# Patient Record
Sex: FEMALE | Race: WHITE | NOT HISPANIC OR LATINO | Employment: OTHER | URBAN - NONMETROPOLITAN AREA
[De-identification: names, ages, dates, MRNs, and addresses within clinical notes are randomized per-mention and may not be internally consistent; named-entity substitution may affect disease eponyms.]

---

## 2017-01-10 ENCOUNTER — OFFICE VISIT (OUTPATIENT)
Dept: RETAIL CLINIC | Facility: CLINIC | Age: 62
End: 2017-01-10

## 2017-01-10 VITALS
HEIGHT: 62 IN | BODY MASS INDEX: 27.94 KG/M2 | WEIGHT: 151.8 LBS | TEMPERATURE: 98.2 F | OXYGEN SATURATION: 97 % | HEART RATE: 93 BPM | RESPIRATION RATE: 20 BRPM

## 2017-01-10 DIAGNOSIS — J06.9 VIRAL UPPER RESPIRATORY TRACT INFECTION: Primary | ICD-10-CM

## 2017-01-10 PROCEDURE — 99213 OFFICE O/P EST LOW 20 MIN: CPT | Performed by: NURSE PRACTITIONER

## 2017-01-10 RX ORDER — FLUTICASONE PROPIONATE 50 MCG
2 SPRAY, SUSPENSION (ML) NASAL DAILY
Qty: 1 BOTTLE | Refills: 0 | Status: SHIPPED | OUTPATIENT
Start: 2017-01-10 | End: 2017-01-24 | Stop reason: SDUPTHER

## 2017-01-10 RX ORDER — GUAIFENESIN, PSEUDOEPHEDRINE HYDROCHLORIDE 600; 60 MG/1; MG/1
1 TABLET, EXTENDED RELEASE ORAL EVERY 12 HOURS
Qty: 10 TABLET | Refills: 0 | Status: SHIPPED | OUTPATIENT
Start: 2017-01-10 | End: 2017-02-07

## 2017-01-10 RX ORDER — NATEGLINIDE 60 MG/1
120 TABLET ORAL
COMMUNITY
End: 2017-02-26 | Stop reason: SDUPTHER

## 2017-01-10 RX ORDER — LORATADINE 10 MG/1
10 TABLET ORAL DAILY
Qty: 30 TABLET | Refills: 0 | Status: SHIPPED | OUTPATIENT
Start: 2017-01-10 | End: 2017-01-24 | Stop reason: SDUPTHER

## 2017-01-10 RX ORDER — ALLOPURINOL 100 MG/1
300 TABLET ORAL DAILY
COMMUNITY
End: 2017-02-26 | Stop reason: SDUPTHER

## 2017-01-10 NOTE — PATIENT INSTRUCTIONS
"Upper Respiratory Infection, Adult  Most upper respiratory infections (URIs) are a viral infection of the air passages leading to the lungs. A URI affects the nose, throat, and upper air passages. The most common type of URI is nasopharyngitis and is typically referred to as \"the common cold.\"  URIs run their course and usually go away on their own. Most of the time, a URI does not require medical attention, but sometimes a bacterial infection in the upper airways can follow a viral infection. This is called a secondary infection. Sinus and middle ear infections are common types of secondary upper respiratory infections.  Bacterial pneumonia can also complicate a URI. A URI can worsen asthma and chronic obstructive pulmonary disease (COPD). Sometimes, these complications can require emergency medical care and may be life threatening.   CAUSES  Almost all URIs are caused by viruses. A virus is a type of germ and can spread from one person to another.   RISKS FACTORS  You may be at risk for a URI if:   · You smoke.    · You have chronic heart or lung disease.  · You have a weakened defense (immune) system.    · You are very young or very old.    · You have nasal allergies or asthma.  · You work in crowded or poorly ventilated areas.  · You work in health care facilities or schools.  SIGNS AND SYMPTOMS   Symptoms typically develop 2-3 days after you come in contact with a cold virus. Most viral URIs last 7-10 days. However, viral URIs from the influenza virus (flu virus) can last 14-18 days and are typically more severe. Symptoms may include:   · Runny or stuffy (congested) nose.    · Sneezing.    · Cough.    · Sore throat.    · Headache.    · Fatigue.    · Fever.    · Loss of appetite.    · Pain in your forehead, behind your eyes, and over your cheekbones (sinus pain).  · Muscle aches.    DIAGNOSIS   Your health care provider may diagnose a URI by:  · Physical exam.  · Tests to check that your symptoms are not due to " another condition such as:  ¨ Strep throat.  ¨ Sinusitis.  ¨ Pneumonia.  ¨ Asthma.  TREATMENT   A URI goes away on its own with time. It cannot be cured with medicines, but medicines may be prescribed or recommended to relieve symptoms. Medicines may help:  · Reduce your fever.  · Reduce your cough.  · Relieve nasal congestion.  HOME CARE INSTRUCTIONS   · Take medicines only as directed by your health care provider.    · Gargle warm saltwater or take cough drops to comfort your throat as directed by your health care provider.  · Use a warm mist humidifier or inhale steam from a shower to increase air moisture. This may make it easier to breathe.  · Drink enough fluid to keep your urine clear or pale yellow.    · Eat soups and other clear broths and maintain good nutrition.    · Rest as needed.    · Return to work when your temperature has returned to normal or as your health care provider advises. You may need to stay home longer to avoid infecting others. You can also use a face mask and careful hand washing to prevent spread of the virus.  · Increase the usage of your inhaler if you have asthma.    · Do not use any tobacco products, including cigarettes, chewing tobacco, or electronic cigarettes. If you need help quitting, ask your health care provider.  PREVENTION   The best way to protect yourself from getting a cold is to practice good hygiene.   · Avoid oral or hand contact with people with cold symptoms.    · Wash your hands often if contact occurs.    There is no clear evidence that vitamin C, vitamin E, echinacea, or exercise reduces the chance of developing a cold. However, it is always recommended to get plenty of rest, exercise, and practice good nutrition.   SEEK MEDICAL CARE IF:   · You are getting worse rather than better.    · Your symptoms are not controlled by medicine.    · You have chills.  · You have worsening shortness of breath.  · You have brown or red mucus.  · You have yellow or brown nasal  discharge.  · You have pain in your face, especially when you bend forward.  · You have a fever.  · You have swollen neck glands.  · You have pain while swallowing.  · You have white areas in the back of your throat.  SEEK IMMEDIATE MEDICAL CARE IF:   · You have severe or persistent:    Headache.    Ear pain.    Sinus pain.    Chest pain.  · You have chronic lung disease and any of the following:    Wheezing.    Prolonged cough.    Coughing up blood.    A change in your usual mucus.  · You have a stiff neck.  · You have changes in your:    Vision.    Hearing.    Thinking.    Mood.  MAKE SURE YOU:   · Understand these instructions.  · Will watch your condition.  · Will get help right away if you are not doing well or get worse.     This information is not intended to replace advice given to you by your health care provider. Make sure you discuss any questions you have with your health care provider.     Document Released: 06/13/2002 Document Revised: 05/03/2016 Document Reviewed: 03/25/2015  [employer] Interactive Patient Education ©2016 [employer]
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
## 2017-01-10 NOTE — MR AVS SNAPSHOT
Breanna Kaba   1/10/2017 2:30 PM   Office Visit    Dept Phone:  [phone]   Encounter #:  [id number]    Provider:  GREG OLIVAREZ   Department:  [facility]                Your Full Care Plan              Today's Medication Changes          These changes are accurate as of: 1/10/17  2:48 PM.  If you have any questions, ask your nurse or doctor.               New Medication(s)Ordered:     fluticasone 50 MCG/ACT nasal spray   Commonly known as:  FLONASE   2 sprays into each nostril Daily for 30 days. Administer 2 sprays in each nostril for each dose.       loratadine 10 MG tablet   Commonly known as:  CLARITIN   Take 1 tablet by mouth Daily for 30 days.       pseudoephedrine-guaifenesin  MG per 12 hr tablet   Commonly known as:  MUCINEX D   Take 1 tablet by mouth Every 12 (Twelve) Hours.            Where to Get Your Medications      These medications were sent to [id number] [facility] [address] [place] [id number]     Phone:  [phone]     fluticasone 50 MCG/ACT nasal spray    loratadine 10 MG tablet    pseudoephedrine-guaifenesin  MG per 12 hr tablet                  Your Updated Medication List          This list is accurate as of: 1/10/17  2:48 PM.  Always use your most recent med list.                allopurinol 100 MG tablet   Commonly known as:  ZYLOPRIM       diclofenac 1 % gel gel   Commonly known as:  VOLTAREN       fluticasone 50 MCG/ACT nasal spray   Commonly known as:  FLONASE   2 sprays into each nostril Daily for 30 days. Administer 2 sprays in each nostril for each dose.       HYDROcodone-acetaminophen 5-325 MG per tablet   Commonly known as:  NORCO   Take 1 tablet by mouth Every 6 (Six) Hours As Needed for mild pain (1-3).       JANUMET PO       loratadine 10 MG tablet   Commonly known as:  CLARITIN   Take 1 tablet by mouth Daily for 30 days.       methocarbamol  "750 MG tablet   Commonly known as:  ROBAXIN   Take 1 tablet by mouth 2 (Two) Times a Day As Needed for muscle spasms.       nateglinide 60 MG tablet   Commonly known as:  STARLIX       pseudoephedrine-guaifenesin  MG per 12 hr tablet   Commonly known as:  MUCINEX D   Take 1 tablet by mouth Every 12 (Twelve) Hours.               You Were Diagnosed With        Codes Comments    Viral upper respiratory tract infection    -  Primary ICD-10-CM: J06.9, B97.89  ICD-9-CM: 465.9       Instructions    Upper Respiratory Infection, Adult  Most upper respiratory infections (URIs) are a viral infection of the air passages leading to the lungs. A URI affects the nose, throat, and upper air passages. The most common type of URI is nasopharyngitis and is typically referred to as \"the common cold.\"  URIs run their course and usually go away on their own. Most of the time, a URI does not require medical attention, but sometimes a bacterial infection in the upper airways can follow a viral infection. This is called a secondary infection. Sinus and middle ear infections are common types of secondary upper respiratory infections.  Bacterial pneumonia can also complicate a URI. A URI can worsen asthma and chronic obstructive pulmonary disease (COPD). Sometimes, these complications can require emergency medical care and may be life threatening.   CAUSES  Almost all URIs are caused by viruses. A virus is a type of germ and can spread from one person to another.   RISKS FACTORS  You may be at risk for a URI if:   · You smoke.    · You have chronic heart or lung disease.  · You have a weakened defense (immune) system.    · You are very young or very old.    · You have nasal allergies or asthma.  · You work in crowded or poorly ventilated areas.  · You work in health care facilities or schools.  SIGNS AND SYMPTOMS   Symptoms typically develop 2-3 days after you come in contact with a cold virus. Most viral URIs last 7-10 days. However, " viral URIs from the influenza virus (flu virus) can last 14-18 days and are typically more severe. Symptoms may include:   · Runny or stuffy (congested) nose.    · Sneezing.    · Cough.    · Sore throat.    · Headache.    · Fatigue.    · Fever.    · Loss of appetite.    · Pain in your forehead, behind your eyes, and over your cheekbones (sinus pain).  · Muscle aches.    DIAGNOSIS   Your health care provider may diagnose a URI by:  · Physical exam.  · Tests to check that your symptoms are not due to another condition such as:  ¨ Strep throat.  ¨ Sinusitis.  ¨ Pneumonia.  ¨ Asthma.  TREATMENT   A URI goes away on its own with time. It cannot be cured with medicines, but medicines may be prescribed or recommended to relieve symptoms. Medicines may help:  · Reduce your fever.  · Reduce your cough.  · Relieve nasal congestion.  HOME CARE INSTRUCTIONS   · Take medicines only as directed by your health care provider.    · Gargle warm saltwater or take cough drops to comfort your throat as directed by your health care provider.  · Use a warm mist humidifier or inhale steam from a shower to increase air moisture. This may make it easier to breathe.  · Drink enough fluid to keep your urine clear or pale yellow.    · Eat soups and other clear broths and maintain good nutrition.    · Rest as needed.    · Return to work when your temperature has returned to normal or as your health care provider advises. You may need to stay home longer to avoid infecting others. You can also use a face mask and careful hand washing to prevent spread of the virus.  · Increase the usage of your inhaler if you have asthma.    · Do not use any tobacco products, including cigarettes, chewing tobacco, or electronic cigarettes. If you need help quitting, ask your health care provider.  PREVENTION   The best way to protect yourself from getting a cold is to practice good hygiene.   · Avoid oral or hand contact with people with cold symptoms.    · Wash  your hands often if contact occurs.    There is no clear evidence that vitamin C, vitamin E, echinacea, or exercise reduces the chance of developing a cold. However, it is always recommended to get plenty of rest, exercise, and practice good nutrition.   SEEK MEDICAL CARE IF:   · You are getting worse rather than better.    · Your symptoms are not controlled by medicine.    · You have chills.  · You have worsening shortness of breath.  · You have brown or red mucus.  · You have yellow or brown nasal discharge.  · You have pain in your face, especially when you bend forward.  · You have a fever.  · You have swollen neck glands.  · You have pain while swallowing.  · You have white areas in the back of your throat.  SEEK IMMEDIATE MEDICAL CARE IF:   · You have severe or persistent:    Headache.    Ear pain.    Sinus pain.    Chest pain.  · You have chronic lung disease and any of the following:    Wheezing.    Prolonged cough.    Coughing up blood.    A change in your usual mucus.  · You have a stiff neck.  · You have changes in your:    Vision.    Hearing.    Thinking.    Mood.  MAKE SURE YOU:   · Understand these instructions.  · Will watch your condition.  · Will get help right away if you are not doing well or get worse.     This information is not intended to replace advice given to you by your health care provider. Make sure you discuss any questions you have with your health care provider.     Document Released: 06/13/2002 Document Revised: 05/03/2016 Document Reviewed: 03/25/2015  [employer] Interactive Patient Education ©2016 [employer]       Patient Instructions History      Upcoming Appointments     Visit Type Date Time Department    OFFICE VISIT 1/10/2017  2:30 PM [facility]    NEW PATIENT 1/24/2017  2:50 PM [facility]      My[facility]ma[facility] Signup     Our records indicate that you have declined [employer] MyChart signup. If you would like to sign up for [employer], please email [email or web address]  "or call [phone] to obtain an activation code.             Other Info from Your Visit           Your Appointments     Jan 24, 2017  2:50 PM EST   New Patient with Myrna Ramos MD   [facility] (--)    [address]  [address]  [facility] [address] [phone]           Bring all previous medical records and films, along with current medications and insurance information.              Allergies     Asa [Aspirin]      Penicillins        Reason for Visit     Sore Throat           Vital Signs     Pulse Temperature Respirations Height Weight Oxygen Saturation    93 98.2 °F (36.8 °C) (Temporal Artery ) 20 62\" (157.5 cm) 151 lb 12.8 oz (68.9 kg) 97%    Body Mass Index Smoking Status                27.76 kg/m2 Never Smoker          Problems and Diagnoses Noted     Viral upper respiratory tract infection    -  Primary        "

## 2017-01-24 ENCOUNTER — OFFICE VISIT (OUTPATIENT)
Dept: FAMILY MEDICINE CLINIC | Facility: CLINIC | Age: 62
End: 2017-01-24

## 2017-01-24 VITALS
SYSTOLIC BLOOD PRESSURE: 125 MMHG | HEART RATE: 102 BPM | DIASTOLIC BLOOD PRESSURE: 73 MMHG | HEIGHT: 62 IN | WEIGHT: 154 LBS | BODY MASS INDEX: 28.34 KG/M2 | OXYGEN SATURATION: 98 %

## 2017-01-24 DIAGNOSIS — R82.90 ABNORMAL URINE: ICD-10-CM

## 2017-01-24 DIAGNOSIS — M1A.09X1 IDIOPATHIC CHRONIC GOUT OF MULTIPLE SITES WITH TOPHUS: ICD-10-CM

## 2017-01-24 DIAGNOSIS — G89.4 CHRONIC PAIN SYNDROME: ICD-10-CM

## 2017-01-24 DIAGNOSIS — J30.1 SEASONAL ALLERGIC RHINITIS DUE TO POLLEN: ICD-10-CM

## 2017-01-24 DIAGNOSIS — E11.9 TYPE 2 DIABETES MELLITUS WITHOUT COMPLICATION, WITHOUT LONG-TERM CURRENT USE OF INSULIN (HCC): Primary | ICD-10-CM

## 2017-01-24 LAB
ALBUMIN UR-MCNC: 42 MG/L
BILIRUB BLD-MCNC: NEGATIVE MG/DL
CLARITY, POC: CLEAR
COLOR UR: YELLOW
GLUCOSE UR STRIP-MCNC: ABNORMAL MG/DL
KETONES UR QL: NEGATIVE
LEUKOCYTE EST, POC: ABNORMAL
NITRITE UR-MCNC: NEGATIVE MG/ML
PH UR: 5.5 [PH] (ref 5–8)
PROT UR STRIP-MCNC: ABNORMAL MG/DL
RBC # UR STRIP: NEGATIVE /UL
SP GR UR: 1.03 (ref 1–1.03)
UROBILINOGEN UR QL: NORMAL

## 2017-01-24 PROCEDURE — 81003 URINALYSIS AUTO W/O SCOPE: CPT | Performed by: FAMILY MEDICINE

## 2017-01-24 PROCEDURE — 82043 UR ALBUMIN QUANTITATIVE: CPT | Performed by: FAMILY MEDICINE

## 2017-01-24 PROCEDURE — 87086 URINE CULTURE/COLONY COUNT: CPT | Performed by: FAMILY MEDICINE

## 2017-01-24 PROCEDURE — 99204 OFFICE O/P NEW MOD 45 MIN: CPT | Performed by: FAMILY MEDICINE

## 2017-01-24 RX ORDER — FLUTICASONE PROPIONATE 50 MCG
2 SPRAY, SUSPENSION (ML) NASAL DAILY
Qty: 1 BOTTLE | Refills: 5 | Status: SHIPPED | OUTPATIENT
Start: 2017-01-24 | End: 2017-02-23

## 2017-01-24 RX ORDER — GABAPENTIN 600 MG/1
600 TABLET ORAL 3 TIMES DAILY
COMMUNITY
End: 2017-01-24 | Stop reason: SDUPTHER

## 2017-01-24 RX ORDER — DICLOFENAC SODIUM 75 MG/1
75 TABLET, DELAYED RELEASE ORAL 2 TIMES DAILY
Qty: 60 TABLET | Refills: 2 | Status: SHIPPED | OUTPATIENT
Start: 2017-01-24 | End: 2019-01-16 | Stop reason: SDUPTHER

## 2017-01-24 RX ORDER — DICLOFENAC SODIUM 75 MG/1
75 TABLET, DELAYED RELEASE ORAL 2 TIMES DAILY
COMMUNITY
End: 2017-01-24 | Stop reason: SDUPTHER

## 2017-01-24 RX ORDER — ALLOPURINOL 300 MG/1
300 TABLET ORAL DAILY
Qty: 30 TABLET | Refills: 2 | Status: SHIPPED | OUTPATIENT
Start: 2017-01-24 | End: 2019-01-16 | Stop reason: SDUPTHER

## 2017-01-24 RX ORDER — LORATADINE 10 MG/1
10 TABLET ORAL DAILY
Qty: 30 TABLET | Refills: 2 | Status: SHIPPED | OUTPATIENT
Start: 2017-01-24 | End: 2017-02-23

## 2017-01-24 RX ORDER — NATEGLINIDE 120 MG/1
120 TABLET ORAL
Qty: 90 TABLET | Refills: 2 | Status: SHIPPED | OUTPATIENT
Start: 2017-01-24 | End: 2019-01-16 | Stop reason: SDUPTHER

## 2017-01-24 RX ORDER — GABAPENTIN 600 MG/1
600 TABLET ORAL 3 TIMES DAILY
Qty: 90 TABLET | Refills: 2 | Status: SHIPPED | OUTPATIENT
Start: 2017-01-24 | End: 2019-06-27

## 2017-01-26 LAB — BACTERIA SPEC AEROBE CULT: NORMAL
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
## 2017-02-13 ENCOUNTER — HOSPITAL ENCOUNTER (EMERGENCY)
Facility: HOSPITAL | Age: 62
Discharge: HOME | End: 2017-02-13
Attending: EMERGENCY MEDICINE | Admitting: EMERGENCY MEDICINE

## 2017-02-13 ENCOUNTER — APPOINTMENT (OUTPATIENT)
Dept: GENERAL RADIOLOGY | Facility: HOSPITAL | Age: 62
End: 2017-02-13

## 2017-02-13 VITALS
SYSTOLIC BLOOD PRESSURE: 145 MMHG | DIASTOLIC BLOOD PRESSURE: 80 MMHG | HEIGHT: 62 IN | TEMPERATURE: 97.9 F | RESPIRATION RATE: 18 BRPM | BODY MASS INDEX: 27.42 KG/M2 | WEIGHT: 149 LBS | OXYGEN SATURATION: 98 % | HEART RATE: 92 BPM

## 2017-02-13 DIAGNOSIS — J10.1 INFLUENZA A: Primary | ICD-10-CM

## 2017-02-13 LAB
ALBUMIN SERPL-MCNC: 4.4 G/DL (ref 3.4–4.8)
ALBUMIN/GLOB SERPL: 1.4 G/DL (ref 1.5–2.5)
ALP SERPL-CCNC: 89 U/L (ref 46–116)
ALT SERPL W P-5'-P-CCNC: 21 U/L (ref 10–36)
ANION GAP SERPL CALCULATED.3IONS-SCNC: 4.1 MMOL/L (ref 3.6–11.2)
AST SERPL-CCNC: 22 U/L (ref 10–30)
BACTERIA UR QL AUTO: ABNORMAL /HPF
BASOPHILS # BLD AUTO: 0.04 10*3/MM3 (ref 0–0.3)
BASOPHILS NFR BLD AUTO: 0.4 % (ref 0–2)
BILIRUB SERPL-MCNC: 0.4 MG/DL (ref 0.2–1.8)
BILIRUB UR QL STRIP: NEGATIVE
BUN BLD-MCNC: 19 MG/DL (ref 7–21)
BUN/CREAT SERPL: 21.3 (ref 7–25)
CALCIUM SPEC-SCNC: 9.7 MG/DL (ref 7.7–10)
CHLORIDE SERPL-SCNC: 100 MMOL/L (ref 99–112)
CLARITY UR: CLEAR
CO2 SERPL-SCNC: 32.9 MMOL/L (ref 24.3–31.9)
COLOR UR: YELLOW
CREAT BLD-MCNC: 0.89 MG/DL (ref 0.43–1.29)
DEPRECATED RDW RBC AUTO: 40.9 FL (ref 37–54)
EOSINOPHIL # BLD AUTO: 0.2 10*3/MM3 (ref 0–0.7)
EOSINOPHIL NFR BLD AUTO: 1.8 % (ref 0–5)
ERYTHROCYTE [DISTWIDTH] IN BLOOD BY AUTOMATED COUNT: 14.2 % (ref 11.5–14.5)
FLUAV AG NPH QL: NEGATIVE
FLUBV AG NPH QL IA: NEGATIVE
GFR SERPL CREATININE-BSD FRML MDRD: 64 ML/MIN/1.73
GLOBULIN UR ELPH-MCNC: 3.1 GM/DL
GLUCOSE BLD-MCNC: 238 MG/DL (ref 70–110)
GLUCOSE UR STRIP-MCNC: ABNORMAL MG/DL
HCT VFR BLD AUTO: 39.1 % (ref 37–47)
HGB BLD-MCNC: 13.2 G/DL (ref 12–16)
HGB UR QL STRIP.AUTO: NEGATIVE
HYALINE CASTS UR QL AUTO: ABNORMAL /LPF
IMM GRANULOCYTES # BLD: 0.03 10*3/MM3 (ref 0–0.03)
IMM GRANULOCYTES NFR BLD: 0.3 % (ref 0–0.5)
KETONES UR QL STRIP: NEGATIVE
LEUKOCYTE ESTERASE UR QL STRIP.AUTO: ABNORMAL
LYMPHOCYTES # BLD AUTO: 2.7 10*3/MM3 (ref 1–3)
LYMPHOCYTES NFR BLD AUTO: 23.8 % (ref 21–51)
MCH RBC QN AUTO: 27.1 PG (ref 27–33)
MCHC RBC AUTO-ENTMCNC: 33.8 G/DL (ref 33–37)
MCV RBC AUTO: 80.3 FL (ref 80–94)
MONOCYTES # BLD AUTO: 1.1 10*3/MM3 (ref 0.1–0.9)
MONOCYTES NFR BLD AUTO: 9.7 % (ref 0–10)
NEUTROPHILS # BLD AUTO: 7.28 10*3/MM3 (ref 1.4–6.5)
NEUTROPHILS NFR BLD AUTO: 64 % (ref 30–70)
NITRITE UR QL STRIP: NEGATIVE
OSMOLALITY SERPL CALC.SUM OF ELEC: 283.8 MOSM/KG (ref 273–305)
PH UR STRIP.AUTO: 5.5 [PH] (ref 5–8)
PLATELET # BLD AUTO: 375 10*3/MM3 (ref 130–400)
PMV BLD AUTO: 10.5 FL (ref 6–10)
POTASSIUM BLD-SCNC: 4.8 MMOL/L (ref 3.5–5.3)
PROT SERPL-MCNC: 7.5 G/DL (ref 6–8)
PROT UR QL STRIP: NEGATIVE
RBC # BLD AUTO: 4.87 10*6/MM3 (ref 4.2–5.4)
RBC # UR: ABNORMAL /HPF
REF LAB TEST METHOD: ABNORMAL
S PYO AG THROAT QL: NEGATIVE
SODIUM BLD-SCNC: 137 MMOL/L (ref 135–153)
SP GR UR STRIP: 1.02 (ref 1–1.03)
SQUAMOUS #/AREA URNS HPF: ABNORMAL /HPF
UROBILINOGEN UR QL STRIP: ABNORMAL
WBC NRBC COR # BLD: 11.35 10*3/MM3 (ref 4.5–12.5)
WBC UR QL AUTO: ABNORMAL /HPF

## 2017-02-13 PROCEDURE — 80053 COMPREHEN METABOLIC PANEL: CPT | Performed by: PHYSICIAN ASSISTANT

## 2017-02-13 PROCEDURE — 87081 CULTURE SCREEN ONLY: CPT | Performed by: PHYSICIAN ASSISTANT

## 2017-02-13 PROCEDURE — 71020 XR CHEST 2 VW: CPT | Performed by: RADIOLOGY

## 2017-02-13 PROCEDURE — 87804 INFLUENZA ASSAY W/OPTIC: CPT | Performed by: PHYSICIAN ASSISTANT

## 2017-02-13 PROCEDURE — 81001 URINALYSIS AUTO W/SCOPE: CPT | Performed by: PHYSICIAN ASSISTANT

## 2017-02-13 PROCEDURE — 87086 URINE CULTURE/COLONY COUNT: CPT | Performed by: PHYSICIAN ASSISTANT

## 2017-02-13 PROCEDURE — 36415 COLL VENOUS BLD VENIPUNCTURE: CPT

## 2017-02-13 PROCEDURE — 85025 COMPLETE CBC W/AUTO DIFF WBC: CPT | Performed by: PHYSICIAN ASSISTANT

## 2017-02-13 PROCEDURE — 99283 EMERGENCY DEPT VISIT LOW MDM: CPT

## 2017-02-13 PROCEDURE — 87880 STREP A ASSAY W/OPTIC: CPT | Performed by: PHYSICIAN ASSISTANT

## 2017-02-13 PROCEDURE — 71020 HC CHEST PA AND LATERAL: CPT

## 2017-02-13 RX ORDER — OSELTAMIVIR PHOSPHATE 75 MG/1
75 CAPSULE ORAL 2 TIMES DAILY
Qty: 10 CAPSULE | Refills: 0 | Status: SHIPPED | OUTPATIENT
Start: 2017-02-13 | End: 2017-02-26

## 2017-02-13 NOTE — ED PROVIDER NOTES
Subjective   Patient is a 61 y.o. female presenting with URI.   History provided by:  Patient   used: No    URI   Presenting symptoms: congestion, cough, fatigue and rhinorrhea    Presenting symptoms: no fever    Severity:  Moderate  Onset quality:  Sudden  Duration:  2 days  Timing:  Constant  Progression:  Worsening  Chronicity:  New  Relieved by:  Nothing  Worsened by:  Nothing  Associated symptoms: arthralgias and myalgias    Associated symptoms: no swollen glands and no wheezing    Risk factors: not elderly, no chronic cardiac disease, no chronic kidney disease, no chronic respiratory disease, no immunosuppression, no recent illness and no recent travel        Review of Systems   Constitutional: Positive for chills and fatigue. Negative for fever.   HENT: Positive for congestion and rhinorrhea.    Respiratory: Positive for cough. Negative for wheezing.    Musculoskeletal: Positive for arthralgias and myalgias.   All other systems reviewed and are negative.      Past Medical History   Diagnosis Date   • Chronic pain    • Diabetes mellitus    • GERD (gastroesophageal reflux disease)    • Gout    • Headache    • Neuropathy        Allergies   Allergen Reactions   • Asa [Aspirin]    • Penicillins        Past Surgical History   Procedure Laterality Date   • Hysterectomy       Partial   • Knee surgery     • Thyroidectomy, partial       Removal of goiter       Family History   Problem Relation Age of Onset   • COPD Mother    • Heart disease Brother    • Heart attack Brother    • Hypertension Daughter    • Diabetes Daughter    • Hypertension Daughter    • Diabetes Daughter        Social History     Social History   • Marital status:      Spouse name: N/A   • Number of children: N/A   • Years of education: N/A     Social History Main Topics   • Smoking status: Never Smoker   • Smokeless tobacco: Never Used   • Alcohol use No   • Drug use: No   • Sexual activity: Defer     Other Topics Concern    • None     Social History Narrative           Objective   Physical Exam   Constitutional: She is oriented to person, place, and time. She appears well-developed and well-nourished.   HENT:   Head: Normocephalic.   Right Ear: External ear normal.   Left Ear: External ear normal.   Nose: Nose normal.   Mouth/Throat: Oropharynx is clear and moist.   Eyes: Conjunctivae and EOM are normal. Pupils are equal, round, and reactive to light.   Neck: Normal range of motion. Neck supple. No tracheal deviation present. No thyromegaly present.   Cardiovascular: Normal rate, regular rhythm, normal heart sounds and intact distal pulses.    Pulmonary/Chest: Effort normal and breath sounds normal.   Abdominal: Soft. Bowel sounds are normal.   Musculoskeletal: Normal range of motion.   Neurological: She is alert and oriented to person, place, and time. She has normal reflexes.   Skin: Skin is warm and dry.   Psychiatric: She has a normal mood and affect. Her behavior is normal. Judgment and thought content normal.   Nursing note and vitals reviewed.      Procedures         ED Course  ED Course   Comment By Time   61-year-old female comes in with chief complaint cough, congestion, fever, body aches.  States his been present for 2 days.  Patient has had a dry nonproductive cough. Sanford Warren PA-C 02/13 1042   Patient will be treated for flu due to known close contact with individual who has tested positive for flu type A. Sanford Warren PA-C 02/13 1247                  MDM  Number of Diagnoses or Management Options  Influenza A: new and requires workup     Amount and/or Complexity of Data Reviewed  Clinical lab tests: reviewed and ordered  Tests in the radiology section of CPT®: ordered and reviewed    Risk of Complications, Morbidity, and/or Mortality  Presenting problems: moderate  Diagnostic procedures: moderate  Management options: moderate    Patient Progress  Patient progress: stable      Final diagnoses:    Influenza A            Sanford Warren PA-C  02/13/17 [id number]

## 2017-02-14 LAB — BACTERIA SPEC AEROBE CULT: NORMAL

## 2017-02-15 LAB — BACTERIA SPEC AEROBE CULT: NO GROWTH

## 2017-02-24 ENCOUNTER — OFFICE VISIT (OUTPATIENT)
Dept: FAMILY MEDICINE CLINIC | Facility: CLINIC | Age: 62
End: 2017-02-24

## 2017-02-24 VITALS
WEIGHT: 154.8 LBS | DIASTOLIC BLOOD PRESSURE: 83 MMHG | HEIGHT: 62 IN | OXYGEN SATURATION: 98 % | HEART RATE: 101 BPM | BODY MASS INDEX: 28.49 KG/M2 | SYSTOLIC BLOOD PRESSURE: 149 MMHG

## 2017-02-24 DIAGNOSIS — M1A.09X0 IDIOPATHIC CHRONIC GOUT OF MULTIPLE SITES WITHOUT TOPHUS: ICD-10-CM

## 2017-02-24 DIAGNOSIS — Z91.199 NONCOMPLIANCE: Primary | ICD-10-CM

## 2017-02-24 DIAGNOSIS — E11.65 TYPE 2 DIABETES MELLITUS WITH HYPERGLYCEMIA, WITHOUT LONG-TERM CURRENT USE OF INSULIN (HCC): ICD-10-CM

## 2017-02-24 DIAGNOSIS — G89.4 CHRONIC PAIN SYNDROME: ICD-10-CM

## 2017-02-24 DIAGNOSIS — J30.89 SEASONAL ALLERGIC RHINITIS DUE TO OTHER ALLERGIC TRIGGER: ICD-10-CM

## 2017-02-24 PROCEDURE — 99214 OFFICE O/P EST MOD 30 MIN: CPT | Performed by: FAMILY MEDICINE

## 2017-02-24 RX ORDER — GABAPENTIN 600 MG/1
600 TABLET ORAL 3 TIMES DAILY
Qty: 90 TABLET | Refills: 2 | Status: CANCELLED | OUTPATIENT
Start: 2017-02-24

## 2017-02-24 RX ORDER — ALLOPURINOL 300 MG/1
300 TABLET ORAL DAILY
Qty: 30 TABLET | Refills: 2 | Status: CANCELLED | OUTPATIENT
Start: 2017-02-24

## 2017-02-24 RX ORDER — DICLOFENAC SODIUM 75 MG/1
75 TABLET, DELAYED RELEASE ORAL 2 TIMES DAILY
Qty: 60 TABLET | Refills: 2 | Status: CANCELLED | OUTPATIENT
Start: 2017-02-24

## 2017-02-27 ENCOUNTER — HOSPITAL ENCOUNTER (EMERGENCY)
Facility: HOSPITAL | Age: 62
Discharge: HOME | End: 2017-02-27
Attending: EMERGENCY MEDICINE | Admitting: EMERGENCY MEDICINE

## 2017-02-27 VITALS
SYSTOLIC BLOOD PRESSURE: 138 MMHG | RESPIRATION RATE: 18 BRPM | WEIGHT: 154 LBS | TEMPERATURE: 97.3 F | HEIGHT: 63 IN | BODY MASS INDEX: 27.29 KG/M2 | OXYGEN SATURATION: 99 % | HEART RATE: 92 BPM | DIASTOLIC BLOOD PRESSURE: 80 MMHG

## 2017-02-27 DIAGNOSIS — N30.01 ACUTE CYSTITIS WITH HEMATURIA: Primary | ICD-10-CM

## 2017-02-27 LAB
BACTERIA UR QL AUTO: ABNORMAL /HPF
BILIRUB UR QL STRIP: NEGATIVE
CLARITY UR: CLEAR
COLOR UR: ABNORMAL
GLUCOSE UR STRIP-MCNC: NEGATIVE MG/DL
HGB UR QL STRIP.AUTO: ABNORMAL
HYALINE CASTS UR QL AUTO: ABNORMAL /LPF
KETONES UR QL STRIP: NEGATIVE
LEUKOCYTE ESTERASE UR QL STRIP.AUTO: ABNORMAL
NITRITE UR QL STRIP: NEGATIVE
PH UR STRIP.AUTO: 6 [PH] (ref 5–8)
PROT UR QL STRIP: NEGATIVE
RBC # UR: ABNORMAL /HPF
REF LAB TEST METHOD: ABNORMAL
SP GR UR STRIP: <=1.005 (ref 1–1.03)
SQUAMOUS #/AREA URNS HPF: ABNORMAL /HPF
UROBILINOGEN UR QL STRIP: ABNORMAL
WBC UR QL AUTO: ABNORMAL /HPF

## 2017-02-27 PROCEDURE — 99283 EMERGENCY DEPT VISIT LOW MDM: CPT

## 2017-02-27 PROCEDURE — 87088 URINE BACTERIA CULTURE: CPT | Performed by: PHYSICIAN ASSISTANT

## 2017-02-27 PROCEDURE — 87086 URINE CULTURE/COLONY COUNT: CPT | Performed by: PHYSICIAN ASSISTANT

## 2017-02-27 PROCEDURE — 81001 URINALYSIS AUTO W/SCOPE: CPT | Performed by: PHYSICIAN ASSISTANT

## 2017-02-27 PROCEDURE — 87077 CULTURE AEROBIC IDENTIFY: CPT | Performed by: PHYSICIAN ASSISTANT

## 2017-02-27 PROCEDURE — 87186 SC STD MICRODIL/AGAR DIL: CPT | Performed by: PHYSICIAN ASSISTANT

## 2017-02-27 RX ORDER — NITROFURANTOIN 25; 75 MG/1; MG/1
100 CAPSULE ORAL 2 TIMES DAILY
Qty: 14 CAPSULE | Refills: 0 | Status: SHIPPED | OUTPATIENT
Start: 2017-02-27 | End: 2017-03-06

## 2017-02-27 RX ORDER — NITROFURANTOIN 25; 75 MG/1; MG/1
100 CAPSULE ORAL ONCE
Status: COMPLETED | OUTPATIENT
Start: 2017-02-27 | End: 2017-02-27

## 2017-02-27 RX ADMIN — NITROFURANTOIN MONOHYDRATE/MACROCRYSTALLINE 100 MG: 25; 75 CAPSULE ORAL at 14:59
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
## 2017-02-28 ENCOUNTER — HOSPITAL ENCOUNTER (OUTPATIENT)
Dept: PHYSICAL THERAPY | Facility: HOSPITAL | Age: 62
Setting detail: THERAPIES SERIES
Discharge: HOME | End: 2017-02-28

## 2017-02-28 DIAGNOSIS — M54.2 NECK PAIN: Primary | ICD-10-CM

## 2017-02-28 PROCEDURE — 97010 HOT OR COLD PACKS THERAPY: CPT | Performed by: PHYSICAL THERAPIST

## 2017-02-28 PROCEDURE — 97163 PT EVAL HIGH COMPLEX 45 MIN: CPT | Performed by: PHYSICAL THERAPIST

## 2017-02-28 PROCEDURE — G0283 ELEC STIM OTHER THAN WOUND: HCPCS | Performed by: PHYSICAL THERAPIST

## 2017-03-01 LAB
25(OH)D3 SERPL-MCNC: 25 NG/ML
ALBUMIN SERPL-MCNC: 4.6 G/DL (ref 3.4–4.8)
ALBUMIN/GLOB SERPL: 1.5 G/DL (ref 1.5–2.5)
ALP SERPL-CCNC: 90 U/L (ref 35–104)
ALT SERPL W P-5'-P-CCNC: 29 U/L (ref 10–36)
ANION GAP SERPL CALCULATED.3IONS-SCNC: 6.2 MMOL/L (ref 3.6–11.2)
AST SERPL-CCNC: 25 U/L (ref 10–30)
BACTERIA SPEC AEROBE CULT: ABNORMAL
BILIRUB SERPL-MCNC: 0.7 MG/DL (ref 0.2–1.8)
BUN BLD-MCNC: 17 MG/DL (ref 7–21)
BUN/CREAT SERPL: 21.5 (ref 7–25)
CALCIUM SPEC-SCNC: 9.9 MG/DL (ref 7.7–10)
CHLORIDE SERPL-SCNC: 99 MMOL/L (ref 99–112)
CO2 SERPL-SCNC: 32.8 MMOL/L (ref 24.3–31.9)
CREAT BLD-MCNC: 0.79 MG/DL (ref 0.43–1.29)
CRP SERPL-MCNC: 0.83 MG/DL (ref 0–0.99)
ERYTHROCYTE [SEDIMENTATION RATE] IN BLOOD: 12 MM/HR (ref 0–30)
GFR SERPL CREATININE-BSD FRML MDRD: 74 ML/MIN/1.73
GLOBULIN UR ELPH-MCNC: 3 GM/DL
GLUCOSE BLD-MCNC: 227 MG/DL (ref 70–110)
HBA1C MFR BLD: 7.7 % (ref 4.5–5.7)
OSMOLALITY SERPL CALC.SUM OF ELEC: 284.4 MOSM/KG (ref 273–305)
POTASSIUM BLD-SCNC: 4.6 MMOL/L (ref 3.5–5.3)
PROT SERPL-MCNC: 7.6 G/DL (ref 6–8)
SODIUM BLD-SCNC: 138 MMOL/L (ref 135–153)
URATE SERPL-MCNC: 3.9 MG/DL (ref 2.4–5.7)
VIT B12 BLD-MCNC: 505 PG/ML (ref 211–911)

## 2017-03-01 PROCEDURE — 83036 HEMOGLOBIN GLYCOSYLATED A1C: CPT | Performed by: FAMILY MEDICINE

## 2017-03-01 PROCEDURE — 82306 VITAMIN D 25 HYDROXY: CPT | Performed by: FAMILY MEDICINE

## 2017-03-01 PROCEDURE — 85652 RBC SED RATE AUTOMATED: CPT | Performed by: FAMILY MEDICINE

## 2017-03-01 PROCEDURE — 36415 COLL VENOUS BLD VENIPUNCTURE: CPT | Performed by: FAMILY MEDICINE

## 2017-03-01 PROCEDURE — 80061 LIPID PANEL: CPT | Performed by: FAMILY MEDICINE

## 2017-03-01 PROCEDURE — 86140 C-REACTIVE PROTEIN: CPT | Performed by: FAMILY MEDICINE

## 2017-03-01 PROCEDURE — 80053 COMPREHEN METABOLIC PANEL: CPT | Performed by: FAMILY MEDICINE

## 2017-03-01 PROCEDURE — 82607 VITAMIN B-12: CPT | Performed by: FAMILY MEDICINE

## 2017-03-01 PROCEDURE — 83704 LIPOPROTEIN BLD QUAN PART: CPT | Performed by: FAMILY MEDICINE

## 2017-03-01 PROCEDURE — 84550 ASSAY OF BLOOD/URIC ACID: CPT | Performed by: FAMILY MEDICINE

## 2017-03-01 RX ORDER — ONDANSETRON 4 MG/1
4 TABLET, FILM COATED ORAL EVERY 8 HOURS PRN
Qty: 21 TABLET | Refills: 0 | Status: SHIPPED | OUTPATIENT
Start: 2017-03-01 | End: 2019-06-27

## 2017-03-02 LAB
CHOLEST SERPL-MCNC: 256 MG/DL (ref 100–199)
HDL SERPL-SCNC: 33.9 UMOL/L
HDLC SERPL-MCNC: 46 MG/DL
LDL-P: 2377 NMOL/L
LDLC REAL SIZE PAT SERPL: 20.1 NM
LDLC SERPL CALC-MCNC: 172 MG/DL (ref 0–99)
SMALL LDL-P: 1639 NMOL/L
TRIGL SERPL-MCNC: 189 MG/DL (ref 0–149)

## 2017-03-03 ENCOUNTER — HOSPITAL ENCOUNTER (OUTPATIENT)
Dept: PHYSICAL THERAPY | Facility: HOSPITAL | Age: 62
Setting detail: THERAPIES SERIES
Discharge: HOME | End: 2017-03-03

## 2017-03-03 DIAGNOSIS — M54.2 NECK PAIN: Primary | ICD-10-CM

## 2017-03-03 PROCEDURE — G0283 ELEC STIM OTHER THAN WOUND: HCPCS | Performed by: PHYSICAL THERAPIST

## 2017-03-03 PROCEDURE — 97110 THERAPEUTIC EXERCISES: CPT | Performed by: PHYSICAL THERAPIST

## 2017-03-03 PROCEDURE — 97010 HOT OR COLD PACKS THERAPY: CPT | Performed by: PHYSICAL THERAPIST

## 2017-03-03 NOTE — PROGRESS NOTES
Outpatient Physical Therapy Ortho Treatment Note   [facility]     Patient Name: Breanna Kaba  : 1955  MRN: [id number]  Today's Date: 3/3/2017      Visit Date: 2017    Visit Dx:    ICD-10-CM ICD-9-CM   1. Neck pain M54.2 723.1       Patient Active Problem List   Diagnosis   • Neuropathy   • Gout   • GERD (gastroesophageal reflux disease)   • Diabetes mellitus   • Chronic pain        Past Medical History   Diagnosis Date   • Arthritis    • Chronic pain    • Diabetes mellitus    • GERD (gastroesophageal reflux disease)    • Gout    • Headache    • Neuropathy         Past Surgical History   Procedure Laterality Date   • Hysterectomy       Partial   • Knee surgery     • Thyroidectomy, partial       Removal of goiter             PT Ortho       17 1200    Subjective Comments    Subjective Comments Pt reported participating in her HEP as instructed. She stated she can tell small improvements.  -AD    Subjective Pain    Able to rate subjective pain? yes  -AD    Pre-Treatment Pain Level 5  -AD    Post-Treatment Pain Level 3  -AD      User Key  (r) = Recorded By, (t) = Taken By, (c) = Cosigned By    Initials Name Provider Type    AD Ashley Claudene Dalton, PT Physical Therapist                            PT Assessment/Plan       17 1253       PT Assessment    Assessment Comments Pt tolerated the session well with no reports of increased pain during therapeutic exercises. No adverse reactions were observed following moist heat combined with IFC to the bilateral trapezius muscles. Minimal cuing was required for proper form. Therapeutic exercises address bilateral upper extremity weakness, scapular stability,  strength, cervical range of motion, and impaired posture. She will continue to be progressed as tolerated.  -AD     PT Plan    PT Plan Comments Progress as tolerated per POC.  -AD       User Key  (r) = Recorded By, (t) = Taken By, (c) = Cosigned By    Initials Name Provider Type    NIRAV  Ashley Claudene Dalton, PT Physical Therapist                Modalities       03/03/17 1200          Moist Heat    MH Applied Yes  -AD      Location Bilateral upper trapezius   No adverse reactions following modality  -AD      Rx Minutes 10 mins  -AD      MH S/P Rx Yes  -AD      ELECTRICAL STIMULATION    Attended/Unattended Unattended  -AD      Stimulation Type IFC  -AD      Max mAmp --   Per pt tolerance  -AD      Location/Electrode Placement/Other Bilateral upper trapzius   With moist heat  -AD      Rx Minutes 10 mins  -AD        User Key  (r) = Recorded By, (t) = Taken By, (c) = Cosigned By    Initials Name Provider Type    AD Ashley Claudene Dalton, PT Physical Therapist                Exercises       03/03/17 1200          Subjective Comments    Subjective Comments Pt reported participating in her HEP as instructed. She stated she can tell small improvements.  -AD      Subjective Pain    Able to rate subjective pain? yes  -AD      Pre-Treatment Pain Level 5  -AD      Post-Treatment Pain Level 3  -AD      Exercise 1    Exercise Name 1 UT stretch, levator stretch, cervical retraction, scapular squeeze, mid and low rows with RTB, red hand gripper.  -AD      Cueing 1 Verbal;Tactile  -AD        User Key  (r) = Recorded By, (t) = Taken By, (c) = Cosigned By    Initials Name Provider Type    AD Ashley Claudene Dalton, PT Physical Therapist                                   Therapy Education       03/03/17 1253          Therapy Education    Given HEP;Symptoms/condition management;Pain management;Posture/body mechanics  -AD      Program Reinforced  -AD      How Provided Verbal  -AD      Provided to Patient  -AD      Level of Understanding Verbalized  -AD        User Key  (r) = Recorded By, (t) = Taken By, (c) = Cosigned By    Initials Name Provider Type    AD Ashley Claudene Dalton, PT Physical Therapist                Time Calculation:   Start Time: 1045  Stop Time: 1130  Time Calculation (min): 45 min    Therapy  Charges for Today     Code Description Service Date Service Provider Modifiers Qty    49392006752 HC ELECTRICAL STIM UNATTENDED 3/3/2017 Ashley Claudene Dalton, PT  1    35964347719 HC PT HOT/COLD PACK WC NONMCARE 3/3/2017 Ashley Claudene Dalton, PT GP 1    21173202564 HC PT THER PROC EA 15 MIN 3/3/2017 Ashley Claudene Dalton, PT GP 2                    Ashley Claudene Dalton, PT  3/3/2017

## 2017-03-06 ENCOUNTER — TELEPHONE (OUTPATIENT)
Dept: FAMILY MEDICINE CLINIC | Facility: CLINIC | Age: 62
End: 2017-03-06

## 2017-03-06 NOTE — TELEPHONE ENCOUNTER
Patient arrived for pill count at 12:22pm gave me her gabapentin bottle and stated she had spilled them & threw some of them away because they were dirty,she had filled #90 on 2/22/17 had 25 remaining verified as gabapentin per Dr. Ramos informed her that Dr. Ramos would discuss this with her on her next visit.

## 2017-03-06 NOTE — TELEPHONE ENCOUNTER
Needs to have at least #51 in her bottle. With an inconsistent UDS x2 along with an inconsistent pill count, will have ot discontinue gabapentin and will have to discuss alternatives at her next visit. Will cancel gabapentin refills.

## 2017-03-06 NOTE — TELEPHONE ENCOUNTER
Called patien to come into the office for a random pill count on gabapentin & informed her she must arrive before 2pm & she verbalized understanding.

## 2017-03-07 ENCOUNTER — HOSPITAL ENCOUNTER (OUTPATIENT)
Dept: PHYSICAL THERAPY | Facility: HOSPITAL | Age: 62
Setting detail: THERAPIES SERIES
Discharge: HOME | End: 2017-03-07

## 2017-03-07 DIAGNOSIS — M54.2 NECK PAIN: Primary | ICD-10-CM

## 2017-03-07 PROCEDURE — G0283 ELEC STIM OTHER THAN WOUND: HCPCS

## 2017-03-07 PROCEDURE — 97010 HOT OR COLD PACKS THERAPY: CPT

## 2017-03-07 PROCEDURE — 97110 THERAPEUTIC EXERCISES: CPT

## 2017-03-07 NOTE — PROGRESS NOTES
Outpatient Physical Therapy Ortho Treatment Note   [facility]     Patient Name: Breanna Kaba  : 1955  MRN: [id number]  Today's Date: 3/7/2017      Visit Date: 2017    Visit Dx:    ICD-10-CM ICD-9-CM   1. Neck pain M54.2 723.1       Patient Active Problem List   Diagnosis   • Neuropathy   • Gout   • GERD (gastroesophageal reflux disease)   • Diabetes mellitus   • Chronic pain        Past Medical History   Diagnosis Date   • Arthritis    • Chronic pain    • Diabetes mellitus    • GERD (gastroesophageal reflux disease)    • Gout    • Headache    • Neuropathy         Past Surgical History   Procedure Laterality Date   • Hysterectomy       Partial   • Knee surgery     • Thyroidectomy, partial       Removal of goiter             PT Ortho       17 1100    Subjective Comments    Subjective Comments Patient states that she is continuing to work on her home exercises. Patient reports that last treatment session.  -    Subjective Pain    Able to rate subjective pain? yes  -    Pre-Treatment Pain Level 7  -    Post-Treatment Pain Level 6  -      User Key  (r) = Recorded By, (t) = Taken By, (c) = Cosigned By    Initials Name Provider Type    LUIS Bass, PTA Physical Therapy Assistant                            PT Assessment/Plan       17 1157       PT Assessment    Assessment Comments New ther ex added per the patient's tolerance, patient demonstrated and understood new ther ex with no increase in pain. Patient tolerated treatment well with no increase in pain noted. Reps increased on mid and lows with no increase in pain noted. Educated patient to perform ther ex per her tolerance, patient verbalized understanding. No adverse reactions with modalities or treatment.   -     PT Plan    PT Plan Comments Continue per PT's POC, progress per the patient's tolerance.  -       User Key  (r) = Recorded By, (t) = Taken By, (c) = Cosigned By    Initials Name Provider Type    LUIS Mustafa  Katie Bass PTA Physical Therapy Assistant                Modalities       03/07/17 1100          Moist Heat     Applied Yes   No redness noted following moist heat  -      Location Bilateral upper trapezius  -      Rx Minutes 10 mins  -      MH S/P Rx Yes  -      ELECTRICAL STIMULATION    Attended/Unattended Unattended   No irritation noted following estim  -      Stimulation Type IFC  -      Max mAmp --   per the patient's tolerance  -      Location/Electrode Placement/Other Bilateral upper trapzius  -      Rx Minutes 10 mins  -        User Key  (r) = Recorded By, (t) = Taken By, (c) = Cosigned By    Initials Name Provider Type     Moon Bass PTA Physical Therapy Assistant                Exercises       03/07/17 1100          Subjective Comments    Subjective Comments Patient states that she is continuing to work on her home exercises. Patient reports that last treatment session.  -      Subjective Pain    Able to rate subjective pain? yes  -AH      Pre-Treatment Pain Level 7  -AH      Post-Treatment Pain Level 6  -      Exercise 1    Exercise Name 1 UT stretch 20 sec holdx3, levator stretch 20 sec hold x3, cervical retraction 15x2, scapular squeeze 15x2, mid and low rows with RTB 25x, red hand gripper 2 minutes each, CROM: (rot, flex, ext) 15x  -      Cueing 1 Verbal;Tactile  -      Time (Minutes) 1 28 minutes  -      Treatment Type 1 Ther Ex  -        User Key  (r) = Recorded By, (t) = Taken By, (c) = Cosigned By    Initials Name Provider Type     Moon Bass PTA Physical Therapy Assistant                                   Therapy Education       03/07/17 1156          Therapy Education    Given HEP;Pain management;Symptoms/condition management;Posture/body mechanics  -      Program New  -      How Provided Verbal  -      Provided to Patient  -      Level of Understanding Verbalized;Demonstrated  -        User Key  (r) = Recorded By, (t) =  Taken By, (c) = Cosigned By    Initials Name Provider Type     Moon Bass PTA Physical Therapy Assistant                Time Calculation:   Start Time: 1100  Stop Time: 1140  Time Calculation (min): 40 min    Therapy Charges for Today     Code Description Service Date Service Provider Modifiers Qty    71793504018 HC PT THER PROC EA 15 MIN 3/7/2017 Moon Bass PTA GP 2    44890706147 HC ELECTRICAL STIM UNATTENDED 3/7/2017 Moon Bass PTA  1    [id number]  PT HOT/COLD PACK WC NONMCARE 3/7/2017 Moon Bass PTA GP 1                    Moon Bass, PATI  3/7/2017

## 2017-03-10 ENCOUNTER — HOSPITAL ENCOUNTER (OUTPATIENT)
Dept: PHYSICAL THERAPY | Facility: HOSPITAL | Age: 62
Setting detail: THERAPIES SERIES
Discharge: HOME | End: 2017-03-10

## 2017-03-10 DIAGNOSIS — M54.2 NECK PAIN: Primary | ICD-10-CM

## 2017-03-10 PROCEDURE — G0283 ELEC STIM OTHER THAN WOUND: HCPCS | Performed by: PHYSICAL THERAPIST

## 2017-03-10 PROCEDURE — 97110 THERAPEUTIC EXERCISES: CPT | Performed by: PHYSICAL THERAPIST

## 2017-03-10 PROCEDURE — 97010 HOT OR COLD PACKS THERAPY: CPT | Performed by: PHYSICAL THERAPIST

## 2017-03-10 NOTE — PROGRESS NOTES
Outpatient Physical Therapy Ortho Treatment Note   [facility]     Patient Name: Breanna Kaba  : 1955  MRN: [id number]  Today's Date: 3/10/2017      Visit Date: 03/10/2017    Visit Dx:    ICD-10-CM ICD-9-CM   1. Neck pain M54.2 723.1       Patient Active Problem List   Diagnosis   • Neuropathy   • Gout   • GERD (gastroesophageal reflux disease)   • Diabetes mellitus   • Chronic pain        Past Medical History   Diagnosis Date   • Arthritis    • Chronic pain    • Diabetes mellitus    • GERD (gastroesophageal reflux disease)    • Gout    • Headache    • Neuropathy         Past Surgical History   Procedure Laterality Date   • Hysterectomy       Partial   • Knee surgery     • Thyroidectomy, partial       Removal of goiter             PT Ortho       03/10/17 1100    Subjective Comments    Subjective Comments Pt reports minimal back pain (2/10) prior to the treatment session, stating she has more back pain than neck.  -AD    Subjective Pain    Able to rate subjective pain? yes  -AD    Pre-Treatment Pain Level 2  -AD    Post-Treatment Pain Level 2  -AD      User Key  (r) = Recorded By, (t) = Taken By, (c) = Cosigned By    Initials Name Provider Type    AD Ashley Claudene Dalton, PT Physical Therapist                            PT Assessment/Plan       03/10/17 1114       PT Assessment    Assessment Comments Pt tolerated treatment session well with minimal cues for proper form. No adverse reactions were observed following moist heat combined with IFC to the cervical region at the beginning of the session. Therapeutic exercises addressed BUE strength, cervical range of motion,  impaired posture, and scapular stability. She will be progressed as tolerated.  -AD     PT Plan    PT Plan Comments Progress as tolerated per POC.  -AD       User Key  (r) = Recorded By, (t) = Taken By, (c) = Cosigned By    Initials Name Provider Type    AD Ashley Claudene Dalton, PT Physical Therapist                Modalities        03/10/17 1100          Moist Heat    MH Applied Yes   Combined with IFC, no adverse reactions  -AD      Location Bilateral upper trapezius  -AD      Rx Minutes 10 mins  -AD      MH Prior to Rx Yes  -AD      ELECTRICAL STIMULATION    Attended/Unattended Unattended   No irritation noted following estim  -AD      Stimulation Type IFC  -AD      Max mAmp --   per the patient's tolerance  -AD      Location/Electrode Placement/Other Bilateral upper trapezius  -AD      Rx Minutes 10 mins  -AD        User Key  (r) = Recorded By, (t) = Taken By, (c) = Cosigned By    Initials Name Provider Type    AD Ashley Claudene Dalton, PT Physical Therapist                Exercises       03/10/17 1100          Subjective Comments    Subjective Comments Pt reports minimal back pain (2/10) prior to the treatment session, stating she has more back pain than neck.  -AD      Subjective Pain    Able to rate subjective pain? yes  -AD      Pre-Treatment Pain Level 2  -AD      Post-Treatment Pain Level 2  -AD      Exercise 1    Exercise Name 1 UT stretch 20 sec holdx3, levator stretch 20 sec hold x3, cervical retraction 15x2, scapular squeeze 15x2, mid and low rows with RTB 25x, red hand gripper 2 minutes each, CROM: (rot, flex, ext) 15x  -AD      Cueing 1 Verbal;Tactile  -AD      Time (Minutes) 1 25 minutes  -AD      Treatment Type 1 Ther Ex  -AD        User Key  (r) = Recorded By, (t) = Taken By, (c) = Cosigned By    Initials Name Provider Type    AD Ashley Claudene Dalton, PT Physical Therapist                                   Therapy Education       03/10/17 1114          Therapy Education    Given HEP;Pain management;Symptoms/condition management;Posture/body mechanics  -AD      Program Reinforced  -AD      How Provided Verbal  -AD      Provided to Patient  -AD      Level of Understanding Verbalized;Demonstrated  -AD        User Key  (r) = Recorded By, (t) = Taken By, (c) = Cosigned By    Initials Name Provider Type    AD Ashley Claudene  Jb, PT Physical Therapist                Time Calculation:   Start Time: 1030  Stop Time: 1105  Time Calculation (min): 35 min    Therapy Charges for Today     Code Description Service Date Service Provider Modifiers Qty    50286430316 HC ELECTRICAL STIM UNATTENDED 3/10/2017 Ashley Claudene Dalton, PT  1    45621740507 HC PT HOT/COLD PACK WC NONMCARE 3/10/2017 Ashley Claudene Dalton, PT GP 1    60599896090 HC PT THER PROC EA 15 MIN 3/10/2017 Ashley Claudene Dalton, PT GP 2    06087025435 HC PT THER SUPP EA 15 MIN 3/10/2017 Ashley Claudene Dalton, PT GP 1                    Ashley Claudene Dalton, PT  3/10/2017

## 2017-03-13 RX ORDER — GABAPENTIN 600 MG/1
TABLET ORAL
Qty: 90 TABLET | Refills: 0 | OUTPATIENT
Start: 2017-03-13

## 2017-03-14 ENCOUNTER — HOSPITAL ENCOUNTER (OUTPATIENT)
Dept: PHYSICAL THERAPY | Facility: HOSPITAL | Age: 62
Setting detail: THERAPIES SERIES
Discharge: HOME | End: 2017-03-14

## 2017-03-14 DIAGNOSIS — M54.2 NECK PAIN: Primary | ICD-10-CM

## 2017-03-14 PROCEDURE — 97010 HOT OR COLD PACKS THERAPY: CPT | Performed by: PHYSICAL THERAPIST

## 2017-03-14 PROCEDURE — 97110 THERAPEUTIC EXERCISES: CPT | Performed by: PHYSICAL THERAPIST

## 2017-03-14 PROCEDURE — G0283 ELEC STIM OTHER THAN WOUND: HCPCS | Performed by: PHYSICAL THERAPIST

## 2017-03-14 NOTE — PROGRESS NOTES
Outpatient Physical Therapy Ortho Treatment Note   [facility]     Patient Name: Breanna Kaba  : 1955  MRN: [id number]  Today's Date: 3/14/2017      Visit Date: 2017    Visit Dx:    ICD-10-CM ICD-9-CM   1. Neck pain M54.2 723.1       Patient Active Problem List   Diagnosis   • Neuropathy   • Gout   • GERD (gastroesophageal reflux disease)   • Diabetes mellitus   • Chronic pain        Past Medical History   Diagnosis Date   • Arthritis    • Chronic pain    • Diabetes mellitus    • GERD (gastroesophageal reflux disease)    • Gout    • Headache    • Neuropathy         Past Surgical History   Procedure Laterality Date   • Hysterectomy       Partial   • Knee surgery     • Thyroidectomy, partial       Removal of goiter                             PT Assessment/Plan       17 1459       PT Assessment    Assessment Comments Pt tolerated treatmemt well today, with good form observed with new ther ex and decrease in c/o pain at conclusion of treatment.  Pt continues to require skilled PT services to focus upon achievement of goals and improved functional mobility.   -CC     PT Plan    PT Plan Comments Cont. with POC, progress as to pt's tolerance  -CC       User Key  (r) = Recorded By, (t) = Taken By, (c) = Cosigned By    Initials Name Provider Type    CC Alma Mathis, PT Physical Therapist                Modalities       17 1400          Moist Heat    Location Bilateral upper trapezius  -CC      Rx Minutes 10 mins  -CC       Prior to Rx Yes  -CC       S/P Rx Yes  -CC      ELECTRICAL STIMULATION    Attended/Unattended Unattended   No irritation noted following estim  -CC      Stimulation Type IFC  -CC      Max mAmp --   per the patient's tolerance  -CC      Location/Electrode Placement/Other Bilateral upper trapezius  -CC      Rx Minutes 10 mins  -CC        User Key  (r) = Recorded By, (t) = Taken By, (c) = Cosigned By    Initials Name Provider Type    CC lAma Mathis, PT Physical  Therapist                Exercises       03/14/17 1400          Subjective Comments    Subjective Comments Pt reports her low back is hurting and it is a 5/10.  States her neck pain today is a 2/10, at conclusion of treatment her pain level was a 1/10.    -CC      Subjective Pain    Able to rate subjective pain? yes  -CC      Pre-Treatment Pain Level 2  -CC      Post-Treatment Pain Level 1  -CC      Exercise 1    Exercise Name 1 cervical isometrics 4 way with ball 15x, corner stretch 5x 10 secs, UT stretch 20 sec holdx3, levator stretch 20 sec hold x3, cervical retraction 15x2, scapular squeeze 15x2, mid and low rows with RTB 25x, red hand gripper 2 minutes each, CROM: (rot, flex, ext) 15x  -CC      Cueing 1 Verbal;Tactile  -CC      Time (Minutes) 1 30 mins  -CC      Treatment Type 1 Ther Ex  -CC        User Key  (r) = Recorded By, (t) = Taken By, (c) = Cosigned By    Initials Name Provider Type    CC Alma Mathis, PT Physical Therapist                                   Therapy Education       03/14/17 3849          Therapy Education    Given HEP  -CC      Program Reinforced  -CC      How Provided Verbal  -CC      Provided to Patient  -CC      Level of Understanding Verbalized;Demonstrated  -CC        User Key  (r) = Recorded By, (t) = Taken By, (c) = Cosigned By    Initials Name Provider Type    CC Alma Mathis, PT Physical Therapist                Time Calculation:        Therapy Charges for Today     Code Description Service Date Service Provider Modifiers Qty    39770331811 HC PT THER PROC EA 15 MIN 3/14/2017 Alma Mathis, PT GP 2    14762252980 HC ELECTRICAL STIM UNATTENDED 3/14/2017 Alma Mathis, PT  1    [id number]  PT HOT/COLD PACK WC NONMCARE 3/14/2017 Alma Mathis, PT GP 1                    Alma Mathis, PT  3/14/2017

## 2017-03-15 ENCOUNTER — TELEPHONE (OUTPATIENT)
Dept: FAMILY MEDICINE CLINIC | Facility: CLINIC | Age: 62
End: 2017-03-15

## 2017-03-15 RX ORDER — GLIPIZIDE 5 MG/1
5 TABLET ORAL DAILY
Qty: 30 TABLET | Refills: 5 | Status: SHIPPED | OUTPATIENT
Start: 2017-03-15 | End: 2019-01-16 | Stop reason: SDUPTHER

## 2017-03-15 RX ORDER — ATORVASTATIN CALCIUM 40 MG/1
40 TABLET, FILM COATED ORAL DAILY
Qty: 30 TABLET | Refills: 5 | Status: SHIPPED | OUTPATIENT
Start: 2017-03-15 | End: 2019-01-16 | Stop reason: SDUPTHER

## 2017-03-15 NOTE — TELEPHONE ENCOUNTER
----- Message from Myrna Ramos MD sent at 3/14/2017  5:43 PM EDT -----  Please call patient.   1) Her lipid panel is elevated (23.1 --> 3.2) and she needs to be on a statin to reduce her heart risk. Can I place her on lipitor 40 mg orally daily, #30 with 5 refills?  2) Her vitamin D is just slightly decreased. I would recommend OTC Vitamin D pills.  3) Her A1C is slightly elevated at 7.7. I don't have anything to compare it to. She is on two pills now; will she take a third pill or really work on her diet and exercise? I would place her on glipizide 5 mg orally daily, #30 with 5 refills.    If she is okay with above, send to pharmacy please. Thanks.      Left a message for her to return call.    Patient returned call & is agreeable to new meds (sent to pharmacy as requested).

## 2017-03-21 ENCOUNTER — HOSPITAL ENCOUNTER (OUTPATIENT)
Dept: PHYSICAL THERAPY | Facility: HOSPITAL | Age: 62
Setting detail: THERAPIES SERIES
Discharge: HOME | End: 2017-03-21

## 2017-03-21 DIAGNOSIS — M54.2 NECK PAIN: Primary | ICD-10-CM

## 2017-03-21 PROCEDURE — 97010 HOT OR COLD PACKS THERAPY: CPT

## 2017-03-21 PROCEDURE — 97110 THERAPEUTIC EXERCISES: CPT

## 2017-03-21 PROCEDURE — G0283 ELEC STIM OTHER THAN WOUND: HCPCS

## 2017-03-21 NOTE — PROGRESS NOTES
Outpatient Physical Therapy Ortho Treatment Note  [facility]     Patient Name: Breanna Kaba  : 1955  MRN: [id number]  Today's Date: 3/21/2017      Visit Date: 2017    Visit Dx:    ICD-10-CM ICD-9-CM   1. Neck pain M54.2 723.1       Patient Active Problem List   Diagnosis   • Neuropathy   • Gout   • GERD (gastroesophageal reflux disease)   • Diabetes mellitus   • Chronic pain        Past Medical History   Diagnosis Date   • Arthritis    • Chronic pain    • Diabetes mellitus    • GERD (gastroesophageal reflux disease)    • Gout    • Headache    • Neuropathy         Past Surgical History   Procedure Laterality Date   • Hysterectomy       Partial   • Knee surgery     • Thyroidectomy, partial       Removal of goiter             PT Ortho       17 1100    Subjective Comments    Subjective Comments Patient states that the pain in her neck is better today. Patient reports still having problems with her back  -    Subjective Pain    Able to rate subjective pain? yes  -    Pre-Treatment Pain Level 6  -    Post-Treatment Pain Level 3  -      User Key  (r) = Recorded By, (t) = Taken By, (c) = Cosigned By    Initials Name Provider Type    LUIS Bass PTA Physical Therapy Assistant                            PT Assessment/Plan       17 1128       PT Assessment    Assessment Comments Thera band resistance and hand gripper resistance increased to green per the patient's tolerance with no increase in pain noted following treatment. Patient tolerated treatment well with no increase in pain or facial grimaces noted. No adverse reactions with modalities or treatment. Decrease in pain noted following treatment.   -     PT Plan    PT Plan Comments Continue per PT's POC, progress per the patient's tolerance.  -       User Key  (r) = Recorded By, (t) = Taken By, (c) = Cosigned By    Initials Name Provider Type    LUIS Bass PTA Physical Therapy Assistant                 Modalities       03/21/17 1100          Moist Heat    MH Applied Yes   No redness noted following moist heat  -AH      Location Bilateral upper trapezius  -AH      Rx Minutes 10 mins  -AH      MH Prior to Rx Yes  -AH      ELECTRICAL STIMULATION    Attended/Unattended Unattended   No irritation noted following estim  -AH      Stimulation Type IFC  -AH      Max mAmp --   per the patient's tolerance  -AH      Location/Electrode Placement/Other Bilateral upper trapezius  -AH      Rx Minutes 10 mins  -AH        User Key  (r) = Recorded By, (t) = Taken By, (c) = Cosigned By    Initials Name Provider Type     Moon Bass PTA Physical Therapy Assistant                Exercises       03/21/17 1100          Subjective Comments    Subjective Comments Patient states that the pain in her neck is better today. Patient reports still having problems with her back  -      Subjective Pain    Able to rate subjective pain? yes  -AH      Pre-Treatment Pain Level 6  -AH      Post-Treatment Pain Level 3  -AH      Exercise 1    Exercise Name 1 cervical isometrics 4 way with ball 15x, corner stretch 5x 10 secs, UT stretch 20 sec holdx3, levator stretch 20 sec hold x3, cervical retraction 15x2, scapular squeeze 15x2, mid and low rows with GTB 10x2, green hand gripper 2 minutes each, CROM: (rot, flex, ext) 15x  -      Cueing 1 Verbal;Tactile  -      Time (Minutes) 1 30 mins  -      Treatment Type 1 Ther Ex  -        User Key  (r) = Recorded By, (t) = Taken By, (c) = Cosigned By    Initials Name Provider Type     Moon Bass PTA Physical Therapy Assistant                                   Therapy Education       03/21/17 1128          Therapy Education    Given HEP;Pain management;Posture/body mechanics;Symptoms/condition management  -      Program Reinforced  -      How Provided Verbal  -      Provided to Patient  -      Level of Understanding Verbalized;Demonstrated  -        User Key  (r) =  Recorded By, (t) = Taken By, (c) = Cosigned By    Initials Name Provider Type     Moon Bass PTA Physical Therapy Assistant                Time Calculation:   Start Time: 1020  Stop Time: 1106  Time Calculation (min): 46 min    Therapy Charges for Today     Code Description Service Date Service Provider Modifiers Qty    50098375689 HC PT THER PROC EA 15 MIN 3/21/2017 Moon Bass PTA GP 2    83570474394 HC ELECTRICAL STIM UNATTENDED 3/21/2017 Moon Bass PTA  1    48645403391 HC PT HOT/COLD PACK WC NONMCARE 3/21/2017 Moon Bass PTA GP 1                    Moon Bass PTA  3/21/2017

## 2017-03-24 ENCOUNTER — HOSPITAL ENCOUNTER (OUTPATIENT)
Dept: PHYSICAL THERAPY | Facility: HOSPITAL | Age: 62
Setting detail: THERAPIES SERIES
Discharge: HOME | End: 2017-03-24

## 2017-03-24 DIAGNOSIS — M54.2 NECK PAIN: Primary | ICD-10-CM

## 2017-03-24 PROCEDURE — 97110 THERAPEUTIC EXERCISES: CPT

## 2017-03-24 PROCEDURE — G0283 ELEC STIM OTHER THAN WOUND: HCPCS

## 2017-03-24 PROCEDURE — 97010 HOT OR COLD PACKS THERAPY: CPT

## 2017-03-24 NOTE — PROGRESS NOTES
Outpatient Physical Therapy Ortho Treatment Note   [facility]     Patient Name: Breanna Kaba  : 1955  MRN: [id number]  Today's Date: 3/24/2017      Visit Date: 2017    Visit Dx:    ICD-10-CM ICD-9-CM   1. Neck pain M54.2 723.1       Patient Active Problem List   Diagnosis   • Neuropathy   • Gout   • GERD (gastroesophageal reflux disease)   • Diabetes mellitus   • Chronic pain        Past Medical History:   Diagnosis Date   • Arthritis    • Chronic pain    • Diabetes mellitus    • GERD (gastroesophageal reflux disease)    • Gout    • Headache    • Neuropathy         Past Surgical History:   Procedure Laterality Date   • HYSTERECTOMY      Partial   • KNEE SURGERY     • THYROIDECTOMY, PARTIAL      Removal of goiter             PT Ortho       17 1100    Subjective Comments    Subjective Comments Patient states that she is going to her MD next week. Patient reports that her neck is feeling better but her back feels worse.  -    Subjective Pain    Able to rate subjective pain? yes  -    Pre-Treatment Pain Level 5  -    Post-Treatment Pain Level 0  -      User Key  (r) = Recorded By, (t) = Taken By, (c) = Cosigned By    Initials Name Provider Type     Moon Bass, PTA Physical Therapy Assistant                            PT Assessment/Plan       17 1136       PT Assessment    Assessment Comments Patient tolerated treatment good with no increase in pain or facial grimaces noted following treatment. Reps increased on several exercsises per the patient's tolerance with no increase in pain noted. New ther ex added per the patient's tolerance, patient demonstrated and understood new ther ex with no increase in pain. Decrease in pain noted following treatment. No adverse reactions with modalities or treatment.    -     PT Plan    PT Plan Comments Continue per PT's POC, progress per the patient's tolerance.  -       User Key  (r) = Recorded By, (t) = Taken By, (c) = Cosigned By     Initials Name Provider Type     Moon Bass PTA Physical Therapy Assistant                Modalities       03/24/17 1100          Moist Heat    MH Applied Yes   No redness noted following moist heat  -      Location Bilateral upper trapezius  -      Rx Minutes 10 mins  -      MH Prior to Rx Yes  -      ELECTRICAL STIMULATION    Attended/Unattended Unattended   No irritation noted following estim  -      Stimulation Type IFC  -      Max mAmp --   per the patient's tolerance.  -      Location/Electrode Placement/Other Bilateral upper trapezius  -      Rx Minutes 10 mins  -        User Key  (r) = Recorded By, (t) = Taken By, (c) = Cosigned By    Initials Name Provider Type     Moon Bass PTA Physical Therapy Assistant                Exercises       03/24/17 1100          Subjective Comments    Subjective Comments Patient states that she is going to her MD next week. Patient reports that her neck is feeling better but her back feels worse.  -      Subjective Pain    Able to rate subjective pain? yes  -      Pre-Treatment Pain Level 5  -      Post-Treatment Pain Level 0  -      Exercise 1    Exercise Name 1 cervical isometrics 4 way with ball 15x, corner stretch 3x 20 secs, UT stretch 20 sec holdx3, levator stretch 20 sec hold x3, cervical retraction 15x2, scapular squeeze 15x2, mid and low rows with GTB 15x2, green hand gripper 2 minutes each, CROM: (rot, flex, ext) 15x, lat pull with GTB 10x2  -      Cueing 1 Verbal;Tactile  -      Time (Minutes) 1 35 minutes  -      Treatment Type 1 Ther Ex  -        User Key  (r) = Recorded By, (t) = Taken By, (c) = Cosigned By    Initials Name Provider Type     Moon Bass PTA Physical Therapy Assistant                                   Therapy Education       03/24/17 1136          Therapy Education    Given HEP;Pain management;Symptoms/condition management;Posture/body mechanics  -      Program New  -       How Provided Verbal  -AH      Provided to Patient  -AH      Level of Understanding Verbalized;Demonstrated  -AH        User Key  (r) = Recorded By, (t) = Taken By, (c) = Cosigned By    Initials Name Provider Type     Moon Bass PTA Physical Therapy Assistant                Time Calculation:   Start Time: 1030  Stop Time: 1120  Time Calculation (min): 50 min    Therapy Charges for Today     Code Description Service Date Service Provider Modifiers Qty    39703940044 HC PT THER PROC EA 15 MIN 3/24/2017 Moon Bass PTA GP 2    15297404313 HC PT HOT/COLD PACK WC NONMCARE 3/24/2017 Moon Bass PTA GP 1    37748942855 HC ELECTRICAL STIM UNATTENDED 3/24/2017 Moon Bass PTA  1    53653249665 HC PT THER SUPP EA 15 MIN 3/24/2017 Moon Bass PTA GP 1                    Moon Bass PTA  3/24/2017

## 2017-03-28 ENCOUNTER — HOSPITAL ENCOUNTER (OUTPATIENT)
Dept: PHYSICAL THERAPY | Facility: HOSPITAL | Age: 62
Setting detail: THERAPIES SERIES
Discharge: HOME | End: 2017-03-28

## 2017-03-28 DIAGNOSIS — M54.2 NECK PAIN: Primary | ICD-10-CM

## 2017-03-28 PROCEDURE — 97110 THERAPEUTIC EXERCISES: CPT | Performed by: PHYSICAL THERAPIST

## 2017-03-28 PROCEDURE — 97010 HOT OR COLD PACKS THERAPY: CPT | Performed by: PHYSICAL THERAPIST

## 2017-03-28 PROCEDURE — G0283 ELEC STIM OTHER THAN WOUND: HCPCS | Performed by: PHYSICAL THERAPIST

## 2017-03-28 NOTE — PROGRESS NOTES
Outpatient Physical Therapy Ortho Re-Evaluation   [facility]     Patient Name: Breanna Kaba  : 1955  MRN: [id number]  Today's Date: 3/28/2017      Visit Date: 2017    Patient Active Problem List   Diagnosis   • Neuropathy   • Gout   • GERD (gastroesophageal reflux disease)   • Diabetes mellitus   • Chronic pain        Past Medical History:   Diagnosis Date   • Arthritis    • Chronic pain    • Diabetes mellitus    • GERD (gastroesophageal reflux disease)    • Gout    • Headache    • Neuropathy         Past Surgical History:   Procedure Laterality Date   • HYSTERECTOMY      Partial   • KNEE SURGERY     • THYROIDECTOMY, PARTIAL      Removal of goiter       Visit Dx:     ICD-10-CM ICD-9-CM   1. Neck pain M54.2 723.1                 PT Ortho       17 1100    Subjective Comments    Subjective Comments Pt reports she feels as though her neck pain is improving, however she continues to have back pain.  -AD    Subjective Pain    Able to rate subjective pain? yes  -AD    Pre-Treatment Pain Level 4  -AD    Post-Treatment Pain Level 7  -AD    Myotomal Screen- Upper Quarter Clearing    Shoulder flexion (C5) Bilateral:;4 (Good)  -AD    Elbow flexion/wrist extension (C6) Bilateral:;4 (Good)  -AD    Elbow extension/wrist flexion (C7) Bilateral:;4 (Good)  -AD    Cervical/Shoulder ROM Screen    Cervical flexion Normal  -AD    Cervical extension Normal  -AD    Cervical lateral flexion Normal  -AD    Cervical rotation Normal  -AD      User Key  (r) = Recorded By, (t) = Taken By, (c) = Cosigned By    Initials Name Provider Type    AD Ashley Claudene Dalton, PT Physical Therapist                            Therapy Education       17 1132          Therapy Education    Given HEP;Pain management;Symptoms/condition management;Posture/body mechanics  -AD      Program Reinforced  -AD      How Provided Verbal  -AD      Provided to Patient  -AD      Level of Understanding Verbalized;Demonstrated  -AD        User Key   (r) = Recorded By, (t) = Taken By, (c) = Cosigned By    Initials Name Provider Type    AD Ashley Claudene Dalton, PT Physical Therapist                PT OP Goals       03/28/17 1100       PT Short Term Goals    STG Date to Achieve 04/11/17  -AD     STG 1 Pt will report no more than 6/10 neck pain to improve QOL.  -AD     STG 1 Progress Not Met  -AD     STG 1 Progress Comments Per pt report, 7-8/10  -AD     STG 2 Pt will demonstrate a 10 degree improvement in cervical range of motion, all planes, for improved function.  -AD     STG 2 Progress Met  -AD     STG 2 Progress Comments WNL  -AD     STG 3 Pt will demonstrate proper understanding of HEP for improved independence.  -AD     STG 3 Progress Met  -AD     Long Term Goals    LTG Date to Achieve 04/27/17  -AD     LTG 1 Pt will demonstrate 4+/5 BUE strength for improved function.  -AD     LTG 1 Progress Partially Met  -AD     LTG 1 Progress Comments 4/5  -AD     LTG 2 Pt will report less than 30% impairment on the NDI for improved function.  -AD     LTG 2 Progress Not Met  -AD     LTG 2 Progress Comments 54%  -AD     LTG 3 Pt will demonstrate cervical AROM within normal limits for improved mobility and function.  -AD     LTG 3 Progress Met  -AD     LTG 4 Pt will report less than 2 headaches in a week for improved QOL and decreased nausea.  -AD     LTG 4 Progress Ongoing  -AD     Time Calculation    PT Goal Re-Cert Due Date 04/27/17  -AD       User Key  (r) = Recorded By, (t) = Taken By, (c) = Cosigned By    Initials Name Provider Type    AD Ashley Claudene Dalton, PT Physical Therapist                PT Assessment/Plan       03/28/17 1135       PT Assessment    Functional Limitations Limitation in home management;Limitations in community activities;Performance in self-care ADL;Performance in leisure activities;Limitations in functional capacity and performance  -AD     Impairments Balance;Posture;Joint mobility;Poor body mechanics;Pain;Muscle strength;Range of  motion  -AD     Assessment Comments Pt has achieved 3/7 established physical therapy goals. She has improved with cervical ROM and BUE strength. However, she states she is experiencing low back pain. She tolerated today's session well with no adverse reactions to modalities. She states she sees her physician this afternoon and will determine course of action at that point.  -AD     Please refer to paper survey for additional self-reported information Yes  -AD     Rehab Potential Good  -AD     Patient/caregiver participated in establishment of treatment plan and goals Yes  -AD     Patient would benefit from skilled therapy intervention Yes  -AD     PT Plan    PT Frequency 2x/week;3x/week  -AD     Predicted Duration of Therapy Intervention (days/wks) 4 weeks  -AD     Planned CPT's? PT EVAL HIGH COMPLEXITY: 12599;PT RE-EVAL: 18201;PT THER PROC EA 15 MIN: 56505;PT NEUROMUSC RE-EDUCATION EA 15 MIN: 09583;PT MANUAL THERAPY EA 15 MIN: 02223;PT THER ACT EA 15 MIN: 59527;PT ULTRASOUND EA 15 MIN: 59586;PT SELF CARE/HOME MGMT/TRAIN EA 15: 53746;PT ELECTRICAL STIM UNATTEND: ;PT HOT/COLD PACK WC NONMCARE: 73942;PT THER SUPP EA 15 MIN  -AD     Physical Therapy Interventions (Optional Details) gross motor skills;home exercise program;joint mobilization;postural re-education;patient/family education;neuromuscular re-education;motor coordination training;modalities;stretching;strengthening;manual therapy techniques;ROM (Range of Motion)  -AD     PT Plan Comments Progress as tolerated per POC.  -AD       User Key  (r) = Recorded By, (t) = Taken By, (c) = Cosigned By    Initials Name Provider Type    AD Ashley Claudene Dalton, PT Physical Therapist                Modalities       03/28/17 1100          Moist Heat    MH Applied Yes  -AD      Location Bilateral upper trapezius  -AD      Rx Minutes 10 mins  -AD      MH Prior to Rx Yes  -AD      ELECTRICAL STIMULATION    Attended/Unattended Unattended   No irritation noted following  estim  -AD      Stimulation Type IFC  -AD      Max mAmp --   per the patient's tolerance.  -AD      Location/Electrode Placement/Other Bilateral upper trapezius  -AD      Rx Minutes 10 mins  -AD        User Key  (r) = Recorded By, (t) = Taken By, (c) = Cosigned By    Initials Name Provider Type    AD Ashley Claudene Dalton, PT Physical Therapist              Exercises       03/28/17 1100          Subjective Comments    Subjective Comments Pt reports she feels as though her neck pain is improving, however she continues to have back pain.  -AD      Subjective Pain    Able to rate subjective pain? yes  -AD      Pre-Treatment Pain Level 4  -AD      Post-Treatment Pain Level 7  -AD      Exercise 1    Exercise Name 1 cervical isometrics 4 way with ball 15x, corner stretch 3x 20 secs, UT stretch 20 sec holdx3, levator stretch 20 sec hold x3, cervical retraction 15x2, scapular squeeze 15x2, mid and low rows with GTB 15x2, green hand gripper 2 minutes each, CROM: (rot, flex, ext) 15x, lat pull with GTB 10x2  -AD      Cueing 1 Verbal;Tactile  -AD      Time (Minutes) 1 25 minutes  -AD      Treatment Type 1 Ther Ex  -AD        User Key  (r) = Recorded By, (t) = Taken By, (c) = Cosigned By    Initials Name Provider Type    AD Ashley Claudene Dalton, JAM Physical Therapist                              Outcome Measures       03/28/17 1100          Neck Disability Index    Section 1 - Pain Intensity 2  -AD      Section 2 - Personal Care 2  -AD      Section 3 - Lifting 2  -AD      Section 4 - Work 2  -AD      Section 5 - Headaches 4  -AD      Section 6 - Concentration 3  -AD      Section 7 - Sleeping 2  -AD      Section 8 - Driving 3  -AD      Section 9 - Reading 4  -AD      Section 10 - Recreation 3  -AD      Neck Disability Index Score 27  -AD      Functional Assessment    Outcome Measure Options Neck Disability Index (NDI)  -AD        User Key  (r) = Recorded By, (t) = Taken By, (c) = Cosigned By    Initials Name Provider Type     AD Ashley Claudene Dalton, PT Physical Therapist            Time Calculation:   Start Time: 1030  Stop Time: 1110  Time Calculation (min): 40 min     Therapy Charges for Today     Code Description Service Date Service Provider Modifiers Qty    61341034165 HC ELECTRICAL STIM UNATTENDED 3/28/2017 Ashley Claudene Dalton, PT  1    44306007536 HC PT HOT/COLD PACK WC NONMCARE 3/28/2017 Ashley Claudene Dalton, PT GP 1    08979378970 HC PT THER PROC EA 15 MIN 3/28/2017 Ashley Claudene Dalton, PT GP 2          PT G-Codes  Outcome Measure Options: Neck Disability Index (NDI)         Ashley Claudene Dalton, PT  3/28/2017

## 2017-04-06 ENCOUNTER — TRANSCRIBE ORDERS (OUTPATIENT)
Dept: ADMINISTRATIVE | Facility: HOSPITAL | Age: 62
End: 2017-04-06

## 2017-04-06 DIAGNOSIS — Z12.31 VISIT FOR SCREENING MAMMOGRAM: Primary | ICD-10-CM

## 2017-04-06 DIAGNOSIS — Z13.820 SCREENING FOR OSTEOPOROSIS: ICD-10-CM

## 2017-04-12 ENCOUNTER — HOSPITAL ENCOUNTER (OUTPATIENT)
Dept: MAMMOGRAPHY | Facility: HOSPITAL | Age: 62
Discharge: HOME | End: 2017-04-12
Admitting: NURSE PRACTITIONER

## 2017-04-12 ENCOUNTER — HOSPITAL ENCOUNTER (OUTPATIENT)
Dept: BONE DENSITY | Facility: HOSPITAL | Age: 62
Discharge: HOME | End: 2017-04-12

## 2017-04-12 DIAGNOSIS — Z12.31 VISIT FOR SCREENING MAMMOGRAM: ICD-10-CM

## 2017-04-12 DIAGNOSIS — Z13.820 SCREENING FOR OSTEOPOROSIS: ICD-10-CM

## 2017-04-12 PROCEDURE — G0202 SCR MAMMO BI INCL CAD: HCPCS

## 2017-04-12 PROCEDURE — 77080 DXA BONE DENSITY AXIAL: CPT | Performed by: RADIOLOGY

## 2017-04-12 PROCEDURE — 77067 SCR MAMMO BI INCL CAD: CPT | Performed by: RADIOLOGY

## 2017-04-12 PROCEDURE — 77080 DXA BONE DENSITY AXIAL: CPT

## 2017-04-12 PROCEDURE — 77063 BREAST TOMOSYNTHESIS BI: CPT

## 2017-04-12 PROCEDURE — 77063 BREAST TOMOSYNTHESIS BI: CPT | Performed by: RADIOLOGY

## 2017-04-18 ENCOUNTER — DOCUMENTATION (OUTPATIENT)
Dept: PHYSICAL THERAPY | Facility: HOSPITAL | Age: 62
End: 2017-04-18

## 2017-04-18 DIAGNOSIS — M54.2 NECK PAIN: Primary | ICD-10-CM

## 2017-04-18 NOTE — THERAPY DISCHARGE NOTE
Outpatient Physical Therapy Discharge Summary         Patient Name: Breanna Kaba  : 1955  MRN: [id number]    Today's Date: 2017    Visit Dx:    ICD-10-CM ICD-9-CM   1. Neck pain M54.2 723.1           OP PT Discharge Summary  Date of Discharge: 17  Reason for Discharge: Non-compliant (The patient last attended skilled physical therapy on 3-28-17. The patient was called to schedule more visits. A voicemail was left with no return call by the patient.)  Outcomes Achieved:  (Unable to assess due to patient not returning to physical therapy.)  Discharge Destination: Unknown  Discharge Instructions: The patient last attended physical therapy on 3-28-17 and did not return phone calls or voicemails to schedule more appointment visits. Therefore, the patient is being discharged from skilled physical therapy at this time.      Time Calculation:                    Ashley Claudene Dalton, PT  2017

## 2017-05-27 ENCOUNTER — HOSPITAL ENCOUNTER (EMERGENCY)
Facility: HOSPITAL | Age: 62
Discharge: HOME | End: 2017-05-27
Attending: EMERGENCY MEDICINE | Admitting: EMERGENCY MEDICINE

## 2017-05-27 VITALS
WEIGHT: 152 LBS | TEMPERATURE: 98.7 F | OXYGEN SATURATION: 99 % | DIASTOLIC BLOOD PRESSURE: 78 MMHG | HEIGHT: 62 IN | RESPIRATION RATE: 18 BRPM | HEART RATE: 88 BPM | BODY MASS INDEX: 27.97 KG/M2 | SYSTOLIC BLOOD PRESSURE: 132 MMHG

## 2017-05-27 DIAGNOSIS — N39.0 URINARY TRACT INFECTION, SITE UNSPECIFIED: Primary | ICD-10-CM

## 2017-05-27 LAB
ANION GAP SERPL CALCULATED.3IONS-SCNC: 9.9 MMOL/L (ref 3.6–11.2)
BACTERIA UR QL AUTO: ABNORMAL /HPF
BASOPHILS # BLD AUTO: 0.04 10*3/MM3 (ref 0–0.3)
BASOPHILS NFR BLD AUTO: 0.4 % (ref 0–2)
BILIRUB UR QL STRIP: ABNORMAL
BUN BLD-MCNC: 26 MG/DL (ref 7–21)
BUN/CREAT SERPL: 31.3 (ref 7–25)
CALCIUM SPEC-SCNC: 9.1 MG/DL (ref 7.7–10)
CHLORIDE SERPL-SCNC: 103 MMOL/L (ref 99–112)
CLARITY UR: ABNORMAL
CO2 SERPL-SCNC: 29.1 MMOL/L (ref 24.3–31.9)
COLOR UR: ABNORMAL
CREAT BLD-MCNC: 0.83 MG/DL (ref 0.43–1.29)
DEPRECATED RDW RBC AUTO: 40.1 FL (ref 37–54)
EOSINOPHIL # BLD AUTO: 0.15 10*3/MM3 (ref 0–0.7)
EOSINOPHIL NFR BLD AUTO: 1.6 % (ref 0–5)
ERYTHROCYTE [DISTWIDTH] IN BLOOD BY AUTOMATED COUNT: 14 % (ref 11.5–14.5)
GFR SERPL CREATININE-BSD FRML MDRD: 69 ML/MIN/1.73
GLUCOSE BLD-MCNC: 123 MG/DL (ref 70–110)
GLUCOSE UR STRIP-MCNC: ABNORMAL MG/DL
HCT VFR BLD AUTO: 37.1 % (ref 37–47)
HGB BLD-MCNC: 12.8 G/DL (ref 12–16)
HGB UR QL STRIP.AUTO: ABNORMAL
IMM GRANULOCYTES # BLD: 0.02 10*3/MM3 (ref 0–0.03)
IMM GRANULOCYTES NFR BLD: 0.2 % (ref 0–0.5)
KETONES UR QL STRIP: ABNORMAL
LEUKOCYTE ESTERASE UR QL STRIP.AUTO: ABNORMAL
LYMPHOCYTES # BLD AUTO: 2.67 10*3/MM3 (ref 1–3)
LYMPHOCYTES NFR BLD AUTO: 27.8 % (ref 21–51)
MCH RBC QN AUTO: 27.7 PG (ref 27–33)
MCHC RBC AUTO-ENTMCNC: 34.5 G/DL (ref 33–37)
MCV RBC AUTO: 80.3 FL (ref 80–94)
MONOCYTES # BLD AUTO: 0.75 10*3/MM3 (ref 0.1–0.9)
MONOCYTES NFR BLD AUTO: 7.8 % (ref 0–10)
NEUTROPHILS # BLD AUTO: 5.99 10*3/MM3 (ref 1.4–6.5)
NEUTROPHILS NFR BLD AUTO: 62.2 % (ref 30–70)
NITRITE UR QL STRIP: POSITIVE
OSMOLALITY SERPL CALC.SUM OF ELEC: 289.2 MOSM/KG (ref 273–305)
PH UR STRIP.AUTO: <=5 [PH] (ref 5–8)
PLATELET # BLD AUTO: 379 10*3/MM3 (ref 130–400)
PMV BLD AUTO: 10.5 FL (ref 6–10)
POTASSIUM BLD-SCNC: 4.5 MMOL/L (ref 3.5–5.3)
PROT UR QL STRIP: ABNORMAL
RBC # BLD AUTO: 4.62 10*6/MM3 (ref 4.2–5.4)
RBC # UR: ABNORMAL /HPF
REF LAB TEST METHOD: ABNORMAL
SODIUM BLD-SCNC: 142 MMOL/L (ref 135–153)
SP GR UR STRIP: >=1.03 (ref 1–1.03)
SQUAMOUS #/AREA URNS HPF: ABNORMAL /HPF
UROBILINOGEN UR QL STRIP: ABNORMAL
WBC NRBC COR # BLD: 9.62 10*3/MM3 (ref 4.5–12.5)
WBC UR QL AUTO: ABNORMAL /HPF

## 2017-05-27 PROCEDURE — 99283 EMERGENCY DEPT VISIT LOW MDM: CPT

## 2017-05-27 PROCEDURE — 96372 THER/PROPH/DIAG INJ SC/IM: CPT

## 2017-05-27 PROCEDURE — 85025 COMPLETE CBC W/AUTO DIFF WBC: CPT | Performed by: PHYSICIAN ASSISTANT

## 2017-05-27 PROCEDURE — 87186 SC STD MICRODIL/AGAR DIL: CPT | Performed by: PHYSICIAN ASSISTANT

## 2017-05-27 PROCEDURE — 25010000002 CEFTRIAXONE PER 250 MG: Performed by: PHYSICIAN ASSISTANT

## 2017-05-27 PROCEDURE — 81001 URINALYSIS AUTO W/SCOPE: CPT | Performed by: PHYSICIAN ASSISTANT

## 2017-05-27 PROCEDURE — 87086 URINE CULTURE/COLONY COUNT: CPT | Performed by: PHYSICIAN ASSISTANT

## 2017-05-27 PROCEDURE — 87077 CULTURE AEROBIC IDENTIFY: CPT | Performed by: PHYSICIAN ASSISTANT

## 2017-05-27 PROCEDURE — 80048 BASIC METABOLIC PNL TOTAL CA: CPT | Performed by: PHYSICIAN ASSISTANT

## 2017-05-27 RX ORDER — LIDOCAINE HYDROCHLORIDE 10 MG/ML
2.1 INJECTION, SOLUTION EPIDURAL; INFILTRATION; INTRACAUDAL; PERINEURAL ONCE
Status: COMPLETED | OUTPATIENT
Start: 2017-05-27 | End: 2017-05-27

## 2017-05-27 RX ORDER — CEFTRIAXONE 1 G/1
1 INJECTION, POWDER, FOR SOLUTION INTRAMUSCULAR; INTRAVENOUS ONCE
Status: COMPLETED | OUTPATIENT
Start: 2017-05-27 | End: 2017-05-27

## 2017-05-27 RX ORDER — PHENAZOPYRIDINE HYDROCHLORIDE 100 MG/1
100 TABLET, FILM COATED ORAL 3 TIMES DAILY PRN
Qty: 15 TABLET | Refills: 0 | Status: SHIPPED | OUTPATIENT
Start: 2017-05-27 | End: 2019-06-27

## 2017-05-27 RX ORDER — NITROFURANTOIN 25; 75 MG/1; MG/1
100 CAPSULE ORAL 2 TIMES DAILY
Qty: 14 CAPSULE | Refills: 0 | Status: SHIPPED | OUTPATIENT
Start: 2017-05-27 | End: 2017-06-03

## 2017-05-27 RX ADMIN — LIDOCAINE HYDROCHLORIDE 2.1 ML: 10 INJECTION, SOLUTION EPIDURAL; INFILTRATION; INTRACAUDAL; PERINEURAL at 13:20

## 2017-05-27 RX ADMIN — CEFTRIAXONE 1 G: 1 INJECTION, POWDER, FOR SOLUTION INTRAMUSCULAR; INTRAVENOUS at 13:20

## 2017-05-29 LAB — BACTERIA SPEC AEROBE CULT: ABNORMAL

## 2017-08-18 ENCOUNTER — TRANSCRIBE ORDERS (OUTPATIENT)
Dept: PHYSICAL THERAPY | Facility: HOSPITAL | Age: 62
End: 2017-08-18

## 2017-08-18 DIAGNOSIS — S16.1XXD CERVICAL STRAIN, SUBSEQUENT ENCOUNTER: Primary | ICD-10-CM

## 2017-08-21 ENCOUNTER — HOSPITAL ENCOUNTER (OUTPATIENT)
Dept: PHYSICAL THERAPY | Facility: HOSPITAL | Age: 62
Setting detail: THERAPIES SERIES
Discharge: HOME | End: 2017-08-21

## 2017-08-21 DIAGNOSIS — M54.2 CERVICAL PAIN: Primary | ICD-10-CM

## 2017-08-21 PROCEDURE — 97162 PT EVAL MOD COMPLEX 30 MIN: CPT | Performed by: PHYSICAL THERAPIST
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
## 2017-08-23 ENCOUNTER — HOSPITAL ENCOUNTER (OUTPATIENT)
Dept: PHYSICAL THERAPY | Facility: HOSPITAL | Age: 62
Setting detail: THERAPIES SERIES
Discharge: HOME | End: 2017-08-23

## 2017-08-23 DIAGNOSIS — M54.2 CERVICAL PAIN: Primary | ICD-10-CM

## 2017-08-23 PROCEDURE — 97010 HOT OR COLD PACKS THERAPY: CPT

## 2017-08-23 PROCEDURE — 97110 THERAPEUTIC EXERCISES: CPT

## 2017-08-23 PROCEDURE — G0283 ELEC STIM OTHER THAN WOUND: HCPCS

## 2017-08-23 NOTE — THERAPY TREATMENT NOTE
Outpatient Physical Therapy Ortho Treatment Note   [facility]     Patient Name: Breanna Kaba  : 1955  MRN: [id number]  Today's Date: 2017      Visit Date: 2017    Visit Dx:    ICD-10-CM ICD-9-CM   1. Cervical pain M54.2 723.1       Patient Active Problem List   Diagnosis   • Neuropathy   • Gout   • GERD (gastroesophageal reflux disease)   • Diabetes mellitus   • Chronic pain        Past Medical History:   Diagnosis Date   • Arthritis    • Chronic pain    • Diabetes mellitus    • GERD (gastroesophageal reflux disease)    • Gout    • Headache    • Neuropathy         Past Surgical History:   Procedure Laterality Date   • BREAST BIOPSY Left     benign   • HYSTERECTOMY      Partial   • KNEE SURGERY     • THYROIDECTOMY, PARTIAL      Removal of goiter             PT Ortho       17 1500    Subjective Pain    Able to rate subjective pain? yes  -GUILLERMO    Pre-Treatment Pain Level 8  -GUILLERMO    Post-Treatment Pain Level 5  -GUILLERMO      17 1500    Posture/Observations    Posture/Observations Comments In standing demonstrates moderate forward head, rounded should posture, at times elevating both shoulders,  right greater than left with muscle guarding   -CC    Quarter Clearing    Quarter Clearing Upper Quarter Clearing  -CC    DTR- Upper Quarter Clearing    Biceps (C5/6) Bilateral:;2- Normal response  -CC    Brachioradialis (C6) Bilateral:;2- Normal response  -CC    Triceps (C7) Bilateral:;2- Normal response  -CC    Sensory Screen for Light Touch- Upper Quarter Clearing    C4 (posterior shoulder) Bilateral:;Intact  -CC    C5 (lateral upper arm) Bilateral:;Intact  -CC    C6 (tip of thumb) Bilateral:;Intact  -CC    C7 (tip of 3rd finger) Bilateral:;Intact  -CC    C8 (tip of 5th finger) Bilateral:;Intact  -CC    T1 (medial lower arm) Bilateral:;Intact  -CC    Myotomal Screen- Upper Quarter Clearing    Finger abduction (T1) Bilateral:;5 (Normal)  -CC     --   setting 2 R)18lbs L)25lbs  -CC     Cervical/Shoulder ROM Screen    Cervical flexion Impaired   25 degrees  -CC    Cervical extension Impaired   35 degrees  -CC    Cervical lateral flexion Impaired   R) 20 degrees L) 15 degrees  -CC    Cervical rotation Impaired   R) 45 degree  L) 30 degrees  -CC    Shoulder elevation  Normal  -CC    Special Tests/Palpation    Special Tests/Palpation Shoulder  -CC    Shoulder Impingement/Rotator Cuff Special Tests    Jimenez-Zohaib Test (RC Lesion vs. Bursitis) Bilateral:;Negative  -CC    Full Can Test (RC Lesion) Bilateral:;Negative  -CC    Empty Can Test (RC Lesion) Right:;Positive  -CC    Drop Arm Test (Full Thickness RC Lesion) Bilateral:;Negative  -CC    Speed's Test (LH of Biceps Lesion) Bilateral:;Negative  -CC    ROM (Range of Motion)    General ROM Detail B) UE AROM at knees and ankles WNL.  B) shoulder AROM  flexion:  R) 0 to 160 degrees  L) 0 to 155 degrees,  B) abduction 0 to 160 degrees   -CC    MMT (Manual Muscle Testing)    General MMT Assessment Detail MMT B) UE   shoulder flexors R) 4+/5 L) 4-/5    Shoulder Abduction  R) 4+/5   L) 4-/5  Bicep/Tricep 5/5   B) shoulder IR/ER   5/5,  B) wrist flexion/extension 5/5, B) finger abduction 5/5,  B) thumb extension 5/5   -CC      User Key  (r) = Recorded By, (t) = Taken By, (c) = Cosigned By    Initials Name Provider Type    CC Alma Mathis, PT Physical Therapist    GUILLERMO Diaz, PTA Physical Therapy Assistant                            PT Assessment/Plan       08/23/17 1506       PT Assessment    Assessment Comments Patient tolerated treatment well today w/ reports of decreased pain at conclusion of session, 5/10.  Today's tx initiated w/ MH and Estim to cervical region for pain control f/b therex as per written flow sheet.  Continuous US performed to R) UT and cervical region to address tightness and assist w/ improved ROM.  Pt will progress with therapy as tolerated.  No complaints or adverse reactions observed during/ following today's tx.   " -GUILLERMO     PT Plan    PT Plan Comments Continue with PT's POC and progress tx as tolerated by patient.   -GUILLERMO       User Key  (r) = Recorded By, (t) = Taken By, (c) = Cosigned By    Initials Name Provider Type    GUILLERMO Diaz PTA Physical Therapy Assistant                Modalities       08/23/17 1500          Subjective Comments    Subjective Comments Patient arrives to therapy w/ reports of 8/10 neck pain.  Pt states the pain is greater on the right side of her neck.   -GUILLERMO      Moist Heat    MH Applied Yes   no adverse reactions observed following  -GUILLERMO      Location cervical and R) upper trap  -GUILLERMO      Rx Minutes 15 mins  -GUILLERMO      MH Prior to Rx Yes   w/ estim in seated position  -GUILLERMO      Ultrasound 69882    Location R) UT region  -GUILLERMO      Rx Minutes 8 min  -GUILLERMO      Duty Cycle 100  -GUILLERMO      Frequency --   3.3MHz  -GUILLERMO      Intensity - Wts/cm 1.2  -GUILLERMO      ELECTRICAL STIMULATION    Attended/Unattended Unattended  -GUILLERMO      Stimulation Type IFC  -GUILLERMO      Max mAmp --   as to pt's tolerance  -GUILLERMO      Location/Electrode Placement/Other cervical region  -GUILLERMO      Rx Minutes 15 mins  -GUILLERMO        User Key  (r) = Recorded By, (t) = Taken By, (c) = Cosigned By    Initials Name Provider Type    GUILLERMO Diaz PTA Physical Therapy Assistant                Exercises       08/23/17 1500          Subjective Comments    Subjective Comments Patient arrives to therapy w/ reports of 8/10 neck pain.  Pt states the pain is greater on the right side of her neck.   -GUILLERMO      Subjective Pain    Able to rate subjective pain? yes  -GUILLERMO      Pre-Treatment Pain Level 8  -GUILLERMO      Post-Treatment Pain Level 5  -GUILLERMO      Exercise 1    Exercise Name 1 UT stretch 3x20\", lev scap stretch 3x20\", scap squeeze 10x2, cervical AROM (flex/ext, rotation) x10, cervical retraction x10, pulleys (flex) x2 min  -GUILLERMO      Cueing 1 Verbal;Tactile;Demo  -GUILLERMO      Time (Minutes) 1 25 min  -GUILLERMO        User Key  (r) = Recorded By, (t) = Taken By, (c) = " Cosigned By    Initials Name Provider Type    GUILLERMO Diaz PTA Physical Therapy Assistant                                   Therapy Education       08/23/17 1506          Therapy Education    Given HEP;Symptoms/condition management;Pain management;Posture/body mechanics  -GUILLERMO      Program Reinforced  -GUILLERMO      How Provided Verbal;Demonstration  -GUILLERMO      Provided to Patient  -GUILLERMO      Level of Understanding Verbalized;Demonstrated  -GUILLERMO        User Key  (r) = Recorded By, (t) = Taken By, (c) = Cosigned By    Initials Name Provider Type    GUILLERMO Diaz PTA Physical Therapy Assistant                Time Calculation:   Start Time: 1340  Stop Time: 1433  Time Calculation (min): 53 min    Therapy Charges for Today     Code Description Service Date Service Provider Modifiers Qty    05487271503 HC PT THER PROC EA 15 MIN 8/23/2017 Kari Diaz PTA GP 2    29615371404 HC PT ELECTRICAL STIM UNATTENDED 8/23/2017 Kari Diaz PTA  1    38679219153 HC PT HOT/COLD PACK WC NONMCARE 8/23/2017 Kari Diaz PTA GP 1                    Kari Diaz PTA  8/23/2017

## 2017-08-28 ENCOUNTER — HOSPITAL ENCOUNTER (OUTPATIENT)
Dept: PHYSICAL THERAPY | Facility: HOSPITAL | Age: 62
Setting detail: THERAPIES SERIES
Discharge: HOME | End: 2017-08-28

## 2017-08-28 DIAGNOSIS — M54.2 CERVICAL PAIN: Primary | ICD-10-CM

## 2017-08-28 PROCEDURE — 97010 HOT OR COLD PACKS THERAPY: CPT | Performed by: PHYSICAL THERAPIST

## 2017-08-28 PROCEDURE — 97110 THERAPEUTIC EXERCISES: CPT | Performed by: PHYSICAL THERAPIST

## 2017-08-28 PROCEDURE — G0283 ELEC STIM OTHER THAN WOUND: HCPCS | Performed by: PHYSICAL THERAPIST

## 2017-08-28 PROCEDURE — 97140 MANUAL THERAPY 1/> REGIONS: CPT | Performed by: PHYSICAL THERAPIST

## 2017-08-28 NOTE — THERAPY TREATMENT NOTE
Outpatient Physical Therapy Ortho Treatment Note   [facility]     Patient Name: Breanna Kaba  : 1955  MRN: [id number]  Today's Date: 2017      Visit Date: 2017    Visit Dx:    ICD-10-CM ICD-9-CM   1. Cervical pain M54.2 723.1       Patient Active Problem List   Diagnosis   • Neuropathy   • Gout   • GERD (gastroesophageal reflux disease)   • Diabetes mellitus   • Chronic pain        Past Medical History:   Diagnosis Date   • Arthritis    • Chronic pain    • Diabetes mellitus    • GERD (gastroesophageal reflux disease)    • Gout    • Headache    • Neuropathy         Past Surgical History:   Procedure Laterality Date   • BREAST BIOPSY Left     benign   • HYSTERECTOMY      Partial   • KNEE SURGERY     • THYROIDECTOMY, PARTIAL      Removal of goiter                             PT Assessment/Plan       17 1612       PT Assessment    Assessment Comments Pt tolerated treatment fairly well today, with decrease in c/o pain at conclusion of treatment, and good form with ther ex observed with verbal cueing required.  Pt continues to require skilled PT services to focus on decrease c/o pain, improve shoulder and cervical mobility and promote improved strength and posture to promote improved functional mobility.   -CC     PT Plan    PT Plan Comments P: Continue with POC, progress as to pt's tolerance   -CC       User Key  (r) = Recorded By, (t) = Taken By, (c) = Cosigned By    Initials Name Provider Type    CC Alma Mathis, PT Physical Therapist                Modalities       17 1600          Moist Heat     Applied Yes   during E-stim  -CC      Location cervical and R) upper trap  -CC      Rx Minutes 15 mins  -CC      MH Prior to Rx Yes   w/ estim in seated position  -CC      Ultrasound 65063    Frequency --   3.3MHz  -CC      ELECTRICAL STIMULATION    Attended/Unattended Unattended   with MH, no adverse skin reactions upon removal of MH  -CC      Stimulation Type IFC   intensity as to  pt's tolerance as per protocol of unit   -CC      Location/Electrode Placement/Other cervical region   B) Upper trap/cervical musculature  -CC      Rx Minutes 15 mins  -CC        User Key  (r) = Recorded By, (t) = Taken By, (c) = Cosigned By    Initials Name Provider Type    CC Alma Mathis, PT Physical Therapist                Exercises       08/28/17 1600          Subjective Comments    Subjective Comments Pt reports she is starting to have some numbness in her R) hand.  (indicates the dorsal side of the hand).  Pt reports she has received her [facility] records from when she was at .  Reports she believes she saw a Dr. Warner who told her, that she was OK.   ( docummentation from Imaging Studies: indicates she has degenerative changes noted at multiple levels.  Grade 1 anterolisthesis of C3 on 4,  C4 on 5, and C5 on 6.  Evidence of significant subluxation on flexion and extension x-rays that were also obtained that same day.  Reports of the CT head without contract was reviewed which notes densley calcified extra-axial mass at the apex likely representing a meningioma.  No acute intracranila abnormality)   In assessment recomendation for Ms. Kaba to follow-up with he PCP for a possible MRI, in regards to the possible meningioma.  Pt reports she will follow-up with her doctor.   -CC      Subjective Pain    Able to rate subjective pain? yes  -CC      Pre-Treatment Pain Level 8  -CC      Post-Treatment Pain Level 5  -CC      Exercise 1    Exercise Name 1 Supine chin tuck 10x 2 slowly and with good form, B) UT stretch 3x 30 secs and B) Lev scapula stretch 3 x 30 secs bilateral with each, scap squeeze 15 x 2, Cervical AROM (flexion, extension, B) rotation) 15 x  slowly and with good form,  verbal cues for posture and upright seated posture,  Pulleys B) shoulder flexion 2 mins x 2   -CC      Time (Minutes) 1 30 mins   -CC        User Key  (r) = Recorded By, (t) = Taken By, (c) = Cosigned By    Initials Name  Provider Type    CC Alma Mathis PT Physical Therapist                        Manual Rx (last 36 hours)      Manual Treatments       08/28/17 1500          Manual Rx 1    Manual Rx 1 Location STM to B) Upper trap/ cervical musculature  -CC      Manual Rx 1 Type as to pt's tolerance, seated   -CC      Manual Rx 1 Duration 10 mins   -CC        User Key  (r) = Recorded By, (t) = Taken By, (c) = Cosigned By    Initials Name Provider Type    KAROLINE Mathis PT Physical Therapist                    Therapy Education       08/28/17 1614          Therapy Education    Given HEP  -CC      Program Reinforced  -CC      How Provided Verbal  -CC      Provided to Patient  -CC      Level of Understanding Verbalized  -CC        User Key  (r) = Recorded By, (t) = Taken By, (c) = Cosigned By    Initials Name Provider Type    CC Alma Mathis PT Physical Therapist                Time Calculation:   Start Time: 1400  Stop Time: 1500  Time Calculation (min): 60 min    Therapy Charges for Today     Code Description Service Date Service Provider Modifiers Qty    33231650044 HC PT ELECTRICAL STIM UNATTENDED 8/28/2017 Alma Mathis, PT  1    86197430559 HC PT HOT/COLD PACK WC NONMCARE 8/28/2017 Alma Mathsi, PT GP 1    46011453828 HC PT MANUAL THERAPY EA 15 MIN 8/28/2017 Alma Mathis, PT GP 1    96969235052 HC PT THER PROC EA 15 MIN 8/28/2017 Alma Mathis PT GP 2                    Alma Mathis PT  8/28/2017

## 2017-09-08 ENCOUNTER — HOSPITAL ENCOUNTER (OUTPATIENT)
Dept: PHYSICAL THERAPY | Facility: HOSPITAL | Age: 62
Setting detail: THERAPIES SERIES
Discharge: HOME | End: 2017-09-08

## 2017-09-08 DIAGNOSIS — M54.2 CERVICAL PAIN: Primary | ICD-10-CM

## 2017-09-08 DIAGNOSIS — M54.2 NECK PAIN: ICD-10-CM

## 2017-09-08 PROCEDURE — 97140 MANUAL THERAPY 1/> REGIONS: CPT | Performed by: PHYSICAL THERAPIST

## 2017-09-08 PROCEDURE — 97010 HOT OR COLD PACKS THERAPY: CPT | Performed by: PHYSICAL THERAPIST

## 2017-09-08 PROCEDURE — G0283 ELEC STIM OTHER THAN WOUND: HCPCS | Performed by: PHYSICAL THERAPIST

## 2017-09-08 PROCEDURE — 97110 THERAPEUTIC EXERCISES: CPT | Performed by: PHYSICAL THERAPIST

## 2017-09-08 NOTE — PROGRESS NOTES
Outpatient Physical Therapy Ortho Treatment Note  [facility]ST[facility]     Patient Name: Breanna Kaba  : 1955  MRN: [id number]  Today's Date: 2017      Visit Date: 2017    Visit Dx:    ICD-10-CM ICD-9-CM   1. Cervical pain M54.2 723.1   2. Neck pain M54.2 723.1       Patient Active Problem List   Diagnosis   • Neuropathy   • Gout   • GERD (gastroesophageal reflux disease)   • Diabetes mellitus   • Chronic pain        Past Medical History:   Diagnosis Date   • Arthritis    • Chronic pain    • Diabetes mellitus    • GERD (gastroesophageal reflux disease)    • Gout    • Headache    • Neuropathy         Past Surgical History:   Procedure Laterality Date   • BREAST BIOPSY Left     benign   • HYSTERECTOMY      Partial   • KNEE SURGERY     • THYROIDECTOMY, PARTIAL      Removal of goiter             PT Ortho       17 1400    Subjective Comments    Subjective Comments Patient reported reports of neck pain, particularly on the right side, prior to the treatment session.  -AD    Subjective Pain    Able to rate subjective pain? yes  -AD    Pre-Treatment Pain Level 6  -AD    Post-Treatment Pain Level 3  -AD      User Key  (r) = Recorded By, (t) = Taken By, (c) = Cosigned By    Initials Name Provider Type    AD Ashley Claudene Dalton, PT Physical Therapist                            PT Assessment/Plan       17 1442       PT Assessment    Assessment Comments The patient performed today's session with minimal tactile and verbal cues required for proper form. STM was applied to the right upper trapezius to address muscle guarding. No adverse reactions were observed following moist heat combined with IFC to the right upper trapezius at the beginning of the session.  -AD     PT Plan    PT Plan Comments Progress as tolerated per POC.  -AD       User Key  (r) = Recorded By, (t) = Taken By, (c) = Cosigned By    Initials Name Provider Type    AD Ashley Claudene Dalton, PT Physical Therapist                 Modalities       09/08/17 1400          Moist Heat    MH Applied Yes   No redness observed  -AD      Location cervical and bilateral upper trap  -AD      Rx Minutes 15 mins  -AD      MH Prior to Rx Yes   w/ estim in seated position  -AD      ELECTRICAL STIMULATION    Attended/Unattended Unattended   with MH, no adverse skin reactions upon removal of MH  -AD      Stimulation Type IFC   intensity as to pt's tolerance as per protocol of unit   -AD      Location/Electrode Placement/Other cervical region   B) Upper trap/cervical musculature  -AD      Rx Minutes 15 mins  -AD        User Key  (r) = Recorded By, (t) = Taken By, (c) = Cosigned By    Initials Name Provider Type    AD Ashley Claudene Dalton, PT Physical Therapist                Exercises       09/08/17 1400          Subjective Comments    Subjective Comments Patient reported reports of neck pain, particularly on the right side, prior to the treatment session.  -AD      Subjective Pain    Able to rate subjective pain? yes  -AD      Pre-Treatment Pain Level 6  -AD      Post-Treatment Pain Level 3  -AD      Exercise 1    Exercise Name 1 UT stretch, levator stretch, scapular squeeze, cervical AROM, pulleys into flexion and scaption.  -AD      Cueing 1 Verbal;Tactile  -AD      Time (Minutes) 1  15 minutes  -AD        User Key  (r) = Recorded By, (t) = Taken By, (c) = Cosigned By    Initials Name Provider Type    AD Ashley Claudene Dalton, PT Physical Therapist                        Manual Rx (last 36 hours)      Manual Treatments       09/08/17 1300          Manual Rx 1    Manual Rx 1 Location STM to B) Upper trap/ cervical musculature  -AD      Manual Rx 1 Type as to pt's tolerance, seated   -AD      Manual Rx 1 Duration 25 minutes  -AD        User Key  (r) = Recorded By, (t) = Taken By, (c) = Cosigned By    Initials Name Provider Type    AD Ashley Claudene Dalton, PT Physical Therapist                    Therapy Education       09/08/17 1442          Therapy  Education    Given HEP  -AD      Program Reinforced  -AD      How Provided Verbal  -AD      Provided to Patient  -AD      Level of Understanding Verbalized  -AD        User Key  (r) = Recorded By, (t) = Taken By, (c) = Cosigned By    Initials Name Provider Type    AD Ashley Claudene Dalton, PT Physical Therapist                Time Calculation:   Start Time: 1300  Stop Time: 1358  Time Calculation (min): 58 min    Therapy Charges for Today     Code Description Service Date Service Provider Modifiers Qty    62568800102 HC PT ELECTRICAL STIM UNATTENDED 9/8/2017 Ashley Claudene Dalton, PT  1    99169946198 HC PT HOT/COLD PACK WC NONMCARE 9/8/2017 Ashley Claudene Dalton, PT GP 1    13770006076 HC PT MANUAL THERAPY EA 15 MIN 9/8/2017 Ashley Claudene Dalton, PT GP 2    54244968250 HC PT THER PROC EA 15 MIN 9/8/2017 Ashley Claudene Dalton, PT GP 1                    Ashley Claudene Dalton, PT  9/8/2017

## 2017-09-18 ENCOUNTER — TRANSCRIBE ORDERS (OUTPATIENT)
Dept: ADMINISTRATIVE | Facility: HOSPITAL | Age: 62
End: 2017-09-18

## 2017-09-18 DIAGNOSIS — M54.2 NECK PAIN: Primary | ICD-10-CM

## 2017-09-19 ENCOUNTER — APPOINTMENT (OUTPATIENT)
Dept: PHYSICAL THERAPY | Facility: HOSPITAL | Age: 62
End: 2017-09-19

## 2017-09-22 ENCOUNTER — DOCUMENTATION (OUTPATIENT)
Dept: PHYSICAL THERAPY | Facility: HOSPITAL | Age: 62
End: 2017-09-22

## 2017-09-22 DIAGNOSIS — M54.2 CERVICAL PAIN: Primary | ICD-10-CM

## 2017-09-22 NOTE — THERAPY DISCHARGE NOTE
Outpatient Physical Therapy Ortho Treatment Note/Discharge Summary       Patient Name: Breanna Kaba  : 1955  MRN: [id number]  Today's Date: 2017      Visit Date: 2017    Visit Dx:    ICD-10-CM ICD-9-CM   1. Cervical pain M54.2 723.1       Patient Active Problem List   Diagnosis   • Neuropathy   • Gout   • GERD (gastroesophageal reflux disease)   • Diabetes mellitus   • Chronic pain        Past Medical History:   Diagnosis Date   • Arthritis    • Chronic pain    • Diabetes mellitus    • GERD (gastroesophageal reflux disease)    • Gout    • Headache    • Neuropathy         Past Surgical History:   Procedure Laterality Date   • BREAST BIOPSY Left     benign   • HYSTERECTOMY      Partial   • KNEE SURGERY     • THYROIDECTOMY, PARTIAL      Removal of goiter                                                        PT OP Goals       17 1100       PT Short Term Goals    STG 1 Pt will report a decrease in c/o pain at worse to 3/10 with daily activities.  -GUILLERMO     STG 1 Progress Comments Unable to assess goals  -GUILLERMO     STG 2 Pt will demonstrates improved AROM with cervical flexion, extension and B) rotation by 10 degrees.  -GUILLERMO     STG 2 Progress Comments Unable to assess goals  -GUILLERMO     STG 3 Pt will demonstrate improved strength of B) shoulders to grossly 4+/5.  -GUILLERMO     STG 3 Progress Comments Unable to assess goals  -GUILLERMO     STG 4 Pt will demonstrates decrease muscle guarding and palpable tenderness of cervical R) shoulder musculature to grossly 1+.  -GUILLERMO     STG 4 Progress Comments Unable to assess goals  -GUILLERMO     STG 5 Pt will be instructed and report daily performance of HEP.    -GUILLERMO     STG 5 Progress Comments Unable to assess  -GUILLERMO     Long Term Goals    LTG 1 Pt will report a decrease in c/o pain at worse to 1/10 with daily activities.  -GUILLERMO     LTG 1 Progress Comments Unable to assess goals  -GUILLERMO     LTG 2 Pt will demonstrates improved AROM with cervical flexion, extension and B) rotation by 20  degrees.  -GUILLERMO     LTG 2 Progress Comments Unable to assess goals  -GUILLERMO     LTG 3 Pt will demonstrate improved strength of B) shoulders to grossly 5/5 to promote improved functional mobility.  -GUILLERMO     LTG 3 Progress Comments Unable to assess goals  -GUILLERMO     LTG 4 Pt will demonstrates improved posture in standing, without observing shoulder elevation and requires no verbal cuing to promote improved posture.   -GUILLERMO     LTG 4 Progress Comments Unable to assess goals  -GUILLERMO       User Key  (r) = Recorded By, (t) = Taken By, (c) = Cosigned By    Initials Name Provider Type    GUILLERMO Diaz, PTA Physical Therapy Assistant                    Time Calculation:                  OP PT Discharge Summary  Date of Discharge: 09/22/17  Reason for Discharge: other (comment) (Pt discharged w/ reason unknown)  Discharge Destination: Unknown  Discharge Instructions: Patient discharged from therapy services at this time due to failure to continue w/ therapy services; unable to assess goals.  Patient attended a total of 4 visits including Initial Evaluation.  Thank you for your referral.       Kari Diaz PTA  9/22/2017

## 2018-04-10 RX ORDER — ATORVASTATIN CALCIUM 40 MG/1
TABLET, FILM COATED ORAL
Qty: 30 TABLET | Refills: 5 | OUTPATIENT
Start: 2018-04-10

## 2018-07-06 ENCOUNTER — TRANSCRIBE ORDERS (OUTPATIENT)
Dept: GENERAL RADIOLOGY | Facility: HOSPITAL | Age: 63
End: 2018-07-06

## 2018-07-06 ENCOUNTER — HOSPITAL ENCOUNTER (OUTPATIENT)
Dept: GENERAL RADIOLOGY | Facility: HOSPITAL | Age: 63
Discharge: HOME | End: 2018-07-06
Admitting: FAMILY MEDICINE

## 2018-07-06 DIAGNOSIS — S16.1XXA CERVICAL STRAIN, ACUTE, INITIAL ENCOUNTER: Primary | ICD-10-CM

## 2018-07-06 DIAGNOSIS — S16.1XXA CERVICAL STRAIN, ACUTE, INITIAL ENCOUNTER: ICD-10-CM

## 2018-07-06 PROCEDURE — 72050 X-RAY EXAM NECK SPINE 4/5VWS: CPT

## 2018-07-06 PROCEDURE — 72050 X-RAY EXAM NECK SPINE 4/5VWS: CPT | Performed by: RADIOLOGY

## 2019-01-16 ENCOUNTER — TELEPHONE (OUTPATIENT)
Dept: FAMILY MEDICINE CLINIC | Facility: CLINIC | Age: 64
End: 2019-01-16

## 2019-01-16 ENCOUNTER — OFFICE VISIT (OUTPATIENT)
Dept: FAMILY MEDICINE CLINIC | Facility: CLINIC | Age: 64
End: 2019-01-16

## 2019-01-16 VITALS
DIASTOLIC BLOOD PRESSURE: 79 MMHG | TEMPERATURE: 98.5 F | WEIGHT: 133 LBS | BODY MASS INDEX: 24.33 KG/M2 | SYSTOLIC BLOOD PRESSURE: 145 MMHG | OXYGEN SATURATION: 99 % | HEART RATE: 82 BPM

## 2019-01-16 DIAGNOSIS — E11.65 TYPE 2 DIABETES MELLITUS WITH HYPERGLYCEMIA, WITHOUT LONG-TERM CURRENT USE OF INSULIN (HCC): Primary | ICD-10-CM

## 2019-01-16 DIAGNOSIS — M1A.09X0 IDIOPATHIC CHRONIC GOUT OF MULTIPLE SITES WITHOUT TOPHUS: ICD-10-CM

## 2019-01-16 DIAGNOSIS — J30.89 SEASONAL ALLERGIC RHINITIS DUE TO OTHER ALLERGIC TRIGGER: ICD-10-CM

## 2019-01-16 DIAGNOSIS — G89.4 CHRONIC PAIN SYNDROME: ICD-10-CM

## 2019-01-16 LAB
ALBUMIN SERPL-MCNC: 4.5 G/DL (ref 3.4–4.8)
ALBUMIN UR-MCNC: 33.4 MG/L
ALBUMIN/GLOB SERPL: 1.6 G/DL (ref 1.5–2.5)
ALP SERPL-CCNC: 213 U/L (ref 35–104)
ALT SERPL W P-5'-P-CCNC: 23 U/L (ref 10–36)
ANION GAP SERPL CALCULATED.3IONS-SCNC: 8.4 MMOL/L (ref 3.6–11.2)
AST SERPL-CCNC: 18 U/L (ref 10–30)
BASOPHILS # BLD AUTO: 0.04 10*3/MM3 (ref 0–0.3)
BASOPHILS NFR BLD AUTO: 0.7 % (ref 0–2)
BILIRUB SERPL-MCNC: 0.5 MG/DL (ref 0.2–1.8)
BUN BLD-MCNC: 16 MG/DL (ref 7–21)
BUN/CREAT SERPL: 16.8 (ref 7–25)
CALCIUM SPEC-SCNC: 9.8 MG/DL (ref 7.7–10)
CHLORIDE SERPL-SCNC: 92 MMOL/L (ref 99–112)
CO2 SERPL-SCNC: 28.6 MMOL/L (ref 24.3–31.9)
CREAT BLD-MCNC: 0.95 MG/DL (ref 0.43–1.29)
DEPRECATED RDW RBC AUTO: 37 FL (ref 37–54)
EOSINOPHIL # BLD AUTO: 0.1 10*3/MM3 (ref 0–0.7)
EOSINOPHIL NFR BLD AUTO: 1.8 % (ref 0–7)
ERYTHROCYTE [DISTWIDTH] IN BLOOD BY AUTOMATED COUNT: 13.1 % (ref 11.5–14.5)
GFR SERPL CREATININE-BSD FRML MDRD: 59 ML/MIN/1.73
GLOBULIN UR ELPH-MCNC: 2.9 GM/DL
GLUCOSE BLD-MCNC: 536 MG/DL (ref 70–110)
HBA1C MFR BLD: 15.1 % (ref 4.5–5.7)
HCT VFR BLD AUTO: 40.5 % (ref 37–47)
HGB BLD-MCNC: 13.9 G/DL (ref 12–16)
IMM GRANULOCYTES # BLD AUTO: 0 10*3/MM3 (ref 0–0.03)
IMM GRANULOCYTES NFR BLD AUTO: 0 % (ref 0–0.5)
LYMPHOCYTES # BLD AUTO: 2.22 10*3/MM3 (ref 1–3)
LYMPHOCYTES NFR BLD AUTO: 41 % (ref 16–46)
MCH RBC QN AUTO: 27.5 PG (ref 27–33)
MCHC RBC AUTO-ENTMCNC: 34.3 G/DL (ref 33–37)
MCV RBC AUTO: 80.2 FL (ref 80–94)
MONOCYTES # BLD AUTO: 0.44 10*3/MM3 (ref 0.1–0.9)
MONOCYTES NFR BLD AUTO: 8.1 % (ref 0–12)
NEUTROPHILS # BLD AUTO: 2.61 10*3/MM3 (ref 1.4–6.5)
NEUTROPHILS NFR BLD AUTO: 48.4 % (ref 40–75)
OSMOLALITY SERPL CALC.SUM OF ELEC: 284.4 MOSM/KG (ref 273–305)
PLATELET # BLD AUTO: 393 10*3/MM3 (ref 130–400)
PMV BLD AUTO: 11.2 FL (ref 6–10)
POTASSIUM BLD-SCNC: 4.2 MMOL/L (ref 3.5–5.3)
PROT SERPL-MCNC: 7.4 G/DL (ref 6–8)
RBC # BLD AUTO: 5.05 10*6/MM3 (ref 4.2–5.4)
SODIUM BLD-SCNC: 129 MMOL/L (ref 135–153)
URATE SERPL-MCNC: 2.9 MG/DL (ref 2.4–5.7)
WBC NRBC COR # BLD: 5.41 10*3/MM3 (ref 4.5–12.5)

## 2019-01-16 PROCEDURE — 85025 COMPLETE CBC W/AUTO DIFF WBC: CPT | Performed by: FAMILY MEDICINE

## 2019-01-16 PROCEDURE — 83036 HEMOGLOBIN GLYCOSYLATED A1C: CPT | Performed by: FAMILY MEDICINE

## 2019-01-16 PROCEDURE — 84550 ASSAY OF BLOOD/URIC ACID: CPT | Performed by: FAMILY MEDICINE

## 2019-01-16 PROCEDURE — 99214 OFFICE O/P EST MOD 30 MIN: CPT | Performed by: FAMILY MEDICINE

## 2019-01-16 PROCEDURE — 82043 UR ALBUMIN QUANTITATIVE: CPT | Performed by: FAMILY MEDICINE

## 2019-01-16 PROCEDURE — 80053 COMPREHEN METABOLIC PANEL: CPT | Performed by: FAMILY MEDICINE

## 2019-01-16 RX ORDER — ALENDRONATE SODIUM 70 MG/1
70 TABLET ORAL
COMMUNITY
End: 2019-06-27 | Stop reason: SDUPTHER

## 2019-01-16 RX ORDER — DICLOFENAC SODIUM 75 MG/1
75 TABLET, DELAYED RELEASE ORAL 2 TIMES DAILY
Qty: 60 TABLET | Refills: 2 | Status: SHIPPED | OUTPATIENT
Start: 2019-01-16 | End: 2019-06-27 | Stop reason: SDUPTHER

## 2019-01-16 RX ORDER — ALENDRONATE SODIUM 70 MG/1
70 TABLET ORAL
Qty: 4 TABLET | Refills: 1 | Status: CANCELLED | OUTPATIENT
Start: 2019-01-16

## 2019-01-16 RX ORDER — RANITIDINE 150 MG/1
1 TABLET ORAL EVERY 12 HOURS
COMMUNITY
Start: 2018-08-17 | End: 2019-01-16 | Stop reason: SDUPTHER

## 2019-01-16 RX ORDER — NATEGLINIDE 120 MG/1
120 TABLET ORAL
Qty: 90 TABLET | Refills: 2 | Status: SHIPPED | OUTPATIENT
Start: 2019-01-16 | End: 2019-06-27 | Stop reason: SDUPTHER

## 2019-01-16 RX ORDER — ATORVASTATIN CALCIUM 40 MG/1
40 TABLET, FILM COATED ORAL DAILY
Qty: 30 TABLET | Refills: 2 | Status: SHIPPED | OUTPATIENT
Start: 2019-01-16 | End: 2019-06-27 | Stop reason: SDUPTHER

## 2019-01-16 RX ORDER — LORATADINE 10 MG/1
1 TABLET ORAL DAILY
COMMUNITY
Start: 2018-08-17 | End: 2019-01-16 | Stop reason: SDUPTHER

## 2019-01-16 RX ORDER — PERMETHRIN 50 MG/G
CREAM TOPICAL ONCE
Qty: 60 G | Refills: 1 | Status: SHIPPED | OUTPATIENT
Start: 2019-01-16 | End: 2019-01-16

## 2019-01-16 RX ORDER — ALLOPURINOL 300 MG/1
300 TABLET ORAL DAILY
Qty: 30 TABLET | Refills: 2 | Status: SHIPPED | OUTPATIENT
Start: 2019-01-16 | End: 2019-06-27 | Stop reason: SDUPTHER

## 2019-01-16 RX ORDER — GLIPIZIDE 5 MG/1
5 TABLET ORAL DAILY
Qty: 30 TABLET | Refills: 2 | Status: SHIPPED | OUTPATIENT
Start: 2019-01-16 | End: 2019-06-27 | Stop reason: SDUPTHER

## 2019-01-16 RX ORDER — RANITIDINE 150 MG/1
150 TABLET ORAL EVERY 12 HOURS
Qty: 60 TABLET | Refills: 2 | Status: SHIPPED | OUTPATIENT
Start: 2019-01-16 | End: 2019-06-27 | Stop reason: SDUPTHER

## 2019-01-16 RX ORDER — LORATADINE 10 MG/1
10 TABLET ORAL DAILY
Qty: 30 TABLET | Refills: 2 | Status: SHIPPED | OUTPATIENT
Start: 2019-01-16 | End: 2019-06-27 | Stop reason: SDUPTHER

## 2019-01-17 LAB
CHOLEST SERPL-MCNC: 273 MG/DL (ref 0–200)
HDLC SERPL-MCNC: 43 MG/DL (ref 60–100)
LDLC SERPL CALC-MCNC: 156 MG/DL (ref 0–100)
LDLC/HDLC SERPL: 3.63 {RATIO}
TRIGL SERPL-MCNC: 369 MG/DL (ref 0–150)
VLDLC SERPL-MCNC: 73.8 MG/DL

## 2019-01-17 PROCEDURE — 80061 LIPID PANEL: CPT | Performed by: FAMILY MEDICINE
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
## 2019-04-02 RX ORDER — SITAGLIPTIN AND METFORMIN HYDROCHLORIDE 1000; 50 MG/1; MG/1
TABLET, FILM COATED ORAL
Qty: 60 TABLET | Refills: 0 | Status: SHIPPED | OUTPATIENT
Start: 2019-04-02 | End: 2019-06-27 | Stop reason: SDUPTHER

## 2019-06-27 ENCOUNTER — OFFICE VISIT (OUTPATIENT)
Dept: FAMILY MEDICINE CLINIC | Facility: CLINIC | Age: 64
End: 2019-06-27

## 2019-06-27 VITALS
HEIGHT: 62 IN | OXYGEN SATURATION: 99 % | SYSTOLIC BLOOD PRESSURE: 130 MMHG | HEART RATE: 87 BPM | BODY MASS INDEX: 25.58 KG/M2 | TEMPERATURE: 98.9 F | DIASTOLIC BLOOD PRESSURE: 78 MMHG | WEIGHT: 139 LBS

## 2019-06-27 DIAGNOSIS — N89.8 VAGINAL IRRITATION: ICD-10-CM

## 2019-06-27 DIAGNOSIS — E78.2 MIXED HYPERLIPIDEMIA: ICD-10-CM

## 2019-06-27 DIAGNOSIS — Z12.31 ENCOUNTER FOR SCREENING MAMMOGRAM FOR MALIGNANT NEOPLASM OF BREAST: ICD-10-CM

## 2019-06-27 DIAGNOSIS — J30.89 SEASONAL ALLERGIC RHINITIS DUE TO OTHER ALLERGIC TRIGGER: ICD-10-CM

## 2019-06-27 DIAGNOSIS — E11.65 TYPE 2 DIABETES MELLITUS WITH HYPERGLYCEMIA, WITHOUT LONG-TERM CURRENT USE OF INSULIN (HCC): Primary | ICD-10-CM

## 2019-06-27 DIAGNOSIS — M25.561 CHRONIC PAIN OF BOTH KNEES: ICD-10-CM

## 2019-06-27 DIAGNOSIS — M25.562 CHRONIC PAIN OF BOTH KNEES: ICD-10-CM

## 2019-06-27 DIAGNOSIS — G89.29 CHRONIC PAIN OF BOTH KNEES: ICD-10-CM

## 2019-06-27 DIAGNOSIS — M1A.09X0 IDIOPATHIC CHRONIC GOUT OF MULTIPLE SITES WITHOUT TOPHUS: ICD-10-CM

## 2019-06-27 DIAGNOSIS — G89.4 CHRONIC PAIN SYNDROME: ICD-10-CM

## 2019-06-27 DIAGNOSIS — M81.8 OTHER OSTEOPOROSIS WITHOUT CURRENT PATHOLOGICAL FRACTURE: ICD-10-CM

## 2019-06-27 DIAGNOSIS — K21.9 GASTROESOPHAGEAL REFLUX DISEASE WITHOUT ESOPHAGITIS: ICD-10-CM

## 2019-06-27 LAB
25(OH)D3 SERPL-MCNC: 29.2 NG/ML (ref 30–100)
ALBUMIN SERPL-MCNC: 4.6 G/DL (ref 3.5–5.2)
ALBUMIN/GLOB SERPL: 1.5 G/DL
ALP SERPL-CCNC: 175 U/L (ref 39–117)
ALT SERPL W P-5'-P-CCNC: 20 U/L (ref 1–33)
ANION GAP SERPL CALCULATED.3IONS-SCNC: 11.6 MMOL/L (ref 5–15)
AST SERPL-CCNC: 17 U/L (ref 1–32)
BILIRUB BLD-MCNC: NEGATIVE MG/DL
BILIRUB SERPL-MCNC: 0.6 MG/DL (ref 0.2–1.2)
BUN BLD-MCNC: 16 MG/DL (ref 8–23)
BUN/CREAT SERPL: 18.2 (ref 7–25)
CALCIUM SPEC-SCNC: 10.1 MG/DL (ref 8.6–10.5)
CHLORIDE SERPL-SCNC: 90 MMOL/L (ref 98–107)
CLARITY, POC: CLEAR
CO2 SERPL-SCNC: 26.4 MMOL/L (ref 22–29)
COLOR UR: YELLOW
CREAT BLD-MCNC: 0.88 MG/DL (ref 0.57–1)
GFR SERPL CREATININE-BSD FRML MDRD: 64 ML/MIN/1.73
GLOBULIN UR ELPH-MCNC: 3 GM/DL
GLUCOSE BLD-MCNC: 541 MG/DL (ref 65–99)
GLUCOSE UR STRIP-MCNC: ABNORMAL MG/DL
KETONES UR QL: NEGATIVE
LEUKOCYTE EST, POC: NEGATIVE
NITRITE UR-MCNC: NEGATIVE MG/ML
PH UR: 5 [PH] (ref 5–8)
POTASSIUM BLD-SCNC: 3.9 MMOL/L (ref 3.5–5.2)
PROT SERPL-MCNC: 7.6 G/DL (ref 6–8.5)
PROT UR STRIP-MCNC: NEGATIVE MG/DL
RBC # UR STRIP: NEGATIVE /UL
SODIUM BLD-SCNC: 128 MMOL/L (ref 136–145)
SP GR UR: 1.01 (ref 1–1.03)
URATE SERPL-MCNC: 3.3 MG/DL (ref 2.4–5.7)
UROBILINOGEN UR QL: NORMAL

## 2019-06-27 PROCEDURE — 82306 VITAMIN D 25 HYDROXY: CPT | Performed by: FAMILY MEDICINE

## 2019-06-27 PROCEDURE — 99214 OFFICE O/P EST MOD 30 MIN: CPT | Performed by: FAMILY MEDICINE

## 2019-06-27 PROCEDURE — 80053 COMPREHEN METABOLIC PANEL: CPT | Performed by: FAMILY MEDICINE

## 2019-06-27 PROCEDURE — 84550 ASSAY OF BLOOD/URIC ACID: CPT | Performed by: FAMILY MEDICINE

## 2019-06-27 PROCEDURE — 81003 URINALYSIS AUTO W/O SCOPE: CPT | Performed by: FAMILY MEDICINE

## 2019-06-27 PROCEDURE — 83036 HEMOGLOBIN GLYCOSYLATED A1C: CPT | Performed by: FAMILY MEDICINE

## 2019-06-27 RX ORDER — LORATADINE 10 MG/1
10 TABLET ORAL DAILY
Qty: 30 TABLET | Refills: 2 | Status: SHIPPED | OUTPATIENT
Start: 2019-06-27 | End: 2019-09-23 | Stop reason: SDUPTHER

## 2019-06-27 RX ORDER — ATORVASTATIN CALCIUM 40 MG/1
40 TABLET, FILM COATED ORAL DAILY
Qty: 30 TABLET | Refills: 2 | Status: SHIPPED | OUTPATIENT
Start: 2019-06-27 | End: 2019-09-23 | Stop reason: SDUPTHER

## 2019-06-27 RX ORDER — GLIPIZIDE 5 MG/1
5 TABLET ORAL DAILY
Qty: 30 TABLET | Refills: 2 | Status: SHIPPED | OUTPATIENT
Start: 2019-06-27 | End: 2019-09-23 | Stop reason: SDUPTHER

## 2019-06-27 RX ORDER — DICLOFENAC SODIUM 75 MG/1
75 TABLET, DELAYED RELEASE ORAL 2 TIMES DAILY
Qty: 60 TABLET | Refills: 2 | Status: SHIPPED | OUTPATIENT
Start: 2019-06-27 | End: 2019-09-23 | Stop reason: SDUPTHER

## 2019-06-27 RX ORDER — NATEGLINIDE 120 MG/1
120 TABLET ORAL
Qty: 90 TABLET | Refills: 2 | Status: SHIPPED | OUTPATIENT
Start: 2019-06-27 | End: 2019-09-23 | Stop reason: SDUPTHER

## 2019-06-27 RX ORDER — ALLOPURINOL 300 MG/1
300 TABLET ORAL DAILY
Qty: 30 TABLET | Refills: 2 | Status: SHIPPED | OUTPATIENT
Start: 2019-06-27 | End: 2019-09-23 | Stop reason: SDUPTHER

## 2019-06-27 RX ORDER — RANITIDINE 150 MG/1
150 TABLET ORAL EVERY 12 HOURS
Qty: 60 TABLET | Refills: 2 | Status: SHIPPED | OUTPATIENT
Start: 2019-06-27 | End: 2019-09-23 | Stop reason: SDUPTHER

## 2019-06-27 RX ORDER — GABAPENTIN 600 MG/1
600 TABLET ORAL 3 TIMES DAILY
Qty: 90 TABLET | Refills: 2 | Status: CANCELLED | OUTPATIENT
Start: 2019-06-27

## 2019-06-27 RX ORDER — ALENDRONATE SODIUM 70 MG/1
70 TABLET ORAL
Qty: 12 TABLET | Refills: 1 | Status: SHIPPED | OUTPATIENT
Start: 2019-06-27 | End: 2019-09-23 | Stop reason: SDUPTHER

## 2019-06-28 LAB — HBA1C MFR BLD: 14.8 % (ref 4.8–5.6)

## 2019-06-29 LAB
A VAGINAE DNA VAG QL NAA+PROBE: ABNORMAL SCORE
BVAB2 DNA VAG QL NAA+PROBE: ABNORMAL SCORE
C ALBICANS DNA VAG QL NAA+PROBE: POSITIVE
C GLABRATA DNA VAG QL NAA+PROBE: POSITIVE
MEGA1 DNA VAG QL NAA+PROBE: ABNORMAL SCORE
T VAGINALIS RRNA SPEC QL NAA+PROBE: NEGATIVE

## 2019-07-05 RX ORDER — FLUCONAZOLE 150 MG/1
150 TABLET ORAL ONCE
Qty: 1 TABLET | Refills: 0 | Status: SHIPPED | OUTPATIENT
Start: 2019-07-05 | End: 2019-07-05

## 2019-07-08 ENCOUNTER — TELEPHONE (OUTPATIENT)
Dept: FAMILY MEDICINE CLINIC | Facility: CLINIC | Age: 64
End: 2019-07-08

## 2019-07-08 RX ORDER — INSULIN GLARGINE 100 [IU]/ML
8 INJECTION, SOLUTION SUBCUTANEOUS NIGHTLY
Qty: 3 ML | Refills: 5 | Status: SHIPPED | OUTPATIENT
Start: 2019-07-08 | End: 2019-08-06

## 2019-07-08 RX ORDER — BLOOD SUGAR DIAGNOSTIC
1 STRIP MISCELLANEOUS NIGHTLY
Qty: 90 EACH | Refills: 5 | Status: SHIPPED | OUTPATIENT
Start: 2019-07-08 | End: 2019-12-06 | Stop reason: SDUPTHER

## 2019-07-08 NOTE — TELEPHONE ENCOUNTER
Please let her know that I sent in some insulin to the pharmacy. I'd like her to start at 8 units at night. I sent in basaglar; if it's not covered, please have her call us and let us know. Please have her call us in a week or so with some numbers. Please set up a followup appointment in 3-4 weeks for her to see me so we can titrate this some more. She can also discontinue the glipizide and the starlix once she is on the insulin (please keep on the janumet for now).

## 2019-07-08 NOTE — TELEPHONE ENCOUNTER
----- Message from Myrna Ramos MD sent at 7/5/2019  4:24 PM EDT -----  Please call Breanna. I sent in a diflucan for yeast as it is positive and she is complaining of vaginal irritation. More importantly, her diabetes is still out of control. We really switch over to insulin at this point. She was very resistant last time; would she be more amendable now? A 14 will not decrease to below a 10 with the three meds she is on.        Patient notified about labs, she is ok with starting insulin.
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
## 2019-07-31 ENCOUNTER — HOSPITAL ENCOUNTER (OUTPATIENT)
Dept: BONE DENSITY | Facility: HOSPITAL | Age: 64
Discharge: HOME | End: 2019-07-31

## 2019-07-31 ENCOUNTER — HOSPITAL ENCOUNTER (OUTPATIENT)
Dept: MAMMOGRAPHY | Facility: HOSPITAL | Age: 64
Discharge: HOME | End: 2019-07-31
Admitting: FAMILY MEDICINE

## 2019-07-31 DIAGNOSIS — M81.8 OTHER OSTEOPOROSIS WITHOUT CURRENT PATHOLOGICAL FRACTURE: ICD-10-CM

## 2019-07-31 PROCEDURE — 77063 BREAST TOMOSYNTHESIS BI: CPT

## 2019-07-31 PROCEDURE — 77067 SCR MAMMO BI INCL CAD: CPT

## 2019-07-31 PROCEDURE — 77080 DXA BONE DENSITY AXIAL: CPT | Performed by: RADIOLOGY

## 2019-07-31 PROCEDURE — 77063 BREAST TOMOSYNTHESIS BI: CPT | Performed by: RADIOLOGY

## 2019-07-31 PROCEDURE — 77080 DXA BONE DENSITY AXIAL: CPT

## 2019-07-31 PROCEDURE — 77067 SCR MAMMO BI INCL CAD: CPT | Performed by: RADIOLOGY

## 2019-08-01 ENCOUNTER — TELEPHONE (OUTPATIENT)
Dept: FAMILY MEDICINE CLINIC | Facility: CLINIC | Age: 64
End: 2019-08-01

## 2019-08-01 NOTE — TELEPHONE ENCOUNTER
----- Message from Myrna Ramos MD sent at 8/1/2019  8:51 AM EDT -----  Labs stable. Okay to either call or send letter to patient. Thanks.        Stable letter mailed.

## 2019-08-05 ENCOUNTER — TELEPHONE (OUTPATIENT)
Dept: FAMILY MEDICINE CLINIC | Facility: CLINIC | Age: 64
End: 2019-08-05

## 2019-08-05 NOTE — TELEPHONE ENCOUNTER
Patient called wanting to know about her Dexa Scan & reports the Co-pay on the Basaglar is 75-$100 ,she can't afford that.

## 2019-08-06 NOTE — TELEPHONE ENCOUNTER
I sent her in [facility]. Let her know to call [employer] before she goes over there and see if it's covered. If it's not, have her call us back.

## 2019-08-06 NOTE — TELEPHONE ENCOUNTER
Well, you can let her know that her DEXA scan shows that she is osteoporotic now, worse than when she was osteopenic. She was started on fosamax then. I think she was still taking it last time I saw her so the fosamax did not really work. We will discuss her options at her next visit.     Is it because she is in the doughnut hole or is it because [employer] changed to something else to cover? Thanks.

## 2019-08-06 NOTE — TELEPHONE ENCOUNTER
I sent her in [employer]. Let her know to call [employer] before she goes over there and see if it's covered. If it's not, have her call us back.    Left a message to return call.    Patient notified & verbalized understanding.

## 2019-08-06 NOTE — TELEPHONE ENCOUNTER
Well, you can let her know that her DEXA scan shows that she is osteoporotic now, worse than when she was osteopenic. She was started on fosamax then. I think she was still taking it last time I saw her so the fosamax did not really work. We will discuss her options at her next visit.     Is it because she is in the doughnut hole or is it because [employer] changed to something else to cover? Thanks.    Spoke with patient & she verbalized understanding,as to her insulin all she knew was that they told her it wasn't covered.

## 2019-09-23 DIAGNOSIS — J30.89 SEASONAL ALLERGIC RHINITIS DUE TO OTHER ALLERGIC TRIGGER: ICD-10-CM

## 2019-09-23 DIAGNOSIS — M1A.09X0 IDIOPATHIC CHRONIC GOUT OF MULTIPLE SITES WITHOUT TOPHUS: ICD-10-CM

## 2019-09-23 DIAGNOSIS — M81.8 OTHER OSTEOPOROSIS WITHOUT CURRENT PATHOLOGICAL FRACTURE: ICD-10-CM

## 2019-09-23 DIAGNOSIS — E78.2 MIXED HYPERLIPIDEMIA: ICD-10-CM

## 2019-09-23 DIAGNOSIS — K21.9 GASTROESOPHAGEAL REFLUX DISEASE WITHOUT ESOPHAGITIS: ICD-10-CM

## 2019-09-23 DIAGNOSIS — G89.4 CHRONIC PAIN SYNDROME: ICD-10-CM

## 2019-09-23 DIAGNOSIS — E11.65 TYPE 2 DIABETES MELLITUS WITH HYPERGLYCEMIA, WITHOUT LONG-TERM CURRENT USE OF INSULIN (HCC): ICD-10-CM

## 2019-09-23 RX ORDER — ALENDRONATE SODIUM 70 MG/1
70 TABLET ORAL
Qty: 12 TABLET | Refills: 1 | Status: SHIPPED | OUTPATIENT
Start: 2019-09-23 | End: 2019-12-05 | Stop reason: SDUPTHER

## 2019-09-23 RX ORDER — ALLOPURINOL 300 MG/1
300 TABLET ORAL DAILY
Qty: 30 TABLET | Refills: 2 | Status: SHIPPED | OUTPATIENT
Start: 2019-09-23 | End: 2019-12-05 | Stop reason: SDUPTHER

## 2019-09-23 RX ORDER — GLIPIZIDE 5 MG/1
5 TABLET ORAL DAILY
Qty: 30 TABLET | Refills: 2 | Status: SHIPPED | OUTPATIENT
Start: 2019-09-23 | End: 2019-12-05 | Stop reason: SDUPTHER

## 2019-09-23 RX ORDER — NATEGLINIDE 120 MG/1
120 TABLET ORAL
Qty: 90 TABLET | Refills: 2 | Status: SHIPPED | OUTPATIENT
Start: 2019-09-23 | End: 2019-12-05 | Stop reason: SDUPTHER

## 2019-09-23 RX ORDER — LORATADINE 10 MG/1
10 TABLET ORAL DAILY
Qty: 30 TABLET | Refills: 2 | Status: SHIPPED | OUTPATIENT
Start: 2019-09-23 | End: 2019-12-05 | Stop reason: SDUPTHER

## 2019-09-23 RX ORDER — ATORVASTATIN CALCIUM 40 MG/1
40 TABLET, FILM COATED ORAL DAILY
Qty: 30 TABLET | Refills: 2 | Status: SHIPPED | OUTPATIENT
Start: 2019-09-23 | End: 2019-12-05 | Stop reason: SDUPTHER

## 2019-09-23 RX ORDER — DICLOFENAC SODIUM 75 MG/1
75 TABLET, DELAYED RELEASE ORAL 2 TIMES DAILY
Qty: 60 TABLET | Refills: 2 | Status: SHIPPED | OUTPATIENT
Start: 2019-09-23 | End: 2019-10-01 | Stop reason: ALTCHOICE

## 2019-09-23 RX ORDER — RANITIDINE 150 MG/1
150 TABLET ORAL EVERY 12 HOURS
Qty: 60 TABLET | Refills: 2 | Status: SHIPPED | OUTPATIENT
Start: 2019-09-23 | End: 2019-12-05

## 2019-09-27 ENCOUNTER — TELEPHONE (OUTPATIENT)
Dept: FAMILY MEDICINE CLINIC | Facility: CLINIC | Age: 64
End: 2019-09-27

## 2019-09-27 ENCOUNTER — OFFICE VISIT (OUTPATIENT)
Dept: FAMILY MEDICINE CLINIC | Facility: CLINIC | Age: 64
End: 2019-09-27

## 2019-09-27 VITALS
OXYGEN SATURATION: 99 % | SYSTOLIC BLOOD PRESSURE: 145 MMHG | HEART RATE: 97 BPM | BODY MASS INDEX: 27.05 KG/M2 | WEIGHT: 147 LBS | DIASTOLIC BLOOD PRESSURE: 70 MMHG | HEIGHT: 62 IN | TEMPERATURE: 98.3 F

## 2019-09-27 DIAGNOSIS — R82.90 ABNORMAL FINDING IN URINE: ICD-10-CM

## 2019-09-27 DIAGNOSIS — E11.65 TYPE 2 DIABETES MELLITUS WITH HYPERGLYCEMIA, WITHOUT LONG-TERM CURRENT USE OF INSULIN (HCC): ICD-10-CM

## 2019-09-27 DIAGNOSIS — M54.41 CHRONIC BILATERAL LOW BACK PAIN WITH BILATERAL SCIATICA: Primary | ICD-10-CM

## 2019-09-27 DIAGNOSIS — G89.29 CHRONIC BILATERAL LOW BACK PAIN WITH BILATERAL SCIATICA: Primary | ICD-10-CM

## 2019-09-27 DIAGNOSIS — M54.42 CHRONIC BILATERAL LOW BACK PAIN WITH BILATERAL SCIATICA: Primary | ICD-10-CM

## 2019-09-27 LAB
BILIRUB BLD-MCNC: NEGATIVE MG/DL
GLUCOSE UR STRIP-MCNC: ABNORMAL MG/DL
KETONES UR QL: NEGATIVE
LEUKOCYTE EST, POC: ABNORMAL
NITRITE UR-MCNC: NEGATIVE MG/ML
PH UR: 6 [PH] (ref 5–8)
PROT UR STRIP-MCNC: ABNORMAL MG/DL
RBC # UR STRIP: NEGATIVE /UL
SP GR UR: 1.02 (ref 1–1.03)
UROBILINOGEN UR QL: NORMAL

## 2019-09-27 PROCEDURE — 83036 HEMOGLOBIN GLYCOSYLATED A1C: CPT | Performed by: FAMILY MEDICINE

## 2019-09-27 PROCEDURE — 87086 URINE CULTURE/COLONY COUNT: CPT | Performed by: FAMILY MEDICINE

## 2019-09-27 PROCEDURE — 36415 COLL VENOUS BLD VENIPUNCTURE: CPT | Performed by: FAMILY MEDICINE

## 2019-09-27 PROCEDURE — 99214 OFFICE O/P EST MOD 30 MIN: CPT | Performed by: FAMILY MEDICINE

## 2019-09-27 PROCEDURE — 80053 COMPREHEN METABOLIC PANEL: CPT | Performed by: FAMILY MEDICINE

## 2019-09-27 PROCEDURE — 81003 URINALYSIS AUTO W/O SCOPE: CPT | Performed by: FAMILY MEDICINE

## 2019-09-27 RX ORDER — LANOLIN ALCOHOL/MO/W.PET/CERES
1000 CREAM (GRAM) TOPICAL DAILY
COMMUNITY
End: 2019-12-05

## 2019-09-27 RX ORDER — SULFAMETHOXAZOLE AND TRIMETHOPRIM 800; 160 MG/1; MG/1
1 TABLET ORAL 2 TIMES DAILY
Qty: 6 TABLET | Refills: 0 | OUTPATIENT
Start: 2019-09-27 | End: 2019-11-12

## 2019-09-27 RX ORDER — ASCORBIC ACID 500 MG
500 TABLET ORAL DAILY
COMMUNITY
End: 2019-12-05

## 2019-09-27 RX ORDER — MELOXICAM 7.5 MG/1
7.5 TABLET ORAL DAILY
Qty: 30 TABLET | Refills: 2 | Status: SHIPPED | OUTPATIENT
Start: 2019-09-27 | End: 2019-11-05 | Stop reason: SDUPTHER

## 2019-09-27 NOTE — TELEPHONE ENCOUNTER
Patient called stated to tell you it was Mobic she took that you would know what she was talking about & to let you know she left a urine here that she thinks she has a UTI,frequency & decreased output.

## 2019-09-27 NOTE — TELEPHONE ENCOUNTER
Yes. Let her know I sent in mobic. Her urine is dirty; I also sent in bactrim, but part of her symptoms might be due to her sugars being high because it's very apparent in the urine. Thanks.      Left a message to return call.    Patient returned call & verbalized understanding.

## 2019-09-27 NOTE — TELEPHONE ENCOUNTER
Yes. Let her know I sent in mobic. Her urine is dirty; I also sent in bactrim, but part of her symptoms might be due to her sugars being high because it's very apparent in the urine. Thanks.

## 2019-09-28 LAB
ALBUMIN SERPL-MCNC: 4.9 G/DL (ref 3.5–5.2)
ALBUMIN/GLOB SERPL: 1.9 G/DL
ALP SERPL-CCNC: 110 U/L (ref 39–117)
ALT SERPL W P-5'-P-CCNC: 18 U/L (ref 1–33)
ANION GAP SERPL CALCULATED.3IONS-SCNC: 13.6 MMOL/L (ref 5–15)
AST SERPL-CCNC: 18 U/L (ref 1–32)
BACTERIA SPEC AEROBE CULT: NO GROWTH
BILIRUB SERPL-MCNC: 0.4 MG/DL (ref 0.2–1.2)
BUN BLD-MCNC: 29 MG/DL (ref 8–23)
BUN/CREAT SERPL: 39.2 (ref 7–25)
CALCIUM SPEC-SCNC: 9.5 MG/DL (ref 8.6–10.5)
CHLORIDE SERPL-SCNC: 101 MMOL/L (ref 98–107)
CO2 SERPL-SCNC: 26.4 MMOL/L (ref 22–29)
CREAT BLD-MCNC: 0.74 MG/DL (ref 0.57–1)
GFR SERPL CREATININE-BSD FRML MDRD: 79 ML/MIN/1.73
GLOBULIN UR ELPH-MCNC: 2.6 GM/DL
GLUCOSE BLD-MCNC: 172 MG/DL (ref 65–99)
HBA1C MFR BLD: 9.2 % (ref 4.8–5.6)
POTASSIUM BLD-SCNC: 4.7 MMOL/L (ref 3.5–5.2)
PROT SERPL-MCNC: 7.5 G/DL (ref 6–8.5)
SODIUM BLD-SCNC: 141 MMOL/L (ref 136–145)

## 2019-09-30 ENCOUNTER — TELEPHONE (OUTPATIENT)
Dept: FAMILY MEDICINE CLINIC | Facility: CLINIC | Age: 64
End: 2019-09-30

## 2019-09-30 NOTE — TELEPHONE ENCOUNTER
----- Message from Myrna Ramos MD sent at 9/29/2019  6:28 PM EDT -----  Please call Breanna. Let her know she is on the right track. Diabetes is better. No growth on urine.

## 2019-10-14 NOTE — PROGRESS NOTES
"Breanna Kaba     VITALS: Blood pressure 145/70, pulse 97, temperature 98.3 °F (36.8 °C), temperature source Oral, height 157.5 cm (62.01\"), weight 66.7 kg (147 lb), SpO2 99 %, not currently breastfeeding.    Subjective  Chief Complaint:   Chief Complaint   Patient presents with   • Pain     Hips an legs         History of Present Illness:  Patient is a 64 y.o.  female with a medical history significant for type 2 diabetes, gout, and allergic rhinitis who presents to clinic secondary to medical followup.  She states that her lower back and her bilateral hips are hurting her worse than usual.  This is been going on for the last 2 weeks.  She states that she is having trouble ambulating.  She states that the pain starts in her lower back at approximately L3-L5 and radiates over to her hips.  The ache is a throbbing, dull ache.  She has tried a Epson salt baths and heating pad without any alleviation to the pain.  She continues on her medications for chronic pain.  She states that they are not working.  She does have a history of seeking behavior.  She has a history of abnormal urine drug screen; she does not get any controlled medications from this clinic.  She does have complications of neuropathy, but this has not worsened.  She denies any urinary incontinence or bowel incontinence.  She has not tried any exercises at home.    Patient has chronic conditions of type 2 diabetes, gout, allergic rhinitis that have remained stable and unchanged.  She denies any side effects of her medications.  No complaints about any of the medications.    The following portions of the patient's history were reviewed and updated as appropriate: allergies, current medications, past family history, past medical history, past social history, past surgical history and problem list.    Past Medical History  Past Medical History:   Diagnosis Date   • Arthritis    • Chronic pain    • Diabetes mellitus (CMS/[facility])    • GERD (gastroesophageal " reflux disease)    • Gout    • Headache    • Neuropathy        Review of Systems   Respiratory: Negative for shortness of breath and wheezing.    Cardiovascular: Negative for chest pain and palpitations.   Musculoskeletal: Positive for arthralgias, back pain and myalgias.       Surgical History  Past Surgical History:   Procedure Laterality Date   • BREAST BIOPSY Left 1988    benign   • HYSTERECTOMY      Partial   • KNEE SURGERY     • THYROIDECTOMY, PARTIAL      Removal of goiter       Family History  Family History   Problem Relation Age of Onset   • COPD Mother    • Heart disease Brother    • Heart attack Brother    • Hypertension Daughter    • Diabetes Daughter    • Hypertension Daughter    • Diabetes Daughter    • Breast cancer Neg Hx        Social History  Social History     Socioeconomic History   • Marital status:      Spouse name: Not on file   • Number of children: Not on file   • Years of education: Not on file   • Highest education level: Not on file   Tobacco Use   • Smoking status: Never Smoker   • Smokeless tobacco: Never Used   Substance and Sexual Activity   • Alcohol use: No   • Drug use: No   • Sexual activity: Defer       Objective  Physical Exam    Gen: Patient in NAD. Pleasant and answers appropriately. A&Ox3.    Skin: Warm and dry with normal turgor. No purpura, rashes, or unusual pigmentation noted. Hair is normal in appearance and distribution.    HEENT: NC/AT. No lesions noted. Conjunctiva clear, sclera nonicteric. PERRL. EOMI without nystagmus or strabismus. Fundi appear benign. No hemorrhages or exudates of eyes. Auditory canals are patent bilaterally without lesions. TMs intact,  nonerythematous, nonbulging without lesions. Nasal mucosa pink, nonerythematous, and nonedematous. Frontal and maxillary sinuses are nontender. O/P nonerythematous and moist without exudate.    Neck: Supple without lymph nodes palpated. FROM.     Lungs: CTA B/L without rales, rhonchi, crackles, or  wheezes.    Heart: RRR. S1 and S2 normal. No S3 or S4. No MRGT.    Abd: Soft, nontender,nondistended. (+)BSx4 quadrants.     Extrem: No CCE. Radial pulses 2+/4 and equal B/L. FROMx4.  Negative flank tenderness bilaterally.    Back: Decreased range of motion.  Straight leg raise testing benign bilaterally.    Neuro: No focal motor/sensory deficits.    Procedures    Assessment/Plan  Breanna Kaba is a 64 y.o. here for medical followup.  Diagnoses and all orders for this visit:    Chronic bilateral low back pain with bilateral sciatica  -     POC Urinalysis Dipstick, Automated  -     Urine Culture - Urine, Urine, Clean Catch; Future  -     Urine Culture - Urine, Urine, Clean Catch  -     meloxicam (MOBIC) 7.5 MG tablet; Take 1 tablet by mouth Daily.    Type 2 diabetes mellitus with hyperglycemia, without long-term current use of insulin (CMS/[facility])  -     Comprehensive Metabolic Panel; Future  -     Hemoglobin A1c; Future  -     Comprehensive Metabolic Panel  -     Hemoglobin A1c    Abnormal finding in urine   -     Urine Culture - Urine, Urine, Clean Catch; Future  -     Urine Culture - Urine, Urine, Clean Catch  -     sulfamethoxazole-trimethoprim (BACTRIM DS) 800-160 MG per tablet; Take 1 tablet by mouth 2 (Two) Times a Day.      Patient's Body mass index is 26.88 kg/m². BMI is above normal parameters. Recommendations include: exercise counseling and nutrition counseling.     Findings and plans discussed with patient who verbalizes understanding and agreement. Will followup with patient once results are in. Patient to followup at clinic PRN or in three months for further medical followup.    MD ABIGAIL Weinberg Dragon/Transcription Disclaimer:  Much of this encounter note is an electronic transcription/translation of spoken language to printed text.  The electronic translation of spoken language may permit erroneous, or at times, nonsensical words or phrases to be inadvertently transcribed.  Although I have  reviewed the note for such errors, some may still exist.

## 2019-10-19 ENCOUNTER — HOSPITAL ENCOUNTER (EMERGENCY)
Facility: HOSPITAL | Age: 64
Discharge: HOME | End: 2019-10-19
Attending: EMERGENCY MEDICINE | Admitting: EMERGENCY MEDICINE

## 2019-10-19 ENCOUNTER — APPOINTMENT (OUTPATIENT)
Dept: CT IMAGING | Facility: HOSPITAL | Age: 64
End: 2019-10-19

## 2019-10-19 VITALS
HEIGHT: 62 IN | OXYGEN SATURATION: 98 % | WEIGHT: 140 LBS | TEMPERATURE: 98.4 F | RESPIRATION RATE: 18 BRPM | SYSTOLIC BLOOD PRESSURE: 143 MMHG | DIASTOLIC BLOOD PRESSURE: 82 MMHG | BODY MASS INDEX: 25.76 KG/M2 | HEART RATE: 101 BPM

## 2019-10-19 DIAGNOSIS — M54.31 SCIATICA OF RIGHT SIDE: Primary | ICD-10-CM

## 2019-10-19 DIAGNOSIS — E08.65 DIABETES MELLITUS DUE TO UNDERLYING CONDITION, UNCONTROLLED, WITH HYPERGLYCEMIA (HCC): ICD-10-CM

## 2019-10-19 LAB
ALBUMIN SERPL-MCNC: 4.68 G/DL (ref 3.5–5.2)
ALBUMIN/GLOB SERPL: 1.6 G/DL
ALP SERPL-CCNC: 139 U/L (ref 39–117)
ALT SERPL W P-5'-P-CCNC: 20 U/L (ref 1–33)
ANION GAP SERPL CALCULATED.3IONS-SCNC: 14 MMOL/L (ref 5–15)
AST SERPL-CCNC: 15 U/L (ref 1–32)
BASOPHILS # BLD AUTO: 0.04 10*3/MM3 (ref 0–0.2)
BASOPHILS NFR BLD AUTO: 0.6 % (ref 0–1.5)
BILIRUB SERPL-MCNC: 0.2 MG/DL (ref 0.2–1.2)
BILIRUB UR QL STRIP: NEGATIVE
BUN BLD-MCNC: 20 MG/DL (ref 8–23)
BUN/CREAT SERPL: 26 (ref 7–25)
CALCIUM SPEC-SCNC: 9.5 MG/DL (ref 8.6–10.5)
CHLORIDE SERPL-SCNC: 96 MMOL/L (ref 98–107)
CLARITY UR: CLEAR
CO2 SERPL-SCNC: 25 MMOL/L (ref 22–29)
COLOR UR: YELLOW
CREAT BLD-MCNC: 0.77 MG/DL (ref 0.57–1)
DEPRECATED RDW RBC AUTO: 40.7 FL (ref 37–54)
EOSINOPHIL # BLD AUTO: 0.23 10*3/MM3 (ref 0–0.4)
EOSINOPHIL NFR BLD AUTO: 3.3 % (ref 0.3–6.2)
ERYTHROCYTE [DISTWIDTH] IN BLOOD BY AUTOMATED COUNT: 14 % (ref 12.3–15.4)
GFR SERPL CREATININE-BSD FRML MDRD: 75 ML/MIN/1.73
GLOBULIN UR ELPH-MCNC: 2.9 GM/DL
GLUCOSE BLD-MCNC: 502 MG/DL (ref 65–99)
GLUCOSE UR STRIP-MCNC: ABNORMAL MG/DL
HCT VFR BLD AUTO: 34.5 % (ref 34–46.6)
HGB BLD-MCNC: 11.8 G/DL (ref 12–15.9)
HGB UR QL STRIP.AUTO: NEGATIVE
IMM GRANULOCYTES # BLD AUTO: 0.01 10*3/MM3 (ref 0–0.05)
IMM GRANULOCYTES NFR BLD AUTO: 0.1 % (ref 0–0.5)
KETONES UR QL STRIP: NEGATIVE
LEUKOCYTE ESTERASE UR QL STRIP.AUTO: NEGATIVE
LYMPHOCYTES # BLD AUTO: 2.31 10*3/MM3 (ref 0.7–3.1)
LYMPHOCYTES NFR BLD AUTO: 33.5 % (ref 19.6–45.3)
MCH RBC QN AUTO: 28 PG (ref 26.6–33)
MCHC RBC AUTO-ENTMCNC: 34.2 G/DL (ref 31.5–35.7)
MCV RBC AUTO: 81.9 FL (ref 79–97)
MONOCYTES # BLD AUTO: 0.63 10*3/MM3 (ref 0.1–0.9)
MONOCYTES NFR BLD AUTO: 9.1 % (ref 5–12)
NEUTROPHILS # BLD AUTO: 3.67 10*3/MM3 (ref 1.7–7)
NEUTROPHILS NFR BLD AUTO: 53.4 % (ref 42.7–76)
NITRITE UR QL STRIP: NEGATIVE
PH UR STRIP.AUTO: <=5 [PH] (ref 5–8)
PLATELET # BLD AUTO: 391 10*3/MM3 (ref 140–450)
PMV BLD AUTO: 11.1 FL (ref 6–12)
POTASSIUM BLD-SCNC: 3.9 MMOL/L (ref 3.5–5.2)
PROT SERPL-MCNC: 7.6 G/DL (ref 6–8.5)
PROT UR QL STRIP: NEGATIVE
RBC # BLD AUTO: 4.21 10*6/MM3 (ref 3.77–5.28)
SODIUM BLD-SCNC: 135 MMOL/L (ref 136–145)
SP GR UR STRIP: >1.03 (ref 1–1.03)
UROBILINOGEN UR QL STRIP: ABNORMAL
WBC NRBC COR # BLD: 6.89 10*3/MM3 (ref 3.4–10.8)

## 2019-10-19 PROCEDURE — 25010000002 KETOROLAC TROMETHAMINE PER 15 MG: Performed by: EMERGENCY MEDICINE

## 2019-10-19 PROCEDURE — 96375 TX/PRO/DX INJ NEW DRUG ADDON: CPT

## 2019-10-19 PROCEDURE — 25010000002 METHYLPREDNISOLONE PER 125 MG: Performed by: EMERGENCY MEDICINE

## 2019-10-19 PROCEDURE — 63710000001 INSULIN REGULAR HUMAN PER 5 UNITS: Performed by: EMERGENCY MEDICINE

## 2019-10-19 PROCEDURE — 96374 THER/PROPH/DIAG INJ IV PUSH: CPT

## 2019-10-19 PROCEDURE — 81003 URINALYSIS AUTO W/O SCOPE: CPT | Performed by: EMERGENCY MEDICINE

## 2019-10-19 PROCEDURE — 25010000002 ONDANSETRON PER 1 MG: Performed by: EMERGENCY MEDICINE

## 2019-10-19 PROCEDURE — 25010000002 ORPHENADRINE CITRATE PER 60 MG: Performed by: EMERGENCY MEDICINE

## 2019-10-19 PROCEDURE — 85025 COMPLETE CBC W/AUTO DIFF WBC: CPT | Performed by: EMERGENCY MEDICINE

## 2019-10-19 PROCEDURE — P9612 CATHETERIZE FOR URINE SPEC: HCPCS

## 2019-10-19 PROCEDURE — 80053 COMPREHEN METABOLIC PANEL: CPT | Performed by: EMERGENCY MEDICINE

## 2019-10-19 PROCEDURE — 99284 EMERGENCY DEPT VISIT MOD MDM: CPT

## 2019-10-19 PROCEDURE — 72131 CT LUMBAR SPINE W/O DYE: CPT

## 2019-10-19 PROCEDURE — 25010000002 MORPHINE PER 10 MG: Performed by: EMERGENCY MEDICINE

## 2019-10-19 RX ORDER — KETOROLAC TROMETHAMINE 10 MG/1
10 TABLET, FILM COATED ORAL EVERY 8 HOURS PRN
Qty: 15 TABLET | Refills: 0 | OUTPATIENT
Start: 2019-10-19 | End: 2019-11-12

## 2019-10-19 RX ORDER — ORPHENADRINE CITRATE 30 MG/ML
60 INJECTION INTRAMUSCULAR; INTRAVENOUS ONCE
Status: COMPLETED | OUTPATIENT
Start: 2019-10-19 | End: 2019-10-19

## 2019-10-19 RX ORDER — PREDNISONE 20 MG/1
20 TABLET ORAL
Qty: 15 TABLET | Refills: 0 | OUTPATIENT
Start: 2019-10-19 | End: 2019-11-12

## 2019-10-19 RX ORDER — METHYLPREDNISOLONE SODIUM SUCCINATE 125 MG/2ML
125 INJECTION, POWDER, LYOPHILIZED, FOR SOLUTION INTRAMUSCULAR; INTRAVENOUS ONCE
Status: COMPLETED | OUTPATIENT
Start: 2019-10-19 | End: 2019-10-19

## 2019-10-19 RX ORDER — KETOROLAC TROMETHAMINE 30 MG/ML
30 INJECTION, SOLUTION INTRAMUSCULAR; INTRAVENOUS ONCE
Status: COMPLETED | OUTPATIENT
Start: 2019-10-19 | End: 2019-10-19

## 2019-10-19 RX ORDER — METAXALONE 800 MG/1
800 TABLET ORAL 3 TIMES DAILY PRN
Qty: 30 TABLET | Refills: 0 | Status: SHIPPED | OUTPATIENT
Start: 2019-10-19 | End: 2019-11-05 | Stop reason: SDUPTHER

## 2019-10-19 RX ORDER — ONDANSETRON 2 MG/ML
4 INJECTION INTRAMUSCULAR; INTRAVENOUS ONCE
Status: COMPLETED | OUTPATIENT
Start: 2019-10-19 | End: 2019-10-19

## 2019-10-19 RX ADMIN — HUMAN INSULIN 10 UNITS: 100 INJECTION, SOLUTION SUBCUTANEOUS at 21:25

## 2019-10-19 RX ADMIN — MORPHINE SULFATE 4 MG: 4 INJECTION, SOLUTION INTRAMUSCULAR; INTRAVENOUS at 21:14

## 2019-10-19 RX ADMIN — METHYLPREDNISOLONE SODIUM SUCCINATE 125 MG: 125 INJECTION, POWDER, FOR SOLUTION INTRAMUSCULAR; INTRAVENOUS at 20:01

## 2019-10-19 RX ADMIN — ORPHENADRINE CITRATE 60 MG: 60 INJECTION INTRAMUSCULAR; INTRAVENOUS at 20:01

## 2019-10-19 RX ADMIN — ONDANSETRON 4 MG: 2 INJECTION, SOLUTION INTRAMUSCULAR; INTRAVENOUS at 21:14

## 2019-10-19 RX ADMIN — KETOROLAC TROMETHAMINE 30 MG: 30 INJECTION, SOLUTION INTRAMUSCULAR; INTRAVENOUS at 20:01

## 2019-10-20 NOTE — ED NOTES
Pt resting quietly on stretcher with complaints of back pain that radiates down right leg.  Pt AAOx4 with no resp distress noted, respirations even and unlabored.  Pt denies any needs at this time.  Skin PWD.  Pt family at bedside. Will continue to monitor and follow plan of care.  Bed rails up x2, bed in lowest position, call light in reach.       Elisa López, RN  10/20/19 [id number]

## 2019-10-20 NOTE — ED NOTES
Pt resting quietly on stretcher with complaints of back pain that radiates down right leg.  Pt AAOx4 with no resp distress noted, respirations even and unlabored.  Pt denies any needs at this time.  Skin PWD.  Pt family at bedside. Will continue to monitor and follow plan of care.  Bed rails up x2, bed in lowest position, call light in reach.      Elisa López, RN  10/20/19 [id number]

## 2019-10-20 NOTE — ED NOTES
Pt resting quietly on stretcher with complaints of back pain that radiates down right leg.  Pt AAOx4 with no resp distress noted, respirations even and unlabored.  Pt denies any needs at this time.  Skin PWD.  Pt family at bedside. Will continue to monitor and follow plan of care.  Bed rails up x2, bed in lowest position, call light in reach.      Elisa López, RN  10/20/19 0353

## 2019-10-20 NOTE — ED PROVIDER NOTES
Subjective   64-year-old female in the emergency department complaining of lower back pain on the right side that radiates into her right buttock.  Started a few days ago, and she denies any recent trauma and/or injury.  Has not improved since she came to the emergency department for further evaluation.  Patient has significant past medical history of arthritis, chronic pain, diabetes mellitus, GERD, gout, headache, and neuropathy.        History provided by:  Patient   used: No    Back Pain   Location:  Lumbar spine  Quality:  Aching  Radiates to:  R thigh  Pain severity:  Mild  Pain is:  Same all the time  Onset quality:  Gradual  Timing:  Constant  Progression:  Worsening  Chronicity:  Recurrent  Context: not emotional stress, not falling, not jumping from heights, not lifting heavy objects, not MCA, not MVA, not occupational injury, not pedestrian accident, not physical stress, not recent illness, not recent injury and not twisting    Relieved by:  Nothing  Worsened by:  Ambulation  Ineffective treatments:  None tried  Associated symptoms: no abdominal pain, no abdominal swelling, no bladder incontinence, no bowel incontinence, no chest pain, no dysuria, no fever, no headaches, no leg pain, no numbness, no paresthesias, no pelvic pain, no perianal numbness, no tingling, no weakness and no weight loss    Risk factors: no hx of cancer, no hx of osteoporosis, no lack of exercise, no menopause, not obese, not pregnant, no recent surgery, no steroid use and no vascular disease        Review of Systems   Constitutional: Negative for fever and weight loss.   Cardiovascular: Negative for chest pain.   Gastrointestinal: Negative for abdominal pain and bowel incontinence.   Genitourinary: Negative for bladder incontinence, dysuria and pelvic pain.   Musculoskeletal: Positive for back pain.   Neurological: Negative for tingling, weakness, numbness, headaches and paresthesias.   All other systems  reviewed and are negative.      Past Medical History:   Diagnosis Date   • Arthritis    • Chronic pain    • Diabetes mellitus (CMS/HCC)    • GERD (gastroesophageal reflux disease)    • Gout    • Headache    • Neuropathy        Allergies   Allergen Reactions   • Asa [Aspirin]    • Penicillins        Past Surgical History:   Procedure Laterality Date   • BREAST BIOPSY Left 1988    benign   • HYSTERECTOMY      Partial   • KNEE SURGERY     • THYROIDECTOMY, PARTIAL      Removal of goiter       Family History   Problem Relation Age of Onset   • COPD Mother    • Heart disease Brother    • Heart attack Brother    • Hypertension Daughter    • Diabetes Daughter    • Hypertension Daughter    • Diabetes Daughter    • Breast cancer Neg Hx        Social History     Socioeconomic History   • Marital status:      Spouse name: Not on file   • Number of children: Not on file   • Years of education: Not on file   • Highest education level: Not on file   Tobacco Use   • Smoking status: Never Smoker   • Smokeless tobacco: Never Used   Substance and Sexual Activity   • Alcohol use: No   • Drug use: No   • Sexual activity: Defer           Objective   Physical Exam   Constitutional: She is oriented to person, place, and time. She appears well-developed and well-nourished.  Non-toxic appearance. No distress.   HENT:   Head: Normocephalic and atraumatic.   Right Ear: External ear normal.   Left Ear: External ear normal.   Nose: Nose normal.   Mouth/Throat: Oropharynx is clear and moist and mucous membranes are normal. No oropharyngeal exudate. No tonsillar exudate.   Eyes: Conjunctivae, EOM and lids are normal. Pupils are equal, round, and reactive to light.   Neck: Normal range of motion and full passive range of motion without pain. Neck supple. No thyromegaly present.   Cardiovascular: Normal rate, regular rhythm, S1 normal, S2 normal, normal heart sounds, intact distal pulses and normal pulses.   Pulmonary/Chest: Effort normal and  breath sounds normal. No tachypnea. No respiratory distress. She has no decreased breath sounds. She has no wheezes. She has no rales. She exhibits no tenderness.   Abdominal: Soft. Normal appearance and bowel sounds are normal. She exhibits no distension. There is no tenderness. There is no rebound and no guarding.   Musculoskeletal: Normal range of motion. She exhibits tenderness. She exhibits no edema or deformity.        Right hip: She exhibits tenderness.        Lumbar back: She exhibits tenderness.   Lymphadenopathy:     She has no cervical adenopathy.   Neurological: She is alert and oriented to person, place, and time. She has normal strength. No cranial nerve deficit or sensory deficit. GCS eye subscore is 4. GCS verbal subscore is 5. GCS motor subscore is 6.   Skin: Skin is warm, dry and intact. No rash noted. She is not diaphoretic. No erythema. No pallor.   Psychiatric: She has a normal mood and affect. Her speech is normal and behavior is normal. Judgment and thought content normal. Cognition and memory are normal.   Nursing note and vitals reviewed.      Procedures           ED Course  ED Course as of Oct 20 0700   Sat Oct 19, 2019   2144 Impression    Degenerative change in the lumbar spine. No CT evidence of lumbar spine fracture.     CT Lumbar Spine Without Contrast [ES]   Sun Oct 20, 2019   0658 Patient reports feeling better with treatment in the emergency department.  Requested to be discharged home, and says she will take her diabetic medications when she gets home.  Reports that her glucose is commonly this high.  Take her medication, and follow-up with the primary care physician to discuss.  [ES]      ED Course User Index  [ES] Noah Goldstein MD                  MDM  Number of Diagnoses or Management Options  Diabetes mellitus due to underlying condition, uncontrolled, with hyperglycemia (CMS/[facility]):   Sciatica of right side: new and requires workup     Amount and/or Complexity of  Data Reviewed  Clinical lab tests: reviewed and ordered  Tests in the radiology section of CPT®: reviewed and ordered  Tests in the medicine section of CPT®: ordered and reviewed  Independent visualization of images, tracings, or specimens: yes    Risk of Complications, Morbidity, and/or Mortality  Presenting problems: moderate  Diagnostic procedures: moderate  Management options: moderate    Patient Progress  Patient progress: stable      Final diagnoses:   Sciatica of right side   Diabetes mellitus due to underlying condition, uncontrolled, with hyperglycemia (CMS/[facility])              Noah Goldstein MD  10/20/19 0700

## 2019-11-05 ENCOUNTER — OFFICE VISIT (OUTPATIENT)
Dept: FAMILY MEDICINE CLINIC | Facility: CLINIC | Age: 64
End: 2019-11-05

## 2019-11-05 ENCOUNTER — TELEPHONE (OUTPATIENT)
Dept: FAMILY MEDICINE CLINIC | Facility: CLINIC | Age: 64
End: 2019-11-05

## 2019-11-05 VITALS
HEIGHT: 62 IN | BODY MASS INDEX: 26.5 KG/M2 | DIASTOLIC BLOOD PRESSURE: 70 MMHG | WEIGHT: 144 LBS | HEART RATE: 117 BPM | SYSTOLIC BLOOD PRESSURE: 125 MMHG | OXYGEN SATURATION: 98 % | TEMPERATURE: 98.7 F

## 2019-11-05 DIAGNOSIS — Z23 IMMUNIZATION DUE: ICD-10-CM

## 2019-11-05 DIAGNOSIS — M54.41 CHRONIC BILATERAL LOW BACK PAIN WITH RIGHT-SIDED SCIATICA: Primary | ICD-10-CM

## 2019-11-05 DIAGNOSIS — G89.4 CHRONIC PAIN SYNDROME: ICD-10-CM

## 2019-11-05 DIAGNOSIS — E11.65 TYPE 2 DIABETES MELLITUS WITH HYPERGLYCEMIA, WITHOUT LONG-TERM CURRENT USE OF INSULIN (HCC): ICD-10-CM

## 2019-11-05 DIAGNOSIS — G89.29 CHRONIC BILATERAL LOW BACK PAIN WITH RIGHT-SIDED SCIATICA: Primary | ICD-10-CM

## 2019-11-05 PROCEDURE — 99214 OFFICE O/P EST MOD 30 MIN: CPT | Performed by: FAMILY MEDICINE

## 2019-11-05 PROCEDURE — 90670 PCV13 VACCINE IM: CPT | Performed by: FAMILY MEDICINE

## 2019-11-05 PROCEDURE — G0009 ADMIN PNEUMOCOCCAL VACCINE: HCPCS | Performed by: FAMILY MEDICINE

## 2019-11-05 RX ORDER — METAXALONE 800 MG/1
800 TABLET ORAL 2 TIMES DAILY PRN
Qty: 60 TABLET | Refills: 0 | Status: SHIPPED | OUTPATIENT
Start: 2019-11-05 | End: 2019-12-05

## 2019-11-05 RX ORDER — MELOXICAM 7.5 MG/1
7.5 TABLET ORAL DAILY
Qty: 30 TABLET | Refills: 2 | Status: SHIPPED | OUTPATIENT
Start: 2019-11-05 | End: 2019-12-05 | Stop reason: SDUPTHER

## 2019-11-05 NOTE — PATIENT INSTRUCTIONS
For your back: Physical therapy, mobic, metaxalone.    For your diabetes: Increase insulin to 12 units at night. Stop janumet. New medication metformin 1000 twice a day. Continue nateglinide and glipizide.

## 2019-11-06 RX ORDER — BACLOFEN 10 MG/1
10 TABLET ORAL 2 TIMES DAILY
Qty: 60 TABLET | Refills: 0 | Status: SHIPPED | OUTPATIENT
Start: 2019-11-06 | End: 2019-12-05

## 2019-11-06 NOTE — TELEPHONE ENCOUNTER
I sent in baclofen. Please let her know. Thanks.      Left a message to return call.    Patient notified & verbalized understanding.

## 2019-11-12 ENCOUNTER — HOSPITAL ENCOUNTER (EMERGENCY)
Facility: HOSPITAL | Age: 64
Discharge: HOME | End: 2019-11-12
Attending: EMERGENCY MEDICINE | Admitting: EMERGENCY MEDICINE

## 2019-11-12 ENCOUNTER — APPOINTMENT (OUTPATIENT)
Dept: GENERAL RADIOLOGY | Facility: HOSPITAL | Age: 64
End: 2019-11-12

## 2019-11-12 VITALS
TEMPERATURE: 98 F | WEIGHT: 140 LBS | OXYGEN SATURATION: 99 % | BODY MASS INDEX: 25.76 KG/M2 | HEART RATE: 107 BPM | SYSTOLIC BLOOD PRESSURE: 140 MMHG | RESPIRATION RATE: 18 BRPM | DIASTOLIC BLOOD PRESSURE: 75 MMHG | HEIGHT: 62 IN

## 2019-11-12 DIAGNOSIS — S61.052A DOG BITE OF LEFT THUMB, INITIAL ENCOUNTER: Primary | ICD-10-CM
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
## 2019-11-12 PROCEDURE — 99284 EMERGENCY DEPT VISIT MOD MDM: CPT

## 2019-11-12 PROCEDURE — 73130 X-RAY EXAM OF HAND: CPT | Performed by: RADIOLOGY

## 2019-11-12 PROCEDURE — 73130 X-RAY EXAM OF HAND: CPT

## 2019-11-12 RX ORDER — SACCHAROMYCES BOULARDII 250 MG
250 CAPSULE ORAL 2 TIMES DAILY
Qty: 20 CAPSULE | Refills: 0 | Status: SHIPPED | OUTPATIENT
Start: 2019-11-12 | End: 2019-12-05

## 2019-11-12 RX ORDER — HYDROCODONE BITARTRATE AND ACETAMINOPHEN 5; 325 MG/1; MG/1
1 TABLET ORAL EVERY 6 HOURS PRN
Qty: 8 TABLET | Refills: 0 | Status: SHIPPED | OUTPATIENT
Start: 2019-11-12 | End: 2019-12-05

## 2019-11-12 RX ORDER — BACITRACIN ZINC, NEOMYCIN SULFATE, POLYMYXIN B SULFATE 3.5; 5000; 4 MG/G; [USP'U]/G; [USP'U]/G
OINTMENT TOPICAL ONCE
Status: COMPLETED | OUTPATIENT
Start: 2019-11-12 | End: 2019-11-12

## 2019-11-12 RX ORDER — CLINDAMYCIN HYDROCHLORIDE 300 MG/1
300 CAPSULE ORAL 4 TIMES DAILY
Qty: 40 CAPSULE | Refills: 0 | Status: SHIPPED | OUTPATIENT
Start: 2019-11-12 | End: 2019-11-22

## 2019-11-12 RX ADMIN — BACITRACIN ZINC, NEOMYCIN SULFATE, POLYMYXIN B SULFATE 1 APPLICATION: 3.5; 5000; 4 OINTMENT TOPICAL at 17:32

## 2019-11-12 NOTE — ED NOTES
Animal bite form completed  On patient and faxed to [facility] Dept.     Randy Alvarez RN  11/12/19 6342

## 2019-11-12 NOTE — ED PROVIDER NOTES
Subjective   64-year-old female who presents to the ED today due to a dog bite.  She states she was trying to separate her dogs that were fighting when she was bit on the left thumb approximately 1 hour ago.  She denies any other injury.  She states she is up-to-date on her tetanus vaccination.        History provided by:  Patient  Animal Bite   Contact animal:  Dog  Location:  Finger  Finger injury location:  L thumb  Time since incident:  1 hour  Pain details:     Quality:  Sore    Severity:  Moderate    Timing:  Constant    Progression:  Unchanged  Incident location:  Home  Provoked: provoked    Animal's rabies vaccination status:  Up to date  Animal in possession: yes    Tetanus status:  Up to date  Relieved by:  Nothing  Worsened by:  Activity  Ineffective treatments:  None tried  Associated symptoms: swelling        Review of Systems   Constitutional: Negative.    HENT: Negative.    Eyes: Negative.    Respiratory: Negative.    Cardiovascular: Negative.    Gastrointestinal: Negative.    Genitourinary: Negative.    Musculoskeletal: Negative.    Skin: Positive for wound.   Neurological: Negative.    Psychiatric/Behavioral: Negative.    All other systems reviewed and are negative.      Past Medical History:   Diagnosis Date   • Arthritis    • Chronic pain    • Diabetes mellitus (CMS/HCC)    • GERD (gastroesophageal reflux disease)    • Gout    • Headache    • Neuropathy        Allergies   Allergen Reactions   • Asa [Aspirin]    • Naproxen Nausea And Vomiting   • Penicillins        Past Surgical History:   Procedure Laterality Date   • BREAST BIOPSY Left 1988    benign   • HYSTERECTOMY      Partial   • KNEE SURGERY     • THYROIDECTOMY, PARTIAL      Removal of goiter       Family History   Problem Relation Age of Onset   • COPD Mother    • Heart disease Brother    • Heart attack Brother    • Hypertension Daughter    • Diabetes Daughter    • Hypertension Daughter    • Diabetes Daughter    • Breast cancer Neg Hx         Social History     Socioeconomic History   • Marital status:      Spouse name: Not on file   • Number of children: Not on file   • Years of education: Not on file   • Highest education level: Not on file   Tobacco Use   • Smoking status: Never Smoker   • Smokeless tobacco: Never Used   Substance and Sexual Activity   • Alcohol use: No   • Drug use: No   • Sexual activity: Defer           Objective   Physical Exam   Constitutional: She is oriented to person, place, and time. She appears well-developed and well-nourished. No distress.   HENT:   Head: Normocephalic and atraumatic.   Right Ear: External ear normal.   Left Ear: External ear normal.   Nose: Nose normal.   Mouth/Throat: Oropharynx is clear and moist.   Eyes: Conjunctivae and EOM are normal. Pupils are equal, round, and reactive to light.   Neck: Normal range of motion. Neck supple.   Cardiovascular: Regular rhythm, normal heart sounds and intact distal pulses. Tachycardia present.   Pulmonary/Chest: Effort normal and breath sounds normal.   Abdominal: Soft. Bowel sounds are normal.   Musculoskeletal: Normal range of motion.   Neurological: She is alert and oriented to person, place, and time.   Skin: Skin is warm and dry. Capillary refill takes less than 2 seconds.   2 puncture wounds to the dorsal side of the left thumb.  Mildly tender to palpation.  She has full ROM with no difficulty.   Psychiatric: She has a normal mood and affect. Her behavior is normal. Judgment and thought content normal.   Nursing note and vitals reviewed.      Procedures           ED Course  ED Course as of Nov 12 1755   Tue Nov 12, 2019   1714 ROGELIO queried #[id number]  [AH]   173 No acute findings on x-ray.  Patient was instructed on wound care.  She will be discharged on Clindamycin and will follow up with her PCP in 2 days for a wound check.  [AH]      ED Course User Index  [AH] Iqra Kelly, PA                  MDM  Number of Diagnoses or Management Options  Dog  bite of left thumb, initial encounter:      Amount and/or Complexity of Data Reviewed  Tests in the radiology section of CPT®: reviewed    Patient Progress  Patient progress: improved      Final diagnoses:   Dog bite of left thumb, initial encounter              Iqra Kelly PA  11/12/19 8826

## 2019-11-12 NOTE — DISCHARGE INSTRUCTIONS
It is very important to take all of your antibiotics until they are gone.  Keep the wound clean and dry.  Wash the area twice a day with antibacterial soap and place triple antibiotic ointment on it twice a day.

## 2019-11-23 ENCOUNTER — HOSPITAL ENCOUNTER (EMERGENCY)
Facility: HOSPITAL | Age: 64
Discharge: HOME | End: 2019-11-23
Attending: EMERGENCY MEDICINE | Admitting: EMERGENCY MEDICINE

## 2019-11-23 VITALS
DIASTOLIC BLOOD PRESSURE: 95 MMHG | HEART RATE: 100 BPM | WEIGHT: 150 LBS | HEIGHT: 62 IN | TEMPERATURE: 98.5 F | RESPIRATION RATE: 18 BRPM | OXYGEN SATURATION: 96 % | SYSTOLIC BLOOD PRESSURE: 167 MMHG | BODY MASS INDEX: 27.6 KG/M2

## 2019-11-23 DIAGNOSIS — M54.31 SCIATICA OF RIGHT SIDE: Primary | ICD-10-CM

## 2019-11-23 PROCEDURE — 25010000002 KETOROLAC TROMETHAMINE PER 15 MG: Performed by: EMERGENCY MEDICINE

## 2019-11-23 PROCEDURE — 25010000002 METHYLPREDNISOLONE PER 125 MG: Performed by: EMERGENCY MEDICINE

## 2019-11-23 PROCEDURE — 96372 THER/PROPH/DIAG INJ SC/IM: CPT

## 2019-11-23 PROCEDURE — 99284 EMERGENCY DEPT VISIT MOD MDM: CPT

## 2019-11-23 RX ORDER — METHYLPREDNISOLONE SODIUM SUCCINATE 125 MG/2ML
80 INJECTION, POWDER, LYOPHILIZED, FOR SOLUTION INTRAMUSCULAR; INTRAVENOUS ONCE
Status: COMPLETED | OUTPATIENT
Start: 2019-11-23 | End: 2019-11-23

## 2019-11-23 RX ORDER — PREDNISONE 20 MG/1
20 TABLET ORAL
Qty: 15 TABLET | Refills: 0 | Status: SHIPPED | OUTPATIENT
Start: 2019-11-23 | End: 2019-12-05

## 2019-11-23 RX ORDER — METAXALONE 800 MG/1
800 TABLET ORAL 3 TIMES DAILY PRN
Qty: 30 TABLET | Refills: 0 | Status: SHIPPED | OUTPATIENT
Start: 2019-11-23 | End: 2019-12-05

## 2019-11-23 RX ORDER — HYDROCODONE BITARTRATE AND ACETAMINOPHEN 5; 325 MG/1; MG/1
1 TABLET ORAL ONCE
Status: COMPLETED | OUTPATIENT
Start: 2019-11-23 | End: 2019-11-23

## 2019-11-23 RX ORDER — HYDROCODONE BITARTRATE AND ACETAMINOPHEN 5; 325 MG/1; MG/1
1 TABLET ORAL ONCE
Status: DISCONTINUED | OUTPATIENT
Start: 2019-11-23 | End: 2019-11-23

## 2019-11-23 RX ORDER — KETOROLAC TROMETHAMINE 30 MG/ML
60 INJECTION, SOLUTION INTRAMUSCULAR; INTRAVENOUS ONCE
Status: COMPLETED | OUTPATIENT
Start: 2019-11-23 | End: 2019-11-23

## 2019-11-23 RX ADMIN — KETOROLAC TROMETHAMINE 60 MG: 60 INJECTION, SOLUTION INTRAMUSCULAR at 20:01

## 2019-11-23 RX ADMIN — METHYLPREDNISOLONE SODIUM SUCCINATE 80 MG: 125 INJECTION, POWDER, FOR SOLUTION INTRAMUSCULAR; INTRAVENOUS at 20:02

## 2019-11-23 RX ADMIN — HYDROCODONE BITARTRATE AND ACETAMINOPHEN 1 TABLET: 5; 325 TABLET ORAL at 20:38

## 2019-11-24 NOTE — ED PROVIDER NOTES
Subjective   64-year-old female in the emergency department complaining of lumbosacral area pain that radiates into the right buttock and right hip.  She reports this is been a chronic issue that waxes and wanes.  Patient is aware that she has lumbar degenerative disc disease and right-sided radiculopathy.  She denies nausea, vomiting, diarrhea, fever, chills, chest pain, or shortness of breath.  Patient has significant past medical history of arthritis, chronic pain, diabetes mellitus, GERD, gout, headache, and neuropathy.        History provided by:  Patient   used: No    Back Pain   Location:  Lumbar spine  Quality:  Aching  Radiates to:  R posterior upper leg and R thigh  Pain severity:  Mild  Pain is:  Worse during the night  Onset quality:  Gradual  Timing:  Intermittent  Progression:  Unchanged  Chronicity:  Chronic  Context: not emotional stress, not falling, not jumping from heights, not lifting heavy objects, not MCA, not MVA, not occupational injury, not pedestrian accident, not physical stress, not recent illness, not recent injury and not twisting    Relieved by:  Nothing  Worsened by:  Nothing  Ineffective treatments:  None tried  Associated symptoms: no abdominal pain, no abdominal swelling, no bladder incontinence, no bowel incontinence, no chest pain, no dysuria, no fever, no headaches, no leg pain, no numbness, no paresthesias, no pelvic pain, no perianal numbness, no tingling, no weakness and no weight loss    Risk factors: no hx of cancer, no hx of osteoporosis, no lack of exercise, no menopause, not obese, not pregnant, no recent surgery, no steroid use and no vascular disease        Review of Systems   Constitutional: Negative for fever and weight loss.   Cardiovascular: Negative for chest pain.   Gastrointestinal: Negative for abdominal pain and bowel incontinence.   Genitourinary: Negative for bladder incontinence, dysuria and pelvic pain.   Musculoskeletal: Positive for  back pain.   Neurological: Negative for tingling, weakness, numbness, headaches and paresthesias.   All other systems reviewed and are negative.      Past Medical History:   Diagnosis Date   • Arthritis    • Chronic pain    • Diabetes mellitus (CMS/HCC)    • GERD (gastroesophageal reflux disease)    • Gout    • Headache    • Neuropathy        Allergies   Allergen Reactions   • Asa [Aspirin]    • Naproxen Nausea And Vomiting   • Penicillins        Past Surgical History:   Procedure Laterality Date   • BREAST BIOPSY Left 1988    benign   • HYSTERECTOMY      Partial   • KNEE SURGERY     • THYROIDECTOMY, PARTIAL      Removal of goiter       Family History   Problem Relation Age of Onset   • COPD Mother    • Heart disease Brother    • Heart attack Brother    • Hypertension Daughter    • Diabetes Daughter    • Hypertension Daughter    • Diabetes Daughter    • Breast cancer Neg Hx        Social History     Socioeconomic History   • Marital status:      Spouse name: Not on file   • Number of children: Not on file   • Years of education: Not on file   • Highest education level: Not on file   Tobacco Use   • Smoking status: Never Smoker   • Smokeless tobacco: Never Used   Substance and Sexual Activity   • Alcohol use: No   • Drug use: No   • Sexual activity: Defer           Objective   Physical Exam   Constitutional: She is oriented to person, place, and time. She appears well-developed and well-nourished.  Non-toxic appearance. No distress.   HENT:   Head: Normocephalic and atraumatic.   Right Ear: External ear normal.   Left Ear: External ear normal.   Nose: Nose normal.   Mouth/Throat: Oropharynx is clear and moist and mucous membranes are normal. No oropharyngeal exudate. No tonsillar exudate.   Eyes: Conjunctivae, EOM and lids are normal. Pupils are equal, round, and reactive to light.   Neck: Normal range of motion and full passive range of motion without pain. Neck supple. No thyromegaly present.    Cardiovascular: Normal rate, regular rhythm, S1 normal, S2 normal, normal heart sounds, intact distal pulses and normal pulses.   Pulmonary/Chest: Effort normal and breath sounds normal. No tachypnea. No respiratory distress. She has no decreased breath sounds. She has no wheezes. She has no rales. She exhibits no tenderness.   Abdominal: Soft. Normal appearance and bowel sounds are normal. She exhibits no distension. There is no tenderness. There is no rebound and no guarding.   Musculoskeletal: Normal range of motion. She exhibits tenderness. She exhibits no edema or deformity.        Lumbar back: She exhibits tenderness and pain.        Right upper leg: She exhibits tenderness.        Legs:  Lymphadenopathy:     She has no cervical adenopathy.   Neurological: She is alert and oriented to person, place, and time. She has normal strength. No cranial nerve deficit or sensory deficit. GCS eye subscore is 4. GCS verbal subscore is 5. GCS motor subscore is 6.   Skin: Skin is warm, dry and intact. No rash noted. She is not diaphoretic. No erythema. No pallor.   Psychiatric: She has a normal mood and affect. Her speech is normal and behavior is normal. Judgment and thought content normal. Cognition and memory are normal.   Nursing note and vitals reviewed.      Procedures           ED Course  ED Course as of Nov 23 2006   Sat Nov 23, 2019 2004 Patient improved with treatment in the emergency department.  She will return to the emergency department with any worsening symptoms.  [ES]      ED Course User Index  [ES] Noah Goldstein MD                  [facility]    Final diagnoses:   Sciatica of right side              Noah Goldstein MD  11/23/19 2006

## 2019-11-25 NOTE — PROGRESS NOTES
"Breanna Kaba     VITALS: Blood pressure 125/70, pulse 117, temperature 98.7 °F (37.1 °C), temperature source Oral, height 157.5 cm (62.01\"), weight 65.3 kg (144 lb), SpO2 98 %, not currently breastfeeding.    Subjective  Chief Complaint:   Chief Complaint   Patient presents with   • Follow-up     ER   • Back Pain        History of Present Illness:  Patient is a 64 y.o.  female with a medical history significant for type 2 diabetes, gout, and allergic rhinitis who presents to clinic secondary to medical followup.  Since her last visit, she has been seen in the ER secondary to her lower back and bilateral hip pain.  She states that she is having trouble ambulating.  She states that the pain starts in the lower back at approximately L3-L5 and radiates over to her hips.  The ache is a throbbing, dull ache.  She has tried Epson salt baths and a heating pad without any alleviation to the pain.  She continues on her medications for chronic pain, but she states that they are not working.  She does have a history of seeking behavior.  She has a history of abnormal urine drug screen; she does not get any controlled medications from this clinic.  She does have complications of neuropathy, but this has not worsened.  She denies any urinary incontinence or bowel incontinence.  She has not been doing any exercises at home.    Patient has chronic conditions including type 2 diabetes, gout, and allergic rhinitis that have remained stable and unchanged.  She denies any side effects of her medications.    No complaints about any of the medications.    The following portions of the patient's history were reviewed and updated as appropriate: allergies, current medications, past family history, past medical history, past social history, past surgical history and problem list.    Past Medical History  Past Medical History:   Diagnosis Date   • Arthritis    • Chronic pain    • Diabetes mellitus (CMS/[facility])    • GERD (gastroesophageal " reflux disease)    • Gout    • Headache    • Neuropathy        Review of Systems   Respiratory: Negative for shortness of breath and wheezing.    Cardiovascular: Negative for chest pain and palpitations.   Musculoskeletal: Positive for arthralgias, back pain, gait problem and myalgias.       Surgical History  Past Surgical History:   Procedure Laterality Date   • BREAST BIOPSY Left 1988    benign   • HYSTERECTOMY      Partial   • KNEE SURGERY     • THYROIDECTOMY, PARTIAL      Removal of goiter       Family History  Family History   Problem Relation Age of Onset   • COPD Mother    • Heart disease Brother    • Heart attack Brother    • Hypertension Daughter    • Diabetes Daughter    • Hypertension Daughter    • Diabetes Daughter    • Breast cancer Neg Hx        Social History  Social History     Socioeconomic History   • Marital status:      Spouse name: Not on file   • Number of children: Not on file   • Years of education: Not on file   • Highest education level: Not on file   Tobacco Use   • Smoking status: Never Smoker   • Smokeless tobacco: Never Used   Substance and Sexual Activity   • Alcohol use: No   • Drug use: No   • Sexual activity: Defer       Objective  Physical Exam    Gen: Patient in NAD. Pleasant and answers appropriately. A&Ox3.    Skin: Warm and dry with normal turgor. No purpura, rashes, or unusual pigmentation noted. Hair is normal in appearance and distribution.    HEENT: NC/AT. No lesions noted. Conjunctiva clear, sclera nonicteric. PERRL. EOMI without nystagmus or strabismus. Fundi appear benign. No hemorrhages or exudates of eyes. Auditory canals are patent bilaterally without lesions. TMs intact,  nonerythematous, nonbulging without lesions. Nasal mucosa pink, nonerythematous, and nonedematous. Frontal and maxillary sinuses are nontender. O/P nonerythematous and moist without exudate.    Neck: Supple without lymph nodes palpated. FROM.     Lungs: CTA B/L without rales, rhonchi,  crackles, or wheezes.    Heart: RRR. S1 and S2 normal. No S3 or S4. No MRGT.    Abd: Soft, nontender,nondistended. (+)BSx4 quadrants.     Extrem: No CCE. Radial pulses 2+/4 and equal B/L.  Negative flank tenderness bilaterally.    Back: Decreased range of motion.  Straight leg raise testing benign bilaterally.    Neuro: No focal motor/sensory deficits.    Procedures    Assessment/Plan  Breanna Kaba is a 64 y.o. here for medical followup.  Diagnoses and all orders for this visit:    Chronic bilateral low back pain with right-sided sciatica  -     meloxicam (MOBIC) 7.5 MG tablet; Take 1 tablet by mouth Daily.  -     metaxalone (SKELAXIN) 800 MG tablet; Take 1 tablet by mouth 2 (Two) Times a Day As Needed for Muscle Spasms.  -     Ambulatory Referral to Physical Therapy Evaluate and treat    Immunization due  -     Pneumococcal Conjugate Vaccine 13-Valent All    Type 2 diabetes mellitus with hyperglycemia, without long-term current use of insulin (CMS/[facility])  -     Insulin Glargine, 1 Unit Dial, (TOUJEO) 300 UNIT/ML solution pen-injector injection; Inject 12 Units under the skin into the appropriate area as directed every night at bedtime.  -     metFORMIN (GLUCOPHAGE) 1000 MG tablet; Take 1 tablet by mouth 2 (Two) Times a Day With Meals.  We will increase Toujeo from 8 units to 12 units secondary to increased glucose levels. Continue other medications. Janumet is too expensive for patient.  As a result, we will discontinue the Januvia portion and will send in a prescription for metformin 1000 mg orally twice a day.  Will monitor glucose levels.  Patient to continue Starlix 120 mg orally 3 times a day.  She will figure out how much that is costing her.  We may need to change that also.  Patient to continue glipizide 5 mg orally daily.  That may need to be increased also.    Chronic pain syndrome  Stable.  Patient to continue new pain medications.  Encourage diet and exercise.    Patient's Body mass index is 26.33  kg/m². BMI is above normal parameters. Recommendations include: exercise counseling and nutrition counseling.      Findings and plans discussed with patient who verbalizes understanding and agreement. Will followup with patient once results are in. Patient to followup at clinic PRN or in one month for further medical followup.    Myrna Ramos MD    EMR Dragon/Transcription Disclaimer:  Much of this encounter note is an electronic transcription/translation of spoken language to printed text.  The electronic translation of spoken language may permit erroneous, or at times, nonsensical words or phrases to be inadvertently transcribed.  Although I have reviewed the note for such errors, some may still exist.

## 2019-11-26 ENCOUNTER — HOSPITAL ENCOUNTER (EMERGENCY)
Facility: HOSPITAL | Age: 64
Discharge: HOME | End: 2019-11-26
Attending: EMERGENCY MEDICINE | Admitting: FAMILY MEDICINE

## 2019-11-26 ENCOUNTER — APPOINTMENT (OUTPATIENT)
Dept: MRI IMAGING | Facility: HOSPITAL | Age: 64
End: 2019-11-26

## 2019-11-26 ENCOUNTER — APPOINTMENT (OUTPATIENT)
Dept: CT IMAGING | Facility: HOSPITAL | Age: 64
End: 2019-11-26

## 2019-11-26 ENCOUNTER — OFFICE VISIT (OUTPATIENT)
Dept: FAMILY MEDICINE CLINIC | Facility: CLINIC | Age: 64
End: 2019-11-26

## 2019-11-26 VITALS
HEART RATE: 88 BPM | BODY MASS INDEX: 26.13 KG/M2 | HEIGHT: 62 IN | WEIGHT: 142 LBS | SYSTOLIC BLOOD PRESSURE: 180 MMHG | RESPIRATION RATE: 18 BRPM | OXYGEN SATURATION: 95 % | TEMPERATURE: 98 F | DIASTOLIC BLOOD PRESSURE: 100 MMHG

## 2019-11-26 VITALS
TEMPERATURE: 98.3 F | BODY MASS INDEX: 26.13 KG/M2 | HEIGHT: 62 IN | WEIGHT: 142 LBS | HEART RATE: 101 BPM | OXYGEN SATURATION: 98 % | DIASTOLIC BLOOD PRESSURE: 71 MMHG | SYSTOLIC BLOOD PRESSURE: 130 MMHG

## 2019-11-26 DIAGNOSIS — M54.42 CHRONIC BILATERAL LOW BACK PAIN WITH BILATERAL SCIATICA: Primary | ICD-10-CM

## 2019-11-26 DIAGNOSIS — M51.36 BULGING LUMBAR DISC: Primary | ICD-10-CM

## 2019-11-26 DIAGNOSIS — M54.41 CHRONIC BILATERAL LOW BACK PAIN WITH BILATERAL SCIATICA: Primary | ICD-10-CM

## 2019-11-26 DIAGNOSIS — G89.29 CHRONIC BILATERAL LOW BACK PAIN WITH BILATERAL SCIATICA: Primary | ICD-10-CM

## 2019-11-26 LAB
ALBUMIN SERPL-MCNC: 4.73 G/DL (ref 3.5–5.2)
ALBUMIN/GLOB SERPL: 1.5 G/DL
ALP SERPL-CCNC: 97 U/L (ref 39–117)
ALT SERPL W P-5'-P-CCNC: 18 U/L (ref 1–33)
ANION GAP SERPL CALCULATED.3IONS-SCNC: 13 MMOL/L (ref 5–15)
AST SERPL-CCNC: 12 U/L (ref 1–32)
BASOPHILS # BLD AUTO: 0.03 10*3/MM3 (ref 0–0.2)
BASOPHILS NFR BLD AUTO: 0.2 % (ref 0–1.5)
BILIRUB BLD-MCNC: NEGATIVE MG/DL
BILIRUB SERPL-MCNC: 0.5 MG/DL (ref 0.2–1.2)
BILIRUB UR QL STRIP: NEGATIVE
BUN BLD-MCNC: 29 MG/DL (ref 8–23)
BUN/CREAT SERPL: 34.9 (ref 7–25)
CALCIUM SPEC-SCNC: 10.1 MG/DL (ref 8.6–10.5)
CHLORIDE SERPL-SCNC: 95 MMOL/L (ref 98–107)
CLARITY UR: CLEAR
CLARITY, POC: CLEAR
CO2 SERPL-SCNC: 27 MMOL/L (ref 22–29)
COLOR UR: YELLOW
COLOR UR: YELLOW
CREAT BLD-MCNC: 0.83 MG/DL (ref 0.57–1)
DEPRECATED RDW RBC AUTO: 40.2 FL (ref 37–54)
EOSINOPHIL # BLD AUTO: 0 10*3/MM3 (ref 0–0.4)
EOSINOPHIL NFR BLD AUTO: 0 % (ref 0.3–6.2)
ERYTHROCYTE [DISTWIDTH] IN BLOOD BY AUTOMATED COUNT: 13.6 % (ref 12.3–15.4)
GFR SERPL CREATININE-BSD FRML MDRD: 69 ML/MIN/1.73
GLOBULIN UR ELPH-MCNC: 3.2 GM/DL
GLUCOSE BLD-MCNC: 382 MG/DL (ref 65–99)
GLUCOSE BLDC GLUCOMTR-MCNC: 361 MG/DL (ref 70–130)
GLUCOSE UR STRIP-MCNC: ABNORMAL MG/DL
GLUCOSE UR STRIP-MCNC: ABNORMAL MG/DL
HCT VFR BLD AUTO: 38.4 % (ref 34–46.6)
HGB BLD-MCNC: 13.2 G/DL (ref 12–15.9)
HGB UR QL STRIP.AUTO: NEGATIVE
HOLD SPECIMEN: NORMAL
HOLD SPECIMEN: NORMAL
IMM GRANULOCYTES # BLD AUTO: 0.05 10*3/MM3 (ref 0–0.05)
IMM GRANULOCYTES NFR BLD AUTO: 0.4 % (ref 0–0.5)
KETONES UR QL STRIP: NEGATIVE
KETONES UR QL: NEGATIVE
LEUKOCYTE EST, POC: NEGATIVE
LEUKOCYTE ESTERASE UR QL STRIP.AUTO: NEGATIVE
LYMPHOCYTES # BLD AUTO: 2.04 10*3/MM3 (ref 0.7–3.1)
LYMPHOCYTES NFR BLD AUTO: 16.7 % (ref 19.6–45.3)
MCH RBC QN AUTO: 27.9 PG (ref 26.6–33)
MCHC RBC AUTO-ENTMCNC: 34.4 G/DL (ref 31.5–35.7)
MCV RBC AUTO: 81.2 FL (ref 79–97)
MONOCYTES # BLD AUTO: 0.69 10*3/MM3 (ref 0.1–0.9)
MONOCYTES NFR BLD AUTO: 5.7 % (ref 5–12)
NEUTROPHILS # BLD AUTO: 9.4 10*3/MM3 (ref 1.7–7)
NEUTROPHILS NFR BLD AUTO: 77 % (ref 42.7–76)
NITRITE UR QL STRIP: NEGATIVE
NITRITE UR-MCNC: NEGATIVE MG/ML
NRBC BLD AUTO-RTO: 0 /100 WBC (ref 0–0.2)
PH UR STRIP.AUTO: <=5 [PH] (ref 5–8)
PH UR: 6 [PH] (ref 5–8)
PLATELET # BLD AUTO: 463 10*3/MM3 (ref 140–450)
PMV BLD AUTO: 10.3 FL (ref 6–12)
POTASSIUM BLD-SCNC: 4.5 MMOL/L (ref 3.5–5.2)
PROT SERPL-MCNC: 7.9 G/DL (ref 6–8.5)
PROT UR QL STRIP: ABNORMAL
PROT UR STRIP-MCNC: ABNORMAL MG/DL
RBC # BLD AUTO: 4.73 10*6/MM3 (ref 3.77–5.28)
RBC # UR STRIP: NEGATIVE /UL
SODIUM BLD-SCNC: 135 MMOL/L (ref 136–145)
SP GR UR STRIP: >1.03 (ref 1–1.03)
SP GR UR: 1.01 (ref 1–1.03)
UROBILINOGEN UR QL STRIP: ABNORMAL
UROBILINOGEN UR QL: NORMAL
WBC NRBC COR # BLD: 12.21 10*3/MM3 (ref 3.4–10.8)
WHOLE BLOOD HOLD SPECIMEN: NORMAL
WHOLE BLOOD HOLD SPECIMEN: NORMAL

## 2019-11-26 PROCEDURE — 72148 MRI LUMBAR SPINE W/O DYE: CPT

## 2019-11-26 PROCEDURE — 99213 OFFICE O/P EST LOW 20 MIN: CPT | Performed by: FAMILY MEDICINE

## 2019-11-26 PROCEDURE — 99284 EMERGENCY DEPT VISIT MOD MDM: CPT

## 2019-11-26 PROCEDURE — 80053 COMPREHEN METABOLIC PANEL: CPT | Performed by: PHYSICIAN ASSISTANT

## 2019-11-26 PROCEDURE — 72148 MRI LUMBAR SPINE W/O DYE: CPT | Performed by: RADIOLOGY

## 2019-11-26 PROCEDURE — 85025 COMPLETE CBC W/AUTO DIFF WBC: CPT | Performed by: PHYSICIAN ASSISTANT

## 2019-11-26 PROCEDURE — 72131 CT LUMBAR SPINE W/O DYE: CPT | Performed by: RADIOLOGY

## 2019-11-26 PROCEDURE — 72131 CT LUMBAR SPINE W/O DYE: CPT

## 2019-11-26 PROCEDURE — 81003 URINALYSIS AUTO W/O SCOPE: CPT | Performed by: FAMILY MEDICINE

## 2019-11-26 PROCEDURE — 82962 GLUCOSE BLOOD TEST: CPT

## 2019-11-26 PROCEDURE — 81003 URINALYSIS AUTO W/O SCOPE: CPT | Performed by: PHYSICIAN ASSISTANT

## 2019-11-26 RX ORDER — CYCLOBENZAPRINE HCL 5 MG
5 TABLET ORAL EVERY 8 HOURS PRN
Refills: 0 | COMMUNITY
Start: 2019-11-24 | End: 2019-12-05

## 2019-11-26 RX ORDER — SODIUM CHLORIDE 0.9 % (FLUSH) 0.9 %
10 SYRINGE (ML) INJECTION AS NEEDED
Status: DISCONTINUED | OUTPATIENT
Start: 2019-11-26 | End: 2019-11-27 | Stop reason: HOSPADM

## 2019-11-27 ENCOUNTER — PATIENT OUTREACH (OUTPATIENT)
Dept: CASE MANAGEMENT | Facility: OTHER | Age: 64
End: 2019-11-27

## 2019-11-27 ENCOUNTER — TELEPHONE (OUTPATIENT)
Dept: FAMILY MEDICINE CLINIC | Facility: CLINIC | Age: 64
End: 2019-11-27

## 2019-11-27 NOTE — OUTREACH NOTE
Care Plan Note      Responses   Lifestyle Goals  Decrease falls risk, Medication management, Routine follow-up with doctor(s), Routine eye exam, Self monitor blood sugar, Routine foot care   Barriers  Pain   Self Management  Medication Adherence, Home Glucose Monitoring   Suggested Appointments  Other (See Comment) [Follow up with PCP as scheduled.]   Annual Wellness Visit:   -- [Patient overdue for Medicare AWV - education materials sent.]   AWV Materials  Send Materials   Care Gaps Addressed  Colon Cancer Screening, Diabetic A1C   Colon Cancer Screening Type  Colonoscopy   Colonoscopy Status  Up to Date (< 10 yrs)   Colon Cancer Screening Completion at [facility] or Other  [facility]   HbA1c Status  Up to Date-outside defined limits   HbA1c Completion at [facility] or Other  [facility]   HbA1c Comments  A1C outside defined limits but considerably lower than previous levels.   Specific Disease Process Teaching  -- [Pain, back precautions, falls risk]   Other Patient Education/Resources   24/7 [facility] Nurse Call Line, [facility], Advanced Care Planning   24/7 Nurse Call Line Education Method  Send Materials   ACP Education Method  Send Materials   MyChart Education Method  Send Materials   Does patient have depression diagnosis?  No   Advanced Directives:  Send Materials   Ed Visits past 12 months:  2 or 3   Discharged From:  [facility] ED   Discharged to:  Home / self care   Discharge Date:  11/23/19   Medication Adherence  Medications understood   Goal Progress  Making Progress Toward Goal(s)   Health Literacy  Good            Care Coordination Assessment    Documented/Reviewed By:  Gwendolyn Whalen RN Date/time:  11/27/2019  9:04 AM   Assessment completed with:  patient  Enrolled in care management program:  Yes  Living arrangement:  family members  Support system:  family  Type of residence:  private residence  Home care services:  No  Equipment used at home:   (Comment: glucometer)  Communication  device:  No  Medication adherence problem:  No  Experiencing side effects from current medications:  No  History of fall(s) in last 6 months:  No  Difficulty keeping appointments:  No  Chronic pain:  Yes  Location of chronic pain:  Back/lumbar  Chronic pain timing:  constant       .    Gwendolyn Whalen RN  Ambulatory     11/27/2019, 9:19 AM

## 2019-11-27 NOTE — OUTREACH NOTE
Patient Outreach Note    Acute activity alert received via Patient PING.  Patient presented to ED at [facility] 11/26/2019 with c/o back pain.  Provider notes indicate patient was seen same day by PCP and referred to ED for imaging.  Resulting diagnosis: bulging lumbar disc.  Patient has f/u appointment with PCP on 12/05/2019. [facility] outreach to patient this morning.  Patient expressed deep appreciation of PCP and hospital staff.  Ms. Kaba stated she still has discomfort but her pain level is considerably reduced today.  She stated compliance with prescribed medications and has a grandson present for assistance.  Patient stated she does not use a walker or cane.  Education provided on falls risk, availability of DME if needed, medication compliance, back precautions, and importance of following with PCP and treatment plan.  Patient stated understanding.  Contact information provided and patient encouraged to call as needed    Gwendolyn Whalen RN  Ambulatory     11/27/2019, 9:18 AM

## 2019-12-05 ENCOUNTER — OFFICE VISIT (OUTPATIENT)
Dept: FAMILY MEDICINE CLINIC | Facility: CLINIC | Age: 64
End: 2019-12-05

## 2019-12-05 VITALS
HEART RATE: 108 BPM | BODY MASS INDEX: 26.13 KG/M2 | DIASTOLIC BLOOD PRESSURE: 80 MMHG | HEIGHT: 62 IN | TEMPERATURE: 98.8 F | OXYGEN SATURATION: 97 % | WEIGHT: 142 LBS | SYSTOLIC BLOOD PRESSURE: 130 MMHG

## 2019-12-05 DIAGNOSIS — J30.89 SEASONAL ALLERGIC RHINITIS DUE TO OTHER ALLERGIC TRIGGER: ICD-10-CM

## 2019-12-05 DIAGNOSIS — E78.2 MIXED HYPERLIPIDEMIA: ICD-10-CM

## 2019-12-05 DIAGNOSIS — G89.29 CHRONIC BILATERAL LOW BACK PAIN WITH RIGHT-SIDED SCIATICA: Primary | ICD-10-CM

## 2019-12-05 DIAGNOSIS — M54.41 CHRONIC BILATERAL LOW BACK PAIN WITH RIGHT-SIDED SCIATICA: Primary | ICD-10-CM

## 2019-12-05 DIAGNOSIS — E11.65 TYPE 2 DIABETES MELLITUS WITH HYPERGLYCEMIA, WITH LONG-TERM CURRENT USE OF INSULIN (HCC): ICD-10-CM

## 2019-12-05 DIAGNOSIS — Z79.4 TYPE 2 DIABETES MELLITUS WITH HYPERGLYCEMIA, WITH LONG-TERM CURRENT USE OF INSULIN (HCC): ICD-10-CM

## 2019-12-05 DIAGNOSIS — M1A.09X0 IDIOPATHIC CHRONIC GOUT OF MULTIPLE SITES WITHOUT TOPHUS: ICD-10-CM

## 2019-12-05 DIAGNOSIS — M81.8 OTHER OSTEOPOROSIS WITHOUT CURRENT PATHOLOGICAL FRACTURE: ICD-10-CM

## 2019-12-05 PROCEDURE — 99214 OFFICE O/P EST MOD 30 MIN: CPT | Performed by: FAMILY MEDICINE

## 2019-12-05 RX ORDER — ALLOPURINOL 300 MG/1
300 TABLET ORAL DAILY
Qty: 30 TABLET | Refills: 2 | Status: SHIPPED | OUTPATIENT
Start: 2019-12-05 | End: 2019-12-06 | Stop reason: SDUPTHER

## 2019-12-05 RX ORDER — NATEGLINIDE 120 MG/1
120 TABLET ORAL
Qty: 90 TABLET | Refills: 2 | Status: SHIPPED | OUTPATIENT
Start: 2019-12-05 | End: 2019-12-06 | Stop reason: SDUPTHER

## 2019-12-05 RX ORDER — LORATADINE 10 MG/1
10 TABLET ORAL DAILY
Qty: 30 TABLET | Refills: 2 | Status: SHIPPED | OUTPATIENT
Start: 2019-12-05 | End: 2019-12-06 | Stop reason: SDUPTHER

## 2019-12-05 RX ORDER — MELOXICAM 7.5 MG/1
7.5 TABLET ORAL 2 TIMES DAILY
Qty: 60 TABLET | Refills: 2 | Status: SHIPPED | OUTPATIENT
Start: 2019-12-05 | End: 2019-12-06 | Stop reason: SDUPTHER

## 2019-12-05 RX ORDER — ALENDRONATE SODIUM 70 MG/1
70 TABLET ORAL
Qty: 12 TABLET | Refills: 1 | Status: SHIPPED | OUTPATIENT
Start: 2019-12-05 | End: 2019-12-06 | Stop reason: SDUPTHER

## 2019-12-05 RX ORDER — ATORVASTATIN CALCIUM 40 MG/1
40 TABLET, FILM COATED ORAL DAILY
Qty: 30 TABLET | Refills: 2 | Status: SHIPPED | OUTPATIENT
Start: 2019-12-05 | End: 2019-12-06 | Stop reason: SDUPTHER

## 2019-12-05 RX ORDER — GLIPIZIDE 5 MG/1
5 TABLET ORAL DAILY
Qty: 30 TABLET | Refills: 2 | Status: SHIPPED | OUTPATIENT
Start: 2019-12-05 | End: 2019-12-06 | Stop reason: SDUPTHER

## 2019-12-05 NOTE — PATIENT INSTRUCTIONS
Increase toujeo (insulin shot) to 18 units at night.  Increase mobic (pain med) to twice a day.  Go to physical therapy.

## 2019-12-06 DIAGNOSIS — M81.8 OTHER OSTEOPOROSIS WITHOUT CURRENT PATHOLOGICAL FRACTURE: ICD-10-CM

## 2019-12-06 DIAGNOSIS — M54.41 CHRONIC BILATERAL LOW BACK PAIN WITH RIGHT-SIDED SCIATICA: ICD-10-CM

## 2019-12-06 DIAGNOSIS — E11.65 TYPE 2 DIABETES MELLITUS WITH HYPERGLYCEMIA, WITHOUT LONG-TERM CURRENT USE OF INSULIN (HCC): ICD-10-CM

## 2019-12-06 DIAGNOSIS — J30.89 SEASONAL ALLERGIC RHINITIS DUE TO OTHER ALLERGIC TRIGGER: ICD-10-CM

## 2019-12-06 DIAGNOSIS — E78.2 MIXED HYPERLIPIDEMIA: ICD-10-CM

## 2019-12-06 DIAGNOSIS — M1A.09X0 IDIOPATHIC CHRONIC GOUT OF MULTIPLE SITES WITHOUT TOPHUS: ICD-10-CM

## 2019-12-06 DIAGNOSIS — G89.29 CHRONIC BILATERAL LOW BACK PAIN WITH RIGHT-SIDED SCIATICA: ICD-10-CM

## 2019-12-06 RX ORDER — ATORVASTATIN CALCIUM 40 MG/1
40 TABLET, FILM COATED ORAL DAILY
Qty: 30 TABLET | Refills: 2 | Status: SHIPPED | OUTPATIENT
Start: 2019-12-06 | End: 2020-02-19

## 2019-12-06 RX ORDER — BLOOD SUGAR DIAGNOSTIC
1 STRIP MISCELLANEOUS NIGHTLY
Qty: 90 EACH | Refills: 5 | Status: SHIPPED | OUTPATIENT
Start: 2019-12-06 | End: 2020-01-10 | Stop reason: SDUPTHER

## 2019-12-06 RX ORDER — NATEGLINIDE 120 MG/1
120 TABLET ORAL
Qty: 90 TABLET | Refills: 2 | Status: SHIPPED | OUTPATIENT
Start: 2019-12-06 | End: 2020-04-14

## 2019-12-06 RX ORDER — ALLOPURINOL 300 MG/1
300 TABLET ORAL DAILY
Qty: 30 TABLET | Refills: 2 | Status: SHIPPED | OUTPATIENT
Start: 2019-12-06 | End: 2020-02-19

## 2019-12-06 RX ORDER — GLIPIZIDE 5 MG/1
5 TABLET ORAL DAILY
Qty: 30 TABLET | Refills: 2 | Status: SHIPPED | OUTPATIENT
Start: 2019-12-06 | End: 2020-04-08 | Stop reason: SDUPTHER

## 2019-12-06 RX ORDER — LORATADINE 10 MG/1
10 TABLET ORAL DAILY
Qty: 30 TABLET | Refills: 2 | Status: SHIPPED | OUTPATIENT
Start: 2019-12-06 | End: 2020-04-08 | Stop reason: SDUPTHER

## 2019-12-06 RX ORDER — MELOXICAM 7.5 MG/1
7.5 TABLET ORAL 2 TIMES DAILY
Qty: 60 TABLET | Refills: 2 | Status: SHIPPED | OUTPATIENT
Start: 2019-12-06 | End: 2020-06-22

## 2019-12-06 RX ORDER — ALENDRONATE SODIUM 70 MG/1
70 TABLET ORAL
Qty: 12 TABLET | Refills: 1 | Status: SHIPPED | OUTPATIENT
Start: 2019-12-06 | End: 2020-04-08 | Stop reason: SDUPTHER

## 2019-12-16 NOTE — PROGRESS NOTES
"Breanna Kaba     VITALS: Blood pressure 130/71, pulse 101, temperature 98.3 °F (36.8 °C), temperature source Oral, height 157.5 cm (62.01\"), weight 64.4 kg (142 lb), SpO2 98 %, not currently breastfeeding.    Subjective  Chief Complaint:   Chief Complaint   Patient presents with   • Back Pain        History of Present Illness:  Patient is a 64 y.o.  female with a medical history significant for chronic lower back pain who presents to clinic secondary to an acute concern.  Patient states for the past 2 days, her chronic back pain has worsened.  She denies any new injury or trauma.  Pain is a sharp, stabbing, throbbing pain that radiates down both of her lower extremities.  She states that she has been incontinent several times over the last 24 hours.  She also feels that her ability to hold her stool has been decreased.  No saddle anesthesia.  She denies any fevers or chills.    No complaints about any of the medications.    The following portions of the patient's history were reviewed and updated as appropriate: allergies, current medications, past family history, past medical history, past social history, past surgical history and problem list.    Past Medical History  Past Medical History:   Diagnosis Date   • Arthritis    • Chronic pain    • Diabetes mellitus (CMS/HCC)    • GERD (gastroesophageal reflux disease)    • Gout    • Headache    • Neuropathy        Review of Systems   Respiratory: Negative for shortness of breath and wheezing.    Cardiovascular: Negative for chest pain and palpitations.       Surgical History  Past Surgical History:   Procedure Laterality Date   • BREAST BIOPSY Left 1988    benign   • HYSTERECTOMY      Partial   • KNEE SURGERY     • THYROIDECTOMY, PARTIAL      Removal of goiter       Family History  Family History   Problem Relation Age of Onset   • COPD Mother    • Heart disease Brother    • Heart attack Brother    • Hypertension Daughter    • Diabetes Daughter    • Hypertension " Daughter    • Diabetes Daughter    • Breast cancer Neg Hx        Social History  Social History     Socioeconomic History   • Marital status:      Spouse name: Not on file   • Number of children: Not on file   • Years of education: Not on file   • Highest education level: Not on file   Tobacco Use   • Smoking status: Never Smoker   • Smokeless tobacco: Never Used   Substance and Sexual Activity   • Alcohol use: No   • Drug use: No   • Sexual activity: Defer       Objective  Physical Exam    Gen: Patient in NAD. Pleasant and answers appropriately. A&Ox3.    Skin: Warm and dry with normal turgor. No purpura, rashes, or unusual pigmentation noted. Hair is normal in appearance and distribution.    HEENT: NC/AT. No lesions noted. Conjunctiva clear, sclera nonicteric. PERRL. EOMI without nystagmus or strabismus. Fundi appear benign. No hemorrhages or exudates of eyes. Auditory canals are patent bilaterally without lesions. TMs intact,  nonerythematous, nonbulging without lesions. Nasal mucosa pink, nonerythematous, and nonedematous. Frontal and maxillary sinuses are nontender. O/P nonerythematous and moist without exudate.    Neck: Supple without lymph nodes palpated. FROM.     Lungs: CTA B/L without rales, rhonchi, crackles, or wheezes.    Heart: RRR. S1 and S2 normal. No S3 or S4. No MRGT.    Abd: Soft, nontender,nondistended. (+)BSx4 quadrants.     Extrem: No CCE. Radial pulses 2+/4 and equal B/L. FROMx4.     Back: Tenderness to palpation diffusely throughout back.  Range of motion decreased.  No masses or bruising.  Straight leg raise testing positive bilaterally.    Neuro: No focal motor/sensory deficits.    Procedures    Assessment/Plan  Breanna Kaba is a 64 y.o. here for an acute concern.  Diagnoses and all orders for this visit:    Chronic bilateral low back pain with bilateral sciatica  -     POCT urinalysis dipstick, automated  Urinalysis within normal limits.  Differentials concerning for cauda equina.   Will send to ER for emergency MRI.    Patient's Body mass index is 25.97 kg/m². BMI is above normal parameters. Recommendations include: exercise counseling and nutrition counseling.     Findings and plans discussed with patient who verbalizes understanding and agreement. Will followup with patient once results are in. Patient to followup at clinic PRN or in two weeks for further medical followup.    Myrna Ramos MD    EMR Dragon/Transcription Disclaimer:  Much of this encounter note is an electronic transcription/translation of spoken language to printed text.  The electronic translation of spoken language may permit erroneous, or at times, nonsensical words or phrases to be inadvertently transcribed.  Although I have reviewed the note for such errors, some may still exist.

## 2019-12-23 NOTE — PROGRESS NOTES
"Breanna Kaba     VITALS: Blood pressure 130/80, pulse 108, temperature 98.8 °F (37.1 °C), temperature source Oral, height 157.5 cm (62.01\"), weight 64.4 kg (142 lb), SpO2 97 %, not currently breastfeeding.    Subjective  Chief Complaint:   Chief Complaint   Patient presents with   • Follow-up   • Back Pain        History of Present Illness:  Patient is a 64 y.o.  female with a medical history significant for type 2 diabetes, allergic rhinitis, gout, and hyperlipidemia who presents to clinic secondary to medical followup.  She continues to have exacerbated lower back pain.  She denies any new trauma or injury.  She states that she has been having worsening lower back pain for the last 3 weeks.  Lower back pain is described as throbbing, sharp, and radiating down her bilateral lower extremities.  She is having numbness and tingling in both of her lower extremities, but this has improved slightly.  She denies any new changes in urination or bowel movements.  She has been having some urinary incontinence occasionally, but she denies any bowel incontinence.  She denies any saddle anesthesia.  At her last visit, we have sent her to the ER secondary to the urinary incontinence.  MRI was not positive for cauda equina.  Patient states that none of the medications that she has for her lower back pain is working.  She states that the baclofen, Flexeril, and Skelaxin do not work.  The Mobic 7.5 mg orally daily works a little.  She declines physical therapy.    Patient has osteoporosis and is currently on Fosamax 70 mg orally every week without any side effects.  Last DEXA scan was July 2019.    Patient has gout and is currently on allopurinol 300 mg orally daily without any side effects.  She has not had any exacerbations since being on allopurinol.    Patient has hyperlipidemia and is currently on atorvastatin 40 mg orally daily without any side effects.  She tries to adhere to a low-cholesterol diet.  She denies any muscle " weakness, abdominal pain, nausea, vomiting, or itching.    Patient has allergic rhinitis and is currently on Claritin 10 mg orally daily without any side effects.  She denies any allergic exacerbations.  She denies any congestion or rhinorrhea.    Patient has type 2 diabetes and is currently on Toujeo 12 units at night, metformin 1000 mg orally twice a day, Starlix 120 mg orally 3 times a day, and glipizide 5 mg orally daily without any side effects.  She states that her glucose levels are mostly controlled, but she does have some exacerbations.  She denies any hypoglycemic episodes.  She denies any changes in urination or vision.  She does have occasional numbness and tingling of her extremities.  She tries to adhere to a diabetic diet.    No complaints about any of the medications.    The following portions of the patient's history were reviewed and updated as appropriate: allergies, current medications, past family history, past medical history, past social history, past surgical history and problem list.    Past Medical History  Past Medical History:   Diagnosis Date   • Arthritis    • Chronic pain    • Diabetes mellitus (CMS/HCC)    • GERD (gastroesophageal reflux disease)    • Gout    • Headache    • Neuropathy        Review of Systems   Respiratory: Negative for shortness of breath and wheezing.    Cardiovascular: Negative for chest pain and palpitations.   Musculoskeletal: Positive for back pain and myalgias.       Surgical History  Past Surgical History:   Procedure Laterality Date   • BREAST BIOPSY Left 1988    benign   • HYSTERECTOMY      Partial   • KNEE SURGERY     • THYROIDECTOMY, PARTIAL      Removal of goiter       Family History  Family History   Problem Relation Age of Onset   • COPD Mother    • Heart disease Brother    • Heart attack Brother    • Hypertension Daughter    • Diabetes Daughter    • Hypertension Daughter    • Diabetes Daughter    • Breast cancer Neg Hx        Social History  Social  History     Socioeconomic History   • Marital status:      Spouse name: Not on file   • Number of children: Not on file   • Years of education: Not on file   • Highest education level: Not on file   Tobacco Use   • Smoking status: Never Smoker   • Smokeless tobacco: Never Used   Substance and Sexual Activity   • Alcohol use: No   • Drug use: No   • Sexual activity: Defer       Objective  Physical Exam    Gen: Patient in NAD. Pleasant and answers appropriately. A&Ox3.    Skin: Warm and dry with normal turgor. No purpura, rashes, or unusual pigmentation noted. Hair is normal in appearance and distribution.    HEENT: NC/AT. No lesions noted. Conjunctiva clear, sclera nonicteric. PERRL. EOMI without nystagmus or strabismus. Fundi appear benign. No hemorrhages or exudates of eyes. Auditory canals are patent bilaterally without lesions. TMs intact,  nonerythematous, nonbulging without lesions. Nasal mucosa pink, nonerythematous, and nonedematous. Frontal and maxillary sinuses are nontender. O/P nonerythematous and moist without exudate.    Neck: Supple without lymph nodes palpated. FROM.     Lungs: CTA B/L without rales, rhonchi, crackles, or wheezes.    Heart: RRR. S1 and S2 normal. No S3 or S4. No MRGT.    Abd: Soft, nontender,nondistended. (+)BSx4 quadrants.     Extrem: No CCE. Radial pulses 2+/4 and equal B/L. FROMx4. No bone, joint, or muscle tenderness noted.    Neuro: No focal motor/sensory deficits.    Back: Decreased range of motion.  Tenderness to palpation diffusely throughout back.    Procedures    Assessment/Plan  Breanna Kaba is a 64 y.o. here for medical followup.  Diagnoses and all orders for this visit:    Chronic bilateral low back pain with right-sided sciatica  -     meloxicam (MOBIC) 7.5 MG tablet; Take 1 tablet by mouth 2 (Two) Times a Day.  Patient is going to physical therapy.  Will increase Mobic to 7.5 mg orally twice a day.    Idiopathic chronic gout of multiple sites without tophus  -      allopurinol (ZYLOPRIM) 300 MG tablet; Take 1 tablet by mouth Daily.    Other osteoporosis without current pathological fracture  -     alendronate (FOSAMAX) 70 MG tablet; Take 1 tablet by mouth Every 7 (Seven) Days.    Type 2 diabetes mellitus with hyperglycemia, with long-term current use of insulin (CMS/[facility])  -     Insulin Glargine, 1 Unit Dial, (TOUJEO) 300 UNIT/ML solution pen-injector injection; Inject 18 Units under the skin into the appropriate area as directed every night at bedtime.  -     glipizide (GLUCOTROL) 5 MG tablet; Take 1 tablet by mouth Daily.  -     nateglinide (STARLIX) 120 MG tablet; Take 1 tablet by mouth 3 (Three) Times a Day Before Meals.  -     metFORMIN (GLUCOPHAGE) 1000 MG tablet; Take 1 tablet by mouth 2 (Two) Times a Day With Meals.    Mixed hyperlipidemia  -      atorvastatin (LIPITOR) 40 MG tablet; Take 1 tablet by mouth Daily.    Seasonal allergic rhinitis due to other allergic trigger  -     loratadine (CLARITIN) 10 MG tablet; Take 1 tablet by mouth Daily.      Patient's Body mass index is 25.97 kg/m². BMI is above normal parameters. Recommendations include: exercise counseling and nutrition counseling.     Findings and plans discussed with patient who verbalizes understanding and agreement. Will followup with patient once results are in. Patient to followup at clinic PRN or in one month for further medical followup.    Myrna Ramos MD    EMR Dragon/Transcription Disclaimer:  Much of this encounter note is an electronic transcription/translation of spoken language to printed text.  The electronic translation of spoken language may permit erroneous, or at times, nonsensical words or phrases to be inadvertently transcribed.  Although I have reviewed the note for such errors, some may still exist.

## 2020-01-10 ENCOUNTER — OFFICE VISIT (OUTPATIENT)
Dept: FAMILY MEDICINE CLINIC | Facility: CLINIC | Age: 65
End: 2020-01-10

## 2020-01-10 ENCOUNTER — TELEPHONE (OUTPATIENT)
Dept: FAMILY MEDICINE CLINIC | Facility: CLINIC | Age: 65
End: 2020-01-10

## 2020-01-10 VITALS
DIASTOLIC BLOOD PRESSURE: 80 MMHG | SYSTOLIC BLOOD PRESSURE: 123 MMHG | HEART RATE: 104 BPM | WEIGHT: 145 LBS | TEMPERATURE: 98.6 F | BODY MASS INDEX: 26.68 KG/M2 | OXYGEN SATURATION: 97 % | HEIGHT: 62 IN

## 2020-01-10 DIAGNOSIS — E78.5 HYPERLIPIDEMIA ASSOCIATED WITH TYPE 2 DIABETES MELLITUS (HCC): ICD-10-CM

## 2020-01-10 DIAGNOSIS — M1A.09X0 IDIOPATHIC CHRONIC GOUT OF MULTIPLE SITES WITHOUT TOPHUS: ICD-10-CM

## 2020-01-10 DIAGNOSIS — E11.69 HYPERLIPIDEMIA ASSOCIATED WITH TYPE 2 DIABETES MELLITUS (HCC): ICD-10-CM

## 2020-01-10 DIAGNOSIS — Z00.00 MEDICARE ANNUAL WELLNESS VISIT, SUBSEQUENT: Primary | ICD-10-CM

## 2020-01-10 DIAGNOSIS — B34.9 VIRAL SYNDROME: ICD-10-CM

## 2020-01-10 DIAGNOSIS — H61.23 BILATERAL IMPACTED CERUMEN: ICD-10-CM

## 2020-01-10 DIAGNOSIS — E11.65 TYPE 2 DIABETES MELLITUS WITH HYPERGLYCEMIA, WITHOUT LONG-TERM CURRENT USE OF INSULIN (HCC): ICD-10-CM

## 2020-01-10 DIAGNOSIS — D12.6 TUBULAR ADENOMA OF COLON: ICD-10-CM

## 2020-01-10 PROCEDURE — 80053 COMPREHEN METABOLIC PANEL: CPT | Performed by: FAMILY MEDICINE

## 2020-01-10 PROCEDURE — 85025 COMPLETE CBC W/AUTO DIFF WBC: CPT | Performed by: FAMILY MEDICINE

## 2020-01-10 PROCEDURE — 84550 ASSAY OF BLOOD/URIC ACID: CPT | Performed by: FAMILY MEDICINE

## 2020-01-10 PROCEDURE — 69209 REMOVE IMPACTED EAR WAX UNI: CPT | Performed by: FAMILY MEDICINE

## 2020-01-10 PROCEDURE — 36415 COLL VENOUS BLD VENIPUNCTURE: CPT | Performed by: FAMILY MEDICINE

## 2020-01-10 PROCEDURE — 82043 UR ALBUMIN QUANTITATIVE: CPT | Performed by: FAMILY MEDICINE

## 2020-01-10 PROCEDURE — G0438 PPPS, INITIAL VISIT: HCPCS | Performed by: FAMILY MEDICINE

## 2020-01-10 PROCEDURE — 83036 HEMOGLOBIN GLYCOSYLATED A1C: CPT | Performed by: FAMILY MEDICINE

## 2020-01-10 RX ORDER — BLOOD SUGAR DIAGNOSTIC
1 STRIP MISCELLANEOUS NIGHTLY
Qty: 90 EACH | Refills: 5 | Status: SHIPPED | OUTPATIENT
Start: 2020-01-10 | End: 2020-01-10 | Stop reason: SDUPTHER

## 2020-01-10 RX ORDER — BLOOD SUGAR DIAGNOSTIC
1 STRIP MISCELLANEOUS NIGHTLY
Qty: 90 EACH | Refills: 5 | Status: SHIPPED | OUTPATIENT
Start: 2020-01-10 | End: 2020-08-19 | Stop reason: SDUPTHER

## 2020-01-11 LAB
ALBUMIN SERPL-MCNC: 4.7 G/DL (ref 3.5–5.2)
ALBUMIN UR-MCNC: 30.4 MG/DL
ALBUMIN/GLOB SERPL: 1.6 G/DL
ALP SERPL-CCNC: 88 U/L (ref 39–117)
ALT SERPL W P-5'-P-CCNC: 23 U/L (ref 1–33)
ANION GAP SERPL CALCULATED.3IONS-SCNC: 16 MMOL/L (ref 5–15)
AST SERPL-CCNC: 24 U/L (ref 1–32)
BASOPHILS # BLD AUTO: 0.02 10*3/MM3 (ref 0–0.2)
BASOPHILS NFR BLD AUTO: 0.3 % (ref 0–1.5)
BILIRUB SERPL-MCNC: 0.3 MG/DL (ref 0.2–1.2)
BUN BLD-MCNC: 20 MG/DL (ref 8–23)
BUN/CREAT SERPL: 31.3 (ref 7–25)
CALCIUM SPEC-SCNC: 9.8 MG/DL (ref 8.6–10.5)
CHLORIDE SERPL-SCNC: 98 MMOL/L (ref 98–107)
CO2 SERPL-SCNC: 25 MMOL/L (ref 22–29)
CREAT BLD-MCNC: 0.64 MG/DL (ref 0.57–1)
DEPRECATED RDW RBC AUTO: 38.9 FL (ref 37–54)
EOSINOPHIL # BLD AUTO: 0.13 10*3/MM3 (ref 0–0.4)
EOSINOPHIL NFR BLD AUTO: 2 % (ref 0.3–6.2)
ERYTHROCYTE [DISTWIDTH] IN BLOOD BY AUTOMATED COUNT: 13.5 % (ref 12.3–15.4)
GFR SERPL CREATININE-BSD FRML MDRD: 93 ML/MIN/1.73
GLOBULIN UR ELPH-MCNC: 2.9 GM/DL
GLUCOSE BLD-MCNC: 161 MG/DL (ref 65–99)
HBA1C MFR BLD: 10.1 % (ref 4.8–5.6)
HCT VFR BLD AUTO: 39.6 % (ref 34–46.6)
HGB BLD-MCNC: 13.6 G/DL (ref 12–15.9)
IMM GRANULOCYTES # BLD AUTO: 0.01 10*3/MM3 (ref 0–0.05)
IMM GRANULOCYTES NFR BLD AUTO: 0.2 % (ref 0–0.5)
LYMPHOCYTES # BLD AUTO: 2.58 10*3/MM3 (ref 0.7–3.1)
LYMPHOCYTES NFR BLD AUTO: 39.9 % (ref 19.6–45.3)
MCH RBC QN AUTO: 27.7 PG (ref 26.6–33)
MCHC RBC AUTO-ENTMCNC: 34.3 G/DL (ref 31.5–35.7)
MCV RBC AUTO: 80.7 FL (ref 79–97)
MONOCYTES # BLD AUTO: 0.46 10*3/MM3 (ref 0.1–0.9)
MONOCYTES NFR BLD AUTO: 7.1 % (ref 5–12)
NEUTROPHILS # BLD AUTO: 3.27 10*3/MM3 (ref 1.7–7)
NEUTROPHILS NFR BLD AUTO: 50.5 % (ref 42.7–76)
NRBC BLD AUTO-RTO: 0 /100 WBC (ref 0–0.2)
PLATELET # BLD AUTO: 373 10*3/MM3 (ref 140–450)
PMV BLD AUTO: 11.1 FL (ref 6–12)
POTASSIUM BLD-SCNC: 4 MMOL/L (ref 3.5–5.2)
PROT SERPL-MCNC: 7.6 G/DL (ref 6–8.5)
RBC # BLD AUTO: 4.91 10*6/MM3 (ref 3.77–5.28)
SODIUM BLD-SCNC: 139 MMOL/L (ref 136–145)
URATE SERPL-MCNC: 2.3 MG/DL (ref 2.4–5.7)
WBC NRBC COR # BLD: 6.47 10*3/MM3 (ref 3.4–10.8)

## 2020-01-13 ENCOUNTER — LAB (OUTPATIENT)
Dept: FAMILY MEDICINE CLINIC | Facility: CLINIC | Age: 65
End: 2020-01-13

## 2020-01-13 DIAGNOSIS — E11.69 HYPERLIPIDEMIA ASSOCIATED WITH TYPE 2 DIABETES MELLITUS (HCC): ICD-10-CM

## 2020-01-13 DIAGNOSIS — E78.5 HYPERLIPIDEMIA ASSOCIATED WITH TYPE 2 DIABETES MELLITUS (HCC): ICD-10-CM

## 2020-01-13 DIAGNOSIS — E11.65 TYPE 2 DIABETES MELLITUS WITH HYPERGLYCEMIA, WITHOUT LONG-TERM CURRENT USE OF INSULIN (HCC): ICD-10-CM

## 2020-01-13 PROCEDURE — 80061 LIPID PANEL: CPT | Performed by: FAMILY MEDICINE

## 2020-01-14 LAB
CHOLEST SERPL-MCNC: 160 MG/DL (ref 0–200)
HDLC SERPL-MCNC: 41 MG/DL (ref 40–60)
LDLC SERPL CALC-MCNC: 76 MG/DL (ref 0–100)
LDLC/HDLC SERPL: 1.85 {RATIO}
TRIGL SERPL-MCNC: 216 MG/DL (ref 0–150)
VLDLC SERPL-MCNC: 43.2 MG/DL (ref 5–40)

## 2020-01-20 ENCOUNTER — TELEPHONE (OUTPATIENT)
Dept: FAMILY MEDICINE CLINIC | Facility: CLINIC | Age: 65
End: 2020-01-20

## 2020-01-20 ENCOUNTER — TELEPHONE (OUTPATIENT)
Dept: GASTROENTEROLOGY | Facility: CLINIC | Age: 65
End: 2020-01-20

## 2020-01-20 DIAGNOSIS — Z12.11 ENCOUNTER FOR COLORECTAL CANCER SCREENING: Primary | ICD-10-CM

## 2020-01-20 DIAGNOSIS — Z12.12 ENCOUNTER FOR COLORECTAL CANCER SCREENING: Primary | ICD-10-CM

## 2020-01-20 DIAGNOSIS — Z80.0 FAMILY HISTORY OF GI MALIGNANCY: ICD-10-CM

## 2020-01-20 DIAGNOSIS — Z86.010 HISTORY OF ADENOMATOUS POLYP OF COLON: ICD-10-CM

## 2020-01-20 PROBLEM — Z86.0101 HISTORY OF ADENOMATOUS POLYP OF COLON: Status: ACTIVE | Noted: 2020-01-20

## 2020-01-20 NOTE — TELEPHONE ENCOUNTER
----- Message from Myrna Ramos MD sent at 1/19/2020 10:14 PM EST -----  Labs stable. Okay to either call or send letter to patient. Thanks.        Stable letter sent

## 2020-01-20 NOTE — TELEPHONE ENCOUNTER
Patient is a screening colonoscopy and needs Goyltley prep sent to her pharmacy and needs a case request in. She is scheduled for 2-17-20.    Please and Thank you

## 2020-01-27 ENCOUNTER — TELEPHONE (OUTPATIENT)
Dept: FAMILY MEDICINE CLINIC | Facility: CLINIC | Age: 65
End: 2020-01-27

## 2020-01-27 DIAGNOSIS — E11.65 TYPE 2 DIABETES MELLITUS WITH HYPERGLYCEMIA, WITHOUT LONG-TERM CURRENT USE OF INSULIN (HCC): ICD-10-CM

## 2020-01-27 NOTE — PROGRESS NOTES
The ABCs of the Annual Wellness Visit  Subsequent Medicare Wellness Visit    Chief Complaint   Patient presents with   • Medicare Wellness-subsequent       Subjective   History of Present Illness:  Breanna Kaba is a 64 y.o. female who presents for a Subsequent Medicare Wellness Visit.    HEALTH RISK ASSESSMENT    Recent Hospitalizations:  No hospitalization(s) within the last year.    Current Medical Providers:  Patient Care Team:  Myrna Ramos MD as PCP - General (Family Medicine)  Gwendolyn Whalen, RN as Ambulatory  (Population Health)    Smoking Status:  Social History     Tobacco Use   Smoking Status Never Smoker   Smokeless Tobacco Never Used       Alcohol Consumption:  Social History     Substance and Sexual Activity   Alcohol Use No       Depression Screen:   PHQ-2/PHQ-9 Depression Screening 1/10/2020   Little interest or pleasure in doing things 0   Feeling down, depressed, or hopeless 0   Total Score 0       Fall Risk Screen:  JESUS ALBERTO Fall Risk Assessment was completed, and patient is at MODERATE risk for falls. Assessment completed on:1/10/2020    Health Habits and Functional and Cognitive Screening:  Functional & Cognitive Status 1/10/2020   Do you have difficulty preparing food and eating? No   Do you have difficulty bathing yourself, getting dressed or grooming yourself? No   Do you have difficulty using the toilet? No   Do you have difficulty moving around from place to place? No   Do you have trouble with steps or getting out of a bed or a chair? No   Current Diet Well Balanced Diet   Dental Exam Up to date   Eye Exam Up to date   Exercise (times per week) 3 times per week   Current Exercise Activities Include Walking   Do you need help using the phone?  No   Are you deaf or do you have serious difficulty hearing?  No   Do you need help with transportation? No   Do you need help shopping? No   Do you need help preparing meals?  No   Do you need help with housework?  No   Do you  need help with laundry? No   Do you need help taking your medications? No   Do you need help managing money? No   Do you ever drive or ride in a car without wearing a seat belt? No   Have you felt unusual stress, anger or loneliness in the last month? No   Who do you live with? Child   If you need help, do you have trouble finding someone available to you? No   Have you been bothered in the last four weeks by sexual problems? No   Do you have difficulty concentrating, remembering or making decisions? No         Does the patient have evidence of cognitive impairment? No    Asprin use counseling:Start ASA 81 mg daily     Age-appropriate Screening Schedule:  Refer to the list below for future screening recommendations based on patient's age, sex and/or medical conditions. Orders for these recommended tests are listed in the plan section. The patient has been provided with a written plan.    Health Maintenance   Topic Date Due   • DIABETIC FOOT EXAM  01/24/2017   • DIABETIC EYE EXAM  01/24/2017   • ZOSTER VACCINE (3 of 3) 07/09/2018   • HEMOGLOBIN A1C  07/10/2020   • URINE MICROALBUMIN  01/10/2021   • LIPID PANEL  01/13/2021   • MAMMOGRAM  07/31/2021   • DXA SCAN  07/31/2021   • PNEUMOCOCCAL VACCINE (65+ HIGH RISK) (2 of 2 - PPSV23) 11/02/2022   • COLONOSCOPY  01/01/2024   • TDAP/TD VACCINES (2 - Td) 11/02/2027   • INFLUENZA VACCINE  Completed          The following portions of the patient's history were reviewed and updated as appropriate: allergies, current medications, past family history, past medical history, past social history, past surgical history and problem list.    Outpatient Medications Prior to Visit   Medication Sig Dispense Refill   • alendronate (FOSAMAX) 70 MG tablet Take 1 tablet by mouth Every 7 (Seven) Days. 12 tablet 1   • allopurinol (ZYLOPRIM) 300 MG tablet Take 1 tablet by mouth Daily. 30 tablet 2   • atorvastatin (LIPITOR) 40 MG tablet Take 1 tablet by mouth Daily. 30 tablet 2   • glipizide  (GLUCOTROL) 5 MG tablet Take 1 tablet by mouth Daily. 30 tablet 2   • Insulin Glargine, 1 Unit Dial, (TOUJEO) 300 UNIT/ML solution pen-injector injection Inject 18 Units under the skin into the appropriate area as directed every night at bedtime. 2 mL 11   • loratadine (CLARITIN) 10 MG tablet Take 1 tablet by mouth Daily. 30 tablet 2   • meloxicam (MOBIC) 7.5 MG tablet Take 1 tablet by mouth 2 (Two) Times a Day. 60 tablet 2   • metFORMIN (GLUCOPHAGE) 1000 MG tablet Take 1 tablet by mouth 2 (Two) Times a Day With Meals. 60 tablet 5   • Multiple Vitamin (MULTI VITAMIN DAILY PO) Take  by mouth.     • nateglinide (STARLIX) 120 MG tablet Take 1 tablet by mouth 3 (Three) Times a Day Before Meals. 90 tablet 2   • Insulin Pen Needle (PEN NEEDLES) 32G X 5 MM misc 1 application Every Night. To use with basaglar pens 90 each 5     No facility-administered medications prior to visit.        Patient Active Problem List   Diagnosis   • Neuropathy   • Gout   • GERD (gastroesophageal reflux disease)   • Diabetes mellitus (CMS/HCC)   • Chronic pain   • Encounter for colorectal cancer screening   • Family history of GI malignancy   • History of adenomatous polyp of colon       Advanced Care Planning:  Patient does not have an advance directive - information provided to the patient today    Review of Systems   Constitutional: Positive for fatigue. Negative for chills and fever.   HENT: Positive for congestion and ear pain. Negative for facial swelling, rhinorrhea, sinus pressure, sore throat and trouble swallowing.    Eyes: Negative for photophobia, discharge, itching and visual disturbance.   Respiratory: Positive for cough. Negative for shortness of breath and wheezing.    Cardiovascular: Positive for leg swelling. Negative for chest pain and palpitations.   Gastrointestinal: Negative for abdominal pain, constipation, diarrhea, nausea and vomiting.   Endocrine: Negative for cold intolerance, heat intolerance, polydipsia and  "polyuria.   Genitourinary: Negative for difficulty urinating, dysuria and hematuria.   Musculoskeletal: Positive for arthralgias, back pain, gait problem, joint swelling and myalgias.   Skin: Negative for rash.   Neurological: Positive for weakness. Negative for dizziness, light-headedness and headaches.   Psychiatric/Behavioral: Negative for agitation and suicidal ideas. The patient is not nervous/anxious.        Compared to one year ago, the patient feels her physical health is better.  Compared to one year ago, the patient feels her mental health is the same.    Reviewed chart for potential of high risk medication in the elderly: yes  Reviewed chart for potential of harmful drug interactions in the elderly:yes    Objective         Vitals:    01/10/20 1435   BP: 123/80   BP Location: Right arm   Patient Position: Sitting   Pulse: 104   Temp: 98.6 °F (37 °C)   TempSrc: Oral   SpO2: 97%   Weight: 65.8 kg (145 lb)   Height: 157.5 cm (62.01\")       Body mass index is 26.51 kg/m².  Discussed the patient's BMI with her. The BMI is above average; BMI management plan is completed.    Physical Exam   Constitutional: She is oriented to person, place, and time. She appears well-developed and well-nourished. No distress.   HENT:   Head: Normocephalic and atraumatic.   Mouth/Throat: Oropharyngeal exudate present.   Eyes: Pupils are equal, round, and reactive to light. Conjunctivae and EOM are normal. Right eye exhibits no discharge. Left eye exhibits no discharge. No scleral icterus.   Neck: Normal range of motion. Neck supple.   Cardiovascular: Regular rhythm, normal heart sounds and intact distal pulses. Exam reveals no gallop and no friction rub.   No murmur heard.  Pulmonary/Chest: Effort normal. She has wheezes. She has no rales.   Abdominal: Soft. Bowel sounds are normal. She exhibits no mass. There is no rebound and no guarding.   Musculoskeletal: Normal range of motion. She exhibits no edema, tenderness or deformity. " "  Lymphadenopathy:     She has no cervical adenopathy.   Neurological: She is alert and oriented to person, place, and time. She displays normal reflexes. No cranial nerve deficit. Coordination normal.   Skin: Skin is warm and dry. Capillary refill takes 2 to 3 seconds. No rash noted. She is not diaphoretic. No erythema.   Psychiatric: She has a normal mood and affect. Her behavior is normal.   Nursing note and vitals reviewed.        Lab Results   Component Value Date    TRIG 216 (H) 01/13/2020    HDL 41 01/13/2020    LDL 76 01/13/2020    VLDL 43.2 (H) 01/13/2020    HGBA1C 10.10 (H) 01/10/2020      Ear Cerumen Removal  Date/Time: 1/10/2020 4:00 PM  Performed by: Myrna Ramos MD  Authorized by: Myrna Ramos MD   Consent: Verbal consent obtained.  Risks and benefits: risks, benefits and alternatives were discussed  Consent given by: patient  Patient understanding: patient states understanding of the procedure being performed  Patient consent: the patient's understanding of the procedure matches consent given  Procedure consent: procedure consent matches procedure scheduled  Relevant documents: relevant documents present and verified  Test results: test results available and properly labeled  Patient identity confirmed: verbally with patient  Time out: Immediately prior to procedure a \"time out\" was called to verify the correct patient, procedure, equipment, support staff and site/side marked as required.    Anesthesia:  Local Anesthetic: none  Location details: left ear and right ear  Patient tolerance: Patient tolerated the procedure well with no immediate complications  Procedure type: irrigation   Sedation:  Patient sedated: no          Assessment/Plan   Medicare Risks and Personalized Health Plan  CMS Preventative Services Quick Reference  Abdominal Aortic Aneurysm Screening  Advance Directive Discussion  Breast Cancer/Mammogram Screening  Cardiovascular risk  Chronic Pain   Colon Cancer " Screening  Dementia/Memory   Depression/Dysphoria  Diabetic Lab Screening   Fall Risk  Glaucoma Risk  Immunizations Discussed/Encouraged (specific immunizations; Influenza, Pneumococcal 23 and Prevnar )  Inadequate Social Support, Isolation, Loneliness, Lack of Transportation, Financial Difficulties, or Caregiver Stress   Inactivity/Sedentary  Obesity/Overweight   Polypharmacy    The above risks/problems have been discussed with the patient.  Pertinent information has been shared with the patient in the After Visit Summary.  Follow up plans and orders are seen below in the Assessment/Plan Section.    Diagnoses and all orders for this visit:    1. Medicare annual wellness visit, subsequent (Primary)    2. Viral syndrome  -     CBC & Differential; Future  -     CBC & Differential  -     CBC Auto Differential  Supportive care indicated, including increased fluids and rest. Patient to monitor. Patient to call if symptoms continue or worsen.     3. Bilateral impacted cerumen  Lavage done on patient.     4. Type 2 diabetes mellitus with hyperglycemia, without long-term current use of insulin (CMS/[facility])  -     Discontinue: Insulin Pen Needle (PEN NEEDLES) 32G X 5 MM misc; 1 application Every Night. To use with toujeo pens  Dispense: 90 each; Refill: 5  -     Discontinue: Insulin Pen Needle (PEN NEEDLES) 32G X 5 MM misc; 1 application Every Night. To use with toujeo pens  Dispense: 90 each; Refill: 5  -     Insulin Pen Needle (PEN NEEDLES) 32G X 5 MM misc; 1 application Every Night. To use with toujeo pens  Dispense: 90 each; Refill: 5  -     Comprehensive Metabolic Panel; Future  -     Hemoglobin A1c; Future  -     CBC & Differential; Future  -     MicroAlbumin, Urine, Random - Urine, Clean Catch; Future  -     Lipid Panel; Future  -     Comprehensive Metabolic Panel  -     Hemoglobin A1c  -     CBC & Differential  -     CBC Auto Differential  -     MicroAlbumin, Urine, Random - Urine, Clean Catch    5. Tubular adenoma of  colon  -     Ambulatory Referral For Screening Colonoscopy  -     Comprehensive Metabolic Panel; Future  -     CBC & Differential; Future  -     Comprehensive Metabolic Panel  -     CBC & Differential  -     CBC Auto Differential    6. Idiopathic chronic gout of multiple sites without tophus  -     Comprehensive Metabolic Panel; Future  -     CBC & Differential; Future  -     Uric Acid; Future  -     Comprehensive Metabolic Panel  -     CBC & Differential  -     Uric Acid  -     CBC Auto Differential    7. Hyperlipidemia associated with type 2 diabetes mellitus (CMS/HCC)  -     Comprehensive Metabolic Panel; Future  -     CBC & Differential; Future  -     Lipid Panel; Future  -     Comprehensive Metabolic Panel  -     CBC & Differential  -     CBC Auto Differential      Follow Up:  Return in about 3 months (around 4/10/2020).     An After Visit Summary and PPPS were given to the patient.

## 2020-01-27 NOTE — TELEPHONE ENCOUNTER
----- Message from Myrna Ramos MD sent at 1/26/2020  8:22 PM EST -----  Please call Breanna. Labs are okay, but her diabetes has gone up a little. Is she still on the toujeo 18 units? Please increase to 22 units at night. Thanks.      Left a message to return call.      Patient returned call & verbalized understanding.

## 2020-02-17 ENCOUNTER — ANESTHESIA (OUTPATIENT)
Dept: PERIOP | Facility: HOSPITAL | Age: 65
End: 2020-02-17

## 2020-02-17 ENCOUNTER — HOSPITAL ENCOUNTER (OUTPATIENT)
Facility: HOSPITAL | Age: 65
Setting detail: HOSPITAL OUTPATIENT SURGERY
Discharge: HOME | End: 2020-02-17
Attending: INTERNAL MEDICINE | Admitting: INTERNAL MEDICINE

## 2020-02-17 ENCOUNTER — ANESTHESIA EVENT (OUTPATIENT)
Dept: PERIOP | Facility: HOSPITAL | Age: 65
End: 2020-02-17

## 2020-02-17 VITALS
TEMPERATURE: 98.4 F | DIASTOLIC BLOOD PRESSURE: 80 MMHG | OXYGEN SATURATION: 97 % | BODY MASS INDEX: 25.76 KG/M2 | WEIGHT: 140 LBS | RESPIRATION RATE: 20 BRPM | SYSTOLIC BLOOD PRESSURE: 127 MMHG | HEIGHT: 62 IN | HEART RATE: 84 BPM

## 2020-02-17 LAB — GLUCOSE BLDC GLUCOMTR-MCNC: 144 MG/DL (ref 70–130)

## 2020-02-17 PROCEDURE — 25010000002 PROPOFOL 10 MG/ML EMULSION: Performed by: NURSE ANESTHETIST, CERTIFIED REGISTERED

## 2020-02-17 PROCEDURE — G0105 COLORECTAL SCRN; HI RISK IND: HCPCS | Performed by: INTERNAL MEDICINE

## 2020-02-17 PROCEDURE — 82962 GLUCOSE BLOOD TEST: CPT

## 2020-02-17 RX ORDER — FENTANYL CITRATE 50 UG/ML
50 INJECTION, SOLUTION INTRAMUSCULAR; INTRAVENOUS
Status: DISCONTINUED | OUTPATIENT
Start: 2020-02-17 | End: 2020-02-17 | Stop reason: HOSPADM

## 2020-02-17 RX ORDER — SODIUM CHLORIDE, SODIUM LACTATE, POTASSIUM CHLORIDE, CALCIUM CHLORIDE 600; 310; 30; 20 MG/100ML; MG/100ML; MG/100ML; MG/100ML
125 INJECTION, SOLUTION INTRAVENOUS CONTINUOUS
Status: DISCONTINUED | OUTPATIENT
Start: 2020-02-17 | End: 2020-02-17 | Stop reason: HOSPADM

## 2020-02-17 RX ORDER — PROPOFOL 10 MG/ML
VIAL (ML) INTRAVENOUS AS NEEDED
Status: DISCONTINUED | OUTPATIENT
Start: 2020-02-17 | End: 2020-02-17 | Stop reason: SURG

## 2020-02-17 RX ORDER — IPRATROPIUM BROMIDE AND ALBUTEROL SULFATE 2.5; .5 MG/3ML; MG/3ML
3 SOLUTION RESPIRATORY (INHALATION) ONCE AS NEEDED
Status: DISCONTINUED | OUTPATIENT
Start: 2020-02-17 | End: 2020-02-17 | Stop reason: HOSPADM

## 2020-02-17 RX ORDER — SODIUM CHLORIDE 0.9 % (FLUSH) 0.9 %
10 SYRINGE (ML) INJECTION EVERY 12 HOURS SCHEDULED
Status: DISCONTINUED | OUTPATIENT
Start: 2020-02-17 | End: 2020-02-17 | Stop reason: HOSPADM

## 2020-02-17 RX ORDER — OXYCODONE HYDROCHLORIDE AND ACETAMINOPHEN 5; 325 MG/1; MG/1
1 TABLET ORAL ONCE AS NEEDED
Status: DISCONTINUED | OUTPATIENT
Start: 2020-02-17 | End: 2020-02-17 | Stop reason: HOSPADM

## 2020-02-17 RX ORDER — SODIUM CHLORIDE 0.9 % (FLUSH) 0.9 %
10 SYRINGE (ML) INJECTION AS NEEDED
Status: DISCONTINUED | OUTPATIENT
Start: 2020-02-17 | End: 2020-02-17 | Stop reason: HOSPADM

## 2020-02-17 RX ORDER — MEPERIDINE HYDROCHLORIDE 25 MG/ML
12.5 INJECTION INTRAMUSCULAR; INTRAVENOUS; SUBCUTANEOUS
Status: DISCONTINUED | OUTPATIENT
Start: 2020-02-17 | End: 2020-02-17 | Stop reason: HOSPADM

## 2020-02-17 RX ORDER — ONDANSETRON 2 MG/ML
4 INJECTION INTRAMUSCULAR; INTRAVENOUS AS NEEDED
Status: DISCONTINUED | OUTPATIENT
Start: 2020-02-17 | End: 2020-02-17 | Stop reason: HOSPADM

## 2020-02-17 RX ADMIN — SODIUM CHLORIDE, POTASSIUM CHLORIDE, SODIUM LACTATE AND CALCIUM CHLORIDE: 600; 310; 30; 20 INJECTION, SOLUTION INTRAVENOUS at 08:23

## 2020-02-17 RX ADMIN — PROPOFOL 170 MCG/KG/MIN: 10 INJECTION, EMULSION INTRAVENOUS at 08:25

## 2020-02-17 RX ADMIN — PROPOFOL 30 MG: 10 INJECTION, EMULSION INTRAVENOUS at 08:28

## 2020-02-17 RX ADMIN — PROPOFOL 30 MG: 10 INJECTION, EMULSION INTRAVENOUS at 08:25

## 2020-02-17 NOTE — H&P
GASTROENTEROLOGY OFFICE NOTE  Breanna Kaba  [id number]  1955    CARE TEAM  Patient Care Team:  Myrna Ramos MD as PCP - General (Family Medicine)  Gwendolyn Whalen RN as Ambulatory  ([facility])    Mariano Prescott MD      Chief complaint  History of adenomatous colonic polyps    HISTORY OF PRESENT ILLNESS:  Patient is referred for surveillance colonoscopy with history of adenomatous colonic polyps.  She presents without dysphasia, odynophagia, early satiety, nausea, vomiting melena or bright red blood per rectum    PAST MEDICAL HISTORY  Past Medical History:   Diagnosis Date   • Arthritis    • Chronic pain    • Diabetes mellitus (CMS/HCC)    • GERD (gastroesophageal reflux disease)    • Gout    • Headache    • Neuropathy         PAST SURGICAL HISTORY  Past Surgical History:   Procedure Laterality Date   • BREAST BIOPSY Left 1988    benign   • HYSTERECTOMY      Partial   • KNEE SURGERY     • THYROIDECTOMY, PARTIAL      Removal of goiter        MEDICATIONS:  No current facility-administered medications for this encounter.     ALLERGIES  Allergies   Allergen Reactions   • Asa [Aspirin]    • Naproxen Nausea And Vomiting   • Penicillins        FAMILY HISTORY:  Family History   Problem Relation Age of Onset   • COPD Mother    • Heart disease Brother    • Heart attack Brother    • Hypertension Daughter    • Diabetes Daughter    • Hypertension Daughter    • Diabetes Daughter    • Breast cancer Neg Hx        SOCIAL HISTORY  Social History     Socioeconomic History   • Marital status:      Spouse name: Not on file   • Number of children: Not on file   • Years of education: Not on file   • Highest education level: Not on file   Tobacco Use   • Smoking status: Never Smoker   • Smokeless tobacco: Never Used   Substance and Sexual Activity   • Alcohol use: No   • Drug use: No   • Sexual activity: Defer     Socioeconomic History:  .  Non-smoker.  Nondrinker.  She has 2  children       REVIEW OF SYSTEMS  Review of Systems  Unremarkable.  Recent review of systems include positives include fatigue, congestion, ear pain, cough, leg swelling, arthralgias, back pain, gait problem, joint swelling, myalgias and weakness    PHYSICAL EXAM   LMP  (LMP Unknown) Comment: partial hysterectomy  General: Alert and oriented x 3. In no apparent or acute distress.  and No stigmata of chronic liver disease  HEENT: Anicteric slcera. Normal oropharynx  Neck: Supple. Without lymphadenopathy  CV: Regular rate and rhythm, S1, S2  Lungs: Clear to ausculation. Without rales, robchi and wheezing  Abdomen:  Soft,non-distended without palpable masses or hepatosplenomeagaly, areas of rebound tenderness or guarding.   Extremeties: without clubbing, cyanosis or edema  Neurologic:  Alert and oriented x 3 without focal motor or sensory deficits  Rectal exam: deferred     Results for orders placed or performed in visit on 01/13/20   Lipid Panel   Result Value Ref Range    Total Cholesterol 160 0 - 200 mg/dL    Triglycerides 216 (H) 0 - 150 mg/dL    HDL Cholesterol 41 40 - 60 mg/dL    LDL Cholesterol  76 0 - 100 mg/dL    VLDL Cholesterol 43.2 (H) 5 - 40 mg/dL    LDL/HDL Ratio 1.85         Results Review:  I reviewed the patient's new clinical results.      ASSESSMENT  1.-  History of adenomatous colonic polyps.  2.-  Other medical problems include neuropathy, gout, GERD, diabetes mellitus chronic pain    PLAN  1.-  Surveillance colonoscopy.  Risk of perforation bleeding cardiorespiratory compromise missed lesions are reviewed and ample opportunity for questions provided she is agreeable to proceeding.      I discussed the patients findings and my recommendations with patient    Mariano Prescott MD  2/17/2020   7:29 AM    Much of this note is an electronic transcription of spoken language to printed text. Electronic transcription of spoken language may permit erroneous, nonsensical word phrases to be  inadvertently transcribed.  Although I have reviewed the note for these errors, some may still be present.

## 2020-02-17 NOTE — ANESTHESIA PREPROCEDURE EVALUATION
Anesthesia Evaluation     no history of anesthetic complications:  NPO Solid Status: > 8 hours  NPO Liquid Status: > 8 hours           Airway   Mallampati: II  TM distance: >3 FB  Neck ROM: full  No difficulty expected  Dental    (+) edentulous    Pulmonary - normal exam   Cardiovascular - normal exam    (+) hypertension, hyperlipidemia,       Neuro/Psych  (+) headaches,     GI/Hepatic/Renal/Endo    (+)  GERD,  diabetes mellitus,     Musculoskeletal     Abdominal  - normal exam   Substance History      OB/GYN          Other                        Anesthesia Plan    ASA 3     general     intravenous induction     Anesthetic plan, all risks, benefits, and alternatives have been provided, discussed and informed consent has been obtained with: patient.

## 2020-02-17 NOTE — ANESTHESIA POSTPROCEDURE EVALUATION
Patient: Breanna Kaba    Procedure Summary     Date:  02/17/20 Room / Location:  [facility] OR [address] [place] [address] COR OR    Anesthesia Start:  0823 Anesthesia Stop:  0843    Procedure:  COLONOSCOPY FOR SCREENING CPT CODE:  (N/A ) Diagnosis:       Encounter for colorectal cancer screening      Family history of GI malignancy      History of adenomatous polyp of colon      (Encounter for colorectal cancer screening [Z12.11, Z12.12])      (Family history of GI malignancy [Z80.0])      (History of adenomatous polyp of colon [Z86.010])    Surgeon:  Mariano Prescott MD Provider:  Jomar Whitman MD    Anesthesia Type:  general ASA Status:  3          Anesthesia Type: general    Vitals  Vitals Value Taken Time   /50 2/17/2020  8:45 AM   Temp 98.4 °F (36.9 °C) 2/17/2020  8:45 AM   Pulse 83 2/17/2020  8:45 AM   Resp 20 2/17/2020  8:45 AM   SpO2 100 % 2/17/2020  8:45 AM           Post Anesthesia Care and Evaluation    Patient location during evaluation: bedside  Patient participation: complete - patient participated  Level of consciousness: awake and alert  Pain score: 1  Pain management: adequate  Airway patency: patent  Anesthetic complications: No anesthetic complications  PONV Status: none  Cardiovascular status: acceptable  Respiratory status: acceptable  Hydration status: acceptable

## 2020-02-17 NOTE — OP NOTE
COLONOSCOPY PROCEDURE NOTE    Breanna Kaba  2/17/2020    PRE-PROCEDURE DIAGNOSIS:   Encounter for colorectal cancer screening [Z12.11, Z12.12]  Family history of GI malignancy [Z80.0]  History of adenomatous polyp of colon [Z86.010]    POST-PROCEDURE DIAGNOSIS:  Normal colon and terminal ileum except for minimal left-sided diverticulosis    INDICATION:  Surveillance.  History of adenomatous colonic polyps    PROCEDURE:  COLONOSCOPY diagnostic    GASTROENTEROLOGIST:  Mariano Prescott MD    ANESTHESIA:  Propofol administered by anesthesia.  See anesthesia notes for ASA classification    STAFF  Circulator: Randa Jones RN; Macy Brewer RN  Endo Technician: Lobo Carter    Findings:  As noted in the post procedure diagnosis    OPERATIVE PROCEDURE   After proper informed consent was obtained, the patient was taken the operating suite and placed in left lateral decubitus position.  An Olympus video colonoscope 180 series was inserted in the rectum and advanced to the terminal ileum under direct visualization.  Cecum and terminal ileum were identified by visualization of the appendiceal orifice and ileocecal valve.  The colonoscope was then slowly withdrawn from the cecum to the rectum and passed a second time from rectum to cecum.  The colonoscope was retroflexed in the cecum and rectum. Scope was then withdrawn. Patient tolerated the procedure well. There were no immediate complications. Cecal withdrawal time was 10 minutes.    ESTIMATED BLOOD LOSS  None    SPECIMENS  None             COMPLICATIONS  None    RECOMMENDATIONS:  Surveillance colonoscopy is recommended in 5 years given prior history    Mariano Prescott MD  02/17/20 8:47 AM

## 2020-02-18 DIAGNOSIS — M1A.09X0 IDIOPATHIC CHRONIC GOUT OF MULTIPLE SITES WITHOUT TOPHUS: ICD-10-CM

## 2020-02-18 DIAGNOSIS — E78.2 MIXED HYPERLIPIDEMIA: ICD-10-CM

## 2020-02-19 RX ORDER — ALLOPURINOL 300 MG/1
TABLET ORAL
Qty: 90 TABLET | Refills: 0 | Status: SHIPPED | OUTPATIENT
Start: 2020-02-19 | End: 2020-08-19 | Stop reason: SDUPTHER

## 2020-02-19 RX ORDER — ATORVASTATIN CALCIUM 40 MG/1
TABLET, FILM COATED ORAL
Qty: 90 TABLET | Refills: 0 | Status: SHIPPED | OUTPATIENT
Start: 2020-02-19 | End: 2020-04-08 | Stop reason: SDUPTHER

## 2020-02-24 ENCOUNTER — OFFICE VISIT (OUTPATIENT)
Dept: GASTROENTEROLOGY | Facility: CLINIC | Age: 65
End: 2020-02-24

## 2020-02-24 DIAGNOSIS — Z53.21 PATIENT LEFT WITHOUT BEING SEEN: Primary | ICD-10-CM

## 2020-02-24 PROCEDURE — S0260 H&P FOR SURGERY: HCPCS | Performed by: PHYSICIAN ASSISTANT

## 2020-04-08 DIAGNOSIS — E78.2 MIXED HYPERLIPIDEMIA: ICD-10-CM

## 2020-04-08 DIAGNOSIS — M81.8 OTHER OSTEOPOROSIS WITHOUT CURRENT PATHOLOGICAL FRACTURE: ICD-10-CM

## 2020-04-08 DIAGNOSIS — J30.89 SEASONAL ALLERGIC RHINITIS DUE TO OTHER ALLERGIC TRIGGER: ICD-10-CM

## 2020-04-08 DIAGNOSIS — E11.65 TYPE 2 DIABETES MELLITUS WITH HYPERGLYCEMIA, WITHOUT LONG-TERM CURRENT USE OF INSULIN (HCC): ICD-10-CM

## 2020-04-08 RX ORDER — LORATADINE 10 MG/1
10 TABLET ORAL DAILY
Qty: 30 TABLET | Refills: 2 | Status: SHIPPED | OUTPATIENT
Start: 2020-04-08 | End: 2020-08-19 | Stop reason: SDUPTHER

## 2020-04-08 RX ORDER — ATORVASTATIN CALCIUM 40 MG/1
40 TABLET, FILM COATED ORAL DAILY
Qty: 90 TABLET | Refills: 0 | Status: SHIPPED | OUTPATIENT
Start: 2020-04-08 | End: 2020-08-19 | Stop reason: SDUPTHER

## 2020-04-08 RX ORDER — ALENDRONATE SODIUM 70 MG/1
70 TABLET ORAL
Qty: 12 TABLET | Refills: 1 | Status: SHIPPED | OUTPATIENT
Start: 2020-04-08 | End: 2020-08-19 | Stop reason: SDUPTHER

## 2020-04-08 RX ORDER — GLIPIZIDE 5 MG/1
5 TABLET ORAL DAILY
Qty: 30 TABLET | Refills: 2 | Status: SHIPPED | OUTPATIENT
Start: 2020-04-08 | End: 2020-08-12

## 2020-04-14 DIAGNOSIS — E11.65 TYPE 2 DIABETES MELLITUS WITH HYPERGLYCEMIA, WITHOUT LONG-TERM CURRENT USE OF INSULIN (HCC): ICD-10-CM

## 2020-04-14 RX ORDER — NATEGLINIDE 120 MG/1
TABLET ORAL
Qty: 90 TABLET | Refills: 2 | Status: SHIPPED | OUTPATIENT
Start: 2020-04-14 | End: 2020-08-19 | Stop reason: SDUPTHER

## 2020-06-20 DIAGNOSIS — M54.41 CHRONIC BILATERAL LOW BACK PAIN WITH RIGHT-SIDED SCIATICA: ICD-10-CM

## 2020-06-20 DIAGNOSIS — G89.29 CHRONIC BILATERAL LOW BACK PAIN WITH RIGHT-SIDED SCIATICA: ICD-10-CM

## 2020-06-22 RX ORDER — MELOXICAM 7.5 MG/1
TABLET ORAL
Qty: 60 TABLET | Refills: 0 | Status: SHIPPED | OUTPATIENT
Start: 2020-06-22 | End: 2020-07-21

## 2020-07-18 DIAGNOSIS — G89.29 CHRONIC BILATERAL LOW BACK PAIN WITH RIGHT-SIDED SCIATICA: ICD-10-CM

## 2020-07-18 DIAGNOSIS — M54.41 CHRONIC BILATERAL LOW BACK PAIN WITH RIGHT-SIDED SCIATICA: ICD-10-CM

## 2020-07-21 RX ORDER — MELOXICAM 7.5 MG/1
TABLET ORAL
Qty: 60 TABLET | Refills: 0 | Status: SHIPPED | OUTPATIENT
Start: 2020-07-21 | End: 2020-08-12

## 2020-08-03 ENCOUNTER — HOSPITAL ENCOUNTER (OUTPATIENT)
Dept: MAMMOGRAPHY | Facility: HOSPITAL | Age: 65
Discharge: HOME | End: 2020-08-03
Admitting: FAMILY MEDICINE

## 2020-08-03 DIAGNOSIS — Z12.31 VISIT FOR SCREENING MAMMOGRAM: ICD-10-CM

## 2020-08-03 PROCEDURE — 77067 SCR MAMMO BI INCL CAD: CPT

## 2020-08-03 PROCEDURE — 77063 BREAST TOMOSYNTHESIS BI: CPT

## 2020-08-03 PROCEDURE — 77067 SCR MAMMO BI INCL CAD: CPT | Performed by: RADIOLOGY

## 2020-08-03 PROCEDURE — 77063 BREAST TOMOSYNTHESIS BI: CPT | Performed by: RADIOLOGY

## 2020-08-12 DIAGNOSIS — E11.65 TYPE 2 DIABETES MELLITUS WITH HYPERGLYCEMIA, WITHOUT LONG-TERM CURRENT USE OF INSULIN (HCC): ICD-10-CM

## 2020-08-12 DIAGNOSIS — G89.29 CHRONIC BILATERAL LOW BACK PAIN WITH RIGHT-SIDED SCIATICA: ICD-10-CM

## 2020-08-12 DIAGNOSIS — M54.41 CHRONIC BILATERAL LOW BACK PAIN WITH RIGHT-SIDED SCIATICA: ICD-10-CM

## 2020-08-12 RX ORDER — GLIPIZIDE 5 MG/1
TABLET ORAL
Qty: 90 TABLET | Refills: 0 | Status: SHIPPED | OUTPATIENT
Start: 2020-08-12 | End: 2020-08-19 | Stop reason: SDUPTHER

## 2020-08-12 RX ORDER — MELOXICAM 7.5 MG/1
TABLET ORAL
Qty: 60 TABLET | Refills: 0 | Status: SHIPPED | OUTPATIENT
Start: 2020-08-12 | End: 2020-08-19 | Stop reason: SDUPTHER

## 2020-08-19 ENCOUNTER — OFFICE VISIT (OUTPATIENT)
Dept: FAMILY MEDICINE CLINIC | Facility: CLINIC | Age: 65
End: 2020-08-19

## 2020-08-19 VITALS
HEART RATE: 93 BPM | BODY MASS INDEX: 28.49 KG/M2 | HEIGHT: 62 IN | SYSTOLIC BLOOD PRESSURE: 130 MMHG | OXYGEN SATURATION: 98 % | TEMPERATURE: 97.2 F | DIASTOLIC BLOOD PRESSURE: 84 MMHG | WEIGHT: 154.8 LBS

## 2020-08-19 DIAGNOSIS — R35.0 URINARY FREQUENCY: Primary | ICD-10-CM

## 2020-08-19 DIAGNOSIS — M1A.09X0 IDIOPATHIC CHRONIC GOUT OF MULTIPLE SITES WITHOUT TOPHUS: ICD-10-CM

## 2020-08-19 DIAGNOSIS — G89.29 CHRONIC BILATERAL LOW BACK PAIN WITH RIGHT-SIDED SCIATICA: ICD-10-CM

## 2020-08-19 DIAGNOSIS — J30.89 SEASONAL ALLERGIC RHINITIS DUE TO OTHER ALLERGIC TRIGGER: ICD-10-CM

## 2020-08-19 DIAGNOSIS — E11.65 TYPE 2 DIABETES MELLITUS WITH HYPERGLYCEMIA, WITHOUT LONG-TERM CURRENT USE OF INSULIN (HCC): ICD-10-CM

## 2020-08-19 DIAGNOSIS — M81.8 OTHER OSTEOPOROSIS WITHOUT CURRENT PATHOLOGICAL FRACTURE: ICD-10-CM

## 2020-08-19 DIAGNOSIS — M54.41 CHRONIC BILATERAL LOW BACK PAIN WITH RIGHT-SIDED SCIATICA: ICD-10-CM

## 2020-08-19 DIAGNOSIS — E78.2 MIXED HYPERLIPIDEMIA: ICD-10-CM

## 2020-08-19 LAB
BILIRUB BLD-MCNC: NEGATIVE MG/DL
CLARITY, POC: CLEAR
COLOR UR: YELLOW
GLUCOSE UR STRIP-MCNC: ABNORMAL MG/DL
KETONES UR QL: NEGATIVE
LEUKOCYTE EST, POC: NEGATIVE
NITRITE UR-MCNC: NEGATIVE MG/ML
PH UR: 6 [PH] (ref 5–8)
PROT UR STRIP-MCNC: ABNORMAL MG/DL
RBC # UR STRIP: NEGATIVE /UL
SP GR UR: 1.02 (ref 1–1.03)
UROBILINOGEN UR QL: NORMAL

## 2020-08-19 PROCEDURE — 87186 SC STD MICRODIL/AGAR DIL: CPT | Performed by: FAMILY MEDICINE

## 2020-08-19 PROCEDURE — 87086 URINE CULTURE/COLONY COUNT: CPT | Performed by: FAMILY MEDICINE

## 2020-08-19 PROCEDURE — 80053 COMPREHEN METABOLIC PANEL: CPT | Performed by: FAMILY MEDICINE

## 2020-08-19 PROCEDURE — 80061 LIPID PANEL: CPT | Performed by: FAMILY MEDICINE

## 2020-08-19 PROCEDURE — 81003 URINALYSIS AUTO W/O SCOPE: CPT | Performed by: FAMILY MEDICINE

## 2020-08-19 PROCEDURE — 83036 HEMOGLOBIN GLYCOSYLATED A1C: CPT | Performed by: FAMILY MEDICINE

## 2020-08-19 PROCEDURE — 99214 OFFICE O/P EST MOD 30 MIN: CPT | Performed by: FAMILY MEDICINE

## 2020-08-19 RX ORDER — ALLOPURINOL 300 MG/1
300 TABLET ORAL DAILY
Qty: 30 TABLET | Refills: 5 | Status: SHIPPED | OUTPATIENT
Start: 2020-08-19 | End: 2020-09-21

## 2020-08-19 RX ORDER — ALLOPURINOL 300 MG/1
300 TABLET ORAL DAILY
Qty: 90 TABLET | Refills: 0 | Status: CANCELLED | OUTPATIENT
Start: 2020-08-19

## 2020-08-19 RX ORDER — MELOXICAM 7.5 MG/1
7.5 TABLET ORAL 2 TIMES DAILY
Qty: 60 TABLET | Refills: 5 | Status: SHIPPED | OUTPATIENT
Start: 2020-08-19 | End: 2020-11-19 | Stop reason: SDUPTHER

## 2020-08-19 RX ORDER — LORATADINE 10 MG/1
10 TABLET ORAL DAILY
Qty: 30 TABLET | Refills: 5 | Status: SHIPPED | OUTPATIENT
Start: 2020-08-19 | End: 2020-11-19 | Stop reason: SDUPTHER

## 2020-08-19 RX ORDER — LORATADINE 10 MG/1
10 TABLET ORAL DAILY
Qty: 30 TABLET | Refills: 2 | Status: CANCELLED | OUTPATIENT
Start: 2020-08-19

## 2020-08-19 RX ORDER — NATEGLINIDE 120 MG/1
120 TABLET ORAL
Qty: 90 TABLET | Refills: 5 | Status: SHIPPED | OUTPATIENT
Start: 2020-08-19 | End: 2020-11-19 | Stop reason: SDUPTHER

## 2020-08-19 RX ORDER — GLIPIZIDE 5 MG/1
5 TABLET ORAL DAILY
Qty: 90 TABLET | Refills: 0 | Status: CANCELLED | OUTPATIENT
Start: 2020-08-19

## 2020-08-19 RX ORDER — GLIPIZIDE 5 MG/1
5 TABLET ORAL DAILY
Qty: 30 TABLET | Refills: 5 | Status: SHIPPED | OUTPATIENT
Start: 2020-08-19 | End: 2020-11-19 | Stop reason: SDUPTHER

## 2020-08-19 RX ORDER — ALENDRONATE SODIUM 70 MG/1
70 TABLET ORAL
Qty: 12 TABLET | Refills: 1 | Status: SHIPPED | OUTPATIENT
Start: 2020-08-19 | End: 2020-11-19 | Stop reason: SDUPTHER

## 2020-08-19 RX ORDER — ATORVASTATIN CALCIUM 40 MG/1
40 TABLET, FILM COATED ORAL DAILY
Qty: 90 TABLET | Refills: 0 | Status: CANCELLED | OUTPATIENT
Start: 2020-08-19

## 2020-08-19 RX ORDER — BLOOD SUGAR DIAGNOSTIC
1 STRIP MISCELLANEOUS NIGHTLY
Qty: 90 EACH | Refills: 5 | Status: SHIPPED | OUTPATIENT
Start: 2020-08-19 | End: 2020-11-19 | Stop reason: SDUPTHER

## 2020-08-19 RX ORDER — ATORVASTATIN CALCIUM 40 MG/1
40 TABLET, FILM COATED ORAL DAILY
Qty: 30 TABLET | Refills: 5 | Status: SHIPPED | OUTPATIENT
Start: 2020-08-19 | End: 2020-11-19 | Stop reason: SDUPTHER

## 2020-08-19 RX ORDER — NITROFURANTOIN 25; 75 MG/1; MG/1
100 CAPSULE ORAL 2 TIMES DAILY
Qty: 10 CAPSULE | Refills: 0 | Status: SHIPPED | OUTPATIENT
Start: 2020-08-19 | End: 2020-11-19

## 2020-08-19 RX ORDER — MELOXICAM 7.5 MG/1
7.5 TABLET ORAL 2 TIMES DAILY
Qty: 60 TABLET | Refills: 0 | Status: CANCELLED | OUTPATIENT
Start: 2020-08-19

## 2020-08-19 RX ORDER — NATEGLINIDE 120 MG/1
TABLET ORAL
Qty: 90 TABLET | Refills: 2 | Status: CANCELLED | OUTPATIENT
Start: 2020-08-19

## 2020-08-20 ENCOUNTER — TELEPHONE (OUTPATIENT)
Dept: FAMILY MEDICINE CLINIC | Facility: CLINIC | Age: 65
End: 2020-08-20

## 2020-08-20 LAB
ALBUMIN SERPL-MCNC: 4.3 G/DL (ref 3.5–5.2)
ALBUMIN/GLOB SERPL: 1.7 G/DL
ALP SERPL-CCNC: 104 U/L (ref 39–117)
ALT SERPL W P-5'-P-CCNC: 16 U/L (ref 1–33)
ANION GAP SERPL CALCULATED.3IONS-SCNC: 9.4 MMOL/L (ref 5–15)
AST SERPL-CCNC: 13 U/L (ref 1–32)
BILIRUB SERPL-MCNC: 0.4 MG/DL (ref 0–1.2)
BUN SERPL-MCNC: 18 MG/DL (ref 8–23)
BUN/CREAT SERPL: 27.7 (ref 7–25)
CALCIUM SPEC-SCNC: 9.5 MG/DL (ref 8.6–10.5)
CHLORIDE SERPL-SCNC: 98 MMOL/L (ref 98–107)
CHOLEST SERPL-MCNC: 175 MG/DL (ref 0–200)
CO2 SERPL-SCNC: 27.6 MMOL/L (ref 22–29)
CREAT SERPL-MCNC: 0.65 MG/DL (ref 0.57–1)
GFR SERPL CREATININE-BSD FRML MDRD: 91 ML/MIN/1.73
GLOBULIN UR ELPH-MCNC: 2.5 GM/DL
GLUCOSE SERPL-MCNC: 309 MG/DL (ref 65–99)
HBA1C MFR BLD: 9.1 % (ref 4.8–5.6)
HDLC SERPL-MCNC: 41 MG/DL (ref 40–60)
LDLC SERPL CALC-MCNC: 86 MG/DL (ref 0–100)
LDLC/HDLC SERPL: 2.1 {RATIO}
POTASSIUM SERPL-SCNC: 4.3 MMOL/L (ref 3.5–5.2)
PROT SERPL-MCNC: 6.8 G/DL (ref 6–8.5)
SODIUM SERPL-SCNC: 135 MMOL/L (ref 136–145)
TRIGL SERPL-MCNC: 239 MG/DL (ref 0–150)
VLDLC SERPL-MCNC: 47.8 MG/DL (ref 5–40)

## 2020-08-20 NOTE — TELEPHONE ENCOUNTER
----- Message from Myrna Ramos MD sent at 8/20/2020  1:40 PM EDT -----  Please have them continue the urine culture. Thanks.        Lab notified.

## 2020-08-21 LAB — BACTERIA SPEC AEROBE CULT: ABNORMAL

## 2020-08-30 NOTE — PROGRESS NOTES
"Breanna Kaba     VITALS: Blood pressure 130/84, pulse 93, temperature 97.2 °F (36.2 °C), height 157.5 cm (62.01\"), weight 70.2 kg (154 lb 12.8 oz), SpO2 98 %, not currently breastfeeding.    Subjective  Chief Complaint:   Chief Complaint   Patient presents with   • Flank Pain     right side flank pain   • Urinary Frequency        History of Present Illness:  Patient is a 65 y.o.  female with a medical history significant for type 2 diabetes, allergic rhinitis, gout, and hyperlipidemia Who presents to clinic secondary to medical followup.  Patient is complaining of a 3-week history of worsening urinary frequency along with intermittent right-sided flank pain.  She denies any fevers or chills.  She denies any dysuria, hematuria, urinary retention.  She does have a history of kidney stones.  She also does have a history of back pain.    Patient has chronic conditions including gout, hyperlipidemia, type 2 diabetes, allergic rhinitis, chronic back pain, and osteoporosis.  These chronic conditions are stable and unchanged.  Medications associated with her chronic conditions are not giving her any side effects.    No complaints about any of the medications.    The following portions of the patient's history were reviewed and updated as appropriate: allergies, current medications, past family history, past medical history, past social history, past surgical history and problem list.    Past Medical History  Past Medical History:   Diagnosis Date   • Arthritis    • Chronic pain    • Diabetes mellitus (CMS/HCC)    • Elevated cholesterol    • GERD (gastroesophageal reflux disease)    • Gout    • Headache    • Hypertension    • Neuropathy        Review of Systems   Respiratory: Negative for shortness of breath and wheezing.    Cardiovascular: Negative for chest pain and palpitations.       Surgical History  Past Surgical History:   Procedure Laterality Date   • BREAST BIOPSY Left 1988    benign   • COLONOSCOPY N/A 2/17/2020 "    Procedure: COLONOSCOPY FOR SCREENING CPT CODE: ;  Surgeon: Mariano Prescott MD;  Location: [facility] OR;  Service: Gastroenterology;  Laterality: N/A;   • HYSTERECTOMY      Partial   • KNEE SURGERY     • THYROIDECTOMY, PARTIAL      Removal of goiter       Family History  Family History   Problem Relation Age of Onset   • COPD Mother    • Heart disease Brother    • Heart attack Brother    • Hypertension Daughter    • Diabetes Daughter    • Hypertension Daughter    • Diabetes Daughter    • Breast cancer Neg Hx        Social History  Social History     Socioeconomic History   • Marital status:      Spouse name: Not on file   • Number of children: Not on file   • Years of education: Not on file   • Highest education level: Not on file   Tobacco Use   • Smoking status: Never Smoker   • Smokeless tobacco: Never Used   Substance and Sexual Activity   • Alcohol use: No   • Drug use: No   • Sexual activity: Defer       Objective  Physical Exam    Gen: Patient in NAD. Pleasant and answers appropriately. A&Ox3.    Skin: Warm and dry with normal turgor. No purpura, rashes, or unusual pigmentation noted. Hair is normal in appearance and distribution.    HEENT: NC/AT. No lesions noted. Conjunctiva clear, sclera nonicteric. PERRL. EOMI without nystagmus or strabismus. Fundi appear benign. No hemorrhages or exudates of eyes. Auditory canals are patent bilaterally without lesions. TMs intact,  nonerythematous, nonbulging without lesions. Nasal mucosa pink, nonerythematous, and nonedematous. Frontal and maxillary sinuses are nontender. O/P nonerythematous and moist without exudate.    Neck: Supple without lymph nodes palpated. FROM.     Lungs: Decreased but clear B/L without rales, rhonchi, crackles, or wheezes.    Heart: Distant.  RRR. S1 and S2 normal. No S3 or S4. No MRGT.    Abd: Soft, nontender,nondistended. (+)BSx4 quadrants.  Positive right side flank tenderness.  Left side benign.    Extrem: No CCE.  Radial pulses 2+/4 and equal B/L. FROMx4.  Joint tenderness noted.    Neuro: No focal motor/sensory deficits.  Muscle strength 5/5 in both upper and lower extremities.    Procedures    Assessment/Plan  Breanna Kaba is a 65 y.o. here for medical followup.  Diagnoses and all orders for this visit:    Urinary frequency  -     POCT urinalysis dipstick, automated  -     Comprehensive Metabolic Panel; Future  -     Urine Culture - Urine, Urine, Clean Catch; Future  -     Comprehensive Metabolic Panel  -     Urine Culture - Urine, Urine, Clean Catch  -     nitrofurantoin, macrocrystal-monohydrate, (MACROBID) 100 MG capsule; Take 1 capsule by mouth 2 (Two) Times a Day.  We will start patient on Macrobid 100 mg orally twice a day x7 days secondary to previous urine cultures.  Urinalysis today positive for glucose, hematuria, and proteinuria.  Will send for urine culture.  UTI versus kidney stone.  Patient to call if she continues have symptoms status post Macrobid.    Idiopathic chronic gout of multiple sites without tophus  -     Comprehensive Metabolic Panel; Future  -     Comprehensive Metabolic Panel  -     allopurinol (ZYLOPRIM) 300 MG tablet; Take 1 tablet by mouth Daily.  Refilled allopurinol 30 mg orally daily.  Previous labs have been stable.    Mixed hyperlipidemia  -     Lipid Panel; Future  -     Comprehensive Metabolic Panel; Future  -     Lipid Panel  -     Comprehensive Metabolic Panel  -     atorvastatin (LIPITOR) 40 MG tablet; Take 1 tablet by mouth Daily.  Will check today.  Continue Lipitor 40 mg orally daily.      Type 2 diabetes mellitus with hyperglycemia, without long-term current use of insulin (CMS/[facility])  -     Hemoglobin A1c; Future  -     Comprehensive Metabolic Panel; Future  -     Hemoglobin A1c  -     Comprehensive Metabolic Panel  -     glipizide (GLUCOTROL) 5 MG tablet; Take 1 tablet by mouth Daily.  -     Insulin Glargine, 1 Unit Dial, (TOUJEO) 300 UNIT/ML solution pen-injector injection;  Inject 22 Units under the skin into the appropriate area as directed every night at bedtime.  -     Insulin Pen Needle (PEN NEEDLES) 32G X 5 MM misc; 1 application Every Night. To use with toujeo pens  -     metFORMIN (GLUCOPHAGE) 1000 MG tablet; Take 1 tablet by mouth 2 (Two) Times a Day With Meals.  -     nateglinide (STARLIX) 120 MG tablet; Take 1 tablet by mouth 3 (Three) Times a Day Before Meals.  Will check labs.  Refilled metformin 1000 mg orally twice a day, glipizide 5 mg orally daily, Starlix 120 mg orally 3 times a day, and Toujeo 22 units at night.  Insulin is very expensive for her.  Patient to check if there is another brand of insulin that is cheaper as Toujeo may have fallen off of the formulary.    Seasonal allergic rhinitis due to other allergic trigger  -     Comprehensive Metabolic Panel; Future  -     Comprehensive Metabolic Panel  -     loratadine (CLARITIN) 10 MG tablet; Take 1 tablet by mouth Daily.  Stable.  Refilled Claritin 10 mg orally daily.      Chronic bilateral low back pain with right-sided sciatica  -     Comprehensive Metabolic Panel; Future  -     Comprehensive Metabolic Panel  -     meloxicam (MOBIC) 7.5 MG tablet; Take 1 tablet by mouth 2 (two) times a day.  Stable on Mobic 7.5 mg orally twice a day.     Other osteoporosis without current pathological fracture  -     alendronate (Fosamax) 70 MG tablet; Take 1 tablet by mouth Every 7 (Seven) Days.  Refilled Fosamax 70 mg weekly.  Last DEXA scan was done July 2019.    Patient's Body mass index is 28.31 kg/m². BMI is above normal parameters. Recommendations include: exercise counseling and nutrition counseling.     Findings and plans discussed with patient who verbalizes understanding and agreement. Will followup with patient once results are in. Patient to followup at clinic PRN or in three months for further medical followup.    Myrna Ramos MD    EMR Dragon/Transcription Disclaimer:  Much of this encounter note is an  electronic transcription/translation of spoken language to printed text.  The electronic translation of spoken language may permit erroneous, or at times, nonsensical words or phrases to be inadvertently transcribed.  Although I have reviewed the note for such errors, some may still exist.

## 2020-09-06 RX ORDER — SULFAMETHOXAZOLE AND TRIMETHOPRIM 800; 160 MG/1; MG/1
1 TABLET ORAL 2 TIMES DAILY
Qty: 6 TABLET | Refills: 0 | Status: SHIPPED | OUTPATIENT
Start: 2020-09-06 | End: 2020-11-19

## 2020-09-08 ENCOUNTER — TELEPHONE (OUTPATIENT)
Dept: FAMILY MEDICINE CLINIC | Facility: CLINIC | Age: 65
End: 2020-09-08

## 2020-09-08 NOTE — TELEPHONE ENCOUNTER
----- Message from Myrna Ramos MD sent at 9/6/2020 10:13 PM EDT -----  Please call Breanna. I sent in bactrim for her for a positive Urine culture that grew out E. Coli. The numbers are small, but she had thought she was having one. Please let her know to . Also, please ask her if she's had a chance to see which diabetic medications are expensive for her. Her labs are good, and her A1C has gone from a 10.1 to 9.1, which is excellent, but she had mentioned the cost of her meds at her last visit.    Left message for patient to return call.

## 2020-09-08 NOTE — TELEPHONE ENCOUNTER
----- Message from Myrna Ramos MD sent at 9/6/2020 10:13 PM EDT -----  Please call Breanna. I sent in bactrim for her for a positive Urine culture that grew out E. Coli. The numbers are small, but she had thought she was having one. Please let her know to . Also, please ask her if she's had a chance to see which diabetic medications are expensive for her. Her labs are good, and her A1C has gone from a 10.1 to 9.1, which is excellent, but she had mentioned the cost of her meds at her last visit.    Left message for patient to return call.      Patient returned call & verbalized understanding,she reports its the Toujeo that is expensive her part is around $90.00. She reports the pharmacy told her Basaglar would be cheaper.

## 2020-09-18 DIAGNOSIS — M1A.09X0 IDIOPATHIC CHRONIC GOUT OF MULTIPLE SITES WITHOUT TOPHUS: ICD-10-CM

## 2020-09-18 RX ORDER — INSULIN GLARGINE 100 [IU]/ML
22 INJECTION, SOLUTION SUBCUTANEOUS NIGHTLY
Qty: 3 PEN | Refills: 11 | Status: SHIPPED | OUTPATIENT
Start: 2020-09-18 | End: 2020-11-19

## 2020-09-21 RX ORDER — ALLOPURINOL 300 MG/1
TABLET ORAL
Qty: 90 TABLET | Refills: 0 | Status: SHIPPED | OUTPATIENT
Start: 2020-09-21 | End: 2020-09-22 | Stop reason: SDUPTHER

## 2020-09-22 DIAGNOSIS — M1A.09X0 IDIOPATHIC CHRONIC GOUT OF MULTIPLE SITES WITHOUT TOPHUS: ICD-10-CM

## 2020-09-22 RX ORDER — ALLOPURINOL 300 MG/1
300 TABLET ORAL DAILY
Qty: 90 TABLET | Refills: 1 | Status: SHIPPED | OUTPATIENT
Start: 2020-09-22 | End: 2020-11-19 | Stop reason: SDUPTHER

## 2020-11-19 ENCOUNTER — OFFICE VISIT (OUTPATIENT)
Dept: FAMILY MEDICINE CLINIC | Facility: CLINIC | Age: 65
End: 2020-11-19

## 2020-11-19 ENCOUNTER — TELEPHONE (OUTPATIENT)
Dept: FAMILY MEDICINE CLINIC | Facility: CLINIC | Age: 65
End: 2020-11-19

## 2020-11-19 VITALS
HEART RATE: 97 BPM | WEIGHT: 152.8 LBS | HEIGHT: 62 IN | TEMPERATURE: 96.6 F | BODY MASS INDEX: 28.12 KG/M2 | SYSTOLIC BLOOD PRESSURE: 128 MMHG | OXYGEN SATURATION: 97 % | DIASTOLIC BLOOD PRESSURE: 64 MMHG

## 2020-11-19 DIAGNOSIS — M1A.09X0 IDIOPATHIC CHRONIC GOUT OF MULTIPLE SITES WITHOUT TOPHUS: ICD-10-CM

## 2020-11-19 DIAGNOSIS — M54.41 CHRONIC BILATERAL LOW BACK PAIN WITH RIGHT-SIDED SCIATICA: Primary | ICD-10-CM

## 2020-11-19 DIAGNOSIS — G89.29 CHRONIC BILATERAL LOW BACK PAIN WITH RIGHT-SIDED SCIATICA: Primary | ICD-10-CM

## 2020-11-19 DIAGNOSIS — E11.65 TYPE 2 DIABETES MELLITUS WITH HYPERGLYCEMIA, WITHOUT LONG-TERM CURRENT USE OF INSULIN (HCC): ICD-10-CM

## 2020-11-19 DIAGNOSIS — J30.89 SEASONAL ALLERGIC RHINITIS DUE TO OTHER ALLERGIC TRIGGER: ICD-10-CM

## 2020-11-19 DIAGNOSIS — M81.8 OTHER OSTEOPOROSIS WITHOUT CURRENT PATHOLOGICAL FRACTURE: ICD-10-CM

## 2020-11-19 DIAGNOSIS — E78.2 MIXED HYPERLIPIDEMIA: ICD-10-CM

## 2020-11-19 PROCEDURE — 84550 ASSAY OF BLOOD/URIC ACID: CPT | Performed by: FAMILY MEDICINE

## 2020-11-19 PROCEDURE — 83036 HEMOGLOBIN GLYCOSYLATED A1C: CPT | Performed by: FAMILY MEDICINE

## 2020-11-19 PROCEDURE — 85025 COMPLETE CBC W/AUTO DIFF WBC: CPT | Performed by: FAMILY MEDICINE

## 2020-11-19 PROCEDURE — 99214 OFFICE O/P EST MOD 30 MIN: CPT | Performed by: FAMILY MEDICINE

## 2020-11-19 PROCEDURE — 80053 COMPREHEN METABOLIC PANEL: CPT | Performed by: FAMILY MEDICINE

## 2020-11-19 RX ORDER — ATORVASTATIN CALCIUM 40 MG/1
40 TABLET, FILM COATED ORAL DAILY
Qty: 30 TABLET | Refills: 5 | Status: SHIPPED | OUTPATIENT
Start: 2020-11-19 | End: 2020-12-22 | Stop reason: SDUPTHER

## 2020-11-19 RX ORDER — MELOXICAM 7.5 MG/1
7.5 TABLET ORAL 2 TIMES DAILY
Qty: 60 TABLET | Refills: 5 | Status: SHIPPED | OUTPATIENT
Start: 2020-11-19 | End: 2020-12-22 | Stop reason: SDUPTHER

## 2020-11-19 RX ORDER — NATEGLINIDE 120 MG/1
120 TABLET ORAL
Qty: 90 TABLET | Refills: 5 | Status: SHIPPED | OUTPATIENT
Start: 2020-11-19 | End: 2021-07-16 | Stop reason: SDUPTHER

## 2020-11-19 RX ORDER — ALLOPURINOL 300 MG/1
300 TABLET ORAL DAILY
Qty: 90 TABLET | Refills: 1 | Status: SHIPPED | OUTPATIENT
Start: 2020-11-19 | End: 2021-08-20 | Stop reason: SDUPTHER

## 2020-11-19 RX ORDER — BLOOD SUGAR DIAGNOSTIC
1 STRIP MISCELLANEOUS NIGHTLY
Qty: 90 EACH | Refills: 5 | Status: SHIPPED | OUTPATIENT
Start: 2020-11-19 | End: 2021-02-25

## 2020-11-19 RX ORDER — LORATADINE 10 MG/1
10 TABLET ORAL DAILY
Qty: 30 TABLET | Refills: 5 | Status: SHIPPED | OUTPATIENT
Start: 2020-11-19 | End: 2021-08-20 | Stop reason: SDUPTHER

## 2020-11-19 RX ORDER — GLIPIZIDE 5 MG/1
5 TABLET ORAL DAILY
Qty: 30 TABLET | Refills: 5 | Status: SHIPPED | OUTPATIENT
Start: 2020-11-19 | End: 2021-08-20 | Stop reason: SDUPTHER

## 2020-11-19 RX ORDER — CYCLOBENZAPRINE HCL 5 MG
5 TABLET ORAL 2 TIMES DAILY PRN
Qty: 60 TABLET | Refills: 0 | Status: SHIPPED | OUTPATIENT
Start: 2020-11-19 | End: 2020-12-22

## 2020-11-19 RX ORDER — ALENDRONATE SODIUM 70 MG/1
70 TABLET ORAL
Qty: 12 TABLET | Refills: 1 | Status: SHIPPED | OUTPATIENT
Start: 2020-11-19 | End: 2021-08-20 | Stop reason: SDUPTHER

## 2020-11-20 ENCOUNTER — TELEPHONE (OUTPATIENT)
Dept: FAMILY MEDICINE CLINIC | Facility: CLINIC | Age: 65
End: 2020-11-20

## 2020-11-20 LAB
ALBUMIN SERPL-MCNC: 4.8 G/DL (ref 3.5–5.2)
ALBUMIN/GLOB SERPL: 1.7 G/DL
ALP SERPL-CCNC: 93 U/L (ref 39–117)
ALT SERPL W P-5'-P-CCNC: 17 U/L (ref 1–33)
ANION GAP SERPL CALCULATED.3IONS-SCNC: 9.6 MMOL/L (ref 5–15)
AST SERPL-CCNC: 17 U/L (ref 1–32)
BASOPHILS # BLD AUTO: 0.05 10*3/MM3 (ref 0–0.2)
BASOPHILS NFR BLD AUTO: 0.6 % (ref 0–1.5)
BILIRUB SERPL-MCNC: 0.5 MG/DL (ref 0–1.2)
BUN SERPL-MCNC: 21 MG/DL (ref 8–23)
BUN/CREAT SERPL: 30.9 (ref 7–25)
CALCIUM SPEC-SCNC: 10.2 MG/DL (ref 8.6–10.5)
CHLORIDE SERPL-SCNC: 99 MMOL/L (ref 98–107)
CO2 SERPL-SCNC: 29.4 MMOL/L (ref 22–29)
CREAT SERPL-MCNC: 0.68 MG/DL (ref 0.57–1)
DEPRECATED RDW RBC AUTO: 40.7 FL (ref 37–54)
EOSINOPHIL # BLD AUTO: 0.18 10*3/MM3 (ref 0–0.4)
EOSINOPHIL NFR BLD AUTO: 2.1 % (ref 0.3–6.2)
ERYTHROCYTE [DISTWIDTH] IN BLOOD BY AUTOMATED COUNT: 14.1 % (ref 12.3–15.4)
GFR SERPL CREATININE-BSD FRML MDRD: 86 ML/MIN/1.73
GLOBULIN UR ELPH-MCNC: 2.8 GM/DL
GLUCOSE SERPL-MCNC: 216 MG/DL (ref 65–99)
HBA1C MFR BLD: 8.53 % (ref 4.8–5.6)
HCT VFR BLD AUTO: 39.6 % (ref 34–46.6)
HGB BLD-MCNC: 13.6 G/DL (ref 12–15.9)
IMM GRANULOCYTES # BLD AUTO: 0.01 10*3/MM3 (ref 0–0.05)
IMM GRANULOCYTES NFR BLD AUTO: 0.1 % (ref 0–0.5)
LYMPHOCYTES # BLD AUTO: 2.22 10*3/MM3 (ref 0.7–3.1)
LYMPHOCYTES NFR BLD AUTO: 26.2 % (ref 19.6–45.3)
MCH RBC QN AUTO: 27.5 PG (ref 26.6–33)
MCHC RBC AUTO-ENTMCNC: 34.3 G/DL (ref 31.5–35.7)
MCV RBC AUTO: 80.2 FL (ref 79–97)
MONOCYTES # BLD AUTO: 0.58 10*3/MM3 (ref 0.1–0.9)
MONOCYTES NFR BLD AUTO: 6.8 % (ref 5–12)
NEUTROPHILS NFR BLD AUTO: 5.43 10*3/MM3 (ref 1.7–7)
NEUTROPHILS NFR BLD AUTO: 64.2 % (ref 42.7–76)
NRBC BLD AUTO-RTO: 0 /100 WBC (ref 0–0.2)
PLATELET # BLD AUTO: 447 10*3/MM3 (ref 140–450)
PMV BLD AUTO: 10.8 FL (ref 6–12)
POTASSIUM SERPL-SCNC: 4.7 MMOL/L (ref 3.5–5.2)
PROT SERPL-MCNC: 7.6 G/DL (ref 6–8.5)
RBC # BLD AUTO: 4.94 10*6/MM3 (ref 3.77–5.28)
SODIUM SERPL-SCNC: 138 MMOL/L (ref 136–145)
URATE SERPL-MCNC: 2.7 MG/DL (ref 2.4–5.7)
WBC # BLD AUTO: 8.47 10*3/MM3 (ref 3.4–10.8)

## 2020-11-20 NOTE — TELEPHONE ENCOUNTER
Patient called indicating Flexeril requires a PA.  Proceed or change?    She also said you had discussed changing her insulin to something possibly she could take by mouth that would be more cost effective. Please advise.

## 2020-11-20 NOTE — TELEPHONE ENCOUNTER
Please PA. Dx: Chronic low back pain with right sided sciatica. Has tried and failed baclofen, robaxin, amitriptyline, cymbalta. Thanks.

## 2020-11-20 NOTE — TELEPHONE ENCOUNTER
Has she discontinued the insulin yet? Let's try an experiment. Please have her d/c her insulin and increase the glipizide to 5 mg BID instead of daily. Keep on the metformin BID and starlix TID for now. Call end of next week and let me know what her fasting glucose levels are. Thanks.

## 2020-11-23 ENCOUNTER — TELEPHONE (OUTPATIENT)
Dept: FAMILY MEDICINE CLINIC | Facility: CLINIC | Age: 65
End: 2020-11-23

## 2020-11-23 NOTE — TELEPHONE ENCOUNTER
----- Message from Myrna Ramos MD sent at 11/22/2020  9:23 PM EST -----  Labs stable. Okay to either call or send letter to patient. Thanks.    Patient notified.Wants to know if you will send her in a new glucose meter and supplies

## 2020-11-25 ENCOUNTER — TELEPHONE (OUTPATIENT)
Dept: FAMILY MEDICINE CLINIC | Facility: CLINIC | Age: 65
End: 2020-11-25

## 2020-11-25 NOTE — TELEPHONE ENCOUNTER
Patient called in complaining of stabbing pain in her back. She says she has been taking the flexeril you prescribed but it is not helping. She wants to know if there is something else you could send in?

## 2020-11-27 DIAGNOSIS — E11.65 TYPE 2 DIABETES MELLITUS WITH HYPERGLYCEMIA, WITHOUT LONG-TERM CURRENT USE OF INSULIN (HCC): Primary | ICD-10-CM

## 2020-11-27 RX ORDER — LANCETS 30 GAUGE
1 EACH MISCELLANEOUS DAILY
Qty: 90 EACH | Refills: 1 | Status: SHIPPED | OUTPATIENT
Start: 2020-11-27 | End: 2021-08-20 | Stop reason: SDUPTHER

## 2020-11-27 RX ORDER — BLOOD-GLUCOSE METER
1 KIT MISCELLANEOUS AS NEEDED
Qty: 1 EACH | Refills: 0 | Status: ON HOLD | OUTPATIENT
Start: 2020-11-27 | End: 2022-03-29

## 2020-11-29 NOTE — PROGRESS NOTES
"Breanna Kaba     VITALS: Blood pressure 128/64, pulse 97, temperature 96.6 °F (35.9 °C), height 157.5 cm (62.01\"), weight 69.3 kg (152 lb 12.8 oz), SpO2 97 %, not currently breastfeeding.    Subjective  Chief Complaint:   Chief Complaint   Patient presents with   • Diabetes     follow up        History of Present Illness:  Patient is a 65 y.o.  female with chronic medical conditions significant for type 2 diabetes, allergic rhinitis, gout, hyperlipidemia who presents to clinic secondary to medical followup.  Patient is complaining of worsening lower back pain for the last 2 weeks.  She denies any new trauma or injury to the area.  Patient currently is on Mobic 7.5 mg orally twice a day.  She states that the pain is localized over her lower back and is dull, throbbing, and achy and occasionally radiates down her right lower extremity and has numbness and tingling in that area.  She denies any saddle anesthesia.  She denies any changes in urination or bowel movements.  She went to physical therapy a long time ago without any success.    Patient has diabetes and is currently on metformin 1000 mg orally twice a day, glipizide 5 mg orally daily, and Starlix 120 mg orally 3 times a day.  She is also prescribed Basaglar 22 units at night, but she has not been taking that for about 2 months now.  She has been working on her diet and exercise.  Patient states that without her insulin, her fasting glucose levels have been in the 140s.  She would like to stay off the insulin if she can as she feels that she gets hypoglycemic episodes from it.  Patient denies any polydipsia.  Patient has neuropathy but she denies any retinopathy or nephropathy.    Patient has chronic conditions including osteoporosis, gout, hyperlipidemia, and allergic rhinitis.  These chronic conditions are stable and unchanged.  Medications associated with these chronic conditions are not giving her any side effects.    No complaints about any of the " medications.    The following portions of the patient's history were reviewed and updated as appropriate: allergies, current medications, past family history, past medical history, past social history, past surgical history and problem list.    Past Medical History  Past Medical History:   Diagnosis Date   • Arthritis    • Chronic pain    • Diabetes mellitus (CMS/HCC)    • Elevated cholesterol    • GERD (gastroesophageal reflux disease)    • Gout    • Headache    • Hypertension    • Neuropathy        Review of Systems   Respiratory: Negative for shortness of breath and wheezing.    Cardiovascular: Negative for chest pain and palpitations.       Surgical History  Past Surgical History:   Procedure Laterality Date   • BREAST BIOPSY Left 1988    benign   • COLONOSCOPY N/A 2/17/2020    Procedure: COLONOSCOPY FOR SCREENING CPT CODE: ;  Surgeon: Mariano Prescott MD;  Location: [facility];  Service: Gastroenterology;  Laterality: N/A;   • HYSTERECTOMY      Partial   • KNEE SURGERY     • THYROIDECTOMY, PARTIAL      Removal of goiter       Family History  Family History   Problem Relation Age of Onset   • COPD Mother    • Heart disease Brother    • Heart attack Brother    • Hypertension Daughter    • Diabetes Daughter    • Hypertension Daughter    • Diabetes Daughter    • Breast cancer Neg Hx        Social History  Social History     Socioeconomic History   • Marital status:      Spouse name: Not on file   • Number of children: Not on file   • Years of education: Not on file   • Highest education level: Not on file   Tobacco Use   • Smoking status: Never Smoker   • Smokeless tobacco: Never Used   Substance and Sexual Activity   • Alcohol use: No   • Drug use: No   • Sexual activity: Defer       Objective  Physical Exam  Cardiovascular:      Pulses:           Dorsalis pedis pulses are 2+ on the right side and 2+ on the left side.        Posterior tibial pulses are 1+ on the right side and 1+ on the  left side.   Musculoskeletal:        Feet:    Feet:      Right foot:      Protective Sensation: 6 sites tested. 4 sites sensed.      Skin integrity: Dry skin present. No warmth.      Left foot:      Protective Sensation: 6 sites tested. 5 sites sensed.      Skin integrity: Callus and dry skin present. No warmth.         Gen: Patient in NAD. Pleasant and answers appropriately. A&Ox3.    Skin: Warm and dry with normal turgor. No purpura, rashes, or unusual pigmentation noted. Hair is normal in appearance and distribution.    HEENT: NC/AT. No lesions noted. Conjunctiva clear, sclera nonicteric. PERRL. EOMI without nystagmus or strabismus. Fundi appear benign. No hemorrhages or exudates of eyes. Auditory canals are patent bilaterally without lesions. TMs intact,  nonerythematous, slightly bulging without lesions. Nasal mucosa pink, nonerythematous, and nonedematous. Frontal and maxillary sinuses are nontender. O/P nonerythematous and moist without exudate.    Neck: Supple without lymph nodes palpated. FROM.     Lungs: Coarse and decreased B/L without rales, rhonchi, crackles, or wheezes.    Heart: RRR. S1 and S2 normal. No S3 or S4. No MRGT.    Abd: Soft, nontender,nondistended. (+)BSx4 quadrants.     Extrem: No CC.  Trace edema bilateral lower extremities.  Radial pulses 2+/4 and equal B/L. FROMx4 but with some difficulty.    Back: Decreased range of motion in all directions with tightness over the right lower back.    Neuro: No new focal motor/sensory deficits.    Procedures    Assessment/Plan  Breanna Kaba is a 65 y.o. here for medical followup.    Diagnoses and all orders for this visit:    1. Chronic bilateral low back pain with right-sided sciatica (Primary)  -     meloxicam (MOBIC) 7.5 MG tablet; Take 1 tablet by mouth 2 (two) times a day.  Dispense: 60 tablet; Refill: 5  -     CBC Auto Differential; Future  -     Comprehensive Metabolic Panel; Future  -     cyclobenzaprine (FLEXERIL) 5 MG tablet; Take 1 tablet  by mouth 2 (Two) Times a Day As Needed for Muscle Spasms.  Dispense: 60 tablet; Refill: 0  -     CBC Auto Differential  -     Comprehensive Metabolic Panel  We will add Flexeril 5 mg orally twice a day for her back pain.  Refilled Mobic 7.5 mg orally twice a day.  Patient currently declines physical therapy.  Home exercises given to patient.    2. Other osteoporosis without current pathological fracture  -     alendronate (Fosamax) 70 MG tablet; Take 1 tablet by mouth Every 7 (Seven) Days.  Dispense: 12 tablet; Refill: 1  -     CBC Auto Differential; Future  -     Comprehensive Metabolic Panel; Future  -     CBC Auto Differential  -     Comprehensive Metabolic Panel  Refilled Fosamax 70 mg orally weekly.  DEXA scan done July 2019.    3. Idiopathic chronic gout of multiple sites without tophus  -     allopurinol (ZYLOPRIM) 300 MG tablet; Take 1 tablet by mouth Daily.  Dispense: 90 tablet; Refill: 1  -     CBC Auto Differential; Future  -     Comprehensive Metabolic Panel; Future  -     Uric Acid; Future  -     CBC Auto Differential  -     Comprehensive Metabolic Panel  -     Uric Acid  Will check uric acid today.  Refilled allopurinol 300 mg orally daily.    4. Mixed hyperlipidemia  -     atorvastatin (LIPITOR) 40 MG tablet; Take 1 tablet by mouth Daily.  Dispense: 30 tablet; Refill: 5  -     CBC Auto Differential; Future  -     Comprehensive Metabolic Panel; Future  -     CBC Auto Differential  -     Comprehensive Metabolic Panel  Check liver enzymes today.  Refilled Lipitor 40 mg orally daily.    5. Type 2 diabetes mellitus with hyperglycemia, without long-term current use of insulin (CMS/[facility])  -     glipizide (GLUCOTROL) 5 MG tablet; Take 1 tablet by mouth Daily.  Dispense: 30 tablet; Refill: 5  -     Insulin Pen Needle (Pen Needles) 32G X 5 MM misc; 1 application Every Night. To use with basaglar pens  Dispense: 90 each; Refill: 5  -     metFORMIN (GLUCOPHAGE) 1000 MG tablet; Take 1 tablet by mouth 2 (Two) Times  a Day With Meals.  Dispense: 60 tablet; Refill: 5  -     nateglinide (STARLIX) 120 MG tablet; Take 1 tablet by mouth 3 (Three) Times a Day Before Meals.  Dispense: 90 tablet; Refill: 5  -     CBC Auto Differential; Future  -     Comprehensive Metabolic Panel; Future  -     Hemoglobin A1c; Future  -     CBC Auto Differential  -     Comprehensive Metabolic Panel  -     Hemoglobin A1c  We will check labs today.  Patient to discontinue Basaglar for now and will hold.  In the meantime, continue Metformin 1000 mg orally twice a day, Lipitor 5 mg orally daily, and Starlix 125 mg orally 3 times a day.    6. Seasonal allergic rhinitis due to other allergic trigger  -     loratadine (CLARITIN) 10 MG tablet; Take 1 tablet by mouth Daily.  Dispense: 30 tablet; Refill: 5  -     CBC Auto Differential; Future  -     Comprehensive Metabolic Panel; Future  -     CBC Auto Differential  -     Comprehensive Metabolic Panel  Refilled Claritin 10 mg orally daily.      Patient's Body mass index is 27.94 kg/m². BMI is above normal parameters. Recommendations include: exercise counseling and nutrition counseling.     Findings and plans discussed with patient who verbalizes understanding and agreement. Will followup with patient once results are in. Patient to followup at clinic PRN or in three months for further medical followup.    Myrna Ramos MD    EMR Dragon/Transcription Disclaimer:  Much of this encounter note is an electronic transcription/translation of spoken language to printed text.  The electronic translation of spoken language may permit erroneous, or at times, nonsensical words or phrases to be inadvertently transcribed.  Although I have reviewed the note for such errors, some may still exist.

## 2020-12-22 DIAGNOSIS — G89.29 CHRONIC BILATERAL LOW BACK PAIN WITH RIGHT-SIDED SCIATICA: ICD-10-CM

## 2020-12-22 DIAGNOSIS — E11.65 TYPE 2 DIABETES MELLITUS WITH HYPERGLYCEMIA, WITHOUT LONG-TERM CURRENT USE OF INSULIN (HCC): ICD-10-CM

## 2020-12-22 DIAGNOSIS — E78.2 MIXED HYPERLIPIDEMIA: ICD-10-CM

## 2020-12-22 DIAGNOSIS — M54.41 CHRONIC BILATERAL LOW BACK PAIN WITH RIGHT-SIDED SCIATICA: ICD-10-CM

## 2020-12-22 RX ORDER — ATORVASTATIN CALCIUM 40 MG/1
40 TABLET, FILM COATED ORAL DAILY
Qty: 30 TABLET | Refills: 5 | Status: SHIPPED | OUTPATIENT
Start: 2020-12-22 | End: 2021-07-16 | Stop reason: SDUPTHER

## 2020-12-22 RX ORDER — CYCLOBENZAPRINE HCL 5 MG
TABLET ORAL
Qty: 60 TABLET | Refills: 5 | Status: SHIPPED | OUTPATIENT
Start: 2020-12-22 | End: 2021-07-16 | Stop reason: SDUPTHER

## 2020-12-22 RX ORDER — MELOXICAM 7.5 MG/1
7.5 TABLET ORAL 2 TIMES DAILY
Qty: 60 TABLET | Refills: 5 | Status: SHIPPED | OUTPATIENT
Start: 2020-12-22 | End: 2021-07-16 | Stop reason: SDUPTHER

## 2020-12-22 NOTE — TELEPHONE ENCOUNTER
Caller: Breanna Kaba    Relationship: Self    Best call back number:[phone]    Medication needed:   Requested Prescriptions     Pending Prescriptions Disp Refills   • cyclobenzaprine (FLEXERIL) 5 MG tablet [Pharmacy Med Name: CYCLOBENZAPRINE 5MG TABLETS] 60 tablet 0     Sig: TAKE 1 TABLET BY MOUTH TWICE DAILY AS NEEDED FOR MUSCLE SPASMS   • metFORMIN (GLUCOPHAGE) 1000 MG tablet 60 tablet 5     Sig: Take 1 tablet by mouth 2 (Two) Times a Day With Meals.   • atorvastatin (LIPITOR) 40 MG tablet 30 tablet 5     Sig: Take 1 tablet by mouth Daily.   • meloxicam (MOBIC) 7.5 MG tablet 60 tablet 5     Sig: Take 1 tablet by mouth 2 (two) times a day.       When do you need the refill by: 12/22/20    What details did the patient provide when requesting the medication: PATIENT IS OUT OF SOME OF THESE MEDICINES.    Does the patient have less than a 3 day supply:  [x] Yes  [] No    What is the patient's preferred pharmacy: [id number] [place], [place] - [address] AT [facility] [address] - [phone] [facility] [phone] FX

## 2021-01-22 ENCOUNTER — TELEPHONE (OUTPATIENT)
Dept: FAMILY MEDICINE CLINIC | Facility: CLINIC | Age: 66
End: 2021-01-22

## 2021-01-22 NOTE — TELEPHONE ENCOUNTER
Caller: Breanna Kaba    Relationship to patient: Self    Best call back number: [phone]    [place] called [place] inform office [place] [facility]

## 2021-01-22 NOTE — TELEPHONE ENCOUNTER
Caller: Breanna Kaba    Relationship to patient: Self    Best call back number: [phone]    Patient called to inform office of pharmacy change to [facility]        Chart corrected.

## 2021-02-25 ENCOUNTER — OFFICE VISIT (OUTPATIENT)
Dept: FAMILY MEDICINE CLINIC | Facility: CLINIC | Age: 66
End: 2021-02-25

## 2021-02-25 ENCOUNTER — TELEPHONE (OUTPATIENT)
Dept: FAMILY MEDICINE CLINIC | Facility: CLINIC | Age: 66
End: 2021-02-25

## 2021-02-25 VITALS
HEART RATE: 101 BPM | TEMPERATURE: 96.8 F | DIASTOLIC BLOOD PRESSURE: 74 MMHG | WEIGHT: 144 LBS | OXYGEN SATURATION: 98 % | HEIGHT: 62 IN | BODY MASS INDEX: 26.5 KG/M2 | SYSTOLIC BLOOD PRESSURE: 126 MMHG

## 2021-02-25 DIAGNOSIS — M54.41 CHRONIC BILATERAL LOW BACK PAIN WITH BILATERAL SCIATICA: ICD-10-CM

## 2021-02-25 DIAGNOSIS — Z00.00 ENCOUNTER FOR SUBSEQUENT ANNUAL WELLNESS VISIT IN MEDICARE PATIENT: Primary | ICD-10-CM

## 2021-02-25 DIAGNOSIS — G89.29 CHRONIC BILATERAL LOW BACK PAIN WITH BILATERAL SCIATICA: ICD-10-CM

## 2021-02-25 DIAGNOSIS — E11.65 TYPE 2 DIABETES MELLITUS WITH HYPERGLYCEMIA, WITHOUT LONG-TERM CURRENT USE OF INSULIN (HCC): ICD-10-CM

## 2021-02-25 DIAGNOSIS — M54.42 CHRONIC BILATERAL LOW BACK PAIN WITH BILATERAL SCIATICA: ICD-10-CM

## 2021-02-25 PROCEDURE — 83036 HEMOGLOBIN GLYCOSYLATED A1C: CPT | Performed by: FAMILY MEDICINE

## 2021-02-25 PROCEDURE — 36415 COLL VENOUS BLD VENIPUNCTURE: CPT | Performed by: FAMILY MEDICINE

## 2021-02-25 PROCEDURE — 80053 COMPREHEN METABOLIC PANEL: CPT | Performed by: FAMILY MEDICINE

## 2021-02-25 PROCEDURE — 1159F MED LIST DOCD IN RCRD: CPT | Performed by: FAMILY MEDICINE

## 2021-02-25 PROCEDURE — 82043 UR ALBUMIN QUANTITATIVE: CPT | Performed by: FAMILY MEDICINE

## 2021-02-25 PROCEDURE — 83735 ASSAY OF MAGNESIUM: CPT | Performed by: FAMILY MEDICINE

## 2021-02-25 PROCEDURE — 1170F FXNL STATUS ASSESSED: CPT | Performed by: FAMILY MEDICINE

## 2021-02-25 PROCEDURE — 85025 COMPLETE CBC W/AUTO DIFF WBC: CPT | Performed by: FAMILY MEDICINE

## 2021-02-25 PROCEDURE — G0439 PPPS, SUBSEQ VISIT: HCPCS | Performed by: FAMILY MEDICINE

## 2021-02-26 ENCOUNTER — TELEPHONE (OUTPATIENT)
Dept: FAMILY MEDICINE CLINIC | Facility: CLINIC | Age: 66
End: 2021-02-26

## 2021-02-26 LAB
ALBUMIN SERPL-MCNC: 4.6 G/DL (ref 3.5–5.2)
ALBUMIN UR-MCNC: 9.2 MG/DL
ALBUMIN/GLOB SERPL: 1.8 G/DL
ALP SERPL-CCNC: 143 U/L (ref 39–117)
ALT SERPL W P-5'-P-CCNC: 17 U/L (ref 1–33)
ANION GAP SERPL CALCULATED.3IONS-SCNC: 13.6 MMOL/L (ref 5–15)
AST SERPL-CCNC: 12 U/L (ref 1–32)
BASOPHILS # BLD AUTO: 0.05 10*3/MM3 (ref 0–0.2)
BASOPHILS NFR BLD AUTO: 0.5 % (ref 0–1.5)
BILIRUB SERPL-MCNC: 0.4 MG/DL (ref 0–1.2)
BUN SERPL-MCNC: 18 MG/DL (ref 8–23)
BUN/CREAT SERPL: 24.3 (ref 7–25)
CALCIUM SPEC-SCNC: 9.7 MG/DL (ref 8.6–10.5)
CHLORIDE SERPL-SCNC: 93 MMOL/L (ref 98–107)
CO2 SERPL-SCNC: 25.4 MMOL/L (ref 22–29)
CREAT SERPL-MCNC: 0.74 MG/DL (ref 0.57–1)
DEPRECATED RDW RBC AUTO: 39.2 FL (ref 37–54)
EOSINOPHIL # BLD AUTO: 0.2 10*3/MM3 (ref 0–0.4)
EOSINOPHIL NFR BLD AUTO: 2.1 % (ref 0.3–6.2)
ERYTHROCYTE [DISTWIDTH] IN BLOOD BY AUTOMATED COUNT: 13.1 % (ref 12.3–15.4)
GFR SERPL CREATININE-BSD FRML MDRD: 78 ML/MIN/1.73
GLOBULIN UR ELPH-MCNC: 2.5 GM/DL
GLUCOSE SERPL-MCNC: 493 MG/DL (ref 65–99)
HBA1C MFR BLD: 12 % (ref 4.8–5.6)
HCT VFR BLD AUTO: 42 % (ref 34–46.6)
HGB BLD-MCNC: 14.1 G/DL (ref 12–15.9)
IMM GRANULOCYTES # BLD AUTO: 0.03 10*3/MM3 (ref 0–0.05)
IMM GRANULOCYTES NFR BLD AUTO: 0.3 % (ref 0–0.5)
LYMPHOCYTES # BLD AUTO: 2.07 10*3/MM3 (ref 0.7–3.1)
LYMPHOCYTES NFR BLD AUTO: 21.4 % (ref 19.6–45.3)
MAGNESIUM SERPL-MCNC: 1.9 MG/DL (ref 1.6–2.4)
MCH RBC QN AUTO: 27.9 PG (ref 26.6–33)
MCHC RBC AUTO-ENTMCNC: 33.6 G/DL (ref 31.5–35.7)
MCV RBC AUTO: 83 FL (ref 79–97)
MONOCYTES # BLD AUTO: 0.59 10*3/MM3 (ref 0.1–0.9)
MONOCYTES NFR BLD AUTO: 6.1 % (ref 5–12)
NEUTROPHILS NFR BLD AUTO: 6.72 10*3/MM3 (ref 1.7–7)
NEUTROPHILS NFR BLD AUTO: 69.6 % (ref 42.7–76)
NRBC BLD AUTO-RTO: 0 /100 WBC (ref 0–0.2)
PLATELET # BLD AUTO: 361 10*3/MM3 (ref 140–450)
PMV BLD AUTO: 11.4 FL (ref 6–12)
POTASSIUM SERPL-SCNC: 4.6 MMOL/L (ref 3.5–5.2)
PROT SERPL-MCNC: 7.1 G/DL (ref 6–8.5)
RBC # BLD AUTO: 5.06 10*6/MM3 (ref 3.77–5.28)
SODIUM SERPL-SCNC: 132 MMOL/L (ref 136–145)
WBC # BLD AUTO: 9.66 10*3/MM3 (ref 3.4–10.8)

## 2021-03-08 ENCOUNTER — TELEPHONE (OUTPATIENT)
Dept: FAMILY MEDICINE CLINIC | Facility: CLINIC | Age: 66
End: 2021-03-08

## 2021-03-08 NOTE — TELEPHONE ENCOUNTER
Patient states that she got a call, but they didn't leave a message, and she didn't know who it was.  She can be reached at [phone]

## 2021-03-08 NOTE — TELEPHONE ENCOUNTER
Would she do a shot (like victoza) or start insulin? That would probably be cheaper.       Left a message to return call.    Patient stated she would be ok with Victoza or insulin only if it is cheaper but the last time she bought insulin it cost her $80.00.

## 2021-03-12 RX ORDER — INSULIN GLARGINE 100 [IU]/ML
10 INJECTION, SOLUTION SUBCUTANEOUS NIGHTLY
Qty: 3 ML | Refills: 5 | Status: SHIPPED | OUTPATIENT
Start: 2021-03-12 | End: 2021-08-20 | Stop reason: SDUPTHER

## 2021-03-12 NOTE — TELEPHONE ENCOUNTER
PATIENT CALLED AND STATED THE INSURANCE WANTS HER TO PAY $300 COPAY FOR HER INSULIN SHOT AND SHE SAID THERE IS NO WAY SHE CAN PAY FOR THAT.    INSURANCE STATES SHE HAS TO PAY A COPAY OF $300 FOR ANY MEDICATION SHE TAKES.    PLEASE CONTACT PATIENT TO ADVISE.    CALLBACK:  [phone]

## 2021-03-12 NOTE — TELEPHONE ENCOUNTER
Please let Breanna know that I sent in basaglar to [facility]. It says 10 units, but she should start off at 6 units and then call us next week with some fasting glucose numbers. I think she will end up needing about 13 units a day. Please have her call if it is too expensive and we will figure out what is cheaper. Thanks.

## 2021-03-12 NOTE — TELEPHONE ENCOUNTER
Please let Breanna know that I sent in basaglar to [facility]. It says 10 units, but she should start off at 6 units and then call us next week with some fasting glucose numbers. I think she will end up needing about 13 units a day. Please have her call if it is too expensive and we will figure out what is cheaper. Thanks.    Made patient aware and she voiced understanding. She will call back if it isn't covered.

## 2021-03-15 NOTE — TELEPHONE ENCOUNTER
Can you clarify this? ALL medications are $300? How was she getting the starlix for $60? Or does the Hub mean that insulin is $300? If that is the case, I'm going to try to find her some free programs.

## 2021-03-15 NOTE — PROGRESS NOTES
The ABCs of the Annual Wellness Visit  Subsequent Medicare Wellness Visit    Chief Complaint   Patient presents with   • Medicare Wellness-subsequent       Subjective   History of Present Illness:  Breanna Kaba is a 65 y.o. female who presents for a Subsequent Medicare Wellness Visit.    HEALTH RISK ASSESSMENT    Recent Hospitalizations:  No hospitalization(s) within the last year.    Current Medical Providers:  Patient Care Team:  Myrna Ramos MD as PCP - General (Family Medicine)    Smoking Status:  Social History     Tobacco Use   Smoking Status Never Smoker   Smokeless Tobacco Never Used       Alcohol Consumption:  Social History     Substance and Sexual Activity   Alcohol Use No       Depression Screen:   PHQ-2/PHQ-9 Depression Screening 2/25/2021   Little interest or pleasure in doing things 0   Feeling down, depressed, or hopeless 0   Total Score 0       Fall Risk Screen:  STEADI Fall Risk Assessment was completed, and patient is at HIGH risk for falls. Assessment completed on:2/25/2021    Health Habits and Functional and Cognitive Screening:  Functional & Cognitive Status 2/25/2021   Do you have difficulty preparing food and eating? No   Do you have difficulty bathing yourself, getting dressed or grooming yourself? No   Do you have difficulty using the toilet? No   Do you have difficulty moving around from place to place? Yes   Do you have trouble with steps or getting out of a bed or a chair? Yes   Current Diet Well Balanced Diet   Dental Exam Up to date   Eye Exam Up to date   Exercise (times per week) 7 times per week   Current Exercise Activities Include Walking   Do you need help using the phone?  No   Are you deaf or do you have serious difficulty hearing?  Yes   Do you need help with transportation? Yes   Do you need help shopping? No   Do you need help preparing meals?  No   Do you need help with housework?  Yes   Do you need help with laundry? No   Do you need help taking your medications?  No   Do you need help managing money? No   Do you ever drive or ride in a car without wearing a seat belt? No   Have you felt unusual stress, anger or loneliness in the last month? -   Who do you live with? -   If you need help, do you have trouble finding someone available to you? -   Have you been bothered in the last four weeks by sexual problems? -   Do you have difficulty concentrating, remembering or making decisions? -         Does the patient have evidence of cognitive impairment? No    Asprin use counseling:Does not need ASA (and currently is not on it)    Age-appropriate Screening Schedule:  Refer to the list below for future screening recommendations based on patient's age, sex and/or medical conditions. Orders for these recommended tests are listed in the plan section. The patient has been provided with a written plan.    Health Maintenance   Topic Date Due   • DIABETIC FOOT EXAM  Never done   • PAP SMEAR  Never done   • ZOSTER VACCINE (3 of 3) 07/09/2018   • DIABETIC EYE EXAM  08/21/2020   • DXA SCAN  07/31/2021   • LIPID PANEL  08/19/2021   • HEMOGLOBIN A1C  08/25/2021   • URINE MICROALBUMIN  02/25/2022   • MAMMOGRAM  08/03/2022   • TDAP/TD VACCINES (2 - Td) 11/02/2027   • COLONOSCOPY  02/17/2030   • INFLUENZA VACCINE  Completed          The following portions of the patient's history were reviewed and updated as appropriate: allergies, current medications, past family history, past medical history, past social history, past surgical history and problem list.    Outpatient Medications Prior to Visit   Medication Sig Dispense Refill   • alendronate (Fosamax) 70 MG tablet Take 1 tablet by mouth Every 7 (Seven) Days. 12 tablet 1   • allopurinol (ZYLOPRIM) 300 MG tablet Take 1 tablet by mouth Daily. 90 tablet 1   • atorvastatin (LIPITOR) 40 MG tablet Take 1 tablet by mouth Daily. 30 tablet 5   • cyclobenzaprine (FLEXERIL) 5 MG tablet TAKE 1 TABLET BY MOUTH TWICE DAILY AS NEEDED FOR MUSCLE SPASMS 60 tablet  5   • glipizide (GLUCOTROL) 5 MG tablet Take 1 tablet by mouth Daily. 30 tablet 5   • glucose blood test strip Use daily for fasting glucose levels. Codes: ICD-10-CM: E11.65 90 each 1   • glucose monitor monitoring kit 1 each As Needed (daily). Codes: ICD-10-CM: E11.65 1 each 0   • Lancets misc 1 application Daily. Codes: ICD-10-CM: E11.65 90 each 1   • loratadine (CLARITIN) 10 MG tablet Take 1 tablet by mouth Daily. 30 tablet 5   • meloxicam (MOBIC) 7.5 MG tablet Take 1 tablet by mouth 2 (two) times a day. 60 tablet 5   • metFORMIN (GLUCOPHAGE) 1000 MG tablet Take 1 tablet by mouth 2 (Two) Times a Day With Meals. 60 tablet 5   • nateglinide (STARLIX) 120 MG tablet Take 1 tablet by mouth 3 (Three) Times a Day Before Meals. 90 tablet 5   • Insulin Pen Needle (Pen Needles) 32G X 5 MM misc 1 application Every Night. To use with basaglar pens 90 each 5     No facility-administered medications prior to visit.       Patient Active Problem List   Diagnosis   • Neuropathy   • Gout   • GERD (gastroesophageal reflux disease)   • Diabetes mellitus (CMS/HCC)   • Chronic pain   • Encounter for colorectal cancer screening   • Family history of GI malignancy   • History of adenomatous polyp of colon       Advanced Care Planning:  ACP discussion was held with the patient during this visit. Patient does not have an advance directive, information provided.    Review of Systems   Constitutional: Negative for chills and fever.   HENT: Negative for congestion and rhinorrhea.    Eyes: Negative for discharge and itching.   Respiratory: Negative for shortness of breath and wheezing.    Cardiovascular: Negative for chest pain and palpitations.   Gastrointestinal: Negative for abdominal pain, constipation, diarrhea, nausea and vomiting.   Endocrine: Negative for cold intolerance and heat intolerance.   Genitourinary: Negative for dysuria and hematuria.   Musculoskeletal: Positive for arthralgias and myalgias.   Skin: Negative for rash and  "wound.   Neurological: Negative for weakness and numbness.   Psychiatric/Behavioral: Negative for suicidal ideas. The patient is not nervous/anxious.        Compared to one year ago, the patient feels her physical health is the same.  Compared to one year ago, the patient feels her mental health is better.    Reviewed chart for potential of high risk medication in the elderly: yes  Reviewed chart for potential of harmful drug interactions in the elderly:yes    Objective         Vitals:    02/25/21 1355   BP: 126/74   BP Location: Right arm   Patient Position: Sitting   Pulse: 101   Temp: 96.8 °F (36 °C)   SpO2: 98%   Weight: 65.3 kg (144 lb)   Height: 157.5 cm (62.01\")       Body mass index is 26.33 kg/m².  Discussed the patient's BMI with her. The BMI is above average; BMI management plan is completed.    Physical Exam  Vitals and nursing note reviewed.   Constitutional:       Appearance: Normal appearance. She is not ill-appearing or diaphoretic.   HENT:      Head: Normocephalic and atraumatic.      Right Ear: Tympanic membrane, ear canal and external ear normal.      Left Ear: Tympanic membrane, ear canal and external ear normal.      Nose: Nose normal. No congestion or rhinorrhea.      Mouth/Throat:      Mouth: Mucous membranes are moist.      Pharynx: Oropharynx is clear. No oropharyngeal exudate or posterior oropharyngeal erythema.   Eyes:      General:         Right eye: No discharge.         Left eye: No discharge.      Extraocular Movements: Extraocular movements intact.      Conjunctiva/sclera: Conjunctivae normal.      Pupils: Pupils are equal, round, and reactive to light.   Neck:      Vascular: No carotid bruit.   Cardiovascular:      Rate and Rhythm: Normal rate and regular rhythm.      Pulses: Normal pulses.      Heart sounds: Normal heart sounds. No murmur. No friction rub. No gallop.    Pulmonary:      Effort: Pulmonary effort is normal.      Breath sounds: Normal breath sounds. No stridor. No " wheezing or rales.   Abdominal:      General: Abdomen is flat. Bowel sounds are normal.      Palpations: Abdomen is soft. There is no mass.      Tenderness: There is no abdominal tenderness. There is no guarding.   Musculoskeletal:         General: Tenderness present. Normal range of motion.      Cervical back: Normal range of motion and neck supple. No rigidity or tenderness.      Right lower leg: Edema present.      Left lower leg: Edema present.   Lymphadenopathy:      Cervical: No cervical adenopathy.   Skin:     General: Skin is warm and dry.      Capillary Refill: Capillary refill takes 2 to 3 seconds.      Findings: No lesion or rash.   Neurological:      General: No focal deficit present.      Mental Status: She is alert.      Motor: No weakness.      Gait: Gait normal.      Deep Tendon Reflexes: Reflexes normal.   Psychiatric:         Mood and Affect: Mood normal.         Behavior: Behavior normal.         Lab Results   Component Value Date    HGBA1C 12.00 (H) 02/25/2021        Assessment/Plan   Medicare Risks and Personalized Health Plan  CMS Preventative Services Quick Reference  Advance Directive Discussion  Breast Cancer/Mammogram Screening  Cardiovascular risk  Colon Cancer Screening  Fall Risk  Glaucoma Risk  Immunizations Discussed/Encouraged (specific immunizations; Coronavirus vaccine )  Inactivity/Sedentary  Obesity/Overweight   Polypharmacy    The above risks/problems have been discussed with the patient.  Pertinent information has been shared with the patient in the After Visit Summary.  Follow up plans and orders are seen below in the Assessment/Plan Section.    Diagnoses and all orders for this visit:    1. Encounter for subsequent annual wellness visit in Medicare patient (Primary)    2. Type 2 diabetes mellitus with hyperglycemia, without long-term current use of insulin (CMS/[facility])  -     Hemoglobin A1c; Future  -     Comprehensive Metabolic Panel; Future  -     CBC Auto Differential;  Future  -     Magnesium; Future  -     MicroAlbumin, Urine, Random - Urine, Clean Catch; Future  -     Hemoglobin A1c  -     Comprehensive Metabolic Panel  -     CBC Auto Differential  -     Magnesium  -     MicroAlbumin, Urine, Random - Urine, Clean Catch    3. Chronic bilateral low back pain with bilateral sciatica  -     Diclofenac Sodium (VOLTAREN) 1 % gel gel; Use 2 inches if above waist and 4 inches if below waist up to TID PRN pain.  Dispense: 350 g; Refill: 1  -     Ambulatory Referral to Pain Management      Follow Up:  Return in about 3 months (around 5/25/2021).     An After Visit Summary and PPPS were given to the patient.

## 2021-03-15 NOTE — TELEPHONE ENCOUNTER
Can you clarify this? ALL medications are $300? How was she getting the starlix for $60? Or does the Hub mean that insulin is $300? If that is the case, I'm going to try to find her some free programs.     It is the insulins that are $300.

## 2021-03-16 NOTE — TELEPHONE ENCOUNTER
[employer] has a free program with Medicare D. Please confirm that she falls below $51,040 if she is a 1 person household or $68,960 in a 2 person household. Thanks. If she does, have her bring in A copy of proof-of-income documentation, such as last year’s Federal Income Tax return, a wage statement (IRS Form W-2), or Social Security Benefit Statement (Form SSA-1099). Thanks.

## 2021-03-16 NOTE — TELEPHONE ENCOUNTER
[employer] has a free program with Medicare D. Please confirm that she falls below $51,040 if she is a 1 person household or $68,960 in a 2 person household. Thanks. If she does, have her bring in A copy of proof-of-income documentation, such as last year’s Federal Income Tax return, a wage statement (IRS Form W-2), or Social Security Benefit Statement (Form SSA-1099). Thanks.    Spoke with patient,she reports she is below the guidelines & will bring the income info in as soon as she can.

## 2021-04-27 ENCOUNTER — PATIENT OUTREACH (OUTPATIENT)
Dept: CASE MANAGEMENT | Facility: OTHER | Age: 66
End: 2021-04-27

## 2021-04-27 NOTE — OUTREACH NOTE
Patient Outreach Note    RN-ACM conversation with patient this date.  Patient discussed pleasure that her insulin co-pay concerns having been resolved. PCP/Clinical staff assisted patient with Medicare Extra Help.  Patient reported she is now obtaining her insulin with a $0 co-pay.      Patient discussed improvement with back pain after having received steroid injections at [facility] recently.    Patient reported having obtained COVID-19 vaccination.  Patient has next PCP appointment scheduled 05/25/21.       Gwendolyn Whalen RN  Ambulatory     4/27/2021, 12:55 EDT

## 2021-05-12 ENCOUNTER — OFFICE VISIT (OUTPATIENT)
Dept: FAMILY MEDICINE CLINIC | Facility: CLINIC | Age: 66
End: 2021-05-12

## 2021-05-12 VITALS
HEART RATE: 103 BPM | SYSTOLIC BLOOD PRESSURE: 132 MMHG | OXYGEN SATURATION: 99 % | WEIGHT: 144.4 LBS | BODY MASS INDEX: 26.57 KG/M2 | HEIGHT: 62 IN | DIASTOLIC BLOOD PRESSURE: 82 MMHG | TEMPERATURE: 96.8 F

## 2021-05-12 DIAGNOSIS — L03.032 CELLULITIS OF GREAT TOE OF LEFT FOOT: Primary | ICD-10-CM

## 2021-05-12 PROCEDURE — 87205 SMEAR GRAM STAIN: CPT | Performed by: FAMILY MEDICINE

## 2021-05-12 PROCEDURE — 87147 CULTURE TYPE IMMUNOLOGIC: CPT | Performed by: FAMILY MEDICINE

## 2021-05-12 PROCEDURE — 99213 OFFICE O/P EST LOW 20 MIN: CPT | Performed by: FAMILY MEDICINE

## 2021-05-12 PROCEDURE — 87070 CULTURE OTHR SPECIMN AEROBIC: CPT | Performed by: FAMILY MEDICINE

## 2021-05-12 PROCEDURE — 87186 SC STD MICRODIL/AGAR DIL: CPT | Performed by: FAMILY MEDICINE

## 2021-05-12 RX ORDER — SULFAMETHOXAZOLE AND TRIMETHOPRIM 800; 160 MG/1; MG/1
1 TABLET ORAL 2 TIMES DAILY
Qty: 20 TABLET | Refills: 0 | Status: SHIPPED | OUTPATIENT
Start: 2021-05-12 | End: 2021-07-16

## 2021-05-12 RX ORDER — GABAPENTIN 100 MG/1
100 CAPSULE ORAL 3 TIMES DAILY
COMMUNITY
End: 2021-07-16

## 2021-05-16 LAB
BACTERIA SPEC AEROBE CULT: ABNORMAL
BACTERIA SPEC AEROBE CULT: ABNORMAL
GRAM STN SPEC: ABNORMAL

## 2021-05-18 ENCOUNTER — TELEPHONE (OUTPATIENT)
Dept: FAMILY MEDICINE CLINIC | Facility: CLINIC | Age: 66
End: 2021-05-18

## 2021-05-21 ENCOUNTER — TELEPHONE (OUTPATIENT)
Dept: FAMILY MEDICINE CLINIC | Facility: CLINIC | Age: 66
End: 2021-05-21

## 2021-05-21 RX ORDER — CEPHALEXIN 500 MG/1
500 CAPSULE ORAL 2 TIMES DAILY
Qty: 20 CAPSULE | Refills: 0 | Status: SHIPPED | OUTPATIENT
Start: 2021-05-21 | End: 2021-07-16

## 2021-05-21 NOTE — TELEPHONE ENCOUNTER
Please call patient. She was given bactrim at her visit. Did she take it? Is it better? I'm trying to decide whether or not to cover for the group B strep. If it is not better, I will. However, she is allergic to PCN. Has she taken keflex or any other similar medications? Thanks.

## 2021-05-21 NOTE — TELEPHONE ENCOUNTER
Please call patient. She was given bactrim at her visit. Did she take it? Is it better? I'm trying to decide whether or not to cover for the group B strep. If it is not better, I will. However, she is allergic to PCN. Has she taken keflex or any other similar medications? Thanks.      Left a message to return call.      Patient returned call,she is better but symptoms have not all resolved,she has taken Keflex in the past.

## 2021-07-16 ENCOUNTER — OFFICE VISIT (OUTPATIENT)
Dept: FAMILY MEDICINE CLINIC | Facility: CLINIC | Age: 66
End: 2021-07-16

## 2021-07-16 VITALS
WEIGHT: 144.4 LBS | HEART RATE: 96 BPM | TEMPERATURE: 97.1 F | DIASTOLIC BLOOD PRESSURE: 80 MMHG | SYSTOLIC BLOOD PRESSURE: 128 MMHG | OXYGEN SATURATION: 98 % | BODY MASS INDEX: 26.57 KG/M2 | HEIGHT: 62 IN

## 2021-07-16 DIAGNOSIS — E78.2 MIXED HYPERLIPIDEMIA: ICD-10-CM

## 2021-07-16 DIAGNOSIS — E55.9 VITAMIN D DEFICIENCY: Primary | ICD-10-CM

## 2021-07-16 DIAGNOSIS — M54.41 CHRONIC BILATERAL LOW BACK PAIN WITH RIGHT-SIDED SCIATICA: ICD-10-CM

## 2021-07-16 DIAGNOSIS — G89.29 CHRONIC BILATERAL LOW BACK PAIN WITH RIGHT-SIDED SCIATICA: ICD-10-CM

## 2021-07-16 DIAGNOSIS — M81.8 OTHER OSTEOPOROSIS WITHOUT CURRENT PATHOLOGICAL FRACTURE: ICD-10-CM

## 2021-07-16 DIAGNOSIS — Z12.31 ENCOUNTER FOR SCREENING MAMMOGRAM FOR MALIGNANT NEOPLASM OF BREAST: ICD-10-CM

## 2021-07-16 DIAGNOSIS — E11.65 TYPE 2 DIABETES MELLITUS WITH HYPERGLYCEMIA, WITHOUT LONG-TERM CURRENT USE OF INSULIN (HCC): ICD-10-CM

## 2021-07-16 PROCEDURE — 80061 LIPID PANEL: CPT | Performed by: FAMILY MEDICINE

## 2021-07-16 PROCEDURE — 80053 COMPREHEN METABOLIC PANEL: CPT | Performed by: FAMILY MEDICINE

## 2021-07-16 PROCEDURE — 36415 COLL VENOUS BLD VENIPUNCTURE: CPT | Performed by: FAMILY MEDICINE

## 2021-07-16 PROCEDURE — 82306 VITAMIN D 25 HYDROXY: CPT | Performed by: FAMILY MEDICINE

## 2021-07-16 PROCEDURE — 83036 HEMOGLOBIN GLYCOSYLATED A1C: CPT | Performed by: FAMILY MEDICINE

## 2021-07-16 PROCEDURE — 99214 OFFICE O/P EST MOD 30 MIN: CPT | Performed by: FAMILY MEDICINE

## 2021-07-16 RX ORDER — ATORVASTATIN CALCIUM 40 MG/1
40 TABLET, FILM COATED ORAL DAILY
Qty: 30 TABLET | Refills: 5 | Status: SHIPPED | OUTPATIENT
Start: 2021-07-16 | End: 2021-08-20 | Stop reason: SDUPTHER

## 2021-07-16 RX ORDER — MELOXICAM 7.5 MG/1
7.5 TABLET ORAL 2 TIMES DAILY
Qty: 60 TABLET | Refills: 5 | Status: SHIPPED | OUTPATIENT
Start: 2021-07-16 | End: 2021-08-20 | Stop reason: SDUPTHER

## 2021-07-16 RX ORDER — NATEGLINIDE 120 MG/1
120 TABLET ORAL
Qty: 90 TABLET | Refills: 5 | Status: SHIPPED | OUTPATIENT
Start: 2021-07-16 | End: 2021-08-20 | Stop reason: SDUPTHER

## 2021-07-16 RX ORDER — CYCLOBENZAPRINE HCL 5 MG
5 TABLET ORAL 2 TIMES DAILY PRN
Qty: 60 TABLET | Refills: 5 | Status: SHIPPED | OUTPATIENT
Start: 2021-07-16 | End: 2021-08-20 | Stop reason: SDUPTHER

## 2021-07-17 LAB
25(OH)D3 SERPL-MCNC: 26.8 NG/ML (ref 30–100)
ALBUMIN SERPL-MCNC: 4.6 G/DL (ref 3.5–5.2)
ALBUMIN/GLOB SERPL: 1.9 G/DL
ALP SERPL-CCNC: 108 U/L (ref 39–117)
ALT SERPL W P-5'-P-CCNC: 18 U/L (ref 1–33)
ANION GAP SERPL CALCULATED.3IONS-SCNC: 12.1 MMOL/L (ref 5–15)
AST SERPL-CCNC: 12 U/L (ref 1–32)
BILIRUB SERPL-MCNC: 0.5 MG/DL (ref 0–1.2)
BUN SERPL-MCNC: 19 MG/DL (ref 8–23)
BUN/CREAT SERPL: 25.3 (ref 7–25)
CALCIUM SPEC-SCNC: 9.6 MG/DL (ref 8.6–10.5)
CHLORIDE SERPL-SCNC: 99 MMOL/L (ref 98–107)
CHOLEST SERPL-MCNC: 163 MG/DL (ref 0–200)
CO2 SERPL-SCNC: 25.9 MMOL/L (ref 22–29)
CREAT SERPL-MCNC: 0.75 MG/DL (ref 0.57–1)
GFR SERPL CREATININE-BSD FRML MDRD: 77 ML/MIN/1.73
GLOBULIN UR ELPH-MCNC: 2.4 GM/DL
GLUCOSE SERPL-MCNC: 259 MG/DL (ref 65–99)
HBA1C MFR BLD: 9.7 % (ref 4.8–5.6)
HDLC SERPL-MCNC: 44 MG/DL (ref 40–60)
LDLC SERPL CALC-MCNC: 89 MG/DL (ref 0–100)
LDLC/HDLC SERPL: 1.91 {RATIO}
POTASSIUM SERPL-SCNC: 4.7 MMOL/L (ref 3.5–5.2)
PROT SERPL-MCNC: 7 G/DL (ref 6–8.5)
SODIUM SERPL-SCNC: 137 MMOL/L (ref 136–145)
TRIGL SERPL-MCNC: 174 MG/DL (ref 0–150)
VLDLC SERPL-MCNC: 30 MG/DL (ref 5–40)

## 2021-08-20 ENCOUNTER — OFFICE VISIT (OUTPATIENT)
Dept: FAMILY MEDICINE CLINIC | Facility: CLINIC | Age: 66
End: 2021-08-20

## 2021-08-20 VITALS
SYSTOLIC BLOOD PRESSURE: 140 MMHG | TEMPERATURE: 97.1 F | HEIGHT: 62 IN | BODY MASS INDEX: 25.98 KG/M2 | DIASTOLIC BLOOD PRESSURE: 82 MMHG | WEIGHT: 141.2 LBS | HEART RATE: 100 BPM | OXYGEN SATURATION: 97 %

## 2021-08-20 DIAGNOSIS — M81.8 OTHER OSTEOPOROSIS WITHOUT CURRENT PATHOLOGICAL FRACTURE: Primary | ICD-10-CM

## 2021-08-20 DIAGNOSIS — J30.89 SEASONAL ALLERGIC RHINITIS DUE TO OTHER ALLERGIC TRIGGER: ICD-10-CM

## 2021-08-20 DIAGNOSIS — E78.2 MIXED HYPERLIPIDEMIA: ICD-10-CM

## 2021-08-20 DIAGNOSIS — G89.29 CHRONIC BILATERAL LOW BACK PAIN WITH RIGHT-SIDED SCIATICA: ICD-10-CM

## 2021-08-20 DIAGNOSIS — M1A.09X0 IDIOPATHIC CHRONIC GOUT OF MULTIPLE SITES WITHOUT TOPHUS: ICD-10-CM

## 2021-08-20 DIAGNOSIS — E11.65 TYPE 2 DIABETES MELLITUS WITH HYPERGLYCEMIA, WITHOUT LONG-TERM CURRENT USE OF INSULIN (HCC): ICD-10-CM

## 2021-08-20 DIAGNOSIS — R39.198 DIFFICULTY URINATING: ICD-10-CM

## 2021-08-20 DIAGNOSIS — M54.41 CHRONIC BILATERAL LOW BACK PAIN WITH RIGHT-SIDED SCIATICA: ICD-10-CM

## 2021-08-20 LAB
BILIRUB BLD-MCNC: NEGATIVE MG/DL
CLARITY, POC: ABNORMAL
COLOR UR: ABNORMAL
GLUCOSE UR STRIP-MCNC: ABNORMAL MG/DL
KETONES UR QL: ABNORMAL
LEUKOCYTE EST, POC: ABNORMAL
NITRITE UR-MCNC: POSITIVE MG/ML
PH UR: 6 [PH] (ref 5–8)
PROT UR STRIP-MCNC: ABNORMAL MG/DL
RBC # UR STRIP: ABNORMAL /UL
SP GR UR: 1.03 (ref 1–1.03)
UROBILINOGEN UR QL: NORMAL

## 2021-08-20 PROCEDURE — 99214 OFFICE O/P EST MOD 30 MIN: CPT | Performed by: FAMILY MEDICINE

## 2021-08-20 PROCEDURE — 87186 SC STD MICRODIL/AGAR DIL: CPT | Performed by: FAMILY MEDICINE

## 2021-08-20 PROCEDURE — 87077 CULTURE AEROBIC IDENTIFY: CPT | Performed by: FAMILY MEDICINE

## 2021-08-20 PROCEDURE — 87086 URINE CULTURE/COLONY COUNT: CPT | Performed by: FAMILY MEDICINE

## 2021-08-20 PROCEDURE — 81003 URINALYSIS AUTO W/O SCOPE: CPT | Performed by: FAMILY MEDICINE

## 2021-08-20 RX ORDER — SULFAMETHOXAZOLE AND TRIMETHOPRIM 800; 160 MG/1; MG/1
1 TABLET ORAL 2 TIMES DAILY
Qty: 20 TABLET | Refills: 0 | Status: SHIPPED | OUTPATIENT
Start: 2021-08-20 | End: 2021-10-18

## 2021-08-20 RX ORDER — PHENAZOPYRIDINE HYDROCHLORIDE 100 MG/1
100 TABLET, FILM COATED ORAL 3 TIMES DAILY PRN
Qty: 6 TABLET | Refills: 0 | Status: SHIPPED | OUTPATIENT
Start: 2021-08-20 | End: 2021-10-18

## 2021-08-20 RX ORDER — GLIPIZIDE 5 MG/1
5 TABLET ORAL DAILY
Qty: 30 TABLET | Refills: 5 | Status: SHIPPED | OUTPATIENT
Start: 2021-08-20 | End: 2022-02-21 | Stop reason: SDUPTHER

## 2021-08-20 RX ORDER — NATEGLINIDE 120 MG/1
120 TABLET ORAL
Qty: 90 TABLET | Refills: 5 | Status: SHIPPED | OUTPATIENT
Start: 2021-08-20 | End: 2022-04-03 | Stop reason: HOSPADM

## 2021-08-20 RX ORDER — ATORVASTATIN CALCIUM 40 MG/1
40 TABLET, FILM COATED ORAL DAILY
Qty: 30 TABLET | Refills: 5 | Status: SHIPPED | OUTPATIENT
Start: 2021-08-20 | End: 2022-05-11 | Stop reason: SDUPTHER

## 2021-08-20 RX ORDER — LORATADINE 10 MG/1
10 TABLET ORAL DAILY
Qty: 30 TABLET | Refills: 5 | Status: SHIPPED | OUTPATIENT
Start: 2021-08-20 | End: 2022-04-25

## 2021-08-20 RX ORDER — MELOXICAM 7.5 MG/1
7.5 TABLET ORAL 2 TIMES DAILY
Qty: 60 TABLET | Refills: 5 | Status: SHIPPED | OUTPATIENT
Start: 2021-08-20 | End: 2021-12-06

## 2021-08-20 RX ORDER — ALENDRONATE SODIUM 70 MG/1
70 TABLET ORAL
Qty: 12 TABLET | Refills: 1 | Status: SHIPPED | OUTPATIENT
Start: 2021-08-20 | End: 2021-09-06

## 2021-08-20 RX ORDER — LANCETS 30 GAUGE
1 EACH MISCELLANEOUS DAILY
Qty: 90 EACH | Refills: 1 | Status: ON HOLD | OUTPATIENT
Start: 2021-08-20 | End: 2022-03-29

## 2021-08-20 RX ORDER — ALLOPURINOL 300 MG/1
300 TABLET ORAL DAILY
Qty: 90 TABLET | Refills: 1 | Status: SHIPPED | OUTPATIENT
Start: 2021-08-20 | End: 2022-03-17 | Stop reason: SDUPTHER

## 2021-08-20 RX ORDER — CYCLOBENZAPRINE HCL 5 MG
5 TABLET ORAL 2 TIMES DAILY PRN
Qty: 60 TABLET | Refills: 5 | Status: SHIPPED | OUTPATIENT
Start: 2021-08-20 | End: 2021-12-22 | Stop reason: SDUPTHER

## 2021-08-20 RX ORDER — INSULIN GLARGINE 100 [IU]/ML
10 INJECTION, SOLUTION SUBCUTANEOUS NIGHTLY
Qty: 3 ML | Refills: 5 | Status: ON HOLD | OUTPATIENT
Start: 2021-08-20 | End: 2022-03-29

## 2021-08-22 LAB — BACTERIA SPEC AEROBE CULT: ABNORMAL

## 2021-08-24 ENCOUNTER — TELEPHONE (OUTPATIENT)
Dept: FAMILY MEDICINE CLINIC | Facility: CLINIC | Age: 66
End: 2021-08-24

## 2021-08-24 NOTE — TELEPHONE ENCOUNTER
----- Message from Myrna Ramos MD sent at 8/23/2021 11:00 PM EDT -----  Please call Breanna. She did grow out E. Coli for her UTI. The bactrim that was given to her at her appointment should have done its job. Is she still having any symptoms? Thanks.      Spoke with patient she reports she is much better,no symptoms.

## 2021-09-03 ENCOUNTER — HOSPITAL ENCOUNTER (OUTPATIENT)
Dept: MAMMOGRAPHY | Facility: HOSPITAL | Age: 66
Discharge: HOME | End: 2021-09-03

## 2021-09-03 ENCOUNTER — HOSPITAL ENCOUNTER (OUTPATIENT)
Dept: BONE DENSITY | Facility: HOSPITAL | Age: 66
Discharge: HOME | End: 2021-09-03

## 2021-09-03 DIAGNOSIS — M81.8 OTHER OSTEOPOROSIS WITHOUT CURRENT PATHOLOGICAL FRACTURE: ICD-10-CM

## 2021-09-03 PROCEDURE — 77067 SCR MAMMO BI INCL CAD: CPT

## 2021-09-03 PROCEDURE — 77080 DXA BONE DENSITY AXIAL: CPT | Performed by: RADIOLOGY

## 2021-09-03 PROCEDURE — 77063 BREAST TOMOSYNTHESIS BI: CPT | Performed by: RADIOLOGY

## 2021-09-03 PROCEDURE — 77063 BREAST TOMOSYNTHESIS BI: CPT

## 2021-09-03 PROCEDURE — 77080 DXA BONE DENSITY AXIAL: CPT

## 2021-09-03 PROCEDURE — 77067 SCR MAMMO BI INCL CAD: CPT | Performed by: RADIOLOGY

## 2021-09-06 ENCOUNTER — TELEPHONE (OUTPATIENT)
Dept: FAMILY MEDICINE CLINIC | Facility: CLINIC | Age: 66
End: 2021-09-06

## 2021-09-06 RX ORDER — CHOLECALCIFEROL (VITAMIN D3) 50 MCG
2000 TABLET ORAL DAILY
Qty: 30 TABLET | Refills: 5
Start: 2021-09-06 | End: 2022-06-13

## 2021-09-06 NOTE — TELEPHONE ENCOUNTER
----- Message from Myrna Ramos MD sent at 9/5/2021 10:30 PM EDT -----  Please call Breanna. Her DEXA scan has actually improved. She is now just osteopenic instead of osteoporotic. I would get off the fosamax but she needs to make sure she starts taking vitamin D OTC. 2000 IU daily would be good. Thanks.      Patient notified & verbalized understanding.

## 2021-09-06 NOTE — TELEPHONE ENCOUNTER
----- Message from Myrna Ramos MD sent at 9/5/2021  9:11 PM EDT -----  Labs stable. Okay to either call or send letter to patient. Thanks.        Patient notified & verbalized understanding.

## 2021-09-06 NOTE — TELEPHONE ENCOUNTER
----- Message from Myrna Ramos MD sent at 9/5/2021 10:30 PM EDT -----  Please call Breanna. Her DEXA scan has actually improved. She is now just osteopenic instead of osteoporotic. I would get off the fosamax but she needs to make sure she starts taking vitamin D OTC. 2000 IU daily would be good. Thanks.

## 2021-09-10 ENCOUNTER — TELEPHONE (OUTPATIENT)
Dept: FAMILY MEDICINE CLINIC | Facility: CLINIC | Age: 66
End: 2021-09-10

## 2021-09-10 PROBLEM — U07.1 COVID-19: Status: ACTIVE | Noted: 2021-09-10

## 2021-09-10 RX ORDER — METHYLPREDNISOLONE SODIUM SUCCINATE 125 MG/2ML
125 INJECTION, POWDER, LYOPHILIZED, FOR SOLUTION INTRAMUSCULAR; INTRAVENOUS AS NEEDED
Status: CANCELLED | OUTPATIENT
Start: 2021-09-11

## 2021-09-10 RX ORDER — EPINEPHRINE 1 MG/ML
0.3 INJECTION, SOLUTION INTRAMUSCULAR; SUBCUTANEOUS AS NEEDED
Status: CANCELLED | OUTPATIENT
Start: 2021-09-11

## 2021-09-10 RX ORDER — DIPHENHYDRAMINE HYDROCHLORIDE 50 MG/ML
50 INJECTION INTRAMUSCULAR; INTRAVENOUS AS NEEDED
Status: CANCELLED | OUTPATIENT
Start: 2021-09-11

## 2021-09-10 NOTE — TELEPHONE ENCOUNTER
Orders written. Please set up. Have her call us Monday and let us know how she feels.      Spoke with Indra & arranged for infusion 9/11/2021 @ 10 AM,patient notified & agreeable,instructions given.

## 2021-09-10 NOTE — TELEPHONE ENCOUNTER
PATIENT REPORTS SHE TESTED POSITIVE FOR COVID ON 09/09/2021. PATIENT STATED SHE NEEDS TO KNOW WHAT TO DO? PATIENT STATES SHE'S HAVING HEADACHES THAT TYLENOL WONT CLEAR UP, SHE HAS CHILLS, AND SHE CAN'T TASTE HER FOOD. PATIENT WOULD LIKE A CALL BACK TO ADVISE [phone]       Spoke with patient,she tested positive at [facility] today,she is scared,instructed in Vit.C,D,Zinc & a baby aspirin,sleeping on her stomach & doing deep breathing exercises throughout the day,she is chilling has no way to check her vitals/O2 sat's,is interested in the Monoclonal infusion.

## 2021-09-10 NOTE — TELEPHONE ENCOUNTER
PATIENT REPORTS SHE TESTED POSITIVE FOR COVID ON 09/09/2021. PATIENT STATED SHE NEEDS TO KNOW WHAT TO DO? PATIENT STATES SHE'S HAVING HEADACHES THAT TYLENOL WONT CLEAR UP, SHE HAS CHILLS, AND SHE CAN'T TASTE HER FOOD. PATIENT WOULD LIKE A CALL BACK TO ADVISE [phone]

## 2021-09-11 ENCOUNTER — HOSPITAL ENCOUNTER (OUTPATIENT)
Dept: INFUSION THERAPY | Facility: HOSPITAL | Age: 66
Discharge: HOME | End: 2021-09-11
Admitting: FAMILY MEDICINE

## 2021-09-11 VITALS
DIASTOLIC BLOOD PRESSURE: 87 MMHG | SYSTOLIC BLOOD PRESSURE: 159 MMHG | TEMPERATURE: 98 F | RESPIRATION RATE: 20 BRPM | HEART RATE: 96 BPM | OXYGEN SATURATION: 99 %

## 2021-09-11 DIAGNOSIS — U07.1 COVID-19: Primary | ICD-10-CM

## 2021-09-11 PROCEDURE — M0243 CASIRIVI AND IMDEVI INFUSION: HCPCS | Performed by: FAMILY MEDICINE

## 2021-09-11 PROCEDURE — 25010000006 INJECTION, CASIRIVIMAB AND IMDEVIMAB, 1200 MG: Performed by: FAMILY MEDICINE

## 2021-09-11 RX ORDER — METHYLPREDNISOLONE SODIUM SUCCINATE 125 MG/2ML
125 INJECTION, POWDER, LYOPHILIZED, FOR SOLUTION INTRAMUSCULAR; INTRAVENOUS AS NEEDED
Status: DISCONTINUED | OUTPATIENT
Start: 2021-09-11 | End: 2021-09-13 | Stop reason: HOSPADM

## 2021-09-11 RX ORDER — METHYLPREDNISOLONE SODIUM SUCCINATE 125 MG/2ML
125 INJECTION, POWDER, LYOPHILIZED, FOR SOLUTION INTRAMUSCULAR; INTRAVENOUS AS NEEDED
Status: CANCELLED | OUTPATIENT
Start: 2021-09-11

## 2021-09-11 RX ORDER — EPINEPHRINE 1 MG/ML
0.3 INJECTION, SOLUTION INTRAMUSCULAR; SUBCUTANEOUS AS NEEDED
Status: DISCONTINUED | OUTPATIENT
Start: 2021-09-11 | End: 2021-09-13 | Stop reason: HOSPADM

## 2021-09-11 RX ORDER — DIPHENHYDRAMINE HYDROCHLORIDE 50 MG/ML
50 INJECTION INTRAMUSCULAR; INTRAVENOUS AS NEEDED
Status: CANCELLED | OUTPATIENT
Start: 2021-09-11

## 2021-09-11 RX ORDER — EPINEPHRINE 1 MG/ML
0.3 INJECTION, SOLUTION INTRAMUSCULAR; SUBCUTANEOUS AS NEEDED
Status: CANCELLED | OUTPATIENT
Start: 2021-09-11

## 2021-09-11 RX ORDER — DIPHENHYDRAMINE HYDROCHLORIDE 50 MG/ML
50 INJECTION INTRAMUSCULAR; INTRAVENOUS AS NEEDED
Status: DISCONTINUED | OUTPATIENT
Start: 2021-09-11 | End: 2021-09-13 | Stop reason: HOSPADM

## 2021-09-11 RX ADMIN — CASIRIVIMAB AND IMDEVIMAB: 600; 600 INJECTION, SOLUTION, CONCENTRATE INTRAVENOUS at 10:03

## 2021-09-13 ENCOUNTER — TELEPHONE (OUTPATIENT)
Dept: FAMILY MEDICINE CLINIC | Facility: CLINIC | Age: 66
End: 2021-09-13

## 2021-09-13 NOTE — TELEPHONE ENCOUNTER
Good. Let her know that if symptoms worsen or drag on, call and let us know. Otherwise, she just tries to get better.

## 2021-09-13 NOTE — TELEPHONE ENCOUNTER
Caller: Breanna Kaba    Relationship: Self    Best call back number:     [phone]     What is the best time to reach you:     ANY TIME    Who are you requesting to speak with (clinical staff, provider,  specific staff member):     DR VARMA    Do you know the name of the person who called:     N/A    What was the call regarding:     PATIENT STATED SHE WENT [facility] ON Saturday, 9/11/21 FOR AN INFUSION SHOT FOR COVID SYMPTOMS    PATIENT CALLED IN TODAY TO SAY THE INFUSION HELPED QUITE A BIT    Do you require a callback:     YES, IF THERE ARE QUESTIONS

## 2021-09-14 ENCOUNTER — TELEPHONE (OUTPATIENT)
Dept: FAMILY MEDICINE CLINIC | Facility: CLINIC | Age: 66
End: 2021-09-14

## 2021-09-14 NOTE — TELEPHONE ENCOUNTER
Caller: Breanna Kaba    Relationship: Self    Best call back number: [phone]    What medication are you requesting:ANTIBOTICS    What are your current symptoms: BIG TOE INFECTED     How long have you been experiencing symptoms: N/A    Have you had these symptoms before:    [x] Yes  [] No    Have you been treated for these symptoms before:   [x] Yes  [] No    If a prescription is needed, what is your preferred pharmacy and phone number: [facility] [address] [facility] [address] - [phone]  - [phone] FX     Additional notes: PATIENT STATED THAT SHE HAS COVID AND NOT ABLE TO COME INTO OFFICE BUT WOULD LIKE TO KNOW IS SOMETHING COULD BE CALLED INTO PHARMACY FOR BIG TOE INFECTION    PLEASE ADVISE

## 2021-10-18 ENCOUNTER — OFFICE VISIT (OUTPATIENT)
Dept: FAMILY MEDICINE CLINIC | Facility: CLINIC | Age: 66
End: 2021-10-18

## 2021-10-18 VITALS
BODY MASS INDEX: 27.05 KG/M2 | DIASTOLIC BLOOD PRESSURE: 80 MMHG | OXYGEN SATURATION: 99 % | WEIGHT: 147 LBS | SYSTOLIC BLOOD PRESSURE: 122 MMHG | TEMPERATURE: 96.4 F | HEIGHT: 62 IN | HEART RATE: 100 BPM

## 2021-10-18 DIAGNOSIS — E55.9 VITAMIN D DEFICIENCY: ICD-10-CM

## 2021-10-18 DIAGNOSIS — E11.65 TYPE 2 DIABETES MELLITUS WITH HYPERGLYCEMIA, WITHOUT LONG-TERM CURRENT USE OF INSULIN (HCC): Primary | ICD-10-CM

## 2021-10-18 DIAGNOSIS — E78.2 MIXED HYPERLIPIDEMIA: ICD-10-CM

## 2021-10-18 LAB — GLUCOSE BLDC GLUCOMTR-MCNC: 193 MG/DL (ref 70–130)

## 2021-10-18 PROCEDURE — 99214 OFFICE O/P EST MOD 30 MIN: CPT | Performed by: FAMILY MEDICINE

## 2021-10-18 PROCEDURE — 82962 GLUCOSE BLOOD TEST: CPT | Performed by: FAMILY MEDICINE

## 2021-12-06 ENCOUNTER — TELEPHONE (OUTPATIENT)
Dept: FAMILY MEDICINE CLINIC | Facility: CLINIC | Age: 66
End: 2021-12-06

## 2021-12-06 DIAGNOSIS — M54.41 CHRONIC BILATERAL LOW BACK PAIN WITH RIGHT-SIDED SCIATICA: ICD-10-CM

## 2021-12-06 DIAGNOSIS — G89.29 CHRONIC BILATERAL LOW BACK PAIN WITH RIGHT-SIDED SCIATICA: ICD-10-CM

## 2021-12-06 RX ORDER — MELOXICAM 7.5 MG/1
TABLET ORAL
Qty: 60 TABLET | Refills: 0 | Status: ON HOLD | OUTPATIENT
Start: 2021-12-06 | End: 2022-03-29

## 2021-12-06 NOTE — TELEPHONE ENCOUNTER
"Spoke with patient and told her she would need to be seen for further evaluation for proper treatment.  I have her scheduled with Gena Mcdermott tomorrow (Dec 7).  In the meantime, is there anything you suggest as she is burning with urination and there is blood when she wipes? When asked if she had a history of kidney stones she said \"I dont think so\".  Please advise.   "

## 2021-12-06 NOTE — TELEPHONE ENCOUNTER
Caller: Breanna Kaba    Relationship: Self    Best call back number:[phone]    What medication are you requesting: MEDICATION FOR UTI    What are your current symptoms: TROUBLE URINATING, BLOOD IN URINE    How long have you been experiencing symptoms: BEGAN LAST NIGHT     Have you had these symptoms before:    [x] Yes  [] No    Have you been treated for these symptoms before:   [x] Yes  [] No    If a prescription is needed, what is your preferred pharmacy and phone number: [facility] [address] [facility] [address] - [phone]  - [phone] FX     Additional notes: PATIENT WOULD LIKE A CALL WHEN/IF THIS IS SENT IN PLEASE

## 2021-12-06 NOTE — TELEPHONE ENCOUNTER
No because she was supposed to get labs are her last visit too. She can try some cranberry juice without the sugar. Make sure she comes fasting, can get her labs done here while she is here.

## 2021-12-06 NOTE — TELEPHONE ENCOUNTER
No because she was supposed to get labs are her last visit too. She can try some cranberry juice without the sugar. Make sure she comes fasting, can get her labs done here while she is here.    Patient notified & verbalized understanding.

## 2021-12-07 ENCOUNTER — OFFICE VISIT (OUTPATIENT)
Dept: FAMILY MEDICINE CLINIC | Facility: CLINIC | Age: 66
End: 2021-12-07

## 2021-12-07 VITALS
BODY MASS INDEX: 26.7 KG/M2 | OXYGEN SATURATION: 99 % | HEART RATE: 102 BPM | SYSTOLIC BLOOD PRESSURE: 128 MMHG | DIASTOLIC BLOOD PRESSURE: 78 MMHG | WEIGHT: 146 LBS | TEMPERATURE: 97 F

## 2021-12-07 DIAGNOSIS — E78.2 MIXED HYPERLIPIDEMIA: ICD-10-CM

## 2021-12-07 DIAGNOSIS — N30.01 ACUTE CYSTITIS WITH HEMATURIA: Primary | ICD-10-CM

## 2021-12-07 DIAGNOSIS — E55.9 VITAMIN D DEFICIENCY: ICD-10-CM

## 2021-12-07 DIAGNOSIS — E11.65 TYPE 2 DIABETES MELLITUS WITH HYPERGLYCEMIA, WITHOUT LONG-TERM CURRENT USE OF INSULIN (HCC): ICD-10-CM

## 2021-12-07 PROCEDURE — 83036 HEMOGLOBIN GLYCOSYLATED A1C: CPT | Performed by: FAMILY MEDICINE

## 2021-12-07 PROCEDURE — 80061 LIPID PANEL: CPT | Performed by: FAMILY MEDICINE

## 2021-12-07 PROCEDURE — 36415 COLL VENOUS BLD VENIPUNCTURE: CPT | Performed by: FAMILY MEDICINE

## 2021-12-07 PROCEDURE — 82306 VITAMIN D 25 HYDROXY: CPT | Performed by: FAMILY MEDICINE

## 2021-12-07 PROCEDURE — 80053 COMPREHEN METABOLIC PANEL: CPT | Performed by: FAMILY MEDICINE

## 2021-12-07 PROCEDURE — 99212 OFFICE O/P EST SF 10 MIN: CPT | Performed by: FAMILY MEDICINE

## 2021-12-07 NOTE — PROGRESS NOTES
Chief Complaint  Follow-up (uti)    Subjective          Breanna Kaba presents to [facility]  Urinary Tract Infection   This is a new problem. The current episode started in the past 7 days. The problem has been gradually improving (States she went to urgen care yesterday, she was having bleeding and pain with urination. She was given rocephin, macrobid, and pyridium. ). The quality of the pain is described as burning. The pain is moderate. There has been no fever. Associated symptoms include hematuria, hesitancy and urgency. She has tried increased fluids and home medications for the symptoms. The treatment provided mild relief.       Objective   Vital Signs:   /78 (BP Location: Right arm, Patient Position: Sitting, Cuff Size: Adult)   Pulse 102   Temp 97 °F (36.1 °C)   Wt 66.2 kg (146 lb)   SpO2 99%   BMI 26.70 kg/m²     Physical Exam  Constitutional:       General: She is not in acute distress.     Appearance: Normal appearance. She is well-developed and well-groomed. She is not ill-appearing, toxic-appearing or diaphoretic.   HENT:      Head: Normocephalic.      Nose: Nose normal. No congestion or rhinorrhea.      Mouth/Throat:      Mouth: Mucous membranes are moist.      Pharynx: Oropharynx is clear. No oropharyngeal exudate or posterior oropharyngeal erythema.   Eyes:      General: Lids are normal.         Right eye: No discharge.         Left eye: No discharge.      Extraocular Movements: Extraocular movements intact.      Pupils: Pupils are equal, round, and reactive to light.   Neck:      Vascular: No carotid bruit.   Cardiovascular:      Rate and Rhythm: Normal rate and regular rhythm.      Pulses: Normal pulses.      Heart sounds: Normal heart sounds. No murmur heard.  No friction rub. No gallop.    Pulmonary:      Effort: Pulmonary effort is normal. No respiratory distress.      Breath sounds: Normal breath sounds. No stridor. No wheezing, rhonchi or rales.    Chest:      Chest wall: No tenderness.   Abdominal:      General: Bowel sounds are normal. There is no distension.      Palpations: Abdomen is soft. There is no mass.      Tenderness: There is no abdominal tenderness. There is no right CVA tenderness, left CVA tenderness, guarding or rebound.      Hernia: No hernia is present.   Musculoskeletal:         General: No swelling or tenderness. Normal range of motion.      Cervical back: Normal range of motion and neck supple. No rigidity or tenderness.      Right lower leg: No edema.      Left lower leg: No edema.   Lymphadenopathy:      Cervical: No cervical adenopathy.   Skin:     General: Skin is warm.      Capillary Refill: Capillary refill takes less than 2 seconds.      Coloration: Skin is not jaundiced.      Findings: No bruising, erythema or rash.   Neurological:      General: No focal deficit present.      Mental Status: She is alert and oriented to person, place, and time.      Motor: Motor function is intact. No weakness.      Coordination: Coordination is intact.      Gait: Gait is intact. Gait normal.   Psychiatric:         Attention and Perception: Attention normal.         Mood and Affect: Mood normal.         Speech: Speech normal.         Behavior: Behavior normal.         Cognition and Memory: Cognition normal.         Judgment: Judgment normal.        Social History     Tobacco Use   • Smoking status: Never Smoker   • Smokeless tobacco: Never Used   Substance Use Topics   • Alcohol use: No      Past Medical History:   • Arthritis   • Chronic pain   • Diabetes mellitus (HCC)   • Elevated cholesterol   • GERD (gastroesophageal reflux disease)   • Gout   • Headache   • Hypertension   • Neuropathy      Current Outpatient Medications on File Prior to Visit   Medication Sig   • allopurinol (ZYLOPRIM) 300 MG tablet Take 1 tablet by mouth Daily.   • atorvastatin (LIPITOR) 40 MG tablet Take 1 tablet by mouth Daily.   • Cholecalciferol (Vitamin D) 50 MCG (2000  UT) tablet Take 2,000 Units by mouth Daily. (OTC)   • cyclobenzaprine (FLEXERIL) 5 MG tablet Take 1 tablet by mouth 2 (Two) Times a Day As Needed for Muscle Spasms.   • Diclofenac Sodium (VOLTAREN) 1 % gel gel Use 2 inches if above waist and 4 inches if below waist up to TID PRN pain.   • glipizide (GLUCOTROL) 5 MG tablet Take 1 tablet by mouth Daily.   • glucose blood test strip Use daily for fasting glucose levels. Codes: ICD-10-CM: E11.65   • glucose monitor monitoring kit 1 each As Needed (daily). Codes: ICD-10-CM: E11.65   • Insulin Glargine (BASAGLAR KWIKPEN) 100 UNIT/ML injection pen Inject 10 Units under the skin into the appropriate area as directed Every Night.   • Insulin Pen Needle 33G X 5 MM misc 1 application Every Night.   • Lancets misc 1 application Daily. Codes: ICD-10-CM: E11.65   • loratadine (CLARITIN) 10 MG tablet Take 1 tablet by mouth Daily.   • meloxicam (MOBIC) 7.5 MG tablet TAKE 1 TABLET BY MOUTH TWICE DAILY   • metFORMIN (GLUCOPHAGE) 1000 MG tablet Take 1 tablet by mouth 2 (Two) Times a Day With Meals.   • nateglinide (STARLIX) 120 MG tablet Take 1 tablet by mouth 3 (Three) Times a Day Before Meals.     No current facility-administered medications on file prior to visit.      Result Review :                 Assessment and Plan    Diagnoses and all orders for this visit:    1. Acute cystitis with hematuria (Primary)  Assessment & Plan:  Continue with macrobid. Follow up 1 week         Follow Up   Return in 1 week (on 12/14/2021), or labs today.  Patient was given instructions and counseling regarding her condition or for health maintenance advice. Please see specific information pulled into the AVS if appropriate.     Breanna Kaba  reports that she has never smoked. She has never used smokeless tobacco.. I have educated her on the risk of diseases from using tobacco products such as cancer, COPD and heart disease.

## 2021-12-08 LAB
25(OH)D3 SERPL-MCNC: 25.8 NG/ML (ref 30–100)
ALBUMIN SERPL-MCNC: 4.8 G/DL (ref 3.5–5.2)
ALBUMIN/GLOB SERPL: 1.7 G/DL
ALP SERPL-CCNC: 101 U/L (ref 39–117)
ALT SERPL W P-5'-P-CCNC: 15 U/L (ref 1–33)
ANION GAP SERPL CALCULATED.3IONS-SCNC: 14.4 MMOL/L (ref 5–15)
AST SERPL-CCNC: 13 U/L (ref 1–32)
BILIRUB SERPL-MCNC: 0.6 MG/DL (ref 0–1.2)
BUN SERPL-MCNC: 19 MG/DL (ref 8–23)
BUN/CREAT SERPL: 30.6 (ref 7–25)
CALCIUM SPEC-SCNC: 9.7 MG/DL (ref 8.6–10.5)
CHLORIDE SERPL-SCNC: 99 MMOL/L (ref 98–107)
CHOLEST SERPL-MCNC: 189 MG/DL (ref 0–200)
CO2 SERPL-SCNC: 26.6 MMOL/L (ref 22–29)
CREAT SERPL-MCNC: 0.62 MG/DL (ref 0.57–1)
GFR SERPL CREATININE-BSD FRML MDRD: 96 ML/MIN/1.73
GLOBULIN UR ELPH-MCNC: 2.8 GM/DL
GLUCOSE SERPL-MCNC: 213 MG/DL (ref 65–99)
HBA1C MFR BLD: 7.9 % (ref 4.8–5.6)
HDLC SERPL-MCNC: 54 MG/DL (ref 40–60)
LDLC SERPL CALC-MCNC: 108 MG/DL (ref 0–100)
LDLC/HDLC SERPL: 1.92 {RATIO}
POTASSIUM SERPL-SCNC: 4.3 MMOL/L (ref 3.5–5.2)
PROT SERPL-MCNC: 7.6 G/DL (ref 6–8.5)
SODIUM SERPL-SCNC: 140 MMOL/L (ref 136–145)
TRIGL SERPL-MCNC: 156 MG/DL (ref 0–150)
VLDLC SERPL-MCNC: 27 MG/DL (ref 5–40)

## 2021-12-13 ENCOUNTER — APPOINTMENT (OUTPATIENT)
Dept: GENERAL RADIOLOGY | Facility: HOSPITAL | Age: 66
End: 2021-12-13

## 2021-12-13 ENCOUNTER — HOSPITAL ENCOUNTER (EMERGENCY)
Facility: HOSPITAL | Age: 66
Discharge: HOME | End: 2021-12-13
Attending: EMERGENCY MEDICINE | Admitting: EMERGENCY MEDICINE

## 2021-12-13 VITALS
BODY MASS INDEX: 25.76 KG/M2 | OXYGEN SATURATION: 97 % | HEIGHT: 62 IN | TEMPERATURE: 97.7 F | WEIGHT: 140 LBS | RESPIRATION RATE: 20 BRPM | DIASTOLIC BLOOD PRESSURE: 75 MMHG | HEART RATE: 106 BPM | SYSTOLIC BLOOD PRESSURE: 128 MMHG

## 2021-12-13 DIAGNOSIS — M25.551 RIGHT HIP PAIN: Primary | ICD-10-CM

## 2021-12-13 PROCEDURE — 73502 X-RAY EXAM HIP UNI 2-3 VIEWS: CPT

## 2021-12-13 PROCEDURE — 96372 THER/PROPH/DIAG INJ SC/IM: CPT

## 2021-12-13 PROCEDURE — 99283 EMERGENCY DEPT VISIT LOW MDM: CPT

## 2021-12-13 PROCEDURE — 25010000002 METHYLPREDNISOLONE PER 125 MG: Performed by: PHYSICIAN ASSISTANT

## 2021-12-13 PROCEDURE — 73502 X-RAY EXAM HIP UNI 2-3 VIEWS: CPT | Performed by: RADIOLOGY

## 2021-12-13 PROCEDURE — 25010000002 KETOROLAC TROMETHAMINE PER 15 MG: Performed by: PHYSICIAN ASSISTANT

## 2021-12-13 PROCEDURE — 25010000002 ORPHENADRINE CITRATE PER 60 MG: Performed by: PHYSICIAN ASSISTANT

## 2021-12-13 RX ORDER — METHYLPREDNISOLONE SODIUM SUCCINATE 125 MG/2ML
80 INJECTION, POWDER, LYOPHILIZED, FOR SOLUTION INTRAMUSCULAR; INTRAVENOUS ONCE
Status: COMPLETED | OUTPATIENT
Start: 2021-12-13 | End: 2021-12-13

## 2021-12-13 RX ORDER — METHYLPREDNISOLONE 4 MG/1
TABLET ORAL
Qty: 21 TABLET | Refills: 0 | Status: SHIPPED | OUTPATIENT
Start: 2021-12-13 | End: 2022-02-24

## 2021-12-13 RX ORDER — KETOROLAC TROMETHAMINE 30 MG/ML
30 INJECTION, SOLUTION INTRAMUSCULAR; INTRAVENOUS ONCE
Status: COMPLETED | OUTPATIENT
Start: 2021-12-13 | End: 2021-12-13

## 2021-12-13 RX ORDER — ORPHENADRINE CITRATE 100 MG/1
100 TABLET, EXTENDED RELEASE ORAL 2 TIMES DAILY PRN
Qty: 15 TABLET | Refills: 0 | Status: SHIPPED | OUTPATIENT
Start: 2021-12-13 | End: 2022-01-03

## 2021-12-13 RX ORDER — ORPHENADRINE CITRATE 30 MG/ML
60 INJECTION INTRAMUSCULAR; INTRAVENOUS ONCE
Status: COMPLETED | OUTPATIENT
Start: 2021-12-13 | End: 2021-12-13

## 2021-12-13 RX ADMIN — ORPHENADRINE CITRATE 60 MG: 30 INJECTION INTRAMUSCULAR; INTRAVENOUS at 15:25

## 2021-12-13 RX ADMIN — KETOROLAC TROMETHAMINE 30 MG: 30 INJECTION, SOLUTION INTRAMUSCULAR; INTRAVENOUS at 15:25

## 2021-12-13 RX ADMIN — METHYLPREDNISOLONE SODIUM SUCCINATE 80 MG: 125 INJECTION, POWDER, FOR SOLUTION INTRAMUSCULAR; INTRAVENOUS at 15:25

## 2021-12-13 NOTE — ED NOTES
MEDICAL SCREENING:    Reason for Visit: right hip pain which began after getting abx injection for UTI on Dec. 6th.    Patient initially seen in triage.  The patient was advised further evaluation and diagnostic testing will be needed, some of the treatment and testing will be initiated in the lobby in order to begin the process.  The patient will be returned to the waiting area for the time being and possibly be re-assessed by a subsequent ED provider.  The patient will be brought back to the treatment area in as timely manner as possible.       Cirilo Preciado PA-C  12/13/21 1351

## 2021-12-13 NOTE — ED PROVIDER NOTES
Subjective   66-year-old female with past medical history of neuropathy, hypertension, gout, GERD, hypercholesterolemia, diabetes, chronic pain, and arthritis presents to the emergency room with right-sided hip pain for the past week.  Patient states she is on December 6 she had a bad urinary tract infection and was treated with Macrobid and Pyridium at urgent care and states at that time she received antibiotic shot in her right hip and since that time she has had pain and radiation of a shooting pain down her right side.  She denies any injury recently or in the past of her right hip.  Aggravating factors include's prolonged sitting on her right side, standing, or lying on her right side.  Denies any alleviating factors.  Denies any other complaints or concerns at this time.      History provided by:  Patient   used: No        Review of Systems   Constitutional: Negative.  Negative for fever.   HENT: Negative.    Respiratory: Negative.    Cardiovascular: Negative.  Negative for chest pain.   Gastrointestinal: Negative.  Negative for abdominal pain.   Endocrine: Negative.    Genitourinary: Negative.  Negative for dysuria.   Musculoskeletal:        (+) Right hip pain   Skin: Negative.    Neurological: Negative.    Psychiatric/Behavioral: Negative.    All other systems reviewed and are negative.      Past Medical History:   Diagnosis Date   • Arthritis    • Chronic pain    • Diabetes mellitus (HCC)    • Elevated cholesterol    • GERD (gastroesophageal reflux disease)    • Gout    • Headache    • Hypertension    • Neuropathy        Allergies   Allergen Reactions   • Asa [Aspirin]    • Naproxen Nausea And Vomiting   • Penicillins        Past Surgical History:   Procedure Laterality Date   • BREAST BIOPSY Left 1988    benign   • COLONOSCOPY N/A 2/17/2020    Procedure: COLONOSCOPY FOR SCREENING CPT CODE: ;  Surgeon: Mariano Prescott MD;  Location: [facility];  Service:  Gastroenterology;  Laterality: N/A;   • HYSTERECTOMY      Partial   • KNEE SURGERY     • THYROIDECTOMY, PARTIAL      Removal of goiter       Family History   Problem Relation Age of Onset   • COPD Mother    • Heart disease Brother    • Heart attack Brother    • Hypertension Daughter    • Diabetes Daughter    • Hypertension Daughter    • Diabetes Daughter    • Breast cancer Neg Hx        Social History     Socioeconomic History   • Marital status:    Tobacco Use   • Smoking status: Never Smoker   • Smokeless tobacco: Never Used   Vaping Use   • Vaping Use: Never used   Substance and Sexual Activity   • Alcohol use: No   • Drug use: No   • Sexual activity: Defer           Objective   Physical Exam  Vitals and nursing note reviewed.   Constitutional:       General: She is not in acute distress.     Appearance: She is well-developed. She is not diaphoretic.   HENT:      Head: Normocephalic and atraumatic.      Right Ear: External ear normal.      Left Ear: External ear normal.      Nose: Nose normal.   Eyes:      Conjunctiva/sclera: Conjunctivae normal.      Pupils: Pupils are equal, round, and reactive to light.   Neck:      Vascular: No JVD.      Trachea: No tracheal deviation.   Cardiovascular:      Rate and Rhythm: Normal rate and regular rhythm.      Heart sounds: Normal heart sounds. No murmur heard.      Pulmonary:      Effort: Pulmonary effort is normal. No respiratory distress.      Breath sounds: Normal breath sounds. No wheezing.   Abdominal:      General: Bowel sounds are normal.      Palpations: Abdomen is soft.      Tenderness: There is no abdominal tenderness.   Musculoskeletal:         General: No deformity. Normal range of motion.      Cervical back: Normal range of motion and neck supple.      Right hip: Tenderness present.      Left hip: Normal.        Legs:    Skin:     General: Skin is warm and dry.      Coloration: Skin is not pale.      Findings: No erythema or rash.   Neurological:       Mental Status: She is alert and oriented to person, place, and time.      Cranial Nerves: No cranial nerve deficit.   Psychiatric:         Behavior: Behavior normal.         Thought Content: Thought content normal.         Procedures           ED Course  ED Course as of 12/13/21 2032   Mon Dec 13, 2021   1449 XR Hip With or Without Pelvis 2 - 3 View Right [TK]      ED Course User Index  [TK] Cirilo Preciado PA-C                                                 MDM  Number of Diagnoses or Management Options  Right hip pain: new and requires workup     Amount and/or Complexity of Data Reviewed  Tests in the radiology section of CPT®: reviewed and ordered    Risk of Complications, Morbidity, and/or Mortality  Presenting problems: moderate  Diagnostic procedures: moderate  Management options: moderate    Patient Progress  Patient progress: stable      Final diagnoses:   Right hip pain       ED Disposition  ED Disposition     ED Disposition Condition Comment    Discharge Stable           Myrna Ramos MD  [address] [place] [address]  [phone]    In 2 days           Medication List      New Prescriptions    methylPREDNISolone 4 MG dose pack  Commonly known as: MEDROL  Take as directed on package instructions.     orphenadrine 100 MG 12 hr tablet  Commonly known as: NORFLEX  Take 1 tablet by mouth 2 (Two) Times a Day As Needed for Muscle Spasms or Mild Pain .           Where to Get Your Medications      These medications were sent to [facility] [place] - [address] [facility] [address] - [phone] [facility] [phone]   [address] [address] [place] [address]    Phone: [phone]   · methylPREDNISolone 4 MG dose pack  · orphenadrine 100 MG 12 hr tablet          Cirilo Preciado PA-C  12/13/21 2032

## 2021-12-14 ENCOUNTER — OFFICE VISIT (OUTPATIENT)
Dept: FAMILY MEDICINE CLINIC | Facility: CLINIC | Age: 66
End: 2021-12-14

## 2021-12-14 VITALS
SYSTOLIC BLOOD PRESSURE: 128 MMHG | OXYGEN SATURATION: 97 % | HEIGHT: 62 IN | TEMPERATURE: 97.2 F | WEIGHT: 149.4 LBS | HEART RATE: 102 BPM | DIASTOLIC BLOOD PRESSURE: 70 MMHG | BODY MASS INDEX: 27.49 KG/M2

## 2021-12-14 DIAGNOSIS — M25.559 HIP PAIN: Primary | ICD-10-CM

## 2021-12-14 DIAGNOSIS — R30.9 PAINFUL URINATION: ICD-10-CM

## 2021-12-14 LAB
BILIRUB BLD-MCNC: NEGATIVE MG/DL
CLARITY, POC: CLEAR
COLOR UR: YELLOW
EXPIRATION DATE: ABNORMAL
GLUCOSE UR STRIP-MCNC: ABNORMAL MG/DL
KETONES UR QL: NEGATIVE
LEUKOCYTE EST, POC: NEGATIVE
Lab: ABNORMAL
NITRITE UR-MCNC: NEGATIVE MG/ML
PH UR: 5.5 [PH] (ref 5–8)
PROT UR STRIP-MCNC: ABNORMAL MG/DL
RBC # UR STRIP: NEGATIVE /UL
SP GR UR: 1.03 (ref 1–1.03)
UROBILINOGEN UR QL: NORMAL

## 2021-12-14 PROCEDURE — 99213 OFFICE O/P EST LOW 20 MIN: CPT | Performed by: FAMILY MEDICINE

## 2021-12-14 PROCEDURE — 86038 ANTINUCLEAR ANTIBODIES: CPT | Performed by: FAMILY MEDICINE

## 2021-12-14 PROCEDURE — 36415 COLL VENOUS BLD VENIPUNCTURE: CPT | Performed by: FAMILY MEDICINE

## 2021-12-14 PROCEDURE — 85652 RBC SED RATE AUTOMATED: CPT | Performed by: FAMILY MEDICINE

## 2021-12-14 PROCEDURE — 96372 THER/PROPH/DIAG INJ SC/IM: CPT | Performed by: FAMILY MEDICINE

## 2021-12-14 PROCEDURE — 81003 URINALYSIS AUTO W/O SCOPE: CPT | Performed by: FAMILY MEDICINE

## 2021-12-14 PROCEDURE — 84550 ASSAY OF BLOOD/URIC ACID: CPT | Performed by: FAMILY MEDICINE

## 2021-12-14 PROCEDURE — 86140 C-REACTIVE PROTEIN: CPT | Performed by: FAMILY MEDICINE

## 2021-12-15 ENCOUNTER — TELEPHONE (OUTPATIENT)
Dept: FAMILY MEDICINE CLINIC | Facility: CLINIC | Age: 66
End: 2021-12-15

## 2021-12-15 LAB
CRP SERPL-MCNC: <0.3 MG/DL (ref 0–0.5)
ERYTHROCYTE [SEDIMENTATION RATE] IN BLOOD: 13 MM/HR (ref 0–30)
URATE SERPL-MCNC: 3.2 MG/DL (ref 2.4–5.7)

## 2021-12-15 PROCEDURE — 87086 URINE CULTURE/COLONY COUNT: CPT | Performed by: FAMILY MEDICINE

## 2021-12-15 NOTE — TELEPHONE ENCOUNTER
----- Message from Myrna Ramos MD sent at 12/15/2021  8:17 AM EST -----  Labs stable. Okay to either call or send letter to patient. Thanks.    LETTER MAILED.

## 2021-12-16 ENCOUNTER — TELEPHONE (OUTPATIENT)
Dept: FAMILY MEDICINE CLINIC | Facility: CLINIC | Age: 66
End: 2021-12-16

## 2021-12-16 NOTE — TELEPHONE ENCOUNTER
She was seen for this yesterday.  She was told that secondary to an inconsistent UDS several years ago, we could not write controlled medications for her.   This is what we did and this is what she needs to do:    - Discontinue the Norflex.   - She will double her dose of Flexeril, and take 10 mg, 2 tablets twice daily as well as increase Mobic to 15 mg daily.

## 2021-12-16 NOTE — TELEPHONE ENCOUNTER
She was seen for this yesterday.  She was told that secondary to an inconsistent UDS several years ago, we could not write controlled medications for her.   This is what we did and this is what she needs to do:    - Discontinue the Norflex.   - She will double her dose of Flexeril, and take 10 mg, 2 tablets twice daily as well as increase Mobic to 15 mg daily.    Spoke with patient & she verbalized understanding.

## 2021-12-16 NOTE — TELEPHONE ENCOUNTER
Caller: Breanna Kaba    Relationship: Self    Best call back number: [phone]    What medication are you requesting: TO HELP WITH SCIATICA PAIN  What are your current symptoms: SCIATICA PAIN    How long have you been experiencing symptoms: TWO WEEKS    Have you had these symptoms before: [] Yes  [x] No    Have you been treated for these symptoms before: [] Yes  [x] No    If a prescription is needed, what is your preferred pharmacy and phone number:   [facility] [address] [address] - [phone]  - [phone]     Additional notes:  SHE GOT AN INJECTION AT URGENT CARE FOR UTI AND THEY HIT A NERVE THAT EFFECTED HER SCIATICA NERVE. SHE HAS BEEN IN PAIN EVER SINCE.    PLEASE CALL AND ADVISE AND SEND IN PAIN MEDICINE TO THE PHARMACY

## 2021-12-17 LAB
ANA TITR SER IF: NEGATIVE {TITER}
BACTERIA UR CULT: NO GROWTH
BACTERIA UR CULT: NORMAL
LABORATORY COMMENT REPORT: NORMAL

## 2021-12-21 ENCOUNTER — APPOINTMENT (OUTPATIENT)
Dept: CT IMAGING | Facility: HOSPITAL | Age: 66
End: 2021-12-21

## 2021-12-21 ENCOUNTER — HOSPITAL ENCOUNTER (EMERGENCY)
Facility: HOSPITAL | Age: 66
Discharge: HOME | End: 2021-12-22
Attending: EMERGENCY MEDICINE | Admitting: EMERGENCY MEDICINE

## 2021-12-21 DIAGNOSIS — M51.36 DEGENERATIVE DISC DISEASE, LUMBAR: ICD-10-CM

## 2021-12-21 DIAGNOSIS — M54.41 CHRONIC BILATERAL LOW BACK PAIN WITH RIGHT-SIDED SCIATICA: ICD-10-CM

## 2021-12-21 DIAGNOSIS — M54.31 SCIATICA OF RIGHT SIDE: Primary | ICD-10-CM

## 2021-12-21 DIAGNOSIS — N39.0 ACUTE UTI: ICD-10-CM

## 2021-12-21 DIAGNOSIS — G89.29 CHRONIC BILATERAL LOW BACK PAIN WITH RIGHT-SIDED SCIATICA: ICD-10-CM

## 2021-12-21 LAB
ALBUMIN SERPL-MCNC: 4.36 G/DL (ref 3.5–5.2)
ALBUMIN/GLOB SERPL: 1.8 G/DL
ALP SERPL-CCNC: 98 U/L (ref 39–117)
ALT SERPL W P-5'-P-CCNC: 14 U/L (ref 1–33)
ANION GAP SERPL CALCULATED.3IONS-SCNC: 14.6 MMOL/L (ref 5–15)
AST SERPL-CCNC: 10 U/L (ref 1–32)
BACTERIA UR QL AUTO: ABNORMAL /HPF
BASOPHILS # BLD AUTO: 0.05 10*3/MM3 (ref 0–0.2)
BASOPHILS NFR BLD AUTO: 0.4 % (ref 0–1.5)
BILIRUB SERPL-MCNC: 0.5 MG/DL (ref 0–1.2)
BILIRUB UR QL STRIP: NEGATIVE
BUN SERPL-MCNC: 21 MG/DL (ref 8–23)
BUN/CREAT SERPL: 22.8 (ref 7–25)
CALCIUM SPEC-SCNC: 9.4 MG/DL (ref 8.6–10.5)
CHLORIDE SERPL-SCNC: 93 MMOL/L (ref 98–107)
CLARITY UR: ABNORMAL
CO2 SERPL-SCNC: 25.4 MMOL/L (ref 22–29)
COLOR UR: YELLOW
CREAT SERPL-MCNC: 0.92 MG/DL (ref 0.57–1)
CRP SERPL-MCNC: 0.3 MG/DL (ref 0–0.5)
DEPRECATED RDW RBC AUTO: 40.1 FL (ref 37–54)
EOSINOPHIL # BLD AUTO: 0.12 10*3/MM3 (ref 0–0.4)
EOSINOPHIL NFR BLD AUTO: 1 % (ref 0.3–6.2)
ERYTHROCYTE [DISTWIDTH] IN BLOOD BY AUTOMATED COUNT: 14 % (ref 12.3–15.4)
ERYTHROCYTE [SEDIMENTATION RATE] IN BLOOD: 5 MM/HR (ref 0–30)
GFR SERPL CREATININE-BSD FRML MDRD: 61 ML/MIN/1.73
GLOBULIN UR ELPH-MCNC: 2.4 GM/DL
GLUCOSE BLDC GLUCOMTR-MCNC: 192 MG/DL (ref 70–130)
GLUCOSE BLDC GLUCOMTR-MCNC: 230 MG/DL (ref 70–130)
GLUCOSE SERPL-MCNC: 443 MG/DL (ref 65–99)
GLUCOSE UR STRIP-MCNC: ABNORMAL MG/DL
HCT VFR BLD AUTO: 40.9 % (ref 34–46.6)
HGB BLD-MCNC: 13.8 G/DL (ref 12–15.9)
HGB UR QL STRIP.AUTO: NEGATIVE
HOLD SPECIMEN: NORMAL
HOLD SPECIMEN: NORMAL
HYALINE CASTS UR QL AUTO: ABNORMAL /LPF
IMM GRANULOCYTES # BLD AUTO: 0.04 10*3/MM3 (ref 0–0.05)
IMM GRANULOCYTES NFR BLD AUTO: 0.3 % (ref 0–0.5)
KETONES UR QL STRIP: ABNORMAL
LEUKOCYTE ESTERASE UR QL STRIP.AUTO: ABNORMAL
LIPASE SERPL-CCNC: 31 U/L (ref 13–60)
LYMPHOCYTES # BLD AUTO: 2.3 10*3/MM3 (ref 0.7–3.1)
LYMPHOCYTES NFR BLD AUTO: 19.2 % (ref 19.6–45.3)
MCH RBC QN AUTO: 27.4 PG (ref 26.6–33)
MCHC RBC AUTO-ENTMCNC: 33.7 G/DL (ref 31.5–35.7)
MCV RBC AUTO: 81.2 FL (ref 79–97)
MONOCYTES # BLD AUTO: 0.71 10*3/MM3 (ref 0.1–0.9)
MONOCYTES NFR BLD AUTO: 5.9 % (ref 5–12)
NEUTROPHILS NFR BLD AUTO: 73.2 % (ref 42.7–76)
NEUTROPHILS NFR BLD AUTO: 8.73 10*3/MM3 (ref 1.7–7)
NITRITE UR QL STRIP: NEGATIVE
NRBC BLD AUTO-RTO: 0 /100 WBC (ref 0–0.2)
PH UR STRIP.AUTO: <=5 [PH] (ref 5–8)
PLATELET # BLD AUTO: 507 10*3/MM3 (ref 140–450)
PMV BLD AUTO: 9.6 FL (ref 6–12)
POTASSIUM SERPL-SCNC: 5.3 MMOL/L (ref 3.5–5.2)
PROT SERPL-MCNC: 6.8 G/DL (ref 6–8.5)
PROT UR QL STRIP: ABNORMAL
RBC # BLD AUTO: 5.04 10*6/MM3 (ref 3.77–5.28)
RBC # UR STRIP: ABNORMAL /HPF
REF LAB TEST METHOD: ABNORMAL
SODIUM SERPL-SCNC: 133 MMOL/L (ref 136–145)
SP GR UR STRIP: >1.03 (ref 1–1.03)
SQUAMOUS #/AREA URNS HPF: ABNORMAL /HPF
UROBILINOGEN UR QL STRIP: ABNORMAL
WBC # UR STRIP: ABNORMAL /HPF
WBC NRBC COR # BLD: 11.95 10*3/MM3 (ref 3.4–10.8)
WHOLE BLOOD HOLD SPECIMEN: NORMAL
WHOLE BLOOD HOLD SPECIMEN: NORMAL

## 2021-12-21 PROCEDURE — 83690 ASSAY OF LIPASE: CPT | Performed by: EMERGENCY MEDICINE

## 2021-12-21 PROCEDURE — 25010000002 CEFTRIAXONE PER 250 MG: Performed by: EMERGENCY MEDICINE

## 2021-12-21 PROCEDURE — 96365 THER/PROPH/DIAG IV INF INIT: CPT

## 2021-12-21 PROCEDURE — 86140 C-REACTIVE PROTEIN: CPT | Performed by: PHYSICIAN ASSISTANT

## 2021-12-21 PROCEDURE — 82962 GLUCOSE BLOOD TEST: CPT

## 2021-12-21 PROCEDURE — 25010000002 KETOROLAC TROMETHAMINE PER 15 MG: Performed by: EMERGENCY MEDICINE

## 2021-12-21 PROCEDURE — 93005 ELECTROCARDIOGRAM TRACING: CPT | Performed by: STUDENT IN AN ORGANIZED HEALTH CARE EDUCATION/TRAINING PROGRAM

## 2021-12-21 PROCEDURE — 80053 COMPREHEN METABOLIC PANEL: CPT | Performed by: PHYSICIAN ASSISTANT

## 2021-12-21 PROCEDURE — 99283 EMERGENCY DEPT VISIT LOW MDM: CPT

## 2021-12-21 PROCEDURE — 72131 CT LUMBAR SPINE W/O DYE: CPT

## 2021-12-21 PROCEDURE — 85652 RBC SED RATE AUTOMATED: CPT | Performed by: PHYSICIAN ASSISTANT

## 2021-12-21 PROCEDURE — 25010000002 DEXAMETHASONE PER 1 MG: Performed by: EMERGENCY MEDICINE

## 2021-12-21 PROCEDURE — 96375 TX/PRO/DX INJ NEW DRUG ADDON: CPT

## 2021-12-21 PROCEDURE — 81001 URINALYSIS AUTO W/SCOPE: CPT | Performed by: PHYSICIAN ASSISTANT

## 2021-12-21 PROCEDURE — 85025 COMPLETE CBC W/AUTO DIFF WBC: CPT | Performed by: PHYSICIAN ASSISTANT

## 2021-12-21 PROCEDURE — 87086 URINE CULTURE/COLONY COUNT: CPT | Performed by: PHYSICIAN ASSISTANT

## 2021-12-21 PROCEDURE — 93005 ELECTROCARDIOGRAM TRACING: CPT

## 2021-12-21 PROCEDURE — 63710000001 INSULIN REGULAR HUMAN PER 5 UNITS: Performed by: EMERGENCY MEDICINE

## 2021-12-21 PROCEDURE — 74176 CT ABD & PELVIS W/O CONTRAST: CPT

## 2021-12-21 RX ORDER — OXYCODONE HYDROCHLORIDE AND ACETAMINOPHEN 5; 325 MG/1; MG/1
1 TABLET ORAL ONCE
Status: COMPLETED | OUTPATIENT
Start: 2021-12-21 | End: 2021-12-21

## 2021-12-21 RX ORDER — DEXAMETHASONE SODIUM PHOSPHATE 10 MG/ML
10 INJECTION INTRAMUSCULAR; INTRAVENOUS ONCE
Status: COMPLETED | OUTPATIENT
Start: 2021-12-21 | End: 2021-12-21

## 2021-12-21 RX ORDER — CYCLOBENZAPRINE HCL 10 MG
10 TABLET ORAL ONCE
Status: COMPLETED | OUTPATIENT
Start: 2021-12-21 | End: 2021-12-21

## 2021-12-21 RX ORDER — SODIUM CHLORIDE 0.9 % (FLUSH) 0.9 %
10 SYRINGE (ML) INJECTION AS NEEDED
Status: DISCONTINUED | OUTPATIENT
Start: 2021-12-21 | End: 2021-12-22 | Stop reason: HOSPADM

## 2021-12-21 RX ORDER — KETOROLAC TROMETHAMINE 30 MG/ML
15 INJECTION, SOLUTION INTRAMUSCULAR; INTRAVENOUS ONCE
Status: COMPLETED | OUTPATIENT
Start: 2021-12-21 | End: 2021-12-21

## 2021-12-21 RX ADMIN — DEXAMETHASONE SODIUM PHOSPHATE 10 MG: 10 INJECTION INTRAMUSCULAR; INTRAVENOUS at 21:31

## 2021-12-21 RX ADMIN — SODIUM CHLORIDE 1000 ML: 9 INJECTION, SOLUTION INTRAVENOUS at 21:34

## 2021-12-21 RX ADMIN — OXYCODONE HYDROCHLORIDE AND ACETAMINOPHEN 1 TABLET: 5; 325 TABLET ORAL at 21:10

## 2021-12-21 RX ADMIN — HUMAN INSULIN 10 UNITS: 100 INJECTION, SOLUTION SUBCUTANEOUS at 21:38

## 2021-12-21 RX ADMIN — KETOROLAC TROMETHAMINE 15 MG: 30 INJECTION, SOLUTION INTRAMUSCULAR; INTRAVENOUS at 21:30

## 2021-12-21 RX ADMIN — OXYCODONE HYDROCHLORIDE AND ACETAMINOPHEN 1 TABLET: 5; 325 TABLET ORAL at 23:11

## 2021-12-21 RX ADMIN — CYCLOBENZAPRINE 10 MG: 10 TABLET, FILM COATED ORAL at 21:11

## 2021-12-21 RX ADMIN — CEFTRIAXONE 1 G: 1 INJECTION, POWDER, FOR SOLUTION INTRAMUSCULAR; INTRAVENOUS at 21:34

## 2021-12-21 NOTE — ED NOTES
MEDICAL SCREENING:    Reason for Visit: Right hip/leg pain.  Patient states he had a urinary tract infection about a week ago.  He was given shot of antibiotics and it is not like it hit the nerve    Patient initially seen in triage.  The patient was advised further evaluation and diagnostic testing will be needed, some of the treatment and testing will be initiated in the lobby in order to begin the process.  The patient will be returned to the waiting area for the time being and possibly be re-assessed by a subsequent ED provider.  The patient will be brought back to the treatment area in as timely manner as possible.         Victor Manuel Tay, PA  12/21/21 1832

## 2021-12-22 VITALS
HEART RATE: 101 BPM | OXYGEN SATURATION: 98 % | WEIGHT: 142 LBS | DIASTOLIC BLOOD PRESSURE: 97 MMHG | TEMPERATURE: 97.7 F | SYSTOLIC BLOOD PRESSURE: 160 MMHG | BODY MASS INDEX: 26.13 KG/M2 | HEIGHT: 62 IN | RESPIRATION RATE: 18 BRPM

## 2021-12-22 LAB
BACTERIA SPEC AEROBE CULT: NO GROWTH
QT INTERVAL: 350 MS
QTC INTERVAL: 458 MS

## 2021-12-22 RX ORDER — CEFDINIR 300 MG/1
300 CAPSULE ORAL 2 TIMES DAILY
Qty: 20 CAPSULE | Refills: 0 | Status: SHIPPED | OUTPATIENT
Start: 2021-12-22 | End: 2022-01-01

## 2021-12-22 RX ORDER — HYDROCODONE BITARTRATE AND ACETAMINOPHEN 7.5; 325 MG/1; MG/1
1 TABLET ORAL EVERY 6 HOURS PRN
Qty: 12 TABLET | Refills: 0 | Status: SHIPPED | OUTPATIENT
Start: 2021-12-22 | End: 2022-01-03

## 2021-12-22 RX ORDER — IBUPROFEN 400 MG/1
400 TABLET ORAL EVERY 6 HOURS PRN
Qty: 30 TABLET | Refills: 0 | Status: ON HOLD | OUTPATIENT
Start: 2021-12-22 | End: 2022-03-29

## 2021-12-22 RX ORDER — CYCLOBENZAPRINE HCL 10 MG
10 TABLET ORAL 3 TIMES DAILY PRN
Qty: 15 TABLET | Refills: 0 | Status: SHIPPED | OUTPATIENT
Start: 2021-12-22 | End: 2022-04-14

## 2021-12-22 NOTE — ED PROVIDER NOTES
"Subjective   66-year-old female presents complaining of right sided low back and leg/hip pain.  Patient states she was seen at urgent care about 1 week ago and diagnosed with a UTI, received a right gluteal antibiotic injection.  She is concerned they \"hit her sciatic nerve\".  Since then she complains of low back and right-sided hip pain.  It is worse with any movement and with lying down.  She denies numbness, weakness, bowel or bladder incontinence, saddle anesthesia.  She did not have improvement after recent treatment here in the ER.      History provided by:  Patient   used: No        Review of Systems   Constitutional: Negative.  Negative for fever.   HENT: Negative.    Eyes: Negative.    Respiratory: Negative.  Negative for shortness of breath.    Cardiovascular: Negative.  Negative for chest pain.   Gastrointestinal: Negative.  Negative for abdominal pain.   Genitourinary: Positive for flank pain. Negative for difficulty urinating and dysuria.   Musculoskeletal: Positive for back pain.   Skin: Negative.    Neurological: Negative.  Negative for weakness and numbness.   Psychiatric/Behavioral: Negative.    All other systems reviewed and are negative.      Past Medical History:   Diagnosis Date   • Arthritis    • Chronic pain    • Diabetes mellitus (HCC)    • Elevated cholesterol    • GERD (gastroesophageal reflux disease)    • Gout    • Headache    • Hypertension    • Neuropathy        Allergies   Allergen Reactions   • Asa [Aspirin]    • Naproxen Nausea And Vomiting   • Penicillins        Past Surgical History:   Procedure Laterality Date   • BREAST BIOPSY Left 1988    benign   • COLONOSCOPY N/A 2/17/2020    Procedure: COLONOSCOPY FOR SCREENING CPT CODE: ;  Surgeon: Mariano Prescott MD;  Location: [facility];  Service: Gastroenterology;  Laterality: N/A;   • HYSTERECTOMY      Partial   • KNEE SURGERY     • THYROIDECTOMY, PARTIAL      Removal of goiter       Family History "   Problem Relation Age of Onset   • COPD Mother    • Heart disease Brother    • Heart attack Brother    • Hypertension Daughter    • Diabetes Daughter    • Hypertension Daughter    • Diabetes Daughter    • Breast cancer Neg Hx        Social History     Socioeconomic History   • Marital status:    Tobacco Use   • Smoking status: Never Smoker   • Smokeless tobacco: Never Used   Vaping Use   • Vaping Use: Never used   Substance and Sexual Activity   • Alcohol use: No   • Drug use: No   • Sexual activity: Defer           Objective   Physical Exam  Vitals and nursing note reviewed.   Constitutional:       General: She is not in acute distress.     Appearance: She is well-developed. She is not diaphoretic.   HENT:      Head: Normocephalic and atraumatic.      Right Ear: External ear normal.      Left Ear: External ear normal.      Nose: Nose normal.      Mouth/Throat:      Mouth: Mucous membranes are moist.   Eyes:      Conjunctiva/sclera: Conjunctivae normal.      Pupils: Pupils are equal, round, and reactive to light.   Neck:      Vascular: No JVD.      Trachea: No tracheal deviation.   Cardiovascular:      Rate and Rhythm: Normal rate and regular rhythm.      Heart sounds: Normal heart sounds. No murmur heard.      Pulmonary:      Effort: Pulmonary effort is normal. No respiratory distress.      Breath sounds: Normal breath sounds. No wheezing.   Abdominal:      General: Bowel sounds are normal.      Palpations: Abdomen is soft.      Tenderness: There is no abdominal tenderness.   Musculoskeletal:         General: No deformity. Normal range of motion.      Cervical back: Normal range of motion and neck supple.      Comments: L-spine ttp and R paraspinal + gluteal ttp, no erythema or warmth, no abscess or cellulitis   Skin:     General: Skin is warm and dry.      Coloration: Skin is not pale.      Findings: No erythema or rash.   Neurological:      General: No focal deficit present.      Mental Status: She is  alert and oriented to person, place, and time.      Cranial Nerves: Cranial nerves are intact.      Sensory: Sensation is intact.      Motor: Motor function is intact.      Coordination: Coordination is intact.      Comments: + SLR on R   Psychiatric:         Behavior: Behavior normal.         Thought Content: Thought content normal.         Procedures       CT Abdomen Pelvis Without Contrast   Final Result   Negative CT of the abdomen and pelvis.               Signer Name: Stefan Abbott MD    Signed: 12/21/2021 11:34 PM    Workstation Name: [facility]     [facility]      CT Lumbar Spine Without Contrast   Final Result   1.  No clearly acute radiographic findings.      2.  Lumbar spondylosis most prominent at L4-L5. Please see the above report for a description of the level.      3.  Clinical aspects of the case will determine if MR of the lumbar spine could provide additional information.      Signer Name: Emil Sutherland MD    Signed: 12/21/2021 11:40 PM    Workstation Name: [email or web address]     [facility]            ED Course  ED Course as of 12/22/21 0037   Tue Dec 21, 2021   1812 EKG noted sinus tachycardia.  103 bpm.  QRS 74 ms.  Anterior infarct of indeterminate age noted.  No acute ST elevation [SF]      ED Course User Index  [SF] Sarmad Osorio, DO MERCADO reviewed by Tj Tiwari MD             MDM  Number of Diagnoses or Management Options  Acute UTI  Degenerative disc disease, lumbar  Sciatica of right side  Diagnosis management comments: 58-year-old female presenting with right-sided sciatica. Work-up was notable for UTI and lumbar DDD but no other emergent conditions identified. Patient is neurologically intact and ambulatory, has no signs or symptoms of cauda equina or any other surgical issue. She has had improvement with symptomatic therapy here. We discussed a trial of ongoing conservative management and close  follow-up with spine for MRI and physical therapy as needed.  She was quite happy with the plan.       Amount and/or Complexity of Data Reviewed  Clinical lab tests: reviewed and ordered  Tests in the radiology section of CPT®: reviewed and ordered  Review and summarize past medical records: yes  Independent visualization of images, tracings, or specimens: yes        Final diagnoses:   Sciatica of right side   Degenerative disc disease, lumbar   Acute UTI       ED Disposition  ED Disposition     ED Disposition Condition Comment    Discharge Stable           Myrna Ramos MD  [address] [place] [address]  [phone]    Schedule an appointment as soon as possible for a visit       Wili Cantu DO  [address] [place] [id number] [phone]    Schedule an appointment as soon as possible for a visit            Medication List      New Prescriptions    cefdinir 300 MG capsule  Commonly known as: OMNICEF  Take 1 capsule by mouth 2 (Two) Times a Day for 10 days.     HYDROcodone-acetaminophen 7.5-325 MG per tablet  Commonly known as: NORCO  Take 1 tablet by mouth Every 6 (Six) Hours As Needed for Moderate Pain  or Severe Pain .     ibuprofen 400 MG tablet  Commonly known as: ADVIL,MOTRIN  Take 1 tablet by mouth Every 6 (Six) Hours As Needed for Mild Pain  or Moderate Pain .        Changed    cyclobenzaprine 10 MG tablet  Commonly known as: FLEXERIL  Take 1 tablet by mouth 3 (Three) Times a Day As Needed for Muscle Spasms.  What changed:   · medication strength  · how much to take  · when to take this     methylPREDNISolone 4 MG dose pack  Commonly known as: MEDROL  Take as directed on package instructions.  What changed:   · how much to take  · how to take this  · when to take this           Where to Get Your Medications      These medications were sent to [facility] [place] - [address] [facility] [address] - [phone]  - [phone] FX  [address] [address] [place] [address]    Phone:  [phone]   · cefdinir 300 MG capsule  · cyclobenzaprine 10 MG tablet  · HYDROcodone-acetaminophen 7.5-325 MG per tablet  · ibuprofen 400 MG tablet          Tj Tiwari MD  12/22/21 0037

## 2021-12-27 ENCOUNTER — TELEPHONE (OUTPATIENT)
Dept: FAMILY MEDICINE CLINIC | Facility: CLINIC | Age: 66
End: 2021-12-27

## 2021-12-27 NOTE — TELEPHONE ENCOUNTER
----- Message from Myrna Ramos MD sent at 12/26/2021 11:15 PM EST -----  Labs stable. Okay to either call or send letter to patient. Thanks.    LETTER MAILED.

## 2022-01-03 ENCOUNTER — OFFICE VISIT (OUTPATIENT)
Dept: FAMILY MEDICINE CLINIC | Facility: CLINIC | Age: 67
End: 2022-01-03

## 2022-01-03 VITALS
OXYGEN SATURATION: 98 % | BODY MASS INDEX: 25.76 KG/M2 | HEART RATE: 122 BPM | WEIGHT: 140 LBS | HEIGHT: 62 IN | SYSTOLIC BLOOD PRESSURE: 110 MMHG | DIASTOLIC BLOOD PRESSURE: 72 MMHG | TEMPERATURE: 96 F

## 2022-01-03 DIAGNOSIS — M54.50 ACUTE RIGHT-SIDED LOW BACK PAIN WITHOUT SCIATICA: Primary | ICD-10-CM

## 2022-01-03 PROCEDURE — 99213 OFFICE O/P EST LOW 20 MIN: CPT | Performed by: FAMILY MEDICINE

## 2022-01-03 NOTE — PROGRESS NOTES
Subjective   Breanna Kaba is a 66 y.o. female.   Pt presents today with CC of Back Pain      History of Present Illness   Patient is a 66-year-old female with long history of back pain.  She complains of right low back pain.  She points to her right SI joint.  She reports radiation down her right buttock to her knee.  She has been to the emergency room twice over the past few weeks for this complaint and has sought care at her PCP office during this time.  She had a CT scan in the emergency room last week that shows that there could be some impingement.  She states that her symptoms have not gotten worse, but they also have not gotten better.       The following portions of the patient's history were reviewed and updated as appropriate: allergies, current medications, past family history, past medical history, past social history, past surgical history and problem list.    Review of Systems   Constitutional: Negative for chills, fever and unexpected weight loss.   HENT: Negative for congestion and sore throat.    Eyes: Negative for blurred vision and visual disturbance.   Respiratory: Negative for cough and wheezing.    Cardiovascular: Negative for chest pain and palpitations.   Gastrointestinal: Negative for abdominal pain and diarrhea.   Endocrine: Negative for cold intolerance and heat intolerance.   Genitourinary: Negative for dysuria.   Musculoskeletal: Negative for arthralgias and neck stiffness.   Neurological: Negative for dizziness, seizures and syncope.   Psychiatric/Behavioral: Negative for self-injury, suicidal ideas and depressed mood.       Objective   Physical Exam  Vitals and nursing note reviewed.   Constitutional:       Appearance: She is well-developed.   HENT:      Head: Normocephalic and atraumatic.      Right Ear: External ear normal.      Left Ear: External ear normal.      Nose: Nose normal.   Eyes:      Conjunctiva/sclera: Conjunctivae normal.      Pupils: Pupils are equal, round, and  reactive to light.   Cardiovascular:      Rate and Rhythm: Regular rhythm. Tachycardia present.      Heart sounds: Normal heart sounds.      Comments: Heart rate 100 bpm  Pulmonary:      Effort: Pulmonary effort is normal.      Breath sounds: Normal breath sounds.   Abdominal:      General: Bowel sounds are normal.      Palpations: Abdomen is soft.   Musculoskeletal:      Cervical back: Normal range of motion and neck supple.      Comments: Tenderness to palpation of the right SI joint, no tenderness over the right piriformis,   Hips, knees, and ankles with full range of motion and 5/5 strength bilaterally. DTRs symmetrical but weak throughout. Straight leg raise test negative bilaterally.     Skin:     General: Skin is warm and dry.   Neurological:      Mental Status: She is alert and oriented to person, place, and time.   Psychiatric:         Behavior: Behavior normal.           Assessment/Plan   Diagnoses and all orders for this visit:    1. Acute right-sided low back pain without sciatica (Primary)  -     Ambulatory Referral to Physical Therapy Evaluate and treat; Full weight bearing      No red flag signs or symptoms.  Her CT scan is suggestive of nerve impingement.  As her symptoms have remained stable, recommend trial of physical therapy before pursuing MRI.  If her conditions worsen, may order MRI sooner.  Recommend follow-up in 2 to 4 weeks for consideration of advanced imaging.  Chose not to pursue manipulation today because of osteopenia.  If no significant improvement with physical therapy, advanced imaging or manipulation may be an option.  I recommend against the use of narcotics in this case.  Of note, her heart rate was 100 bpm on exam.  This is her baseline.         Patient's Body mass index is 25.61 kg/m². indicating that she is overweight (BMI 25-29.9). Obesity-related health conditions include the following: diabetes mellitus. Obesity is unchanged. BMI is is above average; BMI management plan is  completed. We discussed portion control and increasing exercise..

## 2022-01-06 ENCOUNTER — TREATMENT (OUTPATIENT)
Dept: PHYSICAL THERAPY | Facility: CLINIC | Age: 67
End: 2022-01-06

## 2022-01-06 DIAGNOSIS — M54.41 ACUTE RIGHT-SIDED LOW BACK PAIN WITH RIGHT-SIDED SCIATICA: ICD-10-CM

## 2022-01-06 DIAGNOSIS — S39.012D STRAIN OF LUMBAR REGION, SUBSEQUENT ENCOUNTER: Primary | ICD-10-CM

## 2022-01-06 DIAGNOSIS — R29.898 IMPAIRED STRENGTH OF LOWER EXTREMITY: ICD-10-CM

## 2022-01-06 PROCEDURE — 97162 PT EVAL MOD COMPLEX 30 MIN: CPT | Performed by: PHYSICAL THERAPIST

## 2022-01-06 NOTE — PROGRESS NOTES
"  Physical Therapy Initial Evaluation and Plan of Care      Patient: Breanna Kaba   : 1955  Diagnosis/ICD-10 Code:  Strain of lumbar region, subsequent encounter [S39.012D]  Referring practitioner: Anatoliy Stein DO  Date of Initial Visit: 2022  Today's Date: 2022  Patient seen for 8 sessions    Visit Diagnoses:    ICD-10-CM ICD-9-CM   1. Strain of lumbar region, subsequent encounter  S39.012D V58.89     847.2   2. Acute right-sided low back pain with right-sided sciatica  M54.41 724.2     724.3   3. Impaired strength of lower extremity  R29.898 729.89            Subjective Questionnaire: Oswestry: 30% impaired      Subjective Evaluation    History of Present Illness  Mechanism of injury: Pt reports right low back pain which has been present for approximately two months. She states she has been seen by her family physician and the emergency room. The patient reports she was given shots and pain medication at the ER which \"helped a lot\". The patient states she had a shot in the hip for a UTI [facility]  and states \"I think they hit a nerve, I haven't been the same first\". She states the pain extends from the low back into the buttock and upper thigh. She reports she has difficulty sitting or staying in one position for long times due to pain. The patient states she has difficulty sleeping due to pain. She reports she is unable to grocery shop without cart, needing it to lean, due to low back pain. She states she is only able to walk a few steps at a time.      Patient Occupation: Retired Quality of life: good    Pain  Current pain rating: 10  At best pain ratin  At worst pain rating: 10  Location: Lumbar  Quality: sharp, radiating, needle-like and throbbing  Relieving factors: medications  Aggravating factors: lifting, movement, stairs, standing, ambulation, prolonged positioning, sleeping, squatting, repetitive movement and overhead activity  Progression: " worsening    Social Support  Lives in: trailer  Lives with: adult children    Hand dominance: right    Diagnostic Tests  Abnormal CT scan: See chart review.    Treatments  Previous treatment: medication  Patient Goals  Patient goals for therapy: decreased edema, decreased pain, improved balance, increased motion, increased strength and independence with ADLs/IADLs             Objective          Postural Observations  Seated posture: poor    Additional Postural Observation Details  Flexed forward posture.    Palpation   Left   No palpable tenderness to the erector spinae, external abdominal oblique, iliacus, iliopsoas, internal abdominal oblique, lumbar interspinals, quadratus lumborum, rectus abdominus and transverse abdominus.   Tenderness of the lumbar paraspinals.     Right   No palpable tenderness to the external abdominal oblique, iliacus, iliopsoas, internal abdominal oblique, lumbar interspinals, quadratus lumborum and rectus abdominus. Tenderness of the erector spinae, lumbar paraspinals and transverse abdominus.     Tenderness     Lumbar Spine  Tenderness in the spinous process and right transverse process. No tenderness in the left transverse process.     Additional Tenderness Details  Right SIJ    Neurological Testing     Sensation     Lumbar   Left   Intact: light touch    Right   Intact: light touch    Reflexes   Left   Patellar (L4): normal (2+)  Achilles (S1): normal (2+)  Clonus sign: negative    Right   Patellar (L4): normal (2+)  Achilles (S1): normal (2+)  Clonus sign: negative    Active Range of Motion     Lumbar   Flexion: Active lumbar flexion: 50% with pain  Extension: Active lumbar extension: 25% available. with pain  Left lateral flexion: Active left lumbar lateral flexion: 25% available. with pain  Right lateral flexion: Active right lumbar lateral flexion: 25% available. with pain  Left rotation: Active left lumbar rotation: 50%   Right rotation: Active right lumbar rotation: 50%  "    Strength/Myotome Testing     Left Hip   Planes of Motion   Flexion: 3+  Abduction: 4  Adduction: 4-    Right Hip   Planes of Motion   Flexion: 3+  Abduction: 4  Adduction: 4-    Left Knee   Flexion: 3+  Extension: 4-    Right Knee   Flexion: 4-  Extension: 4-    Left Ankle/Foot   Dorsiflexion: 4    Right Ankle/Foot   Dorsiflexion: 4    Tests     Lumbar     Left   Negative passive SLR.     Right   Negative passive SLR.     Left Pelvic Girdle/Sacrum   Negative: thigh thrust.     Right Pelvic Girdle/Sacrum   Negative: thigh thrust.     Additional Tests Details  No abnormal innominate rotation present. Minimal special tests were performed secondary to reports of pain and request to stop testing.          Assessment & Plan     Assessment  Impairments: abnormal gait, abnormal muscle firing, abnormal or restricted ROM, activity intolerance, impaired balance, impaired physical strength, lacks appropriate home exercise program, pain with function and weight-bearing intolerance  Functional Limitations: carrying objects, lifting, sleeping, walking, pulling, pushing, uncomfortable because of pain, moving in bed, sitting, standing, stooping, reaching behind back, reaching overhead and unable to perform repetitive tasks  Assessment details: The patient is a 66 year old female presenting to the clinic with low back and right upper thigh pain. She demonstrated impaired lumbar AROM in all planes as well as bilateral lower extremity weakness. Special tests were limited during today's session secondary to patient reporting \"too much pain\" and requesting to discontinue testing. The patient arrived with flexed forward posture in both sitting and standing. She demonstrated difficulty transitioning from sit<>stand as well as supine<>sit secondary to pain, with extra time required. The patient reported 30% impairment on the Modified Oswestry. She would benefit from skilled physical therapy to address functional limitations and " impairments.    Prognosis: fair    Goals  Plan Goals: SHORT TERM GOALS:     2 weeks  1. Pt will be instructed in HEP for improved independence.  2. Pt to demonstrate 50% lumbar AROM to allow for improved ability to perform ADL's.  3. Pt to demonstrate 4-/5 bilateral lower extremity strength for improved balance and stability.    LONG TERM GOALS:   6 weeks  1. Pt to demonstrate 75% lumbar AROM for improved ability to perform functional activities.  2. Pt to perform sit<>stand transfers with no flexed forward posture and reports of pain for improved ability to get into/out of her car.  3. Pt to report less than 20% impairment on the Modified Oswestry for improved functional independence.  4. Pt to report the ability to sleep through the night with no more than two interruptions due to lumbar pain for improved QOL.  5. Pt will report no more than 5/10 lumbar pain with daily activities for improved functional independence.      Plan  Therapy options: will be seen for skilled therapy services  Planned modality interventions: cryotherapy, thermotherapy (hydrocollator packs), electrical stimulation/Russian stimulation, traction, ultrasound, dry needling and iontophoresis  Planned therapy interventions: manual therapy, home exercise program, flexibility, body mechanics training, strengthening, spinal/joint mobilization, postural training, abdominal trunk stabilization, balance/weight-bearing training, functional ROM exercises, gait training, joint mobilization, motor coordination training, neuromuscular re-education, soft tissue mobilization, stretching, therapeutic activities and transfer training  Frequency: 2x week  Duration in weeks: 12  Treatment plan discussed with: patient  Plan details: Pt will be re-assessed upon follow up for tolerance to pain relieving exercise program.     Moderate Evaluation  97121  Re-evaluation   33027    Therapeutic exercise  12821  Therapeutic activity    24650  Neuromuscular re-education    35156  Manual therapy   51210  Gait training  41976    Attended e-stim  98695  Unattended e-stim (Medicaid/Medicare)     Moist heat/cryotherapy 32447   Ultrasound   73570  Iontophoresis   13726  Mechanical traction 97453          Manual Therapy:         mins  65837;  Therapeutic Exercise:         mins  48814;     Neuromuscular Alexx:        mins  95367;    Therapeutic Activity:          mins  42437;     Gait Training:           mins  75453;     Ultrasound:          mins  01643;    Electrical Stimulation:         mins  18876 ( );  Dry Needling          mins self-pay    Timed Treatment:   0   mins   Total Treatment:     29   mins    PT SIGNATURE: Ashley Claudene Dalton, PT   DATE TREATMENT INITIATED: 1/6/2022  [place] License: [id number]    Initial Certification  Certification Period: 4/6/2022  I certify that the therapy services are furnished while this patient is under my care.  The services outlined above are required by this patient, and will be reviewed every 90 days.     PHYSICIAN: Anatoliy Stein, DO      DATE:     Please sign and return via fax to [phone].. Thank you, [facility].

## 2022-01-11 ENCOUNTER — TREATMENT (OUTPATIENT)
Dept: PHYSICAL THERAPY | Facility: CLINIC | Age: 67
End: 2022-01-11

## 2022-01-11 DIAGNOSIS — M54.41 ACUTE RIGHT-SIDED LOW BACK PAIN WITH RIGHT-SIDED SCIATICA: ICD-10-CM

## 2022-01-11 DIAGNOSIS — S39.012D STRAIN OF LUMBAR REGION, SUBSEQUENT ENCOUNTER: Primary | ICD-10-CM

## 2022-01-11 DIAGNOSIS — R29.898 IMPAIRED STRENGTH OF LOWER EXTREMITY: ICD-10-CM

## 2022-01-11 PROCEDURE — G0283 ELEC STIM OTHER THAN WOUND: HCPCS | Performed by: PHYSICAL THERAPIST

## 2022-01-11 PROCEDURE — 97110 THERAPEUTIC EXERCISES: CPT | Performed by: PHYSICAL THERAPIST

## 2022-01-11 NOTE — PROGRESS NOTES
Physical Therapy Daily Treatment Note      Patient: Breanna Kaba   : 1955  Referring practitioner: Anatoliy Stein DO  Date of Initial Visit: Type: THERAPY  Noted: 2022  Today's Date: 2022  Patient seen for 2 sessions       Visit Diagnoses:    ICD-10-CM ICD-9-CM   1. Strain of lumbar region, subsequent encounter  S39.012D V58.89     847.2   2. Acute right-sided low back pain with right-sided sciatica  M54.41 724.2     724.3   3. Impaired strength of lower extremity  R29.898 729.89       Subjective Evaluation    History of Present Illness    Subjective comment: Patient reports that she has 10/10 pain today and notes that her right hip is really hurting.Pain  Current pain rating: 10           Objective   See Exercise, Manual, and Modality Logs for complete treatment.       Assessment & Plan     Assessment    Assessment details: Therapy session initiated with MH/ESTIM to the lumbar region to assist with pain control.  No adverse reactions were noted with modalities.  There ex focused on lumbar ROM, LE strengthening, stretching, core strengthening, and postural awareness.  Patient instructed to perform exercises per her tolerance, with patient verbalizing understanding.  Rest breaks provided as necessary throughout session.  Patient reported no change in pain at conclusion of treatment session.  She will continue to be progressed per her tolerance and POC.          Timed:         Manual Therapy:         mins  52022;     Therapeutic Exercise:    26     mins  43959;     Neuromuscular Alexx:        mins  53934;    Therapeutic Activity:          mins  40750;     Gait Training:           mins  35888;     Ultrasound:          mins  27589;    Ionto                                   mins   32239  Self Care                            mins   61583  Canalith Repos         mins 22164      Un-Timed:  Electrical Stimulation:    15     mins  93754 ( );  Dry Needling         mins self-pay  Traction           mins 25177      Timed Treatment:   26   mins   Total Treatment:     41   mins    Loren Wang, PT  [place] License: [id number]

## 2022-01-13 ENCOUNTER — TREATMENT (OUTPATIENT)
Dept: PHYSICAL THERAPY | Facility: CLINIC | Age: 67
End: 2022-01-13

## 2022-01-13 DIAGNOSIS — R29.898 IMPAIRED STRENGTH OF LOWER EXTREMITY: ICD-10-CM

## 2022-01-13 DIAGNOSIS — M54.41 ACUTE RIGHT-SIDED LOW BACK PAIN WITH RIGHT-SIDED SCIATICA: ICD-10-CM

## 2022-01-13 DIAGNOSIS — S39.012D STRAIN OF LUMBAR REGION, SUBSEQUENT ENCOUNTER: Primary | ICD-10-CM

## 2022-01-13 PROCEDURE — 97110 THERAPEUTIC EXERCISES: CPT | Performed by: PHYSICAL THERAPIST

## 2022-01-13 PROCEDURE — G0283 ELEC STIM OTHER THAN WOUND: HCPCS | Performed by: PHYSICAL THERAPIST

## 2022-01-13 PROCEDURE — 97035 APP MDLTY 1+ULTRASOUND EA 15: CPT | Performed by: PHYSICAL THERAPIST

## 2022-01-13 NOTE — PROGRESS NOTES
Physical Therapy Daily Treatment Note      Patient: Breanna Kaba   : 1955  Referring practitioner: Anatoliy Stein DO  Date of Initial Visit: Type: THERAPY  Noted: 2022  Today's Date: 2022  Patient seen for 3 sessions       Visit Diagnoses:    ICD-10-CM ICD-9-CM   1. Strain of lumbar region, subsequent encounter  S39.012D V58.89     847.2   2. Acute right-sided low back pain with right-sided sciatica  M54.41 724.2     724.3   3. Impaired strength of lower extremity  R29.898 729.89       Subjective   Patient reports that she is having 10/10 pain in her right hip. Patient states her right hip gives out on her at times.     Objective   See Exercise, Manual, and Modality Logs for complete treatment.       Assessment/Plan  Patient tolerated treatment session well with rest breaks taken as needed by the patient. Educated patient to perform therex per her tolerance, patient verbalized understanding. Ultrasound performed to the right hip musculature to decrease inflammation in the area. New therex added per the patient's tolerance, patient demonstrated and understood new therex with no increase in pain noted. Treatment session consisted of exercises and ROM to the low back. 8/10 pain noted following treatment session. Continue per PT's POC, progress exercises per the patient's tolerance.     Timed:         Manual Therapy:         mins  12211;     Therapeutic Exercise:    35     mins  99129;     Neuromuscular Alexx:        mins  98581;    Therapeutic Activity:          mins  71945;     Gait Training:           mins  80356;     Ultrasound:     8     mins  22520;    Ionto                                   mins   15220  Self Care                            mins   10573  Canalith Repos         mins 85300      Un-Timed:  Electrical Stimulation:    10     mins  19602 ( );  Dry Needling          mins self-pay  Traction          mins 11897      Timed Treatment:   43   mins   Total Treatment:     53    jimmie Oliveira, PTA  [place] License: [id number]

## 2022-01-24 ENCOUNTER — TREATMENT (OUTPATIENT)
Dept: PHYSICAL THERAPY | Facility: CLINIC | Age: 67
End: 2022-01-24

## 2022-01-24 DIAGNOSIS — M54.41 ACUTE RIGHT-SIDED LOW BACK PAIN WITH RIGHT-SIDED SCIATICA: ICD-10-CM

## 2022-01-24 DIAGNOSIS — S39.012D STRAIN OF LUMBAR REGION, SUBSEQUENT ENCOUNTER: Primary | ICD-10-CM

## 2022-01-24 DIAGNOSIS — R29.898 IMPAIRED STRENGTH OF LOWER EXTREMITY: ICD-10-CM

## 2022-01-24 PROCEDURE — G0283 ELEC STIM OTHER THAN WOUND: HCPCS | Performed by: PHYSICAL THERAPIST

## 2022-01-24 PROCEDURE — 97035 APP MDLTY 1+ULTRASOUND EA 15: CPT | Performed by: PHYSICAL THERAPIST

## 2022-01-24 PROCEDURE — 97110 THERAPEUTIC EXERCISES: CPT | Performed by: PHYSICAL THERAPIST

## 2022-01-24 NOTE — PROGRESS NOTES
Physical Therapy Daily Treatment Note      Patient: Breanna Kaba   : 1955  Referring practitioner: Anatoliy Stein DO  Date of Initial Visit: Type: THERAPY  Noted: 2022  Today's Date: 2022  Patient seen for 4 sessions       Visit Diagnoses:    ICD-10-CM ICD-9-CM   1. Strain of lumbar region, subsequent encounter  S39.012D V58.89     847.2   2. Acute right-sided low back pain with right-sided sciatica  M54.41 724.2     724.3   3. Impaired strength of lower extremity  R29.898 729.89       Subjective Evaluation    History of Present Illness    Subjective comment: Pt reports having 10/10 pain today.       Objective   See Exercise, Manual, and Modality Logs for complete treatment.       Assessment & Plan     Assessment    Assessment details: Tx today consisted of mh and estim to lumbar region for improved mobility and pain control followed by there ex with increased reps for improved hip stability and ended with US to right glut to decrease inflammation.  Pt reported similar pain at 9/10 post tx.      Plan  Plan details: Will follow for decreased hip pain and anticipate increased reps with exercises for improved hip stability.          Timed:         Manual Therapy:         mins  34005;     Therapeutic Exercise:    32     mins  66540;     Neuromuscular Alexx:        mins  89518;    Therapeutic Activity:          mins  94277;     Gait Training:           mins  81629;     Ultrasound:     8     mins  39519;    Ionto                                   mins   43553  Self Care                            mins   94274  Canalith Repos         mins 68831      Un-Timed:  Electrical Stimulation:    10     mins  47078 ( );  Dry Needling          mins self-pay  Traction          mins 34549      Timed Treatment:   40   mins   Total Treatment:     50   mins    Rigoberto Salazar, PT  [place] License: [id number]

## 2022-01-31 ENCOUNTER — TREATMENT (OUTPATIENT)
Dept: PHYSICAL THERAPY | Facility: CLINIC | Age: 67
End: 2022-01-31

## 2022-01-31 DIAGNOSIS — S39.012D STRAIN OF LUMBAR REGION, SUBSEQUENT ENCOUNTER: Primary | ICD-10-CM

## 2022-01-31 DIAGNOSIS — R29.898 IMPAIRED STRENGTH OF LOWER EXTREMITY: ICD-10-CM

## 2022-01-31 DIAGNOSIS — M54.41 ACUTE RIGHT-SIDED LOW BACK PAIN WITH RIGHT-SIDED SCIATICA: ICD-10-CM

## 2022-01-31 PROCEDURE — 97110 THERAPEUTIC EXERCISES: CPT | Performed by: PHYSICAL THERAPIST

## 2022-01-31 PROCEDURE — 97035 APP MDLTY 1+ULTRASOUND EA 15: CPT | Performed by: PHYSICAL THERAPIST

## 2022-01-31 PROCEDURE — G0283 ELEC STIM OTHER THAN WOUND: HCPCS | Performed by: PHYSICAL THERAPIST

## 2022-01-31 NOTE — PROGRESS NOTES
"Physical Therapy Daily Treatment Note      Patient: Breanna Kaba   : 1955  Referring practitioner: Anatoliy Stein DO  Date of Initial Visit: Type: THERAPY  Noted: 2022  Today's Date: 2022  Patient seen for 5 sessions       Visit Diagnoses:    ICD-10-CM ICD-9-CM   1. Strain of lumbar region, subsequent encounter  S39.012D V58.89     847.2   2. Acute right-sided low back pain with right-sided sciatica  M54.41 724.2     724.3   3. Impaired strength of lower extremity  R29.898 729.89       Subjective Evaluation    History of Present Illness    Subjective comment: Pt reports 10/10 low back pain prior to today's session. She states, \"it just won't get better\".Pain  Current pain rating: 10           Objective   See Exercise, Manual, and Modality Logs for complete treatment.       Assessment & Plan     Assessment    Assessment details: Today's treatment session was initiated with moist heat combined with IFC to the lumbar region for pain relief and muscle relaxation. Therapeutic exercises were progressed to include YTB hip abduction and knee flexion. The patient reported no increase in pain with progressed exercises. The treatment concluded with therapeutic ultrasound to the right gluteal region with the patient in the left side-lying position and a pillow between her legs. No skin irritation was observed following modalities. The patient reported 5/10 lumbar pain following today's treatment.    Plan  Plan details: Progress resisted activities for improved functional mobility.          Timed:         Manual Therapy:         mins  91167;     Therapeutic Exercise:   33    mins  74844;     Neuromuscular Alexx:        mins  39703;    Therapeutic Activity:          mins  13527;     Gait Training:           mins  03282;     Ultrasound:    8     mins  04491;    Ionto                                   mins   99823  Self Care                            mins   94604  Canalith Repos         mins " 37699      Un-Timed:  Electrical Stimulation:    10     mins  74442 ( );  Dry Needling          mins self-pay  Traction          mins 68852      Timed Treatment:   41   mins   Total Treatment:     51   mins    Ashley Claudene Dalton, PT  [place] License: [id number]    Electronically signed by Ashley Claudene Dalton, PT, 01/31/22, 11:10 AM EST.

## 2022-02-03 ENCOUNTER — TREATMENT (OUTPATIENT)
Dept: PHYSICAL THERAPY | Facility: CLINIC | Age: 67
End: 2022-02-03

## 2022-02-03 DIAGNOSIS — R29.898 IMPAIRED STRENGTH OF LOWER EXTREMITY: ICD-10-CM

## 2022-02-03 DIAGNOSIS — S39.012D STRAIN OF LUMBAR REGION, SUBSEQUENT ENCOUNTER: Primary | ICD-10-CM

## 2022-02-03 DIAGNOSIS — M54.41 ACUTE RIGHT-SIDED LOW BACK PAIN WITH RIGHT-SIDED SCIATICA: ICD-10-CM

## 2022-02-03 PROCEDURE — G0283 ELEC STIM OTHER THAN WOUND: HCPCS | Performed by: PHYSICAL THERAPIST

## 2022-02-03 PROCEDURE — 97110 THERAPEUTIC EXERCISES: CPT | Performed by: PHYSICAL THERAPIST

## 2022-02-03 NOTE — TELEPHONE ENCOUNTER
CR placed and prep sent   SCRIBE #1 NOTE: I, Marlys Davies, am scribing for, and in the presence of, Suleman Silverman MD. I have scribed the entire note.       History     Chief Complaint   Patient presents with    Shortness of Breath     Pt came from urgent care with bronchitis and COPD. Pt states onset of coughing today.     Review of patient's allergies indicates:   Allergen Reactions    Celecoxib Rash and Swelling    Meperidine Nausea And Vomiting and Swelling    Sulfa (sulfonamide antibiotics) Rash and Swelling    Beef containing products     Cephalosporins     Clindamycin     Penicillins     Pork/porcine containing products     Clarithromycin Rash    Codeine Nausea And Vomiting    Doxycycline Rash         History of Present Illness     HPI    2/2/2022, 9:28 PM  History obtained from the patient      History of Present Illness: Teena S Pumphrey is a 57 y.o. female patient with a PMHx of asthma, COPD, and HTN who presents to the Emergency Department for evaluation of SOB which onset gradually 1 day ago. Symptoms are constant and moderate in severity. Sxs are exacerbated when pt walks. Associated sxs include productive cough and wheezing. Patient denies any leg swelling, CP, congestion, sore throat, chest tightness, fever, chills, n/v, and all other sxs at this time. Pt states her home nebulizer only improves her sxs temporarily. She was seen at urgent care pta and sent to the ED after dx of bronchitis. She tested negative for COVID at urgent care. Pt is a smoker. No further complaints or concerns at this time.       Arrival mode: Personal vehicle    PCP: Rubio Carrizales MD        Past Medical History:  Past Medical History:   Diagnosis Date    Asthma     Hypertension        Past Surgical History:  Past Surgical History:   Procedure Laterality Date    BACK SURGERY      x2    elbow surgery Right     HYSTERECTOMY  1985    KNEE SURGERY Right     OOPHORECTOMY Bilateral 1985    SHOULDER SURGERY Right     SINUS SURGERY       TONSILLECTOMY           Family History:  Family History   Problem Relation Age of Onset    Colon cancer Neg Hx     Uterine cancer Neg Hx     Ovarian cancer Neg Hx     Breast cancer Neg Hx        Social History:  Social History     Tobacco Use    Smoking status: Current Every Day Smoker    Smokeless tobacco: Never Used   Substance and Sexual Activity    Alcohol use: Not Currently    Drug use: Never    Sexual activity: Yes     Partners: Male        Review of Systems     Review of Systems   Constitutional: Negative for chills and fever.   HENT: Negative for congestion and sore throat.    Respiratory: Positive for cough (productive), shortness of breath and wheezing. Negative for chest tightness.    Cardiovascular: Negative for chest pain and leg swelling.   Gastrointestinal: Negative for nausea and vomiting.   Genitourinary: Negative for dysuria.   Musculoskeletal: Negative for back pain.   Skin: Negative for rash.   Neurological: Negative for weakness.   Hematological: Does not bruise/bleed easily.   All other systems reviewed and are negative.     Physical Exam     Initial Vitals [02/02/22 2034]   BP Pulse Resp Temp SpO2   (!) 183/82 75 (!) 24 99.1 °F (37.3 °C) (!) 92 %      MAP       --          Physical Exam  Nursing Notes and Vital Signs Reviewed.  Constitutional: Patient is in no acute distress. Well-developed and well-nourished.  Head: Atraumatic. Normocephalic.  Eyes: PERRL. EOM intact. Conjunctivae are not pale. No scleral icterus.  ENT: Mucous membranes are moist. Oropharynx is clear and symmetric. Edentulous.   Neck: Supple. Full ROM. No lymphadenopathy.  Cardiovascular: Regular rate. Regular rhythm. No murmurs, rubs, or gallops. Distal pulses are 2+ and symmetric.  Pulmonary/Chest: No respiratory distress. Diffuse wheezes. No rales.  Abdominal: Soft and non-distended.  There is no tenderness.  No rebound, guarding, or rigidity. Good bowel sounds.  Genitourinary: No CVA tenderness  Musculoskeletal:  "Moves all extremities. No obvious deformities. No edema. No calf tenderness.  Skin: Warm and dry.  Neurological:  Alert, awake, and appropriate.  Normal speech.  No acute focal neurological deficits are appreciated.  Psychiatric: Normal affect. Good eye contact. Appropriate in content.     ED Course   Procedures  ED Vital Signs:  Vitals:    02/02/22 2034 02/02/22 2100 02/02/22 2202 02/02/22 2249   BP: (!) 183/82 (!) 180/82 (!) 179/88 (!) 179/88   Pulse: 75 70 (!) 55 64   Resp: (!) 24 19 20 20   Temp: 99.1 °F (37.3 °C)   98.9 °F (37.2 °C)   TempSrc: Oral   Oral   SpO2: (!) 92% 96% 99% 99%   Weight: 70.1 kg (154 lb 8.7 oz)      Height: 5' 7" (1.702 m)          Abnormal Lab Results:  Labs Reviewed   CBC W/ AUTO DIFFERENTIAL - Abnormal; Notable for the following components:       Result Value    MCH 31.2 (*)     Gran % 73.6 (*)     Lymph % 17.6 (*)     All other components within normal limits   COMPREHENSIVE METABOLIC PANEL - Abnormal; Notable for the following components:    ALT 9 (*)     All other components within normal limits   B-TYPE NATRIURETIC PEPTIDE   TROPONIN I        All Lab Results:  Results for orders placed or performed during the hospital encounter of 02/02/22   CBC auto differential   Result Value Ref Range    WBC 6.93 3.90 - 12.70 K/uL    RBC 4.30 4.00 - 5.40 M/uL    Hemoglobin 13.4 12.0 - 16.0 g/dL    Hematocrit 41.1 37.0 - 48.5 %    MCV 96 82 - 98 fL    MCH 31.2 (H) 27.0 - 31.0 pg    MCHC 32.6 32.0 - 36.0 g/dL    RDW 13.2 11.5 - 14.5 %    Platelets 173 150 - 450 K/uL    MPV 11.0 9.2 - 12.9 fL    Immature Granulocytes 0.3 0.0 - 0.5 %    Gran # (ANC) 5.1 1.8 - 7.7 K/uL    Immature Grans (Abs) 0.02 0.00 - 0.04 K/uL    Lymph # 1.2 1.0 - 4.8 K/uL    Mono # 0.4 0.3 - 1.0 K/uL    Eos # 0.1 0.0 - 0.5 K/uL    Baso # 0.05 0.00 - 0.20 K/uL    nRBC 0 0 /100 WBC    Gran % 73.6 (H) 38.0 - 73.0 %    Lymph % 17.6 (L) 18.0 - 48.0 %    Mono % 5.8 4.0 - 15.0 %    Eosinophil % 2.0 0.0 - 8.0 %    Basophil % 0.7 0.0 - " 1.9 %    Differential Method Automated    Comprehensive metabolic panel   Result Value Ref Range    Sodium 142 136 - 145 mmol/L    Potassium 4.1 3.5 - 5.1 mmol/L    Chloride 108 95 - 110 mmol/L    CO2 24 23 - 29 mmol/L    Glucose 103 70 - 110 mg/dL    BUN 9 6 - 20 mg/dL    Creatinine 0.8 0.5 - 1.4 mg/dL    Calcium 8.7 8.7 - 10.5 mg/dL    Total Protein 7.0 6.0 - 8.4 g/dL    Albumin 4.0 3.5 - 5.2 g/dL    Total Bilirubin 0.7 0.1 - 1.0 mg/dL    Alkaline Phosphatase 60 55 - 135 U/L    AST 11 10 - 40 U/L    ALT 9 (L) 10 - 44 U/L    Anion Gap 10 8 - 16 mmol/L    eGFR if African American >60 >60 mL/min/1.73 m^2    eGFR if non African American >60 >60 mL/min/1.73 m^2   Brain natriuretic peptide   Result Value Ref Range    BNP 12 0 - 99 pg/mL   Troponin I   Result Value Ref Range    Troponin I <0.006 0.000 - 0.026 ng/mL       Imaging Results:  Imaging Results          X-Ray Chest 1 View (Final result)  Result time 02/02/22 21:08:41    Final result by Gabriela Arredondo MD (02/02/22 21:08:41)                 Impression:      No acute abnormality.      Electronically signed by: Arnulfo Ochoa  Date:    02/02/2022  Time:    21:08             Narrative:    EXAMINATION:  XR CHEST 1 VIEW    CLINICAL HISTORY:  Shortness of breath    TECHNIQUE:  Single frontal view of the chest was performed.    COMPARISON:  None    FINDINGS:  The lungs are clear, with normal appearance of pulmonary vasculature and no pleural effusion or pneumothorax.    The cardiac silhouette is normal in size. The hilar and mediastinal contours are unremarkable.    Bones are intact.  Senescent changes                                 The EKG was ordered, reviewed, and independently interpreted by the ED provider.  Interpretation time: 2050  Rate: 60 BPM  Rhythm: normal sinus rhythm  Interpretation: No acute ST changes. No STEMI.           The Emergency Provider reviewed the vital signs and test results, which are outlined above.     ED Discussion     10:27 PM: Reassessed  pt at this time.  Pt's lungs sound much better after breathing treatment. Discussed with pt all pertinent ED information and results. Discussed pt dx and plan of tx. Gave pt all f/u and return to the ED instructions. All questions and concerns were addressed at this time. Pt expresses understanding of information and instructions, and is comfortable with plan to discharge. Pt is stable for discharge.    I discussed with patient and/or family/caretaker that evaluation in the ED does not suggest any emergent or life threatening medical conditions requiring immediate intervention beyond what was provided in the ED, and I believe patient is safe for discharge.  Regardless, an unremarkable evaluation in the ED does not preclude the development or presence of a serious of life threatening condition. As such, patient was instructed to return immediately for any worsening or change in current symptoms.         Medical Decision Making:   Clinical Tests:   Lab Tests: Ordered and Reviewed  Radiological Study: Ordered and Reviewed  Medical Tests: Ordered and Reviewed     Additional MDM:   Smoking Cessation: The patient is a smoker. The patient was counseled on smoking cessation for: 4 minutes. The patient was counseled on tobacco related  health complications.        ED Medication(s):  Medications   methylPREDNISolone sodium succinate injection 125 mg (125 mg Intravenous Given 2/2/22 2204)   albuterol-ipratropium 2.5 mg-0.5 mg/3 mL nebulizer solution 3 mL (3 mLs Nebulization Given 2/2/22 2202)       Discharge Medication List as of 2/2/2022 10:27 PM      START taking these medications    Details   predniSONE (DELTASONE) 50 MG Tab Take 1 tablet (50 mg total) by mouth once daily. for 5 days, Starting Wed 2/2/2022, Until Mon 2/7/2022, Print              Follow-up Information     Rubio Carrizales MD In 2 days.    Specialty: Family Medicine  Contact information:  [address] [place] [address] [phone]              [facility] - Emergency Dept..    Specialty: Emergency Medicine  Why: As needed, If symptoms worsen  Contact information:  [address] [facility] [address] [phone]                           Scribe Attestation:   Scribe #1: I performed the above scribed service and the documentation accurately describes the services I performed. I attest to the accuracy of the note.     Attending:   Physician Attestation Statement for Scribe #1: I, Suleman Silverman MD, personally performed the services described in this documentation, as scribed by Marlys Davies, in my presence, and it is both accurate and complete.           Clinical Impression       ICD-10-CM ICD-9-CM   1. COPD exacerbation  J44.1 491.21   2. Shortness of breath  R06.02 786.05   3. Primary hypertension  I10 401.9       Disposition:   Disposition: Discharged  Condition: Stable         Suleman Silverman MD  02/03/22 9656

## 2022-02-03 NOTE — PROGRESS NOTES
Physical Therapy Progress Note  Patient: Breanna Kaba   : 1955  Diagnosis/ICD-10 Code:  Strain of lumbar region, subsequent encounter [S39.012D]  Referring practitioner: Anatoliy Stein DO  Date of Initial Visit: Type: THERAPY  Noted: 2022  Today's Date: 2/3/2022  Patient seen for 6 sessions         Visit Diagnoses:    ICD-10-CM ICD-9-CM   1. Strain of lumbar region, subsequent encounter  S39.012D V58.89     847.2   2. Acute right-sided low back pain with right-sided sciatica  M54.41 724.2     724.3   3. Impaired strength of lower extremity  R29.898 729.89         Subjective Questionnaire: Oswestry: 20/50=40% impaired  Clinical Progress: improved  Home Program Compliance: Yes      Subjective Evaluation    History of Present Illness    Subjective comment: Patient reports that she believes therapy is helping, noting that she is able to walk a little bit better; however, she continues to have difficulty walking for extended lengths of time in the grocery store.  She also notes that she is able to stand to wash dishes instead of sitting down.Pain  Current pain ratin  At best pain ratin  At worst pain rating: 10             Objective          Postural Observations  Seated posture: poor    Additional Postural Observation Details  Flexed forward posture.    Active Range of Motion     Lumbar   Flexion: Active lumbar flexion: 75%   Extension: Active lumbar extension: 25% available.   Left lateral flexion: Active left lumbar lateral flexion: 50% available.   Right lateral flexion: Active right lumbar lateral flexion: 50% available.   Left rotation: Active left lumbar rotation: 75%   Right rotation: Active right lumbar rotation: 50%     Strength/Myotome Testing     Left Hip   Planes of Motion   Flexion: 4-  Abduction: 4  Adduction: 4    Right Hip   Planes of Motion   Flexion: 4-  Abduction: 4  Adduction: 4    Left Knee   Flexion: 3+  Extension: 4    Right Knee   Flexion: 4-  Extension: 4    Left  Ankle/Foot   Dorsiflexion: 4+    Right Ankle/Foot   Dorsiflexion: 4+          Assessment & Plan     Assessment  Impairments: abnormal gait, abnormal muscle firing, abnormal or restricted ROM, activity intolerance, impaired balance, impaired physical strength, lacks appropriate home exercise program, pain with function and weight-bearing intolerance  Functional Limitations: carrying objects, lifting, sleeping, walking, pulling, pushing, uncomfortable because of pain, moving in bed, sitting, standing, stooping, reaching behind back, reaching overhead and unable to perform repetitive tasks  Assessment details: The patient has been attending therapy for low back and right upper thigh pain; she has attended therapy for a total of 6 sessions.  Patient has shown improvements in lumbar ROM and LE strength since start of care.  Patient continues to note elevated pain levels, noting 7/10 pain at best and 10/10 pain at worst.  Patient has currently met 1/3 STGs and 1/5 LTGs; patient has partially met 2/3 STGs.  Patient currently reports a 40% functional mobility impairment, based on her response to the Oswestry.  Patient will continue to benefit from skilled PT so that she can achieve her maximum level of function.    Prognosis: fair    Goals  Plan Goals: SHORT TERM GOALS:     2 weeks  1. Pt will be instructed in HEP for improved independence.  Met  2. Pt to demonstrate 50% lumbar AROM to allow for improved ability to perform ADL's.  Partially met  3. Pt to demonstrate 4-/5 bilateral lower extremity strength for improved balance and stability.  Partially met    LONG TERM GOALS:   6 weeks  1. Pt to demonstrate 75% lumbar AROM for improved ability to perform functional activities.  Ongoing, progressing  2. Pt to perform sit<>stand transfers with no flexed forward posture and reports of pain for improved ability to get into/out of her car.  Ongoing, progressing  3. Pt to report less than 20% impairment on the Modified Oswestry  for improved functional independence.  Ongoing-40% impaired  4. Pt to report the ability to sleep through the night with no more than two interruptions due to lumbar pain for improved QOL.  Met-wakes up no more than 2x/night  5. Pt will report no more than 5/10 lumbar pain with daily activities for improved functional independence.  Ongoing-reports up to 7/10 with daily activities      Plan  Therapy options: will be seen for skilled therapy services  Planned modality interventions: cryotherapy, thermotherapy (hydrocollator packs), electrical stimulation/Russian stimulation, traction, ultrasound, dry needling and iontophoresis  Planned therapy interventions: manual therapy, home exercise program, flexibility, body mechanics training, strengthening, spinal/joint mobilization, postural training, abdominal trunk stabilization, balance/weight-bearing training, functional ROM exercises, gait training, joint mobilization, motor coordination training, neuromuscular re-education, soft tissue mobilization, stretching, therapeutic activities and transfer training  Frequency: 2x week  Duration in weeks: 8  Treatment plan discussed with: patient  Plan details: Re-evaluation   09657    Therapeutic exercise  93018  Therapeutic activity    86508  Neuromuscular re-education   12042  Manual therapy   85906  Gait training  88255    Attended e-stim  02682  Unattended e-stim (Medicaid/Medicare)     Moist heat/cryotherapy 41598   Ultrasound   06933  Iontophoresis   10076  Mechanical traction 47916             Recommendations: Continue as planned  Timeframe: 2 months  Prognosis to achieve goals: good      Timed:         Manual Therapy:         mins  99036;     Therapeutic Exercise:    35     mins  23991;     Neuromuscular Alexx:        mins  31304;    Therapeutic Activity:          mins  72806;     Gait Training:           mins  11803;     Ultrasound:     8     mins  95947;  (performed by Moon Oliveira PTA)  Keanu                                    mins   32063  Self Care                            mins   92396  Canalith Repos         mins 81679      Un-Timed:  Electrical Stimulation:    15     mins  74393 ( );  Dry Needling          mins self-pay  Traction          mins 28334  Re-Eval                               mins  64922      Timed Treatment:   43   mins   Total Treatment:     58   mins          PT: JAM Solitario License:  [id number]    Electronically signed by Loren Wang PT, 02/03/22, 10:26 AM EST    Certification Period: 2/3/2022 thru 5/3/2022  I certify that the therapy services are furnished while this patient is under my care.  The services outlined above are required by this patient, and will be reviewed every 90 days.         Physician Signature:__________________________________________________    PHYSICIAN: Anatoliy Stein DO  NPI: [id number]                                      DATE:  :     Please sign and return via fax to [email or web address] . Thank you, [facility]

## 2022-02-07 RX ORDER — KETOROLAC TROMETHAMINE 30 MG/ML
30 INJECTION, SOLUTION INTRAMUSCULAR; INTRAVENOUS ONCE
Status: DISCONTINUED | OUTPATIENT
Start: 2021-12-14 | End: 2021-12-18

## 2022-02-07 RX ORDER — KETOROLAC TROMETHAMINE 30 MG/ML
60 INJECTION, SOLUTION INTRAMUSCULAR; INTRAVENOUS ONCE
Status: DISCONTINUED | OUTPATIENT
Start: 2021-12-14 | End: 2021-12-18

## 2022-02-07 RX ORDER — KETOROLAC TROMETHAMINE 30 MG/ML
30 INJECTION, SOLUTION INTRAMUSCULAR; INTRAVENOUS ONCE
Status: DISCONTINUED | OUTPATIENT
Start: 2022-02-07 | End: 2022-02-07

## 2022-02-08 ENCOUNTER — TREATMENT (OUTPATIENT)
Dept: PHYSICAL THERAPY | Facility: CLINIC | Age: 67
End: 2022-02-08

## 2022-02-08 DIAGNOSIS — S39.012D STRAIN OF LUMBAR REGION, SUBSEQUENT ENCOUNTER: Primary | ICD-10-CM

## 2022-02-08 DIAGNOSIS — M54.41 ACUTE RIGHT-SIDED LOW BACK PAIN WITH RIGHT-SIDED SCIATICA: ICD-10-CM

## 2022-02-08 DIAGNOSIS — R29.898 IMPAIRED STRENGTH OF LOWER EXTREMITY: ICD-10-CM

## 2022-02-08 PROCEDURE — 97035 APP MDLTY 1+ULTRASOUND EA 15: CPT | Performed by: PHYSICAL THERAPIST

## 2022-02-08 PROCEDURE — 97110 THERAPEUTIC EXERCISES: CPT | Performed by: PHYSICAL THERAPIST

## 2022-02-08 PROCEDURE — G0283 ELEC STIM OTHER THAN WOUND: HCPCS | Performed by: PHYSICAL THERAPIST

## 2022-02-08 NOTE — PROGRESS NOTES
Physical Therapy Daily Treatment Note      Patient: Breanna Kaba   : 1955  Referring practitioner: Anatoliy Stein DO  Date of Initial Visit: Type: THERAPY  Noted: 2022  Today's Date: 2022  Patient seen for 7 sessions       Visit Diagnoses:    ICD-10-CM ICD-9-CM   1. Strain of lumbar region, subsequent encounter  S39.012D V58.89     847.2   2. Acute right-sided low back pain with right-sided sciatica  M54.41 724.2     724.3   3. Impaired strength of lower extremity  R29.898 729.89       Subjective Evaluation    History of Present Illness    Subjective comment: Pt reports of 6/10 pain today.       Objective   See Exercise, Manual, and Modality Logs for complete treatment.       Assessment & Plan     Assessment    Assessment details: Tx today consisted of mh and estim to lower back for improved mobility and decreased pain followed by there ex for improved leg stability and core stability and ended with US to glut for decreased inflammation and pain.  Pt demonstrated good effort today with report of decreased pain to 2/10 post tx.    Plan  Plan details: Will follow progressing core stability and decreased pain in order to improve ADLs.            Timed:         Manual Therapy:         mins  27040;     Therapeutic Exercise:    31     mins  21754;     Neuromuscular Alexx:        mins  22509;    Therapeutic Activity:          mins  63711;     Gait Training:           mins  16619;     Ultrasound:     8     mins  41388;    Ionto                                   mins   56844  Self Care                            mins   89825  Canalith Repos         mins 10928      Un-Timed:  Electrical Stimulation:    15     mins  98153 (MC );  Dry Needling          mins self-pay  Traction          mins 24721      Timed Treatment:   39   mins   Total Treatment:     54   mins    Rigoberto Salazar PT  [place] License: [id number]      Electronically signed by Rigoberto Salazar, PT, 22, 1:41 PM EST

## 2022-02-09 RX ORDER — KETOROLAC TROMETHAMINE 30 MG/ML
60 INJECTION, SOLUTION INTRAMUSCULAR; INTRAVENOUS ONCE
Status: SHIPPED | OUTPATIENT
Start: 2022-02-09 | End: 2022-02-14

## 2022-02-09 RX ORDER — KETOROLAC TROMETHAMINE 30 MG/ML
60 INJECTION, SOLUTION INTRAMUSCULAR; INTRAVENOUS ONCE
Status: DISCONTINUED | OUTPATIENT
Start: 2021-12-14 | End: 2021-12-18

## 2022-02-14 ENCOUNTER — TREATMENT (OUTPATIENT)
Dept: PHYSICAL THERAPY | Facility: CLINIC | Age: 67
End: 2022-02-14

## 2022-02-14 DIAGNOSIS — R29.898 IMPAIRED STRENGTH OF LOWER EXTREMITY: ICD-10-CM

## 2022-02-14 DIAGNOSIS — M54.41 ACUTE RIGHT-SIDED LOW BACK PAIN WITH RIGHT-SIDED SCIATICA: ICD-10-CM

## 2022-02-14 DIAGNOSIS — S39.012D STRAIN OF LUMBAR REGION, SUBSEQUENT ENCOUNTER: Primary | ICD-10-CM

## 2022-02-14 PROCEDURE — 97035 APP MDLTY 1+ULTRASOUND EA 15: CPT | Performed by: PHYSICAL THERAPIST

## 2022-02-14 PROCEDURE — G0283 ELEC STIM OTHER THAN WOUND: HCPCS | Performed by: PHYSICAL THERAPIST

## 2022-02-14 PROCEDURE — 97110 THERAPEUTIC EXERCISES: CPT | Performed by: PHYSICAL THERAPIST

## 2022-02-14 NOTE — PROGRESS NOTES
Physical Therapy Daily Treatment Note      Patient: Breanna Kaba   : 1955  Referring practitioner: Anatoliy Stein DO  Date of Initial Visit: Type: THERAPY  Noted: 2022  Today's Date: 2022  Patient seen for 8 sessions       Visit Diagnoses:    ICD-10-CM ICD-9-CM   1. Strain of lumbar region, subsequent encounter  S39.012D V58.89     847.2   2. Acute right-sided low back pain with right-sided sciatica  M54.41 724.2     724.3   3. Impaired strength of lower extremity  R29.898 729.89       Subjective:  Patient arrives to therapy w/ reports of /10 low back, and right hip pain.  Patient states she feels like therapy is helping and she would like to continue if her insurance will approve.     Objective   See Exercise, Manual, and Modality Logs for complete treatment.       Assessment/Plan:  Patient responded well to today's session w/ reports of slight increase in soreness following, 5/10.  No complaints, or signs of distress observed.  Pt performed therex as listed w/ continued focus on improved lumbar ROM, improved lumbar stability and bilateral LE strength, as tolerated.  Exercise progressed w/ resistance of tband increased from red to green w/ good tolerance observed.  Pt required cues and demonstration while completing exercise for proper form, and for max benefit.  Continuous US performed to right lower lumbar/ glut region f/b MH w/ Estim.  Pt continues to benefit from therapy services, and will be progressed as tolerated to address goals, reduce pain, and restore function.  Continue w/ PT's POC.       Timed:         Manual Therapy:         mins  44604;     Therapeutic Exercise:    32     mins  96829;     Neuromuscular Alexx:        mins  99427;    Therapeutic Activity:          mins  69313;     Gait Training:           mins  70370;     Ultrasound:    8      mins  45313;    Ionto                                   mins   14800  Self Care                            mins   72190  [facility]          mins 26614      Un-Timed:  Electrical Stimulation:   10      mins  51844 ( );  Dry Needling          mins self-pay  Traction          mins 37485      Timed Treatment:  40    mins   Total Treatment:    50    mins    Kari Benrstein. PATI Diaz  [place] License: [id number]

## 2022-02-21 ENCOUNTER — TELEPHONE (OUTPATIENT)
Dept: FAMILY MEDICINE CLINIC | Facility: CLINIC | Age: 67
End: 2022-02-21

## 2022-02-21 DIAGNOSIS — E11.65 TYPE 2 DIABETES MELLITUS WITH HYPERGLYCEMIA, WITHOUT LONG-TERM CURRENT USE OF INSULIN: ICD-10-CM

## 2022-02-21 RX ORDER — GLIPIZIDE 5 MG/1
5 TABLET ORAL DAILY
Qty: 30 TABLET | Refills: 5 | Status: SHIPPED | OUTPATIENT
Start: 2022-02-21 | End: 2022-04-03 | Stop reason: HOSPADM

## 2022-02-21 NOTE — TELEPHONE ENCOUNTER
Refill request for Metformin HCL 1000 MG Tab and Glipizide 5MG Tablet. Medication sent to pharmacy.

## 2022-02-24 ENCOUNTER — OFFICE VISIT (OUTPATIENT)
Dept: FAMILY MEDICINE CLINIC | Facility: CLINIC | Age: 67
End: 2022-02-24

## 2022-02-24 VITALS
BODY MASS INDEX: 27.12 KG/M2 | SYSTOLIC BLOOD PRESSURE: 124 MMHG | TEMPERATURE: 97.1 F | OXYGEN SATURATION: 98 % | DIASTOLIC BLOOD PRESSURE: 64 MMHG | HEART RATE: 106 BPM | WEIGHT: 147.4 LBS | HEIGHT: 62 IN

## 2022-02-24 DIAGNOSIS — H61.23 BILATERAL IMPACTED CERUMEN: ICD-10-CM

## 2022-02-24 DIAGNOSIS — M25.551 RIGHT HIP PAIN: Primary | ICD-10-CM

## 2022-02-24 PROCEDURE — 96372 THER/PROPH/DIAG INJ SC/IM: CPT | Performed by: FAMILY MEDICINE

## 2022-02-24 PROCEDURE — 99214 OFFICE O/P EST MOD 30 MIN: CPT | Performed by: FAMILY MEDICINE

## 2022-02-24 PROCEDURE — 69209 REMOVE IMPACTED EAR WAX UNI: CPT | Performed by: FAMILY MEDICINE

## 2022-02-24 RX ORDER — ALENDRONATE SODIUM 70 MG/1
70 TABLET ORAL
COMMUNITY
Start: 2022-01-25 | End: 2022-06-13 | Stop reason: SDDI

## 2022-02-24 RX ORDER — KETOROLAC TROMETHAMINE 30 MG/ML
30 INJECTION, SOLUTION INTRAMUSCULAR; INTRAVENOUS EVERY 6 HOURS PRN
Status: DISCONTINUED | OUTPATIENT
Start: 2022-02-24 | End: 2022-02-24

## 2022-02-24 RX ORDER — KETOROLAC TROMETHAMINE 30 MG/ML
30 INJECTION, SOLUTION INTRAMUSCULAR; INTRAVENOUS ONCE
Status: COMPLETED | OUTPATIENT
Start: 2022-02-24 | End: 2022-02-24

## 2022-02-24 RX ADMIN — KETOROLAC TROMETHAMINE 30 MG: 30 INJECTION, SOLUTION INTRAMUSCULAR; INTRAVENOUS at 16:08

## 2022-02-25 ENCOUNTER — LAB (OUTPATIENT)
Dept: FAMILY MEDICINE CLINIC | Facility: CLINIC | Age: 67
End: 2022-02-25

## 2022-02-25 PROCEDURE — 85025 COMPLETE CBC W/AUTO DIFF WBC: CPT | Performed by: FAMILY MEDICINE

## 2022-02-25 PROCEDURE — 80053 COMPREHEN METABOLIC PANEL: CPT | Performed by: FAMILY MEDICINE

## 2022-02-25 PROCEDURE — 36415 COLL VENOUS BLD VENIPUNCTURE: CPT | Performed by: FAMILY MEDICINE

## 2022-02-26 LAB
ALBUMIN SERPL-MCNC: 4.3 G/DL (ref 3.5–5.2)
ALBUMIN/GLOB SERPL: 1.8 G/DL
ALP SERPL-CCNC: 96 U/L (ref 39–117)
ALT SERPL W P-5'-P-CCNC: 23 U/L (ref 1–33)
ANION GAP SERPL CALCULATED.3IONS-SCNC: 13 MMOL/L (ref 5–15)
AST SERPL-CCNC: 18 U/L (ref 1–32)
BASOPHILS # BLD AUTO: 0.04 10*3/MM3 (ref 0–0.2)
BASOPHILS NFR BLD AUTO: 0.4 % (ref 0–1.5)
BILIRUB SERPL-MCNC: 0.6 MG/DL (ref 0–1.2)
BUN SERPL-MCNC: 19 MG/DL (ref 8–23)
BUN/CREAT SERPL: 24.4 (ref 7–25)
CALCIUM SPEC-SCNC: 8.9 MG/DL (ref 8.6–10.5)
CHLORIDE SERPL-SCNC: 102 MMOL/L (ref 98–107)
CO2 SERPL-SCNC: 24 MMOL/L (ref 22–29)
CREAT SERPL-MCNC: 0.78 MG/DL (ref 0.57–1)
DEPRECATED RDW RBC AUTO: 41.3 FL (ref 37–54)
EOSINOPHIL # BLD AUTO: 0.15 10*3/MM3 (ref 0–0.4)
EOSINOPHIL NFR BLD AUTO: 1.6 % (ref 0.3–6.2)
ERYTHROCYTE [DISTWIDTH] IN BLOOD BY AUTOMATED COUNT: 14.2 % (ref 12.3–15.4)
GFR SERPL CREATININE-BSD FRML MDRD: 73 ML/MIN/1.73
GLOBULIN UR ELPH-MCNC: 2.4 GM/DL
GLUCOSE SERPL-MCNC: 213 MG/DL (ref 65–99)
HCT VFR BLD AUTO: 36.6 % (ref 34–46.6)
HGB BLD-MCNC: 12 G/DL (ref 12–15.9)
IMM GRANULOCYTES # BLD AUTO: 0.04 10*3/MM3 (ref 0–0.05)
IMM GRANULOCYTES NFR BLD AUTO: 0.4 % (ref 0–0.5)
LYMPHOCYTES # BLD AUTO: 2.09 10*3/MM3 (ref 0.7–3.1)
LYMPHOCYTES NFR BLD AUTO: 22 % (ref 19.6–45.3)
MCH RBC QN AUTO: 26.7 PG (ref 26.6–33)
MCHC RBC AUTO-ENTMCNC: 32.8 G/DL (ref 31.5–35.7)
MCV RBC AUTO: 81.5 FL (ref 79–97)
MONOCYTES # BLD AUTO: 0.53 10*3/MM3 (ref 0.1–0.9)
MONOCYTES NFR BLD AUTO: 5.6 % (ref 5–12)
NEUTROPHILS NFR BLD AUTO: 6.63 10*3/MM3 (ref 1.7–7)
NEUTROPHILS NFR BLD AUTO: 70 % (ref 42.7–76)
NRBC BLD AUTO-RTO: 0.1 /100 WBC (ref 0–0.2)
PLATELET # BLD AUTO: 424 10*3/MM3 (ref 140–450)
PMV BLD AUTO: 11.1 FL (ref 6–12)
POTASSIUM SERPL-SCNC: 4.3 MMOL/L (ref 3.5–5.2)
PROT SERPL-MCNC: 6.7 G/DL (ref 6–8.5)
RBC # BLD AUTO: 4.49 10*6/MM3 (ref 3.77–5.28)
SODIUM SERPL-SCNC: 139 MMOL/L (ref 136–145)
WBC NRBC COR # BLD: 9.48 10*3/MM3 (ref 3.4–10.8)

## 2022-03-02 ENCOUNTER — TELEPHONE (OUTPATIENT)
Dept: FAMILY MEDICINE CLINIC | Facility: CLINIC | Age: 67
End: 2022-03-02

## 2022-03-02 NOTE — TELEPHONE ENCOUNTER
----- Message from Myrna Ramos MD sent at 3/2/2022  8:27 AM EST -----  Labs stable. Okay to either call or send letter to patient. Thanks.      Stable letter mailed.

## 2022-03-16 ENCOUNTER — HOSPITAL ENCOUNTER (OUTPATIENT)
Dept: MRI IMAGING | Facility: HOSPITAL | Age: 67
Discharge: HOME | End: 2022-03-16

## 2022-03-16 DIAGNOSIS — M25.551 RIGHT HIP PAIN: ICD-10-CM

## 2022-03-17 DIAGNOSIS — M1A.09X0 IDIOPATHIC CHRONIC GOUT OF MULTIPLE SITES WITHOUT TOPHUS: ICD-10-CM

## 2022-03-17 RX ORDER — ALLOPURINOL 300 MG/1
300 TABLET ORAL DAILY
Qty: 90 TABLET | Refills: 1 | Status: SHIPPED | OUTPATIENT
Start: 2022-03-17 | End: 2022-07-25 | Stop reason: SDUPTHER

## 2022-03-28 ENCOUNTER — HOSPITAL ENCOUNTER (INPATIENT)
Facility: HOSPITAL | Age: 67
LOS: 6 days | Discharge: HOME | End: 2022-04-03
Attending: EMERGENCY MEDICINE | Admitting: INTERNAL MEDICINE

## 2022-03-28 ENCOUNTER — APPOINTMENT (OUTPATIENT)
Dept: GENERAL RADIOLOGY | Facility: HOSPITAL | Age: 67
End: 2022-03-28

## 2022-03-28 ENCOUNTER — APPOINTMENT (OUTPATIENT)
Dept: CT IMAGING | Facility: HOSPITAL | Age: 67
End: 2022-03-28

## 2022-03-28 DIAGNOSIS — I50.9 ACUTE CONGESTIVE HEART FAILURE, UNSPECIFIED HEART FAILURE TYPE: Primary | ICD-10-CM

## 2022-03-28 DIAGNOSIS — I21.4 NSTEMI, INITIAL EPISODE OF CARE: ICD-10-CM

## 2022-03-28 LAB
A-A DO2: 35.9 MMHG (ref 0–300)
ALBUMIN SERPL-MCNC: 3.98 G/DL (ref 3.5–5.2)
ALBUMIN/GLOB SERPL: 1.5 G/DL
ALP SERPL-CCNC: 131 U/L (ref 39–117)
ALT SERPL W P-5'-P-CCNC: 46 U/L (ref 1–33)
ANION GAP SERPL CALCULATED.3IONS-SCNC: 12.6 MMOL/L (ref 5–15)
APTT PPP: 32 SECONDS (ref 26.5–34.5)
ARTERIAL PATENCY WRIST A: ABNORMAL
AST SERPL-CCNC: 31 U/L (ref 1–32)
ATMOSPHERIC PRESS: 730 MMHG
BASE EXCESS BLDA CALC-SCNC: -2.1 MMOL/L (ref 0–2)
BASOPHILS # BLD AUTO: 0.06 10*3/MM3 (ref 0–0.2)
BASOPHILS NFR BLD AUTO: 0.6 % (ref 0–1.5)
BDY SITE: ABNORMAL
BILIRUB SERPL-MCNC: 1 MG/DL (ref 0–1.2)
BODY TEMPERATURE: 0 C
BUN SERPL-MCNC: 22 MG/DL (ref 8–23)
BUN/CREAT SERPL: 25.9 (ref 7–25)
CALCIUM SPEC-SCNC: 9.3 MG/DL (ref 8.6–10.5)
CHLORIDE SERPL-SCNC: 100 MMOL/L (ref 98–107)
CO2 BLDA-SCNC: 22.4 MMOL/L (ref 22–33)
CO2 SERPL-SCNC: 23.4 MMOL/L (ref 22–29)
COHGB MFR BLD: 1.2 % (ref 0–5)
CREAT SERPL-MCNC: 0.85 MG/DL (ref 0.57–1)
CRP SERPL-MCNC: 0.3 MG/DL (ref 0–0.5)
D DIMER PPP FEU-MCNC: 0.79 MCGFEU/ML (ref 0–0.5)
D-LACTATE SERPL-SCNC: 1.9 MMOL/L (ref 0.5–2)
DEPRECATED RDW RBC AUTO: 42.9 FL (ref 37–54)
EGFRCR SERPLBLD CKD-EPI 2021: 74.7 ML/MIN/1.73
EOSINOPHIL # BLD AUTO: 0.05 10*3/MM3 (ref 0–0.4)
EOSINOPHIL NFR BLD AUTO: 0.5 % (ref 0.3–6.2)
ERYTHROCYTE [DISTWIDTH] IN BLOOD BY AUTOMATED COUNT: 15.7 % (ref 12.3–15.4)
FLUAV RNA RESP QL NAA+PROBE: NOT DETECTED
FLUBV RNA RESP QL NAA+PROBE: NOT DETECTED
GLOBULIN UR ELPH-MCNC: 2.6 GM/DL
GLUCOSE SERPL-MCNC: 200 MG/DL (ref 65–99)
HBA1C MFR BLD: 7.9 % (ref 4.8–5.6)
HCO3 BLDA-SCNC: 21.4 MMOL/L (ref 20–26)
HCT VFR BLD AUTO: 38.8 % (ref 34–46.6)
HCT VFR BLD CALC: 37.3 % (ref 38–51)
HGB BLD-MCNC: 12.7 G/DL (ref 12–15.9)
HGB BLDA-MCNC: 12.2 G/DL (ref 13.5–17.5)
IMM GRANULOCYTES # BLD AUTO: 0.02 10*3/MM3 (ref 0–0.05)
IMM GRANULOCYTES NFR BLD AUTO: 0.2 % (ref 0–0.5)
INHALED O2 CONCENTRATION: 21 %
INR PPP: 1.04 (ref 0.9–1.1)
LYMPHOCYTES # BLD AUTO: 1.68 10*3/MM3 (ref 0.7–3.1)
LYMPHOCYTES NFR BLD AUTO: 17.8 % (ref 19.6–45.3)
Lab: ABNORMAL
MCH RBC QN AUTO: 24.8 PG (ref 26.6–33)
MCHC RBC AUTO-ENTMCNC: 32.7 G/DL (ref 31.5–35.7)
MCV RBC AUTO: 75.8 FL (ref 79–97)
METHGB BLD QL: 0.1 % (ref 0–3)
MODALITY: ABNORMAL
MONOCYTES # BLD AUTO: 0.69 10*3/MM3 (ref 0.1–0.9)
MONOCYTES NFR BLD AUTO: 7.3 % (ref 5–12)
NEUTROPHILS NFR BLD AUTO: 6.94 10*3/MM3 (ref 1.7–7)
NEUTROPHILS NFR BLD AUTO: 73.6 % (ref 42.7–76)
NOTE: ABNORMAL
NOTIFIED BY: ABNORMAL
NOTIFIED WHO: ABNORMAL
NRBC BLD AUTO-RTO: 0 /100 WBC (ref 0–0.2)
NT-PROBNP SERPL-MCNC: 3360 PG/ML (ref 0–900)
OXYHGB MFR BLDV: 93.9 % (ref 94–99)
PCO2 BLDA: 32.1 MM HG (ref 35–45)
PCO2 TEMP ADJ BLD: ABNORMAL MM[HG]
PH BLDA: 7.43 PH UNITS (ref 7.35–7.45)
PH, TEMP CORRECTED: ABNORMAL
PLATELET # BLD AUTO: 484 10*3/MM3 (ref 140–450)
PMV BLD AUTO: 10.2 FL (ref 6–12)
PO2 BLDA: 71.3 MM HG (ref 83–108)
PO2 TEMP ADJ BLD: ABNORMAL MM[HG]
POTASSIUM SERPL-SCNC: 3.9 MMOL/L (ref 3.5–5.2)
PROT SERPL-MCNC: 6.6 G/DL (ref 6–8.5)
PROTHROMBIN TIME: 13.8 SECONDS (ref 12.1–14.7)
QT INTERVAL: 338 MS
QTC INTERVAL: 465 MS
RBC # BLD AUTO: 5.12 10*6/MM3 (ref 3.77–5.28)
SAO2 % BLDCOA: 95.1 % (ref 94–99)
SARS-COV-2 RNA RESP QL NAA+PROBE: NOT DETECTED
SODIUM SERPL-SCNC: 136 MMOL/L (ref 136–145)
TROPONIN T SERPL-MCNC: 0.26 NG/ML (ref 0–0.03)
TROPONIN T SERPL-MCNC: 0.27 NG/ML (ref 0–0.03)
VENTILATOR MODE: ABNORMAL
WBC NRBC COR # BLD: 9.44 10*3/MM3 (ref 3.4–10.8)

## 2022-03-28 PROCEDURE — 99223 1ST HOSP IP/OBS HIGH 75: CPT | Performed by: HOSPITALIST

## 2022-03-28 PROCEDURE — 25010000002 HEPARIN (PORCINE) PER 1000 UNITS: Performed by: HOSPITALIST

## 2022-03-28 PROCEDURE — 83605 ASSAY OF LACTIC ACID: CPT | Performed by: EMERGENCY MEDICINE

## 2022-03-28 PROCEDURE — 87040 BLOOD CULTURE FOR BACTERIA: CPT | Performed by: EMERGENCY MEDICINE

## 2022-03-28 PROCEDURE — 71045 X-RAY EXAM CHEST 1 VIEW: CPT

## 2022-03-28 PROCEDURE — 36600 WITHDRAWAL OF ARTERIAL BLOOD: CPT

## 2022-03-28 PROCEDURE — 93010 ELECTROCARDIOGRAM REPORT: CPT | Performed by: INTERNAL MEDICINE

## 2022-03-28 PROCEDURE — 83050 HGB METHEMOGLOBIN QUAN: CPT

## 2022-03-28 PROCEDURE — 86140 C-REACTIVE PROTEIN: CPT | Performed by: HOSPITALIST

## 2022-03-28 PROCEDURE — 85730 THROMBOPLASTIN TIME PARTIAL: CPT | Performed by: HOSPITALIST

## 2022-03-28 PROCEDURE — 83036 HEMOGLOBIN GLYCOSYLATED A1C: CPT | Performed by: HOSPITALIST

## 2022-03-28 PROCEDURE — 99284 EMERGENCY DEPT VISIT MOD MDM: CPT

## 2022-03-28 PROCEDURE — 80053 COMPREHEN METABOLIC PANEL: CPT | Performed by: STUDENT IN AN ORGANIZED HEALTH CARE EDUCATION/TRAINING PROGRAM

## 2022-03-28 PROCEDURE — 71275 CT ANGIOGRAPHY CHEST: CPT

## 2022-03-28 PROCEDURE — 93005 ELECTROCARDIOGRAM TRACING: CPT | Performed by: EMERGENCY MEDICINE

## 2022-03-28 PROCEDURE — 25010000002 FUROSEMIDE PER 20 MG: Performed by: EMERGENCY MEDICINE

## 2022-03-28 PROCEDURE — 82375 ASSAY CARBOXYHB QUANT: CPT

## 2022-03-28 PROCEDURE — 85379 FIBRIN DEGRADATION QUANT: CPT | Performed by: EMERGENCY MEDICINE

## 2022-03-28 PROCEDURE — 87636 SARSCOV2 & INF A&B AMP PRB: CPT | Performed by: EMERGENCY MEDICINE

## 2022-03-28 PROCEDURE — 63710000001 INSULIN DETEMIR PER 5 UNITS: Performed by: HOSPITALIST

## 2022-03-28 PROCEDURE — 85610 PROTHROMBIN TIME: CPT | Performed by: HOSPITALIST

## 2022-03-28 PROCEDURE — 85025 COMPLETE CBC W/AUTO DIFF WBC: CPT | Performed by: EMERGENCY MEDICINE

## 2022-03-28 PROCEDURE — 83880 ASSAY OF NATRIURETIC PEPTIDE: CPT | Performed by: EMERGENCY MEDICINE

## 2022-03-28 PROCEDURE — 82805 BLOOD GASES W/O2 SATURATION: CPT

## 2022-03-28 PROCEDURE — 84484 ASSAY OF TROPONIN QUANT: CPT | Performed by: EMERGENCY MEDICINE

## 2022-03-28 PROCEDURE — 0 IOPAMIDOL PER 1 ML: Performed by: STUDENT IN AN ORGANIZED HEALTH CARE EDUCATION/TRAINING PROGRAM

## 2022-03-28 RX ORDER — SODIUM CHLORIDE 0.9 % (FLUSH) 0.9 %
10 SYRINGE (ML) INJECTION EVERY 12 HOURS SCHEDULED
Status: DISCONTINUED | OUTPATIENT
Start: 2022-03-29 | End: 2022-04-03 | Stop reason: HOSPADM

## 2022-03-28 RX ORDER — NICOTINE POLACRILEX 4 MG
15 LOZENGE BUCCAL
Status: DISCONTINUED | OUTPATIENT
Start: 2022-03-28 | End: 2022-03-29

## 2022-03-28 RX ORDER — FUROSEMIDE 10 MG/ML
40 INJECTION INTRAMUSCULAR; INTRAVENOUS ONCE
Status: COMPLETED | OUTPATIENT
Start: 2022-03-28 | End: 2022-03-28

## 2022-03-28 RX ORDER — HEPARIN SODIUM 5000 [USP'U]/ML
60 INJECTION, SOLUTION INTRAVENOUS; SUBCUTANEOUS ONCE
Status: COMPLETED | OUTPATIENT
Start: 2022-03-28 | End: 2022-03-28

## 2022-03-28 RX ORDER — ASPIRIN 325 MG
325 TABLET ORAL ONCE
Status: COMPLETED | OUTPATIENT
Start: 2022-03-28 | End: 2022-03-28

## 2022-03-28 RX ORDER — HEPARIN SODIUM 5000 [USP'U]/ML
30 INJECTION, SOLUTION INTRAVENOUS; SUBCUTANEOUS AS NEEDED
Status: DISCONTINUED | OUTPATIENT
Start: 2022-03-28 | End: 2022-03-31

## 2022-03-28 RX ORDER — SODIUM CHLORIDE 0.9 % (FLUSH) 0.9 %
10 SYRINGE (ML) INJECTION AS NEEDED
Status: DISCONTINUED | OUTPATIENT
Start: 2022-03-28 | End: 2022-04-03 | Stop reason: HOSPADM

## 2022-03-28 RX ORDER — FUROSEMIDE 10 MG/ML
40 INJECTION INTRAMUSCULAR; INTRAVENOUS DAILY
Status: DISCONTINUED | OUTPATIENT
Start: 2022-03-29 | End: 2022-03-30

## 2022-03-28 RX ORDER — ASPIRIN 81 MG/1
81 TABLET ORAL DAILY
Status: DISCONTINUED | OUTPATIENT
Start: 2022-03-29 | End: 2022-04-03 | Stop reason: HOSPADM

## 2022-03-28 RX ORDER — NITROGLYCERIN 0.4 MG/1
0.4 TABLET SUBLINGUAL
Status: DISCONTINUED | OUTPATIENT
Start: 2022-03-28 | End: 2022-04-03 | Stop reason: HOSPADM

## 2022-03-28 RX ORDER — DEXTROSE MONOHYDRATE 25 G/50ML
25 INJECTION, SOLUTION INTRAVENOUS
Status: DISCONTINUED | OUTPATIENT
Start: 2022-03-28 | End: 2022-03-29

## 2022-03-28 RX ORDER — ATORVASTATIN CALCIUM 40 MG/1
40 TABLET, FILM COATED ORAL DAILY
Status: DISCONTINUED | OUTPATIENT
Start: 2022-03-29 | End: 2022-04-03 | Stop reason: HOSPADM

## 2022-03-28 RX ORDER — HEPARIN SODIUM 5000 [USP'U]/ML
60 INJECTION, SOLUTION INTRAVENOUS; SUBCUTANEOUS AS NEEDED
Status: DISCONTINUED | OUTPATIENT
Start: 2022-03-28 | End: 2022-03-31

## 2022-03-28 RX ORDER — HEPARIN SOD,PORCINE/0.9 % NACL 25000/250
12 INTRAVENOUS SOLUTION INTRAVENOUS
Status: DISCONTINUED | OUTPATIENT
Start: 2022-03-28 | End: 2022-03-31

## 2022-03-28 RX ADMIN — INSULIN DETEMIR 5 UNITS: 100 INJECTION, SOLUTION SUBCUTANEOUS at 22:32

## 2022-03-28 RX ADMIN — HEPARIN SODIUM 3800 UNITS: 5000 INJECTION INTRAVENOUS; SUBCUTANEOUS at 22:50

## 2022-03-28 RX ADMIN — HEPARIN SODIUM 12 UNITS/KG/HR: 5000 INJECTION INTRAVENOUS; SUBCUTANEOUS at 22:52

## 2022-03-28 RX ADMIN — FUROSEMIDE 40 MG: 10 INJECTION, SOLUTION INTRAVENOUS at 19:06

## 2022-03-28 RX ADMIN — ASPIRIN 325 MG: 325 TABLET ORAL at 23:14

## 2022-03-28 RX ADMIN — IOPAMIDOL 84 ML: 755 INJECTION, SOLUTION INTRAVENOUS at 23:06

## 2022-03-29 ENCOUNTER — TELEPHONE (OUTPATIENT)
Dept: FAMILY MEDICINE CLINIC | Facility: CLINIC | Age: 67
End: 2022-03-29

## 2022-03-29 ENCOUNTER — APPOINTMENT (OUTPATIENT)
Dept: ULTRASOUND IMAGING | Facility: HOSPITAL | Age: 67
End: 2022-03-29

## 2022-03-29 LAB
ALBUMIN SERPL-MCNC: 4.2 G/DL (ref 3.5–5.2)
ALBUMIN/GLOB SERPL: 1.8 G/DL
ALP SERPL-CCNC: 136 U/L (ref 39–117)
ALT SERPL W P-5'-P-CCNC: 48 U/L (ref 1–33)
ANION GAP SERPL CALCULATED.3IONS-SCNC: 12.1 MMOL/L (ref 5–15)
APTT PPP: 31.9 SECONDS (ref 26.5–34.5)
APTT PPP: 32.4 SECONDS (ref 26.5–34.5)
APTT PPP: 35.3 SECONDS (ref 26.5–34.5)
APTT PPP: 90.1 SECONDS (ref 26.5–34.5)
AST SERPL-CCNC: 33 U/L (ref 1–32)
BASOPHILS # BLD AUTO: 0.07 10*3/MM3 (ref 0–0.2)
BASOPHILS NFR BLD AUTO: 0.8 % (ref 0–1.5)
BILIRUB SERPL-MCNC: 1.2 MG/DL (ref 0–1.2)
BUN SERPL-MCNC: 21 MG/DL (ref 8–23)
BUN/CREAT SERPL: 25.9 (ref 7–25)
CALCIUM SPEC-SCNC: 9.3 MG/DL (ref 8.6–10.5)
CHLORIDE SERPL-SCNC: 96 MMOL/L (ref 98–107)
CHOLEST SERPL-MCNC: 128 MG/DL (ref 0–200)
CO2 SERPL-SCNC: 25.9 MMOL/L (ref 22–29)
CREAT SERPL-MCNC: 0.81 MG/DL (ref 0.57–1)
DEPRECATED RDW RBC AUTO: 41.5 FL (ref 37–54)
EGFRCR SERPLBLD CKD-EPI 2021: 79.2 ML/MIN/1.73
EOSINOPHIL # BLD AUTO: 0.08 10*3/MM3 (ref 0–0.4)
EOSINOPHIL NFR BLD AUTO: 0.9 % (ref 0.3–6.2)
ERYTHROCYTE [DISTWIDTH] IN BLOOD BY AUTOMATED COUNT: 15.6 % (ref 12.3–15.4)
GLOBULIN UR ELPH-MCNC: 2.3 GM/DL
GLUCOSE BLDC GLUCOMTR-MCNC: 102 MG/DL (ref 70–130)
GLUCOSE BLDC GLUCOMTR-MCNC: 103 MG/DL (ref 70–130)
GLUCOSE BLDC GLUCOMTR-MCNC: 175 MG/DL (ref 70–130)
GLUCOSE BLDC GLUCOMTR-MCNC: 236 MG/DL (ref 70–130)
GLUCOSE BLDC GLUCOMTR-MCNC: 60 MG/DL (ref 70–130)
GLUCOSE SERPL-MCNC: 201 MG/DL (ref 65–99)
HCT VFR BLD AUTO: 38.3 % (ref 34–46.6)
HDLC SERPL-MCNC: 54 MG/DL (ref 40–60)
HGB BLD-MCNC: 12.8 G/DL (ref 12–15.9)
IMM GRANULOCYTES # BLD AUTO: 0.02 10*3/MM3 (ref 0–0.05)
IMM GRANULOCYTES NFR BLD AUTO: 0.2 % (ref 0–0.5)
LDLC SERPL CALC-MCNC: 58 MG/DL (ref 0–100)
LDLC/HDLC SERPL: 1.08 {RATIO}
LYMPHOCYTES # BLD AUTO: 1.95 10*3/MM3 (ref 0.7–3.1)
LYMPHOCYTES NFR BLD AUTO: 22.9 % (ref 19.6–45.3)
MCH RBC QN AUTO: 24.8 PG (ref 26.6–33)
MCHC RBC AUTO-ENTMCNC: 33.4 G/DL (ref 31.5–35.7)
MCV RBC AUTO: 74.1 FL (ref 79–97)
MONOCYTES # BLD AUTO: 0.8 10*3/MM3 (ref 0.1–0.9)
MONOCYTES NFR BLD AUTO: 9.4 % (ref 5–12)
NEUTROPHILS NFR BLD AUTO: 5.58 10*3/MM3 (ref 1.7–7)
NEUTROPHILS NFR BLD AUTO: 65.8 % (ref 42.7–76)
NRBC BLD AUTO-RTO: 0 /100 WBC (ref 0–0.2)
PLATELET # BLD AUTO: 493 10*3/MM3 (ref 140–450)
PMV BLD AUTO: 10.2 FL (ref 6–12)
POTASSIUM SERPL-SCNC: 3.7 MMOL/L (ref 3.5–5.2)
PROT SERPL-MCNC: 6.5 G/DL (ref 6–8.5)
RBC # BLD AUTO: 5.17 10*6/MM3 (ref 3.77–5.28)
SODIUM SERPL-SCNC: 134 MMOL/L (ref 136–145)
TRIGL SERPL-MCNC: 79 MG/DL (ref 0–150)
TROPONIN T SERPL-MCNC: 0.29 NG/ML (ref 0–0.03)
TROPONIN T SERPL-MCNC: 0.32 NG/ML (ref 0–0.03)
VLDLC SERPL-MCNC: 16 MG/DL (ref 5–40)
WBC NRBC COR # BLD: 8.5 10*3/MM3 (ref 3.4–10.8)

## 2022-03-29 PROCEDURE — 85025 COMPLETE CBC W/AUTO DIFF WBC: CPT | Performed by: HOSPITALIST

## 2022-03-29 PROCEDURE — 99232 SBSQ HOSP IP/OBS MODERATE 35: CPT | Performed by: INTERNAL MEDICINE

## 2022-03-29 PROCEDURE — 82962 GLUCOSE BLOOD TEST: CPT

## 2022-03-29 PROCEDURE — 85730 THROMBOPLASTIN TIME PARTIAL: CPT | Performed by: STUDENT IN AN ORGANIZED HEALTH CARE EDUCATION/TRAINING PROGRAM

## 2022-03-29 PROCEDURE — 63710000001 INSULIN DETEMIR PER 5 UNITS: Performed by: HOSPITALIST

## 2022-03-29 PROCEDURE — 84484 ASSAY OF TROPONIN QUANT: CPT | Performed by: HOSPITALIST

## 2022-03-29 PROCEDURE — 80053 COMPREHEN METABOLIC PANEL: CPT | Performed by: HOSPITALIST

## 2022-03-29 PROCEDURE — 99222 1ST HOSP IP/OBS MODERATE 55: CPT | Performed by: INTERNAL MEDICINE

## 2022-03-29 PROCEDURE — 80061 LIPID PANEL: CPT | Performed by: HOSPITALIST

## 2022-03-29 PROCEDURE — 85730 THROMBOPLASTIN TIME PARTIAL: CPT | Performed by: HOSPITALIST

## 2022-03-29 PROCEDURE — 85730 THROMBOPLASTIN TIME PARTIAL: CPT | Performed by: INTERNAL MEDICINE

## 2022-03-29 PROCEDURE — 63710000001 INSULIN ASPART PER 5 UNITS: Performed by: HOSPITALIST

## 2022-03-29 PROCEDURE — 25010000002 FUROSEMIDE PER 20 MG: Performed by: HOSPITALIST

## 2022-03-29 PROCEDURE — 25010000002 HEPARIN (PORCINE) PER 1000 UNITS: Performed by: HOSPITALIST

## 2022-03-29 RX ORDER — CETIRIZINE HYDROCHLORIDE 10 MG/1
10 TABLET ORAL DAILY
Status: DISCONTINUED | OUTPATIENT
Start: 2022-03-29 | End: 2022-04-03 | Stop reason: HOSPADM

## 2022-03-29 RX ORDER — DEXTROSE MONOHYDRATE 25 G/50ML
25 INJECTION, SOLUTION INTRAVENOUS
Status: DISCONTINUED | OUTPATIENT
Start: 2022-03-29 | End: 2022-04-03 | Stop reason: HOSPADM

## 2022-03-29 RX ORDER — OMEGA-3S/DHA/EPA/FISH OIL/D3 300MG-1000
2000 CAPSULE ORAL DAILY
Status: DISCONTINUED | OUTPATIENT
Start: 2022-03-29 | End: 2022-04-03 | Stop reason: HOSPADM

## 2022-03-29 RX ORDER — MELOXICAM 7.5 MG/1
7.5 TABLET ORAL 2 TIMES DAILY
COMMUNITY
End: 2022-04-03 | Stop reason: HOSPADM

## 2022-03-29 RX ORDER — ALLOPURINOL 300 MG/1
300 TABLET ORAL DAILY
Status: DISCONTINUED | OUTPATIENT
Start: 2022-03-29 | End: 2022-04-03 | Stop reason: HOSPADM

## 2022-03-29 RX ORDER — GLIPIZIDE 5 MG/1
5 TABLET ORAL DAILY
Status: CANCELLED | OUTPATIENT
Start: 2022-03-29

## 2022-03-29 RX ORDER — CYCLOBENZAPRINE HCL 10 MG
10 TABLET ORAL 3 TIMES DAILY PRN
Status: DISCONTINUED | OUTPATIENT
Start: 2022-03-29 | End: 2022-04-03 | Stop reason: HOSPADM

## 2022-03-29 RX ORDER — MELOXICAM 7.5 MG/1
7.5 TABLET ORAL 2 TIMES DAILY
Status: CANCELLED | OUTPATIENT
Start: 2022-03-29

## 2022-03-29 RX ORDER — INSULIN GLARGINE 100 [IU]/ML
22 INJECTION, SOLUTION SUBCUTANEOUS NIGHTLY
Status: ON HOLD | COMMUNITY
End: 2022-04-03 | Stop reason: SDUPTHER

## 2022-03-29 RX ORDER — REPAGLINIDE 1 MG/1
2 TABLET ORAL
Refills: 5 | Status: CANCELLED | OUTPATIENT
Start: 2022-03-29

## 2022-03-29 RX ORDER — NICOTINE POLACRILEX 4 MG
15 LOZENGE BUCCAL
Status: DISCONTINUED | OUTPATIENT
Start: 2022-03-29 | End: 2022-04-03 | Stop reason: HOSPADM

## 2022-03-29 RX ADMIN — INSULIN DETEMIR 5 UNITS: 100 INJECTION, SOLUTION SUBCUTANEOUS at 19:43

## 2022-03-29 RX ADMIN — HEPARIN SODIUM 22 UNITS/KG/HR: 5000 INJECTION INTRAVENOUS; SUBCUTANEOUS at 19:44

## 2022-03-29 RX ADMIN — HEPARIN SODIUM 3800 UNITS: 5000 INJECTION INTRAVENOUS; SUBCUTANEOUS at 12:58

## 2022-03-29 RX ADMIN — HEPARIN SODIUM 3800 UNITS: 5000 INJECTION INTRAVENOUS; SUBCUTANEOUS at 19:43

## 2022-03-29 RX ADMIN — HEPARIN SODIUM 3800 UNITS: 5000 INJECTION INTRAVENOUS; SUBCUTANEOUS at 06:13

## 2022-03-29 RX ADMIN — INSULIN ASPART 2 UNITS: 100 INJECTION, SOLUTION INTRAVENOUS; SUBCUTANEOUS at 18:03

## 2022-03-29 RX ADMIN — Medication 10 ML: at 09:40

## 2022-03-29 RX ADMIN — ASPIRIN 81 MG: 81 TABLET, COATED ORAL at 09:39

## 2022-03-29 RX ADMIN — ATORVASTATIN CALCIUM 40 MG: 40 TABLET, FILM COATED ORAL at 09:39

## 2022-03-29 RX ADMIN — CHOLECALCIFEROL TAB 10 MCG (400 UNIT) 2000 UNITS: 10 TAB at 18:07

## 2022-03-29 RX ADMIN — FUROSEMIDE 40 MG: 10 INJECTION, SOLUTION INTRAVENOUS at 09:40

## 2022-03-29 RX ADMIN — CETIRIZINE HYDROCHLORIDE 10 MG: 10 TABLET, FILM COATED ORAL at 18:07

## 2022-03-29 RX ADMIN — ALLOPURINOL 300 MG: 300 TABLET ORAL at 18:07

## 2022-03-29 NOTE — TELEPHONE ENCOUNTER
Caller: Breanna Kaba    Relationship: Self    Best call back number: [phone]    What was the call regarding:   PATIENT WANTED TO INFORM ESTHER VARMA MD THAT SHE WAS ADMITTED TO [facility] YESTERDAY 03/28/2022 FOR A ACUTE CONGESTIVE HEART FAILURE

## 2022-03-30 ENCOUNTER — APPOINTMENT (OUTPATIENT)
Dept: CARDIOLOGY | Facility: HOSPITAL | Age: 67
End: 2022-03-30

## 2022-03-30 ENCOUNTER — APPOINTMENT (OUTPATIENT)
Dept: ULTRASOUND IMAGING | Facility: HOSPITAL | Age: 67
End: 2022-03-30

## 2022-03-30 LAB
ANION GAP SERPL CALCULATED.3IONS-SCNC: 12.2 MMOL/L (ref 5–15)
APTT PPP: 34 SECONDS (ref 26.5–34.5)
APTT PPP: 53.9 SECONDS (ref 26.5–34.5)
APTT PPP: >100 SECONDS (ref 26.5–34.5)
BASOPHILS # BLD AUTO: 0.05 10*3/MM3 (ref 0–0.2)
BASOPHILS NFR BLD AUTO: 0.6 % (ref 0–1.5)
BH CV ECHO MEAS - ACS: 1.8 CM
BH CV ECHO MEAS - AO MAX PG: 4.8 MMHG
BH CV ECHO MEAS - AO MEAN PG: 2 MMHG
BH CV ECHO MEAS - AO ROOT DIAM: 3.2 CM
BH CV ECHO MEAS - AO V2 MAX: 109 CM/SEC
BH CV ECHO MEAS - AO V2 VTI: 18.1 CM
BH CV ECHO MEAS - EDV(CUBED): 102.2 ML
BH CV ECHO MEAS - EDV(MOD-SP4): 106 ML
BH CV ECHO MEAS - EF(MOD-SP4): 45.8 %
BH CV ECHO MEAS - ESV(CUBED): 52.5 ML
BH CV ECHO MEAS - ESV(MOD-SP4): 57.5 ML
BH CV ECHO MEAS - FS: 19.9 %
BH CV ECHO MEAS - IVS/LVPW: 0.99 CM
BH CV ECHO MEAS - IVSD: 1.18 CM
BH CV ECHO MEAS - LA DIMENSION: 4.7 CM
BH CV ECHO MEAS - LAT PEAK E' VEL: 3.6 CM/SEC
BH CV ECHO MEAS - LV DIASTOLIC VOL/BSA (35-75): 62.1 CM2
BH CV ECHO MEAS - LV MASS(C)D: 207.1 GRAMS
BH CV ECHO MEAS - LV SYSTOLIC VOL/BSA (12-30): 33.7 CM2
BH CV ECHO MEAS - LVIDD: 4.7 CM
BH CV ECHO MEAS - LVIDS: 3.7 CM
BH CV ECHO MEAS - LVOT AREA: 3.5 CM2
BH CV ECHO MEAS - LVOT DIAM: 2.1 CM
BH CV ECHO MEAS - LVPWD: 1.2 CM
BH CV ECHO MEAS - MED PEAK E' VEL: 7.7 CM/SEC
BH CV ECHO MEAS - MV A MAX VEL: 159 CM/SEC
BH CV ECHO MEAS - MV E MAX VEL: 124 CM/SEC
BH CV ECHO MEAS - MV E/A: 0.78
BH CV ECHO MEAS - PA ACC TIME: 0.08 SEC
BH CV ECHO MEAS - PA PR(ACCEL): 42.6 MMHG
BH CV ECHO MEAS - RAP SYSTOLE: 10 MMHG
BH CV ECHO MEAS - RVSP: 31 MMHG
BH CV ECHO MEAS - SI(MOD-SP4): 28.4 ML/M2
BH CV ECHO MEAS - SV(MOD-SP4): 48.5 ML
BH CV ECHO MEAS - TAPSE (>1.6): 1.69 CM
BH CV ECHO MEAS - TR MAX PG: 21 MMHG
BH CV ECHO MEAS - TR MAX VEL: 229 CM/SEC
BH CV ECHO MEASUREMENTS AVERAGE E/E' RATIO: 21.95
BUN SERPL-MCNC: 21 MG/DL (ref 8–23)
BUN/CREAT SERPL: 17.5 (ref 7–25)
CALCIUM SPEC-SCNC: 9 MG/DL (ref 8.6–10.5)
CHLORIDE SERPL-SCNC: 98 MMOL/L (ref 98–107)
CO2 SERPL-SCNC: 26.8 MMOL/L (ref 22–29)
CREAT SERPL-MCNC: 1.2 MG/DL (ref 0.57–1)
DEPRECATED RDW RBC AUTO: 42.1 FL (ref 37–54)
EGFRCR SERPLBLD CKD-EPI 2021: 49.4 ML/MIN/1.73
EOSINOPHIL # BLD AUTO: 0.13 10*3/MM3 (ref 0–0.4)
EOSINOPHIL NFR BLD AUTO: 1.6 % (ref 0.3–6.2)
ERYTHROCYTE [DISTWIDTH] IN BLOOD BY AUTOMATED COUNT: 15.6 % (ref 12.3–15.4)
GLUCOSE BLDC GLUCOMTR-MCNC: 154 MG/DL (ref 70–130)
GLUCOSE BLDC GLUCOMTR-MCNC: 158 MG/DL (ref 70–130)
GLUCOSE BLDC GLUCOMTR-MCNC: 215 MG/DL (ref 70–130)
GLUCOSE BLDC GLUCOMTR-MCNC: 259 MG/DL (ref 70–130)
GLUCOSE SERPL-MCNC: 295 MG/DL (ref 65–99)
HCT VFR BLD AUTO: 36.7 % (ref 34–46.6)
HGB BLD-MCNC: 12 G/DL (ref 12–15.9)
IMM GRANULOCYTES # BLD AUTO: 0.02 10*3/MM3 (ref 0–0.05)
IMM GRANULOCYTES NFR BLD AUTO: 0.2 % (ref 0–0.5)
LEFT ATRIUM VOLUME INDEX: 31.8 ML/M2
LYMPHOCYTES # BLD AUTO: 1.99 10*3/MM3 (ref 0.7–3.1)
LYMPHOCYTES NFR BLD AUTO: 23.9 % (ref 19.6–45.3)
MAXIMAL PREDICTED HEART RATE: 152 BPM
MCH RBC QN AUTO: 24.5 PG (ref 26.6–33)
MCHC RBC AUTO-ENTMCNC: 32.7 G/DL (ref 31.5–35.7)
MCV RBC AUTO: 75.1 FL (ref 79–97)
MONOCYTES # BLD AUTO: 0.73 10*3/MM3 (ref 0.1–0.9)
MONOCYTES NFR BLD AUTO: 8.8 % (ref 5–12)
NEUTROPHILS NFR BLD AUTO: 5.42 10*3/MM3 (ref 1.7–7)
NEUTROPHILS NFR BLD AUTO: 64.9 % (ref 42.7–76)
NRBC BLD AUTO-RTO: 0 /100 WBC (ref 0–0.2)
PLATELET # BLD AUTO: 361 10*3/MM3 (ref 140–450)
PMV BLD AUTO: 10.4 FL (ref 6–12)
POTASSIUM SERPL-SCNC: 3.9 MMOL/L (ref 3.5–5.2)
RBC # BLD AUTO: 4.89 10*6/MM3 (ref 3.77–5.28)
SODIUM SERPL-SCNC: 137 MMOL/L (ref 136–145)
STRESS TARGET HR: 129 BPM
WBC NRBC COR # BLD: 8.34 10*3/MM3 (ref 3.4–10.8)

## 2022-03-30 PROCEDURE — 85025 COMPLETE CBC W/AUTO DIFF WBC: CPT | Performed by: INTERNAL MEDICINE

## 2022-03-30 PROCEDURE — 25010000002 HEPARIN (PORCINE) PER 1000 UNITS: Performed by: HOSPITALIST

## 2022-03-30 PROCEDURE — 63710000001 INSULIN DETEMIR PER 5 UNITS: Performed by: HOSPITALIST

## 2022-03-30 PROCEDURE — 85730 THROMBOPLASTIN TIME PARTIAL: CPT | Performed by: INTERNAL MEDICINE

## 2022-03-30 PROCEDURE — 93306 TTE W/DOPPLER COMPLETE: CPT | Performed by: INTERNAL MEDICINE

## 2022-03-30 PROCEDURE — 93970 EXTREMITY STUDY: CPT

## 2022-03-30 PROCEDURE — 93970 EXTREMITY STUDY: CPT | Performed by: RADIOLOGY

## 2022-03-30 PROCEDURE — 93306 TTE W/DOPPLER COMPLETE: CPT

## 2022-03-30 PROCEDURE — 82962 GLUCOSE BLOOD TEST: CPT

## 2022-03-30 PROCEDURE — 99232 SBSQ HOSP IP/OBS MODERATE 35: CPT | Performed by: INTERNAL MEDICINE

## 2022-03-30 PROCEDURE — 80048 BASIC METABOLIC PNL TOTAL CA: CPT | Performed by: INTERNAL MEDICINE

## 2022-03-30 PROCEDURE — 99233 SBSQ HOSP IP/OBS HIGH 50: CPT | Performed by: INTERNAL MEDICINE

## 2022-03-30 PROCEDURE — 63710000001 INSULIN ASPART PER 5 UNITS: Performed by: HOSPITALIST

## 2022-03-30 RX ORDER — CARVEDILOL 6.25 MG/1
6.25 TABLET ORAL 2 TIMES DAILY WITH MEALS
Status: DISCONTINUED | OUTPATIENT
Start: 2022-03-30 | End: 2022-04-03 | Stop reason: HOSPADM

## 2022-03-30 RX ADMIN — INSULIN ASPART 3 UNITS: 100 INJECTION, SOLUTION INTRAVENOUS; SUBCUTANEOUS at 17:19

## 2022-03-30 RX ADMIN — ASPIRIN 81 MG: 81 TABLET, COATED ORAL at 11:26

## 2022-03-30 RX ADMIN — CARVEDILOL 6.25 MG: 6.25 TABLET, FILM COATED ORAL at 17:19

## 2022-03-30 RX ADMIN — CHOLECALCIFEROL TAB 10 MCG (400 UNIT) 2000 UNITS: 10 TAB at 11:27

## 2022-03-30 RX ADMIN — Medication 10 ML: at 21:03

## 2022-03-30 RX ADMIN — HEPARIN SODIUM 26 UNITS/KG/HR: 5000 INJECTION INTRAVENOUS; SUBCUTANEOUS at 03:01

## 2022-03-30 RX ADMIN — CETIRIZINE HYDROCHLORIDE 10 MG: 10 TABLET, FILM COATED ORAL at 11:27

## 2022-03-30 RX ADMIN — ATORVASTATIN CALCIUM 40 MG: 40 TABLET, FILM COATED ORAL at 11:26

## 2022-03-30 RX ADMIN — HEPARIN SODIUM 22 UNITS/KG/HR: 5000 INJECTION INTRAVENOUS; SUBCUTANEOUS at 00:33

## 2022-03-30 RX ADMIN — ALLOPURINOL 300 MG: 300 TABLET ORAL at 11:26

## 2022-03-30 RX ADMIN — CARVEDILOL 6.25 MG: 6.25 TABLET, FILM COATED ORAL at 11:28

## 2022-03-30 RX ADMIN — HEPARIN SODIUM 3800 UNITS: 5000 INJECTION INTRAVENOUS; SUBCUTANEOUS at 03:01

## 2022-03-30 RX ADMIN — Medication 10 ML: at 08:22

## 2022-03-30 RX ADMIN — INSULIN DETEMIR 5 UNITS: 100 INJECTION, SOLUTION SUBCUTANEOUS at 21:03

## 2022-03-31 PROBLEM — I21.4 NSTEMI, INITIAL EPISODE OF CARE (HCC): Status: ACTIVE | Noted: 2022-03-28

## 2022-03-31 LAB
ACT BLD: 243 SECONDS (ref 82–152)
ALBUMIN SERPL-MCNC: 3.55 G/DL (ref 3.5–5.2)
ALBUMIN/GLOB SERPL: 1.4 G/DL
ALP SERPL-CCNC: 118 U/L (ref 39–117)
ALT SERPL W P-5'-P-CCNC: 33 U/L (ref 1–33)
ANION GAP SERPL CALCULATED.3IONS-SCNC: 12.9 MMOL/L (ref 5–15)
APTT PPP: 81.9 SECONDS (ref 26.5–34.5)
APTT PPP: >100 SECONDS (ref 26.5–34.5)
AST SERPL-CCNC: 24 U/L (ref 1–32)
BASOPHILS # BLD AUTO: 0.06 10*3/MM3 (ref 0–0.2)
BASOPHILS NFR BLD AUTO: 0.8 % (ref 0–1.5)
BILIRUB SERPL-MCNC: 0.9 MG/DL (ref 0–1.2)
BUN SERPL-MCNC: 22 MG/DL (ref 8–23)
BUN/CREAT SERPL: 24.4 (ref 7–25)
CALCIUM SPEC-SCNC: 8.8 MG/DL (ref 8.6–10.5)
CHLORIDE SERPL-SCNC: 98 MMOL/L (ref 98–107)
CO2 SERPL-SCNC: 25.1 MMOL/L (ref 22–29)
CREAT SERPL-MCNC: 0.9 MG/DL (ref 0.57–1)
DEPRECATED RDW RBC AUTO: 42.4 FL (ref 37–54)
EGFRCR SERPLBLD CKD-EPI 2021: 69.8 ML/MIN/1.73
EOSINOPHIL # BLD AUTO: 0.21 10*3/MM3 (ref 0–0.4)
EOSINOPHIL NFR BLD AUTO: 2.8 % (ref 0.3–6.2)
ERYTHROCYTE [DISTWIDTH] IN BLOOD BY AUTOMATED COUNT: 15.8 % (ref 12.3–15.4)
GLOBULIN UR ELPH-MCNC: 2.5 GM/DL
GLUCOSE BLDC GLUCOMTR-MCNC: 184 MG/DL (ref 70–130)
GLUCOSE BLDC GLUCOMTR-MCNC: 234 MG/DL (ref 70–130)
GLUCOSE BLDC GLUCOMTR-MCNC: 299 MG/DL (ref 70–130)
GLUCOSE BLDC GLUCOMTR-MCNC: 358 MG/DL (ref 70–130)
GLUCOSE SERPL-MCNC: 191 MG/DL (ref 65–99)
HCT VFR BLD AUTO: 36.2 % (ref 34–46.6)
HGB BLD-MCNC: 11.8 G/DL (ref 12–15.9)
IMM GRANULOCYTES # BLD AUTO: 0.02 10*3/MM3 (ref 0–0.05)
IMM GRANULOCYTES NFR BLD AUTO: 0.3 % (ref 0–0.5)
LYMPHOCYTES # BLD AUTO: 2.19 10*3/MM3 (ref 0.7–3.1)
LYMPHOCYTES NFR BLD AUTO: 29.4 % (ref 19.6–45.3)
MCH RBC QN AUTO: 24.5 PG (ref 26.6–33)
MCHC RBC AUTO-ENTMCNC: 32.6 G/DL (ref 31.5–35.7)
MCV RBC AUTO: 75.3 FL (ref 79–97)
MONOCYTES # BLD AUTO: 0.68 10*3/MM3 (ref 0.1–0.9)
MONOCYTES NFR BLD AUTO: 9.1 % (ref 5–12)
NEUTROPHILS NFR BLD AUTO: 4.29 10*3/MM3 (ref 1.7–7)
NEUTROPHILS NFR BLD AUTO: 57.6 % (ref 42.7–76)
NRBC BLD AUTO-RTO: 0 /100 WBC (ref 0–0.2)
PLATELET # BLD AUTO: 360 10*3/MM3 (ref 140–450)
PMV BLD AUTO: 10.4 FL (ref 6–12)
POTASSIUM SERPL-SCNC: 4.1 MMOL/L (ref 3.5–5.2)
PROT SERPL-MCNC: 6 G/DL (ref 6–8.5)
RBC # BLD AUTO: 4.81 10*6/MM3 (ref 3.77–5.28)
SODIUM SERPL-SCNC: 136 MMOL/L (ref 136–145)
T4 FREE SERPL-MCNC: 1.42 NG/DL (ref 0.93–1.7)
TSH SERPL DL<=0.05 MIU/L-ACNC: 2.76 UIU/ML (ref 0.27–4.2)
WBC NRBC COR # BLD: 7.45 10*3/MM3 (ref 3.4–10.8)

## 2022-03-31 PROCEDURE — C9600 PERC DRUG-EL COR STENT SING: HCPCS | Performed by: INTERNAL MEDICINE

## 2022-03-31 PROCEDURE — C1769 GUIDE WIRE: HCPCS | Performed by: INTERNAL MEDICINE

## 2022-03-31 PROCEDURE — 99232 SBSQ HOSP IP/OBS MODERATE 35: CPT | Performed by: INTERNAL MEDICINE

## 2022-03-31 PROCEDURE — C1894 INTRO/SHEATH, NON-LASER: HCPCS | Performed by: INTERNAL MEDICINE

## 2022-03-31 PROCEDURE — 93454 CORONARY ARTERY ANGIO S&I: CPT | Performed by: INTERNAL MEDICINE

## 2022-03-31 PROCEDURE — B2111ZZ FLUOROSCOPY OF MULTIPLE CORONARY ARTERIES USING LOW OSMOLAR CONTRAST: ICD-10-PCS | Performed by: INTERNAL MEDICINE

## 2022-03-31 PROCEDURE — 25010000002 MIDAZOLAM PER 1 MG: Performed by: INTERNAL MEDICINE

## 2022-03-31 PROCEDURE — 25010000002 FENTANYL CITRATE (PF) 50 MCG/ML SOLUTION: Performed by: INTERNAL MEDICINE

## 2022-03-31 PROCEDURE — 4A023N7 MEASUREMENT OF CARDIAC SAMPLING AND PRESSURE, LEFT HEART, PERCUTANEOUS APPROACH: ICD-10-PCS | Performed by: INTERNAL MEDICINE

## 2022-03-31 PROCEDURE — 82962 GLUCOSE BLOOD TEST: CPT

## 2022-03-31 PROCEDURE — 63710000001 INSULIN ASPART PER 5 UNITS: Performed by: INTERNAL MEDICINE

## 2022-03-31 PROCEDURE — 63710000001 INSULIN ASPART PER 5 UNITS: Performed by: HOSPITALIST

## 2022-03-31 PROCEDURE — 85730 THROMBOPLASTIN TIME PARTIAL: CPT | Performed by: HOSPITALIST

## 2022-03-31 PROCEDURE — C1887 CATHETER, GUIDING: HCPCS | Performed by: INTERNAL MEDICINE

## 2022-03-31 PROCEDURE — 80053 COMPREHEN METABOLIC PANEL: CPT | Performed by: INTERNAL MEDICINE

## 2022-03-31 PROCEDURE — B2151ZZ FLUOROSCOPY OF LEFT HEART USING LOW OSMOLAR CONTRAST: ICD-10-PCS | Performed by: INTERNAL MEDICINE

## 2022-03-31 PROCEDURE — 85025 COMPLETE CBC W/AUTO DIFF WBC: CPT | Performed by: INTERNAL MEDICINE

## 2022-03-31 PROCEDURE — 027034Z DILATION OF CORONARY ARTERY, ONE ARTERY WITH DRUG-ELUTING INTRALUMINAL DEVICE, PERCUTANEOUS APPROACH: ICD-10-PCS | Performed by: INTERNAL MEDICINE

## 2022-03-31 PROCEDURE — C1874 STENT, COATED/COV W/DEL SYS: HCPCS | Performed by: INTERNAL MEDICINE

## 2022-03-31 PROCEDURE — 93005 ELECTROCARDIOGRAM TRACING: CPT | Performed by: INTERNAL MEDICINE

## 2022-03-31 PROCEDURE — 0 IOPAMIDOL PER 1 ML: Performed by: INTERNAL MEDICINE

## 2022-03-31 PROCEDURE — 85730 THROMBOPLASTIN TIME PARTIAL: CPT | Performed by: INTERNAL MEDICINE

## 2022-03-31 PROCEDURE — 84439 ASSAY OF FREE THYROXINE: CPT | Performed by: INTERNAL MEDICINE

## 2022-03-31 PROCEDURE — 63710000001 INSULIN DETEMIR PER 5 UNITS: Performed by: INTERNAL MEDICINE

## 2022-03-31 PROCEDURE — C1725 CATH, TRANSLUMIN NON-LASER: HCPCS | Performed by: INTERNAL MEDICINE

## 2022-03-31 PROCEDURE — 85347 COAGULATION TIME ACTIVATED: CPT

## 2022-03-31 PROCEDURE — 84443 ASSAY THYROID STIM HORMONE: CPT | Performed by: INTERNAL MEDICINE

## 2022-03-31 PROCEDURE — 94799 UNLISTED PULMONARY SVC/PX: CPT

## 2022-03-31 PROCEDURE — 92928 PRQ TCAT PLMT NTRAC ST 1 LES: CPT | Performed by: INTERNAL MEDICINE

## 2022-03-31 PROCEDURE — 25010000002 HEPARIN (PORCINE) PER 1000 UNITS: Performed by: INTERNAL MEDICINE

## 2022-03-31 DEVICE — XIENCE SKYPOINT™ EVEROLIMUS ELUTING CORONARY STENT SYSTEM 2.50 MM X 12 MM / RAPID-EXCHANGE
Type: IMPLANTABLE DEVICE | Site: CORONARY | Status: FUNCTIONAL
Brand: XIENCE SKYPOINT™

## 2022-03-31 RX ORDER — MIDAZOLAM HYDROCHLORIDE 1 MG/ML
INJECTION INTRAMUSCULAR; INTRAVENOUS AS NEEDED
Status: DISCONTINUED | OUTPATIENT
Start: 2022-03-31 | End: 2022-03-31 | Stop reason: HOSPADM

## 2022-03-31 RX ORDER — HEPARIN SODIUM 1000 [USP'U]/ML
INJECTION, SOLUTION INTRAVENOUS; SUBCUTANEOUS AS NEEDED
Status: DISCONTINUED | OUTPATIENT
Start: 2022-03-31 | End: 2022-03-31 | Stop reason: HOSPADM

## 2022-03-31 RX ORDER — ACETAMINOPHEN 325 MG/1
650 TABLET ORAL EVERY 4 HOURS PRN
Status: DISCONTINUED | OUTPATIENT
Start: 2022-03-31 | End: 2022-04-03 | Stop reason: HOSPADM

## 2022-03-31 RX ORDER — SODIUM CHLORIDE 9 MG/ML
100 INJECTION, SOLUTION INTRAVENOUS ONCE
Status: COMPLETED | OUTPATIENT
Start: 2022-03-31 | End: 2022-03-31

## 2022-03-31 RX ORDER — SODIUM CHLORIDE 9 MG/ML
INJECTION, SOLUTION INTRAVENOUS CONTINUOUS PRN
Status: COMPLETED | OUTPATIENT
Start: 2022-03-31 | End: 2022-03-31

## 2022-03-31 RX ORDER — FENTANYL CITRATE 50 UG/ML
INJECTION, SOLUTION INTRAMUSCULAR; INTRAVENOUS AS NEEDED
Status: DISCONTINUED | OUTPATIENT
Start: 2022-03-31 | End: 2022-03-31 | Stop reason: HOSPADM

## 2022-03-31 RX ORDER — ACETAMINOPHEN 325 MG/1
650 TABLET ORAL EVERY 6 HOURS PRN
Status: DISCONTINUED | OUTPATIENT
Start: 2022-03-31 | End: 2022-04-03 | Stop reason: HOSPADM

## 2022-03-31 RX ORDER — LIDOCAINE HYDROCHLORIDE 20 MG/ML
INJECTION, SOLUTION INFILTRATION; PERINEURAL AS NEEDED
Status: DISCONTINUED | OUTPATIENT
Start: 2022-03-31 | End: 2022-03-31 | Stop reason: HOSPADM

## 2022-03-31 RX ADMIN — CARVEDILOL 6.25 MG: 6.25 TABLET, FILM COATED ORAL at 17:18

## 2022-03-31 RX ADMIN — ATORVASTATIN CALCIUM 40 MG: 40 TABLET, FILM COATED ORAL at 15:51

## 2022-03-31 RX ADMIN — SACUBITRIL AND VALSARTAN 1 TABLET: 24; 26 TABLET, FILM COATED ORAL at 21:26

## 2022-03-31 RX ADMIN — SACUBITRIL AND VALSARTAN 1 TABLET: 24; 26 TABLET, FILM COATED ORAL at 15:51

## 2022-03-31 RX ADMIN — Medication 10 ML: at 21:28

## 2022-03-31 RX ADMIN — ALLOPURINOL 300 MG: 300 TABLET ORAL at 15:52

## 2022-03-31 RX ADMIN — ASPIRIN 81 MG: 81 TABLET, COATED ORAL at 15:51

## 2022-03-31 RX ADMIN — SODIUM CHLORIDE 100 ML/HR: 9 INJECTION, SOLUTION INTRAVENOUS at 15:52

## 2022-03-31 RX ADMIN — INSULIN ASPART 4 UNITS: 100 INJECTION, SOLUTION INTRAVENOUS; SUBCUTANEOUS at 17:18

## 2022-03-31 RX ADMIN — Medication 10 ML: at 09:49

## 2022-03-31 RX ADMIN — INSULIN DETEMIR 5 UNITS: 100 INJECTION, SOLUTION SUBCUTANEOUS at 21:26

## 2022-03-31 RX ADMIN — TICAGRELOR 90 MG: 90 TABLET ORAL at 21:26

## 2022-03-31 RX ADMIN — CHOLECALCIFEROL TAB 10 MCG (400 UNIT) 2000 UNITS: 10 TAB at 15:52

## 2022-03-31 RX ADMIN — INSULIN ASPART 2 UNITS: 100 INJECTION, SOLUTION INTRAVENOUS; SUBCUTANEOUS at 09:48

## 2022-03-31 RX ADMIN — CETIRIZINE HYDROCHLORIDE 10 MG: 10 TABLET, FILM COATED ORAL at 15:51

## 2022-04-01 LAB
ALBUMIN SERPL-MCNC: 3.62 G/DL (ref 3.5–5.2)
ALBUMIN/GLOB SERPL: 1.4 G/DL
ALP SERPL-CCNC: 135 U/L (ref 39–117)
ALT SERPL W P-5'-P-CCNC: 29 U/L (ref 1–33)
ANION GAP SERPL CALCULATED.3IONS-SCNC: 11.6 MMOL/L (ref 5–15)
AST SERPL-CCNC: 20 U/L (ref 1–32)
BASOPHILS # BLD AUTO: 0.07 10*3/MM3 (ref 0–0.2)
BASOPHILS NFR BLD AUTO: 0.9 % (ref 0–1.5)
BILIRUB SERPL-MCNC: 0.8 MG/DL (ref 0–1.2)
BUN SERPL-MCNC: 20 MG/DL (ref 8–23)
BUN/CREAT SERPL: 25.6 (ref 7–25)
CALCIUM SPEC-SCNC: 8.9 MG/DL (ref 8.6–10.5)
CHLORIDE SERPL-SCNC: 98 MMOL/L (ref 98–107)
CO2 SERPL-SCNC: 23.4 MMOL/L (ref 22–29)
CREAT SERPL-MCNC: 0.78 MG/DL (ref 0.57–1)
DEPRECATED RDW RBC AUTO: 43.4 FL (ref 37–54)
EGFRCR SERPLBLD CKD-EPI 2021: 82.9 ML/MIN/1.73
EOSINOPHIL # BLD AUTO: 0.11 10*3/MM3 (ref 0–0.4)
EOSINOPHIL NFR BLD AUTO: 1.4 % (ref 0.3–6.2)
ERYTHROCYTE [DISTWIDTH] IN BLOOD BY AUTOMATED COUNT: 15.9 % (ref 12.3–15.4)
GLOBULIN UR ELPH-MCNC: 2.6 GM/DL
GLUCOSE BLDC GLUCOMTR-MCNC: 172 MG/DL (ref 70–130)
GLUCOSE BLDC GLUCOMTR-MCNC: 189 MG/DL (ref 70–130)
GLUCOSE BLDC GLUCOMTR-MCNC: 206 MG/DL (ref 70–130)
GLUCOSE BLDC GLUCOMTR-MCNC: 328 MG/DL (ref 70–130)
GLUCOSE SERPL-MCNC: 241 MG/DL (ref 65–99)
HCT VFR BLD AUTO: 38 % (ref 34–46.6)
HGB BLD-MCNC: 12.2 G/DL (ref 12–15.9)
IMM GRANULOCYTES # BLD AUTO: 0.04 10*3/MM3 (ref 0–0.05)
IMM GRANULOCYTES NFR BLD AUTO: 0.5 % (ref 0–0.5)
LYMPHOCYTES # BLD AUTO: 1.5 10*3/MM3 (ref 0.7–3.1)
LYMPHOCYTES NFR BLD AUTO: 18.8 % (ref 19.6–45.3)
MCH RBC QN AUTO: 24.4 PG (ref 26.6–33)
MCHC RBC AUTO-ENTMCNC: 32.1 G/DL (ref 31.5–35.7)
MCV RBC AUTO: 75.8 FL (ref 79–97)
MONOCYTES # BLD AUTO: 0.85 10*3/MM3 (ref 0.1–0.9)
MONOCYTES NFR BLD AUTO: 10.7 % (ref 5–12)
NEUTROPHILS NFR BLD AUTO: 5.41 10*3/MM3 (ref 1.7–7)
NEUTROPHILS NFR BLD AUTO: 67.7 % (ref 42.7–76)
NRBC BLD AUTO-RTO: 0 /100 WBC (ref 0–0.2)
NT-PROBNP SERPL-MCNC: 2011 PG/ML (ref 0–900)
PLATELET # BLD AUTO: 421 10*3/MM3 (ref 140–450)
PMV BLD AUTO: 10.5 FL (ref 6–12)
POTASSIUM SERPL-SCNC: 3.8 MMOL/L (ref 3.5–5.2)
PROT SERPL-MCNC: 6.2 G/DL (ref 6–8.5)
RBC # BLD AUTO: 5.01 10*6/MM3 (ref 3.77–5.28)
SODIUM SERPL-SCNC: 133 MMOL/L (ref 136–145)
WBC NRBC COR # BLD: 7.98 10*3/MM3 (ref 3.4–10.8)

## 2022-04-01 PROCEDURE — 25010000002 ENOXAPARIN PER 10 MG: Performed by: INTERNAL MEDICINE

## 2022-04-01 PROCEDURE — 84156 ASSAY OF PROTEIN URINE: CPT | Performed by: INTERNAL MEDICINE

## 2022-04-01 PROCEDURE — 82570 ASSAY OF URINE CREATININE: CPT | Performed by: INTERNAL MEDICINE

## 2022-04-01 PROCEDURE — 99232 SBSQ HOSP IP/OBS MODERATE 35: CPT | Performed by: SPECIALIST

## 2022-04-01 PROCEDURE — 83880 ASSAY OF NATRIURETIC PEPTIDE: CPT | Performed by: INTERNAL MEDICINE

## 2022-04-01 PROCEDURE — 93005 ELECTROCARDIOGRAM TRACING: CPT | Performed by: INTERNAL MEDICINE

## 2022-04-01 PROCEDURE — 63710000001 INSULIN ASPART PER 5 UNITS: Performed by: INTERNAL MEDICINE

## 2022-04-01 PROCEDURE — 99232 SBSQ HOSP IP/OBS MODERATE 35: CPT | Performed by: INTERNAL MEDICINE

## 2022-04-01 PROCEDURE — 25010000002 FUROSEMIDE PER 20 MG: Performed by: NURSE PRACTITIONER

## 2022-04-01 PROCEDURE — 82962 GLUCOSE BLOOD TEST: CPT

## 2022-04-01 PROCEDURE — 63710000001 INSULIN DETEMIR PER 5 UNITS: Performed by: INTERNAL MEDICINE

## 2022-04-01 PROCEDURE — 80053 COMPREHEN METABOLIC PANEL: CPT | Performed by: INTERNAL MEDICINE

## 2022-04-01 PROCEDURE — 85025 COMPLETE CBC W/AUTO DIFF WBC: CPT | Performed by: INTERNAL MEDICINE

## 2022-04-01 RX ORDER — FUROSEMIDE 10 MG/ML
20 INJECTION INTRAMUSCULAR; INTRAVENOUS EVERY 12 HOURS
Status: COMPLETED | OUTPATIENT
Start: 2022-04-01 | End: 2022-04-02

## 2022-04-01 RX ORDER — DEXTROSE MONOHYDRATE 25 G/50ML
25 INJECTION, SOLUTION INTRAVENOUS
Status: DISCONTINUED | OUTPATIENT
Start: 2022-04-01 | End: 2022-04-01

## 2022-04-01 RX ORDER — SPIRONOLACTONE 25 MG/1
25 TABLET ORAL DAILY
Status: DISCONTINUED | OUTPATIENT
Start: 2022-04-01 | End: 2022-04-03 | Stop reason: HOSPADM

## 2022-04-01 RX ORDER — NICOTINE POLACRILEX 4 MG
15 LOZENGE BUCCAL
Status: DISCONTINUED | OUTPATIENT
Start: 2022-04-01 | End: 2022-04-01

## 2022-04-01 RX ADMIN — TICAGRELOR 90 MG: 90 TABLET ORAL at 19:52

## 2022-04-01 RX ADMIN — ENOXAPARIN SODIUM 40 MG: 40 INJECTION SUBCUTANEOUS at 19:52

## 2022-04-01 RX ADMIN — INSULIN DETEMIR 10 UNITS: 100 INJECTION, SOLUTION SUBCUTANEOUS at 19:51

## 2022-04-01 RX ADMIN — INSULIN ASPART 3 UNITS: 100 INJECTION, SOLUTION INTRAVENOUS; SUBCUTANEOUS at 18:22

## 2022-04-01 RX ADMIN — FUROSEMIDE 20 MG: 10 INJECTION, SOLUTION INTRAMUSCULAR; INTRAVENOUS at 12:40

## 2022-04-01 RX ADMIN — TICAGRELOR 90 MG: 90 TABLET ORAL at 09:01

## 2022-04-01 RX ADMIN — ALLOPURINOL 300 MG: 300 TABLET ORAL at 09:01

## 2022-04-01 RX ADMIN — ASPIRIN 81 MG: 81 TABLET, COATED ORAL at 09:02

## 2022-04-01 RX ADMIN — Medication 10 ML: at 19:56

## 2022-04-01 RX ADMIN — SPIRONOLACTONE 25 MG: 25 TABLET ORAL at 12:24

## 2022-04-01 RX ADMIN — ACETAMINOPHEN 650 MG: 325 TABLET ORAL at 09:23

## 2022-04-01 RX ADMIN — ATORVASTATIN CALCIUM 40 MG: 40 TABLET, FILM COATED ORAL at 09:02

## 2022-04-01 RX ADMIN — CETIRIZINE HYDROCHLORIDE 10 MG: 10 TABLET, FILM COATED ORAL at 09:01

## 2022-04-01 RX ADMIN — INSULIN ASPART 5 UNITS: 100 INJECTION, SOLUTION INTRAVENOUS; SUBCUTANEOUS at 11:35

## 2022-04-01 RX ADMIN — CARVEDILOL 6.25 MG: 6.25 TABLET, FILM COATED ORAL at 18:22

## 2022-04-01 RX ADMIN — CARVEDILOL 6.25 MG: 6.25 TABLET, FILM COATED ORAL at 09:00

## 2022-04-01 RX ADMIN — Medication 10 ML: at 09:02

## 2022-04-01 RX ADMIN — SACUBITRIL AND VALSARTAN 1 TABLET: 24; 26 TABLET, FILM COATED ORAL at 19:52

## 2022-04-01 RX ADMIN — CHOLECALCIFEROL TAB 10 MCG (400 UNIT) 2000 UNITS: 10 TAB at 09:02

## 2022-04-01 RX ADMIN — SACUBITRIL AND VALSARTAN 1 TABLET: 24; 26 TABLET, FILM COATED ORAL at 09:01

## 2022-04-01 RX ADMIN — INSULIN ASPART 2 UNITS: 100 INJECTION, SOLUTION INTRAVENOUS; SUBCUTANEOUS at 08:59

## 2022-04-01 NOTE — PROGRESS NOTES
LOS: 4 days     Name: Breanna Kaba  Age/Sex: 66 y.o. female  :  1955        PCP: Myrna Ramos MD  REF: No ref. provider found    Principal Problem:    Acute congestive heart failure, unspecified heart failure type (HCC)  Active Problems:    NSTEMI, initial episode of care ([facility], located within [facility])      Reason for follow-up: HFrEF and Cardiomyopathy     Subjective     Subjective     Ms. Kaba is a 66-year-old  female with history of hypertension and type 2 diabetes mellitus, presented with increasing shortness of breath and was diagnosed to have acute decompensated heart failure.  She has had good response with IV diuretic therapy since admission.  Her echo Doppler study revealed dilated left ventricle with severely depressed LV systolic function with an estimated LV ejection fraction of about 25%.  She denies any chest pains.    Interval History: Patient underwent left heart catheterization yesterday that showed 99% stenosis of the mid LAD. She underwent PCI to mid LAD. Breathing has improved since admission. Denies any chest pain. Kidney function is stable.     Vital Signs  Temp:  [97.9 °F (36.6 °C)-98.2 °F (36.8 °C)] 98.1 °F (36.7 °C)  Heart Rate:  [] 101  Resp:  [18-20] 20  BP: (116-147)/(66-91) 135/77     Vital Signs (last 72 hrs)        0700   0659  0700   0659  0700   0659  0700   0914   Most Recent      Temp (°F) 97.7 -  98.1    97.5 -  98    97.9 -  98.2       98.1 (36.7)  0614    Heart Rate 99 -  114    95 -  112    86 -  101      101     101 / 0900    Resp 18 -  20    18 -  20    18 -  20       20 / 0614    /95 -  153/98    117/67 -  156/95    116/66 -  147/89       135/77  0614    SpO2 (%) 94 -  96    96 -  97    96 -  98       97  0614        Documented weights    22 1705 22 0500 22 0500 22 0459   Weight: 63.5 kg (140 lb) 69.4 kg (153 lb 1.6 oz) 69.5 kg (153 lb 3.2 oz) 69.9 kg (154 lb 1.6 oz)     04/01/22 0500   Weight: 70.9 kg (156 lb 4.8 oz)      Body mass index is 28.59 kg/m².    Intake/Output Summary (Last 24 hours) at 4/1/2022 0914  Last data filed at 4/1/2022 0900  Gross per 24 hour   Intake 1287.65 ml   Output 1450 ml   Net -162.35 ml     Objective    Objective       Physical Exam:     General Appearance:    Alert, cooperative, in no acute distress   Head:    Normocephalic, without obvious abnormality, atraumatic   Eyes:            Conjunctivae and sclerae normal, no   icterus, no pallor, corneas clear.   Neck:   No adenopathy, supple, trachea midline, no thyromegaly, no   carotid bruit, no JVD   Lungs:     + crackles,respirations regular, even and                  unlabored    Heart:    Regular rhythm and normal rate, normal S1 and S2, no            murmur, no gallop, no rub, no click   Chest Wall:    No abnormalities observed   Abdomen:     Normal bowel sounds, no masses, no organomegaly, soft        non-tender, non-distended, no guarding, no rebound                tenderness   Extremities:   Moves all extremities well, 1+ BLE edema, no cyanosis, no             redness   Pulses:   Pulses palpable and equal bilaterally   Skin:   No bleeding, bruising or rash       Neurologic:   Alert and oriented      Results review       Results Review:   Results from last 7 days   Lab Units 04/01/22  0200 03/31/22 0415 03/30/22 0145 03/29/22  0003 03/28/22  1811   WBC 10*3/mm3 7.98 7.45 8.34 8.50 9.44   HEMOGLOBIN g/dL 12.2 11.8* 12.0 12.8 12.7   PLATELETS 10*3/mm3 421 360 361 493* 484*     Results from last 7 days   Lab Units 04/01/22  0200 03/31/22 0415 03/30/22 0145 03/29/22  0003 03/28/22  2043   SODIUM mmol/L 133* 136 137 134* 136   POTASSIUM mmol/L 3.8 4.1 3.9 3.7 3.9   CHLORIDE mmol/L 98 98 98 96* 100   CO2 mmol/L 23.4 25.1 26.8 25.9 23.4   BUN mg/dL 20 22 21 21 22   CREATININE mg/dL 0.78 0.90 1.20* 0.81 0.85   CALCIUM mg/dL 8.9 8.8 9.0 9.3 9.3   GLUCOSE mg/dL 241* 191* 295* 201* 200*   ALT (SGPT) U/L  29 33  --  48* 46*   AST (SGOT) U/L 20 24  --  33* 31     Results from last 7 days   Lab Units 03/29/22  0526 03/29/22  0003 03/28/22 2043 03/28/22  1811   TROPONIN T ng/mL 0.293* 0.323* 0.266* 0.259*     Lab Results   Component Value Date    INR 1.04 03/28/2022     Lab Results   Component Value Date    MG 1.9 02/25/2021     Lab Results   Component Value Date    TSH 2.760 03/31/2022    CHLPL 256 (H) 03/01/2017    TRIG 79 03/29/2022    HDL 54 03/29/2022    LDL 58 03/29/2022      Imaging Results (Last 48 Hours)     ** No results found for the last 48 hours. **        Echo   Results for orders placed during the hospital encounter of 03/28/22    Adult Transthoracic Echo Complete w/ Color, Spectral and Contrast if necessary per protocol    Interpretation Summary  · The left ventricular cavity is mildly dilated.  · Left ventricular ejection fraction appears to be 21 - 25%. Left ventricular systolic function is severely decreased.  · Left ventricular diastolic function is consistent with (grade I) impaired relaxation.  · The aortic valve exhibits sclerosis. There is mild thickening of the aortic valve. The aortic valve appears trileaflet. Mild aortic valve regurgitation is present. No aortic valve stenosis is present.  · There are myxomatous changes of the mitral valve apparatus present.  · Moderate mitral valve regurgitation is present.  · There are myxomatous changes of the mitral valve apparatus present. Moderate mitral valve regurgitation is present. No significant mitral valve stenosis is present.  · The tricuspid valve is structurally normal with no significant stenosis present. Mild tricuspid valve regurgitation is present. Estimated right ventricular systolic pressure from tricuspid regurgitation is normal (<35 mmHg).  · There is a small (<1cm) circumferential pericardial effusion. There is no evidence of cardiac tamponade.     I reviewed the patient's new clinical results.    Telemetry: NSR 80-90 bpm       Medication Review:   allopurinol, 300 mg, Oral, Daily  aspirin, 81 mg, Oral, Daily  atorvastatin, 40 mg, Oral, Daily  carvedilol, 6.25 mg, Oral, BID With Meals  cetirizine, 10 mg, Oral, Daily  cholecalciferol, 2,000 Units, Oral, Daily  insulin aspart, 0-7 Units, Subcutaneous, TID AC  insulin detemir, 5 Units, Subcutaneous, Nightly  sacubitril-valsartan, 1 tablet, Oral, Q12H  sodium chloride, 10 mL, Intravenous, Q12H  ticagrelor, 90 mg, Oral, BID             Assessment      Assessment:  Acute decompensated systolic heart failure HFrEF, continues to improve  Acute NSTEMI status post PCI of the LAD  Acute kidney injury, resolved  Newly diagnosed dilated cardiomyopathy EF 25%          Plan     Recommendations:  Status post successful PCI of the LAD continue dual antiplatelet therapy  Continue with diuresing and will also add spironolactone we will monitor creatinine closely  She will need LifeVest prior to discharge    I discussed the patients findings and my recommendations with patient and family      Electronically signed by REGINALDO Toribio, 04/01/22, 9:14 AM EDT.  Electronically signed by Daniel Ruiz MD, 04/01/22, 2:44 PM EDT.    Please note that portions of this note were completed with a voice recognition program.

## 2022-04-01 NOTE — PLAN OF CARE
Problem: Adult Inpatient Plan of Care  Goal: Plan of Care Review  Outcome: Ongoing, Progressing  Flowsheets  Taken 4/1/2022 0342 by Jostin Van RN  Progress: improving  Taken 3/31/2022 1729 by Mariama Hernández RN  Plan of Care Reviewed With: patient  Goal: Patient-Specific Goal (Individualized)  Outcome: Ongoing, Progressing  Goal: Absence of Hospital-Acquired Illness or Injury  Outcome: Ongoing, Progressing  Intervention: Identify and Manage Fall Risk  Recent Flowsheet Documentation  Taken 4/1/2022 0300 by Jostin Van RN  Safety Promotion/Fall Prevention:   activity supervised   assistive device/personal items within reach   clutter free environment maintained   fall prevention program maintained   room organization consistent   safety round/check completed  Taken 4/1/2022 0100 by Jostin Van RN  Safety Promotion/Fall Prevention:   activity supervised   assistive device/personal items within reach   clutter free environment maintained   fall prevention program maintained   room organization consistent   safety round/check completed  Taken 3/31/2022 2300 by Jostin Van RN  Safety Promotion/Fall Prevention:   activity supervised   clutter free environment maintained   assistive device/personal items within reach   fall prevention program maintained   room organization consistent   safety round/check completed  Taken 3/31/2022 2115 by Jostin Van RN  Safety Promotion/Fall Prevention:   activity supervised   assistive device/personal items within reach   clutter free environment maintained   fall prevention program maintained   room organization consistent   safety round/check completed  Taken 3/31/2022 1900 by Jostin Van RN  Safety Promotion/Fall Prevention:   assistive device/personal items within reach   activity supervised   clutter free environment maintained   fall prevention program maintained   safety round/check completed   room organization consistent  Intervention: Prevent and Manage  VTE (Venous Thromboembolism) Risk  Recent Flowsheet Documentation  Taken 4/1/2022 0300 by Jostin Van RN  Activity Management: activity adjusted per tolerance  Taken 4/1/2022 0100 by Jostin Van RN  Activity Management: activity adjusted per tolerance  Taken 3/31/2022 2300 by Jostin Van RN  Activity Management: activity adjusted per tolerance  Taken 3/31/2022 2115 by Jostin Van RN  Activity Management: activity adjusted per tolerance  VTE Prevention/Management: (See MAR)   bleeding risk factor(s) identified   other (see comments)  Range of Motion: active ROM (range of motion) encouraged  Taken 3/31/2022 1900 by Jostin Van RN  Activity Management: activity adjusted per tolerance  Intervention: Prevent Infection  Recent Flowsheet Documentation  Taken 4/1/2022 0300 by Jostin Van RN  Infection Prevention: rest/sleep promoted  Taken 4/1/2022 0100 by Jostin Van RN  Infection Prevention: rest/sleep promoted  Taken 3/31/2022 2300 by Jostin Van RN  Infection Prevention: rest/sleep promoted  Taken 3/31/2022 2115 by Jostin Van RN  Infection Prevention: rest/sleep promoted  Taken 3/31/2022 1900 by Jostin Van RN  Infection Prevention: rest/sleep promoted  Goal: Optimal Comfort and Wellbeing  Outcome: Ongoing, Progressing  Intervention: Provide Person-Centered Care  Recent Flowsheet Documentation  Taken 3/31/2022 2115 by Jostin Van RN  Trust Relationship/Rapport:   care explained   choices provided  Goal: Readiness for Transition of Care  Outcome: Ongoing, Progressing     Problem: Fall Injury Risk  Goal: Absence of Fall and Fall-Related Injury  Outcome: Ongoing, Progressing  Intervention: Identify and Manage Contributors  Recent Flowsheet Documentation  Taken 4/1/2022 0300 by Jostin Van RN  Medication Review/Management: medications reviewed  Taken 4/1/2022 0100 by Jostin Van RN  Medication Review/Management: medications reviewed  Taken 3/31/2022 2300 by Rehan  Jostin RN  Medication Review/Management: medications reviewed  Taken 3/31/2022 2115 by Jostin Van RN  Medication Review/Management: medications reviewed  Taken 3/31/2022 1900 by Jostin Van RN  Medication Review/Management: medications reviewed  Intervention: Promote Injury-Free Environment  Recent Flowsheet Documentation  Taken 4/1/2022 0300 by Jostin Van RN  Safety Promotion/Fall Prevention:   activity supervised   assistive device/personal items within reach   clutter free environment maintained   fall prevention program maintained   room organization consistent   safety round/check completed  Taken 4/1/2022 0100 by Jostin Van RN  Safety Promotion/Fall Prevention:   activity supervised   assistive device/personal items within reach   clutter free environment maintained   fall prevention program maintained   room organization consistent   safety round/check completed  Taken 3/31/2022 2300 by Jostin Van RN  Safety Promotion/Fall Prevention:   activity supervised   clutter free environment maintained   assistive device/personal items within reach   fall prevention program maintained   room organization consistent   safety round/check completed  Taken 3/31/2022 2115 by Jostin Van RN  Safety Promotion/Fall Prevention:   activity supervised   assistive device/personal items within reach   clutter free environment maintained   fall prevention program maintained   room organization consistent   safety round/check completed  Taken 3/31/2022 1900 by Jostin Van RN  Safety Promotion/Fall Prevention:   assistive device/personal items within reach   activity supervised   clutter free environment maintained   fall prevention program maintained   safety round/check completed   room organization consistent     Problem: Adjustment to Illness (Heart Failure)  Goal: Optimal Coping  Outcome: Ongoing, Progressing  Intervention: Support Psychosocial Response  Recent Flowsheet Documentation  Taken  3/31/2022 2115 by Jostin Van RN  Family/Support System Care:   self-care encouraged   support provided     Problem: Cardiac Output Decreased (Heart Failure)  Goal: Optimal Cardiac Output  Outcome: Ongoing, Progressing     Problem: Dysrhythmia (Heart Failure)  Goal: Stable Heart Rate and Rhythm  Outcome: Ongoing, Progressing     Problem: Fluid Imbalance (Heart Failure)  Goal: Fluid Balance  Outcome: Ongoing, Progressing  Intervention: Monitor and Manage Fluid Balance  Recent Flowsheet Documentation  Taken 3/31/2022 2115 by Jostin Van RN  Fluid/Electrolyte Management: fluids provided     Problem: Functional Ability Impaired (Heart Failure)  Goal: Optimal Functional Ability  Outcome: Ongoing, Progressing  Intervention: Optimize Functional Ability  Recent Flowsheet Documentation  Taken 4/1/2022 0300 by Jostin Van RN  Activity Management: activity adjusted per tolerance  Taken 4/1/2022 0100 by Jostin Van RN  Activity Management: activity adjusted per tolerance  Taken 3/31/2022 2300 by Jostin Van RN  Activity Management: activity adjusted per tolerance  Taken 3/31/2022 2115 by Jostin Van RN  Activity Management: activity adjusted per tolerance  Taken 3/31/2022 1900 by Jostin Van RN  Activity Management: activity adjusted per tolerance     Problem: Oral Intake Inadequate (Heart Failure)  Goal: Optimal Nutrition Intake  Outcome: Ongoing, Progressing     Problem: Respiratory Compromise (Heart Failure)  Goal: Effective Oxygenation and Ventilation  Outcome: Ongoing, Progressing  Intervention: Promote Airway Secretion Clearance  Recent Flowsheet Documentation  Taken 3/31/2022 2115 by Jostin Van RN  Cough And Deep Breathing: done independently per patient     Problem: Sleep Disordered Breathing (Heart Failure)  Goal: Effective Breathing Pattern During Sleep  Outcome: Ongoing, Progressing   Goal Outcome Evaluation:           Progress: improving

## 2022-04-01 NOTE — PHARMACY PATIENT ASSISTANCE
Pharmacy evaluated the cost of Brilinta for patient. Brilinta is covered on her insurance with a $47 copay. Patient states she would be able to afford the copay of either Entresto ($47) or Brilinta ($47), but not both. We profiled a free trial card for patient to use at discharge but she will need to discuss switching from Brilinta to Plavix at her follow-up cardiology appointment.    Thank you,    Susi Akins, PharmD  04/01/22  16:23 EDT

## 2022-04-01 NOTE — NURSING NOTE
Order received for Cardiac Rehab Consultation.     CR staff will follow up with patient      Information discussed with: Patient        Educated on: Benefits of Exercise,  Educated on Cardiac Rehab and Program Protocol, Brochure and/or educational material provided, Contact information given and Teach Back Verified        Comments: Staff spoke with patient regarding program. Patient stated that she is definitely interested in attending the program in hopes to strengthen her heart. I explained that with COVID guidelines that we currently have a wait list and it may be a few weeks before we could get her started but as soon as a spot becomes available, we will give her a call back and gladly get her started.       Thank you for the referral. Please contact the Cardiac Rehab Dept. (ext. [phone]) with any further questions or concerns.

## 2022-04-01 NOTE — CASE MANAGEMENT/SOCIAL WORK
Discharge Planning Assessment   [facility]     Patient Name: Breanna Kaba  MRN: [id number]  Today's Date: 4/1/2022    Admit Date: 3/28/2022       Discharge Plan     Row Name 04/01/22 0956       Plan    Plan SS spoke with Alma with [facility] who is aware of Life Vest order and pt is scheduled to be fit with Life Vest this pm.  SS will follow.                  NAYA JacksonW

## 2022-04-01 NOTE — CASE MANAGEMENT/SOCIAL WORK
Discharge Planning Assessment   [facility]     Patient Name: Breanna Kaba  MRN: [id number]  Today's Date: 4/1/2022    Admit Date: 3/28/2022     Discharge Plan     Row Name 04/01/22 1349       Plan    Plan Pt. admitted 3/28/22. SS spoke with pt, she states discharge plan is still for her to return home with her granddaughter at discharge. SS to follow                 JORDIN Bassett

## 2022-04-01 NOTE — PROGRESS NOTES
Assisted By: Mariama COLLINS and granddaughter who lives with her    CC: Follow-up on CHF    Interview History/HPI: Patient states she continues to feel better over admission.  She still has some edema but she thinks it is improving, she is tolerating her diet, denies any chest pain status post stent yesterday.  Further IV diuretics were given by cardiology today.  She has been voiding a significant amount.  No new complaints.    ROS:     Vitals:    04/01/22 1409   BP: 105/67   Pulse: 82   Resp: 20   Temp: 98.1 °F (36.7 °C)   SpO2: 98%         Intake/Output Summary (Last 24 hours) at 4/1/2022 1816  Last data filed at 4/1/2022 1725  Gross per 24 hour   Intake 1800 ml   Output 1650 ml   Net 150 ml       EXAM: She is pleasant in no distress, she can speak in full sentence without difficulty, no JVD is noted lungs have bilateral breath sounds with some faint crackle at the bases posterior and inferior but otherwise lungs are clear heart is a regular rate and rhythm without murmur, the right radial cath site looks good no bruising or aneurysm noted, good capillary refill in the hand.  Abdomen is soft, benign she still has 1+ to 2+ edema of her extremities seems to be improving.      EKG:     Tele: Sinus rhythm    LABS:     Lab Results (last 48 hours)     Procedure Component Value Units Date/Time    POC Glucose Once [[id number]]  (Abnormal) Collected: 04/01/22 1804    Specimen: Blood Updated: 04/01/22 1810     Glucose 206 mg/dL      Comment: Meter: [id number] : [id number] drew grace       POC Glucose Once [[id number]]  (Abnormal) Collected: 04/01/22 1018    Specimen: Blood Updated: 04/01/22 1032     Glucose 328 mg/dL      Comment: Meter: [id number] : [id number] drew grace       POC Glucose Once [[id number]]  (Abnormal) Collected: 04/01/22 0616    Specimen: Blood Updated: 04/01/22 0624     Glucose 189 mg/dL      Comment: Meter: [id number] : [id number] drew grace       Comprehensive Metabolic Panel [[id number]]   (Abnormal) Collected: 04/01/22 0200    Specimen: Blood Updated: 04/01/22 0315     Glucose 241 mg/dL      BUN 20 mg/dL      Creatinine 0.78 mg/dL      Sodium 133 mmol/L      Potassium 3.8 mmol/L      Chloride 98 mmol/L      CO2 23.4 mmol/L      Calcium 8.9 mg/dL      Total Protein 6.2 g/dL      Albumin 3.62 g/dL      ALT (SGPT) 29 U/L      AST (SGOT) 20 U/L      Alkaline Phosphatase 135 U/L      Total Bilirubin 0.8 mg/dL      Globulin 2.6 gm/dL      A/G Ratio 1.4 g/dL      BUN/Creatinine Ratio 25.6     Anion Gap 11.6 mmol/L      eGFR 82.9 mL/min/1.73      Comment: National Kidney Foundation and American Society of Nephrology (ASN) Task Force recommended calculation based on the Chronic Kidney Disease Epidemiology Collaboration (CKD-EPI) equation refit without adjustment for race.       Narrative:      GFR Normal >60  Chronic Kidney Disease <60  Kidney Failure <15      BNP [[id number]]  (Abnormal) Collected: 04/01/22 0200    Specimen: Blood Updated: 04/01/22 0310     proBNP 2,011.0 pg/mL     Narrative:      Among patients with dyspnea, NT-proBNP is highly sensitive for the detection of acute congestive heart failure. In addition NT-proBNP of <300 pg/ml effectively rules out acute congestive heart failure with 99% negative predictive value.    Results may be falsely decreased if patient taking Biotin.      CBC & Differential [[id number]]  (Abnormal) Collected: 04/01/22 0200    Specimen: Blood Updated: 04/01/22 0251    Narrative:      The following orders were created for panel order CBC & Differential.  Procedure                               Abnormality         Status                     ---------                               -----------         ------                     CBC Auto Differential[[id number]]        Abnormal            Final result                 Please view results for these tests on the individual orders.    CBC Auto Differential [[id number]]  (Abnormal) Collected: 04/01/22 0200    Specimen: Blood  Updated: 04/01/22 0251     WBC 7.98 10*3/mm3      RBC 5.01 10*6/mm3      Hemoglobin 12.2 g/dL      Hematocrit 38.0 %      MCV 75.8 fL      MCH 24.4 pg      MCHC 32.1 g/dL      RDW 15.9 %      RDW-SD 43.4 fl      MPV 10.5 fL      Platelets 421 10*3/mm3      Neutrophil % 67.7 %      Lymphocyte % 18.8 %      Monocyte % 10.7 %      Eosinophil % 1.4 %      Basophil % 0.9 %      Immature Grans % 0.5 %      Neutrophils, Absolute 5.41 10*3/mm3      Lymphocytes, Absolute 1.50 10*3/mm3      Monocytes, Absolute 0.85 10*3/mm3      Eosinophils, Absolute 0.11 10*3/mm3      Basophils, Absolute 0.07 10*3/mm3      Immature Grans, Absolute 0.04 10*3/mm3      nRBC 0.0 /100 WBC     Blood Culture - Blood, Wrist, Right [[id number]]  (Normal) Collected: 03/28/22 1944    Specimen: Blood from Wrist, Right Updated: 03/31/22 2002     Blood Culture No growth at 3 days    Blood Culture - Blood, Arm, Right [[id number]]  (Normal) Collected: 03/28/22 1944    Specimen: Blood from Arm, Right Updated: 03/31/22 2002     Blood Culture No growth at 3 days    POC Glucose Once [[id number]]  (Abnormal) Collected: 03/31/22 1850    Specimen: Blood Updated: 03/31/22 1856     Glucose 358 mg/dL      Comment: Meter: [id number] : [id number] SRIKANTH PETERS       POC Glucose Once [[id number]]  (Abnormal) Collected: 03/31/22 1557    Specimen: Blood Updated: 03/31/22 1625     Glucose 299 mg/dL      Comment: Meter: [id number] : [id number] drew grace       aPTT [[id number]]  (Abnormal) Collected: 03/31/22 1205    Specimen: Blood Updated: 03/31/22 1255     PTT >100.0 seconds      Comment: Note new Reference Range       Narrative:      PTT Heparin Therapeutic Range:  59 - 95 seconds      POC Activated Clotting Time [[id number]]  (Abnormal) Collected: 03/31/22 1144    Specimen: Blood Updated: 03/31/22 1148     Activated Clotting Time  243 Seconds      Comment: Serial Number: [id number]:  [id number]       POC Glucose Once [[id number]]  (Abnormal) Collected: 03/31/22  1020    Specimen: Blood Updated: 03/31/22 1103     Glucose 234 mg/dL      Comment: Meter: [id number] : [id number] drew grace       POC Glucose Once [[id number]]  (Abnormal) Collected: 03/31/22 0617    Specimen: Blood Updated: 03/31/22 0624     Glucose 184 mg/dL      Comment: Meter: [id number] : [id number] drew grace       TSH [[id number]]  (Normal) Collected: 03/31/22 0415    Specimen: Blood Updated: 03/31/22 0539     TSH 2.760 uIU/mL     T4, Free [[id number]]  (Normal) Collected: 03/31/22 0415    Specimen: Blood Updated: 03/31/22 0539     Free T4 1.42 ng/dL     Narrative:      Results may be falsely increased if patient taking Biotin.      aPTT [[id number]]  (Abnormal) Collected: 03/31/22 0415    Specimen: Blood Updated: 03/31/22 0531     PTT 81.9 seconds      Comment: Note new Reference Range       Narrative:      PTT Heparin Therapeutic Range:  59 - 95 seconds      Comprehensive Metabolic Panel [[id number]]  (Abnormal) Collected: 03/31/22 0415    Specimen: Blood Updated: 03/31/22 0524     Glucose 191 mg/dL      BUN 22 mg/dL      Creatinine 0.90 mg/dL      Sodium 136 mmol/L      Potassium 4.1 mmol/L      Chloride 98 mmol/L      CO2 25.1 mmol/L      Calcium 8.8 mg/dL      Total Protein 6.0 g/dL      Albumin 3.55 g/dL      ALT (SGPT) 33 U/L      AST (SGOT) 24 U/L      Alkaline Phosphatase 118 U/L      Total Bilirubin 0.9 mg/dL      Globulin 2.5 gm/dL      A/G Ratio 1.4 g/dL      BUN/Creatinine Ratio 24.4     Anion Gap 12.9 mmol/L      eGFR 69.8 mL/min/1.73      Comment: National Kidney Foundation and American Society of Nephrology (ASN) Task Force recommended calculation based on the Chronic Kidney Disease Epidemiology Collaboration (CKD-EPI) equation refit without adjustment for race.       Narrative:      GFR Normal >60  Chronic Kidney Disease <60  Kidney Failure <15      CBC & Differential [[id number]]  (Abnormal) Collected: 03/31/22 0415    Specimen: Blood Updated: 03/31/22 0507    Narrative:      The  following orders were created for panel order CBC & Differential.  Procedure                               Abnormality         Status                     ---------                               -----------         ------                     CBC Auto Differential[[id number]]        Abnormal            Final result                 Please view results for these tests on the individual orders.    CBC Auto Differential [[id number]]  (Abnormal) Collected: 03/31/22 0415    Specimen: Blood Updated: 03/31/22 0507     WBC 7.45 10*3/mm3      RBC 4.81 10*6/mm3      Hemoglobin 11.8 g/dL      Hematocrit 36.2 %      MCV 75.3 fL      MCH 24.5 pg      MCHC 32.6 g/dL      RDW 15.8 %      RDW-SD 42.4 fl      MPV 10.4 fL      Platelets 360 10*3/mm3      Neutrophil % 57.6 %      Lymphocyte % 29.4 %      Monocyte % 9.1 %      Eosinophil % 2.8 %      Basophil % 0.8 %      Immature Grans % 0.3 %      Neutrophils, Absolute 4.29 10*3/mm3      Lymphocytes, Absolute 2.19 10*3/mm3      Monocytes, Absolute 0.68 10*3/mm3      Eosinophils, Absolute 0.21 10*3/mm3      Basophils, Absolute 0.06 10*3/mm3      Immature Grans, Absolute 0.02 10*3/mm3      nRBC 0.0 /100 WBC     aPTT [[id number]]  (Abnormal) Collected: 03/30/22 1842    Specimen: Blood Updated: 03/30/22 2054     PTT >100.0 seconds      Comment: Note new Reference Range       Narrative:      PTT Heparin Therapeutic Range:  59 - 95 seconds      POC Glucose Once [[id number]]  (Abnormal) Collected: 03/30/22 1938    Specimen: Blood Updated: 03/30/22 1944     Glucose 259 mg/dL      Comment: Meter: [id number] : [id number] ARMINDA ANGIE                  Radiology:    Imaging Results (Last 72 Hours)     Procedure Component Value Units Date/Time    US Venous Doppler Lower Extremity Bilateral (duplex) [[id number]] Collected: 03/30/22 0856     Updated: 03/30/22 0858    Narrative:      EXAM:    US Duplex Bilateral Lower Extremities Veins     EXAM DATE:    3/30/2022 8:11 AM     CLINICAL HISTORY:     elevated d-dimer, rule out DVT; I50.9-Heart failure, unspecified     TECHNIQUE:    Real-time duplex ultrasound scan of the bilateral lower extremity  veins integrating B-mode two-dimensional vascular structure, Doppler  spectral analysis, color flow Doppler imaging and compression.     COMPARISON:    No relevant prior studies available.     FINDINGS:    RIGHT DEEP VEINS:  Unremarkable.  No DVT in the right common femoral,  femoral, proximal deep femoral or popliteal veins.  The veins  demonstrate normal color flow, are normally compressible, with normal  phasic flow and/or augmentation response.       LEFT DEEP VEINS:  Unremarkable.  No DVT in the left common femoral,  femoral, proximal deep femoral or popliteal veins.  The veins  demonstrate normal color flow, are normally compressible, with normal  phasic flow and/or augmentation response.       SOFT TISSUES:  No acute findings.  No popliteal cyst.       Impression:        Normal bilateral lower extremity duplex venous ultrasound.     This report was finalized on 3/30/2022 8:56 AM by Dr. Jovan Casanova MD.             Results for orders placed during the hospital encounter of 03/28/22    Adult Transthoracic Echo Complete w/ Color, Spectral and Contrast if necessary per protocol    Interpretation Summary  · The left ventricular cavity is mildly dilated.  · Left ventricular ejection fraction appears to be 21 - 25%. Left ventricular systolic function is severely decreased.  · Left ventricular diastolic function is consistent with (grade I) impaired relaxation.  · The aortic valve exhibits sclerosis. There is mild thickening of the aortic valve. The aortic valve appears trileaflet. Mild aortic valve regurgitation is present. No aortic valve stenosis is present.  · There are myxomatous changes of the mitral valve apparatus present.  · Moderate mitral valve regurgitation is present.  · There are myxomatous changes of the mitral valve apparatus present. Moderate mitral  valve regurgitation is present. No significant mitral valve stenosis is present.  · The tricuspid valve is structurally normal with no significant stenosis present. Mild tricuspid valve regurgitation is present. Estimated right ventricular systolic pressure from tricuspid regurgitation is normal (<35 mmHg).  · There is a small (<1cm) circumferential pericardial effusion. There is no evidence of cardiac tamponade.      Assessment/Plan:   HFrEF, acute, with non-ST elevation myocardial infarction status post cardiac catheterization with LAD stenting.  Some of this EF may recover it is yet to be determined now that the LAD is stented, echo can be repeated in a couple of months.  In the meantime we are continuing to treat the heart failure she still has some edema, will dose Lasix as per cardiology and recheck renal function in the a.m.  She is diuresing well.  Patient is on Coreg, Aldactone, Entresto.  Clinically improving.  Continue DAPT for her recent stent.  LifeVest ordered, should be here tonight according to .  I discussed the need for this both with the patient and the granddaughter and encouraged regular usage.    Diabetes, not well controlled, insulin adjusted, follow    Small pericardial effusion can be followed up as an outpatient.    Hypertension, earlier in the stay, certainly improved with treatment of her CHF as well as the Entresto and Coreg.    Mildly elevated D-dimer, insignificant, work-up negative including Dopplers and CT PE protocol    DVT prophylaxis, not wearing SCUDs currently, will discuss with Dr. Blanco possibility starting subcu Lovenox prophylactically now 24 hours post cath.    Edema most like related to CHF but she did have 100 of protein in her urine on admission, thyroid is normal, I will check a urine protein to creatinine ratio to make sure the proteinuria is not playing a role in her edema.  Doubtful it is is albumin is normal.    Disposition Home    Neri Oliva MD

## 2022-04-01 NOTE — PLAN OF CARE
Goal Outcome Evaluation:  Plan of Care Reviewed With: patient        Progress: no change  Outcome Evaluation: Pt currently sitting up in bed watching television; denies any complaints at this time. V/S stable with no signs of respiratory distress present. Pt's BLE edematous and painful due to tightness from swelling per pt. Pt received IV lasix and P.O. aldactone earlier today. Will continue to monitor pt and follow plan of care.

## 2022-04-02 LAB
ABSOLUTE LUNG FLUID CONTENT: 37 % (ref 20–35)
ANION GAP SERPL CALCULATED.3IONS-SCNC: 12.8 MMOL/L (ref 5–15)
ANION GAP SERPL CALCULATED.3IONS-SCNC: 14 MMOL/L (ref 5–15)
BACTERIA SPEC AEROBE CULT: NORMAL
BACTERIA SPEC AEROBE CULT: NORMAL
BASOPHILS # BLD AUTO: 0.04 10*3/MM3 (ref 0–0.2)
BASOPHILS NFR BLD AUTO: 0.5 % (ref 0–1.5)
BUN SERPL-MCNC: 19 MG/DL (ref 8–23)
BUN SERPL-MCNC: 21 MG/DL (ref 8–23)
BUN/CREAT SERPL: 19.1 (ref 7–25)
BUN/CREAT SERPL: 20.2 (ref 7–25)
CALCIUM SPEC-SCNC: 9 MG/DL (ref 8.6–10.5)
CALCIUM SPEC-SCNC: 9 MG/DL (ref 8.6–10.5)
CHLORIDE SERPL-SCNC: 101 MMOL/L (ref 98–107)
CHLORIDE SERPL-SCNC: 99 MMOL/L (ref 98–107)
CO2 SERPL-SCNC: 24 MMOL/L (ref 22–29)
CO2 SERPL-SCNC: 25.2 MMOL/L (ref 22–29)
CREAT SERPL-MCNC: 0.94 MG/DL (ref 0.57–1)
CREAT SERPL-MCNC: 1.1 MG/DL (ref 0.57–1)
CREAT UR-MCNC: 20.3 MG/DL
DEPRECATED RDW RBC AUTO: 44.4 FL (ref 37–54)
EGFRCR SERPLBLD CKD-EPI 2021: 54.8 ML/MIN/1.73
EGFRCR SERPLBLD CKD-EPI 2021: 66.2 ML/MIN/1.73
EOSINOPHIL # BLD AUTO: 0.21 10*3/MM3 (ref 0–0.4)
EOSINOPHIL NFR BLD AUTO: 2.8 % (ref 0.3–6.2)
ERYTHROCYTE [DISTWIDTH] IN BLOOD BY AUTOMATED COUNT: 16 % (ref 12.3–15.4)
GLUCOSE BLDC GLUCOMTR-MCNC: 110 MG/DL (ref 70–130)
GLUCOSE BLDC GLUCOMTR-MCNC: 237 MG/DL (ref 70–130)
GLUCOSE BLDC GLUCOMTR-MCNC: 242 MG/DL (ref 70–130)
GLUCOSE BLDC GLUCOMTR-MCNC: 270 MG/DL (ref 70–130)
GLUCOSE BLDC GLUCOMTR-MCNC: 387 MG/DL (ref 70–130)
GLUCOSE SERPL-MCNC: 256 MG/DL (ref 65–99)
GLUCOSE SERPL-MCNC: 366 MG/DL (ref 65–99)
HCT VFR BLD AUTO: 37.4 % (ref 34–46.6)
HGB BLD-MCNC: 12 G/DL (ref 12–15.9)
IMM GRANULOCYTES # BLD AUTO: 0.03 10*3/MM3 (ref 0–0.05)
IMM GRANULOCYTES NFR BLD AUTO: 0.4 % (ref 0–0.5)
LYMPHOCYTES # BLD AUTO: 1.42 10*3/MM3 (ref 0.7–3.1)
LYMPHOCYTES NFR BLD AUTO: 18.9 % (ref 19.6–45.3)
MAGNESIUM SERPL-MCNC: 1.6 MG/DL (ref 1.6–2.4)
MCH RBC QN AUTO: 24.6 PG (ref 26.6–33)
MCHC RBC AUTO-ENTMCNC: 32.1 G/DL (ref 31.5–35.7)
MCV RBC AUTO: 76.6 FL (ref 79–97)
MONOCYTES # BLD AUTO: 0.61 10*3/MM3 (ref 0.1–0.9)
MONOCYTES NFR BLD AUTO: 8.1 % (ref 5–12)
NEUTROPHILS NFR BLD AUTO: 5.21 10*3/MM3 (ref 1.7–7)
NEUTROPHILS NFR BLD AUTO: 69.3 % (ref 42.7–76)
NRBC BLD AUTO-RTO: 0 /100 WBC (ref 0–0.2)
PLATELET # BLD AUTO: 381 10*3/MM3 (ref 140–450)
PMV BLD AUTO: 10.4 FL (ref 6–12)
POTASSIUM SERPL-SCNC: 4.1 MMOL/L (ref 3.5–5.2)
POTASSIUM SERPL-SCNC: 4.2 MMOL/L (ref 3.5–5.2)
PROT ?TM UR-MCNC: 13.1 MG/DL
PROT/CREAT UR: 645.3 MG/G CREA (ref 0–200)
RBC # BLD AUTO: 4.88 10*6/MM3 (ref 3.77–5.28)
SODIUM SERPL-SCNC: 137 MMOL/L (ref 136–145)
SODIUM SERPL-SCNC: 139 MMOL/L (ref 136–145)
WBC NRBC COR # BLD: 7.52 10*3/MM3 (ref 3.4–10.8)

## 2022-04-02 PROCEDURE — 80048 BASIC METABOLIC PNL TOTAL CA: CPT | Performed by: INTERNAL MEDICINE

## 2022-04-02 PROCEDURE — 63710000001 INSULIN ASPART PER 5 UNITS: Performed by: INTERNAL MEDICINE

## 2022-04-02 PROCEDURE — 25010000002 ENOXAPARIN PER 10 MG: Performed by: INTERNAL MEDICINE

## 2022-04-02 PROCEDURE — 82962 GLUCOSE BLOOD TEST: CPT

## 2022-04-02 PROCEDURE — 99232 SBSQ HOSP IP/OBS MODERATE 35: CPT | Performed by: NURSE PRACTITIONER

## 2022-04-02 PROCEDURE — 85025 COMPLETE CBC W/AUTO DIFF WBC: CPT | Performed by: INTERNAL MEDICINE

## 2022-04-02 PROCEDURE — 63710000001 INSULIN DETEMIR PER 5 UNITS: Performed by: INTERNAL MEDICINE

## 2022-04-02 PROCEDURE — 25010000002 FUROSEMIDE PER 20 MG: Performed by: NURSE PRACTITIONER

## 2022-04-02 PROCEDURE — 83735 ASSAY OF MAGNESIUM: CPT | Performed by: INTERNAL MEDICINE

## 2022-04-02 PROCEDURE — 94726 PLETHYSMOGRAPHY LUNG VOLUMES: CPT

## 2022-04-02 PROCEDURE — 99232 SBSQ HOSP IP/OBS MODERATE 35: CPT | Performed by: INTERNAL MEDICINE

## 2022-04-02 RX ORDER — DEXTROSE MONOHYDRATE 25 G/50ML
25 INJECTION, SOLUTION INTRAVENOUS
Status: DISCONTINUED | OUTPATIENT
Start: 2022-04-02 | End: 2022-04-02 | Stop reason: SDUPTHER

## 2022-04-02 RX ORDER — NICOTINE POLACRILEX 4 MG
15 LOZENGE BUCCAL
Status: DISCONTINUED | OUTPATIENT
Start: 2022-04-02 | End: 2022-04-02 | Stop reason: SDUPTHER

## 2022-04-02 RX ADMIN — ATORVASTATIN CALCIUM 40 MG: 40 TABLET, FILM COATED ORAL at 08:28

## 2022-04-02 RX ADMIN — SACUBITRIL AND VALSARTAN 1 TABLET: 24; 26 TABLET, FILM COATED ORAL at 19:34

## 2022-04-02 RX ADMIN — FUROSEMIDE 20 MG: 10 INJECTION, SOLUTION INTRAMUSCULAR; INTRAVENOUS at 00:41

## 2022-04-02 RX ADMIN — ASPIRIN 81 MG: 81 TABLET, COATED ORAL at 08:27

## 2022-04-02 RX ADMIN — CHOLECALCIFEROL TAB 10 MCG (400 UNIT) 400 UNITS: 10 TAB at 12:14

## 2022-04-02 RX ADMIN — ALLOPURINOL 300 MG: 300 TABLET ORAL at 08:27

## 2022-04-02 RX ADMIN — CARVEDILOL 6.25 MG: 6.25 TABLET, FILM COATED ORAL at 17:11

## 2022-04-02 RX ADMIN — FUROSEMIDE 20 MG: 10 INJECTION, SOLUTION INTRAMUSCULAR; INTRAVENOUS at 12:19

## 2022-04-02 RX ADMIN — SPIRONOLACTONE 25 MG: 25 TABLET ORAL at 08:28

## 2022-04-02 RX ADMIN — SACUBITRIL AND VALSARTAN 1 TABLET: 24; 26 TABLET, FILM COATED ORAL at 08:28

## 2022-04-02 RX ADMIN — INSULIN ASPART 5 UNITS: 100 INJECTION, SOLUTION INTRAVENOUS; SUBCUTANEOUS at 12:16

## 2022-04-02 RX ADMIN — INSULIN DETEMIR 15 UNITS: 100 INJECTION, SOLUTION SUBCUTANEOUS at 19:33

## 2022-04-02 RX ADMIN — Medication 10 ML: at 08:29

## 2022-04-02 RX ADMIN — CETIRIZINE HYDROCHLORIDE 10 MG: 10 TABLET, FILM COATED ORAL at 08:27

## 2022-04-02 RX ADMIN — CARVEDILOL 6.25 MG: 6.25 TABLET, FILM COATED ORAL at 08:25

## 2022-04-02 RX ADMIN — ENOXAPARIN SODIUM 40 MG: 40 INJECTION SUBCUTANEOUS at 19:34

## 2022-04-02 RX ADMIN — TICAGRELOR 90 MG: 90 TABLET ORAL at 08:28

## 2022-04-02 RX ADMIN — INSULIN ASPART 8 UNITS: 100 INJECTION, SOLUTION INTRAVENOUS; SUBCUTANEOUS at 08:34

## 2022-04-02 RX ADMIN — Medication 10 ML: at 19:35

## 2022-04-02 RX ADMIN — TICAGRELOR 90 MG: 90 TABLET ORAL at 19:34

## 2022-04-02 NOTE — PROGRESS NOTES
Assisted By: Alma POWELL RN    CC: Follow-up on CHF    Interview History/HPI: Patient is sitting up in a chair and actually has walked twice with the nursing staff very well.  She denies any chest pain, breathing is doing better, she does not really know how her edema is going, she is tolerating her diet.  She did get her a LifeVest and is wearing this currently.    ROS:     Vitals:    04/02/22 1004   BP: 134/77   Pulse: 92   Resp: 18   Temp: 98.4 °F (36.9 °C)   SpO2: 98%         Intake/Output Summary (Last 24 hours) at 4/2/2022 1216  Last data filed at 4/2/2022 0730  Gross per 24 hour   Intake 600 ml   Output 2050 ml   Net -1450 ml       EXAM: Lungs have bilateral breath sounds clear anterior and posterior, heart regular rate and rhythm without murmur or gallop, extremities have 1+ edema.  Seems to be slightly less than yesterday.  Right radial wrist site looks good no bruising no pseudoaneurysm, good capillary refill    EKG:     Tele: Sinus rhythm    LABS:     Lab Results (last 48 hours)     Procedure Component Value Units Date/Time    POC Glucose Once [[id number]]  (Abnormal) Collected: 04/02/22 1006    Specimen: Blood Updated: 04/02/22 1044     Glucose 242 mg/dL      Comment: Meter: [id number] : [id number] meier zan       Basic Metabolic Panel [[id number]]  (Abnormal) Collected: 04/02/22 0803    Specimen: Blood Updated: 04/02/22 0833     Glucose 256 mg/dL      BUN 19 mg/dL      Creatinine 0.94 mg/dL      Sodium 139 mmol/L      Potassium 4.1 mmol/L      Comment: Slight hemolysis detected by analyzer. Results may be affected.        Chloride 101 mmol/L      CO2 24.0 mmol/L      Calcium 9.0 mg/dL      BUN/Creatinine Ratio 20.2     Anion Gap 14.0 mmol/L      eGFR 66.2 mL/min/1.73      Comment: National Kidney Foundation and American Society of Nephrology (ASN) Task Force recommended calculation based on the Chronic Kidney Disease Epidemiology Collaboration (CKD-EPI) equation refit without adjustment for  race.       Narrative:      GFR Normal >60  Chronic Kidney Disease <60  Kidney Failure <15      POC Glucose Once [[id number]]  (Abnormal) Collected: 04/02/22 0709    Specimen: Blood Updated: 04/02/22 0716     Glucose 270 mg/dL      Comment: Meter: [id number] : [id number] hardeep zuluaga       Protein / Creatinine Ratio, Urine - Urine, Clean Catch [[id number]]  (Abnormal) Collected: 04/01/22 1923    Specimen: Urine, Clean Catch Updated: 04/02/22 0549     Protein/Creatinine Ratio, Urine 645.3 mg/G Crea      Creatinine, Urine 20.3 mg/dL      Total Protein, Urine 13.1 mg/dL     Basic Metabolic Panel [[id number]]  (Abnormal) Collected: 04/02/22 0111    Specimen: Blood Updated: 04/02/22 0253     Glucose 366 mg/dL      BUN 21 mg/dL      Creatinine 1.10 mg/dL      Sodium 137 mmol/L      Potassium 4.2 mmol/L      Chloride 99 mmol/L      CO2 25.2 mmol/L      Calcium 9.0 mg/dL      BUN/Creatinine Ratio 19.1     Anion Gap 12.8 mmol/L      eGFR 54.8 mL/min/1.73      Comment: National Kidney Foundation and American Society of Nephrology (ASN) Task Force recommended calculation based on the Chronic Kidney Disease Epidemiology Collaboration (CKD-EPI) equation refit without adjustment for race.       Narrative:      GFR Normal >60  Chronic Kidney Disease <60  Kidney Failure <15      Magnesium [[id number]]  (Normal) Collected: 04/02/22 0111    Specimen: Blood Updated: 04/02/22 0253     Magnesium 1.6 mg/dL     CBC & Differential [[id number]]  (Abnormal) Collected: 04/02/22 0111    Specimen: Blood Updated: 04/02/22 0221    Narrative:      The following orders were created for panel order CBC & Differential.  Procedure                               Abnormality         Status                     ---------                               -----------         ------                     CBC Auto Differential[[id number]]        Abnormal            Final result                 Please view results for these tests on the individual orders.    CBC  Auto Differential [[id number]]  (Abnormal) Collected: 04/02/22 0111    Specimen: Blood Updated: 04/02/22 0221     WBC 7.52 10*3/mm3      RBC 4.88 10*6/mm3      Hemoglobin 12.0 g/dL      Hematocrit 37.4 %      MCV 76.6 fL      MCH 24.6 pg      MCHC 32.1 g/dL      RDW 16.0 %      RDW-SD 44.4 fl      MPV 10.4 fL      Platelets 381 10*3/mm3      Neutrophil % 69.3 %      Lymphocyte % 18.9 %      Monocyte % 8.1 %      Eosinophil % 2.8 %      Basophil % 0.5 %      Immature Grans % 0.4 %      Neutrophils, Absolute 5.21 10*3/mm3      Lymphocytes, Absolute 1.42 10*3/mm3      Monocytes, Absolute 0.61 10*3/mm3      Eosinophils, Absolute 0.21 10*3/mm3      Basophils, Absolute 0.04 10*3/mm3      Immature Grans, Absolute 0.03 10*3/mm3      nRBC 0.0 /100 WBC     Blood Culture - Blood, Arm, Right [[id number]]  (Normal) Collected: 03/28/22 1944    Specimen: Blood from Arm, Right Updated: 04/01/22 2002     Blood Culture No growth at 4 days    Blood Culture - Blood, Wrist, Right [[id number]]  (Normal) Collected: 03/28/22 1944    Specimen: Blood from Wrist, Right Updated: 04/01/22 2002     Blood Culture No growth at 4 days    POC Glucose Once [[id number]]  (Abnormal) Collected: 04/01/22 1828    Specimen: Blood Updated: 04/01/22 1834     Glucose 172 mg/dL      Comment: RN Notified Meter: [id number] : [id number] ZAC DAVID       POC Glucose Once [[id number]]  (Abnormal) Collected: 04/01/22 1804    Specimen: Blood Updated: 04/01/22 1810     Glucose 206 mg/dL      Comment: Meter: [id number] : [id number] drew grace       POC Glucose Once [[id number]]  (Abnormal) Collected: 04/01/22 1018    Specimen: Blood Updated: 04/01/22 1032     Glucose 328 mg/dL      Comment: Meter: [id number] : [id number] drew grace       POC Glucose Once [[id number]]  (Abnormal) Collected: 04/01/22 0616    Specimen: Blood Updated: 04/01/22 0624     Glucose 189 mg/dL      Comment: Meter: [id number] : [id number] drew Moore  Metabolic Panel [[id number]]  (Abnormal) Collected: 04/01/22 0200    Specimen: Blood Updated: 04/01/22 0315     Glucose 241 mg/dL      BUN 20 mg/dL      Creatinine 0.78 mg/dL      Sodium 133 mmol/L      Potassium 3.8 mmol/L      Chloride 98 mmol/L      CO2 23.4 mmol/L      Calcium 8.9 mg/dL      Total Protein 6.2 g/dL      Albumin 3.62 g/dL      ALT (SGPT) 29 U/L      AST (SGOT) 20 U/L      Alkaline Phosphatase 135 U/L      Total Bilirubin 0.8 mg/dL      Globulin 2.6 gm/dL      A/G Ratio 1.4 g/dL      BUN/Creatinine Ratio 25.6     Anion Gap 11.6 mmol/L      eGFR 82.9 mL/min/1.73      Comment: National Kidney Foundation and American Society of Nephrology (ASN) Task Force recommended calculation based on the Chronic Kidney Disease Epidemiology Collaboration (CKD-EPI) equation refit without adjustment for race.       Narrative:      GFR Normal >60  Chronic Kidney Disease <60  Kidney Failure <15      BNP [[id number]]  (Abnormal) Collected: 04/01/22 0200    Specimen: Blood Updated: 04/01/22 0310     proBNP 2,011.0 pg/mL     Narrative:      Among patients with dyspnea, NT-proBNP is highly sensitive for the detection of acute congestive heart failure. In addition NT-proBNP of <300 pg/ml effectively rules out acute congestive heart failure with 99% negative predictive value.    Results may be falsely decreased if patient taking Biotin.      CBC & Differential [[id number]]  (Abnormal) Collected: 04/01/22 0200    Specimen: Blood Updated: 04/01/22 0251    Narrative:      The following orders were created for panel order CBC & Differential.  Procedure                               Abnormality         Status                     ---------                               -----------         ------                     CBC Auto Differential[[id number]]        Abnormal            Final result                 Please view results for these tests on the individual orders.    CBC Auto Differential [[id number]]  (Abnormal) Collected: 04/01/22  0200    Specimen: Blood Updated: 04/01/22 0251     WBC 7.98 10*3/mm3      RBC 5.01 10*6/mm3      Hemoglobin 12.2 g/dL      Hematocrit 38.0 %      MCV 75.8 fL      MCH 24.4 pg      MCHC 32.1 g/dL      RDW 15.9 %      RDW-SD 43.4 fl      MPV 10.5 fL      Platelets 421 10*3/mm3      Neutrophil % 67.7 %      Lymphocyte % 18.8 %      Monocyte % 10.7 %      Eosinophil % 1.4 %      Basophil % 0.9 %      Immature Grans % 0.5 %      Neutrophils, Absolute 5.41 10*3/mm3      Lymphocytes, Absolute 1.50 10*3/mm3      Monocytes, Absolute 0.85 10*3/mm3      Eosinophils, Absolute 0.11 10*3/mm3      Basophils, Absolute 0.07 10*3/mm3      Immature Grans, Absolute 0.04 10*3/mm3      nRBC 0.0 /100 WBC     POC Glucose Once [[id number]]  (Abnormal) Collected: 03/31/22 1850    Specimen: Blood Updated: 03/31/22 1856     Glucose 358 mg/dL      Comment: Meter: [id number] : [id number] SRIKANTH PETERS       POC Glucose Once [[id number]]  (Abnormal) Collected: 03/31/22 1557    Specimen: Blood Updated: 03/31/22 1625     Glucose 299 mg/dL      Comment: Meter: [id number] : [id number] drew grace       aPTT [[id number]]  (Abnormal) Collected: 03/31/22 1205    Specimen: Blood Updated: 03/31/22 1255     PTT >100.0 seconds      Comment: Note new Reference Range       Narrative:      PTT Heparin Therapeutic Range:  59 - 95 seconds                 Radiology:    Imaging Results (Last 72 Hours)     ** No results found for the last 72 hours. **      PE protocol CT negative for PE 3/28    Results for orders placed during the hospital encounter of 03/28/22    Adult Transthoracic Echo Complete w/ Color, Spectral and Contrast if necessary per protocol    Interpretation Summary  · The left ventricular cavity is mildly dilated.  · Left ventricular ejection fraction appears to be 21 - 25%. Left ventricular systolic function is severely decreased.  · Left ventricular diastolic function is consistent with (grade I) impaired relaxation.  · The aortic valve  exhibits sclerosis. There is mild thickening of the aortic valve. The aortic valve appears trileaflet. Mild aortic valve regurgitation is present. No aortic valve stenosis is present.  · There are myxomatous changes of the mitral valve apparatus present.  · Moderate mitral valve regurgitation is present.  · There are myxomatous changes of the mitral valve apparatus present. Moderate mitral valve regurgitation is present. No significant mitral valve stenosis is present.  · The tricuspid valve is structurally normal with no significant stenosis present. Mild tricuspid valve regurgitation is present. Estimated right ventricular systolic pressure from tricuspid regurgitation is normal (<35 mmHg).  · There is a small (<1cm) circumferential pericardial effusion. There is no evidence of cardiac tamponade.      Assessment/Plan:   HFrEF, secondary to coronary disease, ischemic cardiomyopathy, she had a non-ST elevation myocardial infarction this admission.  She is status post cardiac catheterization with LAD stenting for single-vessel disease.  Patient is stable LifeVest in place, discussed with cardiology NP, they are going to transition the patient over the next 24 hours to oral diuretics and potentially discharged tomorrow.  Patient is on Entresto and Aldactone as well as Coreg seems to be tolerating this.  She will receive her last dose of the 3 dose of Lasix ordered by cardiology at noon today.  This morning her creatinine was slightly elevated but I rechecked it and is back to normal so we will proceed with this dosing.  She will need a LifeVest for bridging until possible need for ICD.    Coronary artery disease single-vessel status post stenting, continue DAPT (aspirin and Brilinta) as well as Lipitor and Coreg.  Asymptomatic now.    Edema secondary to CHF, she really did not have a significant proteinuria amount.  This was less than 1 g.    Diabetes, still not well controlled, insulin was adjusted.    Small  pericardial effusion, can be followed up at outpatient cardiology discretion    Hypertension, improving control on Entresto and Coreg as well as Aldactone, follow    DVT prophylaxis, subcu Lovenox    Disposition Home hopefully in the next 24 hours    Neri Oliva MD

## 2022-04-02 NOTE — PROGRESS NOTES
LOS: 5 days     Name: Breanna Kaba  Age/Sex: 66 y.o. female  :  1955        PCP: Myrna Ramos MD  REF: No ref. provider found    Principal Problem:    Acute congestive heart failure, unspecified heart failure type (HCC)  Active Problems:    NSTEMI, initial episode of care ([facility])      Reason for follow-up: HFrEF and Cardiomyopathy    Subjective       Subjective     Ms. Kaba is a 66-year-old  female with history of hypertension and type 2 diabetes mellitus, presented with increasing shortness of breath and was diagnosed to have acute decompensated heart failure.  She has had good response with IV diuretic therapy since admission.  Her echo Doppler study revealed dilated left ventricle with severely depressed LV systolic function with an estimated LV ejection fraction of about 25%.  She denies any chest pains.     Interval History: Patient reports her breathing is much better today.  She has been ambulating in the hallways with no issues.  States she is feeling around her baseline.  Reports good urine output overnight.  Creatinine stable.  Wearing LifeVest.  Denies any chest pain.    Vital Signs  Temp:  [97.4 °F (36.3 °C)-98.4 °F (36.9 °C)] 98.4 °F (36.9 °C)  Heart Rate:  [82-94] 92  Resp:  [16-20] 18  BP: (105-151)/(67-90) 134/77     Vital Signs (last 72 hrs)        0700   0659  0700  04/ 0659 04/ 0700  04/ 0659 / 0700  / 0830   Most Recent      Temp (°F) 97.5 -  98    97.9 -  98.2    97.4 -  98.2       98.1 (36.7) / 0614    Heart Rate 95 -  112    86 -  101    82 -  101       93 / 0614    Resp 18 -  20    18 -  20    16 -  20       18 04/ 0614    /67 -  156/95    116/66 -  147/89    105/67 -  151/90       151/90 / 0614    SpO2 (%) 96 -  97    96 -  98    96 -  99       98 / 0614        Documented weights    22 1705 22 0500 22 0500 22 0459   Weight: 63.5 kg (140 lb) 69.4 kg (153 lb 1.6 oz) 69.5 kg (153 lb 3.2  oz) 69.9 kg (154 lb 1.6 oz)    04/01/22 0500 04/02/22 0500   Weight: 70.9 kg (156 lb 4.8 oz) 72.8 kg (160 lb 8 oz)      Body mass index is 29.36 kg/m².    Intake/Output Summary (Last 24 hours) at 4/2/2022 1049  Last data filed at 4/2/2022 0730  Gross per 24 hour   Intake 600 ml   Output 2150 ml   Net -1550 ml     Objective    Objective       Physical Exam:     General Appearance:    Alert, cooperative, in no acute distress   Head:    Normocephalic, without obvious abnormality, atraumatic   Eyes:            Conjunctivae and sclerae normal, no   icterus, no pallor, corneas clear.   Neck:   No adenopathy, supple, trachea midline, no thyromegaly, no   carotid bruit, no JVD   Lungs:     Clear to auscultation,respirations regular, even and                  unlabored    Heart:    Regular rhythm and normal rate, normal S1 and S2, no            murmur, no gallop, no rub, no click   Chest Wall:    No abnormalities observed   Abdomen:     Normal bowel sounds, no masses, no organomegaly, soft        non-tender, non-distended, no guarding, no rebound                tenderness   Extremities:   Moves all extremities well, 1+ ble edema, no cyanosis, no         redness   Pulses:   Pulses palpable and equal bilaterally   Skin:   No bleeding, bruising or rash       Neurologic:   Alert and oriented      Results review       Results Review:   Results from last 7 days   Lab Units 04/02/22  0111 04/01/22  0200 03/31/22 0415 03/30/22 0145 03/29/22  0003 03/28/22  1811   WBC 10*3/mm3 7.52 7.98 7.45 8.34 8.50 9.44   HEMOGLOBIN g/dL 12.0 12.2 11.8* 12.0 12.8 12.7   PLATELETS 10*3/mm3 381 421 360 361 493* 484*     Results from last 7 days   Lab Units 04/02/22  0803 04/02/22  0111 04/01/22  0200 03/31/22  0415 03/30/22 0145 03/29/22  0003 03/28/22  2043   SODIUM mmol/L 139 137 133* 136 137 134* 136   POTASSIUM mmol/L 4.1 4.2 3.8 4.1 3.9 3.7 3.9   CHLORIDE mmol/L 101 99 98 98 98 96* 100   CO2 mmol/L 24.0 25.2 23.4 25.1 26.8 25.9 23.4   BUN  mg/dL 19 21 20 22 21 21 22   CREATININE mg/dL 0.94 1.10* 0.78 0.90 1.20* 0.81 0.85   CALCIUM mg/dL 9.0 9.0 8.9 8.8 9.0 9.3 9.3   GLUCOSE mg/dL 256* 366* 241* 191* 295* 201* 200*   ALT (SGPT) U/L  --   --  29 33  --  48* 46*   AST (SGOT) U/L  --   --  20 24  --  33* 31     Results from last 7 days   Lab Units 03/29/22  0526 03/29/22  0003 03/28/22  2043 03/28/22  1811   TROPONIN T ng/mL 0.293* 0.323* 0.266* 0.259*     Lab Results   Component Value Date    INR 1.04 03/28/2022     Lab Results   Component Value Date    MG 1.6 04/02/2022    MG 1.9 02/25/2021     Lab Results   Component Value Date    TSH 2.760 03/31/2022    CHLPL 256 (H) 03/01/2017    TRIG 79 03/29/2022    HDL 54 03/29/2022    LDL 58 03/29/2022      Imaging Results (Last 48 Hours)     ** No results found for the last 48 hours. **        Echo   Results for orders placed during the hospital encounter of 03/28/22    Adult Transthoracic Echo Complete w/ Color, Spectral and Contrast if necessary per protocol    Interpretation Summary  · The left ventricular cavity is mildly dilated.  · Left ventricular ejection fraction appears to be 21 - 25%. Left ventricular systolic function is severely decreased.  · Left ventricular diastolic function is consistent with (grade I) impaired relaxation.  · The aortic valve exhibits sclerosis. There is mild thickening of the aortic valve. The aortic valve appears trileaflet. Mild aortic valve regurgitation is present. No aortic valve stenosis is present.  · There are myxomatous changes of the mitral valve apparatus present.  · Moderate mitral valve regurgitation is present.  · There are myxomatous changes of the mitral valve apparatus present. Moderate mitral valve regurgitation is present. No significant mitral valve stenosis is present.  · The tricuspid valve is structurally normal with no significant stenosis present. Mild tricuspid valve regurgitation is present. Estimated right ventricular systolic pressure from tricuspid  regurgitation is normal (<35 mmHg).  · There is a small (<1cm) circumferential pericardial effusion. There is no evidence of cardiac tamponade.     I reviewed the patient's new clinical results.    Telemetry: NSR 80-90 bpm      Medication Review:   allopurinol, 300 mg, Oral, Daily  aspirin, 81 mg, Oral, Daily  atorvastatin, 40 mg, Oral, Daily  carvedilol, 6.25 mg, Oral, BID With Meals  cetirizine, 10 mg, Oral, Daily  cholecalciferol, 2,000 Units, Oral, Daily  enoxaparin, 40 mg, Subcutaneous, Q24H  furosemide, 20 mg, Intravenous, Q12H  insulin aspart, 0-14 Units, Subcutaneous, TID AC  insulin detemir, 15 Units, Subcutaneous, Nightly  sacubitril-valsartan, 1 tablet, Oral, Q12H  sodium chloride, 10 mL, Intravenous, Q12H  spironolactone, 25 mg, Oral, Daily  ticagrelor, 90 mg, Oral, BID             Assessment      Assessment:  NSTEMI, status post PCI to mid LAD  HFrEF, improved   Newly diagnosed dilated cardiomyopathy, EF 21 to 25%    Plan     Recommendations:  NSTEMI  Patient underwent PCI to mid LAD due to 99% stenosis.  Denies any chest pain.  Continue with guideline directed medical therapy with aspirin, Coreg, Lipitor, Entresto and Brilinta.    2.  HFrEF/cardiomyopathy  Kidney function is stable. Continue with IV lasix today and will plan to switch to PO lasix in the AM if kidney function remains stable.   Continue with Coreg, Entresto and spironolactone for her cardiomyopathy and titrate up as tolerated.  LifeVest has been ordered.  Will recheck echocardiogram in around 3 months in the outpatient setting to reevaluate LV function.  She will need to follow-up in the heart failure clinic on discharge.    I discussed the patients findings and my recommendations with patient and family      Electronically signed by REGINALDO Toribio, 04/02/22, 8:30 AM EDT.    Please note that portions of this note were completed with a voice recognition program.

## 2022-04-02 NOTE — NURSING NOTE
Ambulated Pt in [place] today on room air. Pt walked with stand by assist. Pt did hold on to wall for support and had to stop one time due to SOB. After ambulating Pt sat up in chair. Pt had no complaints. Pt agreeable to sit in chair for all meals and ambulate in [place] at least one more time during this shift. Will continue to monitor and follow plan of care.    Update: 1250  Ambulated Pt before lunch on room air. Other than one episode of SOB Pt did not have any other s/s. Pt would benefit from a walker at discharge. Pt stated she always  reaches out to grab something while walking so she doesn't fall. Pt sat back in the chair to eat lunch. Will continue to monitor and follow plan of care.

## 2022-04-02 NOTE — PLAN OF CARE
Problem: Adult Inpatient Plan of Care  Goal: Absence of Hospital-Acquired Illness or Injury  Intervention: Prevent Skin Injury  Recent Flowsheet Documentation  Taken 4/1/2022 1940 by Neida Easton RN  Body Position: sitting up in bed  Skin Protection:   adhesive use limited   incontinence pads utilized     Problem: Adult Inpatient Plan of Care  Goal: Absence of Hospital-Acquired Illness or Injury  Intervention: Prevent and Manage VTE (Venous Thromboembolism) Risk  Recent Flowsheet Documentation  Taken 4/1/2022 1940 by Neida Easton RN  Activity Management:   activity adjusted per tolerance   up ad maria guadalupe  VTE Prevention/Management: (See MAR) bleeding risk factor(s) identified     Problem: Adult Inpatient Plan of Care  Goal: Absence of Hospital-Acquired Illness or Injury  Intervention: Prevent Infection  Recent Flowsheet Documentation  Taken 4/1/2022 2300 by Neida Easton RN  Infection Prevention:   rest/sleep promoted   environmental surveillance performed  Taken 4/1/2022 2100 by Neida Easton RN  Infection Prevention:   rest/sleep promoted   environmental surveillance performed  Taken 4/1/2022 1940 by Neida Easton RN  Infection Prevention:   rest/sleep promoted   environmental surveillance performed   Goal Outcome Evaluation:

## 2022-04-02 NOTE — PROGRESS NOTES
ReDS Value     Date: 04/02/22     ReDS Value: 37    36-41 Possible Hypervolemic Status        Joaquin Osorio, RRT

## 2022-04-03 ENCOUNTER — READMISSION MANAGEMENT (OUTPATIENT)
Dept: CALL CENTER | Facility: HOSPITAL | Age: 67
End: 2022-04-03

## 2022-04-03 VITALS
BODY MASS INDEX: 29.09 KG/M2 | WEIGHT: 158.1 LBS | OXYGEN SATURATION: 97 % | RESPIRATION RATE: 20 BRPM | HEART RATE: 90 BPM | HEIGHT: 62 IN | DIASTOLIC BLOOD PRESSURE: 69 MMHG | SYSTOLIC BLOOD PRESSURE: 119 MMHG | TEMPERATURE: 97.5 F

## 2022-04-03 LAB
ALBUMIN SERPL-MCNC: 3.5 G/DL (ref 3.5–5.2)
ALBUMIN/GLOB SERPL: 1.4 G/DL
ALP SERPL-CCNC: 124 U/L (ref 39–117)
ALT SERPL W P-5'-P-CCNC: 21 U/L (ref 1–33)
ANION GAP SERPL CALCULATED.3IONS-SCNC: 10.6 MMOL/L (ref 5–15)
AST SERPL-CCNC: 15 U/L (ref 1–32)
BASOPHILS # BLD AUTO: 0.04 10*3/MM3 (ref 0–0.2)
BASOPHILS NFR BLD AUTO: 0.5 % (ref 0–1.5)
BILIRUB SERPL-MCNC: 0.8 MG/DL (ref 0–1.2)
BUN SERPL-MCNC: 24 MG/DL (ref 8–23)
BUN/CREAT SERPL: 25.8 (ref 7–25)
CALCIUM SPEC-SCNC: 9 MG/DL (ref 8.6–10.5)
CHLORIDE SERPL-SCNC: 99 MMOL/L (ref 98–107)
CO2 SERPL-SCNC: 27.4 MMOL/L (ref 22–29)
CREAT SERPL-MCNC: 0.93 MG/DL (ref 0.57–1)
DEPRECATED RDW RBC AUTO: 43.4 FL (ref 37–54)
EGFRCR SERPLBLD CKD-EPI 2021: 67.1 ML/MIN/1.73
EOSINOPHIL # BLD AUTO: 0.26 10*3/MM3 (ref 0–0.4)
EOSINOPHIL NFR BLD AUTO: 3.2 % (ref 0.3–6.2)
ERYTHROCYTE [DISTWIDTH] IN BLOOD BY AUTOMATED COUNT: 15.9 % (ref 12.3–15.4)
GLOBULIN UR ELPH-MCNC: 2.5 GM/DL
GLUCOSE BLDC GLUCOMTR-MCNC: 179 MG/DL (ref 70–130)
GLUCOSE BLDC GLUCOMTR-MCNC: 189 MG/DL (ref 70–130)
GLUCOSE SERPL-MCNC: 273 MG/DL (ref 65–99)
HCT VFR BLD AUTO: 37.4 % (ref 34–46.6)
HGB BLD-MCNC: 11.9 G/DL (ref 12–15.9)
IMM GRANULOCYTES # BLD AUTO: 0.03 10*3/MM3 (ref 0–0.05)
IMM GRANULOCYTES NFR BLD AUTO: 0.4 % (ref 0–0.5)
LYMPHOCYTES # BLD AUTO: 1.82 10*3/MM3 (ref 0.7–3.1)
LYMPHOCYTES NFR BLD AUTO: 22.4 % (ref 19.6–45.3)
MCH RBC QN AUTO: 24.2 PG (ref 26.6–33)
MCHC RBC AUTO-ENTMCNC: 31.8 G/DL (ref 31.5–35.7)
MCV RBC AUTO: 76.2 FL (ref 79–97)
MONOCYTES # BLD AUTO: 0.75 10*3/MM3 (ref 0.1–0.9)
MONOCYTES NFR BLD AUTO: 9.2 % (ref 5–12)
NEUTROPHILS NFR BLD AUTO: 5.24 10*3/MM3 (ref 1.7–7)
NEUTROPHILS NFR BLD AUTO: 64.3 % (ref 42.7–76)
NRBC BLD AUTO-RTO: 0 /100 WBC (ref 0–0.2)
PLATELET # BLD AUTO: 395 10*3/MM3 (ref 140–450)
PMV BLD AUTO: 10.8 FL (ref 6–12)
POTASSIUM SERPL-SCNC: 4.1 MMOL/L (ref 3.5–5.2)
PROT SERPL-MCNC: 6 G/DL (ref 6–8.5)
QT INTERVAL: 374 MS
QT INTERVAL: 416 MS
QTC INTERVAL: 482 MS
QTC INTERVAL: 520 MS
RBC # BLD AUTO: 4.91 10*6/MM3 (ref 3.77–5.28)
SODIUM SERPL-SCNC: 137 MMOL/L (ref 136–145)
WBC NRBC COR # BLD: 8.14 10*3/MM3 (ref 3.4–10.8)

## 2022-04-03 PROCEDURE — 99239 HOSP IP/OBS DSCHRG MGMT >30: CPT | Performed by: INTERNAL MEDICINE

## 2022-04-03 PROCEDURE — 99232 SBSQ HOSP IP/OBS MODERATE 35: CPT | Performed by: NURSE PRACTITIONER

## 2022-04-03 PROCEDURE — 80053 COMPREHEN METABOLIC PANEL: CPT | Performed by: INTERNAL MEDICINE

## 2022-04-03 PROCEDURE — 85025 COMPLETE CBC W/AUTO DIFF WBC: CPT | Performed by: INTERNAL MEDICINE

## 2022-04-03 PROCEDURE — 63710000001 INSULIN ASPART PER 5 UNITS: Performed by: INTERNAL MEDICINE

## 2022-04-03 PROCEDURE — 82962 GLUCOSE BLOOD TEST: CPT

## 2022-04-03 RX ORDER — FUROSEMIDE 20 MG/1
20 TABLET ORAL DAILY
Status: DISCONTINUED | OUTPATIENT
Start: 2022-04-03 | End: 2022-04-03 | Stop reason: HOSPADM

## 2022-04-03 RX ORDER — INSULIN GLARGINE 100 [IU]/ML
15 INJECTION, SOLUTION SUBCUTANEOUS NIGHTLY
Start: 2022-04-03 | End: 2022-04-03 | Stop reason: SDUPTHER

## 2022-04-03 RX ORDER — ASPIRIN 81 MG/1
81 TABLET ORAL DAILY
Qty: 30 TABLET | Refills: 1 | Status: SHIPPED | OUTPATIENT
Start: 2022-04-04 | End: 2022-05-11 | Stop reason: SDUPTHER

## 2022-04-03 RX ORDER — INSULIN ASPART 100 [IU]/ML
0-14 INJECTION, SOLUTION INTRAVENOUS; SUBCUTANEOUS
Qty: 15 ML | Refills: 0 | Status: SHIPPED | OUTPATIENT
Start: 2022-04-03 | End: 2022-12-15 | Stop reason: SDUPTHER

## 2022-04-03 RX ORDER — FUROSEMIDE 20 MG/1
20 TABLET ORAL DAILY
Qty: 30 TABLET | Refills: 0 | Status: SHIPPED | OUTPATIENT
Start: 2022-04-03 | End: 2022-05-11 | Stop reason: SDUPTHER

## 2022-04-03 RX ORDER — SPIRONOLACTONE 25 MG/1
25 TABLET ORAL DAILY
Qty: 30 TABLET | Refills: 0 | Status: SHIPPED | OUTPATIENT
Start: 2022-04-04 | End: 2022-04-25 | Stop reason: SDUPTHER

## 2022-04-03 RX ORDER — INSULIN GLARGINE 100 [IU]/ML
20 INJECTION, SOLUTION SUBCUTANEOUS NIGHTLY
Start: 2022-04-03 | End: 2022-04-14 | Stop reason: SDUPTHER

## 2022-04-03 RX ORDER — CARVEDILOL 6.25 MG/1
6.25 TABLET ORAL 2 TIMES DAILY WITH MEALS
Qty: 60 TABLET | Refills: 0 | Status: SHIPPED | OUTPATIENT
Start: 2022-04-03 | End: 2022-07-21

## 2022-04-03 RX ADMIN — ATORVASTATIN CALCIUM 40 MG: 40 TABLET, FILM COATED ORAL at 08:23

## 2022-04-03 RX ADMIN — CHOLECALCIFEROL TAB 10 MCG (400 UNIT) 2000 UNITS: 10 TAB at 08:22

## 2022-04-03 RX ADMIN — TICAGRELOR 90 MG: 90 TABLET ORAL at 08:23

## 2022-04-03 RX ADMIN — INSULIN ASPART 2 UNITS: 100 INJECTION, SOLUTION INTRAVENOUS; SUBCUTANEOUS at 08:24

## 2022-04-03 RX ADMIN — SPIRONOLACTONE 25 MG: 25 TABLET ORAL at 08:23

## 2022-04-03 RX ADMIN — ASPIRIN 81 MG: 81 TABLET, COATED ORAL at 08:23

## 2022-04-03 RX ADMIN — CARVEDILOL 6.25 MG: 6.25 TABLET, FILM COATED ORAL at 08:23

## 2022-04-03 RX ADMIN — ACETAMINOPHEN 650 MG: 325 TABLET ORAL at 04:21

## 2022-04-03 RX ADMIN — CETIRIZINE HYDROCHLORIDE 10 MG: 10 TABLET, FILM COATED ORAL at 08:23

## 2022-04-03 RX ADMIN — ALLOPURINOL 300 MG: 300 TABLET ORAL at 08:23

## 2022-04-03 RX ADMIN — SACUBITRIL AND VALSARTAN 1 TABLET: 24; 26 TABLET, FILM COATED ORAL at 08:23

## 2022-04-03 NOTE — DISCHARGE INSTR - APPOINTMENTS
Myrna plummer    and dr pimentel  office  closed  on  the  weekend  will call  on  Monday  and  get apt  and call  the  patient with  apt  will  call  cardiac  rehab  at  4628  in  am   for  referal

## 2022-04-03 NOTE — DISCHARGE SUMMARY
Date of admission: 3/28/2022  Date of discharge: 4/3/2022    Principal diagnosis: Acute systolic heart failure ejection fraction 21 to 25% present on admission  Secondary diagnoses:  Non-ST elevation myocardial infarction  Coronary artery disease one-vessel status post stenting this admission  Diabetes type 2 insulin requiring, A1c 7.9  Small pericardial effusion to be followed up in cardiology office in 3 weeks  Hypertension    Consultants:  General cardiology Dr. Johnson  Interventional cardiology Dr. Marin    Procedures:  CT PE protocol  Venous Doppler  2D echocardiogram  Cardiac catheterization performed on 3/31/2022: Results  · Mid LAD lesion is 99% stenosed. This is the culprit lesion with CALI flow 2. It nearly occluded with passage of wire.  · RCA is very small and shows no disease  · CX is medium caliber with no obstructive disease  · Single v CAD with very large LAD but small caliber showing subtotal stenosis of high mid LAD.  · Successful stenting of the mid LAD with 2.5 x 12 Skypoint from 99% to 0% CALI flow 2-3  · EBL 30 ml, Contrast 70 ml  · Right radial approach with TR band    Exam: Assisted byRozina and as needed, patient is lying flat in no distress, on room air, lungs are clear no rhonchi rales wheezing heart regular rate and rhythm Reds vest yesterday 37, 1+ edema persists abdomen is soft, benign mood is good skin warm and dry vital signs: 119/59, room air saturation 97% prior to rate 20 pulse 90 temperature 97.5    Hospital course: Patient was admitted with shortness of breath edema and findings consistent with CHF.  Echocardiogram was done and it showed an ejection fraction 21-25%.  Patient was diuresed, she did have a positive troponin and with this and the decreased EF it was thought she should undergo cardiac catheterization.  Cardiac catheterization was done that showed  basically a single vessel disease, stent was placed, this was an LAD stent.  Her medications have been optimized towards  goal therapy and it is thought she is stable for discharge.  Have discussed this with cardiology NP.  Because of the low EF LifeVest has been placed, echocardiogram can be repeated as an outpatient to see if EF recovers at all from the LAD stent.  She did have a small pericardial effusion, this can be followed up as an outpatient as well.  Glucoses have been intermittently elevated, I have placed her on basal bolus schedule and she is willing to continue this, she did not have a functioning glucometer, I have consulted our pharmacist to get a glucometer strips and lancets to check before meals and at bedtime.  Patient is feeling much better than admission.    Condition at discharge stable/improved    Disposition: Home    Diet: Constant carbohydrate    Follow-up:  Heart failure clinic  Dr. Johnson 3 weeks  PCP this week    Activity: As tolerated    Medication:  Fosamax 70 mg every 7 days  Allopurinol 300 mg daily  Aspirin 81 mg a day  Lipitor 40 mg a day  Basaglar 20 units under the skin every night  Coreg 6.25 mg twice daily  Flexeril 1 tablet 10 mg 3 times a day as needed  Lasix 20 mg a day  Sliding scale insulin 0 to 14 units  Claritin 10 mg a day  Entresto 24/26 twice daily  Aldactone 25 mg a day  Brilinta 90 mg twice daily  Vitamin D 2000 units daily  Glucometer, strips, lancets    Greater than 30-minute discharge

## 2022-04-03 NOTE — PLAN OF CARE
Goal Outcome Evaluation:  Plan of Care Reviewed With: patient        Progress: improving  Outcome Evaluation: PT IS SITTING ON SIDE OF BED EATING BREALFAST, DENIES ANY C.O PAIN OR BREATHING POBLEMS

## 2022-04-03 NOTE — OUTREACH NOTE
Prep Survey    Flowsheet Row Responses   [facility] patient discharged from? [facility]   Is LACE score < 7 ? No   Emergency Room discharge w/ pulse ox? No   Eligibility [facility]   Date of Admission 03/28/22   Date of Discharge 04/03/22   Discharge Disposition Home or Self Care   Discharge diagnosis Acute systolic heart failure    Does the patient have one of the following disease processes/diagnoses(primary or secondary)? CHF   Does the patient have Home health ordered? No   Is there a DME ordered? Yes   What DME was ordered? Life Vest    Prep survey completed? Yes          JESSICA VÁSQUEZ - Registered Nurse

## 2022-04-03 NOTE — PROGRESS NOTES
LOS: 6 days     Name: Breanna Kaba  Age/Sex: 66 y.o. female  :  1955        PCP: Myrna Ramos MD  REF: No ref. provider found    Principal Problem:    Acute congestive heart failure, unspecified heart failure type (HCC)  Active Problems:    NSTEMI, initial episode of care ([facility])      Reason for follow-up: HFrEF and Cardiomyopathy    Subjective       Subjective     Ms. Kaba is a 66-year-old  female with history of hypertension and type 2 diabetes mellitus, presented with increasing shortness of breath and was diagnosed to have acute decompensated heart failure.  She has had good response with IV diuretic therapy since admission.  Her echo Doppler study revealed dilated left ventricle with severely depressed LV systolic function with an estimated LV ejection fraction of about 25%.  She denies any chest pains.    Interval History: Patient reports she is feeling better today. Denies any shortness of breath of chest pain. Kidney function and blood pressure stable.     Vital Signs  Temp:  [97.3 °F (36.3 °C)-98.4 °F (36.9 °C)] 97.4 °F (36.3 °C)  Heart Rate:  [81-93] 93  Resp:  [18] 18  BP: (106-138)/(62-91) 138/91     Vital Signs (last 72 hrs)        0700  04/ 0659 04 0700  04/ 0659 04/ 0700  / 0659 / 0700  / 0832   Most Recent      Temp (°F) 97.9 -  98.2    97.4 -  98.2    97.3 -  98.4       97.4 (36.3)  0613    Heart Rate 86 -  101    82 -  101    81 -  93       93 / 0613    Resp 18 -  20    16 -  20      18       18 / 0613    /66 -  147/89    105/67 -  151/90    106/62 -  138/91       138/91 / 0613    SpO2 (%) 96 -  98    96 -  99    92 -  98       97 /613        Documented weights    22 1705 22 0500 22 0500 22 0459   Weight: 63.5 kg (140 lb) 69.4 kg (153 lb 1.6 oz) 69.5 kg (153 lb 3.2 oz) 69.9 kg (154 lb 1.6 oz)    22 0500 22 0500 22 0500   Weight: 70.9 kg (156 lb 4.8 oz) 72.8 kg (160 lb 8  oz) 71.7 kg (158 lb 1.6 oz)      Body mass index is 28.92 kg/m².  No intake or output data in the 24 hours ending 04/03/22 0832  Objective    Objective       Physical Exam:     General Appearance:    Alert, cooperative, in no acute distress   Head:    Normocephalic, without obvious abnormality, atraumatic   Eyes:            Conjunctivae and sclerae normal, no   icterus, no pallor, corneas clear.   Neck:   No adenopathy, supple, trachea midline, no thyromegaly, no   carotid bruit, no JVD   Lungs:     Clear to auscultation,respirations regular, even and                  unlabored    Heart:    Regular rhythm and normal rate, normal S1 and S2, no            murmur, no gallop, no rub, no click   Chest Wall:    No abnormalities observed   Abdomen:     Normal bowel sounds, no masses, no organomegaly, soft        non-tender, non-distended, no guarding, no rebound                tenderness   Extremities:   Moves all extremities well, trace BLE edema, no cyanosis, no   redness   Pulses:   Pulses palpable and equal bilaterally   Skin:   No bleeding, bruising or rash       Neurologic:   Alert and oriented      Results review       Results Review:   Results from last 7 days   Lab Units 04/03/22  0237 04/02/22  0111 04/01/22  0200 03/31/22  0415 03/30/22  0145 03/29/22  0003 03/28/22  1811   WBC 10*3/mm3 8.14 7.52 7.98 7.45 8.34 8.50 9.44   HEMOGLOBIN g/dL 11.9* 12.0 12.2 11.8* 12.0 12.8 12.7   PLATELETS 10*3/mm3 395 381 421 360 361 493* 484*     Results from last 7 days   Lab Units 04/03/22  0237 04/02/22  0803 04/02/22  0111 04/01/22  0200 03/31/22  0415 03/30/22  0145 03/29/22  0003 03/28/22  2043   SODIUM mmol/L 137 139 137 133* 136 137 134* 136   POTASSIUM mmol/L 4.1 4.1 4.2 3.8 4.1 3.9 3.7 3.9   CHLORIDE mmol/L 99 101 99 98 98 98 96* 100   CO2 mmol/L 27.4 24.0 25.2 23.4 25.1 26.8 25.9 23.4   BUN mg/dL 24* 19 21 20 22 21 21 22   CREATININE mg/dL 0.93 0.94 1.10* 0.78 0.90 1.20* 0.81 0.85   CALCIUM mg/dL 9.0 9.0 9.0 8.9 8.8  9.0 9.3 9.3   GLUCOSE mg/dL 273* 256* 366* 241* 191* 295* 201* 200*   ALT (SGPT) U/L 21  --   --  29 33  --  48* 46*   AST (SGOT) U/L 15  --   --  20 24  --  33* 31     Results from last 7 days   Lab Units 03/29/22  0526 03/29/22  0003 03/28/22  2043 03/28/22  1811   TROPONIN T ng/mL 0.293* 0.323* 0.266* 0.259*     Lab Results   Component Value Date    INR 1.04 03/28/2022     Lab Results   Component Value Date    MG 1.6 04/02/2022    MG 1.9 02/25/2021     Lab Results   Component Value Date    TSH 2.760 03/31/2022    CHLPL 256 (H) 03/01/2017    TRIG 79 03/29/2022    HDL 54 03/29/2022    LDL 58 03/29/2022      Imaging Results (Last 48 Hours)     ** No results found for the last 48 hours. **        No results found for: BNP    Lab Results   Component Value Date    ABSOLUTELUNG 37 (A) 04/02/2022       Echo   Results for orders placed during the hospital encounter of 03/28/22    Adult Transthoracic Echo Complete w/ Color, Spectral and Contrast if necessary per protocol    Interpretation Summary  · The left ventricular cavity is mildly dilated.  · Left ventricular ejection fraction appears to be 21 - 25%. Left ventricular systolic function is severely decreased.  · Left ventricular diastolic function is consistent with (grade I) impaired relaxation.  · The aortic valve exhibits sclerosis. There is mild thickening of the aortic valve. The aortic valve appears trileaflet. Mild aortic valve regurgitation is present. No aortic valve stenosis is present.  · There are myxomatous changes of the mitral valve apparatus present.  · Moderate mitral valve regurgitation is present.  · There are myxomatous changes of the mitral valve apparatus present. Moderate mitral valve regurgitation is present. No significant mitral valve stenosis is present.  · The tricuspid valve is structurally normal with no significant stenosis present. Mild tricuspid valve regurgitation is present. Estimated right ventricular systolic pressure from tricuspid  regurgitation is normal (<35 mmHg).  · There is a small (<1cm) circumferential pericardial effusion. There is no evidence of cardiac tamponade.     I reviewed the patient's new clinical results.    Telemetry: NSR  bpm      Medication Review:   allopurinol, 300 mg, Oral, Daily  aspirin, 81 mg, Oral, Daily  atorvastatin, 40 mg, Oral, Daily  carvedilol, 6.25 mg, Oral, BID With Meals  cetirizine, 10 mg, Oral, Daily  cholecalciferol, 2,000 Units, Oral, Daily  enoxaparin, 40 mg, Subcutaneous, Q24H  furosemide, 20 mg, Oral, Daily  insulin aspart, 0-14 Units, Subcutaneous, TID AC  insulin detemir, 15 Units, Subcutaneous, Nightly  sacubitril-valsartan, 1 tablet, Oral, Q12H  sodium chloride, 10 mL, Intravenous, Q12H  spironolactone, 25 mg, Oral, Daily  ticagrelor, 90 mg, Oral, BID             Assessment      Assessment:  NSTEMI, status post PCI to mid LAD  HFrEF, compensated  Newly diagnosed dilated cardiomyopathy, EF 2125%    Plan     Recommendations:  NSTEMI  Patient underwent PCI to mid LAD due to 90% stenosis.  Denies any chest pain.  Continue with guideline directed medical therapy with aspirin, Coreg, Lipitor, Entresto and Brilinta.  She will need to be followed up in the cardiology clinic in 3 weeks.    2.  HFrEF/cardiomyopathy  Kidney function stable.  Switch to 20 mg p.o. Lasix daily.  Continue with Coreg, Entresto and spironolactone for cardiomyopathy and titrate up as tolerated.  Life vest has been placed.  We will recheck echocardiogram in around 3 months in outpatient setting to reevaluate LV function.  Follow-up in the heart failure clinic on discharge.  Follow-up with Dr. Johnson's office in 3 weeks.  Appears stable from a cardiac standpoint for discharge home today.    I discussed the patients findings and my recommendations with patient and family    Electronically signed by REGINALDO Toribio, 04/03/22, 8:33 AM EDT.    Please note that portions of this note were completed with a voice recognition  program.

## 2022-04-04 ENCOUNTER — TRANSITIONAL CARE MANAGEMENT TELEPHONE ENCOUNTER (OUTPATIENT)
Dept: CALL CENTER | Facility: HOSPITAL | Age: 67
End: 2022-04-04

## 2022-04-04 ENCOUNTER — TELEPHONE (OUTPATIENT)
Dept: TELEMETRY | Facility: HOSPITAL | Age: 67
End: 2022-04-04

## 2022-04-04 NOTE — OUTREACH NOTE
Call Center TCM Note    Flowsheet Row Responses   [facility] facility patient discharged from? [facility]   Does the patient have one of the following disease processes/diagnoses(primary or secondary)? CHF   TCM attempt successful? No   Unsuccessful attempts Attempt 1           Kristan Silveira LPN    4/4/2022, 17:17 EDT

## 2022-04-04 NOTE — CASE MANAGEMENT/SOCIAL WORK
Discharge Planning Assessment   [facility]     Patient Name: Breanna Kaba  MRN: [id number]  Today's Date: 4/4/2022    Admit Date: 3/28/2022       Discharge Plan     Row Name 04/04/22 0928       Plan    Final Discharge Disposition Code 01 - home or self-care    Final Note Pt to be discharged home on this date.                    Eufemia Braun, NAYAW

## 2022-04-05 ENCOUNTER — TRANSITIONAL CARE MANAGEMENT TELEPHONE ENCOUNTER (OUTPATIENT)
Dept: CALL CENTER | Facility: HOSPITAL | Age: 67
End: 2022-04-05

## 2022-04-05 NOTE — OUTREACH NOTE
Call Center TCM Note    Flowsheet Row Responses   [facility] facility patient discharged from? [facility]   Does the patient have one of the following disease processes/diagnoses(primary or secondary)? CHF   TCM attempt successful? No   Unsuccessful attempts Attempt 3           Kristan Silveira LPN    4/5/2022, 08:43 EDT

## 2022-04-13 ENCOUNTER — READMISSION MANAGEMENT (OUTPATIENT)
Dept: CALL CENTER | Facility: HOSPITAL | Age: 67
End: 2022-04-13

## 2022-04-13 NOTE — OUTREACH NOTE
CHF Week 2 Survey    Flowsheet Row Responses   [facility] [place] patient discharged from? [facility]   Does the patient have one of the following disease processes/diagnoses(primary or secondary)? CHF   Week 2 attempt successful? Yes   Call start time 1025   Call end time 1030   Discharge diagnosis Acute systolic heart failure    Meds reviewed with patient/caregiver? Yes   Is the patient having any side effects they believe may be caused by any medication additions or changes? No   Does the patient have all medications ordered at discharge? Yes   Is the patient taking all medications as directed (includes completed medication regime)? Yes   Does the patient have a primary care provider?  Yes   Does the patient have an appointment with their PCP within 7 days of discharge? Greater than 7 days   Comments regarding PCP PCP 4/25/22   What is preventing the patient from scheduling follow up appointments within 7 days of discharge? Earlier appointment not available   Nursing Interventions Verified appointment date/time/provider   Has the patient kept scheduled appointments due by today? N/A   Has home health visited the patient within 72 hours of discharge? N/A   What DME was ordered? Life Vest    Pulse Ox monitoring None   Psychosocial issues? No   Did the patient receive a copy of their discharge instructions? Yes   Nursing interventions Reviewed instructions with patient   What is the patient's perception of their health status since discharge? New symptoms unrelated to diagnosis  [Rash on legs, abdomen]   Nursing interventions Nurse provided patient education   Is the patient weighing daily? Yes   Does the patient have scales? Yes   Daily weight interventions Education provided on importance of daily weight   Is the patient able to teach back Heart Failure diet management? Yes   Is the patient able to teach back Heart Failure Zones? Yes   Is the patient able to teach back signs and symptoms of worsening condition? (i.e.  weight gain, shortness of air, etc.) Yes   If the patient is a current smoker, are they able to teach back resources for cessation? Not a smoker   Is the patient/caregiver able to teach back the hierarchy of who to call/visit for symptoms/problems? PCP, Specialist, Home health nurse, Urgent Care, ED, 911 Yes   CHF Week 2 call completed? Yes   Wrap up additional comments Patient reports she has developed a rash on legs/ abdomen, she is going to call PCP for earlier appt.          SAPNA CASAS - Registered Nurse

## 2022-04-14 ENCOUNTER — TRANSCRIBE ORDERS (OUTPATIENT)
Dept: ONCOLOGY | Facility: HOSPITAL | Age: 67
End: 2022-04-14

## 2022-04-14 ENCOUNTER — INFUSION (OUTPATIENT)
Dept: ONCOLOGY | Facility: HOSPITAL | Age: 67
End: 2022-04-14
Payer: MEDICARE

## 2022-04-14 ENCOUNTER — OFFICE VISIT (OUTPATIENT)
Dept: FAMILY MEDICINE CLINIC | Facility: CLINIC | Age: 67
End: 2022-04-14

## 2022-04-14 VITALS
OXYGEN SATURATION: 98 % | HEART RATE: 130 BPM | RESPIRATION RATE: 18 BRPM | SYSTOLIC BLOOD PRESSURE: 145 MMHG | TEMPERATURE: 97.3 F | DIASTOLIC BLOOD PRESSURE: 87 MMHG

## 2022-04-14 VITALS
WEIGHT: 134.4 LBS | DIASTOLIC BLOOD PRESSURE: 68 MMHG | HEART RATE: 104 BPM | BODY MASS INDEX: 24.73 KG/M2 | TEMPERATURE: 96.6 F | OXYGEN SATURATION: 98 % | HEIGHT: 62 IN | SYSTOLIC BLOOD PRESSURE: 122 MMHG

## 2022-04-14 DIAGNOSIS — E11.40 TYPE 2 DIABETES MELLITUS WITH DIABETIC NEUROPATHY, UNSPECIFIED WHETHER LONG TERM INSULIN USE: ICD-10-CM

## 2022-04-14 DIAGNOSIS — Z79.4 TYPE 2 DIABETES MELLITUS WITH DIABETIC NEUROPATHY, WITH LONG-TERM CURRENT USE OF INSULIN: ICD-10-CM

## 2022-04-14 DIAGNOSIS — E11.40 TYPE 2 DIABETES MELLITUS WITH DIABETIC NEUROPATHY, WITH LONG-TERM CURRENT USE OF INSULIN: ICD-10-CM

## 2022-04-14 DIAGNOSIS — T78.40XA ALLERGIC REACTION TO DRUG, INITIAL ENCOUNTER: ICD-10-CM

## 2022-04-14 DIAGNOSIS — Z09 HOSPITAL DISCHARGE FOLLOW-UP: Primary | ICD-10-CM

## 2022-04-14 LAB
ALBUMIN SERPL-MCNC: 4.49 G/DL (ref 3.5–5.2)
ALBUMIN/GLOB SERPL: 1.5 G/DL
ALP SERPL-CCNC: 196 U/L (ref 39–117)
ALT SERPL W P-5'-P-CCNC: 14 U/L (ref 1–33)
ANION GAP SERPL CALCULATED.3IONS-SCNC: 10.3 MMOL/L (ref 5–15)
AST SERPL-CCNC: 15 U/L (ref 1–32)
BASOPHILS # BLD AUTO: 0.06 10*3/MM3 (ref 0–0.2)
BASOPHILS # BLD AUTO: 0.07 10*3/MM3 (ref 0–0.2)
BASOPHILS NFR BLD AUTO: 0.8 % (ref 0–1.5)
BASOPHILS NFR BLD AUTO: 0.8 % (ref 0–1.5)
BILIRUB BLD-MCNC: NEGATIVE MG/DL
BILIRUB SERPL-MCNC: 0.5 MG/DL (ref 0–1.2)
BUN SERPL-MCNC: 30 MG/DL (ref 8–23)
BUN/CREAT SERPL: 31.9 (ref 7–25)
CALCIUM SPEC-SCNC: 10.2 MG/DL (ref 8.6–10.5)
CHLORIDE SERPL-SCNC: 92 MMOL/L (ref 98–107)
CLARITY, POC: CLEAR
CO2 SERPL-SCNC: 25.7 MMOL/L (ref 22–29)
COLOR UR: YELLOW
CREAT SERPL-MCNC: 0.94 MG/DL (ref 0.57–1)
DEPRECATED RDW RBC AUTO: 44.3 FL (ref 37–54)
DEPRECATED RDW RBC AUTO: 45.2 FL (ref 37–54)
EGFRCR SERPLBLD CKD-EPI 2021: 66.2 ML/MIN/1.73
EOSINOPHIL # BLD AUTO: 0.15 10*3/MM3 (ref 0–0.4)
EOSINOPHIL # BLD AUTO: 0.16 10*3/MM3 (ref 0–0.4)
EOSINOPHIL NFR BLD AUTO: 1.9 % (ref 0.3–6.2)
EOSINOPHIL NFR BLD AUTO: 1.9 % (ref 0.3–6.2)
ERYTHROCYTE [DISTWIDTH] IN BLOOD BY AUTOMATED COUNT: 18 % (ref 12.3–15.4)
ERYTHROCYTE [DISTWIDTH] IN BLOOD BY AUTOMATED COUNT: 18.1 % (ref 12.3–15.4)
EXPIRATION DATE: ABNORMAL
GLOBULIN UR ELPH-MCNC: 3 GM/DL
GLUCOSE BLDC GLUCOMTR-MCNC: 600 MG/DL (ref 70–130)
GLUCOSE SERPL-MCNC: 598 MG/DL (ref 65–99)
GLUCOSE UR STRIP-MCNC: ABNORMAL MG/DL
HCT VFR BLD AUTO: 41.6 % (ref 34–46.6)
HCT VFR BLD AUTO: 43.8 % (ref 34–46.6)
HGB BLD-MCNC: 13.6 G/DL (ref 12–15.9)
HGB BLD-MCNC: 14.1 G/DL (ref 12–15.9)
IMM GRANULOCYTES # BLD AUTO: 0.02 10*3/MM3 (ref 0–0.05)
IMM GRANULOCYTES # BLD AUTO: 0.02 10*3/MM3 (ref 0–0.05)
IMM GRANULOCYTES NFR BLD AUTO: 0.2 % (ref 0–0.5)
IMM GRANULOCYTES NFR BLD AUTO: 0.3 % (ref 0–0.5)
KETONES UR QL: NEGATIVE
LEUKOCYTE EST, POC: NEGATIVE
LYMPHOCYTES # BLD AUTO: 1.75 10*3/MM3 (ref 0.7–3.1)
LYMPHOCYTES # BLD AUTO: 1.92 10*3/MM3 (ref 0.7–3.1)
LYMPHOCYTES NFR BLD AUTO: 22 % (ref 19.6–45.3)
LYMPHOCYTES NFR BLD AUTO: 22.5 % (ref 19.6–45.3)
Lab: ABNORMAL
MCH RBC QN AUTO: 23.8 PG (ref 26.6–33)
MCH RBC QN AUTO: 23.9 PG (ref 26.6–33)
MCHC RBC AUTO-ENTMCNC: 32.2 G/DL (ref 31.5–35.7)
MCHC RBC AUTO-ENTMCNC: 32.7 G/DL (ref 31.5–35.7)
MCV RBC AUTO: 73.2 FL (ref 79–97)
MCV RBC AUTO: 74 FL (ref 79–97)
MONOCYTES # BLD AUTO: 0.74 10*3/MM3 (ref 0.1–0.9)
MONOCYTES # BLD AUTO: 0.74 10*3/MM3 (ref 0.1–0.9)
MONOCYTES NFR BLD AUTO: 8.7 % (ref 5–12)
MONOCYTES NFR BLD AUTO: 9.3 % (ref 5–12)
NEUTROPHILS NFR BLD AUTO: 5.24 10*3/MM3 (ref 1.7–7)
NEUTROPHILS NFR BLD AUTO: 5.62 10*3/MM3 (ref 1.7–7)
NEUTROPHILS NFR BLD AUTO: 65.7 % (ref 42.7–76)
NEUTROPHILS NFR BLD AUTO: 65.9 % (ref 42.7–76)
NITRITE UR-MCNC: NEGATIVE MG/ML
NRBC BLD AUTO-RTO: 0 /100 WBC (ref 0–0.2)
NRBC BLD AUTO-RTO: 0 /100 WBC (ref 0–0.2)
PH UR: 5.5 [PH] (ref 5–8)
PLATELET # BLD AUTO: 439 10*3/MM3 (ref 140–450)
PLATELET # BLD AUTO: 506 10*3/MM3 (ref 140–450)
PMV BLD AUTO: 10.4 FL (ref 6–12)
PMV BLD AUTO: 11.4 FL (ref 6–12)
POTASSIUM SERPL-SCNC: 5.1 MMOL/L (ref 3.5–5.2)
PROT SERPL-MCNC: 7.5 G/DL (ref 6–8.5)
PROT UR STRIP-MCNC: ABNORMAL MG/DL
RBC # BLD AUTO: 5.68 10*6/MM3 (ref 3.77–5.28)
RBC # BLD AUTO: 5.92 10*6/MM3 (ref 3.77–5.28)
RBC # UR STRIP: NEGATIVE /UL
SODIUM SERPL-SCNC: 128 MMOL/L (ref 136–145)
SP GR UR: 1.01 (ref 1–1.03)
UROBILINOGEN UR QL: NORMAL
WBC NRBC COR # BLD: 7.96 10*3/MM3 (ref 3.4–10.8)
WBC NRBC COR # BLD: 8.53 10*3/MM3 (ref 3.4–10.8)

## 2022-04-14 PROCEDURE — 99495 TRANSJ CARE MGMT MOD F2F 14D: CPT | Performed by: FAMILY MEDICINE

## 2022-04-14 PROCEDURE — 85025 COMPLETE CBC W/AUTO DIFF WBC: CPT

## 2022-04-14 PROCEDURE — 1111F DSCHRG MED/CURRENT MED MERGE: CPT | Performed by: FAMILY MEDICINE

## 2022-04-14 PROCEDURE — 85025 COMPLETE CBC W/AUTO DIFF WBC: CPT | Performed by: FAMILY MEDICINE

## 2022-04-14 PROCEDURE — 96375 TX/PRO/DX INJ NEW DRUG ADDON: CPT

## 2022-04-14 PROCEDURE — 96374 THER/PROPH/DIAG INJ IV PUSH: CPT

## 2022-04-14 PROCEDURE — 25010000002 DIPHENHYDRAMINE PER 50 MG: Performed by: FAMILY MEDICINE

## 2022-04-14 PROCEDURE — 82043 UR ALBUMIN QUANTITATIVE: CPT | Performed by: FAMILY MEDICINE

## 2022-04-14 PROCEDURE — 81003 URINALYSIS AUTO W/O SCOPE: CPT | Performed by: FAMILY MEDICINE

## 2022-04-14 PROCEDURE — 80053 COMPREHEN METABOLIC PANEL: CPT

## 2022-04-14 PROCEDURE — 3062F POS MACROALBUMINURIA REV: CPT | Performed by: FAMILY MEDICINE

## 2022-04-14 PROCEDURE — 82962 GLUCOSE BLOOD TEST: CPT | Performed by: FAMILY MEDICINE

## 2022-04-14 RX ORDER — DIPHENHYDRAMINE HYDROCHLORIDE 50 MG/ML
25 INJECTION INTRAMUSCULAR; INTRAVENOUS ONCE
Status: COMPLETED | OUTPATIENT
Start: 2022-04-14 | End: 2022-04-14

## 2022-04-14 RX ORDER — INSULIN GLARGINE 100 [IU]/ML
22 INJECTION, SOLUTION SUBCUTANEOUS NIGHTLY
Qty: 21 ML | Refills: 1 | Status: SHIPPED | OUTPATIENT
Start: 2022-04-14 | End: 2022-04-14 | Stop reason: SDUPTHER

## 2022-04-14 RX ORDER — INSULIN GLARGINE 100 [IU]/ML
22 INJECTION, SOLUTION SUBCUTANEOUS NIGHTLY
Qty: 21 ML | Refills: 1 | Status: SHIPPED | OUTPATIENT
Start: 2022-04-14 | End: 2022-12-15 | Stop reason: SDUPTHER

## 2022-04-14 RX ORDER — DIPHENHYDRAMINE HYDROCHLORIDE 50 MG/ML
50 INJECTION INTRAMUSCULAR; INTRAVENOUS ONCE
Status: DISCONTINUED | OUTPATIENT
Start: 2022-04-14 | End: 2022-04-14

## 2022-04-14 RX ADMIN — FAMOTIDINE 40 MG: 10 INJECTION INTRAVENOUS at 16:18

## 2022-04-14 RX ADMIN — DIPHENHYDRAMINE HYDROCHLORIDE 25 MG: 50 INJECTION, SOLUTION INTRAMUSCULAR; INTRAVENOUS at 16:22

## 2022-04-15 LAB — ALBUMIN UR-MCNC: 12.2 MG/DL

## 2022-04-15 RX ORDER — DIPHENHYDRAMINE HCL 25 MG
25 CAPSULE ORAL 2 TIMES DAILY PRN
Qty: 30 CAPSULE | Refills: 0 | Status: SHIPPED | OUTPATIENT
Start: 2022-04-15 | End: 2022-04-25

## 2022-04-15 RX ORDER — CETIRIZINE HYDROCHLORIDE 10 MG/1
10 TABLET ORAL DAILY
Qty: 15 TABLET | Refills: 0 | Status: SHIPPED | OUTPATIENT
Start: 2022-04-15 | End: 2022-06-13

## 2022-04-15 RX ORDER — FAMOTIDINE 20 MG/1
20 TABLET, FILM COATED ORAL 2 TIMES DAILY
Qty: 30 TABLET | Refills: 0 | Status: SHIPPED | OUTPATIENT
Start: 2022-04-15 | End: 2022-06-13

## 2022-04-18 ENCOUNTER — TELEPHONE (OUTPATIENT)
Dept: CARDIOLOGY | Facility: CLINIC | Age: 67
End: 2022-04-18

## 2022-04-18 NOTE — TELEPHONE ENCOUNTER
Pt called in stating she recently had a Defibrillator placed in & while cleaning the device she has been unable to put it back together. Needs to know where she can get this done, referred her to the provider that placed this procedure in which she verbalized understanding.

## 2022-04-21 ENCOUNTER — READMISSION MANAGEMENT (OUTPATIENT)
Dept: CALL CENTER | Facility: HOSPITAL | Age: 67
End: 2022-04-21

## 2022-04-21 NOTE — OUTREACH NOTE
CHF Week 3 Survey    Flowsheet Row Responses   [facility] patient discharged from? [facility]   Does the patient have one of the following disease processes/diagnoses(primary or secondary)? CHF   Week 3 attempt successful? Yes   Call start time 1720   Call end time 1726   Discharge diagnosis Acute systolic heart failure    Is patient permission given to speak with other caregiver? No   Meds reviewed with patient/caregiver? Yes   Is the patient taking all medications as directed (includes completed medication regime)? Yes   Comments regarding appointments cardiology appt 4/25   Does the patient have a primary care provider?  Yes   Has the patient kept scheduled appointments due by today? Yes   Has home health visited the patient within 72 hours of discharge? N/A   What DME was ordered? Life Vest    Pulse Ox monitoring None   Psychosocial issues? No   Did the patient receive a copy of their discharge instructions? Yes   What is the patient's perception of their health status since discharge? Improving   Is the patient weighing daily? Yes   Is the patient able to teach back Heart Failure diet management? Yes   Is the patient able to teach back signs and symptoms of worsening condition? (i.e. weight gain, shortness of air, etc.) Yes   If the patient is a current smoker, are they able to teach back resources for cessation? Not a smoker   Is the patient/caregiver able to teach back the hierarchy of who to call/visit for symptoms/problems? PCP, Specialist, Home health nurse, Urgent Care, ED, 911 Yes   CHF Week 3 call completed? Yes   Wrap up additional comments Patient reports that she is doing well. Denies any needs today.           LILO FLORES - Registered Nurse

## 2022-04-25 ENCOUNTER — OFFICE VISIT (OUTPATIENT)
Dept: FAMILY MEDICINE CLINIC | Facility: CLINIC | Age: 67
End: 2022-04-25

## 2022-04-25 ENCOUNTER — OFFICE VISIT (OUTPATIENT)
Dept: CARDIOLOGY | Facility: CLINIC | Age: 67
End: 2022-04-25

## 2022-04-25 VITALS
HEART RATE: 101 BPM | DIASTOLIC BLOOD PRESSURE: 68 MMHG | TEMPERATURE: 96.4 F | SYSTOLIC BLOOD PRESSURE: 120 MMHG | HEIGHT: 62 IN | WEIGHT: 132.8 LBS | BODY MASS INDEX: 24.44 KG/M2 | OXYGEN SATURATION: 97 %

## 2022-04-25 VITALS
TEMPERATURE: 96.8 F | WEIGHT: 133.4 LBS | BODY MASS INDEX: 24.55 KG/M2 | RESPIRATION RATE: 16 BRPM | SYSTOLIC BLOOD PRESSURE: 91 MMHG | HEIGHT: 62 IN | DIASTOLIC BLOOD PRESSURE: 58 MMHG | HEART RATE: 95 BPM

## 2022-04-25 DIAGNOSIS — Z00.00 ENCOUNTER FOR SUBSEQUENT ANNUAL WELLNESS VISIT IN MEDICARE PATIENT: Primary | ICD-10-CM

## 2022-04-25 DIAGNOSIS — I25.5 ISCHEMIC CARDIOMYOPATHY: Primary | ICD-10-CM

## 2022-04-25 DIAGNOSIS — E11.40 TYPE 2 DIABETES MELLITUS WITH DIABETIC NEUROPATHY, UNSPECIFIED WHETHER LONG TERM INSULIN USE: ICD-10-CM

## 2022-04-25 LAB — GLUCOSE BLDC GLUCOMTR-MCNC: 158 MG/DL (ref 70–130)

## 2022-04-25 PROCEDURE — 82962 GLUCOSE BLOOD TEST: CPT | Performed by: FAMILY MEDICINE

## 2022-04-25 PROCEDURE — 1159F MED LIST DOCD IN RCRD: CPT | Performed by: FAMILY MEDICINE

## 2022-04-25 PROCEDURE — 1170F FXNL STATUS ASSESSED: CPT | Performed by: FAMILY MEDICINE

## 2022-04-25 PROCEDURE — 99214 OFFICE O/P EST MOD 30 MIN: CPT | Performed by: PHYSICIAN ASSISTANT

## 2022-04-25 PROCEDURE — G0439 PPPS, SUBSEQ VISIT: HCPCS | Performed by: FAMILY MEDICINE

## 2022-04-25 RX ORDER — CYCLOBENZAPRINE HCL 5 MG
5 TABLET ORAL 3 TIMES DAILY PRN
COMMUNITY
End: 2022-12-15 | Stop reason: SDUPTHER

## 2022-04-25 RX ORDER — NATEGLINIDE 120 MG/1
120 TABLET ORAL
COMMUNITY
End: 2022-12-15

## 2022-04-25 RX ORDER — SPIRONOLACTONE 25 MG/1
12.5 TABLET ORAL DAILY
Qty: 90 TABLET | Refills: 3 | Status: SHIPPED | OUTPATIENT
Start: 2022-04-25 | End: 2022-05-11 | Stop reason: SDUPTHER

## 2022-04-25 RX ORDER — GLIPIZIDE 5 MG/1
5 TABLET ORAL DAILY
COMMUNITY
End: 2022-12-15 | Stop reason: SDUPTHER

## 2022-04-25 RX ORDER — MELOXICAM 7.5 MG/1
7.5 TABLET ORAL DAILY
COMMUNITY
End: 2022-07-21

## 2022-04-25 NOTE — PROGRESS NOTES
"Myrna Ramos MD  Breanna Kaba  1955 04/25/2022    Patient Active Problem List   Diagnosis   • Neuropathy   • Gout   • GERD (gastroesophageal reflux disease)   • Diabetes mellitus (HCC)   • Chronic pain   • Encounter for colorectal cancer screening   • Family history of GI malignancy   • History of adenomatous polyp of colon   • COVID-19   • Acute cystitis with hematuria   • Acute congestive heart failure, unspecified heart failure type ([facility])   • NSTEMI, initial episode of care ([facility])       Dear Myrna Ramos MD:    Subjective     History of Present Illness:    Chief Complaint   Patient presents with   • Follow-up     Recent hosp stay, stent s/p MI   • Congestive Heart Failure   • Shortness of Breath     \"Better\"   • Med Management     presented       Breanna Kaba is a pleasant 66 y.o. female with a past medical history significant for coronary artery disease with recent stenting of the LAD on 3/31/2022, ischemic cardiomyopathy with HFrEF of 20 to 25% currently wearing LifeVest.  She also has diabetes mellitus type 2, essential hypertension, dyslipidemia.  She comes in today for cardiology follow-up.     Breanna comes in after recently being diagnosed with ischemic cardiomyopathy with acute HFrEF secondary to acute NSTEMI with 99% lesion seen in the LAD was subsequently stented.  Clinically she reports she has significantly improved reporting that her breathing is returned completely back to her baseline prior to this incident.  She does state her only symptoms while in the hospital was dyspnea denying any chest pains and denies chest pains in the office today.  She reports she has been compliant with medications.  Her biggest complaint is handling the LifeVest she reports that it has been having poor skin contact as this will alarm when this occurs and she reports it has been alarming multiple times daily.  She denies any syncope, palpitations, or near syncope.  She has mildly hypotensive " today.    Allergies   Allergen Reactions   • Asa [Aspirin] GI Intolerance   • Naproxen Nausea And Vomiting   • Penicillins Hives and Rash   :      Current Outpatient Medications:   •  alendronate (FOSAMAX) 70 MG tablet, Take 70 mg by mouth Every 7 (Seven) Days. mondays, Disp: , Rfl:   •  allopurinol (ZYLOPRIM) 300 MG tablet, Take 1 tablet by mouth Daily., Disp: 90 tablet, Rfl: 1  •  aspirin 81 MG EC tablet, Take 1 tablet by mouth Daily., Disp: 30 tablet, Rfl: 1  •  atorvastatin (LIPITOR) 40 MG tablet, Take 1 tablet by mouth Daily., Disp: 30 tablet, Rfl: 5  •  carvedilol (COREG) 6.25 MG tablet, Take 1 tablet by mouth 2 (Two) Times a Day With Meals., Disp: 60 tablet, Rfl: 0  •  cetirizine (zyrTEC) 10 MG tablet, Take 1 tablet by mouth Daily., Disp: 15 tablet, Rfl: 0  •  Cholecalciferol (Vitamin D) 50 MCG (2000 UT) tablet, Take 2,000 Units by mouth Daily. (OTC), Disp: 30 tablet, Rfl: 5  •  cyclobenzaprine (FLEXERIL) 5 MG tablet, Take 5 mg by mouth 3 (Three) Times a Day As Needed for Muscle Spasms., Disp: , Rfl:   •  diphenhydrAMINE (BENADRYL) 25 mg capsule, Take 1 capsule by mouth 2 (Two) Times a Day As Needed for Itching., Disp: 30 capsule, Rfl: 0  •  famotidine (PEPCID) 20 MG tablet, Take 1 tablet by mouth 2 (Two) Times a Day., Disp: 30 tablet, Rfl: 0  •  furosemide (LASIX) 20 MG tablet, Take 1 tablet by mouth Daily., Disp: 30 tablet, Rfl: 0  •  glipizide (GLUCOTROL) 5 MG tablet, Take 5 mg by mouth Daily., Disp: , Rfl:   •  insulin aspart (NovoLOG FlexPen) 100 UNIT/ML solution pen-injector sc pen, Inject 0-14 Units under the skin into the appropriate area as directed 3 (Three) Times a Day Before Meals., Disp: 15 mL, Rfl: 0  •  Insulin Glargine (BASAGLAR KWIKPEN) 100 UNIT/ML injection pen, Inject 22 Units under the skin into the appropriate area as directed Every Night., Disp: 21 mL, Rfl: 1  •  meloxicam (MOBIC) 7.5 MG tablet, Take 7.5 mg by mouth Daily., Disp: , Rfl:   •  metFORMIN (GLUCOPHAGE) 1000 MG tablet, Take  "1,000 mg by mouth 2 (Two) Times a Day With Meals., Disp: , Rfl:   •  nateglinide (STARLIX) 120 MG tablet, Take 120 mg by mouth 3 (Three) Times a Day Before Meals., Disp: , Rfl:   •  sacubitril-valsartan (ENTRESTO) 24-26 MG tablet, Take 1 tablet by mouth Every 12 (Twelve) Hours., Disp: 60 tablet, Rfl: 0  •  spironolactone (ALDACTONE) 25 MG tablet, Take 0.5 tablets by mouth Daily., Disp: 90 tablet, Rfl: 3  •  ticagrelor (BRILINTA) 90 MG tablet tablet, Take 1 tablet by mouth 2 (Two) Times a Day., Disp: 60 tablet, Rfl: 1  •  Blood Glucose Monitoring Suppl (ONE TOUCH ULTRA 2) w/Device kit, Use to test blood sugar before meals and at bedtime., Disp: 1 each, Rfl: 0  •  glucose blood (OneTouch Ultra) test strip, Use to test blood sugar before meals and at bedtime. (Patient taking differently: Use to test blood sugar before meals and at bedtime.), Disp: 100 each, Rfl: 0  •  Insulin Pen Needle (Pen Needles) 31G X 6 MM misc, Use with Novolog FlexPen to inject insulin 3 times daily before meals., Disp: 100 each, Rfl: 0  •  Lancets (onetouch ultrasoft) lancets, Use to test blood sugar before meals and at bedtime., Disp: 100 each, Rfl: 0  •  loratadine (CLARITIN) 10 MG tablet, Take 1 tablet by mouth Daily., Disp: 30 tablet, Rfl: 5    The following portions of the patient's history were reviewed and updated as appropriate: allergies, current medications, past family history, past medical history, past social history, past surgical history and problem list.    Social History     Tobacco Use   • Smoking status: Never Smoker   • Smokeless tobacco: Never Used   Vaping Use   • Vaping Use: Never used   Substance Use Topics   • Alcohol use: No   • Drug use: No         Objective   Vitals:    04/25/22 0857 04/25/22 0900   BP: (!) 89/54 91/58   BP Location: Left arm Right arm   Pulse: 94 95   Resp: 16    Temp: 96.8 °F (36 °C)    Weight: 60.5 kg (133 lb 6.4 oz)    Height: 157.5 cm (62\")      Body mass index is 24.4 kg/m².    Constitutional:  "      General: Not in acute distress.     Appearance: Healthy appearance. Well-developed and not in distress. Not diaphoretic.   Eyes:      Conjunctiva/sclera: Conjunctivae normal.      Pupils: Pupils are equal, round, and reactive to light.   HENT:      Head: Normocephalic and atraumatic.   Neck:      Vascular: No carotid bruit or JVD.   Pulmonary:      Effort: Pulmonary effort is normal. No respiratory distress.      Breath sounds: Normal breath sounds.   Cardiovascular:      Normal rate. Regular rhythm.      Comments: Difficult to auscultate lungs due to LifeVest  Skin:     General: Skin is cool.   Neurological:      Mental Status: Alert, oriented to person, place, and time and oriented to person, place and time.         Lab Results   Component Value Date     (L) 04/14/2022    K 5.1 04/14/2022    CL 92 (L) 04/14/2022    CO2 25.7 04/14/2022    BUN 30 (H) 04/14/2022    CREATININE 0.94 04/14/2022    GLUCOSE 598 (C) 04/14/2022    CALCIUM 10.2 04/14/2022    AST 15 04/14/2022    ALT 14 04/14/2022    ALKPHOS 196 (H) 04/14/2022     No results found for: CKTOTAL  Lab Results   Component Value Date    WBC 8.53 04/14/2022    HGB 13.6 04/14/2022    HCT 41.6 04/14/2022     (H) 04/14/2022     Lab Results   Component Value Date    INR 1.04 03/28/2022     Lab Results   Component Value Date    MG 1.6 04/02/2022     Lab Results   Component Value Date    TSH 2.760 03/31/2022    CHLPL 256 (H) 03/01/2017    TRIG 79 03/29/2022    HDL 54 03/29/2022    LDL 58 03/29/2022      No results found for: BNP    During this visit the following were done:  Labs Reviewed []    Labs Ordered []    Radiology Reports Reviewed []    Radiology Ordered []    PCP Records Reviewed []    Referring Provider Records Reviewed []    ER Records Reviewed []    Hospital Records Reviewed []    History Obtained From Family []    Radiology Images Reviewed []    Other Reviewed []    Records Requested []       Procedures    Assessment/Plan    Diagnosis Plan    1. Ischemic cardiomyopathy  Adult Transthoracic Echo Complete W/ Cont if Necessary Per Protocol            Recommendations:  1. Hypotension  1. Reduce dose of spironolactone to 12.5 mg.  She reports she does not have a blood pressure device at home I did encourage her to acquire 1 and then if her blood pressure persistently remains below 100 mmHg systolic to hold her spironolactone.  2. Ischemic cardiomyopathy  1. Clinically euvolemic denies any dyspnea lungs were clear on auscultation however was difficult due to LifeVest to auscultate lungs.  For now I will continue aspirin, Lipitor, carvedilol, Entresto, and Brilinta.  2. I did recommend her to use meloxicam sparingly due to increased risk of GI bleed.  Breast understanding.  3. Also recommend SGLT2 inhibitors such as Jardiance/Farxiga however since she is on insulin will defer to PCP.  4. We will plan to repeat echocardiogram in June to reassess LVEF.      No follow-ups on file.    As always, I appreciate very much the opportunity to participate in the cardiovascular care of your patients.      With Best Regards,    Da Fox PA-C

## 2022-05-11 DIAGNOSIS — E78.2 MIXED HYPERLIPIDEMIA: ICD-10-CM

## 2022-05-11 DIAGNOSIS — T78.40XA ALLERGIC REACTION TO DRUG, INITIAL ENCOUNTER: ICD-10-CM

## 2022-05-11 NOTE — TELEPHONE ENCOUNTER
Caller: Breanna Kaba    Relationship: Self    Best call back number: [phone]    Requested Prescriptions:   Requested Prescriptions     Pending Prescriptions Disp Refills   • sacubitril-valsartan (ENTRESTO) 24-26 MG tablet 60 tablet 0     Sig: Take 1 tablet by mouth Every 12 (Twelve) Hours.   • aspirin 81 MG EC tablet 30 tablet 1     Sig: Take 1 tablet by mouth Daily.   • cetirizine (zyrTEC) 10 MG tablet 15 tablet 0     Sig: Take 1 tablet by mouth Daily.   • ticagrelor (BRILINTA) 90 MG tablet tablet 60 tablet 1     Sig: Take 1 tablet by mouth 2 (Two) Times a Day.   • spironolactone (ALDACTONE) 25 MG tablet 90 tablet 3     Sig: Take 0.5 tablets by mouth Daily.   • atorvastatin (LIPITOR) 40 MG tablet 30 tablet 5     Sig: Take 1 tablet by mouth Daily.   • glipizide (GLUCOTROL) 5 MG tablet       Sig: Take 1 tablet by mouth Daily.   • famotidine (PEPCID) 20 MG tablet 30 tablet 0     Sig: Take 1 tablet by mouth 2 (Two) Times a Day.   • cyclobenzaprine (FLEXERIL) 5 MG tablet       Sig: Take 1 tablet by mouth 3 (Three) Times a Day As Needed for Muscle Spasms.   • furosemide (LASIX) 20 MG tablet 30 tablet 0     Sig: Take 1 tablet by mouth Daily.        Pharmacy where request should be sent: [facility] [place] [address] [facility] [address] - [phone]  - [phone] FX         Does the patient have less than a 3 day supply:  [] Yes  [x] No    Shira Berkowitz   05/11/22 12:17 EDT

## 2022-05-13 ENCOUNTER — CLINICAL SUPPORT (OUTPATIENT)
Dept: FAMILY MEDICINE CLINIC | Facility: CLINIC | Age: 67
End: 2022-05-13

## 2022-05-13 ENCOUNTER — TELEPHONE (OUTPATIENT)
Dept: FAMILY MEDICINE CLINIC | Facility: CLINIC | Age: 67
End: 2022-05-13

## 2022-05-13 DIAGNOSIS — E11.40 TYPE 2 DIABETES MELLITUS WITH DIABETIC NEUROPATHY, UNSPECIFIED WHETHER LONG TERM INSULIN USE: Primary | ICD-10-CM

## 2022-05-13 LAB — GLUCOSE BLDC GLUCOMTR-MCNC: 178 MG/DL (ref 70–130)

## 2022-05-13 PROCEDURE — 82962 GLUCOSE BLOOD TEST: CPT | Performed by: FAMILY MEDICINE

## 2022-05-13 NOTE — TELEPHONE ENCOUNTER
That's where she is going wrong.  Please let her know that she is to take basaglar 14 units at night.  She is to check her glucose before meals and use a sliding scale for the novolog.    Below 150 - none  150-200 - 2 units  201-250 4 units  251 - 300 6 units  301-350  8 units  351-400 10 units  400+ 12 units.    Patient notified & verbalized understanding.

## 2022-05-13 NOTE — TELEPHONE ENCOUNTER
That's where she is going wrong.  Please let her know that she is to take basaglar 14 units at night.  She is to check her glucose before meals and use a sliding scale for the novolog.    Below 150 - none  150-200 - 2 units  201-250 4 units  251 - 300 6 units  301-350  8 units  351-400 10 units  400+ 12 units.

## 2022-05-13 NOTE — TELEPHONE ENCOUNTER
"But what was she taking? She came into clinic today saying that her blood glucose levels were low and she was just taking \"insulin\" three times a day. Not checking her glucose. BS at clinic 178,. What has she been doing with each insulin?    She reports her Novolog says 0-14 units with each meal so she just takes 14 units with each meal & the 22 units of Basaglar daily.  "

## 2022-05-13 NOTE — TELEPHONE ENCOUNTER
"But what was she taking? She came into clinic today saying that her blood glucose levels were low and she was just taking \"insulin\" three times a day. Not checking her glucose. BS at clinic 178,. What has she been doing with each insulin?  "

## 2022-05-13 NOTE — TELEPHONE ENCOUNTER
Patient called stated to let you know she has Basaglar 22 units daily & Novolog for sliding scale three times a day.

## 2022-05-13 NOTE — PROGRESS NOTES
PATIENT COME IN SAYING HER SUGAR IS LOW.sPOKE WITH PATIENT SHE STATES SHE TAKES HER INSULIN 22 UNITS 3 TIMES A DAY THEN AT 1-2 IN THE MORNING SHE WAKES UP SHAKY AND SWEATY AND HAS TO EAT SOMETHING.SHE DOES NOT CHECK HER SUGAR BECAUSE SHE CANT GET THE LANCETS IN THE DEVICE. SHE IS GOING TO GO HOME CALL US BACK AND LET THE NURSE KNOW WHAT INSULIN SHE IS TAKING 3 TIMES A DAY PER . CHECKED PATIENTS SUGAR WHILE IN THE OFFICE IT . PATIENT INSTRUCTED SHE NEEDS TO LEARN HOW TO CHECK HER SUGAR SO WE CAN HAVE ACCURATE INFORMATION TO TREAT HER SUGAR.PATIENT WILL CALL WHEN SHE GETS HOME.

## 2022-05-16 ENCOUNTER — LAB (OUTPATIENT)
Dept: FAMILY MEDICINE CLINIC | Facility: CLINIC | Age: 67
End: 2022-05-16

## 2022-05-16 ENCOUNTER — TELEPHONE (OUTPATIENT)
Dept: FAMILY MEDICINE CLINIC | Facility: CLINIC | Age: 67
End: 2022-05-16

## 2022-05-16 DIAGNOSIS — R30.0 DYSURIA: ICD-10-CM

## 2022-05-16 DIAGNOSIS — R30.0 DYSURIA: Primary | ICD-10-CM

## 2022-05-16 PROCEDURE — 87147 CULTURE TYPE IMMUNOLOGIC: CPT | Performed by: FAMILY MEDICINE

## 2022-05-16 PROCEDURE — 87086 URINE CULTURE/COLONY COUNT: CPT | Performed by: FAMILY MEDICINE

## 2022-05-16 RX ORDER — SPIRONOLACTONE 25 MG/1
12.5 TABLET ORAL DAILY
Qty: 15 TABLET | Refills: 2 | Status: SHIPPED | OUTPATIENT
Start: 2022-05-16 | End: 2022-06-09

## 2022-05-16 RX ORDER — CETIRIZINE HYDROCHLORIDE 10 MG/1
10 TABLET ORAL DAILY
Qty: 30 TABLET | Refills: 2 | OUTPATIENT
Start: 2022-05-16

## 2022-05-16 RX ORDER — GLIPIZIDE 5 MG/1
5 TABLET ORAL DAILY
Qty: 30 TABLET | Refills: 0 | OUTPATIENT
Start: 2022-05-16

## 2022-05-16 RX ORDER — ASPIRIN 81 MG/1
81 TABLET ORAL DAILY
Qty: 30 TABLET | Refills: 2 | Status: SHIPPED | OUTPATIENT
Start: 2022-05-16 | End: 2022-06-13 | Stop reason: SDDI

## 2022-05-16 RX ORDER — FUROSEMIDE 20 MG/1
20 TABLET ORAL DAILY
Qty: 30 TABLET | Refills: 0 | Status: SHIPPED | OUTPATIENT
Start: 2022-05-16 | End: 2022-07-25 | Stop reason: SDUPTHER

## 2022-05-16 RX ORDER — CYCLOBENZAPRINE HCL 5 MG
5 TABLET ORAL 3 TIMES DAILY PRN
OUTPATIENT
Start: 2022-05-16

## 2022-05-16 RX ORDER — ATORVASTATIN CALCIUM 40 MG/1
40 TABLET, FILM COATED ORAL DAILY
Qty: 30 TABLET | Refills: 5 | Status: SHIPPED | OUTPATIENT
Start: 2022-05-16 | End: 2022-11-28 | Stop reason: SDUPTHER

## 2022-05-16 RX ORDER — FAMOTIDINE 20 MG/1
20 TABLET, FILM COATED ORAL 2 TIMES DAILY
Qty: 30 TABLET | Refills: 0 | OUTPATIENT
Start: 2022-05-16

## 2022-05-16 NOTE — TELEPHONE ENCOUNTER
Yes. Order is in. Tell her to ask for a lab appointment. Maybe this is why she has been so confused lately.    Patient notified & verbalized understanding.

## 2022-05-16 NOTE — TELEPHONE ENCOUNTER
Caller: Breanna Kaba    Relationship to patient: Self    Best call back number: [phone]     Characteristics of symptom/severity: BURNING WITH URINATION, PRESSURE, FREQUENCY    How long have you been experiencing symptoms: SINCE YESTERDAY    Have you had any recent surgeries, procedures or injections: [x] Yes  [] No     If so, what: HEART CATH     Is it the symptoms constant or intermittent: [x] Constant  [] Intermittent    If a prescription is needed, what is your preferred pharmacy:   [facility] [place] - [place], [place] - [address] [address] - [phone]  - [phone]   [address] [address] [address]  Phone: [phone] Fax: [phone]

## 2022-05-16 NOTE — TELEPHONE ENCOUNTER
Caller: Breanna Kaba    Relationship to patient: Self    Best call back number: [phone]     Characteristics of symptom/severity: BURNING WITH URINATION, PRESSURE, FREQUENCY    How long have you been experiencing symptoms: SINCE YESTERDAY    Have you had any recent surgeries, procedures or injections: [x] Yes  [] No     If so, what: HEART CATH     Is it the symptoms constant or intermittent: [x] Constant  [] Intermittent    If a prescription is needed, what is your preferred pharmacy:   [facility] - [place], [place] - [address] [address] - [phone]  - [phone]   [address] [address] [address]  Phone: [phone] Fax: [phone]    Mail box is full,can she just leave a urine /

## 2022-05-16 NOTE — TELEPHONE ENCOUNTER
Pepcid and zyrtec were temporary medications.  Has been hypoglycemic - will hold glipizide for now.  Not sure who has been giving her flexeril. We have never refilled.  Need to re-evaluate lasix as she already is on spironolactone.

## 2022-05-16 NOTE — TELEPHONE ENCOUNTER
Yes. Order is in. Tell her to ask for a lab appointment. Maybe this is why she has been so confused lately.

## 2022-05-17 LAB — BACTERIA SPEC AEROBE CULT: ABNORMAL

## 2022-05-19 ENCOUNTER — TELEPHONE (OUTPATIENT)
Dept: FAMILY MEDICINE CLINIC | Facility: CLINIC | Age: 67
End: 2022-05-19

## 2022-05-19 NOTE — TELEPHONE ENCOUNTER
Please call her. Is she having symptoms? What symptoms? The urinc culture grew out Group B Strep, which is bacteria that usually is already found in urinary tract so it is not necessary an infection.

## 2022-05-19 NOTE — TELEPHONE ENCOUNTER
Pt stated that she was having problems such as burning when she urinated and urgency with no urine. Problems are still present but intermittent and the pt states that her urgency has increase to where she is having some incontinence..

## 2022-05-19 NOTE — TELEPHONE ENCOUNTER
Let her know that we will go ahead and put in some antibiotics for this to see if it makes her symptoms better. Thanks.

## 2022-05-19 NOTE — TELEPHONE ENCOUNTER
Caller: CharlotteBreanna    Relationship: Self    Best call back number: [phone]     Caller requesting test results: SELF    What test was performed: URINALYSIS    When was the test performed: LAST WEEK    Where was the test performed: [facility]    Additional notes:

## 2022-05-20 RX ORDER — CEPHALEXIN 500 MG/1
500 CAPSULE ORAL 2 TIMES DAILY
Qty: 14 CAPSULE | Refills: 0 | Status: SHIPPED | OUTPATIENT
Start: 2022-05-20 | End: 2022-06-13

## 2022-06-02 DIAGNOSIS — Z95.5 STENTED CORONARY ARTERY: ICD-10-CM

## 2022-06-02 DIAGNOSIS — I21.4 NSTEMI (NON-ST ELEVATED MYOCARDIAL INFARCTION): Primary | ICD-10-CM

## 2022-06-06 ENCOUNTER — LAB (OUTPATIENT)
Dept: LAB | Facility: HOSPITAL | Age: 67
End: 2022-06-06

## 2022-06-06 ENCOUNTER — TREATMENT (OUTPATIENT)
Dept: CARDIAC REHAB | Facility: HOSPITAL | Age: 67
End: 2022-06-06

## 2022-06-06 VITALS
OXYGEN SATURATION: 98 % | BODY MASS INDEX: 24.84 KG/M2 | RESPIRATION RATE: 13 BRPM | SYSTOLIC BLOOD PRESSURE: 102 MMHG | HEIGHT: 62 IN | DIASTOLIC BLOOD PRESSURE: 60 MMHG | WEIGHT: 135 LBS | HEART RATE: 120 BPM

## 2022-06-06 DIAGNOSIS — Z95.5 STENTED CORONARY ARTERY: ICD-10-CM

## 2022-06-06 DIAGNOSIS — I25.5 ISCHEMIC CARDIOMYOPATHY: Primary | ICD-10-CM

## 2022-06-06 DIAGNOSIS — R00.0 TACHYCARDIA: ICD-10-CM

## 2022-06-06 DIAGNOSIS — I25.5 ISCHEMIC CARDIOMYOPATHY: ICD-10-CM

## 2022-06-06 DIAGNOSIS — I50.23 ACUTE ON CHRONIC SYSTOLIC CHF (CONGESTIVE HEART FAILURE): ICD-10-CM

## 2022-06-06 DIAGNOSIS — I21.4 NSTEMI (NON-ST ELEVATED MYOCARDIAL INFARCTION): Primary | ICD-10-CM

## 2022-06-06 LAB
DEPRECATED RDW RBC AUTO: 52 FL (ref 37–54)
ERYTHROCYTE [DISTWIDTH] IN BLOOD BY AUTOMATED COUNT: 20.3 % (ref 12.3–15.4)
HCT VFR BLD AUTO: 43 % (ref 34–46.6)
HGB BLD-MCNC: 13.7 G/DL (ref 12–15.9)
MCH RBC QN AUTO: 24 PG (ref 26.6–33)
MCHC RBC AUTO-ENTMCNC: 31.9 G/DL (ref 31.5–35.7)
MCV RBC AUTO: 75.2 FL (ref 79–97)
PLATELET # BLD AUTO: 436 10*3/MM3 (ref 140–450)
PMV BLD AUTO: 10.5 FL (ref 6–12)
RBC # BLD AUTO: 5.72 10*6/MM3 (ref 3.77–5.28)
WBC NRBC COR # BLD: 11.36 10*3/MM3 (ref 3.4–10.8)

## 2022-06-06 PROCEDURE — 93798 PHYS/QHP OP CAR RHAB W/ECG: CPT

## 2022-06-06 PROCEDURE — 80053 COMPREHEN METABOLIC PANEL: CPT

## 2022-06-06 PROCEDURE — 84443 ASSAY THYROID STIM HORMONE: CPT

## 2022-06-06 PROCEDURE — 36415 COLL VENOUS BLD VENIPUNCTURE: CPT

## 2022-06-06 PROCEDURE — 85027 COMPLETE CBC AUTOMATED: CPT

## 2022-06-06 NOTE — PROGRESS NOTES
Cardiac Rehab Initial Assessment      Name: Breanna Kaba  :1955 Allergies:Asa [aspirin], Naproxen, and Penicillins   MRN: [id number] 67 y.o. Physician: Myrna Ramos MD   Primary Diagnosis:    Diagnosis Plan   1. NSTEMI (non-ST elevated myocardial infarction) (HCC)     2. Stented coronary artery     3. Acute on chronic systolic CHF (congestive heart failure) (HCC)      Event Date: 3/31/22 Specialist: Da REHMAN   Secondary Diagnosis: ASHF Risk Stratification:High Risk Note Author: Veronica Pinedo RN     Cardiovascular History: Previous MI     EXERCISE AT HOME  no  na  N/A    EF: 21-25%      Source: Echo          Ambulatory Status:Independent  Ambulatory Fall Risk Assessed on Initial Visit: yes 6 Minute Walk Pre- Cardiac Rehab:  Distance:688ft      RPE:14  Max. HR: 126       SPO2:97    MET: 2.0  Resting BP: 100/58 LA, 102/60 RA    Peak BP: 110/68  Recovery BP: 104/66  Comments: Patient tolerated 6 MWT well. No complaints voiced, denied Chest Pain Pressure SOA.   Her heart rate was tachy on arrival. I spoke with ORI Fox see notes    no distress noted      NUTRITION  Lipids:yes If yes, labs as follows;  Total: No components found for: CHOLESTEROL  HDL:   HDL Cholesterol   Date Value Ref Range Status   2022 54 40 - 60 mg/dL Final    Lipids continued:  LDL:  LDL Cholesterol    Date Value Ref Range Status   2022 58 0 - 100 mg/dL Final     Triglyceride: No components found for: TRIGLYCERIDE   Weight Management:                 Weight: 135 lb  Height: 62 in                                   BMI: Body mass index is 24.69 kg/m².  Waist Circumference: 34  inches   Alcohol Use: none Diabetes:Yes,  Monitors BS at home- yes, Frequency: 2 times daily, Random BS: 140    Last HGBA1C with date if applicable:No components found for: A1C         SOCIAL HISTORY  Social History     Socioeconomic History   • Marital status:    Tobacco Use   • Smoking status: Never Smoker   • Smokeless  Gastroenterology Consult     Referring Physician: Dr. Caridad Kelley Date: 4/14/2020     Subjective:     Chief Complaint: nausea, vomiting    History of Present Illness: Alex Silva is a 59 y.o. male who is seen in consultation for nausea, vomiting. Known to our practice for recent admission due to DILI.  LFT's have resolved. Admitted with 1 week of N/V which he states began after he started taking Eliquis. Recent cardiac ablation for Afib and pacemaker placement. When admitted to the hospital, Eliquis was replaced by heparin and N/V resolved completely and he was able to tolerate PO. He took 1 Eliquis yesterday AM and had N/V for hours afterwards. Today he is tolerating a regular diet. Denies NSAID use. No melena or hematemesis. No abd pain. No marijuana or ETOH use. His last BM was 3 days ago. Prior to that he estimates he had been a week without a BM. Passed flatus yesterday. Attributes the bowel changes to not keeping down PO at home. He's had multiple colonoscopies and has never had polyps. He believes his last colon was 5 yrs ago. Prior imaging from recent hospitalization:    CT abd/pelvis without contrast 3/29/20:  IMPRESSION:  1. Features suggestive of chronic gallbladder disease-correlate clinically. 2. Nonobstructing right nephrolithiasis. 3. Generalized body wall edema. U/S Abd 3/29/20: : Thick-walled gallbladder as discussed. No biliary dilation.     Past Medical History:   Diagnosis Date    A-fib [facility])     Allergic reaction to contrast material     galodinium    Hypertension     Irregular heart rhythm     Mercury poisoning     Morbid obesity ([facility] [address] 11/4/2019    ALYSSA on CPAP     ALYSSA on CPAP 11/4/2019    Post concussion syndrome      Past Surgical History:   Procedure Laterality Date    HX SHOULDER ARTHROSCOPY      HX UROLOGICAL Left     hydrocoele resection    MI ICAR CATHETER ABLATION ATRIOVENTR NODE FUNCTION N/A 4/3/2020    ABLATION AV NODE performed by [facility] Dwyer [facility] at [facility]    SD INS NEW/RPLC PRM PACEMAKER W/TRANSV ELTRD VENTR N/A 4/3/2020    INSERT PPM SINGLE VENTRICULAR performed by India Dwyer MD at [facility] CARDIAC CATH LAB      Family History   Problem Relation Age of Onset    Colon Cancer Mother     Hypertension Father     Other Father         multiple birth defects    Hypertension Paternal Grandfather      Social History     Tobacco Use    Smoking status: Never Smoker    Smokeless tobacco: Never Used   Substance Use Topics    Alcohol use: Not Currently      Allergies   Allergen Reactions    Ace Inhibitors Cough    Gadolinium-Containing Contrast Media Rash    Iodinated Contrast Media Rash     No contrast injection dye     Current Facility-Administered Medications   Medication Dose Route Frequency    pantoprazole (PROTONIX) tablet 40 mg  40 mg Oral ACB    apixaban (ELIQUIS) tablet 5 mg  5 mg Oral Q12H    melatonin tablet 3 mg  3 mg Oral QHS PRN    sodium chloride (NS) flush 5-40 mL  5-40 mL IntraVENous Q8H    sodium chloride (NS) flush 5-40 mL  5-40 mL IntraVENous PRN    acetaminophen (TYLENOL) tablet 650 mg  650 mg Oral Q6H PRN    cefTRIAXone (ROCEPHIN) 1 g in 0.9% sodium chloride (MBP/ADV) 50 mL  1 g IntraVENous Q24H    metoprolol tartrate (LOPRESSOR) tablet 25 mg  25 mg Oral Q12H    metOLazone (ZAROXOLYN) tablet 5 mg  5 mg Oral DAILY    montelukast (SINGULAIR) tablet 10 mg  10 mg Oral QPM    ondansetron (ZOFRAN) injection 4 mg  4 mg IntraVENous Q6H PRN        Review of Systems:  A detailed 10 organ review of systems is obtained with pertinent positives as listed in the History of Present Illness and Past Medical History. A detailed review of systems was performed as follows:  Constitutional:  Negative  Eyes:  No ocular sensitivity to the sun, blurred vision or double vision. ENMT:  No nose or sinus problems.   Respiratory: No coughing, wheezing or sob  Cardiac:  See HPI  Gastrointestinal:  See history tobacco: Never Used   Vaping Use   • Vaping Use: Never used   Substance and Sexual Activity   • Alcohol use: No   • Drug use: No   • Sexual activity: Defer       Educational Level (choose one that applies) high school diploma/GED Learning Barriers:Ready to Learn    Family Support:yes    Living Arrangement: lives with their family    Risk Factors: Stress  Yes, Hyperlipidemia  Yes and Diabetes  Yes If Yes: Do you check blood glucose daily  Yes Today's glucose level 140     Tobacco Adjunct: N/A        Comorbidities: Diabetes Mellitus     PSYCHOSOCIAL  Clinical Depression: no    Stress: yes     Assess presence or absence of depression using a valid screening tool: yes    PHQ score 3       PHYSICAL ASSESSMENT  Influenza vaccine: yes  Pneumococcal vaccine: yes          Angina: no    Describe angina scale of 0 - 4: 0 = none    Today are you having incisional pain? N/A. If, Yes, Scale: na        Today are you having any other pain? No. If, Yes, Scale: na     Diagnosed with Hypertension:yes    Heart Sounds: S1 S2 no rubs or murmurs noted      Lung Sounds: normal air entry, lungs clear to auscultation         Assessment: Pt tolerated 6MWT well, NAD noted, no complaints voiced,  skin warm, pink and dry, resp within normal limits and even, denies chest discomfort, chest pressure ,SOA .  Monitor shows ST , V/S WDL ,see [place]d documentation for exercise data.  Dr. Marin physician immediately available     Pt educated on Cardiac Rehab Program,  warm up, stretching, use of RPE scale, application of heart monitor, home exercise and exercise guidelines, pre exercise meal, Diabetic guidelines for Cardiac Rehab,  Dietary information provided by Carmencita Barger (dietary) Steps to your health,  s/s to report ,Cleaning and use of equipment, see Epic for all other education completed with patient today.  Verbal teach back verified   Orthopedic Problems: na Patient heart rate in 120's resting. See BP.  She stated she felt ok, no  dizziness or SOA. Stated her heart rate does that at times.  I called and spoke with Da Fox at Dr Cuevas office. We discussed her BP, Heart rate, Life vest, medications and telemetry strip Mary Ellen had sent to him for me. He stated she looked to be in ST at this time. . He stated he would like for her to go to lab and get labs drawn and to take her BP, Heart rate  Three times a day and bring back with her on her next visit.  No changes to her meds at this time.     Patient is aware and stated she would take her vitals and go get labs. I stressed the importance of doing so. She stated she understood     I educated patient on coming to ER if chest pain pressure , SOA , N/V, dizziness, indigestion,  Any symptoms unusual for her . She stated she would.    Are you being hurt, hit, or frightened by anyone at home or in your life? no    Are you being neglected by a caregiver? No Shoulder flexibility/Range of motion: Average     Recommended arm activity: Any    Chair sit and reach within: 16 inches   Leg flexibility: Average    Leg Strength/Balance/Five times sit to stand: 0 seconds.   Patient stated she did not want to do the sit//stand  She was too tired from walking across the parking lot   Chose one: Fall Risk    Recommended stretching: Standing    Assessment: Alert and oriented to person place and time  Heart rate Sinus Tach   Respiratory respirations even , no distress noted  Cap refill less than 2 seconds  Pedal pulses : Normal  no edema noted  Skin :warm and dry   Gait : Steady   Life vest noted     Family attends IA: no Time of arrival: 1032  Time of departure: 1200     Patient Goals: Build strength and feel better          6/6/2022  10:57 EDT  Veronica Pinedo RN   of the present illness  :   No pain with urination or hematuria  Musculoskeletal:  No arthritis or hot swollen joints. Endocrine:  No thyroid disease or diabetes  Psychiatric: No depression or feeling blue  Integumentary:  No skin rash or sensitivity to the sun. Neurologic:  No stroke or seizure; no numbness or tingling of the extremities. Heme-Lymphatic:  No history of anemia, no unexplained lumps or bumps        Objective:     Physical Exam:  Visit Vitals  /80 (BP 1 Location: Left arm, BP Patient Position: At rest)   Pulse 80   Temp 98.9 °F (37.2 °C)   Resp 16   Ht 6' 2\" (1.88 m)   Wt 127 kg (279 lb 15.8 oz)   SpO2 95%   BMI 35.95 kg/m²        Gen: obese white male in NAD  Skin:  Extremities and face reveal no rashes. No jaundice  HEENT: Sclerae anicteric. Cardiovascular: Regular rate and rhythm. No murmurs, gallops, or rubs. Respiratory:  Comfortable breathing with no accessory muscle use. Clear breath sounds with no wheezes, rales, or rhonchi. GI:  Abdomen nondistended, soft, and nontender. No RUQ or epigastric tenderness. Neg Granados's sign. Normal active bowel sounds. No enlargement of the liver or spleen. No masses palpable. Rectal:  Deferred  Musculoskeletal:  No pitting edema of the lower legs. Neurological:  Gross memory appears intact. Patient is alert and oriented. Psychiatric:  Mood appears appropriate with judgement intact. Lymphatic:  No cervical or supraclavicular adenopathy.     Lab/Data Review:  Lab Results   Component Value Date/Time    WBC 6.3 04/14/2020 04:31 AM    HGB 12.8 04/14/2020 04:31 AM    HCT 37.9 04/14/2020 04:31 AM    PLATELET 406 02/60/6534 04:31 AM    MCV 92.7 04/14/2020 04:31 AM     Lab Results   Component Value Date/Time    Sodium 132 (L) 04/14/2020 04:31 AM    Potassium 3.1 (L) 04/14/2020 04:31 AM    Chloride 93 (L) 04/14/2020 04:31 AM    CO2 35 (H) 04/14/2020 04:31 AM    Anion gap 4 (L) 04/14/2020 04:31 AM    Glucose 92 04/14/2020 04:31 AM    BUN 28 (H) 04/14/2020 04:31 AM    Creatinine 1.90 (H) 04/14/2020 04:31 AM    BUN/Creatinine ratio 15 04/14/2020 04:31 AM    GFR est AA 44 (L) 04/14/2020 04:31 AM    GFR est non-AA 36 (L) 04/14/2020 04:31 AM    Calcium 8.2 (L) 04/14/2020 04:31 AM    Bilirubin, total 0.8 04/11/2020 11:08 PM    AST (SGOT) 47 (H) 04/11/2020 11:08 PM    Alk. phosphatase 82 04/11/2020 11:08 PM    Protein, total 6.9 04/11/2020 11:08 PM    Albumin 2.9 (L) 04/11/2020 11:08 PM    Globulin 4.0 04/11/2020 11:08 PM    A-G Ratio 0.7 (L) 04/11/2020 11:08 PM    ALT (SGPT) 70 04/11/2020 11:08 PM         Assessment/Plan:     Active Problems:    ALYSSA on CPAP (11/4/2019)      NSTEMI (non-ST elevated myocardial infarction) ([facility] [address] (4/12/2020)      Elevated troponin (4/12/2020)      Permanent atrial fibrillation (4/12/2020)      Nausea & vomiting (4/14/2020)         Patient gives a history that suggests his N/V may be a side effect of his Eliquis. Nausea is listed as a side effect in 3% of the population. I suggest using an alternative to Eliquis. The history is not suggestive of biliary disease and I would not repeat an ultrasound at this time. He was evaluated by general surgery during his recent admission and they did not feel he had gallbladder disease either. His hx is also not suggestive of PUD or gastritis and his anticoagulation use increases risk for EGD. I suggest holding off on invasive GI procedures and proceeding with a KUB to assess bowel gas pattern given his recent report of constipation to rule out an ileus prior to his discharge. At this time he is tolerating a regular cardiac diet without N/V. RUBI Elizalde  04/14/20  1:02 PM      The patient was seen and examined independently by me. I have discussed the case with the mid-level provider in detail. I have reviewed the patient's chart and the note.   I personally performed all components of the history, physical, and medical decision making and agree with the assessment and plan, with minor modifications as noted. Please see APPs note for full details. Physical exam:  General:AAO x 3, tangential historian. HEENT:  EOMI,   Chest:  CTA,Heart: S1, S2, RRR  GI: Soft, NT, ND + bowel sounds  Extremities: No edema    Data Review:  reviewed     Impression:   Nausea/vomiting  NSTEMI  Constipation   KVNG  Recent DILI-now resolved    Plan and discussion:  He feels that his symptoms occur when he takes Eliquis. Symptoms are better today with adding PPI. He is not be a good candidate for any GI invasive procedure/s at this juncture 2/2 his NSTEMI. Possible AE of Eliquis as noted above. Use alternative if possible. Continue PPI daily and anti emetics prn. If symptoms recur while he is off Eliquis consider CCK HIDA scan as both CT/US showed possible ch GB disease despite no RUQ pain currently. Agree with KUB to assess fecal burden in his colon and SB loops. Signed By: Malcolm Miranda.  Quincy Caputo MD    4/14/2020  2:59 PM

## 2022-06-06 NOTE — PROGRESS NOTES
I was contacted by cardiac rehab patient presented with resting sinus tachycardia at a rate of around 120 to 125 bpm.  She is completely asymptomatic per RN at cardiac rehab and the patient reports that she chronically runs tachycardic at home as well.  She denies any new onset shortness of breath is worse from her baseline as she does have advanced ischemic cardiomyopathy.  She also denies any chest pains.    I did order a CBC, CMP, and TSH for evaluation and we will try to have her seen sooner for further evaluation.  She will have her blood pressure and heart rate checked 3 times daily for this week

## 2022-06-07 LAB
ALBUMIN SERPL-MCNC: 5 G/DL (ref 3.5–5.2)
ALBUMIN/GLOB SERPL: 1.9 G/DL
ALP SERPL-CCNC: 115 U/L (ref 39–117)
ALT SERPL W P-5'-P-CCNC: 20 U/L (ref 1–33)
ANION GAP SERPL CALCULATED.3IONS-SCNC: 15.7 MMOL/L (ref 5–15)
AST SERPL-CCNC: 21 U/L (ref 1–32)
BILIRUB SERPL-MCNC: 0.3 MG/DL (ref 0–1.2)
BUN SERPL-MCNC: 34 MG/DL (ref 8–23)
BUN/CREAT SERPL: 26 (ref 7–25)
CALCIUM SPEC-SCNC: 10 MG/DL (ref 8.6–10.5)
CHLORIDE SERPL-SCNC: 94 MMOL/L (ref 98–107)
CO2 SERPL-SCNC: 22.3 MMOL/L (ref 22–29)
CREAT SERPL-MCNC: 1.31 MG/DL (ref 0.57–1)
EGFRCR SERPLBLD CKD-EPI 2021: 44.2 ML/MIN/1.73
GLOBULIN UR ELPH-MCNC: 2.7 GM/DL
GLUCOSE SERPL-MCNC: 244 MG/DL (ref 65–99)
POTASSIUM SERPL-SCNC: 5.4 MMOL/L (ref 3.5–5.2)
PROT SERPL-MCNC: 7.7 G/DL (ref 6–8.5)
SODIUM SERPL-SCNC: 132 MMOL/L (ref 136–145)
TSH SERPL DL<=0.05 MIU/L-ACNC: 1.26 UIU/ML (ref 0.27–4.2)

## 2022-06-08 ENCOUNTER — TELEPHONE (OUTPATIENT)
Dept: CARDIOLOGY | Facility: CLINIC | Age: 67
End: 2022-06-08

## 2022-06-08 NOTE — TELEPHONE ENCOUNTER
Patient called and wanted to know if someone could check on her labs she said she called the other office and they told her to call here.     Thanks!

## 2022-06-09 ENCOUNTER — TREATMENT (OUTPATIENT)
Dept: CARDIAC REHAB | Facility: HOSPITAL | Age: 67
End: 2022-06-09

## 2022-06-09 VITALS — SYSTOLIC BLOOD PRESSURE: 110 MMHG | HEART RATE: 115 BPM | DIASTOLIC BLOOD PRESSURE: 58 MMHG | OXYGEN SATURATION: 98 %

## 2022-06-09 DIAGNOSIS — I21.4 NSTEMI (NON-ST ELEVATED MYOCARDIAL INFARCTION): Primary | ICD-10-CM

## 2022-06-09 DIAGNOSIS — I25.5 ISCHEMIC CARDIOMYOPATHY: Primary | ICD-10-CM

## 2022-06-09 DIAGNOSIS — I50.23 ACUTE ON CHRONIC SYSTOLIC CHF (CONGESTIVE HEART FAILURE): ICD-10-CM

## 2022-06-09 DIAGNOSIS — Z95.5 STENTED CORONARY ARTERY: ICD-10-CM

## 2022-06-09 PROCEDURE — 93798 PHYS/QHP OP CAR RHAB W/ECG: CPT

## 2022-06-09 NOTE — PROGRESS NOTES
Pt attended phase II visit.  Tolerated exercise well, NAD noted, no complaints voiced, skin warm, pink, dry, resp nl and even, denies chest discomfort, denies SOA.   Monitor shows ST , V/S WDL ,see Td documentation for exercise data.  Dr. Marin physician immediately available   Patient attended first day of exercise. Educated on warm up, cool down, cleaning and use of equipment, signs and symptoms to report, Cardiac Rehab Protocol, Applying the heart monitor.       Information Da requested was faxed.  See epic

## 2022-06-10 RX ORDER — FUROSEMIDE 20 MG/1
20 TABLET ORAL DAILY
Qty: 30 TABLET | Refills: 0 | OUTPATIENT
Start: 2022-06-10

## 2022-06-10 NOTE — TELEPHONE ENCOUNTER
Please call patient. Needs to be seen as soon as possible as her creatinine has bumped up on this medication and we will need to discuss further treatment. No refill given.       Spoke with patient & an appointment was scheduled.

## 2022-06-10 NOTE — TELEPHONE ENCOUNTER
Please call patient. Needs to be seen as soon as possible as her creatinine has bumped up on this medication and we will need to discuss further treatment. No refill given.

## 2022-06-13 ENCOUNTER — OFFICE VISIT (OUTPATIENT)
Dept: FAMILY MEDICINE CLINIC | Facility: CLINIC | Age: 67
End: 2022-06-13

## 2022-06-13 VITALS
HEIGHT: 62 IN | DIASTOLIC BLOOD PRESSURE: 64 MMHG | OXYGEN SATURATION: 99 % | TEMPERATURE: 97.7 F | BODY MASS INDEX: 25.28 KG/M2 | WEIGHT: 137.4 LBS | HEART RATE: 112 BPM | SYSTOLIC BLOOD PRESSURE: 98 MMHG

## 2022-06-13 DIAGNOSIS — R30.0 DYSURIA: Primary | ICD-10-CM

## 2022-06-13 DIAGNOSIS — R00.0 TACHYCARDIA: ICD-10-CM

## 2022-06-13 DIAGNOSIS — E11.40 TYPE 2 DIABETES MELLITUS WITH DIABETIC NEUROPATHY, UNSPECIFIED WHETHER LONG TERM INSULIN USE: ICD-10-CM

## 2022-06-13 DIAGNOSIS — I25.5 ISCHEMIC CARDIOMYOPATHY: ICD-10-CM

## 2022-06-13 LAB
BILIRUB BLD-MCNC: NEGATIVE MG/DL
CLARITY, POC: CLEAR
COLOR UR: YELLOW
EXPIRATION DATE: ABNORMAL
GLUCOSE BLDC GLUCOMTR-MCNC: 254 MG/DL (ref 70–130)
GLUCOSE UR STRIP-MCNC: NEGATIVE MG/DL
KETONES UR QL: NEGATIVE
LEUKOCYTE EST, POC: ABNORMAL
Lab: ABNORMAL
NITRITE UR-MCNC: NEGATIVE MG/ML
PH UR: 5.5 [PH] (ref 5–8)
PROT UR STRIP-MCNC: ABNORMAL MG/DL
RBC # UR STRIP: ABNORMAL /UL
SP GR UR: 1.02 (ref 1–1.03)
UROBILINOGEN UR QL: NORMAL

## 2022-06-13 PROCEDURE — 99214 OFFICE O/P EST MOD 30 MIN: CPT | Performed by: FAMILY MEDICINE

## 2022-06-13 PROCEDURE — 80053 COMPREHEN METABOLIC PANEL: CPT | Performed by: FAMILY MEDICINE

## 2022-06-13 PROCEDURE — 81003 URINALYSIS AUTO W/O SCOPE: CPT | Performed by: FAMILY MEDICINE

## 2022-06-13 PROCEDURE — 87086 URINE CULTURE/COLONY COUNT: CPT | Performed by: FAMILY MEDICINE

## 2022-06-13 PROCEDURE — 82962 GLUCOSE BLOOD TEST: CPT | Performed by: FAMILY MEDICINE

## 2022-06-13 PROCEDURE — 85025 COMPLETE CBC W/AUTO DIFF WBC: CPT | Performed by: FAMILY MEDICINE

## 2022-06-13 PROCEDURE — 85007 BL SMEAR W/DIFF WBC COUNT: CPT | Performed by: FAMILY MEDICINE

## 2022-06-14 ENCOUNTER — TREATMENT (OUTPATIENT)
Dept: CARDIAC REHAB | Facility: HOSPITAL | Age: 67
End: 2022-06-14

## 2022-06-14 ENCOUNTER — TELEPHONE (OUTPATIENT)
Dept: FAMILY MEDICINE CLINIC | Facility: CLINIC | Age: 67
End: 2022-06-14

## 2022-06-14 VITALS — SYSTOLIC BLOOD PRESSURE: 106 MMHG | OXYGEN SATURATION: 98 % | HEART RATE: 110 BPM | DIASTOLIC BLOOD PRESSURE: 58 MMHG

## 2022-06-14 DIAGNOSIS — Z95.5 STENTED CORONARY ARTERY: ICD-10-CM

## 2022-06-14 DIAGNOSIS — I21.4 NSTEMI (NON-ST ELEVATED MYOCARDIAL INFARCTION): Primary | ICD-10-CM

## 2022-06-14 DIAGNOSIS — I50.23 ACUTE ON CHRONIC SYSTOLIC CHF (CONGESTIVE HEART FAILURE): ICD-10-CM

## 2022-06-14 LAB
ALBUMIN SERPL-MCNC: 4.8 G/DL (ref 3.5–5.2)
ALBUMIN/GLOB SERPL: 1.9 G/DL
ALP SERPL-CCNC: 91 U/L (ref 39–117)
ALT SERPL W P-5'-P-CCNC: 18 U/L (ref 1–33)
ANION GAP SERPL CALCULATED.3IONS-SCNC: 11.7 MMOL/L (ref 5–15)
ANISOCYTOSIS BLD QL: ABNORMAL
AST SERPL-CCNC: 18 U/L (ref 1–32)
BACTERIA SPEC AEROBE CULT: NO GROWTH
BASOPHILS # BLD MANUAL: 0.18 10*3/MM3 (ref 0–0.2)
BASOPHILS NFR BLD MANUAL: 2.1 % (ref 0–1.5)
BILIRUB SERPL-MCNC: 0.4 MG/DL (ref 0–1.2)
BUN SERPL-MCNC: 28 MG/DL (ref 8–23)
BUN/CREAT SERPL: 20.9 (ref 7–25)
CALCIUM SPEC-SCNC: 9.8 MG/DL (ref 8.6–10.5)
CHLORIDE SERPL-SCNC: 94 MMOL/L (ref 98–107)
CO2 SERPL-SCNC: 23.3 MMOL/L (ref 22–29)
CREAT SERPL-MCNC: 1.34 MG/DL (ref 0.57–1)
DEPRECATED RDW RBC AUTO: 52.9 FL (ref 37–54)
EGFRCR SERPLBLD CKD-EPI 2021: 43 ML/MIN/1.73
EOSINOPHIL # BLD MANUAL: 0.09 10*3/MM3 (ref 0–0.4)
EOSINOPHIL NFR BLD MANUAL: 1.1 % (ref 0.3–6.2)
ERYTHROCYTE [DISTWIDTH] IN BLOOD BY AUTOMATED COUNT: 20.8 % (ref 12.3–15.4)
GLOBULIN UR ELPH-MCNC: 2.5 GM/DL
GLUCOSE SERPL-MCNC: 229 MG/DL (ref 65–99)
HCT VFR BLD AUTO: 38 % (ref 34–46.6)
HGB BLD-MCNC: 12.2 G/DL (ref 12–15.9)
LYMPHOCYTES # BLD MANUAL: 2.7 10*3/MM3 (ref 0.7–3.1)
LYMPHOCYTES NFR BLD MANUAL: 9.5 % (ref 5–12)
MCH RBC QN AUTO: 23.7 PG (ref 26.6–33)
MCHC RBC AUTO-ENTMCNC: 32.1 G/DL (ref 31.5–35.7)
MCV RBC AUTO: 73.8 FL (ref 79–97)
MONOCYTES # BLD: 0.81 10*3/MM3 (ref 0.1–0.9)
NEUTROPHILS # BLD AUTO: 4.77 10*3/MM3 (ref 1.7–7)
NEUTROPHILS NFR BLD MANUAL: 55.8 % (ref 42.7–76)
NRBC SPEC MANUAL: 1.1 /100 WBC (ref 0–0.2)
PLAT MORPH BLD: NORMAL
PLATELET # BLD AUTO: 356 10*3/MM3 (ref 140–450)
PMV BLD AUTO: 11.2 FL (ref 6–12)
POIKILOCYTOSIS BLD QL SMEAR: ABNORMAL
POTASSIUM SERPL-SCNC: 4.9 MMOL/L (ref 3.5–5.2)
PROT SERPL-MCNC: 7.3 G/DL (ref 6–8.5)
RBC # BLD AUTO: 5.15 10*6/MM3 (ref 3.77–5.28)
SMUDGE CELLS BLD QL SMEAR: ABNORMAL
SODIUM SERPL-SCNC: 129 MMOL/L (ref 136–145)
VARIANT LYMPHS NFR BLD MANUAL: 31.6 % (ref 19.6–45.3)
WBC NRBC COR # BLD: 8.54 10*3/MM3 (ref 3.4–10.8)

## 2022-06-14 PROCEDURE — 93798 PHYS/QHP OP CAR RHAB W/ECG: CPT

## 2022-06-14 NOTE — TELEPHONE ENCOUNTER
Pt came in with a RX of Spironolactone and wanted to know if she needs to continue taking this or if she needs to stop it? Please advise. Thank you.

## 2022-06-14 NOTE — PROGRESS NOTES
Pt attended phase II visit.  Tolerated exercise well, NAD noted, no complaints voiced, skin warm, pink, dry, resp nl and even, denies chest discomfort, denies SOA.   Monitor shows ST Life veast noted, V/S WDL ,see [place] documentation for exercise data.  Dr. Ennis physician immediately available .

## 2022-06-15 ENCOUNTER — TELEPHONE (OUTPATIENT)
Dept: FAMILY MEDICINE CLINIC | Facility: CLINIC | Age: 67
End: 2022-06-15

## 2022-06-15 DIAGNOSIS — R79.89 ELEVATED SERUM CREATININE: Primary | ICD-10-CM

## 2022-06-15 NOTE — TELEPHONE ENCOUNTER
Caller: Sendy Breanna    Relationship: Self    Best call back number: [phone]    Caller requesting test results: PATIENT    What test was performed: BLOOD WORK FOR KIDNEYS    When was the test performed: A FEW DAYS AGO PER PATIENT    Where was the test performed: [facility]    Additional notes:     PLEASE CALL BACK PATIENT AS SOON AS POSSIBLE WITH RESULTS    PATIENT IS WORRIED BECAUSE SHE RECEIVED A CALL FROM HER HEART DOCTOR STATING SHE NEEDS TO SEE A KIDNEY DOCTOR    PATIENT HAD BLOOD WORK DONE IN DR FELISHA [facility]REWS A FEW DAYS AGO

## 2022-06-16 ENCOUNTER — TREATMENT (OUTPATIENT)
Dept: CARDIAC REHAB | Facility: HOSPITAL | Age: 67
End: 2022-06-16

## 2022-06-16 VITALS — DIASTOLIC BLOOD PRESSURE: 68 MMHG | SYSTOLIC BLOOD PRESSURE: 122 MMHG | HEART RATE: 112 BPM | OXYGEN SATURATION: 98 %

## 2022-06-16 DIAGNOSIS — I50.23 ACUTE ON CHRONIC SYSTOLIC CHF (CONGESTIVE HEART FAILURE): ICD-10-CM

## 2022-06-16 DIAGNOSIS — I21.4 NSTEMI (NON-ST ELEVATED MYOCARDIAL INFARCTION): Primary | ICD-10-CM

## 2022-06-16 DIAGNOSIS — Z95.5 STENTED CORONARY ARTERY: ICD-10-CM

## 2022-06-16 PROCEDURE — 93798 PHYS/QHP OP CAR RHAB W/ECG: CPT

## 2022-06-16 NOTE — TELEPHONE ENCOUNTER
Her kidneys are okay. They are stressed, but they are okay and stable right now. We still need to monitor and watch what it does. She should stay off the spironolactone and just take the lasix. If she feels that she is swelling more, she needs to call back.

## 2022-06-16 NOTE — TELEPHONE ENCOUNTER
Has she been taking this?  Does she think it is helping?  What dosage is it?  Does she like the spironolactone or the lasix better?

## 2022-06-16 NOTE — TELEPHONE ENCOUNTER
Her kidneys are okay. They are stressed, but they are okay and stable right now. We still need to monitor and watch what it does. She should stay off the spironolactone and just take the lasix. If she feels that she is swelling more, she needs to call back.      Patient notified & verbalized understanding.

## 2022-06-16 NOTE — PROGRESS NOTES
Pt attended phase II visit.  Tolerated exercise well, NAD noted, no complaints voiced, skin warm, pink, dry, resp nl and even, denies chest discomfort, denies SOA.   Monitor shows ST Life veast noted, V/S WDL ,see Td documentation for exercise data.  Dr. Ennis physician immediately available .       Patient brought her BP and HR journal in today . I will be faxing them  to Da Fox for review

## 2022-06-16 NOTE — TELEPHONE ENCOUNTER
Has she been taking this?  Does she think it is helping?  What dosage is it?  Does she like the spironolactone or the lasix better?    Spoke with patient she reports that when she was here on the 6/13/2022 you told her she was not supposed to be taking it & she stopped it ,it really did not help her,lasoix is better,she is just worried about her kidneys & needed to know if she needed it,she reports she has really increased her water intake.

## 2022-06-16 NOTE — TELEPHONE ENCOUNTER
See prior phone thread. Does not need to see nephrology. Needs monitoring and we have changed her medications.

## 2022-06-21 ENCOUNTER — TREATMENT (OUTPATIENT)
Dept: CARDIAC REHAB | Facility: HOSPITAL | Age: 67
End: 2022-06-21

## 2022-06-21 VITALS — SYSTOLIC BLOOD PRESSURE: 114 MMHG | DIASTOLIC BLOOD PRESSURE: 60 MMHG | OXYGEN SATURATION: 98 % | HEART RATE: 114 BPM

## 2022-06-21 DIAGNOSIS — I21.4 NSTEMI (NON-ST ELEVATED MYOCARDIAL INFARCTION): Primary | ICD-10-CM

## 2022-06-21 DIAGNOSIS — I50.23 ACUTE ON CHRONIC SYSTOLIC CHF (CONGESTIVE HEART FAILURE): ICD-10-CM

## 2022-06-21 DIAGNOSIS — Z95.5 STENTED CORONARY ARTERY: ICD-10-CM

## 2022-06-21 PROCEDURE — 93798 PHYS/QHP OP CAR RHAB W/ECG: CPT

## 2022-06-21 NOTE — PROGRESS NOTES
Pt attended phase II visit.  Tolerated exercise well, NAD noted, no complaints voiced, skin warm, pink, dry, resp nl and even, denies chest discomfort, denies SOA.   Monitor shows ST Life veast noted, V/S WDL ,see [place] documentation for exercise data.  Dr. Marin  physician immediately available .

## 2022-06-23 ENCOUNTER — TREATMENT (OUTPATIENT)
Dept: CARDIAC REHAB | Facility: HOSPITAL | Age: 67
End: 2022-06-23

## 2022-06-23 VITALS — DIASTOLIC BLOOD PRESSURE: 60 MMHG | OXYGEN SATURATION: 98 % | SYSTOLIC BLOOD PRESSURE: 120 MMHG | HEART RATE: 111 BPM

## 2022-06-23 DIAGNOSIS — I50.23 ACUTE ON CHRONIC SYSTOLIC CHF (CONGESTIVE HEART FAILURE): ICD-10-CM

## 2022-06-23 DIAGNOSIS — Z95.5 STENTED CORONARY ARTERY: ICD-10-CM

## 2022-06-23 DIAGNOSIS — I21.4 NSTEMI (NON-ST ELEVATED MYOCARDIAL INFARCTION): Primary | ICD-10-CM

## 2022-06-23 PROCEDURE — 93798 PHYS/QHP OP CAR RHAB W/ECG: CPT

## 2022-06-23 NOTE — PROGRESS NOTES
Pt attended phase II visit.  Tolerated exercise well, NAD noted, no complaints voiced, skin warm, pink, dry, resp nl and even, denies chest discomfort, denies SOA.   Monitor shows ST Life veast noted, V/S WDL ,see [place] documentation for exercise data.  Dr. Marin  physician immediately available .

## 2022-06-28 ENCOUNTER — DOCUMENTATION (OUTPATIENT)
Dept: CARDIAC REHAB | Facility: HOSPITAL | Age: 67
End: 2022-06-28

## 2022-06-28 ENCOUNTER — TREATMENT (OUTPATIENT)
Dept: CARDIAC REHAB | Facility: HOSPITAL | Age: 67
End: 2022-06-28

## 2022-06-28 VITALS — OXYGEN SATURATION: 98 % | DIASTOLIC BLOOD PRESSURE: 66 MMHG | SYSTOLIC BLOOD PRESSURE: 110 MMHG | HEART RATE: 110 BPM

## 2022-06-28 DIAGNOSIS — I21.4 NSTEMI (NON-ST ELEVATED MYOCARDIAL INFARCTION): Primary | ICD-10-CM

## 2022-06-28 DIAGNOSIS — I50.23 ACUTE ON CHRONIC SYSTOLIC CHF (CONGESTIVE HEART FAILURE): ICD-10-CM

## 2022-06-28 DIAGNOSIS — Z95.5 STENTED CORONARY ARTERY: ICD-10-CM

## 2022-06-28 PROCEDURE — 93798 PHYS/QHP OP CAR RHAB W/ECG: CPT

## 2022-06-28 NOTE — PROGRESS NOTES
Pt attended phase II visit.  Tolerated exercise well, NAD noted, no complaints voiced, skin warm, pink, dry, resp nl and even, denies chest discomfort, denies SOA.   Monitor shows ST Life veast noted, V/S WDL ,see [place] documentation for exercise data.  Dr. Ennis physician immediately available .

## 2022-06-28 NOTE — PROGRESS NOTES
Spoke with Tahmina at Dr Johnson's office about trying to fax BP and heart rate for Da to review. I have tried several times over the week to get information faxed to him. I am sending it by Kristen ramesh from mailroom picked up today. Copies were sent to medical records .

## 2022-06-30 ENCOUNTER — APPOINTMENT (OUTPATIENT)
Dept: CARDIAC REHAB | Facility: HOSPITAL | Age: 67
End: 2022-06-30

## 2022-07-01 DIAGNOSIS — I25.5 ISCHEMIC CARDIOMYOPATHY: Primary | ICD-10-CM

## 2022-07-04 PROBLEM — N30.01 ACUTE CYSTITIS WITH HEMATURIA: Status: RESOLVED | Noted: 2021-12-07 | Resolved: 2022-07-04

## 2022-07-05 ENCOUNTER — TREATMENT (OUTPATIENT)
Dept: CARDIAC REHAB | Facility: HOSPITAL | Age: 67
End: 2022-07-05

## 2022-07-05 VITALS — SYSTOLIC BLOOD PRESSURE: 102 MMHG | HEART RATE: 102 BPM | DIASTOLIC BLOOD PRESSURE: 58 MMHG | OXYGEN SATURATION: 98 %

## 2022-07-05 DIAGNOSIS — Z95.5 STENTED CORONARY ARTERY: ICD-10-CM

## 2022-07-05 DIAGNOSIS — I21.4 NSTEMI (NON-ST ELEVATED MYOCARDIAL INFARCTION): Primary | ICD-10-CM

## 2022-07-05 DIAGNOSIS — I50.23 ACUTE ON CHRONIC SYSTOLIC CHF (CONGESTIVE HEART FAILURE): ICD-10-CM

## 2022-07-05 PROCEDURE — 93798 PHYS/QHP OP CAR RHAB W/ECG: CPT

## 2022-07-05 NOTE — PROGRESS NOTES
Pt attended phase II visit.  Tolerated exercise well, NAD noted, no complaints voiced, skin warm, pink, dry, resp nl and even, denies chest discomfort, denies SOA.   Monitor shows ST Life veast noted, V/S WDL ,see Td documentation for exercise data.  Dr. Satish Clinton  physician immediately available .

## 2022-07-06 NOTE — PLAN OF CARE
Problem: Adult Inpatient Plan of Care  Goal: Absence of Hospital-Acquired Illness or Injury  Intervention: Prevent Skin Injury  Recent Flowsheet Documentation  Taken 4/2/2022 1921 by Neida Easton RN  Body Position: sitting up in bed  Skin Protection:   adhesive use limited   incontinence pads utilized     Problem: Adult Inpatient Plan of Care  Goal: Absence of Hospital-Acquired Illness or Injury  Intervention: Prevent and Manage VTE (Venous Thromboembolism) Risk  Recent Flowsheet Documentation  Taken 4/2/2022 1921 by Neida Easton RN  Activity Management:   activity adjusted per tolerance   up ad maria guadalupe  VTE Prevention/Management: (See MAR) other (see comments)     Problem: Fall Injury Risk  Goal: Absence of Fall and Fall-Related Injury  Intervention: Identify and Manage Contributors  Recent Flowsheet Documentation  Taken 4/2/2022 1921 by Neida Easton RN  Medication Review/Management: medications reviewed  Intervention: Promote Injury-Free Environment  Recent Flowsheet Documentation  Taken 4/3/2022 0100 by Neida Easton RN  Safety Promotion/Fall Prevention:   safety round/check completed   room organization consistent   clutter free environment maintained   assistive device/personal items within reach  Taken 4/2/2022 2300 by Neida Easton RN  Safety Promotion/Fall Prevention:   safety round/check completed   room organization consistent   clutter free environment maintained   assistive device/personal items within reach  Taken 4/2/2022 2100 by Neida Easton RN  Safety Promotion/Fall Prevention:   safety round/check completed   room organization consistent   clutter free environment maintained   assistive device/personal items within reach  Taken 4/2/2022 1921 by Neida Easton RN  Safety Promotion/Fall Prevention:   safety round/check completed   clutter free environment maintained   assistive device/personal items within reach   Goal Outcome Evaluation:                  PAST SURGICAL HISTORY:  H/O  section     H/O removal of cyst right upper arm    History of cholecystectomy

## 2022-07-07 ENCOUNTER — TREATMENT (OUTPATIENT)
Dept: CARDIAC REHAB | Facility: HOSPITAL | Age: 67
End: 2022-07-07

## 2022-07-07 VITALS — SYSTOLIC BLOOD PRESSURE: 122 MMHG | HEART RATE: 109 BPM | DIASTOLIC BLOOD PRESSURE: 66 MMHG | OXYGEN SATURATION: 98 %

## 2022-07-07 DIAGNOSIS — Z95.5 STENTED CORONARY ARTERY: ICD-10-CM

## 2022-07-07 DIAGNOSIS — I21.4 NSTEMI (NON-ST ELEVATED MYOCARDIAL INFARCTION): Primary | ICD-10-CM

## 2022-07-07 DIAGNOSIS — I50.23 ACUTE ON CHRONIC SYSTOLIC CHF (CONGESTIVE HEART FAILURE): ICD-10-CM

## 2022-07-07 PROCEDURE — 93798 PHYS/QHP OP CAR RHAB W/ECG: CPT

## 2022-07-12 ENCOUNTER — TREATMENT (OUTPATIENT)
Dept: CARDIAC REHAB | Facility: HOSPITAL | Age: 67
End: 2022-07-12

## 2022-07-12 VITALS — SYSTOLIC BLOOD PRESSURE: 108 MMHG | DIASTOLIC BLOOD PRESSURE: 58 MMHG | HEART RATE: 108 BPM | OXYGEN SATURATION: 97 %

## 2022-07-12 DIAGNOSIS — I50.23 ACUTE ON CHRONIC SYSTOLIC CHF (CONGESTIVE HEART FAILURE): ICD-10-CM

## 2022-07-12 DIAGNOSIS — I21.4 NSTEMI (NON-ST ELEVATED MYOCARDIAL INFARCTION): Primary | ICD-10-CM

## 2022-07-12 DIAGNOSIS — Z95.5 STENTED CORONARY ARTERY: ICD-10-CM

## 2022-07-12 PROCEDURE — 93798 PHYS/QHP OP CAR RHAB W/ECG: CPT

## 2022-07-12 NOTE — PROGRESS NOTES
Pt attended phase II visit.  Tolerated exercise well, NAD noted, no complaints voiced, skin warm, pink, dry, resp nl and even, denies chest discomfort, denies SOA.   Monitor shows ST Life veast noted, V/S WDL ,see [place] documentation for exercise data.  Dr. Ennis  physician immediately available .

## 2022-07-14 ENCOUNTER — TREATMENT (OUTPATIENT)
Dept: CARDIAC REHAB | Facility: HOSPITAL | Age: 67
End: 2022-07-14

## 2022-07-14 VITALS — DIASTOLIC BLOOD PRESSURE: 58 MMHG | HEART RATE: 106 BPM | SYSTOLIC BLOOD PRESSURE: 100 MMHG

## 2022-07-14 DIAGNOSIS — I21.4 NSTEMI (NON-ST ELEVATED MYOCARDIAL INFARCTION): Primary | ICD-10-CM

## 2022-07-14 DIAGNOSIS — Z95.5 STENTED CORONARY ARTERY: ICD-10-CM

## 2022-07-14 PROCEDURE — 93798 PHYS/QHP OP CAR RHAB W/ECG: CPT

## 2022-07-14 NOTE — PROGRESS NOTES
Pt attended phase II visit.  Tolerated exercise well, NAD noted, no complaints voiced, skin warm, pink, dry, resp nl and even, denies chest discomfort, denies SOA.  Monitor shows ST, Life vest noted, V/S WDL, see [place] documentation for exercise data.  Dr. Ennis physician immediately available .

## 2022-07-19 ENCOUNTER — HOSPITAL ENCOUNTER (OUTPATIENT)
Dept: CARDIOLOGY | Facility: HOSPITAL | Age: 67
Discharge: HOME | End: 2022-07-19
Admitting: PHYSICIAN ASSISTANT

## 2022-07-19 ENCOUNTER — TREATMENT (OUTPATIENT)
Dept: CARDIAC REHAB | Facility: HOSPITAL | Age: 67
End: 2022-07-19

## 2022-07-19 VITALS — DIASTOLIC BLOOD PRESSURE: 58 MMHG | SYSTOLIC BLOOD PRESSURE: 102 MMHG | HEART RATE: 101 BPM

## 2022-07-19 DIAGNOSIS — I21.4 NSTEMI (NON-ST ELEVATED MYOCARDIAL INFARCTION): Primary | ICD-10-CM

## 2022-07-19 DIAGNOSIS — I50.23 ACUTE ON CHRONIC SYSTOLIC CHF (CONGESTIVE HEART FAILURE): ICD-10-CM

## 2022-07-19 DIAGNOSIS — Z95.5 STENTED CORONARY ARTERY: ICD-10-CM

## 2022-07-19 DIAGNOSIS — I25.5 ISCHEMIC CARDIOMYOPATHY: ICD-10-CM

## 2022-07-19 PROCEDURE — 93798 PHYS/QHP OP CAR RHAB W/ECG: CPT

## 2022-07-19 PROCEDURE — 93306 TTE W/DOPPLER COMPLETE: CPT | Performed by: INTERNAL MEDICINE

## 2022-07-19 PROCEDURE — 93306 TTE W/DOPPLER COMPLETE: CPT

## 2022-07-19 NOTE — PROGRESS NOTES
Pt attended phase II visit.  Tolerated exercise well, NAD noted, no complaints voiced, skin warm, pink, dry, resp nl and even, denies chest discomfort, denies SOA.  Monitor shows ST, Life vest noted, V/S WDL, see [place] documentation for exercise data.  Dr. Galaviz physician immediately available .

## 2022-07-20 LAB
BH CV ECHO MEAS - ACS: 1.9 CM
BH CV ECHO MEAS - AO MAX PG: 7.7 MMHG
BH CV ECHO MEAS - AO MEAN PG: 4 MMHG
BH CV ECHO MEAS - AO ROOT DIAM: 3 CM
BH CV ECHO MEAS - AO V2 MAX: 139 CM/SEC
BH CV ECHO MEAS - AO V2 VTI: 24.3 CM
BH CV ECHO MEAS - EDV(CUBED): 83.7 ML
BH CV ECHO MEAS - EDV(MOD-SP4): 127 ML
BH CV ECHO MEAS - EF(MOD-SP4): 49.4 %
BH CV ECHO MEAS - ESV(CUBED): 46.7 ML
BH CV ECHO MEAS - ESV(MOD-SP4): 64.2 ML
BH CV ECHO MEAS - FS: 17.7 %
BH CV ECHO MEAS - IVS/LVPW: 0.98 CM
BH CV ECHO MEAS - IVSD: 1.02 CM
BH CV ECHO MEAS - LA DIMENSION: 3.8 CM
BH CV ECHO MEAS - LAT PEAK E' VEL: 6 CM/SEC
BH CV ECHO MEAS - LV DIASTOLIC VOL/BSA (35-75): 78 CM2
BH CV ECHO MEAS - LV MASS(C)D: 152.6 GRAMS
BH CV ECHO MEAS - LV SYSTOLIC VOL/BSA (12-30): 39.4 CM2
BH CV ECHO MEAS - LVIDD: 4.4 CM
BH CV ECHO MEAS - LVIDS: 3.6 CM
BH CV ECHO MEAS - LVOT AREA: 2.8 CM2
BH CV ECHO MEAS - LVOT DIAM: 1.9 CM
BH CV ECHO MEAS - LVPWD: 1.04 CM
BH CV ECHO MEAS - MED PEAK E' VEL: 4.8 CM/SEC
BH CV ECHO MEAS - MV A MAX VEL: 156 CM/SEC
BH CV ECHO MEAS - MV E MAX VEL: 54.8 CM/SEC
BH CV ECHO MEAS - MV E/A: 0.35
BH CV ECHO MEAS - PA ACC TIME: 0.09 SEC
BH CV ECHO MEAS - PA PR(ACCEL): 37.6 MMHG
BH CV ECHO MEAS - SI(MOD-SP4): 38.6 ML/M2
BH CV ECHO MEAS - SV(MOD-SP4): 62.8 ML
BH CV ECHO MEAS - TAPSE (>1.6): 2.6 CM
BH CV ECHO MEASUREMENTS AVERAGE E/E' RATIO: 10.15
LEFT ATRIUM VOLUME INDEX: 27.4 ML/M2
MAXIMAL PREDICTED HEART RATE: 151 BPM
STRESS TARGET HR: 128 BPM

## 2022-07-21 ENCOUNTER — TREATMENT (OUTPATIENT)
Dept: CARDIAC REHAB | Facility: HOSPITAL | Age: 67
End: 2022-07-21

## 2022-07-21 ENCOUNTER — OFFICE VISIT (OUTPATIENT)
Dept: CARDIOLOGY | Facility: CLINIC | Age: 67
End: 2022-07-21

## 2022-07-21 VITALS
WEIGHT: 140.4 LBS | OXYGEN SATURATION: 96 % | BODY MASS INDEX: 25.83 KG/M2 | DIASTOLIC BLOOD PRESSURE: 71 MMHG | SYSTOLIC BLOOD PRESSURE: 132 MMHG | HEIGHT: 62 IN | HEART RATE: 115 BPM

## 2022-07-21 VITALS — SYSTOLIC BLOOD PRESSURE: 102 MMHG | HEART RATE: 107 BPM | DIASTOLIC BLOOD PRESSURE: 68 MMHG

## 2022-07-21 DIAGNOSIS — I50.22 CHRONIC HFREF (HEART FAILURE WITH REDUCED EJECTION FRACTION): Primary | ICD-10-CM

## 2022-07-21 DIAGNOSIS — R00.0 TACHYCARDIA: ICD-10-CM

## 2022-07-21 DIAGNOSIS — Z95.5 STENTED CORONARY ARTERY: ICD-10-CM

## 2022-07-21 DIAGNOSIS — I21.4 NSTEMI (NON-ST ELEVATED MYOCARDIAL INFARCTION): Primary | ICD-10-CM

## 2022-07-21 DIAGNOSIS — I50.23 ACUTE ON CHRONIC SYSTOLIC CHF (CONGESTIVE HEART FAILURE): ICD-10-CM

## 2022-07-21 PROCEDURE — 99214 OFFICE O/P EST MOD 30 MIN: CPT | Performed by: PHYSICIAN ASSISTANT

## 2022-07-21 PROCEDURE — 93798 PHYS/QHP OP CAR RHAB W/ECG: CPT

## 2022-07-21 RX ORDER — SPIRONOLACTONE 25 MG/1
25 TABLET ORAL DAILY
COMMUNITY
End: 2022-07-25

## 2022-07-21 RX ORDER — METOPROLOL SUCCINATE 25 MG/1
25 TABLET, EXTENDED RELEASE ORAL DAILY
Qty: 90 TABLET | Refills: 3 | Status: SHIPPED | OUTPATIENT
Start: 2022-07-21 | End: 2022-10-21 | Stop reason: SDUPTHER

## 2022-07-21 RX ORDER — ASPIRIN 81 MG/1
81 TABLET ORAL DAILY
Qty: 90 TABLET | Refills: 3 | Status: SHIPPED | OUTPATIENT
Start: 2022-07-21

## 2022-07-21 NOTE — PROGRESS NOTES
Myrna Ramos MD  Breanna Kaba  1955 07/21/2022    Patient Active Problem List   Diagnosis   • Neuropathy   • Gout   • GERD (gastroesophageal reflux disease)   • Diabetes mellitus ([facility])   • Chronic pain   • Family history of GI malignancy   • History of adenomatous polyp of colon   • COVID-19   • Acute congestive heart failure, unspecified heart failure type ([facility])   • NSTEMI, initial episode of care ([facility])   • Chronic HFrEF (heart failure with reduced ejection fraction) ([facility])       Dear Myrna Ramos MD:    Subjective     History of Present Illness:    Chief Complaint   Patient presents with   • Follow-up     Echo    • Med Management     Med Bottles       Breanna Kaba is a pleasant 67 y.o. female with a past medical history significant for coronary artery disease with recent stenting of the LAD on 3/31/2022 after she presented with an acute NSTEMI, ischemic cardiomyopathy with recovered LVEF now at 50 to 55% previously as low as 20 to 25%, diabetes mellitus, essential hypertension, and dyslipidemia.  No history of tobacco abuse.  She comes in today for cardiology follow-up.    Breanna did have echocardiogram performed which did show improvement in LVEF now within normal limits of 50 to 55% although vitals on the low normal side and has vastly improved previously was 20 to 25%.  Clinically she denies any chest pains or worsening shortness of breath.  She actually ports her shortness of breath has improved since she had her heart attack in March.  She has been participating in cardiac rehab and reports that this is helped her tremendously as well.  Unfortunately has not been on Brilinta nor aspirin.    Allergies   Allergen Reactions   • Asa [Aspirin] GI Intolerance   • Naproxen Nausea And Vomiting   • Penicillins Hives and Rash   :      Current Outpatient Medications:   •  allopurinol (ZYLOPRIM) 300 MG tablet, Take 1 tablet by mouth Daily., Disp: 90 tablet, Rfl: 1  •  atorvastatin (LIPITOR) 40 MG  tablet, Take 1 tablet by mouth Daily., Disp: 30 tablet, Rfl: 5  •  Blood Glucose Monitoring Suppl (ONE TOUCH ULTRA 2) w/Device kit, Use to test blood sugar before meals and at bedtime., Disp: 1 each, Rfl: 0  •  cyclobenzaprine (FLEXERIL) 5 MG tablet, Take 5 mg by mouth 3 (Three) Times a Day As Needed for Muscle Spasms., Disp: , Rfl:   •  glipizide (GLUCOTROL) 5 MG tablet, Take 5 mg by mouth Daily., Disp: , Rfl:   •  glucose blood (OneTouch Ultra) test strip, Use to test blood sugar before meals and at bedtime. (Patient taking differently: Use to test blood sugar before meals and at bedtime.), Disp: 100 each, Rfl: 0  •  insulin aspart (NovoLOG FlexPen) 100 UNIT/ML solution pen-injector sc pen, Inject 0-14 Units under the skin into the appropriate area as directed 3 (Three) Times a Day Before Meals., Disp: 15 mL, Rfl: 0  •  Insulin Glargine (BASAGLAR KWIKPEN) 100 UNIT/ML injection pen, Inject 22 Units under the skin into the appropriate area as directed Every Night., Disp: 21 mL, Rfl: 1  •  Insulin Pen Needle (Pen Needles) 31G X 6 MM misc, Use with Novolog FlexPen to inject insulin 3 times daily before meals., Disp: 100 each, Rfl: 0  •  Lancets (onetouch ultrasoft) lancets, Use to test blood sugar before meals and at bedtime., Disp: 100 each, Rfl: 0  •  metFORMIN (GLUCOPHAGE) 1000 MG tablet, Take 1,000 mg by mouth 2 (Two) Times a Day With Meals., Disp: , Rfl:   •  nateglinide (STARLIX) 120 MG tablet, Take 120 mg by mouth 3 (Three) Times a Day Before Meals., Disp: , Rfl:   •  sacubitril-valsartan (ENTRESTO) 24-26 MG tablet, Take 1 tablet by mouth Every 12 (Twelve) Hours., Disp: 60 tablet, Rfl: 2  •  spironolactone (ALDACTONE) 25 MG tablet, Take 25 mg by mouth Daily. Take 1/2 tablet by mouth daily, Disp: , Rfl:   •  ticagrelor (BRILINTA) 90 MG tablet tablet, Take 1 tablet by mouth 2 (Two) Times a Day., Disp: 60 tablet, Rfl: 3  •  aspirin (aspirin) 81 MG EC tablet, Take 1 tablet by mouth Daily., Disp: 90 tablet, Rfl:  "3  •  furosemide (LASIX) 20 MG tablet, Take 1 tablet by mouth Daily., Disp: 30 tablet, Rfl: 0  •  metoprolol succinate XL (TOPROL-XL) 25 MG 24 hr tablet, Take 1 tablet by mouth Daily., Disp: 90 tablet, Rfl: 3    The following portions of the patient's history were reviewed and updated as appropriate: allergies, current medications, past family history, past medical history, past social history, past surgical history and problem list.    Social History     Tobacco Use   • Smoking status: Never Smoker   • Smokeless tobacco: Never Used   Vaping Use   • Vaping Use: Never used   Substance Use Topics   • Alcohol use: No   • Drug use: No         Objective   Vitals:    07/21/22 0944   BP: 132/71   BP Location: Left arm   Patient Position: Sitting   Cuff Size: Adult   Pulse: 115   SpO2: 96%   Weight: 63.7 kg (140 lb 6.4 oz)   Height: 157.5 cm (62\")     Body mass index is 25.68 kg/m².    Constitutional:       General: Not in acute distress.     Appearance: Healthy appearance. Well-developed and not in distress. Not diaphoretic.   Eyes:      Conjunctiva/sclera: Conjunctivae normal.      Pupils: Pupils are equal, round, and reactive to light.   HENT:      Head: Normocephalic and atraumatic.   Neck:      Vascular: No carotid bruit or JVD.   Pulmonary:      Effort: Pulmonary effort is normal. No respiratory distress.      Breath sounds: Normal breath sounds.   Cardiovascular:      Tachycardia present. Regular rhythm.      Comments: Wearing lifevest.   Skin:     General: Skin is cool.   Neurological:      Mental Status: Alert, oriented to person, place, and time and oriented to person, place and time.         Lab Results   Component Value Date     (L) 06/13/2022    K 4.9 06/13/2022    CL 94 (L) 06/13/2022    CO2 23.3 06/13/2022    BUN 28 (H) 06/13/2022    CREATININE 1.34 (H) 06/13/2022    GLUCOSE 229 (H) 06/13/2022    CALCIUM 9.8 06/13/2022    AST 18 06/13/2022    ALT 18 06/13/2022    ALKPHOS 91 06/13/2022     No results " found for: CKTOTAL  Lab Results   Component Value Date    WBC 8.54 06/13/2022    HGB 12.2 06/13/2022    HCT 38.0 06/13/2022     06/13/2022     Lab Results   Component Value Date    INR 1.04 03/28/2022     Lab Results   Component Value Date    MG 1.6 04/02/2022     Lab Results   Component Value Date    TSH 1.260 06/06/2022    CHLPL 256 (H) 03/01/2017    TRIG 79 03/29/2022    HDL 54 03/29/2022    LDL 58 03/29/2022      No results found for: BNP    During this visit the following were done:  Labs Reviewed []    Labs Ordered []    Radiology Reports Reviewed []    Radiology Ordered []    PCP Records Reviewed []    Referring Provider Records Reviewed []    ER Records Reviewed []    Hospital Records Reviewed []    History Obtained From Family []    Radiology Images Reviewed []    Other Reviewed []    Records Requested []       Procedures    Assessment & Plan    Diagnosis Plan   1. Chronic HFrEF (heart failure with reduced ejection fraction) (HCC)  Comprehensive Metabolic Panel    Lipid Panel   2. Tachycardia  Full Pulmonary Function Test With Bronchodilator            Recommendations:  1. Chronic HFrEF with improved LVEF  1. I discussed results of echocardiogram with Breanna which did show significant improvement in her LVEF now at 50 to 55% I did advise her that she can remove the LifeVest.  2. With rising creatinine I will stop spironolactone it appears she has not been on carvedilol with her blood pressure having episodes of hypotension I will change carvedilol to metoprolol succinate 25 mg daily for hopefully better rate control as she has been having persistent tachycardia.  3. Continue Entresto 24-26 mg twice daily for now.  4. I will check a CMP and lipid panel.  2. Coronary artery disease  1. Thankfully no recent anginal symptoms doing well clinically participating in cardiac rehab with improvement in her functional status.  I did emphasize the importance of dual antiplatelet therapy in preventing in-stent  restenosis.   3. Medical noncompliance  1. Unfortunately she is not taking either aspirin nor brilinta. She reports that she was unaware of taking these medications. I did thoroughly educate Breanna on the importance of DAPT therapy for the first year post stenting. She expressed understanding and stated that she would start today.   2. I did prescribe her aspirin and brilinta  90 mg BID but asked her to take 180 mg the first dose for a loading initially. Unsure exactly how long she had not been taking these although was on them in April and may she reported.   4. Persistent tachycardia  1. Has been in sinus rhythm unknown etiology at the moment. She denies any history of tobacco abuse but I will order PFT to rule out COPD as she does state that she has suffered from second smoke most of her life.       Return in about 3 months (around 10/21/2022).    As always, I appreciate very much the opportunity to participate in the cardiovascular care of your patients.      With Best Regards,    Da Fox PA-C

## 2022-07-25 ENCOUNTER — TELEPHONE (OUTPATIENT)
Dept: FAMILY MEDICINE CLINIC | Facility: CLINIC | Age: 67
End: 2022-07-25

## 2022-07-25 ENCOUNTER — HOSPITAL ENCOUNTER (OUTPATIENT)
Dept: RESPIRATORY THERAPY | Facility: HOSPITAL | Age: 67
Discharge: HOME | End: 2022-07-25

## 2022-07-25 ENCOUNTER — LAB (OUTPATIENT)
Dept: LAB | Facility: HOSPITAL | Age: 67
End: 2022-07-25

## 2022-07-25 ENCOUNTER — OFFICE VISIT (OUTPATIENT)
Dept: FAMILY MEDICINE CLINIC | Facility: CLINIC | Age: 67
End: 2022-07-25

## 2022-07-25 VITALS
HEIGHT: 62 IN | BODY MASS INDEX: 24.99 KG/M2 | SYSTOLIC BLOOD PRESSURE: 118 MMHG | TEMPERATURE: 96.9 F | HEART RATE: 98 BPM | WEIGHT: 135.8 LBS | OXYGEN SATURATION: 99 % | DIASTOLIC BLOOD PRESSURE: 62 MMHG

## 2022-07-25 VITALS — OXYGEN SATURATION: 99 % | HEART RATE: 91 BPM | RESPIRATION RATE: 16 BRPM

## 2022-07-25 DIAGNOSIS — M1A.09X0 IDIOPATHIC CHRONIC GOUT OF MULTIPLE SITES WITHOUT TOPHUS: ICD-10-CM

## 2022-07-25 DIAGNOSIS — M54.9 BACK PAIN, UNSPECIFIED BACK LOCATION, UNSPECIFIED BACK PAIN LATERALITY, UNSPECIFIED CHRONICITY: ICD-10-CM

## 2022-07-25 DIAGNOSIS — R06.02 SOB (SHORTNESS OF BREATH): ICD-10-CM

## 2022-07-25 DIAGNOSIS — I25.5 ISCHEMIC CARDIOMYOPATHY: ICD-10-CM

## 2022-07-25 DIAGNOSIS — N23 KIDNEY PAIN: Primary | ICD-10-CM

## 2022-07-25 DIAGNOSIS — R00.0 TACHYCARDIA: ICD-10-CM

## 2022-07-25 DIAGNOSIS — E11.40 TYPE 2 DIABETES MELLITUS WITH DIABETIC NEUROPATHY, UNSPECIFIED WHETHER LONG TERM INSULIN USE: ICD-10-CM

## 2022-07-25 LAB
BILIRUB BLD-MCNC: NEGATIVE MG/DL
CLARITY, POC: ABNORMAL
COLOR UR: YELLOW
EXPIRATION DATE: ABNORMAL
GLUCOSE UR STRIP-MCNC: ABNORMAL MG/DL
KETONES UR QL: NEGATIVE
LEUKOCYTE EST, POC: ABNORMAL
Lab: ABNORMAL
NITRITE UR-MCNC: NEGATIVE MG/ML
PH UR: 6 [PH] (ref 5–8)
PROT UR STRIP-MCNC: ABNORMAL MG/DL
RBC # UR STRIP: ABNORMAL /UL
SP GR UR: 1.01 (ref 1–1.03)
UROBILINOGEN UR QL: NORMAL

## 2022-07-25 PROCEDURE — 99214 OFFICE O/P EST MOD 30 MIN: CPT | Performed by: FAMILY MEDICINE

## 2022-07-25 PROCEDURE — 94726 PLETHYSMOGRAPHY LUNG VOLUMES: CPT

## 2022-07-25 PROCEDURE — 36415 COLL VENOUS BLD VENIPUNCTURE: CPT | Performed by: FAMILY MEDICINE

## 2022-07-25 PROCEDURE — 94640 AIRWAY INHALATION TREATMENT: CPT

## 2022-07-25 PROCEDURE — 94060 EVALUATION OF WHEEZING: CPT

## 2022-07-25 PROCEDURE — 94664 DEMO&/EVAL PT USE INHALER: CPT

## 2022-07-25 PROCEDURE — 80053 COMPREHEN METABOLIC PANEL: CPT | Performed by: FAMILY MEDICINE

## 2022-07-25 PROCEDURE — 94729 DIFFUSING CAPACITY: CPT

## 2022-07-25 PROCEDURE — 83036 HEMOGLOBIN GLYCOSYLATED A1C: CPT | Performed by: FAMILY MEDICINE

## 2022-07-25 PROCEDURE — 94760 N-INVAS EAR/PLS OXIMETRY 1: CPT

## 2022-07-25 PROCEDURE — 94799 UNLISTED PULMONARY SVC/PX: CPT

## 2022-07-25 PROCEDURE — 87186 SC STD MICRODIL/AGAR DIL: CPT | Performed by: FAMILY MEDICINE

## 2022-07-25 PROCEDURE — U0004 COV-19 TEST NON-CDC HGH THRU: HCPCS

## 2022-07-25 PROCEDURE — 87086 URINE CULTURE/COLONY COUNT: CPT | Performed by: FAMILY MEDICINE

## 2022-07-25 PROCEDURE — 81003 URINALYSIS AUTO W/O SCOPE: CPT | Performed by: FAMILY MEDICINE

## 2022-07-25 RX ORDER — ALBUTEROL SULFATE 2.5 MG/3ML
2.5 SOLUTION RESPIRATORY (INHALATION) ONCE
Status: COMPLETED | OUTPATIENT
Start: 2022-07-25 | End: 2022-07-25

## 2022-07-25 RX ORDER — ALLOPURINOL 300 MG/1
300 TABLET ORAL DAILY
Qty: 90 TABLET | Refills: 1 | Status: SHIPPED | OUTPATIENT
Start: 2022-07-25 | End: 2022-12-15 | Stop reason: SDUPTHER

## 2022-07-25 RX ORDER — FUROSEMIDE 20 MG/1
20 TABLET ORAL DAILY
Qty: 30 TABLET | Refills: 2 | Status: SHIPPED | OUTPATIENT
Start: 2022-07-25 | End: 2022-11-28

## 2022-07-25 RX ADMIN — ALBUTEROL SULFATE 2.5 MG: 2.5 SOLUTION RESPIRATORY (INHALATION) at 14:05

## 2022-07-25 NOTE — TELEPHONE ENCOUNTER
----- Message from Myrna Ramos MD sent at 7/25/2022 12:46 PM EDT -----  Can you call [facility] and tell them to discontinue her spironolactone script? I think they are automatically refilling it and then she gets confused and starts taking it and then calls and asks why she is on several diuretics. She should just be on lasix PRN.        Called the pharmacy & canceled.

## 2022-07-26 ENCOUNTER — APPOINTMENT (OUTPATIENT)
Dept: CARDIAC REHAB | Facility: HOSPITAL | Age: 67
End: 2022-07-26

## 2022-07-26 LAB
ALBUMIN SERPL-MCNC: 4.9 G/DL (ref 3.5–5.2)
ALBUMIN/GLOB SERPL: 1.9 G/DL
ALP SERPL-CCNC: 123 U/L (ref 39–117)
ALT SERPL W P-5'-P-CCNC: 13 U/L (ref 1–33)
ANION GAP SERPL CALCULATED.3IONS-SCNC: 13.9 MMOL/L (ref 5–15)
AST SERPL-CCNC: 17 U/L (ref 1–32)
BILIRUB SERPL-MCNC: 0.4 MG/DL (ref 0–1.2)
BUN SERPL-MCNC: 35 MG/DL (ref 8–23)
BUN/CREAT SERPL: 30.2 (ref 7–25)
CALCIUM SPEC-SCNC: 10.1 MG/DL (ref 8.6–10.5)
CHLORIDE SERPL-SCNC: 100 MMOL/L (ref 98–107)
CO2 SERPL-SCNC: 23.1 MMOL/L (ref 22–29)
CREAT SERPL-MCNC: 1.16 MG/DL (ref 0.57–1)
EGFRCR SERPLBLD CKD-EPI 2021: 51.1 ML/MIN/1.73
GLOBULIN UR ELPH-MCNC: 2.6 GM/DL
GLUCOSE SERPL-MCNC: 215 MG/DL (ref 65–99)
HBA1C MFR BLD: 8.6 % (ref 4.8–5.6)
POTASSIUM SERPL-SCNC: 5.2 MMOL/L (ref 3.5–5.2)
PROT SERPL-MCNC: 7.5 G/DL (ref 6–8.5)
SARS-COV-2 RNA PNL SPEC NAA+PROBE: NOT DETECTED
SODIUM SERPL-SCNC: 137 MMOL/L (ref 136–145)

## 2022-07-27 LAB — BACTERIA SPEC AEROBE CULT: ABNORMAL

## 2022-07-28 ENCOUNTER — APPOINTMENT (OUTPATIENT)
Dept: CARDIAC REHAB | Facility: HOSPITAL | Age: 67
End: 2022-07-28

## 2022-08-02 ENCOUNTER — TREATMENT (OUTPATIENT)
Dept: CARDIAC REHAB | Facility: HOSPITAL | Age: 67
End: 2022-08-02

## 2022-08-02 VITALS — DIASTOLIC BLOOD PRESSURE: 66 MMHG | HEART RATE: 98 BPM | SYSTOLIC BLOOD PRESSURE: 108 MMHG

## 2022-08-02 DIAGNOSIS — I50.23 ACUTE ON CHRONIC SYSTOLIC CHF (CONGESTIVE HEART FAILURE): ICD-10-CM

## 2022-08-02 DIAGNOSIS — Z95.5 STENTED CORONARY ARTERY: ICD-10-CM

## 2022-08-02 DIAGNOSIS — I21.4 NSTEMI (NON-ST ELEVATED MYOCARDIAL INFARCTION): Primary | ICD-10-CM

## 2022-08-02 PROCEDURE — 93798 PHYS/QHP OP CAR RHAB W/ECG: CPT

## 2022-08-02 NOTE — PROGRESS NOTES
Pt attended phase II visit.  Tolerated exercise well, NAD noted, no complaints voiced, skin warm, pink, dry, resp nl and even, denies chest discomfort, denies SOA.  Monitor shows NSR-ST, V/S WDL, see [place] documentation for exercise data.  Dr. Marin physician immediately available .

## 2022-08-03 ENCOUNTER — TELEPHONE (OUTPATIENT)
Dept: FAMILY MEDICINE CLINIC | Facility: CLINIC | Age: 67
End: 2022-08-03

## 2022-08-03 RX ORDER — CIPROFLOXACIN 500 MG/1
500 TABLET, FILM COATED ORAL 2 TIMES DAILY
Qty: 14 TABLET | Refills: 0 | Status: SHIPPED | OUTPATIENT
Start: 2022-08-03 | End: 2022-09-15

## 2022-08-03 NOTE — TELEPHONE ENCOUNTER
----- Message from Myrna Ramos MD sent at 8/3/2022  3:53 AM EDT -----  Please call Breanna. I've sent in cipro for her UTI. Also, her A1C has gone up just a little from 7.9 to 8.6. Does she think she can get it down with diet?     CrCl 36.2-44.2    Patient notified and said she does think she can get it back down.

## 2022-08-04 ENCOUNTER — APPOINTMENT (OUTPATIENT)
Dept: CARDIAC REHAB | Facility: HOSPITAL | Age: 67
End: 2022-08-04

## 2022-08-09 ENCOUNTER — TREATMENT (OUTPATIENT)
Dept: CARDIAC REHAB | Facility: HOSPITAL | Age: 67
End: 2022-08-09

## 2022-08-09 VITALS — SYSTOLIC BLOOD PRESSURE: 118 MMHG | HEART RATE: 100 BPM | OXYGEN SATURATION: 98 % | DIASTOLIC BLOOD PRESSURE: 70 MMHG

## 2022-08-09 DIAGNOSIS — I21.4 NSTEMI (NON-ST ELEVATED MYOCARDIAL INFARCTION): ICD-10-CM

## 2022-08-09 DIAGNOSIS — I50.23 ACUTE ON CHRONIC SYSTOLIC CHF (CONGESTIVE HEART FAILURE): Primary | ICD-10-CM

## 2022-08-09 DIAGNOSIS — Z95.5 STENTED CORONARY ARTERY: ICD-10-CM

## 2022-08-09 PROCEDURE — 93798 PHYS/QHP OP CAR RHAB W/ECG: CPT

## 2022-08-09 NOTE — PROGRESS NOTES
Pt attended phase II visit.  Tolerated exercise well, NAD noted, no complaints voiced, skin warm, pink, dry, resp nl and even, denies chest discomfort, denies SOA.  Monitor shows NSR-ST, V/S WDL, see [place] documentation for exercise data.  Dr. Ennis physician immediately available .

## 2022-08-10 ENCOUNTER — DOCUMENTATION (OUTPATIENT)
Dept: CARDIAC REHAB | Facility: HOSPITAL | Age: 67
End: 2022-08-10

## 2022-08-10 NOTE — PROGRESS NOTES
I had faxed a request for more visits for Cardiac rehab on 8/9/22. [facility] was faxing the reponse to that fax to our phone number. I called and spoke with Carlos VÁSQUEZ. She stated that as of 7/1/22 [facility] did not require a pre auth for cpt code 82023. That it was ok to continue with the program. . Ref # for call [id number]

## 2022-08-11 ENCOUNTER — TREATMENT (OUTPATIENT)
Dept: CARDIAC REHAB | Facility: HOSPITAL | Age: 67
End: 2022-08-11

## 2022-08-11 VITALS — SYSTOLIC BLOOD PRESSURE: 118 MMHG | OXYGEN SATURATION: 98 % | DIASTOLIC BLOOD PRESSURE: 66 MMHG | HEART RATE: 99 BPM

## 2022-08-11 DIAGNOSIS — I21.4 NSTEMI (NON-ST ELEVATED MYOCARDIAL INFARCTION): ICD-10-CM

## 2022-08-11 DIAGNOSIS — Z95.5 STENTED CORONARY ARTERY: ICD-10-CM

## 2022-08-11 DIAGNOSIS — I50.23 ACUTE ON CHRONIC SYSTOLIC CHF (CONGESTIVE HEART FAILURE): Primary | ICD-10-CM

## 2022-08-11 PROCEDURE — 93798 PHYS/QHP OP CAR RHAB W/ECG: CPT

## 2022-08-11 NOTE — PROGRESS NOTES
Pt attended phase II visit.  Tolerated exercise well, NAD noted, no complaints voiced, skin warm, pink, dry, resp nl and even, denies chest discomfort, denies SOA.  Monitor shows NSR-ST, V/S WDL, see [place] documentation for exercise data.  Dr. Ennis physician immediately available .

## 2022-08-16 ENCOUNTER — TREATMENT (OUTPATIENT)
Dept: CARDIAC REHAB | Facility: HOSPITAL | Age: 67
End: 2022-08-16

## 2022-08-16 VITALS — HEART RATE: 98 BPM | OXYGEN SATURATION: 98 % | SYSTOLIC BLOOD PRESSURE: 116 MMHG | DIASTOLIC BLOOD PRESSURE: 60 MMHG

## 2022-08-16 DIAGNOSIS — I50.23 ACUTE ON CHRONIC SYSTOLIC CHF (CONGESTIVE HEART FAILURE): Primary | ICD-10-CM

## 2022-08-16 DIAGNOSIS — I21.4 NSTEMI (NON-ST ELEVATED MYOCARDIAL INFARCTION): ICD-10-CM

## 2022-08-16 DIAGNOSIS — Z95.5 STENTED CORONARY ARTERY: ICD-10-CM

## 2022-08-16 PROCEDURE — 93798 PHYS/QHP OP CAR RHAB W/ECG: CPT

## 2022-08-16 NOTE — PROGRESS NOTES
Pt attended phase II visit.  Tolerated exercise well, NAD noted, no complaints voiced, skin warm, pink, dry, resp nl and even, denies chest discomfort, denies SOA.  Monitor shows NSR-ST, V/S WDL, see [place] documentation for exercise data.  Dr. Ennis physician immediately available .

## 2022-08-18 ENCOUNTER — TREATMENT (OUTPATIENT)
Dept: CARDIAC REHAB | Facility: HOSPITAL | Age: 67
End: 2022-08-18

## 2022-08-18 VITALS — DIASTOLIC BLOOD PRESSURE: 70 MMHG | HEART RATE: 93 BPM | SYSTOLIC BLOOD PRESSURE: 102 MMHG

## 2022-08-18 DIAGNOSIS — I50.23 ACUTE ON CHRONIC SYSTOLIC CHF (CONGESTIVE HEART FAILURE): Primary | ICD-10-CM

## 2022-08-18 DIAGNOSIS — I21.4 NSTEMI (NON-ST ELEVATED MYOCARDIAL INFARCTION): ICD-10-CM

## 2022-08-18 DIAGNOSIS — Z95.5 STENTED CORONARY ARTERY: ICD-10-CM

## 2022-08-18 PROCEDURE — 93798 PHYS/QHP OP CAR RHAB W/ECG: CPT

## 2022-08-18 NOTE — PROGRESS NOTES
Pt attended phase II visit.  Tolerated exercise well, NAD noted, no complaints voiced, skin warm, pink, dry, resp nl and even, denies chest discomfort, denies SOA.  Monitor shows NSR-ST, V/S WDL, see [place] documentation for exercise data.  Dr. Ennis physician immediately available .

## 2022-08-23 ENCOUNTER — TREATMENT (OUTPATIENT)
Dept: CARDIAC REHAB | Facility: HOSPITAL | Age: 67
End: 2022-08-23

## 2022-08-23 VITALS — SYSTOLIC BLOOD PRESSURE: 128 MMHG | DIASTOLIC BLOOD PRESSURE: 72 MMHG | HEART RATE: 101 BPM

## 2022-08-23 DIAGNOSIS — I21.4 NSTEMI (NON-ST ELEVATED MYOCARDIAL INFARCTION): ICD-10-CM

## 2022-08-23 DIAGNOSIS — I50.23 ACUTE ON CHRONIC SYSTOLIC CHF (CONGESTIVE HEART FAILURE): Primary | ICD-10-CM

## 2022-08-23 DIAGNOSIS — Z95.5 STENTED CORONARY ARTERY: ICD-10-CM

## 2022-08-23 PROCEDURE — 93798 PHYS/QHP OP CAR RHAB W/ECG: CPT

## 2022-08-23 NOTE — PROGRESS NOTES
Pt attended phase II visit.  Tolerated exercise well, NAD noted, no complaints voiced, skin warm, pink, dry, resp nl and even, denies chest discomfort, denies SOA.  Monitor shows NSR-ST, V/S WDL, see [place] documentation for exercise data.  Dr. Ennis physician immediately available .

## 2022-08-25 ENCOUNTER — TREATMENT (OUTPATIENT)
Dept: CARDIAC REHAB | Facility: HOSPITAL | Age: 67
End: 2022-08-25

## 2022-08-25 VITALS — HEART RATE: 104 BPM | DIASTOLIC BLOOD PRESSURE: 78 MMHG | SYSTOLIC BLOOD PRESSURE: 120 MMHG

## 2022-08-25 DIAGNOSIS — I21.4 NSTEMI (NON-ST ELEVATED MYOCARDIAL INFARCTION): ICD-10-CM

## 2022-08-25 DIAGNOSIS — I50.23 ACUTE ON CHRONIC SYSTOLIC CHF (CONGESTIVE HEART FAILURE): Primary | ICD-10-CM

## 2022-08-25 DIAGNOSIS — Z95.5 STENTED CORONARY ARTERY: ICD-10-CM

## 2022-08-25 PROCEDURE — 93798 PHYS/QHP OP CAR RHAB W/ECG: CPT

## 2022-08-25 NOTE — PROGRESS NOTES
Pt attended phase II visit.  Tolerated exercise well, NAD noted, no complaints voiced, skin warm, pink, dry, resp nl and even, denies chest discomfort, denies SOA.  Monitor shows NSR-ST, V/S WDL, see [place] documentation for exercise data.  Dr. Ennis physician immediately available .

## 2022-08-30 ENCOUNTER — TREATMENT (OUTPATIENT)
Dept: CARDIAC REHAB | Facility: HOSPITAL | Age: 67
End: 2022-08-30

## 2022-08-30 VITALS — SYSTOLIC BLOOD PRESSURE: 120 MMHG | OXYGEN SATURATION: 98 % | DIASTOLIC BLOOD PRESSURE: 70 MMHG | HEART RATE: 104 BPM

## 2022-08-30 DIAGNOSIS — I21.4 NSTEMI (NON-ST ELEVATED MYOCARDIAL INFARCTION): ICD-10-CM

## 2022-08-30 DIAGNOSIS — I50.23 ACUTE ON CHRONIC SYSTOLIC CHF (CONGESTIVE HEART FAILURE): Primary | ICD-10-CM

## 2022-08-30 DIAGNOSIS — Z95.5 STENTED CORONARY ARTERY: ICD-10-CM

## 2022-08-30 PROCEDURE — 93798 PHYS/QHP OP CAR RHAB W/ECG: CPT

## 2022-08-30 NOTE — PROGRESS NOTES
Pt attended phase II visit.  Tolerated exercise well, NAD noted, no complaints voiced, skin warm, pink, dry, resp nl and even, denies chest discomfort, denies SOA.  Monitor shows NSR-ST, V/S WDL, see [place] documentation for exercise data.  Dr. Marin physician immediately available .

## 2022-09-01 ENCOUNTER — APPOINTMENT (OUTPATIENT)
Dept: CARDIAC REHAB | Facility: HOSPITAL | Age: 67
End: 2022-09-01

## 2022-09-02 ENCOUNTER — TELEPHONE (OUTPATIENT)
Dept: FAMILY MEDICINE CLINIC | Facility: CLINIC | Age: 67
End: 2022-09-02

## 2022-09-02 NOTE — TELEPHONE ENCOUNTER
She has some that we could talk about - optional medications or not. Can you make her an appointment to do this with me? And have her bring all her meds with her so we can make sure we are not missing any? Thanks.

## 2022-09-02 NOTE — TELEPHONE ENCOUNTER
She has some that we could talk about - optional medications or not. Can you make her an appointment to do this with me? And have her bring all her meds with her so we can make sure we are not missing any? Thanks.    Spoke with patient & scheduled an appointment.

## 2022-09-02 NOTE — TELEPHONE ENCOUNTER
Caller: Breanna Kaba    Relationship: Self    Best call back number: [phone]    What is the best time to reach you: ANYTIME     Who are you requesting to speak with (clinical staff, provider,  specific staff member): CLINICAL     Do you know the name of the person who called: PATIENT     What was the call regarding: PATIENT WANTED TO LET THE CLINICAL STAFF THAT HER KIDNEY DOCTOR ADVISED HER THAT HER KIDNEYS ARE ONLY WORKING AT 44% CAPACITY    PATIENT WANTS TO KNOW IF SHE NEEDS TO CONTINUE TAKING ANY OF HER MEDICATIONS OR STOP THEM     Do you require a callback: YES

## 2022-09-06 ENCOUNTER — TREATMENT (OUTPATIENT)
Dept: CARDIAC REHAB | Facility: HOSPITAL | Age: 67
End: 2022-09-06

## 2022-09-06 VITALS — SYSTOLIC BLOOD PRESSURE: 112 MMHG | DIASTOLIC BLOOD PRESSURE: 58 MMHG | OXYGEN SATURATION: 98 % | HEART RATE: 82 BPM

## 2022-09-06 DIAGNOSIS — I21.4 NSTEMI (NON-ST ELEVATED MYOCARDIAL INFARCTION): ICD-10-CM

## 2022-09-06 DIAGNOSIS — Z95.5 STENTED CORONARY ARTERY: ICD-10-CM

## 2022-09-06 DIAGNOSIS — I50.23 ACUTE ON CHRONIC SYSTOLIC CHF (CONGESTIVE HEART FAILURE): Primary | ICD-10-CM

## 2022-09-06 PROCEDURE — 93798 PHYS/QHP OP CAR RHAB W/ECG: CPT

## 2022-09-06 NOTE — PROGRESS NOTES
Pt attended phase II visit.  Tolerated exercise well, NAD noted, no complaints voiced, skin warm, pink, dry, resp nl and even, denies chest discomfort, denies SOA.  Monitor shows NSR-ST, V/S WDL, see [place] documentation for exercise data.    physician immediately available .

## 2022-09-08 ENCOUNTER — TREATMENT (OUTPATIENT)
Dept: CARDIAC REHAB | Facility: HOSPITAL | Age: 67
End: 2022-09-08

## 2022-09-08 VITALS — DIASTOLIC BLOOD PRESSURE: 60 MMHG | HEART RATE: 94 BPM | OXYGEN SATURATION: 98 % | SYSTOLIC BLOOD PRESSURE: 118 MMHG

## 2022-09-08 DIAGNOSIS — I21.4 NSTEMI (NON-ST ELEVATED MYOCARDIAL INFARCTION): ICD-10-CM

## 2022-09-08 DIAGNOSIS — I50.23 ACUTE ON CHRONIC SYSTOLIC CHF (CONGESTIVE HEART FAILURE): Primary | ICD-10-CM

## 2022-09-08 DIAGNOSIS — Z95.5 STENTED CORONARY ARTERY: ICD-10-CM

## 2022-09-08 PROCEDURE — 93798 PHYS/QHP OP CAR RHAB W/ECG: CPT

## 2022-09-08 NOTE — PROGRESS NOTES
Pt attended phase II visit.  Tolerated exercise well, NAD noted, no complaints voiced, skin warm, pink, dry, resp nl and even, denies chest discomfort, denies SOA.  Monitor shows NSR-ST, V/S WDL, see [place] documentation for exercise data.    physician immediately available .

## 2022-09-13 ENCOUNTER — TREATMENT (OUTPATIENT)
Dept: CARDIAC REHAB | Facility: HOSPITAL | Age: 67
End: 2022-09-13

## 2022-09-13 VITALS — SYSTOLIC BLOOD PRESSURE: 120 MMHG | HEART RATE: 88 BPM | DIASTOLIC BLOOD PRESSURE: 68 MMHG | OXYGEN SATURATION: 98 %

## 2022-09-13 DIAGNOSIS — I21.4 NSTEMI (NON-ST ELEVATED MYOCARDIAL INFARCTION): ICD-10-CM

## 2022-09-13 DIAGNOSIS — Z95.5 STENTED CORONARY ARTERY: ICD-10-CM

## 2022-09-13 DIAGNOSIS — I50.23 ACUTE ON CHRONIC SYSTOLIC CHF (CONGESTIVE HEART FAILURE): Primary | ICD-10-CM

## 2022-09-13 PROCEDURE — 93798 PHYS/QHP OP CAR RHAB W/ECG: CPT

## 2022-09-13 NOTE — PROGRESS NOTES
Pt attended phase II visit.  Tolerated exercise well, NAD noted, no complaints voiced, skin warm, pink, dry, resp nl and even, denies chest discomfort, denies SOA.  Monitor shows NSR-ST, V/S WDL, see [place] documentation for exercise data.   physician immediately available .

## 2022-09-15 ENCOUNTER — TREATMENT (OUTPATIENT)
Dept: CARDIAC REHAB | Facility: HOSPITAL | Age: 67
End: 2022-09-15

## 2022-09-15 ENCOUNTER — OFFICE VISIT (OUTPATIENT)
Dept: FAMILY MEDICINE CLINIC | Facility: CLINIC | Age: 67
End: 2022-09-15

## 2022-09-15 VITALS — HEART RATE: 87 BPM | OXYGEN SATURATION: 98 % | SYSTOLIC BLOOD PRESSURE: 116 MMHG | DIASTOLIC BLOOD PRESSURE: 66 MMHG

## 2022-09-15 VITALS
SYSTOLIC BLOOD PRESSURE: 120 MMHG | DIASTOLIC BLOOD PRESSURE: 60 MMHG | HEART RATE: 89 BPM | BODY MASS INDEX: 25.68 KG/M2 | TEMPERATURE: 96.6 F | WEIGHT: 140.4 LBS

## 2022-09-15 DIAGNOSIS — M1A.09X0 IDIOPATHIC CHRONIC GOUT OF MULTIPLE SITES WITHOUT TOPHUS: ICD-10-CM

## 2022-09-15 DIAGNOSIS — N18.2 CKD (CHRONIC KIDNEY DISEASE), STAGE II: Primary | ICD-10-CM

## 2022-09-15 DIAGNOSIS — I21.4 NSTEMI (NON-ST ELEVATED MYOCARDIAL INFARCTION): ICD-10-CM

## 2022-09-15 DIAGNOSIS — E11.69 HYPERLIPIDEMIA DUE TO TYPE 2 DIABETES MELLITUS: ICD-10-CM

## 2022-09-15 DIAGNOSIS — E11.40 TYPE 2 DIABETES MELLITUS WITH DIABETIC NEUROPATHY, UNSPECIFIED WHETHER LONG TERM INSULIN USE: ICD-10-CM

## 2022-09-15 DIAGNOSIS — E78.5 HYPERLIPIDEMIA DUE TO TYPE 2 DIABETES MELLITUS: ICD-10-CM

## 2022-09-15 DIAGNOSIS — Z95.5 STENTED CORONARY ARTERY: ICD-10-CM

## 2022-09-15 DIAGNOSIS — I50.23 ACUTE ON CHRONIC SYSTOLIC CHF (CONGESTIVE HEART FAILURE): Primary | ICD-10-CM

## 2022-09-15 PROCEDURE — 99214 OFFICE O/P EST MOD 30 MIN: CPT | Performed by: FAMILY MEDICINE

## 2022-09-15 PROCEDURE — 93798 PHYS/QHP OP CAR RHAB W/ECG: CPT

## 2022-09-15 NOTE — PROGRESS NOTES
Pt attended phase II visit.  Tolerated exercise well, NAD noted, no complaints voiced, skin warm, pink, dry, resp nl and even, denies chest discomfort, denies SOA.  Monitor shows NSR-ST, V/S WDL, see [place] documentation for exercise data.   physician immediately available .

## 2022-09-19 ENCOUNTER — TELEPHONE (OUTPATIENT)
Dept: CARDIOLOGY | Facility: CLINIC | Age: 67
End: 2022-09-19

## 2022-09-19 NOTE — TELEPHONE ENCOUNTER
Pt called needing samples of Brilinta 90 mg.     NDC (for all four bottles) 4754-7865-95   Lot # [id number]   Exp. 08/2023  This is for one bottle x 8 tabs     Lot # [id number]   Exp. 09/2023  This is for one bottle x 8 tabs     Lot # [id number]   Exo 03/31/2024  This is for two bottles x 8 tabs

## 2022-09-20 ENCOUNTER — TREATMENT (OUTPATIENT)
Dept: CARDIAC REHAB | Facility: HOSPITAL | Age: 67
End: 2022-09-20

## 2022-09-20 VITALS — OXYGEN SATURATION: 98 % | SYSTOLIC BLOOD PRESSURE: 110 MMHG | DIASTOLIC BLOOD PRESSURE: 60 MMHG | HEART RATE: 89 BPM

## 2022-09-20 DIAGNOSIS — I50.23 ACUTE ON CHRONIC SYSTOLIC CHF (CONGESTIVE HEART FAILURE): Primary | ICD-10-CM

## 2022-09-20 DIAGNOSIS — Z95.5 STENTED CORONARY ARTERY: ICD-10-CM

## 2022-09-20 DIAGNOSIS — I21.4 NSTEMI (NON-ST ELEVATED MYOCARDIAL INFARCTION): ICD-10-CM

## 2022-09-20 PROCEDURE — 93798 PHYS/QHP OP CAR RHAB W/ECG: CPT

## 2022-09-20 NOTE — PROGRESS NOTES
Pt attended phase II visit.  Tolerated exercise well, NAD noted, no complaints voiced, skin warm, pink, dry, resp nl and even, denies chest discomfort, denies SOA.  Monitor shows NSR-ST, V/S WDL, see [place] documentation for exercise data.  Dr. Ennis physician immediately available .

## 2022-09-22 ENCOUNTER — TREATMENT (OUTPATIENT)
Dept: CARDIAC REHAB | Facility: HOSPITAL | Age: 67
End: 2022-09-22

## 2022-09-22 VITALS — OXYGEN SATURATION: 96 % | SYSTOLIC BLOOD PRESSURE: 112 MMHG | DIASTOLIC BLOOD PRESSURE: 66 MMHG | HEART RATE: 86 BPM

## 2022-09-22 DIAGNOSIS — Z95.5 STENTED CORONARY ARTERY: ICD-10-CM

## 2022-09-22 DIAGNOSIS — I21.4 NSTEMI (NON-ST ELEVATED MYOCARDIAL INFARCTION): ICD-10-CM

## 2022-09-22 DIAGNOSIS — I50.23 ACUTE ON CHRONIC SYSTOLIC CHF (CONGESTIVE HEART FAILURE): Primary | ICD-10-CM

## 2022-09-22 PROCEDURE — 93798 PHYS/QHP OP CAR RHAB W/ECG: CPT

## 2022-09-22 NOTE — PROGRESS NOTES
Pt attended phase II visit.  Tolerated exercise well, NAD noted, no complaints voiced, skin warm, pink, dry, resp nl and even, denies chest discomfort, denies SOA.  Monitor shows NSR-ST, V/S WDL, see [place] documentation for exercise data.  Dr. Ennis physician immediately available .

## 2022-09-27 ENCOUNTER — TREATMENT (OUTPATIENT)
Dept: CARDIAC REHAB | Facility: HOSPITAL | Age: 67
End: 2022-09-27

## 2022-09-27 VITALS — HEART RATE: 99 BPM | DIASTOLIC BLOOD PRESSURE: 60 MMHG | SYSTOLIC BLOOD PRESSURE: 110 MMHG | OXYGEN SATURATION: 98 %

## 2022-09-27 DIAGNOSIS — I21.4 NSTEMI (NON-ST ELEVATED MYOCARDIAL INFARCTION): ICD-10-CM

## 2022-09-27 DIAGNOSIS — I50.23 ACUTE ON CHRONIC SYSTOLIC CHF (CONGESTIVE HEART FAILURE): Primary | ICD-10-CM

## 2022-09-27 DIAGNOSIS — Z95.5 STENTED CORONARY ARTERY: ICD-10-CM

## 2022-09-27 PROCEDURE — 93798 PHYS/QHP OP CAR RHAB W/ECG: CPT

## 2022-09-27 NOTE — PROGRESS NOTES
Pt attended phase II visit.  Tolerated exercise well, NAD noted, no complaints voiced, skin warm, pink, dry, resp nl and even, denies chest discomfort, denies SOA.  Monitor shows NSR-ST, V/S WDL, see [place] documentation for exercise data.  Dr. Galaviz physician immediately available .

## 2022-09-29 ENCOUNTER — TELEPHONE (OUTPATIENT)
Dept: FAMILY MEDICINE CLINIC | Facility: CLINIC | Age: 67
End: 2022-09-29

## 2022-09-29 ENCOUNTER — APPOINTMENT (OUTPATIENT)
Dept: CARDIAC REHAB | Facility: HOSPITAL | Age: 67
End: 2022-09-29

## 2022-09-29 NOTE — TELEPHONE ENCOUNTER
Contacted patient regarding overdue fasting labs. Patient says she will come in sometime next week to get these done.

## 2022-10-04 ENCOUNTER — TREATMENT (OUTPATIENT)
Dept: CARDIAC REHAB | Facility: HOSPITAL | Age: 67
End: 2022-10-04

## 2022-10-04 VITALS — DIASTOLIC BLOOD PRESSURE: 70 MMHG | SYSTOLIC BLOOD PRESSURE: 120 MMHG | HEART RATE: 97 BPM | OXYGEN SATURATION: 98 %

## 2022-10-04 DIAGNOSIS — I50.23 ACUTE ON CHRONIC SYSTOLIC CHF (CONGESTIVE HEART FAILURE): Primary | ICD-10-CM

## 2022-10-04 DIAGNOSIS — Z95.5 STENTED CORONARY ARTERY: ICD-10-CM

## 2022-10-04 DIAGNOSIS — I21.4 NSTEMI (NON-ST ELEVATED MYOCARDIAL INFARCTION): ICD-10-CM

## 2022-10-04 PROCEDURE — 93798 PHYS/QHP OP CAR RHAB W/ECG: CPT

## 2022-10-04 NOTE — PROGRESS NOTES
Pt attended phase II visit.  Tolerated exercise well, NAD noted, no complaints voiced, skin warm, pink, dry, resp nl and even, denies chest discomfort, denies SOA.  Monitor shows NSR-ST, V/S WDL, see [place] documentation for exercise data.  Dr. Ennis physician immediately available .

## 2022-10-06 ENCOUNTER — TREATMENT (OUTPATIENT)
Dept: CARDIAC REHAB | Facility: HOSPITAL | Age: 67
End: 2022-10-06

## 2022-10-06 VITALS — OXYGEN SATURATION: 98 % | HEART RATE: 88 BPM | SYSTOLIC BLOOD PRESSURE: 120 MMHG | DIASTOLIC BLOOD PRESSURE: 60 MMHG

## 2022-10-06 DIAGNOSIS — I50.23 ACUTE ON CHRONIC SYSTOLIC CHF (CONGESTIVE HEART FAILURE): Primary | ICD-10-CM

## 2022-10-06 DIAGNOSIS — I21.4 NSTEMI (NON-ST ELEVATED MYOCARDIAL INFARCTION): ICD-10-CM

## 2022-10-06 DIAGNOSIS — Z95.5 STENTED CORONARY ARTERY: ICD-10-CM

## 2022-10-06 PROCEDURE — 93798 PHYS/QHP OP CAR RHAB W/ECG: CPT

## 2022-10-06 NOTE — PROGRESS NOTES
Pt attended phase II visit.  Tolerated exercise well, NAD noted, no complaints voiced, skin warm, pink, dry, resp nl and even, denies chest discomfort, denies SOA.  Monitor shows NSR-ST, V/S WDL, see [place] documentation for exercise data.  Dr. Ennis physician immediately available .

## 2022-10-11 ENCOUNTER — TREATMENT (OUTPATIENT)
Dept: CARDIAC REHAB | Facility: HOSPITAL | Age: 67
End: 2022-10-11

## 2022-10-11 VITALS — OXYGEN SATURATION: 99 % | HEART RATE: 98 BPM | DIASTOLIC BLOOD PRESSURE: 68 MMHG | SYSTOLIC BLOOD PRESSURE: 110 MMHG

## 2022-10-11 DIAGNOSIS — I50.23 ACUTE ON CHRONIC SYSTOLIC CHF (CONGESTIVE HEART FAILURE): Primary | ICD-10-CM

## 2022-10-11 DIAGNOSIS — Z95.5 STENTED CORONARY ARTERY: ICD-10-CM

## 2022-10-11 DIAGNOSIS — I21.4 NSTEMI (NON-ST ELEVATED MYOCARDIAL INFARCTION): ICD-10-CM

## 2022-10-11 PROBLEM — I10 BENIGN ESSENTIAL HYPERTENSION: Status: ACTIVE | Noted: 2022-10-11

## 2022-10-11 PROBLEM — E78.2 MIXED HYPERLIPIDEMIA: Status: ACTIVE | Noted: 2022-10-11

## 2022-10-11 PROCEDURE — 93798 PHYS/QHP OP CAR RHAB W/ECG: CPT

## 2022-10-11 NOTE — PROGRESS NOTES
Pt attended phase II visit.  Tolerated exercise well, NAD noted, no complaints voiced, skin warm, pink, dry, resp nl and even, denies chest discomfort, denies SOA.  Monitor shows NSR-ST, V/S WDL, see [place] documentation for exercise data.  Dr. Faye physician immediately available .

## 2022-10-13 ENCOUNTER — TREATMENT (OUTPATIENT)
Dept: CARDIAC REHAB | Facility: HOSPITAL | Age: 67
End: 2022-10-13

## 2022-10-13 VITALS — OXYGEN SATURATION: 98 % | DIASTOLIC BLOOD PRESSURE: 66 MMHG | SYSTOLIC BLOOD PRESSURE: 118 MMHG | HEART RATE: 77 BPM

## 2022-10-13 DIAGNOSIS — Z95.5 STENTED CORONARY ARTERY: ICD-10-CM

## 2022-10-13 DIAGNOSIS — I21.4 NSTEMI (NON-ST ELEVATED MYOCARDIAL INFARCTION): ICD-10-CM

## 2022-10-13 DIAGNOSIS — I50.23 ACUTE ON CHRONIC SYSTOLIC CHF (CONGESTIVE HEART FAILURE): Primary | ICD-10-CM

## 2022-10-13 PROCEDURE — 93798 PHYS/QHP OP CAR RHAB W/ECG: CPT

## 2022-10-13 NOTE — PROGRESS NOTES
Pt attended phase II visit.  Tolerated exercise well, NAD noted, no complaints voiced, skin warm, pink, dry, resp nl and even, denies chest discomfort, denies SOA.  Monitor shows NSR-ST, V/S WDL, see [place] documentation for exercise data.  Dr. Faye physician immediately available .

## 2022-10-18 ENCOUNTER — TREATMENT (OUTPATIENT)
Dept: CARDIAC REHAB | Facility: HOSPITAL | Age: 67
End: 2022-10-18

## 2022-10-18 VITALS — DIASTOLIC BLOOD PRESSURE: 68 MMHG | OXYGEN SATURATION: 100 % | SYSTOLIC BLOOD PRESSURE: 122 MMHG | HEART RATE: 98 BPM

## 2022-10-18 DIAGNOSIS — I50.23 ACUTE ON CHRONIC SYSTOLIC CHF (CONGESTIVE HEART FAILURE): Primary | ICD-10-CM

## 2022-10-18 DIAGNOSIS — I21.4 NSTEMI (NON-ST ELEVATED MYOCARDIAL INFARCTION): ICD-10-CM

## 2022-10-18 DIAGNOSIS — Z95.5 STENTED CORONARY ARTERY: ICD-10-CM

## 2022-10-18 PROCEDURE — 93798 PHYS/QHP OP CAR RHAB W/ECG: CPT

## 2022-10-18 NOTE — PROGRESS NOTES
Pt attended phase II visit.  Tolerated exercise well, NAD noted, no complaints voiced, skin warm, pink, dry, resp nl and even, denies chest discomfort, denies SOA.  Monitor shows NSR-ST, V/S WDL, see [place] documentation for exercise data.  Dr. Faye physician immediately available .

## 2022-10-20 ENCOUNTER — TREATMENT (OUTPATIENT)
Dept: CARDIAC REHAB | Facility: HOSPITAL | Age: 67
End: 2022-10-20

## 2022-10-20 VITALS — DIASTOLIC BLOOD PRESSURE: 82 MMHG | SYSTOLIC BLOOD PRESSURE: 132 MMHG | HEART RATE: 91 BPM

## 2022-10-20 DIAGNOSIS — Z95.5 STENTED CORONARY ARTERY: ICD-10-CM

## 2022-10-20 DIAGNOSIS — I21.4 NSTEMI (NON-ST ELEVATED MYOCARDIAL INFARCTION): ICD-10-CM

## 2022-10-20 DIAGNOSIS — I50.23 ACUTE ON CHRONIC SYSTOLIC CHF (CONGESTIVE HEART FAILURE): Primary | ICD-10-CM

## 2022-10-20 PROCEDURE — 93798 PHYS/QHP OP CAR RHAB W/ECG: CPT

## 2022-10-20 NOTE — PROGRESS NOTES
Pt attended phase II visit.  Tolerated exercise well, NAD noted, no complaints voiced, skin warm, pink, dry, resp nl and even, denies chest discomfort, denies SOA.  Monitor shows NSR-ST, V/S WDL, see [place] documentation for exercise data.  Dr. Faye physician immediately available .

## 2022-10-21 ENCOUNTER — OFFICE VISIT (OUTPATIENT)
Dept: CARDIOLOGY | Facility: CLINIC | Age: 67
End: 2022-10-21

## 2022-10-21 VITALS — HEART RATE: 92 BPM | SYSTOLIC BLOOD PRESSURE: 157 MMHG | DIASTOLIC BLOOD PRESSURE: 82 MMHG

## 2022-10-21 DIAGNOSIS — I25.10 CORONARY ARTERY DISEASE DUE TO LIPID RICH PLAQUE: ICD-10-CM

## 2022-10-21 DIAGNOSIS — I25.83 CORONARY ARTERY DISEASE DUE TO LIPID RICH PLAQUE: ICD-10-CM

## 2022-10-21 DIAGNOSIS — I50.22 CHRONIC HFREF (HEART FAILURE WITH REDUCED EJECTION FRACTION): Primary | ICD-10-CM

## 2022-10-21 PROCEDURE — 93000 ELECTROCARDIOGRAM COMPLETE: CPT | Performed by: PHYSICIAN ASSISTANT

## 2022-10-21 PROCEDURE — 99214 OFFICE O/P EST MOD 30 MIN: CPT | Performed by: PHYSICIAN ASSISTANT

## 2022-10-21 RX ORDER — METOPROLOL SUCCINATE 50 MG/1
50 TABLET, EXTENDED RELEASE ORAL DAILY
Qty: 90 TABLET | Refills: 3 | Status: SHIPPED | OUTPATIENT
Start: 2022-10-21 | End: 2022-12-15 | Stop reason: DRUGHIGH

## 2022-10-21 NOTE — PROGRESS NOTES
Myrna Ramos MD  Breanna Kaba  1955  10/21/2022    Patient Active Problem List   Diagnosis   • Neuropathy   • Gout   • GERD (gastroesophageal reflux disease)   • Diabetes mellitus ([facility])   • Chronic pain   • Family history of GI malignancy   • History of adenomatous polyp of colon   • COVID-19   • Acute congestive heart failure, unspecified heart failure type ([facility])   • NSTEMI, initial episode of care ([facility])   • Chronic HFrEF (heart failure with reduced ejection fraction) ([facility])   • Benign essential hypertension   • Mixed hyperlipidemia       Dear Myrna Ramos MD:    Subjective     History of Present Illness:    Chief Complaint   Patient presents with   • Follow-up     ROUTINE-NO COMPLAINTS       Breanna Kaba is a pleasant 67 y.o. female with a past medical history significant for coronary artery disease with recent stenting of the LAD on 3/31/2022 after she presented with an acute NSTEMI, ischemic cardiomyopathy with recovered LVEF now at 50 to 55% previously as low as 20 to 25%, diabetes mellitus, essential hypertension, and dyslipidemia.  No history of tobacco abuse.  She comes in today for cardiology follow-up.    Breanna reports she has been doing excellent since she was last seen.  She is still participating in cardiac rehab although this will end next week.  She states that she is going to see if she can get a membership at a local gym to continue walking and using a stationary bicycle.  She denies any chest pains or shortness of breath.  Her heart rate has also improved and is no longer as tachycardic as she previously was.  She has been compliant with DAPT therapy since she was last seen denying any bleeding issues with this.    Allergies   Allergen Reactions   • Asa [Aspirin] GI Intolerance   • Naproxen Nausea And Vomiting   • Penicillins Hives and Rash   :      Current Outpatient Medications:   •  allopurinol (ZYLOPRIM) 300 MG tablet, Take 1 tablet by mouth Daily., Disp: 90 tablet, Rfl: 1  •   aspirin (aspirin) 81 MG EC tablet, Take 1 tablet by mouth Daily., Disp: 90 tablet, Rfl: 3  •  atorvastatin (LIPITOR) 40 MG tablet, Take 1 tablet by mouth Daily., Disp: 30 tablet, Rfl: 5  •  Blood Glucose Monitoring Suppl (ONE TOUCH ULTRA 2) w/Device kit, Use to test blood sugar before meals and at bedtime., Disp: 1 each, Rfl: 0  •  cyclobenzaprine (FLEXERIL) 5 MG tablet, Take 5 mg by mouth 3 (Three) Times a Day As Needed for Muscle Spasms., Disp: , Rfl:   •  furosemide (LASIX) 20 MG tablet, Take 1 tablet by mouth Daily., Disp: 30 tablet, Rfl: 2  •  glipizide (GLUCOTROL) 5 MG tablet, Take 5 mg by mouth Daily., Disp: , Rfl:   •  glucose blood (OneTouch Ultra) test strip, Use to test blood sugar before meals and at bedtime. (Patient taking differently: Use to test blood sugar before meals and at bedtime.), Disp: 100 each, Rfl: 0  •  insulin aspart (NovoLOG FlexPen) 100 UNIT/ML solution pen-injector sc pen, Inject 0-14 Units under the skin into the appropriate area as directed 3 (Three) Times a Day Before Meals., Disp: 15 mL, Rfl: 0  •  Insulin Glargine (BASAGLAR KWIKPEN) 100 UNIT/ML injection pen, Inject 22 Units under the skin into the appropriate area as directed Every Night., Disp: 21 mL, Rfl: 1  •  Insulin Pen Needle (Pen Needles) 31G X 6 MM misc, Use with Novolog FlexPen to inject insulin 3 times daily before meals., Disp: 100 each, Rfl: 0  •  Lancets (onetouch ultrasoft) lancets, Use to test blood sugar before meals and at bedtime., Disp: 100 each, Rfl: 0  •  metoprolol succinate XL (TOPROL-XL) 25 MG 24 hr tablet, Take 1 tablet by mouth Daily., Disp: 90 tablet, Rfl: 3  •  nateglinide (STARLIX) 120 MG tablet, Take 120 mg by mouth 3 (Three) Times a Day Before Meals., Disp: , Rfl:   •  sacubitril-valsartan (ENTRESTO) 24-26 MG tablet, Take 1 tablet by mouth Every 12 (Twelve) Hours., Disp: 60 tablet, Rfl: 2  •  ticagrelor (BRILINTA) 90 MG tablet tablet, Take 1 tablet by mouth 2 (Two) Times a Day., Disp: 60 tablet,  Rfl: 3    The following portions of the patient's history were reviewed and updated as appropriate: allergies, current medications, past family history, past medical history, past social history, past surgical history and problem list.    Social History     Tobacco Use   • Smoking status: Never   • Smokeless tobacco: Never   Vaping Use   • Vaping Use: Never used   Substance Use Topics   • Alcohol use: No   • Drug use: No         Objective   Vitals:    10/21/22 0830   BP: 157/82   Pulse: 92     There is no height or weight on file to calculate BMI.    Constitutional:       General: Not in acute distress.     Appearance: Healthy appearance. Well-developed and not in distress. Not diaphoretic.   Eyes:      Conjunctiva/sclera: Conjunctivae normal.      Pupils: Pupils are equal, round, and reactive to light.   HENT:      Head: Normocephalic and atraumatic.   Neck:      Vascular: No carotid bruit or JVD.   Pulmonary:      Effort: Pulmonary effort is normal. No respiratory distress.      Breath sounds: Normal breath sounds.   Cardiovascular:      Normal rate. Regular rhythm.   Skin:     General: Skin is cool.   Neurological:      Mental Status: Alert, oriented to person, place, and time and oriented to person, place and time.       Lab Results   Component Value Date     07/25/2022    K 5.2 07/25/2022     07/25/2022    CO2 23.1 07/25/2022    BUN 35 (H) 07/25/2022    CREATININE 1.16 (H) 07/25/2022    GLUCOSE 215 (H) 07/25/2022    CALCIUM 10.1 07/25/2022    AST 17 07/25/2022    ALT 13 07/25/2022    ALKPHOS 123 (H) 07/25/2022     No results found for: CKTOTAL  Lab Results   Component Value Date    WBC 8.54 06/13/2022    HGB 12.2 06/13/2022    HCT 38.0 06/13/2022     06/13/2022     Lab Results   Component Value Date    INR 1.04 03/28/2022     Lab Results   Component Value Date    MG 1.6 04/02/2022     Lab Results   Component Value Date    TSH 1.260 06/06/2022    CHLPL 256 (H) 03/01/2017    TRIG 79 03/29/2022     HDL 54 03/29/2022    LDL 58 03/29/2022      No results found for: BNP    During this visit the following were done:  Labs Reviewed []    Labs Ordered []    Radiology Reports Reviewed []    Radiology Ordered []    PCP Records Reviewed []    Referring Provider Records Reviewed []    ER Records Reviewed []    Hospital Records Reviewed []    History Obtained From Family []    Radiology Images Reviewed []    Other Reviewed []    Records Requested []         ECG 12 Lead    Date/Time: 10/21/2022 8:30 AM  Performed by: Da Fox PA-C  Authorized by: Da Fox PA-C   Comparison: compared with previous ECG   Similar to previous ECG  Rhythm: sinus rhythm  Conduction: conduction normal  ST Segments: ST segments normal    Clinical impression: normal ECG            Assessment & Plan    Diagnosis Plan   1. Chronic HFrEF (heart failure with reduced ejection fraction) (HCC)        2. Coronary artery disease due to lipid rich plaque                 Recommendations:  1. Chronic HFrEF with improved EF  aCarlos Carlos is doing excellent still participating in cardiac rehab although this will in the next week.  I encouraged her to get a membership at the local gym so she can continue her regular exercises which she plans on doing.  b. I will continue current regimen with aspirin, Brilinta, atorvastatin, metoprolol succinate, and Entresto.  c. I think she would be a great candidate for SGLT2 inhibitor such as Farxiga however she is currently on insulin and reports to me that she thinks that she is having moments of low blood glucose level although her A1c is 8.6.  I will await her PCPs evaluation on if she would be a good candidate for Farxiga given her insulin therapy.  2. Coronary artery disease  a. Chest pain-free and is exercising regularly.  We will continue current regimen.  3. Essential hypertension  a. Blood pressure is elevated today, I will increase metoprolol to 50 mg daily.     No follow-ups on file.    As always,  I appreciate very much the opportunity to participate in the cardiovascular care of your patients.      With Best Regards,    Da Fox PA-C

## 2022-10-24 ENCOUNTER — TREATMENT (OUTPATIENT)
Dept: CARDIAC REHAB | Facility: HOSPITAL | Age: 67
End: 2022-10-24

## 2022-10-24 VITALS — OXYGEN SATURATION: 98 % | HEART RATE: 87 BPM | DIASTOLIC BLOOD PRESSURE: 60 MMHG | SYSTOLIC BLOOD PRESSURE: 118 MMHG

## 2022-10-24 DIAGNOSIS — I50.23 ACUTE ON CHRONIC SYSTOLIC CHF (CONGESTIVE HEART FAILURE): Primary | ICD-10-CM

## 2022-10-24 DIAGNOSIS — Z95.5 STENTED CORONARY ARTERY: ICD-10-CM

## 2022-10-24 DIAGNOSIS — I21.4 NSTEMI (NON-ST ELEVATED MYOCARDIAL INFARCTION): ICD-10-CM

## 2022-10-24 PROCEDURE — 93798 PHYS/QHP OP CAR RHAB W/ECG: CPT

## 2022-10-24 NOTE — PROGRESS NOTES
Pt attended phase II visit.  Tolerated exercise well, NAD noted, no complaints voiced, skin warm, pink, dry, resp nl and even, denies chest discomfort, denies SOA.  Monitor shows NSR-ST, V/S WDL, see [place] documentation for exercise data.  Dr. Thanh Badillo physician immediately available .

## 2022-10-25 ENCOUNTER — TREATMENT (OUTPATIENT)
Dept: CARDIAC REHAB | Facility: HOSPITAL | Age: 67
End: 2022-10-25

## 2022-10-25 VITALS
BODY MASS INDEX: 24.55 KG/M2 | HEART RATE: 79 BPM | SYSTOLIC BLOOD PRESSURE: 112 MMHG | OXYGEN SATURATION: 98 % | DIASTOLIC BLOOD PRESSURE: 60 MMHG | WEIGHT: 134.2 LBS

## 2022-10-25 DIAGNOSIS — Z95.5 STENTED CORONARY ARTERY: ICD-10-CM

## 2022-10-25 DIAGNOSIS — I50.23 ACUTE ON CHRONIC SYSTOLIC CHF (CONGESTIVE HEART FAILURE): Primary | ICD-10-CM

## 2022-10-25 DIAGNOSIS — I21.4 NSTEMI (NON-ST ELEVATED MYOCARDIAL INFARCTION): ICD-10-CM

## 2022-10-25 PROCEDURE — 93798 PHYS/QHP OP CAR RHAB W/ECG: CPT

## 2022-10-25 NOTE — PROGRESS NOTES
Pt attended phase II visit.  Tolerated exercise well, NAD noted, no complaints voiced, skin warm, pink, dry, resp nl and even, denies chest discomfort, denies SOA.  Monitor shows NSR-ST, V/S WDL, see [place] documentation for exercise data.  Dr. Thanh Badillo physician immediately available .     Discussed Breanna's goal set at the beginning of the program of beginning a personal exercise program. Informed patient of options for exercise after discharge, including checking on fitness centers in their area.  We discussed the Silver Sneakers Program , Educated on calling insurance to see if they have Silver Sneakers or other exercise  programs available .    Patient stated she is joining the gym were he grandson goes. She stated she has improved so much with the program she doesn't want to have a back set. We discussed the s/s to be aware of and she stated she knew since we discussed those so much in here.

## 2022-10-27 ENCOUNTER — APPOINTMENT (OUTPATIENT)
Dept: CARDIAC REHAB | Facility: HOSPITAL | Age: 67
End: 2022-10-27

## 2022-11-22 ENCOUNTER — HOSPITAL ENCOUNTER (OUTPATIENT)
Dept: MAMMOGRAPHY | Facility: HOSPITAL | Age: 67
Discharge: HOME | End: 2022-11-22
Admitting: FAMILY MEDICINE

## 2022-11-22 DIAGNOSIS — Z12.31 VISIT FOR SCREENING MAMMOGRAM: ICD-10-CM

## 2022-11-22 PROCEDURE — 77063 BREAST TOMOSYNTHESIS BI: CPT

## 2022-11-22 PROCEDURE — 77067 SCR MAMMO BI INCL CAD: CPT | Performed by: RADIOLOGY

## 2022-11-22 PROCEDURE — 77063 BREAST TOMOSYNTHESIS BI: CPT | Performed by: RADIOLOGY

## 2022-11-22 PROCEDURE — 77067 SCR MAMMO BI INCL CAD: CPT

## 2022-11-28 ENCOUNTER — OFFICE VISIT (OUTPATIENT)
Dept: FAMILY MEDICINE CLINIC | Facility: CLINIC | Age: 67
End: 2022-11-28

## 2022-11-28 VITALS
WEIGHT: 130.6 LBS | SYSTOLIC BLOOD PRESSURE: 136 MMHG | OXYGEN SATURATION: 97 % | BODY MASS INDEX: 24.03 KG/M2 | HEART RATE: 93 BPM | TEMPERATURE: 97.8 F | HEIGHT: 62 IN | DIASTOLIC BLOOD PRESSURE: 70 MMHG

## 2022-11-28 DIAGNOSIS — E11.69 HYPERLIPIDEMIA DUE TO TYPE 2 DIABETES MELLITUS: ICD-10-CM

## 2022-11-28 DIAGNOSIS — N18.2 CKD (CHRONIC KIDNEY DISEASE), STAGE II: ICD-10-CM

## 2022-11-28 DIAGNOSIS — E11.40 TYPE 2 DIABETES MELLITUS WITH DIABETIC NEUROPATHY, WITH LONG-TERM CURRENT USE OF INSULIN: Primary | ICD-10-CM

## 2022-11-28 DIAGNOSIS — M1A.09X0 IDIOPATHIC CHRONIC GOUT OF MULTIPLE SITES WITHOUT TOPHUS: ICD-10-CM

## 2022-11-28 DIAGNOSIS — E78.2 MIXED HYPERLIPIDEMIA: ICD-10-CM

## 2022-11-28 DIAGNOSIS — E11.40 TYPE 2 DIABETES MELLITUS WITH DIABETIC NEUROPATHY, UNSPECIFIED WHETHER LONG TERM INSULIN USE: ICD-10-CM

## 2022-11-28 DIAGNOSIS — Z79.4 TYPE 2 DIABETES MELLITUS WITH DIABETIC NEUROPATHY, WITH LONG-TERM CURRENT USE OF INSULIN: Primary | ICD-10-CM

## 2022-11-28 DIAGNOSIS — E78.5 HYPERLIPIDEMIA DUE TO TYPE 2 DIABETES MELLITUS: ICD-10-CM

## 2022-11-28 PROCEDURE — 82043 UR ALBUMIN QUANTITATIVE: CPT | Performed by: FAMILY MEDICINE

## 2022-11-28 PROCEDURE — 99214 OFFICE O/P EST MOD 30 MIN: CPT | Performed by: FAMILY MEDICINE

## 2022-11-28 PROCEDURE — 82570 ASSAY OF URINE CREATININE: CPT | Performed by: FAMILY MEDICINE

## 2022-11-28 PROCEDURE — 81001 URINALYSIS AUTO W/SCOPE: CPT | Performed by: FAMILY MEDICINE

## 2022-11-28 PROCEDURE — 84550 ASSAY OF BLOOD/URIC ACID: CPT | Performed by: FAMILY MEDICINE

## 2022-11-28 PROCEDURE — 80053 COMPREHEN METABOLIC PANEL: CPT | Performed by: FAMILY MEDICINE

## 2022-11-28 PROCEDURE — 80061 LIPID PANEL: CPT | Performed by: FAMILY MEDICINE

## 2022-11-28 PROCEDURE — 84156 ASSAY OF PROTEIN URINE: CPT | Performed by: FAMILY MEDICINE

## 2022-11-28 RX ORDER — ATORVASTATIN CALCIUM 40 MG/1
40 TABLET, FILM COATED ORAL DAILY
Qty: 30 TABLET | Refills: 5 | Status: SHIPPED | OUTPATIENT
Start: 2022-11-28

## 2022-11-28 NOTE — PROGRESS NOTES
"Chief Complaint  Chronic Kidney Disease    Subjective          Breanna Kaba presents to [facility]  Diabetes  She presents for her follow-up diabetic visit. She has type 2 diabetes mellitus. Her disease course has been stable. Risk factors for coronary artery disease include diabetes mellitus, dyslipidemia and hypertension. Current diabetic treatment includes insulin injections. She is following a generally healthy diet. She participates in exercise every other day.   Chronic Kidney Disease  This is a chronic problem. The current episode started more than 1 year ago. The problem has been gradually improving. She has tried drinking (d/c metformin) for the symptoms. The treatment provided significant relief.   Hyperlipidemia  This is a chronic problem. The current episode started more than 1 year ago. Current antihyperlipidemic treatment includes statins. The current treatment provides significant improvement of lipids.       Review of Systems      Objective   Vital Signs:   /70 (BP Location: Right arm, Patient Position: Sitting, Cuff Size: Large Adult)   Pulse 93   Temp 97.8 °F (36.6 °C) (Temporal)   Ht 157.5 cm (62\")   Wt 59.2 kg (130 lb 9.6 oz)   SpO2 97%   BMI 23.89 kg/m²     Physical Exam  Constitutional:       General: She is not in acute distress.     Appearance: Normal appearance. She is well-developed and well-groomed. She is not ill-appearing, toxic-appearing or diaphoretic.   HENT:      Head: Normocephalic.      Nose: Nose normal. No congestion or rhinorrhea.      Mouth/Throat:      Mouth: Mucous membranes are moist.      Pharynx: Oropharynx is clear. No oropharyngeal exudate or posterior oropharyngeal erythema.   Eyes:      General: Lids are normal.         Right eye: No discharge.         Left eye: No discharge.      Extraocular Movements: Extraocular movements intact.      Pupils: Pupils are equal, round, and reactive to light.   Neck:      Vascular: No " carotid bruit.   Cardiovascular:      Rate and Rhythm: Normal rate and regular rhythm.      Pulses: Normal pulses.      Heart sounds: Normal heart sounds. No murmur heard.    No friction rub. No gallop.   Pulmonary:      Effort: Pulmonary effort is normal. No respiratory distress.      Breath sounds: Normal breath sounds. No stridor. No wheezing, rhonchi or rales.   Chest:      Chest wall: No tenderness.   Abdominal:      General: There is no distension.      Palpations: There is no mass.      Tenderness: There is no abdominal tenderness. There is no right CVA tenderness, left CVA tenderness, guarding or rebound.      Hernia: No hernia is present.   Musculoskeletal:         General: No swelling or tenderness. Normal range of motion.      Cervical back: Normal range of motion and neck supple. No rigidity or tenderness.      Right lower leg: No edema.      Left lower leg: No edema.   Lymphadenopathy:      Cervical: No cervical adenopathy.   Skin:     General: Skin is warm.      Capillary Refill: Capillary refill takes less than 2 seconds.      Coloration: Skin is not jaundiced.      Findings: No bruising, erythema or rash.   Neurological:      General: No focal deficit present.      Mental Status: She is alert and oriented to person, place, and time.      Motor: Motor function is intact. No weakness.      Coordination: Coordination is intact.      Gait: Gait is intact. Gait normal.   Psychiatric:         Attention and Perception: Attention normal.         Mood and Affect: Mood normal.         Speech: Speech normal.         Behavior: Behavior normal.         Cognition and Memory: Cognition normal.         Judgment: Judgment normal.        Result Review :                 Assessment and Plan    Diagnoses and all orders for this visit:    1. Type 2 diabetes mellitus with diabetic neuropathy, with long-term current use of insulin (HCC) (Primary)  Comments:  continue current medications at this time    2. Mixed  hyperlipidemia  -     atorvastatin (LIPITOR) 40 MG tablet; Take 1 tablet by mouth Daily.  Dispense: 30 tablet; Refill: 5      Patient's Body mass index is 23.89 kg/m². indicating that she is within normal range (BMI 18.5-24.9). No BMI management plan needed..    Follow Up   Return in about 3 months (around 2/28/2023), or labs today.  Patient was given instructions and counseling regarding her condition or for health maintenance advice. Please see specific information pulled into the AVS if appropriate.     This document has been electronically signed by REGINALDO Alberto  November 28, 2022 09:02 EST

## 2022-11-29 ENCOUNTER — HOSPITAL ENCOUNTER (OUTPATIENT)
Dept: MAMMOGRAPHY | Facility: HOSPITAL | Age: 67
Discharge: HOME | End: 2022-11-29
Admitting: RADIOLOGY

## 2022-11-29 DIAGNOSIS — R92.8 ABNORMAL MAMMOGRAM: ICD-10-CM

## 2022-11-29 LAB
ALBUMIN SERPL-MCNC: 4.5 G/DL (ref 3.5–5.2)
ALBUMIN UR-MCNC: 25.2 MG/DL
ALBUMIN/GLOB SERPL: 1.4 G/DL
ALP SERPL-CCNC: 165 U/L (ref 39–117)
ALT SERPL W P-5'-P-CCNC: 14 U/L (ref 1–33)
ANION GAP SERPL CALCULATED.3IONS-SCNC: 16 MMOL/L (ref 5–15)
AST SERPL-CCNC: 16 U/L (ref 1–32)
BACTERIA UR QL AUTO: ABNORMAL /HPF
BILIRUB SERPL-MCNC: 0.4 MG/DL (ref 0–1.2)
BILIRUB UR QL STRIP: NEGATIVE
BUN SERPL-MCNC: 18 MG/DL (ref 8–23)
BUN/CREAT SERPL: 21.2 (ref 7–25)
CALCIUM SPEC-SCNC: 10 MG/DL (ref 8.6–10.5)
CHLORIDE SERPL-SCNC: 93 MMOL/L (ref 98–107)
CHOLEST SERPL-MCNC: 229 MG/DL (ref 0–200)
CLARITY UR: CLEAR
CO2 SERPL-SCNC: 26 MMOL/L (ref 22–29)
COLOR UR: YELLOW
CREAT SERPL-MCNC: 0.85 MG/DL (ref 0.57–1)
CREAT UR-MCNC: 51 MG/DL
CREAT UR-MCNC: 51 MG/DL
EGFRCR SERPLBLD CKD-EPI 2021: 74.3 ML/MIN/1.73
GLOBULIN UR ELPH-MCNC: 3.3 GM/DL
GLUCOSE SERPL-MCNC: 391 MG/DL (ref 65–99)
GLUCOSE UR STRIP-MCNC: ABNORMAL MG/DL
HDLC SERPL-MCNC: 44 MG/DL (ref 40–60)
HGB UR QL STRIP.AUTO: NEGATIVE
HYALINE CASTS UR QL AUTO: ABNORMAL /LPF
KETONES UR QL STRIP: NEGATIVE
LDLC SERPL CALC-MCNC: 127 MG/DL (ref 0–100)
LDLC/HDLC SERPL: 2.71 {RATIO}
LEUKOCYTE ESTERASE UR QL STRIP.AUTO: NEGATIVE
MICROALBUMIN/CREAT UR: 494.1 MG/G
NITRITE UR QL STRIP: NEGATIVE
PH UR STRIP.AUTO: 6 [PH] (ref 5–8)
POTASSIUM SERPL-SCNC: 4.7 MMOL/L (ref 3.5–5.2)
PROT ?TM UR-MCNC: 45.8 MG/DL
PROT SERPL-MCNC: 7.8 G/DL (ref 6–8.5)
PROT UR QL STRIP: ABNORMAL
PROT/CREAT UR: 898 MG/G CREA (ref 0–200)
RBC # UR STRIP: ABNORMAL /HPF
REF LAB TEST METHOD: ABNORMAL
SODIUM SERPL-SCNC: 135 MMOL/L (ref 136–145)
SP GR UR STRIP: >=1.03 (ref 1–1.03)
SQUAMOUS #/AREA URNS HPF: ABNORMAL /HPF
TRIGL SERPL-MCNC: 328 MG/DL (ref 0–150)
URATE SERPL-MCNC: 2.6 MG/DL (ref 2.4–5.7)
UROBILINOGEN UR QL STRIP: ABNORMAL
VLDLC SERPL-MCNC: 58 MG/DL (ref 5–40)
WBC # UR STRIP: ABNORMAL /HPF

## 2022-11-29 PROCEDURE — 77065 DX MAMMO INCL CAD UNI: CPT

## 2022-11-29 PROCEDURE — 77065 DX MAMMO INCL CAD UNI: CPT | Performed by: RADIOLOGY

## 2022-11-30 ENCOUNTER — TELEPHONE (OUTPATIENT)
Dept: FAMILY MEDICINE CLINIC | Facility: CLINIC | Age: 67
End: 2022-11-30

## 2022-12-01 NOTE — TELEPHONE ENCOUNTER
Her lipid panel is worse, has she been taking her lipitor as prescribed? Her glucose was close to 400 when she had her labs drawn, I seen in her last note her nephrologist had stopped her metformin how have her sugars been running at home? Also has she been having urinary symptoms she did have some bacteria in her urine, when does she see her nephrologist again as her protein in her urine has increased.

## 2022-12-02 NOTE — TELEPHONE ENCOUNTER
Her lipid panel is worse, has she been taking her lipitor as prescribed? Her glucose was close to 400 when she had her labs drawn, I seen in her last note her nephrologist had stopped her metformin how have her sugars been running at home? Also has she been having urinary symptoms she did have some bacteria in her urine, when does she see her nephrologist again as her protein in her urine has increased.     Spoke with patient she reports she takes her Atorvastatin every night,her Metformin was stopped recently per Nephrology due to her kidney function,she has not checked her blood sugars recently reports they are up & down,denies any urinary symptoms,reports the Nephrologist was waiting on her labs,faxed to Dr. Newton & she will call his office for a FU.

## 2022-12-06 NOTE — TELEPHONE ENCOUNTER
Has she been taking her novolog and basaglar?       Spoke with patient she reports she is not taking either,reports it makes her sick so she stopped them.

## 2022-12-07 NOTE — TELEPHONE ENCOUNTER
Due to her kidney function she needs to restart her insulin and sliding scale insulin and follow up with pcp     Left a message to return call.    Left a second message to return call.      Spoke with patient & scheduled a FU with Primary.

## 2022-12-07 NOTE — TELEPHONE ENCOUNTER
Due to her kidney function she needs to restart her insulin and sliding scale insulin and follow up with pcp

## 2022-12-12 ENCOUNTER — TELEPHONE (OUTPATIENT)
Dept: FAMILY MEDICINE CLINIC | Facility: CLINIC | Age: 67
End: 2022-12-12

## 2022-12-12 NOTE — TELEPHONE ENCOUNTER
----- Message from Myrna Ramos MD sent at 12/11/2022 11:28 PM EST -----  Please call Breanna and make sure she is still taking that atorvastatin. Cholesterol is high. All other labs are okay.    The ASCVD Risk score (Sarai BLANCAS, et al., 2019) failed to calculate for the following reasons:    The patient has a prior MI or stroke diagnosis    Left a message to return call.( She also has a patient calls message)      Spoke with patient & she verbalized understanding,she is taking Atorvastatin,made her an appointment to see you regarding not taking her insulin because it makes her sick.There was a previous message from Gena regarding this.

## 2022-12-15 ENCOUNTER — TELEPHONE (OUTPATIENT)
Dept: FAMILY MEDICINE CLINIC | Facility: CLINIC | Age: 67
End: 2022-12-15

## 2022-12-15 ENCOUNTER — OFFICE VISIT (OUTPATIENT)
Dept: FAMILY MEDICINE CLINIC | Facility: CLINIC | Age: 67
End: 2022-12-15
Payer: MEDICARE

## 2022-12-15 VITALS
WEIGHT: 129.4 LBS | OXYGEN SATURATION: 99 % | BODY MASS INDEX: 23.81 KG/M2 | SYSTOLIC BLOOD PRESSURE: 132 MMHG | HEIGHT: 62 IN | HEART RATE: 94 BPM | DIASTOLIC BLOOD PRESSURE: 74 MMHG | TEMPERATURE: 97.1 F

## 2022-12-15 DIAGNOSIS — E11.40 TYPE 2 DIABETES MELLITUS WITH DIABETIC NEUROPATHY, WITH LONG-TERM CURRENT USE OF INSULIN: ICD-10-CM

## 2022-12-15 DIAGNOSIS — N18.2 CKD (CHRONIC KIDNEY DISEASE), STAGE II: ICD-10-CM

## 2022-12-15 DIAGNOSIS — Z79.4 TYPE 2 DIABETES MELLITUS WITH DIABETIC NEUROPATHY, WITH LONG-TERM CURRENT USE OF INSULIN: ICD-10-CM

## 2022-12-15 DIAGNOSIS — M1A.09X0 IDIOPATHIC CHRONIC GOUT OF MULTIPLE SITES WITHOUT TOPHUS: ICD-10-CM

## 2022-12-15 DIAGNOSIS — M54.9 BACK PAIN, UNSPECIFIED BACK LOCATION, UNSPECIFIED BACK PAIN LATERALITY, UNSPECIFIED CHRONICITY: ICD-10-CM

## 2022-12-15 DIAGNOSIS — E78.5 HYPERLIPIDEMIA DUE TO TYPE 2 DIABETES MELLITUS: ICD-10-CM

## 2022-12-15 DIAGNOSIS — R73.09 ELEVATED GLUCOSE: Primary | ICD-10-CM

## 2022-12-15 DIAGNOSIS — E55.9 VITAMIN D DEFICIENCY: ICD-10-CM

## 2022-12-15 DIAGNOSIS — E11.69 HYPERLIPIDEMIA DUE TO TYPE 2 DIABETES MELLITUS: ICD-10-CM

## 2022-12-15 DIAGNOSIS — I25.5 ISCHEMIC CARDIOMYOPATHY: ICD-10-CM

## 2022-12-15 DIAGNOSIS — R30.0 DYSURIA: ICD-10-CM

## 2022-12-15 LAB
ALBUMIN SERPL-MCNC: 4.8 G/DL (ref 3.5–5.2)
ALBUMIN/GLOB SERPL: 1.8 G/DL
ALP SERPL-CCNC: 191 U/L (ref 39–117)
ALT SERPL W P-5'-P-CCNC: 13 U/L (ref 1–33)
ANION GAP SERPL CALCULATED.3IONS-SCNC: 11.1 MMOL/L (ref 5–15)
AST SERPL-CCNC: 16 U/L (ref 1–32)
BASOPHILS # BLD AUTO: 0.03 10*3/MM3 (ref 0–0.2)
BASOPHILS NFR BLD AUTO: 0.5 % (ref 0–1.5)
BILIRUB BLD-MCNC: NEGATIVE MG/DL
BILIRUB SERPL-MCNC: 0.4 MG/DL (ref 0–1.2)
BUN SERPL-MCNC: 21 MG/DL (ref 8–23)
BUN/CREAT SERPL: 22.8 (ref 7–25)
CALCIUM SPEC-SCNC: 10.5 MG/DL (ref 8.6–10.5)
CHLORIDE SERPL-SCNC: 94 MMOL/L (ref 98–107)
CLARITY, POC: CLEAR
CO2 SERPL-SCNC: 25.9 MMOL/L (ref 22–29)
COLOR UR: YELLOW
CREAT SERPL-MCNC: 0.92 MG/DL (ref 0.57–1)
DEPRECATED RDW RBC AUTO: 38.5 FL (ref 37–54)
EGFRCR SERPLBLD CKD-EPI 2021: 67.5 ML/MIN/1.73
EOSINOPHIL # BLD AUTO: 0.09 10*3/MM3 (ref 0–0.4)
EOSINOPHIL NFR BLD AUTO: 1.6 % (ref 0.3–6.2)
ERYTHROCYTE [DISTWIDTH] IN BLOOD BY AUTOMATED COUNT: 12.9 % (ref 12.3–15.4)
EXPIRATION DATE: ABNORMAL
EXPIRATION DATE: NORMAL
FLUAV AG UPPER RESP QL IA.RAPID: NOT DETECTED
FLUBV AG UPPER RESP QL IA.RAPID: NOT DETECTED
GLOBULIN UR ELPH-MCNC: 2.7 GM/DL
GLUCOSE SERPL-MCNC: 673 MG/DL (ref 65–99)
GLUCOSE UR STRIP-MCNC: ABNORMAL MG/DL
HBA1C MFR BLD: 15.7 % (ref 4.8–5.6)
HCT VFR BLD AUTO: 40.1 % (ref 34–46.6)
HGB BLD-MCNC: 12.8 G/DL (ref 12–15.9)
IMM GRANULOCYTES # BLD AUTO: 0.01 10*3/MM3 (ref 0–0.05)
IMM GRANULOCYTES NFR BLD AUTO: 0.2 % (ref 0–0.5)
INTERNAL CONTROL: NORMAL
KETONES UR QL: NEGATIVE
LEUKOCYTE EST, POC: ABNORMAL
LYMPHOCYTES # BLD AUTO: 1.26 10*3/MM3 (ref 0.7–3.1)
LYMPHOCYTES NFR BLD AUTO: 22.1 % (ref 19.6–45.3)
Lab: ABNORMAL
Lab: NORMAL
MCH RBC QN AUTO: 26.5 PG (ref 26.6–33)
MCHC RBC AUTO-ENTMCNC: 31.9 G/DL (ref 31.5–35.7)
MCV RBC AUTO: 83 FL (ref 79–97)
MONOCYTES # BLD AUTO: 0.39 10*3/MM3 (ref 0.1–0.9)
MONOCYTES NFR BLD AUTO: 6.9 % (ref 5–12)
NEUTROPHILS NFR BLD AUTO: 3.91 10*3/MM3 (ref 1.7–7)
NEUTROPHILS NFR BLD AUTO: 68.7 % (ref 42.7–76)
NITRITE UR-MCNC: NEGATIVE MG/ML
NRBC BLD AUTO-RTO: 0 /100 WBC (ref 0–0.2)
PH UR: 6 [PH] (ref 5–8)
PLATELET # BLD AUTO: 364 10*3/MM3 (ref 140–450)
PMV BLD AUTO: 11.7 FL (ref 6–12)
POTASSIUM SERPL-SCNC: 4.7 MMOL/L (ref 3.5–5.2)
PROT SERPL-MCNC: 7.5 G/DL (ref 6–8.5)
PROT UR STRIP-MCNC: ABNORMAL MG/DL
RBC # BLD AUTO: 4.83 10*6/MM3 (ref 3.77–5.28)
RBC # UR STRIP: ABNORMAL /UL
SARS-COV-2 AG UPPER RESP QL IA.RAPID: NOT DETECTED
SODIUM SERPL-SCNC: 131 MMOL/L (ref 136–145)
SP GR UR: 1.01 (ref 1–1.03)
UROBILINOGEN UR QL: NORMAL
WBC NRBC COR # BLD: 5.69 10*3/MM3 (ref 3.4–10.8)

## 2022-12-15 PROCEDURE — 1159F MED LIST DOCD IN RCRD: CPT | Performed by: FAMILY MEDICINE

## 2022-12-15 PROCEDURE — 1160F RVW MEDS BY RX/DR IN RCRD: CPT | Performed by: FAMILY MEDICINE

## 2022-12-15 PROCEDURE — 80053 COMPREHEN METABOLIC PANEL: CPT | Performed by: FAMILY MEDICINE

## 2022-12-15 PROCEDURE — 85025 COMPLETE CBC W/AUTO DIFF WBC: CPT | Performed by: FAMILY MEDICINE

## 2022-12-15 PROCEDURE — 82306 VITAMIN D 25 HYDROXY: CPT | Performed by: FAMILY MEDICINE

## 2022-12-15 PROCEDURE — 81003 URINALYSIS AUTO W/O SCOPE: CPT | Performed by: FAMILY MEDICINE

## 2022-12-15 PROCEDURE — 3075F SYST BP GE 130 - 139MM HG: CPT | Performed by: FAMILY MEDICINE

## 2022-12-15 PROCEDURE — 83036 HEMOGLOBIN GLYCOSYLATED A1C: CPT | Performed by: FAMILY MEDICINE

## 2022-12-15 PROCEDURE — 99999 PR OFFICE/OUTPT VISIT,PROCEDURE ONLY: CPT | Performed by: FAMILY MEDICINE

## 2022-12-15 PROCEDURE — 87086 URINE CULTURE/COLONY COUNT: CPT | Performed by: FAMILY MEDICINE

## 2022-12-15 PROCEDURE — 87428 SARSCOV & INF VIR A&B AG IA: CPT | Performed by: FAMILY MEDICINE

## 2022-12-15 PROCEDURE — 3078F DIAST BP <80 MM HG: CPT | Performed by: FAMILY MEDICINE

## 2022-12-15 PROCEDURE — 99214 OFFICE O/P EST MOD 30 MIN: CPT | Performed by: FAMILY MEDICINE

## 2022-12-15 RX ORDER — METOPROLOL SUCCINATE 25 MG/1
25 TABLET, EXTENDED RELEASE ORAL DAILY
Qty: 30 TABLET | Refills: 2
Start: 2022-12-15 | End: 2023-04-03

## 2022-12-15 RX ORDER — INSULIN ASPART 100 [IU]/ML
0-14 INJECTION, SOLUTION INTRAVENOUS; SUBCUTANEOUS
Qty: 15 ML | Refills: 2 | Status: SHIPPED | OUTPATIENT
Start: 2022-12-15

## 2022-12-15 RX ORDER — ALLOPURINOL 300 MG/1
300 TABLET ORAL DAILY
Qty: 90 TABLET | Refills: 1 | Status: SHIPPED | OUTPATIENT
Start: 2022-12-15 | End: 2023-02-28 | Stop reason: SDUPTHER

## 2022-12-15 RX ORDER — INSULIN GLARGINE 100 [IU]/ML
22 INJECTION, SOLUTION SUBCUTANEOUS NIGHTLY
Qty: 21 ML | Refills: 1 | Status: SHIPPED | OUTPATIENT
Start: 2022-12-15

## 2022-12-15 RX ORDER — GLIPIZIDE 5 MG/1
5 TABLET ORAL DAILY
Qty: 30 TABLET | Refills: 2 | Status: SHIPPED | OUTPATIENT
Start: 2022-12-15 | End: 2023-02-28 | Stop reason: SDUPTHER

## 2022-12-15 RX ORDER — CYCLOBENZAPRINE HCL 5 MG
5 TABLET ORAL 3 TIMES DAILY PRN
Qty: 30 TABLET | Refills: 2 | Status: SHIPPED | OUTPATIENT
Start: 2022-12-15 | End: 2023-01-17

## 2022-12-15 NOTE — PROGRESS NOTES
Breanna Kaba     VITALS: Blood pressure 132/74, pulse 94, temperature 97.1 °F (36.2 °C), height 157.5 cm (62\"), weight 58.7 kg (129 lb 6.4 oz), SpO2 99 %, not currently breastfeeding.    Subjective  Chief Complaint  Diabetes    Subjective          History of Present Illness:  Patient is a 67 y.o.  female with medical conditions significant for type 2 diabetes, gout, hypertension, and CHF who presents to clinic secondary to medical follow-up. She states that the insulin is making her ill.    The patient notes that she uses her Novolog on a sliding scale, but after she takes it, she feels ill, like her blood glucose levels are too low. She also mentions that her insulin is covered by her insurance, but the co-pay is high, 150 to 200 dollars since the 2020 pandemic, and this is too expensive for her. The patient also states her glocometer at home is not working. The patient statrs she has a fe other expensive medications but is unsure which ones they are or how much they cost. Some of her regular medications include aspirin, Brillinta, Entresto, a muscle relaxer, a medication for her gout, and a hyperlipidemia medication.  She does note that her cardiologist provides her with samples of one of her expensive medications. The patient also notes neuropathic pain to her bilateral feet.    The patient complains of urinary frequency and is concerned she may have a urinary tract infection. She reports that she saw her nephrologist recently and was told her kidneys were improving and everything looked good. She notes that she has to go to [place] because her bladder is falling and there is a mesh that needs to be removed. She reports that she walks every day and she drinks a lot of water. The patient also mentions her hearing is decreased and would like her ears checked for cerum.  No complaints about any of the medications.    The following portions of the patient's history were reviewed and updated as appropriate:  allergies, current medications, past family history, past medical history, past social history, past surgical history and problem list.    Past Medical History  Past Medical History:   Diagnosis Date   • Acute congestive heart failure, unspecified heart failure type (HCC) 03/28/2022   • Arthritis    • Chronic pain    • Diabetes mellitus (HCC)    • Elevated cholesterol    • GERD (gastroesophageal reflux disease)    • Gout    • Headache    • Hypertension    • Myocardial infarction (HCC)    • Neuropathy        Surgical History  Past Surgical History:   Procedure Laterality Date   • BREAST BIOPSY Left 1988    benign   • CARDIAC CATHETERIZATION N/A 03/31/2022    Procedure: Left Heart Cath;  Surgeon: Tristan Marin MD;  Location:  [facility] CATH INVASIVE LOCATION;  Service: Cardiology;  Laterality: N/A;   • CARDIAC CATHETERIZATION N/A 03/31/2022    Procedure: Percutaneous Coronary Intervention;  Surgeon: Tristan Marin MD;  Location: [facility] CATH INVASIVE LOCATION;  Service: Cardiology;  Laterality: N/A;   • COLONOSCOPY N/A 02/17/2020    Procedure: COLONOSCOPY FOR SCREENING CPT CODE: ;  Surgeon: Mariano Prescott MD;  Location: [facility] OR;  Service: Gastroenterology;  Laterality: N/A;   • CORONARY STENT PLACEMENT     • HYSTERECTOMY      Partial   • KNEE SURGERY     • THYROIDECTOMY, PARTIAL      Removal of goiter       Family History  Family History   Problem Relation Age of Onset   • COPD Mother    • Heart disease Brother    • Heart attack Brother    • Hypertension Daughter    • Diabetes Daughter    • Hypertension Daughter    • Diabetes Daughter    • Breast cancer Neg Hx        Social History  Social History     Socioeconomic History   • Marital status:    Tobacco Use   • Smoking status: Never   • Smokeless tobacco: Never   Vaping Use   • Vaping Use: Never used   Substance and Sexual Activity   • Alcohol use: No   • Drug use: No   • Sexual activity: Defer       Objective   Vital Signs:   /74 (BP  Location: Right arm, Patient Position: Sitting)   Pulse 94   Temp 97.1 °F (36.2 °C)   Ht 157.5 cm (62\")   Wt 58.7 kg (129 lb 6.4 oz)   SpO2 99%   BMI 23.67 kg/m²     Physical Exam     Gen: Patient in NAD. Pleasant and answers appropriately. A&Ox3.  Patient looks ill.    Skin: Warm and dry with normal turgor. No purpura, rashes, or unusual pigmentation noted. Hair is normal in appearance and distribution.    HEENT: NC/AT. No lesions noted. Conjunctiva clear, sclera nonicteric. PERRL. EOMI without nystagmus or strabismus. Fundi appear benign. No hemorrhages or exudates of eyes. Auditory canals are patent bilaterally without lesions. TMs intact,  nonerythematous, nonbulging without lesions. Nasal mucosa pink, nonerythematous, and nonedematous. Frontal and maxillary sinuses are nontender. O/P nonerythematous and moist without exudate.    Neck: Supple without lymph nodes palpated. FROM.     Lungs: Decreased B/L without rales, rhonchi, crackles, or wheezes.    Heart: RRR. S1 and S2 normal. No S3 or S4. No MRGT.    Abd: Soft, nontender,nondistended. (+)BSx4 quadrants.     Extrem: No CCE. Radial pulses 2+/4 and equal B/L. FROMx4.  Positive joint tenderness noted.    Neuro: No focal motor/sensory deficits.    Procedures    Result Review :   The following data was reviewed by: Myrna Ramos MD on 12/15/2022:                Assessment and Plan    Breanna Kaba is a 67 y.o. here for medical follow-up.    Problem List Items Addressed This Visit        Endocrine and Metabolic    Diabetes mellitus (HCC)    Relevant Medications    glipizide (GLUCOTROL) 5 MG tablet    insulin aspart (NovoLOG FlexPen) 100 UNIT/ML solution pen-injector sc pen    Insulin Glargine (BASAGLAR KWIKPEN) 100 UNIT/ML injection pen    Other Relevant Orders    CBC Auto Differential    Comprehensive Metabolic Panel    Hemoglobin A1c    Laboratory tests were ordered as shown above. Refill prescriptions were sent for the patients regular medications.  The patient was given a new sliding scale for her NovoLog.  The patient was advised to call her pharmacy to see if there is a program she qualifies for to get her insulin at a cheaper price. She was also advised to note which others of her regular medications are too expensive to afford. The patient was advised to bring her glucometer to [facility] for repair. She will follow up in 4 weeks.    New sliding scale:  Blood glucose 150-199 mg/dL - 2 units   Blood glucose 200-249 mg/dL - 4 units   Blood glucose 250-299 mg/dL - 6 units   Blood glucose 300-349 mg/dL - 8 units   Blood glucose 350-400 mg/dL - 10 units   Blood glucose greater than 400 mg/dL - 12 units and call provider          Musculoskeletal and Injuries    Gout    Relevant Medications    allopurinol (ZYLOPRIM) 300 MG tablet    Other Relevant Orders    CBC Auto Differential    Comprehensive Metabolic Panel   Other Visit Diagnoses     Elevated glucose    -  Primary    Relevant Orders    CBC Auto Differential    CKD (chronic kidney disease), stage II        Relevant Orders    CBC Auto Differential    Comprehensive Metabolic Panel    The patient was advised to discontinue her lasix if it is no longer needed as it has the potential to hurt her kidneys.    Hyperlipidemia due to type 2 diabetes mellitus (HCC)        Relevant Medications    glipizide (GLUCOTROL) 5 MG tablet    insulin aspart (NovoLOG FlexPen) 100 UNIT/ML solution pen-injector sc pen    Insulin Glargine (BASAGLAR KWIKPEN) 100 UNIT/ML injection pen    Other Relevant Orders    CBC Auto Differential    Comprehensive Metabolic Panel    Ischemic cardiomyopathy        Relevant Medications    sacubitril-valsartan (ENTRESTO) 24-26 MG tablet    metoprolol succinate XL (TOPROL-XL) 25 MG 24 hr tablet    Other Relevant Orders    CBC Auto Differential    Comprehensive Metabolic Panel    Back pain, unspecified back location, unspecified back pain laterality, unspecified chronicity        Relevant Medications     cyclobenzaprine (FLEXERIL) 5 MG tablet    Other Relevant Orders    CBC Auto Differential    Comprehensive Metabolic Panel    Dysuria        Relevant Orders    Urine Culture - Urine, Urine, Clean Catch    CBC Auto Differential    Comprehensive Metabolic Panel    Vitamin D deficiency        Relevant Orders    Vitamin D,25-Hydroxy            BMI is within normal parameters. No other follow-up for BMI required.         Follow Up   Return in about 4 weeks (around 1/12/2023), or LABS; HARD 4 weeks.  Findings and plans discussed with patient who verbalizes understanding and agreement. Will followup with patient once results are in. Patient was given instructions and counseling regarding her condition or for health maintenance advice. Please see specific information pulled into the AVS if appropriate.       Transcribed from ambient dictation for Myrna Ramos MD by Marjan Amanda.  12/15/22   13:18 EST    Patient or patient representative verbalized consent to the visit recording.  I have personally performed the services described in this document as transcribed by the above individual, and it is both accurate and complete.

## 2022-12-15 NOTE — PATIENT INSTRUCTIONS
Blood glucose 150-199 mg/dL - 2 units   Blood glucose 200-249 mg/dL - 4 units   Blood glucose 250-299 mg/dL - 6 units   Blood glucose 300-349 mg/dL - 8 units   Blood glucose 350-400 mg/dL - 10 units   Blood glucose greater than 400 mg/dL - 12 units and call provider     [facility] if [facility] for free Toujeo or Lantus.  Bring your glucometer to [facility] [place], [place] and see if they fix it.

## 2022-12-16 LAB — 25(OH)D3 SERPL-MCNC: 25.7 NG/ML (ref 30–100)

## 2022-12-16 NOTE — TELEPHONE ENCOUNTER
Received call for critical lab of glucose of 673. Contacted patient, verified name and . Patient a/o x 4. Made patient aware of result. Patient reports she has been taking her novolog per sliding scale throughout today. Patient reports her glucometer is broken so she has not been able to check her glucose level. Patient denies any s/s. No pertinent positives on ROS. Patient educated on s/s to monitor for that would indicate need to go to ER. Patient verbalizes understanding and agreement. Patient instructed to call and f/u with clinic tomorrow to check on status of getting a glucometer replacement and can come to clinic for fingerstick check.

## 2022-12-17 LAB — BACTERIA SPEC AEROBE CULT: NO GROWTH

## 2022-12-28 ENCOUNTER — TELEPHONE (OUTPATIENT)
Dept: FAMILY MEDICINE CLINIC | Facility: CLINIC | Age: 67
End: 2022-12-28

## 2022-12-28 NOTE — TELEPHONE ENCOUNTER
----- Message from Myrna Ramos MD sent at 12/26/2022 11:02 PM EST -----  Please call the patient regarding her abnormal result.    Please call Breanna. Was she able to discuss with her pharmacy which long acting insulins would be cheaper for her? I think we also have samples of lantus if we are still trying to figure things out (she had reactions to basaglar).      Spoke with patient she reports she was going to call them today had not because of the Holidays,will let you know what they say.      Left a message to return call.    Breanna called back reports the cheapest is Novolog but Co-pay is $101.90,Basaglar is $88.00,we do have Lantus?

## 2022-12-29 NOTE — TELEPHONE ENCOUNTER
Is this because she's in the doughnut hole or because her [employer] Medicare all of a sudden is bad? I'm trying to decide if it's worth it to sign her up for a free program. I think she might be able to qualify.    I would go ahead and get her a lantus sample pen. Her glucose levels have been atrociously high.    Left a message to return call.      Patient came into the office & was given a sample of Lantus.

## 2022-12-29 NOTE — TELEPHONE ENCOUNTER
Is this because she's in the doughnut hole or because her [employer] Medicare all of a sudden is bad? I'm trying to decide if it's worth it to sign her up for a free program. I think she might be able to qualify.    I would go ahead and get her a lantus sample pen. Her glucose levels have been atrociously high.

## 2023-01-17 ENCOUNTER — OFFICE VISIT (OUTPATIENT)
Dept: FAMILY MEDICINE CLINIC | Facility: CLINIC | Age: 68
End: 2023-01-17
Payer: MEDICARE

## 2023-01-17 ENCOUNTER — TELEPHONE (OUTPATIENT)
Dept: FAMILY MEDICINE CLINIC | Facility: CLINIC | Age: 68
End: 2023-01-17

## 2023-01-17 VITALS
BODY MASS INDEX: 23.26 KG/M2 | HEART RATE: 73 BPM | DIASTOLIC BLOOD PRESSURE: 74 MMHG | HEIGHT: 62 IN | SYSTOLIC BLOOD PRESSURE: 134 MMHG | OXYGEN SATURATION: 99 % | TEMPERATURE: 96.4 F | WEIGHT: 126.4 LBS

## 2023-01-17 DIAGNOSIS — M1A.09X0 IDIOPATHIC CHRONIC GOUT OF MULTIPLE SITES WITHOUT TOPHUS: ICD-10-CM

## 2023-01-17 DIAGNOSIS — E11.40 TYPE 2 DIABETES MELLITUS WITH DIABETIC NEUROPATHY, WITH LONG-TERM CURRENT USE OF INSULIN: Primary | ICD-10-CM

## 2023-01-17 DIAGNOSIS — Z79.4 TYPE 2 DIABETES MELLITUS WITH DIABETIC NEUROPATHY, WITH LONG-TERM CURRENT USE OF INSULIN: Primary | ICD-10-CM

## 2023-01-17 DIAGNOSIS — R53.83 OTHER FATIGUE: ICD-10-CM

## 2023-01-17 DIAGNOSIS — I25.5 ISCHEMIC CARDIOMYOPATHY: ICD-10-CM

## 2023-01-17 LAB — GLUCOSE BLDC GLUCOMTR-MCNC: 386 MG/DL (ref 70–130)

## 2023-01-17 PROCEDURE — 82962 GLUCOSE BLOOD TEST: CPT | Performed by: FAMILY MEDICINE

## 2023-01-17 PROCEDURE — 99214 OFFICE O/P EST MOD 30 MIN: CPT | Performed by: FAMILY MEDICINE

## 2023-01-17 PROCEDURE — 96372 THER/PROPH/DIAG INJ SC/IM: CPT | Performed by: FAMILY MEDICINE

## 2023-01-17 RX ORDER — CYANOCOBALAMIN 1000 UG/ML
1000 INJECTION, SOLUTION INTRAMUSCULAR; SUBCUTANEOUS ONCE
Status: COMPLETED | OUTPATIENT
Start: 2023-01-17 | End: 2023-01-17

## 2023-01-17 RX ADMIN — CYANOCOBALAMIN 1000 MCG: 1000 INJECTION, SOLUTION INTRAMUSCULAR; SUBCUTANEOUS at 11:01

## 2023-02-28 ENCOUNTER — OFFICE VISIT (OUTPATIENT)
Dept: FAMILY MEDICINE CLINIC | Facility: CLINIC | Age: 68
End: 2023-02-28
Payer: MEDICARE

## 2023-02-28 VITALS
HEIGHT: 62 IN | OXYGEN SATURATION: 99 % | HEART RATE: 67 BPM | SYSTOLIC BLOOD PRESSURE: 118 MMHG | WEIGHT: 125.8 LBS | TEMPERATURE: 97.5 F | BODY MASS INDEX: 23.15 KG/M2 | DIASTOLIC BLOOD PRESSURE: 72 MMHG

## 2023-02-28 DIAGNOSIS — N18.2 CKD (CHRONIC KIDNEY DISEASE), STAGE II: ICD-10-CM

## 2023-02-28 DIAGNOSIS — Z79.4 TYPE 2 DIABETES MELLITUS WITH DIABETIC NEUROPATHY, WITH LONG-TERM CURRENT USE OF INSULIN: Primary | ICD-10-CM

## 2023-02-28 DIAGNOSIS — R30.0 DYSURIA: ICD-10-CM

## 2023-02-28 DIAGNOSIS — I25.5 ISCHEMIC CARDIOMYOPATHY: ICD-10-CM

## 2023-02-28 DIAGNOSIS — M1A.09X0 IDIOPATHIC CHRONIC GOUT OF MULTIPLE SITES WITHOUT TOPHUS: ICD-10-CM

## 2023-02-28 DIAGNOSIS — E11.40 TYPE 2 DIABETES MELLITUS WITH DIABETIC NEUROPATHY, WITH LONG-TERM CURRENT USE OF INSULIN: Primary | ICD-10-CM

## 2023-02-28 LAB
BILIRUB BLD-MCNC: NEGATIVE MG/DL
CLARITY, POC: ABNORMAL
COLOR UR: YELLOW
EXPIRATION DATE: ABNORMAL
GLUCOSE BLDC GLUCOMTR-MCNC: 335 MG/DL (ref 70–130)
GLUCOSE UR STRIP-MCNC: ABNORMAL MG/DL
KETONES UR QL: NEGATIVE
LEUKOCYTE EST, POC: ABNORMAL
Lab: ABNORMAL
NITRITE UR-MCNC: NEGATIVE MG/ML
PH UR: 5.5 [PH] (ref 5–8)
PROT UR STRIP-MCNC: ABNORMAL MG/DL
RBC # UR STRIP: NEGATIVE /UL
SP GR UR: 1.03 (ref 1–1.03)
UROBILINOGEN UR QL: NORMAL

## 2023-02-28 PROCEDURE — 87147 CULTURE TYPE IMMUNOLOGIC: CPT | Performed by: FAMILY MEDICINE

## 2023-02-28 PROCEDURE — 1160F RVW MEDS BY RX/DR IN RCRD: CPT | Performed by: FAMILY MEDICINE

## 2023-02-28 PROCEDURE — 3078F DIAST BP <80 MM HG: CPT | Performed by: FAMILY MEDICINE

## 2023-02-28 PROCEDURE — 81003 URINALYSIS AUTO W/O SCOPE: CPT | Performed by: FAMILY MEDICINE

## 2023-02-28 PROCEDURE — 3074F SYST BP LT 130 MM HG: CPT | Performed by: FAMILY MEDICINE

## 2023-02-28 PROCEDURE — 1159F MED LIST DOCD IN RCRD: CPT | Performed by: FAMILY MEDICINE

## 2023-02-28 PROCEDURE — 99214 OFFICE O/P EST MOD 30 MIN: CPT | Performed by: FAMILY MEDICINE

## 2023-02-28 PROCEDURE — 87086 URINE CULTURE/COLONY COUNT: CPT | Performed by: FAMILY MEDICINE

## 2023-02-28 PROCEDURE — 82962 GLUCOSE BLOOD TEST: CPT | Performed by: FAMILY MEDICINE

## 2023-02-28 RX ORDER — GLIPIZIDE 5 MG/1
5 TABLET ORAL DAILY
Qty: 30 TABLET | Refills: 2 | Status: SHIPPED | OUTPATIENT
Start: 2023-02-28

## 2023-02-28 RX ORDER — ALLOPURINOL 300 MG/1
300 TABLET ORAL DAILY
Qty: 90 TABLET | Refills: 1 | Status: SHIPPED | OUTPATIENT
Start: 2023-02-28

## 2023-03-01 LAB — BACTERIA SPEC AEROBE CULT: ABNORMAL

## 2023-03-02 ENCOUNTER — TELEPHONE (OUTPATIENT)
Dept: FAMILY MEDICINE CLINIC | Facility: CLINIC | Age: 68
End: 2023-03-02
Payer: MEDICARE

## 2023-03-02 NOTE — TELEPHONE ENCOUNTER
Caller: Breanna Kaba    Relationship: Self    Best call back number:[phone]      What was the call regarding: PATIENT STATED THAT [facility] AND [facility] DID A PAP SMEAR AND ANOTHER BREAST EXAM.        Do you require a callback: YES

## 2023-03-08 ENCOUNTER — TELEPHONE (OUTPATIENT)
Dept: FAMILY MEDICINE CLINIC | Facility: CLINIC | Age: 68
End: 2023-03-08
Payer: MEDICARE

## 2023-03-08 DIAGNOSIS — R30.0 DYSURIA: Primary | ICD-10-CM

## 2023-03-08 NOTE — TELEPHONE ENCOUNTER
PATIENT STATES HER KIDNEY'S ARE NOT BETTER, STILL CLOUDY AND DARK.  SHE IS WORRIED.  WHAT TO DO?    PLEASE CALL [phone]

## 2023-03-09 ENCOUNTER — LAB (OUTPATIENT)
Dept: FAMILY MEDICINE CLINIC | Facility: CLINIC | Age: 68
End: 2023-03-09
Payer: MEDICARE

## 2023-03-09 DIAGNOSIS — R30.0 DYSURIA: ICD-10-CM

## 2023-03-09 PROCEDURE — 87086 URINE CULTURE/COLONY COUNT: CPT | Performed by: FAMILY MEDICINE

## 2023-03-09 RX ORDER — BLOOD-GLUCOSE METER
EACH MISCELLANEOUS
Qty: 1 EACH | Refills: 0 | Status: SHIPPED | OUTPATIENT
Start: 2023-03-09

## 2023-03-09 NOTE — TELEPHONE ENCOUNTER
I put in for an urine culture if she wants to do it again as the most recent one showed normal genital bacteria. But I'm not really worried. I think it's her glucose in the urine that's making it discolored. If she brings down the glucose, it will turn back to normal.     Speaking of which, how are her glucose numbers? We changed her regimen to basaglar, rybelsus (samples), and glipizide. May have to increase on the basaglar.

## 2023-03-09 NOTE — TELEPHONE ENCOUNTER
I put in for an urine culture if she wants to do it again as the most recent one showed normal genital bacteria. But I'm not really worried. I think it's her glucose in the urine that's making it discolored. If she brings down the glucose, it will turn back to normal.     Speaking of which, how are her glucose numbers? We changed her regimen to basaglar, rybelsus (samples), and glipizide. May have to increase on the basaglar.    Spoke with patient & she verbalized understanding,her Glucometer is broken so she has been unable to check her suagr,new Glucometer sent to her pharmacy,she will come by for the lab.

## 2023-03-10 ENCOUNTER — TELEPHONE (OUTPATIENT)
Dept: FAMILY MEDICINE CLINIC | Facility: CLINIC | Age: 68
End: 2023-03-10
Payer: MEDICARE

## 2023-03-10 LAB — BACTERIA SPEC AEROBE CULT: NORMAL

## 2023-03-10 NOTE — TELEPHONE ENCOUNTER
It's normal.  
It's normal.      Left a message to return call.      Hub to read.    Patient notified & verbalized understanding.  
PHONE CALL FROM PATIENT.  PAP SMEAR CAME BACK NORMAL.  WOULD LIKE RESULTS OF URINE TEST.      PLEASE CALL [phone]  
4

## 2023-03-28 ENCOUNTER — OFFICE VISIT (OUTPATIENT)
Dept: FAMILY MEDICINE CLINIC | Facility: CLINIC | Age: 68
End: 2023-03-28
Payer: MEDICARE

## 2023-03-28 ENCOUNTER — PATIENT OUTREACH (OUTPATIENT)
Dept: CASE MANAGEMENT | Facility: OTHER | Age: 68
End: 2023-03-28
Payer: MEDICARE

## 2023-03-28 ENCOUNTER — REFERRAL TRIAGE (OUTPATIENT)
Dept: CASE MANAGEMENT | Facility: OTHER | Age: 68
End: 2023-03-28
Payer: MEDICARE

## 2023-03-28 VITALS
HEIGHT: 62 IN | HEART RATE: 92 BPM | SYSTOLIC BLOOD PRESSURE: 138 MMHG | DIASTOLIC BLOOD PRESSURE: 68 MMHG | OXYGEN SATURATION: 100 % | WEIGHT: 129.8 LBS | BODY MASS INDEX: 23.89 KG/M2 | TEMPERATURE: 96.8 F

## 2023-03-28 DIAGNOSIS — J30.89 SEASONAL ALLERGIC RHINITIS DUE TO OTHER ALLERGIC TRIGGER: ICD-10-CM

## 2023-03-28 DIAGNOSIS — E11.40 TYPE 2 DIABETES MELLITUS WITH DIABETIC NEUROPATHY, WITH LONG-TERM CURRENT USE OF INSULIN: Primary | ICD-10-CM

## 2023-03-28 DIAGNOSIS — M81.8 OTHER OSTEOPOROSIS WITHOUT CURRENT PATHOLOGICAL FRACTURE: ICD-10-CM

## 2023-03-28 DIAGNOSIS — Z79.4 TYPE 2 DIABETES MELLITUS WITH DIABETIC NEUROPATHY, WITH LONG-TERM CURRENT USE OF INSULIN: Primary | ICD-10-CM

## 2023-03-28 LAB — GLUCOSE BLDC GLUCOMTR-MCNC: 419 MG/DL (ref 70–130)

## 2023-03-28 PROCEDURE — 1159F MED LIST DOCD IN RCRD: CPT | Performed by: FAMILY MEDICINE

## 2023-03-28 PROCEDURE — 36415 COLL VENOUS BLD VENIPUNCTURE: CPT | Performed by: FAMILY MEDICINE

## 2023-03-28 PROCEDURE — 3075F SYST BP GE 130 - 139MM HG: CPT | Performed by: FAMILY MEDICINE

## 2023-03-28 PROCEDURE — 3078F DIAST BP <80 MM HG: CPT | Performed by: FAMILY MEDICINE

## 2023-03-28 PROCEDURE — 83036 HEMOGLOBIN GLYCOSYLATED A1C: CPT | Performed by: FAMILY MEDICINE

## 2023-03-28 PROCEDURE — 82962 GLUCOSE BLOOD TEST: CPT | Performed by: FAMILY MEDICINE

## 2023-03-28 PROCEDURE — 80053 COMPREHEN METABOLIC PANEL: CPT | Performed by: FAMILY MEDICINE

## 2023-03-28 PROCEDURE — 99214 OFFICE O/P EST MOD 30 MIN: CPT | Performed by: FAMILY MEDICINE

## 2023-03-28 PROCEDURE — 1160F RVW MEDS BY RX/DR IN RCRD: CPT | Performed by: FAMILY MEDICINE

## 2023-03-28 PROCEDURE — 3046F HEMOGLOBIN A1C LEVEL >9.0%: CPT | Performed by: FAMILY MEDICINE

## 2023-03-28 NOTE — PROGRESS NOTES
"Breanna Kaba     VITALS: Blood pressure 138/68, pulse 92, temperature 96.8 °F (36 °C), height 157.5 cm (62\"), weight 58.9 kg (129 lb 12.8 oz), SpO2 100 %, not currently breastfeeding.    Subjective  Chief Complaint  Diabetes    Subjective          History of Present Illness:  The patient is a 67 y.o.  female with medical conditions significant for type 2 diabetes, gout, and hyperlipidemia who presents to the clinic secondary to medical follow-up.    She does not want continued trips to the hospital and she wants to get everything straightened out. She was informed by a previous clinician that her liver was only working 44 percent, although when she discontinued metformin it seemed like it cleared it up. She inquires about eating beef.     No complaints about any of the medications currently prescribed.       The following portions of the patient's history were reviewed and updated as appropriate: allergies, current medications, past family history, past medical history, past social history, past surgical history and problem list.    Past Medical History  Past Medical History:   Diagnosis Date   • Acute congestive heart failure, unspecified heart failure type (HCC) 03/28/2022   • Arthritis    • Chronic pain    • Diabetes mellitus (HCC)    • Elevated cholesterol    • GERD (gastroesophageal reflux disease)    • Gout    • Headache    • Hypertension    • Myocardial infarction (HCC)    • Neuropathy        Surgical History  Past Surgical History:   Procedure Laterality Date   • BREAST BIOPSY Left 1988    benign   • CARDIAC CATHETERIZATION N/A 03/31/2022    Procedure: Left Heart Cath;  Surgeon: Tristan Marin MD;  Location:  [facility] CATH INVASIVE LOCATION;  Service: Cardiology;  Laterality: N/A;   • CARDIAC CATHETERIZATION N/A 03/31/2022    Procedure: Percutaneous Coronary Intervention;  Surgeon: Tristan Marin MD;  Location:  [facility] CATH INVASIVE LOCATION;  Service: Cardiology;  Laterality: N/A;   • COLONOSCOPY N/A " "02/17/2020    Procedure: COLONOSCOPY FOR SCREENING CPT CODE: ;  Surgeon: Mariano Prescott MD;  Location: [facility];  Service: Gastroenterology;  Laterality: N/A;   • CORONARY STENT PLACEMENT     • HYSTERECTOMY      Partial   • KNEE SURGERY     • THYROIDECTOMY, PARTIAL      Removal of goiter       Family History  Family History   Problem Relation Age of Onset   • COPD Mother    • Heart disease Brother    • Heart attack Brother    • Hypertension Daughter    • Diabetes Daughter    • Hypertension Daughter    • Diabetes Daughter    • Breast cancer Neg Hx        Social History  Social History     Socioeconomic History   • Marital status:    Tobacco Use   • Smoking status: Never   • Smokeless tobacco: Never   Vaping Use   • Vaping Use: Never used   Substance and Sexual Activity   • Alcohol use: No   • Drug use: No   • Sexual activity: Defer       Objective   Vital Signs:   /68 (BP Location: Right arm, Patient Position: Sitting, Cuff Size: Adult)   Pulse 92   Temp 96.8 °F (36 °C)   Ht 157.5 cm (62\")   Wt 58.9 kg (129 lb 12.8 oz)   SpO2 100%   BMI 23.74 kg/m²     Physical Exam     Gen: Patient in NAD. Pleasant and answers appropriately. A&Ox3.    Skin: Warm and dry with normal turgor. No purpura, rashes, or unusual pigmentation noted. Hair is normal in appearance and distribution.    HEENT: NC/AT. No lesions noted. Conjunctiva clear, sclera nonicteric. PERRL. EOMI without nystagmus or strabismus. Fundi appear benign. No hemorrhages or exudates of eyes. Auditory canals are patent bilaterally without lesions. TMs intact,  nonerythematous, nonbulging without lesions. Nasal mucosa pink, nonerythematous, and nonedematous. Frontal and maxillary sinuses are nontender. O/P nonerythematous and moist without exudate.    Neck: Supple without lymph nodes palpated. FROM.     Lungs: Decreased B/L without rales, rhonchi, crackles, or wheezes.    Heart: RRR. S1 and S2 normal. No S3 or S4. No " MRGT.    Abd: Soft, nontender,nondistended. (+)BSx4 quadrants.     Extrem: No CCE. Radial pulses 2+/4 and equal B/L. FROMx4.  Positive joint tenderness noted.    Neuro: No focal motor/sensory deficits.    Procedures       Assessment and Plan    Breanna Kaba is a 67 y.o. here for medical followup.    Diagnoses and all orders for this visit:    1. Type 2 diabetes mellitus with diabetic neuropathy, with long-term current use of insulin (Primary)  -     POCT Glucose  -     Comprehensive Metabolic Panel; Future  -     Hemoglobin A1c; Future  -     Ambulatory Referral to Case Management Value Based Care, Medication Adherence, Rising Risk  -     Comprehensive Metabolic Panel  -     Hemoglobin A1c    2. Seasonal allergic rhinitis due to other allergic trigger  Encouraged saline rinse.    3. Other osteoporosis without current pathological fracture  Declines further treatment.  Encouraged walking exercises.        BMI is within normal parameters. No other follow-up for BMI required.              Follow Up   Return in about 4 weeks (around 4/25/2023), or LABS.  Findings and plans discussed with patient who verbalizes understanding and agreement. Will followup with patient once results are in. Patient was given instructions and counseling regarding her condition or for health maintenance advice. Please see specific information pulled into the AVS if appropriate.     Transcribed from ambient dictation for Myrna Ramos MD by Rena Leo.  03/28/23   10:33 EDT        Myrna Ramos MD

## 2023-03-28 NOTE — OUTREACH NOTE
AMBULATORY CASE MANAGEMENT NOTE    Name and Relationship of Patient/Support Person: SendyBreanna - Self    Patient Outreach    RN-ACM outreach with patient referred to CM for education and support with diabetes management. Patient agreeable to engagement.  RN-ACM assessment and care plan, targets and goals initiated.     Patient reported having issues with a newly acquired glucometer.  Patient stated intent to return the equipment to the DME provider for replacement.      Patient had OV with primary care this date.  New sliding scale in place.  Patient is unable to test glucose levels.  Patient requests a return call early next week after she has had an opportunity to obtain a functional glucometer.  Patient is encouraged to maintain a log of glucose levels for review at our next encounter.      Education Documentation  Importance of Glycemic Management, taught by Gwendolyn Whalen RN at 3/28/2023  3:40 PM.  Learner: Patient  Readiness: Acceptance  Method: Explanation  Response: Verbalizes Understanding, Needs Reinforcement    Blood Glucose Monitoring, taught by Gwendolyn Whalen RN at 3/28/2023  3:40 PM.  Learner: Patient  Readiness: Acceptance  Method: Explanation  Response: Verbalizes Understanding, Needs Reinforcement    A1C Goal, taught by Gwendolyn Whlaen RN at 3/28/2023  3:40 PM.  Learner: Patient  Readiness: Acceptance  Method: Explanation  Response: Verbalizes Understanding, Needs Reinforcement        Adult Patient Profile  Questions/Answers    Flowsheet Row Most Recent Value   Symptoms/Conditions Managed at Home diabetes, type 2   Barriers to Managing Health medication, unable to afford   Diabetes Management Strategies blood glucose testing, insulin therapy, medication therapy, coping strategies, diet modification   Frequency of Blood Glucose Testing --  [Patient is currently not testing due to faulty equipment.]   A1C Target <9   Last A1C Result 12.40%   Insulin Pump Type needle   Diabetes  Self-Management Outcome 3 (uncertain)   Identifying Health Goals keep illness under control   Barriers to Taking Medication as Prescribed unable to afford medicine   Source of Information patient, health record          Gwendolyn PIERRE  Ambulatory Case Management    3/28/2023, 15:33 EDT

## 2023-03-29 DIAGNOSIS — I25.5 ISCHEMIC CARDIOMYOPATHY: ICD-10-CM

## 2023-03-29 LAB
ALBUMIN SERPL-MCNC: 4.8 G/DL (ref 3.5–5.2)
ALBUMIN/GLOB SERPL: 1.8 G/DL
ALP SERPL-CCNC: 188 U/L (ref 39–117)
ALT SERPL W P-5'-P-CCNC: 20 U/L (ref 1–33)
ANION GAP SERPL CALCULATED.3IONS-SCNC: 12.8 MMOL/L (ref 5–15)
AST SERPL-CCNC: 15 U/L (ref 1–32)
BILIRUB SERPL-MCNC: 0.7 MG/DL (ref 0–1.2)
BUN SERPL-MCNC: 22 MG/DL (ref 8–23)
BUN/CREAT SERPL: 23.9 (ref 7–25)
CALCIUM SPEC-SCNC: 10.1 MG/DL (ref 8.6–10.5)
CHLORIDE SERPL-SCNC: 94 MMOL/L (ref 98–107)
CO2 SERPL-SCNC: 26.2 MMOL/L (ref 22–29)
CREAT SERPL-MCNC: 0.92 MG/DL (ref 0.57–1)
EGFRCR SERPLBLD CKD-EPI 2021: 67.5 ML/MIN/1.73
GLOBULIN UR ELPH-MCNC: 2.7 GM/DL
GLUCOSE SERPL-MCNC: 448 MG/DL (ref 65–99)
HBA1C MFR BLD: 12.4 % (ref 4.8–5.6)
POTASSIUM SERPL-SCNC: 4.5 MMOL/L (ref 3.5–5.2)
PROT SERPL-MCNC: 7.5 G/DL (ref 6–8.5)
SODIUM SERPL-SCNC: 133 MMOL/L (ref 136–145)

## 2023-04-03 ENCOUNTER — PATIENT OUTREACH (OUTPATIENT)
Dept: CASE MANAGEMENT | Facility: OTHER | Age: 68
End: 2023-04-03
Payer: MEDICARE

## 2023-04-03 RX ORDER — METOPROLOL SUCCINATE 25 MG/1
25 TABLET, EXTENDED RELEASE ORAL DAILY
Qty: 30 TABLET | Refills: 0 | Status: SHIPPED | OUTPATIENT
Start: 2023-04-03

## 2023-04-03 NOTE — OUTREACH NOTE
AMBULATORY CASE MANAGEMENT NOTE    Name and Relationship of Patient/Support Person: Breanna Kaba - Self    Patient Outreach    RN-ACM outreach with patient engaged in HRCM for disease education and support with diabetes management.    1.  Patient was able to obtain a new glucometer with insurance coverage.  Patient now has:  ONE TOUCH VERIO FLEX    2.  Patient stated preference to test after her meal rather than carb counting prior to eating. Patient utilizes sliding scale Apidra at mealtimes and 30 units Basaglar at bedtime    3.  Patient has logged the following levels and states to have administered short acting insulin according to prescribed sliding scale :    03/28/23:    491 dinner    03/29/23:    488 breakfast   417 lunch   240 dinner   298 bedtime    03/30/23   155 upon waking   199 breakfast   378 lunch   335 dinner    04/01/23   247 breakfast   462 dinner    04/02/23   406 lunch    04/03/23   94 upon awaking   465 lunch       No changes to Basaglar insulin as waking bg is 94 -155 when patient remembers to test.  Patient is agreeable to occasional testing 30-45 minutes after mealtime injection to gauge reaction to current sliding scale.   HRCM continued.       Gwendolyn PIERRE  Ambulatory Case Management    4/3/2023, 14:52 EDT

## 2023-04-11 ENCOUNTER — PATIENT OUTREACH (OUTPATIENT)
Dept: CASE MANAGEMENT | Facility: OTHER | Age: 68
End: 2023-04-11
Payer: MEDICARE

## 2023-04-11 NOTE — OUTREACH NOTE
AMBULATORY CASE MANAGEMENT NOTE    Name and Relationship of Patient/Support Person: Breanna Kaba - Self    Patient Outreach    RN-ACM outreach with patient engaged in HRCM for education and support with diabetes management.  Patient reported compliance with all medications including 30 units Basaglar at bedtime and Apidra three times per day on sliding scale.    Patient is keeping a bg log to review at her primary care appointment in two weeks.  Patient was away from home at the time of our call and not able to review numbers.  Per her report, her fasting/morning bg is averaging .  She indicated having sample tested after the occasional sliding scale injection.  Patient provided example of her pre injection bg at 250 and 20-30 minutes after injection, it had dropped to around 150.  Patient is encouraged to continue efforts to adhere to a consistent carb diet and consistent testing.     Education Documentation  Monitoring Carbohydrate Intake, taught by Gwendolyn Whalen RN at 4/11/2023  3:12 PM.  Learner: Patient  Readiness: Acceptance  Method: Explanation  Response: Verbalizes Understanding    Healthy Food Choices, taught by Gwendolyn Whalen RN at 4/11/2023  3:12 PM.  Learner: Patient  Readiness: Acceptance  Method: Explanation  Response: Verbalizes Understanding    Carbohydrate-Containing Foods, taught by Gwendolyn Whalen RN at 4/11/2023  3:12 PM.  Learner: Patient  Readiness: Acceptance  Method: Explanation  Response: Verbalizes Understanding    Hypoglycemia Identification and Treatment, taught by Gwendolyn Whalen RN at 4/11/2023  3:12 PM.  Learner: Patient  Readiness: Acceptance  Method: Explanation  Response: Verbalizes Understanding    Hyperglycemia Identification and Treatment, taught by Gwendolyn Whalen RN at 4/11/2023  3:12 PM.  Learner: Patient  Readiness: Acceptance  Method: Explanation  Response: Verbalizes Understanding          Gwendolyn PIERRE  Ambulatory Case Management    4/11/2023, 15:12  EDT

## 2023-04-25 ENCOUNTER — TELEPHONE (OUTPATIENT)
Dept: FAMILY MEDICINE CLINIC | Facility: CLINIC | Age: 68
End: 2023-04-25
Payer: MEDICARE

## 2023-04-25 NOTE — TELEPHONE ENCOUNTER
Caller: Breanna Kaba    Relationship: Self    Best call back number: [phone]    Requested Prescriptions:   Requested Prescriptions     Signed Prescriptions Disp Refills   • glucose blood (OneTouch Ultra) test strip 100 each 5     Sig: Use to test blood sugar before meals and at bedtime.     Authorizing Provider: ESTHER VARMA     Ordering User: RORY GARCIA        Pharmacy where request should be sent: [place] [id number] - [phone]  - [phone]     Last office visit with prescribing clinician: 3/28/2023   Last telemedicine visit with prescribing clinician: 4/26/2023   Next office visit with prescribing clinician: 4/26/2023     Additional details provided by patient: PATIENT IS OUT OF STRIPS AND NO REFILLS REMAINING, NEEDS NEW RX SENT    Does the patient have less than a 3 day supply:  [x] Yes  [] No    Would you like a call back once the refill request has been completed: [] Yes [] No    If the office needs to give you a call back, can they leave a voicemail: [] Yes [] No    Rory Garcia LPN   04/25/23 10:26 EDT     Refilled per orders.

## 2023-04-25 NOTE — TELEPHONE ENCOUNTER
Caller: Breanna Kaba    Relationship: Self    Best call back number: [phone]    Requested Prescriptions:   Requested Prescriptions     Pending Prescriptions Disp Refills   • glucose blood (OneTouch Ultra) test strip 100 each 0     Sig: Use to test blood sugar before meals and at bedtime.        Pharmacy where request should be sent: [facility] [address] [facility] - [phone]  - [phone]     Last office visit with prescribing clinician: 3/28/2023   Last telemedicine visit with prescribing clinician: 4/26/2023   Next office visit with prescribing clinician: 4/26/2023     Additional details provided by patient: PATIENT IS OUT OF STRIPS AND NO REFILLS REMAINING, NEEDS NEW RX SENT    Does the patient have less than a 3 day supply:  [x] Yes  [] No    Would you like a call back once the refill request has been completed: [] Yes [] No    If the office needs to give you a call back, can they leave a voicemail: [] Yes [] No    Shira Denton   04/25/23 10:23 EDT

## 2023-05-02 ENCOUNTER — TELEPHONE (OUTPATIENT)
Dept: CASE MANAGEMENT | Facility: OTHER | Age: 68
End: 2023-05-02
Payer: MEDICARE

## 2023-05-02 NOTE — TELEPHONE ENCOUNTER
Recent RN-ACM outreach attempts to patient have been unsuccessful.  Patient was previously active with HRCM for education and assistance with diabetes management.  Patient was also no-show for last PCP appointment.

## 2023-05-03 ENCOUNTER — PATIENT OUTREACH (OUTPATIENT)
Dept: CASE MANAGEMENT | Facility: OTHER | Age: 68
End: 2023-05-03
Payer: MEDICARE

## 2023-05-03 NOTE — OUTREACH NOTE
AMBULATORY CASE MANAGEMENT NOTE    Name and Relationship of Patient/Support Person: Breanna Kaba - Self    Patient Outreach    RN-ACM outreach with patient engaged in HRCM for disease education and assistance with diabetes management.  Patient reported medication compliance and blood glucose levels averaging .  Patient stated to be feeling much better with the stabilization of her bg levels.  Patient was unable to attend her last scheduled appointment with primary care due to transportation issues.  Patient requested the appointment be rescheduled.     Care Coordination    RN-ACM care coordination with scheduling staff at [facility].  PCP schedule is heavily booked for the new few weeks.  First available is 06/27/23.      RN-ACM notified patient.  Patient voiced acceptance of appointment as scheduled.  Patient had additional questions about incentive points and over the counter benefits.  RN-ACM provided education and encouraged patient to contact her insurance carrier for specific benefit availability.  Patient v/u.        Education Documentation  Follow-Up Care, taught by Gwendolyn Whalen RN at 5/3/2023 12:30 PM.  Learner: Patient  Readiness: Acceptance  Method: Explanation  Response: Verbalizes Understanding    Hypoglycemia Identification and Treatment, taught by Gwendolyn Whalen RN at 5/3/2023 12:30 PM.  Learner: Patient  Readiness: Acceptance  Method: Explanation  Response: Verbalizes Understanding    Blood Glucose Goal, taught by Gwendolyn Whalen RN at 5/3/2023 12:30 PM.  Learner: Patient  Readiness: Acceptance  Method: Explanation  Response: Verbalizes Understanding          Gwendolyn PIERRE  Ambulatory Case Management    5/3/2023, 12:40 EDT

## 2023-05-11 NOTE — TELEPHONE ENCOUNTER
Incoming Refill Request      Medication requested (name and dose): Insulin Pen Needle (Pen Needles) 31G X 6 MM misc, glucose blood (OneTouch Ultra) test strip    Pharmacy where request should be sent: [facility]    Additional details provided by patient: PATIENT IS OUT OF THE INSULIN SUPPLIES    Best call back number:    [phone]      Does the patient have less than a 3 day supply:  [x] Yes  [] No    Shira Mora Rep  05/11/23, 11:25 EDT

## 2023-05-12 RX ORDER — PEN NEEDLE, DIABETIC 31 G X1/4"
NEEDLE, DISPOSABLE MISCELLANEOUS
Qty: 100 EACH | Refills: 5 | Status: SHIPPED | OUTPATIENT
Start: 2023-05-12

## 2023-05-15 ENCOUNTER — TELEPHONE (OUTPATIENT)
Dept: CARDIOLOGY | Facility: CLINIC | Age: 68
End: 2023-05-15
Payer: MEDICARE

## 2023-05-15 ENCOUNTER — OFFICE VISIT (OUTPATIENT)
Dept: FAMILY MEDICINE CLINIC | Facility: CLINIC | Age: 68
End: 2023-05-15
Payer: MEDICARE

## 2023-05-15 VITALS
TEMPERATURE: 97.7 F | RESPIRATION RATE: 18 BRPM | WEIGHT: 140 LBS | HEIGHT: 62 IN | BODY MASS INDEX: 25.76 KG/M2 | OXYGEN SATURATION: 97 % | HEART RATE: 89 BPM | SYSTOLIC BLOOD PRESSURE: 134 MMHG | DIASTOLIC BLOOD PRESSURE: 64 MMHG

## 2023-05-15 DIAGNOSIS — N30.00 ACUTE CYSTITIS WITHOUT HEMATURIA: ICD-10-CM

## 2023-05-15 DIAGNOSIS — Z79.4 TYPE 2 DIABETES MELLITUS WITH DIABETIC NEUROPATHY, WITH LONG-TERM CURRENT USE OF INSULIN: ICD-10-CM

## 2023-05-15 DIAGNOSIS — R10.9 FLANK PAIN: Primary | ICD-10-CM

## 2023-05-15 DIAGNOSIS — E11.40 TYPE 2 DIABETES MELLITUS WITH DIABETIC NEUROPATHY, WITH LONG-TERM CURRENT USE OF INSULIN: ICD-10-CM

## 2023-05-15 LAB
BILIRUB BLD-MCNC: NEGATIVE MG/DL
CLARITY, POC: CLEAR
COLOR UR: YELLOW
EXPIRATION DATE: ABNORMAL
GLUCOSE UR STRIP-MCNC: ABNORMAL MG/DL
KETONES UR QL: NEGATIVE
LEUKOCYTE EST, POC: ABNORMAL
Lab: ABNORMAL
NITRITE UR-MCNC: NEGATIVE MG/ML
PH UR: 6 [PH] (ref 5–8)
PROT UR STRIP-MCNC: ABNORMAL MG/DL
RBC # UR STRIP: NEGATIVE /UL
SP GR UR: 1.02 (ref 1–1.03)
UROBILINOGEN UR QL: ABNORMAL

## 2023-05-15 PROCEDURE — 81003 URINALYSIS AUTO W/O SCOPE: CPT | Performed by: FAMILY MEDICINE

## 2023-05-15 PROCEDURE — 3078F DIAST BP <80 MM HG: CPT | Performed by: FAMILY MEDICINE

## 2023-05-15 PROCEDURE — 99214 OFFICE O/P EST MOD 30 MIN: CPT | Performed by: FAMILY MEDICINE

## 2023-05-15 PROCEDURE — 87086 URINE CULTURE/COLONY COUNT: CPT | Performed by: FAMILY MEDICINE

## 2023-05-15 PROCEDURE — 3046F HEMOGLOBIN A1C LEVEL >9.0%: CPT | Performed by: FAMILY MEDICINE

## 2023-05-15 PROCEDURE — 3075F SYST BP GE 130 - 139MM HG: CPT | Performed by: FAMILY MEDICINE

## 2023-05-15 PROCEDURE — 93000 ELECTROCARDIOGRAM COMPLETE: CPT | Performed by: FAMILY MEDICINE

## 2023-05-15 RX ORDER — NITROFURANTOIN 25; 75 MG/1; MG/1
100 CAPSULE ORAL 2 TIMES DAILY
Qty: 10 CAPSULE | Refills: 0 | Status: SHIPPED | OUTPATIENT
Start: 2023-05-15

## 2023-05-15 RX ORDER — INSULIN GLULISINE 100 [IU]/ML
INJECTION, SOLUTION SUBCUTANEOUS
COMMUNITY
End: 2023-05-15 | Stop reason: SDUPTHER

## 2023-05-15 RX ORDER — INSULIN GLULISINE 100 [IU]/ML
10 INJECTION, SOLUTION SUBCUTANEOUS
Qty: 9 ML | Refills: 2 | Status: SHIPPED | OUTPATIENT
Start: 2023-05-15 | End: 2023-06-14

## 2023-05-15 RX ORDER — INSULIN GLARGINE 100 [IU]/ML
30 INJECTION, SOLUTION SUBCUTANEOUS NIGHTLY
Qty: 21 ML | Refills: 1 | Status: SHIPPED | OUTPATIENT
Start: 2023-05-15

## 2023-05-15 RX ORDER — TROSPIUM CHLORIDE 20 MG/1
1 TABLET, FILM COATED ORAL EVERY 12 HOURS SCHEDULED
COMMUNITY
Start: 2023-04-21

## 2023-05-15 NOTE — PROGRESS NOTES
"Chief Complaint  Back Pain (2 days duration)    Subjective          Breanna Kaba presents to [facility]  Back Pain  This is a new problem. The current episode started in the past 7 days. The problem is unchanged. The pain is present in the lumbar spine. The quality of the pain is described as aching. The pain is mild. Associated symptoms include bladder incontinence. Pertinent negatives include no bowel incontinence. She has tried NSAIDs and walking for the symptoms. The treatment provided mild relief.     States her blood sugar has been elevated. She has been doing the apidra and this has been helping some. States she has not changed her eating habits. Will increase her basaglar to 30 units nightly. Encouraged to continue to improve diet.     Review of Systems   Gastrointestinal: Negative for bowel incontinence.   Genitourinary: Positive for bladder incontinence.   Musculoskeletal: Positive for back pain.         Objective   Vital Signs:   /64 (BP Location: Right arm, Patient Position: Sitting, Cuff Size: Adult)   Pulse 89   Temp 97.7 °F (36.5 °C) (Temporal)   Resp 18   Ht 157.5 cm (62\")   Wt 63.5 kg (140 lb)   SpO2 97%   BMI 25.61 kg/m²     Physical Exam  Constitutional:       General: She is not in acute distress.     Appearance: Normal appearance. She is well-developed and well-groomed. She is not ill-appearing, toxic-appearing or diaphoretic.   HENT:      Head: Normocephalic.      Nose: Nose normal. No congestion or rhinorrhea.      Mouth/Throat:      Mouth: Mucous membranes are moist.      Pharynx: Oropharynx is clear. No oropharyngeal exudate or posterior oropharyngeal erythema.   Eyes:      General: Lids are normal.         Right eye: No discharge.         Left eye: No discharge.      Extraocular Movements: Extraocular movements intact.      Pupils: Pupils are equal, round, and reactive to light.   Neck:      Vascular: No carotid bruit.   Cardiovascular:      " Rate and Rhythm: Normal rate and regular rhythm.      Pulses: Normal pulses.      Heart sounds: Normal heart sounds. No murmur heard.    No friction rub. No gallop.   Pulmonary:      Effort: Pulmonary effort is normal. No respiratory distress.      Breath sounds: Normal breath sounds. No stridor. No wheezing, rhonchi or rales.   Chest:      Chest wall: No tenderness.   Abdominal:      General: Bowel sounds are normal. There is no distension.      Palpations: Abdomen is soft. There is no mass.      Tenderness: There is no abdominal tenderness. There is no right CVA tenderness, left CVA tenderness, guarding or rebound.      Hernia: No hernia is present.   Musculoskeletal:         General: No swelling or tenderness. Normal range of motion.      Cervical back: Normal range of motion and neck supple. No rigidity or tenderness.      Right lower leg: No edema.      Left lower leg: No edema.   Lymphadenopathy:      Cervical: No cervical adenopathy.   Skin:     General: Skin is warm.      Capillary Refill: Capillary refill takes less than 2 seconds.      Coloration: Skin is not jaundiced.      Findings: No bruising, erythema or rash.   Neurological:      General: No focal deficit present.      Mental Status: She is alert and oriented to person, place, and time.      Motor: Motor function is intact. No weakness.      Coordination: Coordination is intact.      Gait: Gait is intact. Gait normal.   Psychiatric:         Attention and Perception: Attention normal.         Mood and Affect: Mood normal.         Speech: Speech normal.         Behavior: Behavior normal.         Cognition and Memory: Cognition normal.         Judgment: Judgment normal.        Result Review :            ECG 12 Lead    Date/Time: 5/15/2023 2:17 PM  Performed by: Gena Mcdermott APRN  Authorized by: Gena Mcdermott APRN   Comparison: compared with previous ECG   Similar to previous ECG  Rhythm: sinus rhythm              Assessment and Plan     Diagnoses and all orders for this visit:    1. Flank pain (Primary)  -     POCT urinalysis dipstick, automated  -     Urine Culture - Urine, Urine, Clean Catch; Future  -     Urine Culture - Urine, Urine, Clean Catch    2. Type 2 diabetes mellitus with diabetic neuropathy, with long-term current use of insulin  Comments:  I advised the patient to go back on her Fosamax once a week.  Orders:  -     Insulin Glargine (BASAGLAR KWIKPEN) 100 UNIT/ML injection pen; Inject 30 Units under the skin into the appropriate area as directed Every Night.  Dispense: 21 mL; Refill: 1  -     insulin glulisine (Apidra) 100 UNIT/ML injection; Inject 10 Units under the skin into the appropriate area as directed 3 (Three) Times a Day Before Meals for 30 days.  Dispense: 9 mL; Refill: 2    3. Acute cystitis without hematuria  -     nitrofurantoin, macrocrystal-monohydrate, (Macrobid) 100 MG capsule; Take 1 capsule by mouth 2 (Two) Times a Day.  Dispense: 10 capsule; Refill: 0      Patient's Body mass index is 25.61 kg/m². indicating that she is overweight (BMI 25-29.9). Patient's (Body mass index is 25.61 kg/m².) indicates that they are overweight with health conditions that include hypertension, diabetes mellitus and dyslipidemias . Weight is unchanged. BMI is is above average; BMI management plan is completed. We discussed low calorie, low carb based diet program, portion control and increasing exercise. .    Follow Up   No follow-ups on file.  Patient was given instructions and counseling regarding her condition or for health maintenance advice. Please see specific information pulled into the AVS if appropriate.     This document has been electronically signed by REGINALDO Alberto  May 15, 2023 14:16 EDT

## 2023-05-16 LAB — BACTERIA SPEC AEROBE CULT: NO GROWTH

## 2023-05-17 ENCOUNTER — TELEPHONE (OUTPATIENT)
Dept: FAMILY MEDICINE CLINIC | Facility: CLINIC | Age: 68
End: 2023-05-17
Payer: MEDICARE

## 2023-05-17 NOTE — TELEPHONE ENCOUNTER
I don't have that - She got her colonoscopy in 2020 with Dr. Prescott and he said 5 years. Who called her? Was it a recording? Was it his office?      Spoke with patient she reports it was a recording on her answering machine she isn't sure where it came from.Doesnt want one if it stated that on her last one.

## 2023-05-17 NOTE — TELEPHONE ENCOUNTER
I don't have that - She got her colonoscopy in 2020 with Dr. Prescott and he said 5 years. Who called her? Was it a recording? Was it his office?

## 2023-05-17 NOTE — TELEPHONE ENCOUNTER
Caller: Sendy Breanna    Relationship: Self    Best call back number: [phone]    What is the medical concern/diagnosis: GENERAL HEALTH    What specialty or service is being requested: COLONOSCOPY    What is the office location: [facility] [place]    Any additional details: PATIENT RECEIVED A MESSAGE ON HER PHONE THAT IT WAS TIME FOR HER TO SCHEDULE A COLONOSCOPY.

## 2023-05-26 ENCOUNTER — TELEPHONE (OUTPATIENT)
Dept: FAMILY MEDICINE CLINIC | Facility: CLINIC | Age: 68
End: 2023-05-26
Payer: MEDICARE

## 2023-05-26 NOTE — TELEPHONE ENCOUNTER
Provider: AVANI     Caller: TAN BOONE     Phone Number: [phone]    Reason for Call: PATIENT ASKED IF SHE CAN GET MORE SAMPLES OF ATIDRA-INSULIN ? PATIENT IS OUT OF MEDICATION

## 2023-05-26 NOTE — TELEPHONE ENCOUNTER
Contacted the pt and informed her that we had to ordered the samples. The pt stated that she was completely out of her medication and even though she has refills at the pharmacy she did not think she could afford them. I informed her we would contact her as soon as they came in.

## 2023-05-31 ENCOUNTER — TELEPHONE (OUTPATIENT)
Dept: FAMILY MEDICINE CLINIC | Facility: CLINIC | Age: 68
End: 2023-05-31
Payer: MEDICARE

## 2023-05-31 NOTE — TELEPHONE ENCOUNTER
Breanna called for samples of Apidra we do not have any she stated you told her if she ran out & we did not have any you would give her something different?

## 2023-06-02 NOTE — TELEPHONE ENCOUNTER
Yes. Ugh. I forgot to look. What do we have in the sample fridge currently? Sorry.      The only short acting we have is Admelog.

## 2023-06-05 NOTE — TELEPHONE ENCOUNTER
Admelog is perfect. Can we set the two in the fridge aside for her and call her and let her know that she should use the same as Apidra?

## 2023-06-05 NOTE — TELEPHONE ENCOUNTER
Patient notified and verbalized understanding.  Sample is in the refrigerator with her name on it and logged in the sample binder. There is only 1 box available.

## 2023-06-07 ENCOUNTER — OFFICE VISIT (OUTPATIENT)
Dept: CARDIOLOGY | Facility: CLINIC | Age: 68
End: 2023-06-07
Payer: MEDICARE

## 2023-06-07 VITALS
HEIGHT: 62 IN | DIASTOLIC BLOOD PRESSURE: 78 MMHG | OXYGEN SATURATION: 93 % | WEIGHT: 144 LBS | HEART RATE: 73 BPM | BODY MASS INDEX: 26.5 KG/M2 | SYSTOLIC BLOOD PRESSURE: 174 MMHG

## 2023-06-07 DIAGNOSIS — I25.5 ISCHEMIC CARDIOMYOPATHY: ICD-10-CM

## 2023-06-07 DIAGNOSIS — I25.83 CORONARY ARTERY DISEASE DUE TO LIPID RICH PLAQUE: Primary | ICD-10-CM

## 2023-06-07 DIAGNOSIS — I25.10 CORONARY ARTERY DISEASE DUE TO LIPID RICH PLAQUE: Primary | ICD-10-CM

## 2023-06-07 PROCEDURE — 99214 OFFICE O/P EST MOD 30 MIN: CPT | Performed by: PHYSICIAN ASSISTANT

## 2023-06-07 PROCEDURE — 3077F SYST BP >= 140 MM HG: CPT | Performed by: PHYSICIAN ASSISTANT

## 2023-06-07 PROCEDURE — 3078F DIAST BP <80 MM HG: CPT | Performed by: PHYSICIAN ASSISTANT

## 2023-06-07 PROCEDURE — 1159F MED LIST DOCD IN RCRD: CPT | Performed by: PHYSICIAN ASSISTANT

## 2023-06-07 PROCEDURE — 1160F RVW MEDS BY RX/DR IN RCRD: CPT | Performed by: PHYSICIAN ASSISTANT

## 2023-06-07 RX ORDER — ATORVASTATIN CALCIUM 40 MG/1
40 TABLET, FILM COATED ORAL DAILY
Qty: 90 TABLET | Refills: 3 | Status: SHIPPED | OUTPATIENT
Start: 2023-06-07

## 2023-06-07 RX ORDER — METOPROLOL SUCCINATE 25 MG/1
25 TABLET, EXTENDED RELEASE ORAL DAILY
Qty: 30 TABLET | Refills: 0 | Status: SHIPPED | OUTPATIENT
Start: 2023-06-07

## 2023-06-07 RX ORDER — ASPIRIN 81 MG/1
81 TABLET ORAL DAILY
Qty: 90 TABLET | Refills: 3 | Status: SHIPPED | OUTPATIENT
Start: 2023-06-07

## 2023-06-07 NOTE — PROGRESS NOTES
Myrna Ramos MD  Breanna Kaba  1955 06/07/2023    Patient Active Problem List   Diagnosis    Neuropathy    Gout    GERD (gastroesophageal reflux disease)    Diabetes mellitus    Chronic pain    Family history of GI malignancy    History of adenomatous polyp of colon    COVID-19    Acute congestive heart failure, unspecified heart failure type    NSTEMI, initial episode of care    Chronic HFrEF (heart failure with reduced ejection fraction)    Benign essential hypertension    Mixed hyperlipidemia       Dear Myrna Ramos MD:    Subjective     History of Present Illness:    Chief Complaint   Patient presents with    Med Management       Breanna Kaba is a pleasant 68 y.o. female with a past medical history significant for coronary artery disease with recent stenting of the LAD on 3/31/2022 after she presented with an acute NSTEMI, ischemic cardiomyopathy with recovered LVEF now at 50 to 55% previously as low as 20 to 25%, diabetes mellitus, essential hypertension, and dyslipidemia.  No history of tobacco abuse.  She comes in today for cardiology follow-up.     Breanna comes in today for routine follow-up.  She reports that she has been having some left-sided arm pain similar to the pain she had prior to her heart attack.  She does state that this pain only last for few seconds and does believe it is worse when she lays in a particular position unable to reproduce with physical exertion.  Reviewing her chart she has been having difficult controlling her blood glucose level with hemoglobin A1c being between 12-15.  She admits she does not exercise regularly.    Allergies   Allergen Reactions    Asa [Aspirin] GI Intolerance    Naproxen Nausea And Vomiting    Penicillins Hives and Rash   :      Current Outpatient Medications:     allopurinol (ZYLOPRIM) 300 MG tablet, Take 1 tablet by mouth Daily., Disp: 90 tablet, Rfl: 1    aspirin 81 MG EC tablet, Take 1 tablet by mouth Daily., Disp: 90 tablet, Rfl: 3     Blood Glucose Monitoring Suppl (ONE TOUCH ULTRA 2) w/Device kit, Use to test blood sugar before meals and at bedtime., Disp: 1 each, Rfl: 0    glipizide (GLUCOTROL) 5 MG tablet, Take 1 tablet by mouth Daily., Disp: 30 tablet, Rfl: 2    glucose blood (OneTouch Ultra) test strip, Use to test blood sugar before meals and at bedtime., Disp: 100 each, Rfl: 5    insulin aspart (NovoLOG FlexPen) 100 UNIT/ML solution pen-injector sc pen, Inject 0-14 Units under the skin into the appropriate area as directed 3 (Three) Times a Day Before Meals., Disp: 15 mL, Rfl: 2    Insulin Glargine (BASAGLAR KWIKPEN) 100 UNIT/ML injection pen, Inject 30 Units under the skin into the appropriate area as directed Every Night., Disp: 21 mL, Rfl: 1    insulin glulisine (Apidra) 100 UNIT/ML injection, Inject 10 Units under the skin into the appropriate area as directed 3 (Three) Times a Day Before Meals for 30 days., Disp: 9 mL, Rfl: 2    Insulin Pen Needle (Pen Needles) 31G X 6 MM misc, Use with Novolog FlexPen to inject insulin 3 times daily before meals., Disp: 100 each, Rfl: 5    Lancets (onetouch ultrasoft) lancets, Use to test blood sugar before meals and at bedtime., Disp: 100 each, Rfl: 0    metoprolol succinate XL (TOPROL-XL) 25 MG 24 hr tablet, Take 1 tablet by mouth Daily., Disp: 30 tablet, Rfl: 0    sacubitril-valsartan (ENTRESTO) 24-26 MG tablet, Take 1 tablet by mouth Every 12 (Twelve) Hours., Disp: 180 tablet, Rfl: 1    trospium (SANCTURA) 20 MG tablet, Take 1 tablet by mouth Every 12 (Twelve) Hours., Disp: , Rfl:     atorvastatin (LIPITOR) 40 MG tablet, Take 1 tablet by mouth Daily., Disp: 90 tablet, Rfl: 3    The following portions of the patient's history were reviewed and updated as appropriate: allergies, current medications, past family history, past medical history, past social history, past surgical history and problem list.    Social History     Tobacco Use    Smoking status: Never    Smokeless tobacco: Never   Vaping  "Use    Vaping Use: Never used   Substance Use Topics    Alcohol use: No    Drug use: No         Objective   Vitals:    06/07/23 0833   BP: 174/78   BP Location: Left arm   Patient Position: Sitting   Cuff Size: Adult   Pulse: 73   SpO2: 93%   Weight: 65.3 kg (144 lb)   Height: 157.5 cm (62\")     Body mass index is 26.34 kg/m².    Constitutional:       General: Not in acute distress.     Appearance: Healthy appearance. Well-developed and not in distress. Not diaphoretic.   Eyes:      Conjunctiva/sclera: Conjunctivae normal.      Pupils: Pupils are equal, round, and reactive to light.   HENT:      Head: Normocephalic and atraumatic.   Neck:      Vascular: No carotid bruit or JVD.   Pulmonary:      Effort: Pulmonary effort is normal. No respiratory distress.      Breath sounds: Normal breath sounds.   Cardiovascular:      Normal rate. Regular rhythm.   Edema:     Peripheral edema absent.   Skin:     General: Skin is cool.   Neurological:      Mental Status: Alert, oriented to person, place, and time and oriented to person, place and time.       Lab Results   Component Value Date     (L) 03/28/2023    K 4.5 03/28/2023    CL 94 (L) 03/28/2023    CO2 26.2 03/28/2023    BUN 22 03/28/2023    CREATININE 0.92 03/28/2023    GLUCOSE 448 (C) 03/28/2023    CALCIUM 10.1 03/28/2023    AST 15 03/28/2023    ALT 20 03/28/2023    ALKPHOS 188 (H) 03/28/2023     No results found for: CKTOTAL  Lab Results   Component Value Date    WBC 5.69 12/15/2022    HGB 12.8 12/15/2022    HCT 40.1 12/15/2022     12/15/2022     Lab Results   Component Value Date    INR 1.04 03/28/2022     Lab Results   Component Value Date    MG 1.6 04/02/2022     Lab Results   Component Value Date    TSH 1.260 06/06/2022    CHLPL 256 (H) 03/01/2017    TRIG 328 (H) 11/28/2022    HDL 44 11/28/2022     (H) 11/28/2022      Advance Care Planning   ACP discussion was declined by the patient. Patient does not have an advance directive, information " provided.         No results found for: BNP    During this visit the following were done:  Labs Reviewed []    Labs Ordered []    Radiology Reports Reviewed []    Radiology Ordered []    PCP Records Reviewed []    Referring Provider Records Reviewed []    ER Records Reviewed []    Hospital Records Reviewed []    History Obtained From Family []    Radiology Images Reviewed []    Other Reviewed []    Records Requested []       Procedures    Assessment & Plan    Diagnosis Plan   1. Coronary artery disease due to lipid rich plaque  Stress Test With Myocardial Perfusion One Day    Comprehensive Metabolic Panel    Lipid Panel      2. Ischemic cardiomyopathy  metoprolol succinate XL (TOPROL-XL) 25 MG 24 hr tablet    sacubitril-valsartan (ENTRESTO) 24-26 MG tablet    Comprehensive Metabolic Panel    Lipid Panel               Recommendations:  Medical noncompliance  Breanna has been noncompliant with both keeping follow-up and taking her medications regularly.  It appears that she is no longer taking her atorvastatin or Entresto.  Emphasized the importance on regular follow-ups and taking medicines as prescribed.  I also informed her that if she does have issues or side effects with any medication she is welcome to contact my office and I will be happy to assist in finding alternatives.  Coronary artery disease  Patient reports she has had difficulty affording Brilinta frequently going long periods without taking this at all going several weeks at a time.  She is greater than 1 year post stenting so I do think it is reasonable to go ahead and stop this and continue just aspirin 81 mg.  I will order stress test.  Although symptoms are atypical she does report they are similar to her previous symptoms when she presented with an NSTEMI.  With her diabetes being uncontrolled and need to rule out ischemic etiology.  Ischemic cardiomyopathy with improved EF  I did ask her to restart atorvastatin 40 mg she denies any adverse reactions  in the past.  I also asked her to restart Entresto and advised her to contact our office if this is not affordable.  Continue aspirin and metoprolol succinate.  I encouraged her to try to exercise on a daily basis.  Diabetes mellitus  Severely uncontrolled I did have a long conversation with Breanna on the importance of diabetes control in relation to her cardiovascular disease she reports she is trying to improve her lifestyle.  I also encouraged her to continue with regular follow-ups with PCP.    Return in about 2 months (around 8/7/2023).    As always, I appreciate very much the opportunity to participate in the cardiovascular care of your patients.      With Best Regards,    Da Fox PA-C

## 2023-08-01 ENCOUNTER — TELEPHONE (OUTPATIENT)
Dept: CARDIOLOGY | Facility: CLINIC | Age: 68
End: 2023-08-01
Payer: MEDICARE

## 2023-08-02 DIAGNOSIS — E11.40 TYPE 2 DIABETES MELLITUS WITH DIABETIC NEUROPATHY, WITH LONG-TERM CURRENT USE OF INSULIN: ICD-10-CM

## 2023-08-02 DIAGNOSIS — Z79.4 TYPE 2 DIABETES MELLITUS WITH DIABETIC NEUROPATHY, WITH LONG-TERM CURRENT USE OF INSULIN: ICD-10-CM

## 2023-08-02 RX ORDER — GLIPIZIDE 5 MG/1
5 TABLET ORAL DAILY
Qty: 30 TABLET | Refills: 2 | Status: SHIPPED | OUTPATIENT
Start: 2023-08-02

## 2023-08-02 NOTE — TELEPHONE ENCOUNTER
Caller: Breanna Kaba    Relationship: Self    Best call back number: [phone]    Requested Prescriptions:   Requested Prescriptions     Pending Prescriptions Disp Refills    glipizide (GLUCOTROL) 5 MG tablet 30 tablet 2     Sig: Take 1 tablet by mouth Daily.        Pharmacy where request should be sent: [facility] [place] [address] [facility] [address] - [phone] PH - [phone] FX     Last office visit with prescribing clinician: 6/27/2023   Last telemedicine visit with prescribing clinician: Visit date not found   Next office visit with prescribing clinician: 9/28/2023     Additional details provided by patient: COMPLETELY OUT      Does the patient have less than a 3 day supply:  [x] Yes  [] No    Would you like a call back once the refill request has been completed: [] Yes [x] No    If the office needs to give you a call back, can they leave a voicemail: [] Yes [x] No    Shira Peña   08/02/23 10:24 EDT

## 2023-08-07 ENCOUNTER — OFFICE VISIT (OUTPATIENT)
Dept: CARDIOLOGY | Facility: CLINIC | Age: 68
End: 2023-08-07
Payer: MEDICARE

## 2023-08-07 ENCOUNTER — TELEPHONE (OUTPATIENT)
Dept: FAMILY MEDICINE CLINIC | Facility: CLINIC | Age: 68
End: 2023-08-07
Payer: MEDICARE

## 2023-08-07 VITALS
HEART RATE: 78 BPM | WEIGHT: 151 LBS | BODY MASS INDEX: 27.79 KG/M2 | HEIGHT: 62 IN | OXYGEN SATURATION: 97 % | DIASTOLIC BLOOD PRESSURE: 73 MMHG | SYSTOLIC BLOOD PRESSURE: 164 MMHG

## 2023-08-07 DIAGNOSIS — E78.2 MIXED HYPERLIPIDEMIA: ICD-10-CM

## 2023-08-07 DIAGNOSIS — I10 BENIGN ESSENTIAL HYPERTENSION: ICD-10-CM

## 2023-08-07 DIAGNOSIS — I50.22 CHRONIC HFREF (HEART FAILURE WITH REDUCED EJECTION FRACTION): Primary | ICD-10-CM

## 2023-08-07 PROCEDURE — 1160F RVW MEDS BY RX/DR IN RCRD: CPT | Performed by: PHYSICIAN ASSISTANT

## 2023-08-07 PROCEDURE — 3077F SYST BP >= 140 MM HG: CPT | Performed by: PHYSICIAN ASSISTANT

## 2023-08-07 PROCEDURE — 99214 OFFICE O/P EST MOD 30 MIN: CPT | Performed by: PHYSICIAN ASSISTANT

## 2023-08-07 PROCEDURE — 1159F MED LIST DOCD IN RCRD: CPT | Performed by: PHYSICIAN ASSISTANT

## 2023-08-07 PROCEDURE — 3078F DIAST BP <80 MM HG: CPT | Performed by: PHYSICIAN ASSISTANT

## 2023-08-07 RX ORDER — ACETAMINOPHEN 500 MG
500 TABLET ORAL EVERY 6 HOURS PRN
COMMUNITY

## 2023-08-07 RX ORDER — LOSARTAN POTASSIUM 50 MG/1
50 TABLET ORAL DAILY
Qty: 90 TABLET | Refills: 3 | Status: SHIPPED | OUTPATIENT
Start: 2023-08-07

## 2023-08-07 RX ORDER — INSULIN GLULISINE 100 [IU]/ML
INJECTION, SOLUTION SUBCUTANEOUS
COMMUNITY

## 2023-08-07 NOTE — TELEPHONE ENCOUNTER
Caller: Sendy Breanna    Relationship: Self    Best call back number: [phone]     What is the best time to reach you: ANYTIME, OK TO LEAVE VOICEMAIL.    Who are you requesting to speak with (clinical staff, provider,  specific staff member): CLINICAL STAFF    Do you know the name of the person who called: PATIENT    What was the call regarding: PATIENT ADVISES THAT SHE SEEN HER CARDIOLOGIST TODAY AND HE ADVISED HER TO STOP TAKING HER INSULIN. PATIENT IS CONCERNED ABOUT THAT. PLEASE CALL BACK TO DISCUSS.    Is it okay if the provider responds through MyChart: NO CALL ONLY

## 2023-08-07 NOTE — PROGRESS NOTES
Myrna Ramos MD  Breanna Kaba  1955 08/07/2023    Patient Active Problem List   Diagnosis    Neuropathy    Gout    GERD (gastroesophageal reflux disease)    Diabetes mellitus    Chronic pain    Family history of GI malignancy    History of adenomatous polyp of colon    COVID-19    Acute congestive heart failure, unspecified heart failure type    NSTEMI, initial episode of care    Chronic HFrEF (heart failure with reduced ejection fraction)    Benign essential hypertension    Mixed hyperlipidemia       Dear Myrna Ramos MD:    Subjective     History of Present Illness:    Chief Complaint   Patient presents with    Med Management     Bottles.     Results     Stress and labs.     Palpitations       Breanna Kaba is a pleasant 68 y.o. female with a past medical history significant for coronary artery disease with recent stenting of the LAD on 3/31/2022 after she presented with an acute NSTEMI, ischemic cardiomyopathy with recovered LVEF now at 50 to 55% previously as low as 20 to 25%, diabetes mellitus, essential hypertension, and dyslipidemia.  No history of tobacco abuse.  She comes in today for cardiology follow-up.      Breanna reports she is doing well since she was last seen she reports resolution of all chest pains.  She denies any shortness of breath worsening from baseline, dizziness, syncope, or near syncope.  She did have stress test performed which showed no myocardial perfusion with no evidence of ischemia.  She does admit she has not been taking Entresto due to cost.    Allergies   Allergen Reactions    Asa [Aspirin] GI Intolerance    Naproxen Nausea And Vomiting    Penicillins Hives and Rash   :      Current Outpatient Medications:     acetaminophen (TYLENOL) 500 MG tablet, Take 1 tablet by mouth Every 6 (Six) Hours As Needed for Mild Pain., Disp: , Rfl:     allopurinol (ZYLOPRIM) 300 MG tablet, Take 1 tablet by mouth Daily., Disp: 90 tablet, Rfl: 1    aspirin 81 MG EC tablet, Take 1  tablet by mouth Daily., Disp: 90 tablet, Rfl: 3    atorvastatin (LIPITOR) 40 MG tablet, Take 1 tablet by mouth Daily., Disp: 90 tablet, Rfl: 3    Blood Glucose Monitoring Suppl (ONE TOUCH ULTRA 2) w/Device kit, Use to test blood sugar before meals and at bedtime., Disp: 1 each, Rfl: 0    glipizide (GLUCOTROL) 5 MG tablet, Take 1 tablet by mouth Daily., Disp: 30 tablet, Rfl: 2    glucose blood (OneTouch Ultra) test strip, Use to test blood sugar before meals and at bedtime., Disp: 100 each, Rfl: 5    Insulin Glargine (BASAGLAR KWIKPEN) 100 UNIT/ML injection pen, Inject 30 Units under the skin into the appropriate area as directed Every Night., Disp: 21 mL, Rfl: 1    insulin glulisine (Apidra) 100 UNIT/ML injection, Inject  under the skin into the appropriate area as directed 3 (Three) Times a Day Before Meals., Disp: , Rfl:     Lancets (onetouch ultrasoft) lancets, Use to test blood sugar before meals and at bedtime., Disp: 100 each, Rfl: 0    metoprolol succinate XL (TOPROL-XL) 25 MG 24 hr tablet, Take 1 tablet by mouth Daily., Disp: 30 tablet, Rfl: 0    trospium (SANCTURA) 20 MG tablet, Take 1 tablet by mouth Every 12 (Twelve) Hours., Disp: , Rfl:     insulin glulisine (Apidra) 100 UNIT/ML injection, Inject 10 Units under the skin into the appropriate area as directed 3 (Three) Times a Day Before Meals for 30 days., Disp: 9 mL, Rfl: 2    losartan (Cozaar) 50 MG tablet, Take 1 tablet by mouth Daily., Disp: 90 tablet, Rfl: 3    The following portions of the patient's history were reviewed and updated as appropriate: allergies, current medications, past family history, past medical history, past social history, past surgical history and problem list.    Social History     Tobacco Use    Smoking status: Never    Smokeless tobacco: Never   Vaping Use    Vaping Use: Never used   Substance Use Topics    Alcohol use: No    Drug use: No         Objective   Vitals:    08/07/23 0846 08/07/23 0850   BP: (!) 182/77 164/73   BP  "Location: Left arm Left arm   Patient Position: Sitting Sitting   Cuff Size: Adult Adult   Pulse: 78 78   SpO2: 97%    Weight: 68.5 kg (151 lb)    Height: 157.5 cm (62\")      Body mass index is 27.62 kg/mý.    Constitutional:       General: Not in acute distress.     Appearance: Healthy appearance. Well-developed and not in distress. Not diaphoretic.   Eyes:      Conjunctiva/sclera: Conjunctivae normal.      Pupils: Pupils are equal, round, and reactive to light.   HENT:      Head: Normocephalic and atraumatic.   Neck:      Vascular: No carotid bruit or JVD.   Pulmonary:      Effort: Pulmonary effort is normal. No respiratory distress.      Breath sounds: Normal breath sounds.   Cardiovascular:      Normal rate. Regular rhythm.   Edema:     Peripheral edema absent.   Skin:     General: Skin is cool.   Neurological:      Mental Status: Alert, oriented to person, place, and time and oriented to person, place and time.       Lab Results   Component Value Date     06/27/2023    K 4.1 06/27/2023     06/27/2023    CO2 27.0 06/27/2023    BUN 29 (H) 06/27/2023    CREATININE 0.84 06/27/2023    GLUCOSE 187 (H) 06/27/2023    CALCIUM 10.2 06/27/2023    AST 14 06/27/2023    ALT 15 06/27/2023    ALKPHOS 122 (H) 06/27/2023     No results found for: CKTOTAL  Lab Results   Component Value Date    WBC 5.69 12/15/2022    HGB 12.8 12/15/2022    HCT 40.1 12/15/2022     12/15/2022     Lab Results   Component Value Date    INR 1.04 03/28/2022     Lab Results   Component Value Date    MG 1.6 04/02/2022     Lab Results   Component Value Date    TSH 1.260 06/06/2022    CHLPL 256 (H) 03/01/2017    TRIG 176 (H) 06/27/2023    HDL 59 06/27/2023    LDL 92 06/27/2023      No results found for: BNP    During this visit the following were done:  Labs Reviewed []    Labs Ordered []    Radiology Reports Reviewed []    Radiology Ordered []    PCP Records Reviewed []    Referring Provider Records Reviewed []    ER Records Reviewed []  "   Hospital Records Reviewed []    History Obtained From Family []    Radiology Images Reviewed []    Other Reviewed []    Records Requested []       Procedures    Assessment & Plan    Diagnosis Plan   1. Chronic HFrEF (heart failure with reduced ejection fraction)  Comprehensive Metabolic Panel    Lipid Panel      2. Benign essential hypertension  Comprehensive Metabolic Panel    Lipid Panel      3. Mixed hyperlipidemia  Comprehensive Metabolic Panel               Recommendations:  Chronic HFrEF with improved EF  Unable to tolerate Entresto due to cost will try losartan 50 mg daily  CMP and lipid panel in 2 weeks.  Continue aspirin, Lipitor, metoprolol succinate.  Discussed results of stress test.  Essential hypertension  Still above goal hopefully will improve with addition of losartan.    Return in about 3 months (around 11/7/2023).    As always, I appreciate very much the opportunity to participate in the cardiovascular care of your patients.      With Best Regards,    Da Fox PA-C

## 2023-08-09 ENCOUNTER — TELEPHONE (OUTPATIENT)
Dept: FAMILY MEDICINE CLINIC | Facility: CLINIC | Age: 68
End: 2023-08-09
Payer: MEDICARE

## 2023-08-09 ENCOUNTER — TELEPHONE (OUTPATIENT)
Dept: CARDIOLOGY | Facility: CLINIC | Age: 68
End: 2023-08-09
Payer: MEDICARE

## 2023-08-09 NOTE — TELEPHONE ENCOUNTER
I'm not sure Da said anything about that. I think Breanna may have misunderstood something. His note doesn't say anything to that effect. Can you call Da's office and talk to his nurse and see if he can shed any light on this?

## 2023-08-09 NOTE — TELEPHONE ENCOUNTER
PCP called stating that she was told by us to stop insulin-there is nothing in the note or any place I see.  They were advised that this is not something we would stop-can you recall this conversation?

## 2023-08-09 NOTE — TELEPHONE ENCOUNTER
I'm not sure Da said anything about that. I think Breanna may have misunderstood something. His note doesn't say anything to that effect. Can you call Da's office and talk to his nurse and see if he can shed any light on this?    Spoke with Magalie at the cardiology office she will speak to him & call me back but us sure he did not tell her that.

## 2023-08-14 NOTE — TELEPHONE ENCOUNTER
The patient must have miss-understood I do not believe I provided any guidance on insulin use. I recommend her continue insulin as advised by her pcp

## 2023-08-22 NOTE — TELEPHONE ENCOUNTER
----- Message from Myrna Ramos MD sent at 1/17/2023 10:56 AM EST -----  Can you call Dr. Rueda's office? Breanna said she was just in there and he said that there's an air pocket in her kidneys and is sending her to a specialist in [place]? She just kept talking about it and was confused about it so I was hoping to get a clarification for her.    If they ask about the diabetes, we are working on it. She is off her long acting insulin secondary to cost but we are working on getting a replacement for her.    Called Dr. Rueda's office spoke with Ines his note says Cystocele will refer to urology.   
----- Message from Myrna Ramos MD sent at 1/17/2023 10:56 AM EST -----  Can you call Dr. Rueda's office? Breanna said she was just in there and he said that there's an air pocket in her kidneys and is sending her to a specialist in [place]? She just kept talking about it and was confused about it so I was hoping to get a clarification for her.    If they ask about the diabetes, we are working on it. She is off her long acting insulin secondary to cost but we are working on getting a replacement for her.    
Okay that makes so much more sense.    Please call Breanna and let her know that there is nothing physically wrong with the kidneys. She has a cystocele which is when the wall between the bladder and the vagina weakens. So then everything starts falling down and almost creates an empty space where the organs used to be. This must be what she thought was the air pocket. That's why she is going to urology somewhere in [place] for help.    That being said, now that she has her long acting insulin and short acting insulin also, how are her glucose levels?  
Okay that makes so much more sense.    Please call Breanna and let her know that there is nothing physically wrong with the kidneys. She has a cystocele which is when the wall between the bladder and the vagina weakens. So then everything starts falling down and almost creates an empty space where the organs used to be. This must be what she thought was the air pocket. That's why she is going to urology somewhere in [place] for help.    That being said, now that she has her long acting insulin and short acting insulin also, how are her glucose levels?      Left a message to return call.    Spoke with patient & she verbalized understanding,night before last it was 400,has not checked it since then has to go get the device that holds the Lancets her broke,is going to try to get one today,feels better so thinks it is better toady.  
Treatment Goal Explanation (Does Not Render In The Note): Stable for the purposes of categorizing medical decision making is defined by the specific treatment goals for an individual patient. A patient that is not at their treatment goal is not stable, even if the condition has not changed and there is no short- term threat to life or function.
Treatment Goal Met?: no

## 2023-08-23 DIAGNOSIS — M1A.09X0 IDIOPATHIC CHRONIC GOUT OF MULTIPLE SITES WITHOUT TOPHUS: ICD-10-CM

## 2023-08-23 RX ORDER — ALLOPURINOL 300 MG/1
300 TABLET ORAL DAILY
Qty: 90 TABLET | Refills: 0 | Status: SHIPPED | OUTPATIENT
Start: 2023-08-23

## 2023-09-06 ENCOUNTER — TELEPHONE (OUTPATIENT)
Dept: FAMILY MEDICINE CLINIC | Facility: CLINIC | Age: 68
End: 2023-09-06
Payer: MEDICARE

## 2023-09-06 DIAGNOSIS — E11.40 TYPE 2 DIABETES MELLITUS WITH DIABETIC NEUROPATHY, WITH LONG-TERM CURRENT USE OF INSULIN: ICD-10-CM

## 2023-09-06 DIAGNOSIS — Z79.4 TYPE 2 DIABETES MELLITUS WITH DIABETIC NEUROPATHY, WITH LONG-TERM CURRENT USE OF INSULIN: ICD-10-CM

## 2023-09-06 RX ORDER — INSULIN GLARGINE 100 [IU]/ML
30 INJECTION, SOLUTION SUBCUTANEOUS NIGHTLY
Qty: 3 ML | Refills: 0 | Status: CANCELLED | COMMUNITY
Start: 2023-09-06

## 2023-09-06 NOTE — TELEPHONE ENCOUNTER
Pt needs Basaglar KwikPen and states that she is completely out of the Apidra which is causing her blood sugar to go crazy.    I spoke with Dr. Ramos and she stated that the pt could have 1 box of Basaglar samples and that we had contacted the company for samples of Apidra but they have not arrived yet.    I contacted the pt's pharmacy and they stated that the closest they could get to the Apidra would be Novolog FlexPen. It would be 50 Days worth for a copay of $70.

## 2023-09-20 ENCOUNTER — TELEPHONE (OUTPATIENT)
Dept: FAMILY MEDICINE CLINIC | Facility: CLINIC | Age: 68
End: 2023-09-20
Payer: MEDICARE

## 2023-09-20 DIAGNOSIS — Z79.4 TYPE 2 DIABETES MELLITUS WITH DIABETIC NEUROPATHY, WITH LONG-TERM CURRENT USE OF INSULIN: ICD-10-CM

## 2023-09-20 DIAGNOSIS — E11.40 TYPE 2 DIABETES MELLITUS WITH DIABETIC NEUROPATHY, WITH LONG-TERM CURRENT USE OF INSULIN: ICD-10-CM

## 2023-09-21 RX ORDER — INSULIN GLARGINE 100 [IU]/ML
30 INJECTION, SOLUTION SUBCUTANEOUS NIGHTLY
Qty: 2 ML | Refills: 0 | COMMUNITY
Start: 2023-09-21

## 2023-09-21 NOTE — TELEPHONE ENCOUNTER
That's fine.      Left a detailed message that there is a sample available that she can come to the clinic & .    Patient picked with samples x 2 pens.

## 2023-09-21 NOTE — TELEPHONE ENCOUNTER
That's fine.      Left a detailed message that there is a sample available that she can come to the clinic & .

## 2023-09-28 ENCOUNTER — OFFICE VISIT (OUTPATIENT)
Dept: FAMILY MEDICINE CLINIC | Facility: CLINIC | Age: 68
End: 2023-09-28
Payer: MEDICARE

## 2023-09-28 VITALS
OXYGEN SATURATION: 99 % | BODY MASS INDEX: 27.82 KG/M2 | WEIGHT: 151.2 LBS | HEART RATE: 92 BPM | DIASTOLIC BLOOD PRESSURE: 84 MMHG | SYSTOLIC BLOOD PRESSURE: 136 MMHG | HEIGHT: 62 IN | TEMPERATURE: 97.3 F

## 2023-09-28 DIAGNOSIS — E11.40 TYPE 2 DIABETES MELLITUS WITH DIABETIC NEUROPATHY, WITH LONG-TERM CURRENT USE OF INSULIN: Primary | ICD-10-CM

## 2023-09-28 DIAGNOSIS — E78.5 HYPERLIPIDEMIA DUE TO TYPE 2 DIABETES MELLITUS: ICD-10-CM

## 2023-09-28 DIAGNOSIS — Z79.4 TYPE 2 DIABETES MELLITUS WITH DIABETIC NEUROPATHY, WITH LONG-TERM CURRENT USE OF INSULIN: Primary | ICD-10-CM

## 2023-09-28 DIAGNOSIS — M1A.09X0 IDIOPATHIC CHRONIC GOUT OF MULTIPLE SITES WITHOUT TOPHUS: ICD-10-CM

## 2023-09-28 DIAGNOSIS — E11.69 HYPERLIPIDEMIA DUE TO TYPE 2 DIABETES MELLITUS: ICD-10-CM

## 2023-09-28 PROCEDURE — 99214 OFFICE O/P EST MOD 30 MIN: CPT | Performed by: FAMILY MEDICINE

## 2023-09-28 PROCEDURE — 3079F DIAST BP 80-89 MM HG: CPT | Performed by: FAMILY MEDICINE

## 2023-09-28 PROCEDURE — 1159F MED LIST DOCD IN RCRD: CPT | Performed by: FAMILY MEDICINE

## 2023-09-28 PROCEDURE — 1160F RVW MEDS BY RX/DR IN RCRD: CPT | Performed by: FAMILY MEDICINE

## 2023-09-28 PROCEDURE — 83036 HEMOGLOBIN GLYCOSYLATED A1C: CPT | Performed by: FAMILY MEDICINE

## 2023-09-28 PROCEDURE — 3046F HEMOGLOBIN A1C LEVEL >9.0%: CPT | Performed by: FAMILY MEDICINE

## 2023-09-28 PROCEDURE — 3075F SYST BP GE 130 - 139MM HG: CPT | Performed by: FAMILY MEDICINE

## 2023-09-28 PROCEDURE — 84550 ASSAY OF BLOOD/URIC ACID: CPT | Performed by: FAMILY MEDICINE

## 2023-09-28 PROCEDURE — 80053 COMPREHEN METABOLIC PANEL: CPT | Performed by: FAMILY MEDICINE

## 2023-09-28 PROCEDURE — 36415 COLL VENOUS BLD VENIPUNCTURE: CPT | Performed by: FAMILY MEDICINE

## 2023-09-28 NOTE — PROGRESS NOTES
"Chief Complaint  Fatigue and Knot on hand (Left hand)    Subjective        Breanna Kaba presents to [facility]  History of Present Illness    The patient is a 68-year-old female with medical conditions Soliqua gout, type 2 diabetes, hyperlipidemia, and hypertension who presents to clinic for medical follow-up.     The patient's blood glucose levels have been well controlled. She takes Basaglar 30 units at night. She checks her blood glucose levels at home. If her blood glucose levels are elevated, she will take 10 units of Basaglar. She is walking as much as she can.    The patient has a cyst on her left hand.    The patient's brother was recently diagnosed with diabetes mellitus.    Objective   Vital Signs:  /84 (BP Location: Right arm, Patient Position: Sitting, Cuff Size: Adult)   Pulse 92   Temp 97.3 øF (36.3 øC) (Temporal)   Ht 157.5 cm (62\")   Wt 68.6 kg (151 lb 3.2 oz)   SpO2 99%   BMI 27.65 kg/mý   Estimated body mass index is 27.65 kg/mý as calculated from the following:    Height as of this encounter: 157.5 cm (62\").    Weight as of this encounter: 68.6 kg (151 lb 3.2 oz).       Physical Exam     Gen: Patient in NAD. Pleasant and answers appropriately. A&Ox3.    Skin: Warm and dry with normal turgor. No purpura, rashes, or unusual pigmentation noted. Hair is normal in appearance and distribution.    HEENT: NC/AT. No lesions noted. Conjunctiva clear, sclera nonicteric. PERRL. EOMI without nystagmus or strabismus. Fundi appear benign. No hemorrhages or exudates of eyes. Auditory canals are patent bilaterally without lesions. TMs intact,  nonerythematous, nonbulging without lesions. Nasal mucosa pink, nonerythematous, and nonedematous. Frontal and maxillary sinuses are nontender. O/P nonerythematous and moist without exudate.    Neck: Supple without lymph nodes palpated. FROM.     Lungs: Decreased B/L without rales, rhonchi, crackles, or wheezes.    Heart: RRR. " "S1 and S2 normal. No S3 or S4. No MRGT.    Abd: Soft, nontender,nondistended. (+)BSx4 quadrants.     Extrem: No CCE. Radial pulses 2+/4 and equal B/L. FROMx4.  Positive joint tenderness noted.    Neuro: No focal motor/sensory deficits.    Result Review :                   Assessment and Plan     This is a 68-year-old female presents clinic for medical follow-up.    Diagnoses and all orders for this visit:    1. Type 2 diabetes mellitus with diabetic neuropathy, with long-term current use of insulin (Primary)  -     Comprehensive Metabolic Panel; Future  -     Hemoglobin A1c; Future  -     Comprehensive Metabolic Panel  -     Hemoglobin A1c    2. Idiopathic chronic gout of multiple sites without tophus  -     Comprehensive Metabolic Panel; Future  -     Uric Acid; Future  -     Comprehensive Metabolic Panel  -     Uric Acid    3. Hyperlipidemia due to type 2 diabetes mellitus  -     Comprehensive Metabolic Panel; Future  -     Comprehensive Metabolic Panel    1. Type 2 diabetes mellitus with diabetic neuropathy with long-term current use of insulin (HCC).  - The patient will call the pharmacy and ask if Basaglar is on the insurance formulary and how much the co-payment is and if she is in the \"donut hole\" for the year.  - The patient will call the pharmacy and ask if Apidra is on the insurance formulary and how much the co-payment is and if she is in the \"donut hole\" for the year  - She will call me and let me know the information she collects.  - If I need to, I will supplement her with samples.    2. Hyperlipidemia, unspecified hyperlipidemia type.  - The patient will have blood work done today.    3. Hypertension, unspecified type.  - The patient will have blood work done today.    4. Cyst left hand.  - The patient will continue to monitor her symptoms.         Follow Up   Return in about 3 months (around 12/28/2023), or LABS.  Patient was given instructions and counseling regarding her condition or for health " maintenance advice. Please see specific information pulled into the AVS if appropriate.     Myrna Ramos MD     Transcribed from ambient dictation for Myrna Ramos MD by Starr Bah.  09/28/23   13:04 EDT    Patient or patient representative verbalized consent to the visit recording.  I have personally performed the services described in this document as transcribed by the above individual, and it is both accurate and complete.

## 2023-09-29 LAB
ALBUMIN SERPL-MCNC: 4.6 G/DL (ref 3.5–5.2)
ALBUMIN/GLOB SERPL: 1.6 G/DL
ALP SERPL-CCNC: 144 U/L (ref 39–117)
ALT SERPL W P-5'-P-CCNC: 18 U/L (ref 1–33)
ANION GAP SERPL CALCULATED.3IONS-SCNC: 8 MMOL/L (ref 5–15)
AST SERPL-CCNC: 19 U/L (ref 1–32)
BILIRUB SERPL-MCNC: 0.4 MG/DL (ref 0–1.2)
BUN SERPL-MCNC: 22 MG/DL (ref 8–23)
BUN/CREAT SERPL: 25.9 (ref 7–25)
CALCIUM SPEC-SCNC: 10.2 MG/DL (ref 8.6–10.5)
CHLORIDE SERPL-SCNC: 101 MMOL/L (ref 98–107)
CO2 SERPL-SCNC: 29 MMOL/L (ref 22–29)
CREAT SERPL-MCNC: 0.85 MG/DL (ref 0.57–1)
EGFRCR SERPLBLD CKD-EPI 2021: 73.8 ML/MIN/1.73
GLOBULIN UR ELPH-MCNC: 2.8 GM/DL
GLUCOSE SERPL-MCNC: 101 MG/DL (ref 65–99)
HBA1C MFR BLD: 9.2 % (ref 4.8–5.6)
POTASSIUM SERPL-SCNC: 4.3 MMOL/L (ref 3.5–5.2)
PROT SERPL-MCNC: 7.4 G/DL (ref 6–8.5)
SODIUM SERPL-SCNC: 138 MMOL/L (ref 136–145)
URATE SERPL-MCNC: 2.5 MG/DL (ref 2.4–5.7)

## 2023-10-03 DIAGNOSIS — Z79.4 TYPE 2 DIABETES MELLITUS WITH DIABETIC NEUROPATHY, WITH LONG-TERM CURRENT USE OF INSULIN: ICD-10-CM

## 2023-10-03 DIAGNOSIS — E11.40 TYPE 2 DIABETES MELLITUS WITH DIABETIC NEUROPATHY, WITH LONG-TERM CURRENT USE OF INSULIN: ICD-10-CM

## 2023-10-03 RX ORDER — GLIPIZIDE 5 MG/1
5 TABLET ORAL DAILY
Qty: 30 TABLET | Refills: 2 | Status: SHIPPED | OUTPATIENT
Start: 2023-10-03

## 2023-10-10 RX ORDER — BLOOD SUGAR DIAGNOSTIC
STRIP MISCELLANEOUS
Qty: 100 EACH | Refills: 2 | Status: SHIPPED | OUTPATIENT
Start: 2023-10-10

## 2023-10-17 NOTE — ED NOTES
Creat baseline 0.5-0.6  Creat peak this admission: 3.28  In the setting of HRS vs contrast nephropathy vs decreased PO intake 2/2 acute illness   Previously requiring midodrine earlier in her stay, which has since been discontinued given borderline hypertension   10/16 clarke placed 2/2 low UOP and strict I&Os    Plan:   Creat slowly downtrending -- continue to trend   Hold home torsemide, though may require eventual dosing of Lasix for volume overload on exam  Avoid hypotension, nephrotoxic agents Glucose 230. Nurse aware.      Anton Merritt  12/21/21 [id number]

## 2023-10-20 ENCOUNTER — TRANSCRIBE ORDERS (OUTPATIENT)
Dept: ADMINISTRATIVE | Facility: HOSPITAL | Age: 68
End: 2023-10-20
Payer: MEDICARE

## 2023-10-20 DIAGNOSIS — Z12.31 SCREENING MAMMOGRAM, ENCOUNTER FOR: Primary | ICD-10-CM

## 2023-11-07 ENCOUNTER — TELEPHONE (OUTPATIENT)
Dept: CARDIOLOGY | Facility: CLINIC | Age: 68
End: 2023-11-07
Payer: MEDICARE

## 2023-11-07 ENCOUNTER — OFFICE VISIT (OUTPATIENT)
Dept: CARDIOLOGY | Facility: CLINIC | Age: 68
End: 2023-11-07
Payer: MEDICARE

## 2023-11-07 VITALS
HEIGHT: 62 IN | HEART RATE: 84 BPM | OXYGEN SATURATION: 97 % | SYSTOLIC BLOOD PRESSURE: 160 MMHG | DIASTOLIC BLOOD PRESSURE: 81 MMHG | WEIGHT: 153.4 LBS | BODY MASS INDEX: 28.23 KG/M2

## 2023-11-07 DIAGNOSIS — E78.2 MIXED HYPERLIPIDEMIA: Primary | ICD-10-CM

## 2023-11-07 PROCEDURE — 99214 OFFICE O/P EST MOD 30 MIN: CPT | Performed by: PHYSICIAN ASSISTANT

## 2023-11-07 PROCEDURE — 3079F DIAST BP 80-89 MM HG: CPT | Performed by: PHYSICIAN ASSISTANT

## 2023-11-07 PROCEDURE — 1160F RVW MEDS BY RX/DR IN RCRD: CPT | Performed by: PHYSICIAN ASSISTANT

## 2023-11-07 PROCEDURE — 1159F MED LIST DOCD IN RCRD: CPT | Performed by: PHYSICIAN ASSISTANT

## 2023-11-07 PROCEDURE — 3077F SYST BP >= 140 MM HG: CPT | Performed by: PHYSICIAN ASSISTANT

## 2023-11-07 RX ORDER — SPIRONOLACTONE 25 MG/1
25 TABLET ORAL DAILY
Qty: 30 TABLET | Refills: 11 | Status: SHIPPED | OUTPATIENT
Start: 2023-11-07

## 2023-11-07 RX ORDER — ATORVASTATIN CALCIUM 80 MG/1
80 TABLET, FILM COATED ORAL DAILY
Qty: 90 TABLET | Refills: 2 | Status: SHIPPED | OUTPATIENT
Start: 2023-11-07

## 2023-11-07 NOTE — Clinical Note
Can we call and let Breanna know that I am starting her on spironolactone 25 mg daily as her blood pressure was elevated in the office today.

## 2023-11-07 NOTE — TELEPHONE ENCOUNTER
----- Message from Da Fox PA-C sent at 11/7/2023  9:39 AM EST -----  Can we call and let Breanna know that I am starting her on spironolactone 25 mg daily as her blood pressure was elevated in the office today.

## 2023-11-07 NOTE — PROGRESS NOTES
Myrna Ramso MD  Breanna Kaba  1955 11/07/2023    Patient Active Problem List   Diagnosis    Neuropathy    Gout    GERD (gastroesophageal reflux disease)    Diabetes mellitus    Chronic pain    Family history of GI malignancy    History of adenomatous polyp of colon    COVID-19    Acute congestive heart failure, unspecified heart failure type    NSTEMI, initial episode of care    Chronic HFrEF (heart failure with reduced ejection fraction)    Benign essential hypertension    Mixed hyperlipidemia       Dear Myrna Ramos MD:    Subjective     History of Present Illness:    Chief Complaint   Patient presents with    Follow-up     routine    Fatigue    Chest Pain     intermittent       Breanna Kaba is a pleasant 68 y.o. female with a past medical history significant for coronary artery disease with recent stenting of the LAD on 3/31/2022 after she presented with an acute NSTEMI, ischemic cardiomyopathy with recovered LVEF now at 50 to 55% previously as low as 20 to 25%, diabetes mellitus, essential hypertension, and dyslipidemia.  No history of tobacco abuse.  She comes in today for cardiology follow-up.     Breanna reports that she has been having pain in her extremities. Upon further questioning she states that it occurs in both upper and lower, bilaterally. She describes these as a numbing type pain. She does deny any actual chest pain. She also denies any shortness of breath.  She denies any palpitations or syncope.  Blood pressure is above goal today    Allergies   Allergen Reactions    Asa [Aspirin] GI Intolerance    Naproxen Nausea And Vomiting    Penicillins Hives and Rash   :      Current Outpatient Medications:     acetaminophen (TYLENOL) 500 MG tablet, Take 1 tablet by mouth Every 6 (Six) Hours As Needed for Mild Pain., Disp: , Rfl:     allopurinol (ZYLOPRIM) 300 MG tablet, Take 1 tablet by mouth Daily., Disp: 90 tablet, Rfl: 0    aspirin 81 MG EC tablet, Take 1 tablet by mouth Daily.,  Disp: 90 tablet, Rfl: 3    atorvastatin (LIPITOR) 80 MG tablet, Take 1 tablet by mouth Daily., Disp: 90 tablet, Rfl: 2    Blood Glucose Monitoring Suppl (ONE TOUCH ULTRA 2) w/Device kit, Use to test blood sugar before meals and at bedtime., Disp: 1 each, Rfl: 0    glipizide (GLUCOTROL) 5 MG tablet, Take 1 tablet by mouth Daily., Disp: 30 tablet, Rfl: 2    glucose blood (OneTouch Ultra) test strip, Use to test blood sugar before meals and at bedtime., Disp: 100 each, Rfl: 2    Insulin Glargine (BASAGLAR KWIKPEN) 100 UNIT/ML injection pen, Inject 30 Units under the skin into the appropriate area as directed Every Night., Disp: 2 mL, Rfl: 0    insulin glulisine (Apidra) 100 UNIT/ML injection, Inject 10 Units under the skin into the appropriate area as directed 3 (Three) Times a Day Before Meals for 30 days., Disp: 9 mL, Rfl: 2    insulin glulisine (Apidra) 100 UNIT/ML injection, Inject  under the skin into the appropriate area as directed 3 (Three) Times a Day Before Meals., Disp: , Rfl:     Lancets (onetouch ultrasoft) lancets, Use to test blood sugar before meals and at bedtime., Disp: 100 each, Rfl: 0    losartan (Cozaar) 50 MG tablet, Take 1 tablet by mouth Daily., Disp: 90 tablet, Rfl: 3    metoprolol succinate XL (TOPROL-XL) 25 MG 24 hr tablet, Take 1 tablet by mouth Daily., Disp: 30 tablet, Rfl: 0    trospium (SANCTURA) 20 MG tablet, Take 1 tablet by mouth Every 12 (Twelve) Hours., Disp: , Rfl:     spironolactone (ALDACTONE) 25 MG tablet, Take 1 tablet by mouth Daily., Disp: 30 tablet, Rfl: 11    The following portions of the patient's history were reviewed and updated as appropriate: allergies, current medications, past family history, past medical history, past social history, past surgical history and problem list.    Social History     Tobacco Use    Smoking status: Never    Smokeless tobacco: Never   Vaping Use    Vaping Use: Never used   Substance Use Topics    Alcohol use: No    Drug use: No  "        Objective   Vitals:    11/07/23 0822   BP: 160/81   Pulse: 84   SpO2: 97%   Weight: 69.6 kg (153 lb 6.4 oz)   Height: 157.5 cm (62\")     Body mass index is 28.06 kg/m².    ROS    Constitutional:       General: Not in acute distress.     Appearance: Healthy appearance. Well-developed and not in distress. Not diaphoretic.   Eyes:      Conjunctiva/sclera: Conjunctivae normal.      Pupils: Pupils are equal, round, and reactive to light.   HENT:      Head: Normocephalic and atraumatic.   Neck:      Vascular: No carotid bruit or JVD.   Pulmonary:      Effort: Pulmonary effort is normal. No respiratory distress.      Breath sounds: Normal breath sounds.   Cardiovascular:      Normal rate. Regular rhythm.   Edema:     Peripheral edema absent.   Skin:     General: Skin is cool.   Neurological:      Mental Status: Alert, oriented to person, place, and time and oriented to person, place and time.         Lab Results   Component Value Date     09/28/2023    K 4.3 09/28/2023     09/28/2023    CO2 29.0 09/28/2023    BUN 22 09/28/2023    CREATININE 0.85 09/28/2023    GLUCOSE 101 (H) 09/28/2023    CALCIUM 10.2 09/28/2023    AST 19 09/28/2023    ALT 18 09/28/2023    ALKPHOS 144 (H) 09/28/2023     No results found for: \"CKTOTAL\"  Lab Results   Component Value Date    WBC 5.69 12/15/2022    HGB 12.8 12/15/2022    HCT 40.1 12/15/2022     12/15/2022     Lab Results   Component Value Date    INR 1.04 03/28/2022     Lab Results   Component Value Date    MG 1.6 04/02/2022     Lab Results   Component Value Date    TSH 1.260 06/06/2022    CHLPL 256 (H) 03/01/2017    TRIG 176 (H) 06/27/2023    HDL 59 06/27/2023    LDL 92 06/27/2023      No results found for: \"BNP\"    During this visit the following were done:  Labs Reviewed []    Labs Ordered []    Radiology Reports Reviewed []    Radiology Ordered []    PCP Records Reviewed []    Referring Provider Records Reviewed []    ER Records Reviewed []    Hospital Records " Reviewed []    History Obtained From Family []    Radiology Images Reviewed []    Other Reviewed []    Records Requested []       Procedures    Assessment & Plan    Diagnosis Plan   1. Mixed hyperlipidemia  Comprehensive Metabolic Panel    Lipid Panel               Recommendations:  Peripheral neuropathy  Breanna symptoms today are fairly suspicious of peripheral neuropathy likely secondary to her long-term uncontrolled diabetes mellitus.  I did explain this to her.  Originally she was concerned this could be symptoms of coronary artery disease however with recent stress test being unremarkable I provided reassurance today as she does deny any chest pains at all.   Known CAD  Continue aspirin, Lipitor, losartan, and metoprolol.  Dyslipidemia  Cholesterol still above goal we will increase Lipitor to 80 mg  Check CMP and lipid panel in 1 month  Essential hypertension  Still above goal we will start spironolactone 25 mg.    No follow-ups on file.    As always, I appreciate very much the opportunity to participate in the cardiovascular care of your patients.      With Best Regards,    Da Fox PA-C

## 2023-11-30 ENCOUNTER — HOSPITAL ENCOUNTER (OUTPATIENT)
Dept: MAMMOGRAPHY | Facility: HOSPITAL | Age: 68
Discharge: HOME | End: 2023-11-30
Admitting: FAMILY MEDICINE
Payer: MEDICARE

## 2023-11-30 DIAGNOSIS — Z12.31 SCREENING MAMMOGRAM, ENCOUNTER FOR: ICD-10-CM

## 2023-11-30 PROCEDURE — 77067 SCR MAMMO BI INCL CAD: CPT

## 2023-11-30 PROCEDURE — 77063 BREAST TOMOSYNTHESIS BI: CPT

## 2023-12-24 ENCOUNTER — APPOINTMENT (OUTPATIENT)
Dept: CT IMAGING | Facility: HOSPITAL | Age: 68
End: 2023-12-24
Payer: MEDICARE

## 2023-12-24 ENCOUNTER — HOSPITAL ENCOUNTER (INPATIENT)
Facility: HOSPITAL | Age: 68
LOS: 2 days | Discharge: HOME | End: 2023-12-26
Attending: STUDENT IN AN ORGANIZED HEALTH CARE EDUCATION/TRAINING PROGRAM | Admitting: HOSPITALIST
Payer: MEDICARE

## 2023-12-24 ENCOUNTER — APPOINTMENT (OUTPATIENT)
Dept: GENERAL RADIOLOGY | Facility: HOSPITAL | Age: 68
End: 2023-12-24
Payer: MEDICARE

## 2023-12-24 DIAGNOSIS — E11.40 TYPE 2 DIABETES MELLITUS WITH DIABETIC NEUROPATHY, WITH LONG-TERM CURRENT USE OF INSULIN: ICD-10-CM

## 2023-12-24 DIAGNOSIS — Z79.4 TYPE 2 DIABETES MELLITUS WITH DIABETIC NEUROPATHY, WITH LONG-TERM CURRENT USE OF INSULIN: ICD-10-CM

## 2023-12-24 PROBLEM — I20.0 UNSTABLE ANGINA: Status: ACTIVE | Noted: 2023-12-24

## 2023-12-24 LAB
ALBUMIN SERPL-MCNC: 4.1 G/DL (ref 3.5–5.2)
ALBUMIN/GLOB SERPL: 1.4 G/DL
ALP SERPL-CCNC: 156 U/L (ref 39–117)
ALT SERPL W P-5'-P-CCNC: 7 U/L (ref 1–33)
ANION GAP SERPL CALCULATED.3IONS-SCNC: 9.8 MMOL/L (ref 5–15)
APTT PPP: 29.2 SECONDS (ref 26.5–34.5)
AST SERPL-CCNC: 17 U/L (ref 1–32)
BACTERIA UR QL AUTO: ABNORMAL /HPF
BASOPHILS # BLD AUTO: 0.05 10*3/MM3 (ref 0–0.2)
BASOPHILS NFR BLD AUTO: 0.5 % (ref 0–1.5)
BILIRUB SERPL-MCNC: 0.6 MG/DL (ref 0–1.2)
BILIRUB UR QL STRIP: NEGATIVE
BUN SERPL-MCNC: 28 MG/DL (ref 8–23)
BUN/CREAT SERPL: 22.6 (ref 7–25)
CALCIUM SPEC-SCNC: 9.4 MG/DL (ref 8.6–10.5)
CHLORIDE SERPL-SCNC: 99 MMOL/L (ref 98–107)
CLARITY UR: ABNORMAL
CO2 SERPL-SCNC: 26.2 MMOL/L (ref 22–29)
COLOR UR: YELLOW
CREAT SERPL-MCNC: 1.24 MG/DL (ref 0.57–1)
DEPRECATED RDW RBC AUTO: 39.8 FL (ref 37–54)
EGFRCR SERPLBLD CKD-EPI 2021: 46.9 ML/MIN/1.73
EOSINOPHIL # BLD AUTO: 0.14 10*3/MM3 (ref 0–0.4)
EOSINOPHIL NFR BLD AUTO: 1.3 % (ref 0.3–6.2)
ERYTHROCYTE [DISTWIDTH] IN BLOOD BY AUTOMATED COUNT: 13.3 % (ref 12.3–15.4)
GEN 5 2HR TROPONIN T REFLEX: 29 NG/L
GLOBULIN UR ELPH-MCNC: 3 GM/DL
GLUCOSE BLDC GLUCOMTR-MCNC: 197 MG/DL (ref 70–130)
GLUCOSE BLDC GLUCOMTR-MCNC: 200 MG/DL (ref 70–130)
GLUCOSE SERPL-MCNC: 410 MG/DL (ref 65–99)
GLUCOSE UR STRIP-MCNC: ABNORMAL MG/DL
HBA1C MFR BLD: 10.4 % (ref 4.8–5.6)
HCT VFR BLD AUTO: 38.6 % (ref 34–46.6)
HGB BLD-MCNC: 13.1 G/DL (ref 12–15.9)
HGB UR QL STRIP.AUTO: ABNORMAL
HOLD SPECIMEN: NORMAL
HOLD SPECIMEN: NORMAL
HYALINE CASTS UR QL AUTO: ABNORMAL /LPF
IMM GRANULOCYTES # BLD AUTO: 0.04 10*3/MM3 (ref 0–0.05)
IMM GRANULOCYTES NFR BLD AUTO: 0.4 % (ref 0–0.5)
INR PPP: 0.86 (ref 0.9–1.1)
KETONES UR QL STRIP: ABNORMAL
LEUKOCYTE ESTERASE UR QL STRIP.AUTO: ABNORMAL
LYMPHOCYTES # BLD AUTO: 1.54 10*3/MM3 (ref 0.7–3.1)
LYMPHOCYTES NFR BLD AUTO: 14.2 % (ref 19.6–45.3)
MCH RBC QN AUTO: 28 PG (ref 26.6–33)
MCHC RBC AUTO-ENTMCNC: 33.9 G/DL (ref 31.5–35.7)
MCV RBC AUTO: 82.5 FL (ref 79–97)
MONOCYTES # BLD AUTO: 0.63 10*3/MM3 (ref 0.1–0.9)
MONOCYTES NFR BLD AUTO: 5.8 % (ref 5–12)
NEUTROPHILS NFR BLD AUTO: 77.8 % (ref 42.7–76)
NEUTROPHILS NFR BLD AUTO: 8.47 10*3/MM3 (ref 1.7–7)
NITRITE UR QL STRIP: NEGATIVE
NRBC BLD AUTO-RTO: 0 /100 WBC (ref 0–0.2)
NT-PROBNP SERPL-MCNC: 148.3 PG/ML (ref 0–900)
PH UR STRIP.AUTO: 5.5 [PH] (ref 5–8)
PLATELET # BLD AUTO: 324 10*3/MM3 (ref 140–450)
PMV BLD AUTO: 10.3 FL (ref 6–12)
POTASSIUM SERPL-SCNC: 5.2 MMOL/L (ref 3.5–5.2)
PROT SERPL-MCNC: 7.1 G/DL (ref 6–8.5)
PROT UR QL STRIP: ABNORMAL
PROTHROMBIN TIME: 12.2 SECONDS (ref 12.1–14.7)
QT INTERVAL: 352 MS
QTC INTERVAL: 465 MS
RBC # BLD AUTO: 4.68 10*6/MM3 (ref 3.77–5.28)
RBC # UR STRIP: ABNORMAL /HPF
REF LAB TEST METHOD: ABNORMAL
SODIUM SERPL-SCNC: 135 MMOL/L (ref 136–145)
SP GR UR STRIP: 1.02 (ref 1–1.03)
SQUAMOUS #/AREA URNS HPF: ABNORMAL /HPF
TROPONIN T DELTA: -2 NG/L
TROPONIN T SERPL HS-MCNC: 31 NG/L
UFH PPP CHRO-ACNC: <0.1 IU/ML (ref 0.3–0.7)
UROBILINOGEN UR QL STRIP: ABNORMAL
WBC # UR STRIP: ABNORMAL /HPF
WBC NRBC COR # BLD AUTO: 10.87 10*3/MM3 (ref 3.4–10.8)
WHOLE BLOOD HOLD COAG: NORMAL
WHOLE BLOOD HOLD SPECIMEN: NORMAL

## 2023-12-24 PROCEDURE — 85610 PROTHROMBIN TIME: CPT | Performed by: STUDENT IN AN ORGANIZED HEALTH CARE EDUCATION/TRAINING PROGRAM

## 2023-12-24 PROCEDURE — 83036 HEMOGLOBIN GLYCOSYLATED A1C: CPT | Performed by: HOSPITALIST

## 2023-12-24 PROCEDURE — 71045 X-RAY EXAM CHEST 1 VIEW: CPT

## 2023-12-24 PROCEDURE — 99223 1ST HOSP IP/OBS HIGH 75: CPT

## 2023-12-24 PROCEDURE — 63710000001 INSULIN LISPRO (HUMAN) PER 5 UNITS: Performed by: HOSPITALIST

## 2023-12-24 PROCEDURE — 71045 X-RAY EXAM CHEST 1 VIEW: CPT | Performed by: RADIOLOGY

## 2023-12-24 PROCEDURE — 87086 URINE CULTURE/COLONY COUNT: CPT | Performed by: STUDENT IN AN ORGANIZED HEALTH CARE EDUCATION/TRAINING PROGRAM

## 2023-12-24 PROCEDURE — 85730 THROMBOPLASTIN TIME PARTIAL: CPT | Performed by: STUDENT IN AN ORGANIZED HEALTH CARE EDUCATION/TRAINING PROGRAM

## 2023-12-24 PROCEDURE — 25510000001 IOPAMIDOL PER 1 ML: Performed by: STUDENT IN AN ORGANIZED HEALTH CARE EDUCATION/TRAINING PROGRAM

## 2023-12-24 PROCEDURE — 99285 EMERGENCY DEPT VISIT HI MDM: CPT

## 2023-12-24 PROCEDURE — 84484 ASSAY OF TROPONIN QUANT: CPT | Performed by: STUDENT IN AN ORGANIZED HEALTH CARE EDUCATION/TRAINING PROGRAM

## 2023-12-24 PROCEDURE — 63710000001 INSULIN REGULAR HUMAN PER 5 UNITS: Performed by: STUDENT IN AN ORGANIZED HEALTH CARE EDUCATION/TRAINING PROGRAM

## 2023-12-24 PROCEDURE — 25010000002 CEFTRIAXONE PER 250 MG: Performed by: HOSPITALIST

## 2023-12-24 PROCEDURE — 82948 REAGENT STRIP/BLOOD GLUCOSE: CPT

## 2023-12-24 PROCEDURE — 80053 COMPREHEN METABOLIC PANEL: CPT | Performed by: STUDENT IN AN ORGANIZED HEALTH CARE EDUCATION/TRAINING PROGRAM

## 2023-12-24 PROCEDURE — 93010 ELECTROCARDIOGRAM REPORT: CPT | Performed by: INTERNAL MEDICINE

## 2023-12-24 PROCEDURE — 36415 COLL VENOUS BLD VENIPUNCTURE: CPT

## 2023-12-24 PROCEDURE — 83880 ASSAY OF NATRIURETIC PEPTIDE: CPT | Performed by: HOSPITALIST

## 2023-12-24 PROCEDURE — 93005 ELECTROCARDIOGRAM TRACING: CPT | Performed by: STUDENT IN AN ORGANIZED HEALTH CARE EDUCATION/TRAINING PROGRAM

## 2023-12-24 PROCEDURE — 87186 SC STD MICRODIL/AGAR DIL: CPT | Performed by: STUDENT IN AN ORGANIZED HEALTH CARE EDUCATION/TRAINING PROGRAM

## 2023-12-24 PROCEDURE — 85520 HEPARIN ASSAY: CPT | Performed by: STUDENT IN AN ORGANIZED HEALTH CARE EDUCATION/TRAINING PROGRAM

## 2023-12-24 PROCEDURE — 85025 COMPLETE CBC W/AUTO DIFF WBC: CPT | Performed by: STUDENT IN AN ORGANIZED HEALTH CARE EDUCATION/TRAINING PROGRAM

## 2023-12-24 PROCEDURE — 81001 URINALYSIS AUTO W/SCOPE: CPT | Performed by: STUDENT IN AN ORGANIZED HEALTH CARE EDUCATION/TRAINING PROGRAM

## 2023-12-24 PROCEDURE — 25010000002 LABETALOL 5 MG/ML SOLUTION

## 2023-12-24 PROCEDURE — 25010000002 HEPARIN (PORCINE) PER 1000 UNITS: Performed by: STUDENT IN AN ORGANIZED HEALTH CARE EDUCATION/TRAINING PROGRAM

## 2023-12-24 PROCEDURE — 75574 CT ANGIO HRT W/3D IMAGE: CPT | Performed by: RADIOLOGY

## 2023-12-24 PROCEDURE — 87077 CULTURE AEROBIC IDENTIFY: CPT | Performed by: STUDENT IN AN ORGANIZED HEALTH CARE EDUCATION/TRAINING PROGRAM

## 2023-12-24 PROCEDURE — 63710000001 INSULIN GLARGINE PER 5 UNITS: Performed by: HOSPITALIST

## 2023-12-24 PROCEDURE — 25010000002 HEPARIN (PORCINE) 25000-0.45 UT/250ML-% SOLUTION: Performed by: STUDENT IN AN ORGANIZED HEALTH CARE EDUCATION/TRAINING PROGRAM

## 2023-12-24 PROCEDURE — 75574 CT ANGIO HRT W/3D IMAGE: CPT

## 2023-12-24 RX ORDER — ASPIRIN 81 MG/1
324 TABLET, CHEWABLE ORAL ONCE
Status: COMPLETED | OUTPATIENT
Start: 2023-12-24 | End: 2023-12-24

## 2023-12-24 RX ORDER — ASPIRIN 81 MG/1
81 TABLET, CHEWABLE ORAL DAILY
Status: DISCONTINUED | OUTPATIENT
Start: 2023-12-25 | End: 2023-12-26 | Stop reason: HOSPADM

## 2023-12-24 RX ORDER — LABETALOL HYDROCHLORIDE 5 MG/ML
10 INJECTION, SOLUTION INTRAVENOUS ONCE
Status: COMPLETED | OUTPATIENT
Start: 2023-12-24 | End: 2023-12-24

## 2023-12-24 RX ORDER — INSULIN LISPRO 100 [IU]/ML
2-9 INJECTION, SOLUTION INTRAVENOUS; SUBCUTANEOUS
Status: DISCONTINUED | OUTPATIENT
Start: 2023-12-24 | End: 2023-12-26 | Stop reason: HOSPADM

## 2023-12-24 RX ORDER — BISACODYL 10 MG
10 SUPPOSITORY, RECTAL RECTAL DAILY PRN
Status: DISCONTINUED | OUTPATIENT
Start: 2023-12-24 | End: 2023-12-26 | Stop reason: HOSPADM

## 2023-12-24 RX ORDER — BISACODYL 5 MG/1
5 TABLET, DELAYED RELEASE ORAL DAILY PRN
Status: DISCONTINUED | OUTPATIENT
Start: 2023-12-24 | End: 2023-12-26 | Stop reason: HOSPADM

## 2023-12-24 RX ORDER — HEPARIN SODIUM 5000 [USP'U]/ML
4000 INJECTION, SOLUTION INTRAVENOUS; SUBCUTANEOUS ONCE
Qty: 1 ML | Refills: 0 | Status: COMPLETED | OUTPATIENT
Start: 2023-12-24 | End: 2023-12-24

## 2023-12-24 RX ORDER — SODIUM CHLORIDE 0.9 % (FLUSH) 0.9 %
10 SYRINGE (ML) INJECTION EVERY 12 HOURS SCHEDULED
Status: DISCONTINUED | OUTPATIENT
Start: 2023-12-24 | End: 2023-12-26 | Stop reason: HOSPADM

## 2023-12-24 RX ORDER — HEPARIN SODIUM 5000 [USP'U]/ML
25 INJECTION, SOLUTION INTRAVENOUS; SUBCUTANEOUS AS NEEDED
Status: DISCONTINUED | OUTPATIENT
Start: 2023-12-24 | End: 2023-12-24

## 2023-12-24 RX ORDER — SODIUM CHLORIDE 9 MG/ML
40 INJECTION, SOLUTION INTRAVENOUS AS NEEDED
Status: DISCONTINUED | OUTPATIENT
Start: 2023-12-24 | End: 2023-12-26 | Stop reason: HOSPADM

## 2023-12-24 RX ORDER — HEPARIN SODIUM 5000 [USP'U]/ML
50 INJECTION, SOLUTION INTRAVENOUS; SUBCUTANEOUS AS NEEDED
Status: DISCONTINUED | OUTPATIENT
Start: 2023-12-24 | End: 2023-12-24

## 2023-12-24 RX ORDER — HEPARIN SODIUM 10000 [USP'U]/100ML
18 INJECTION, SOLUTION INTRAVENOUS
Status: DISCONTINUED | OUTPATIENT
Start: 2023-12-24 | End: 2023-12-24

## 2023-12-24 RX ORDER — ATORVASTATIN CALCIUM 40 MG/1
80 TABLET, FILM COATED ORAL DAILY
Status: DISCONTINUED | OUTPATIENT
Start: 2023-12-25 | End: 2023-12-26 | Stop reason: HOSPADM

## 2023-12-24 RX ORDER — GLUCAGON 1 MG/ML
1 KIT INJECTION
Status: DISCONTINUED | OUTPATIENT
Start: 2023-12-24 | End: 2023-12-26 | Stop reason: HOSPADM

## 2023-12-24 RX ORDER — POLYETHYLENE GLYCOL 3350 17 G/17G
17 POWDER, FOR SOLUTION ORAL DAILY PRN
Status: DISCONTINUED | OUTPATIENT
Start: 2023-12-24 | End: 2023-12-26 | Stop reason: HOSPADM

## 2023-12-24 RX ORDER — SODIUM CHLORIDE 0.9 % (FLUSH) 0.9 %
10 SYRINGE (ML) INJECTION AS NEEDED
Status: DISCONTINUED | OUTPATIENT
Start: 2023-12-24 | End: 2023-12-26 | Stop reason: HOSPADM

## 2023-12-24 RX ORDER — HEPARIN SODIUM 5000 [USP'U]/ML
80 INJECTION, SOLUTION INTRAVENOUS; SUBCUTANEOUS ONCE
Status: DISCONTINUED | OUTPATIENT
Start: 2023-12-24 | End: 2023-12-24

## 2023-12-24 RX ORDER — NITROFURANTOIN 25; 75 MG/1; MG/1
100 CAPSULE ORAL ONCE
Status: COMPLETED | OUTPATIENT
Start: 2023-12-24 | End: 2023-12-24

## 2023-12-24 RX ORDER — SODIUM CHLORIDE 9 MG/ML
75 INJECTION, SOLUTION INTRAVENOUS CONTINUOUS
Status: DISCONTINUED | OUTPATIENT
Start: 2023-12-25 | End: 2023-12-26 | Stop reason: HOSPADM

## 2023-12-24 RX ORDER — DEXTROSE MONOHYDRATE 25 G/50ML
25 INJECTION, SOLUTION INTRAVENOUS
Status: DISCONTINUED | OUTPATIENT
Start: 2023-12-24 | End: 2023-12-26 | Stop reason: HOSPADM

## 2023-12-24 RX ORDER — AMOXICILLIN 250 MG
2 CAPSULE ORAL 2 TIMES DAILY
Status: DISCONTINUED | OUTPATIENT
Start: 2023-12-24 | End: 2023-12-26 | Stop reason: HOSPADM

## 2023-12-24 RX ORDER — NITROGLYCERIN 0.4 MG/1
0.4 TABLET SUBLINGUAL AS NEEDED
Status: DISCONTINUED | OUTPATIENT
Start: 2023-12-24 | End: 2023-12-26 | Stop reason: HOSPADM

## 2023-12-24 RX ORDER — HEPARIN SODIUM 10000 [USP'U]/100ML
16 INJECTION, SOLUTION INTRAVENOUS
Status: DISCONTINUED | OUTPATIENT
Start: 2023-12-24 | End: 2023-12-26

## 2023-12-24 RX ORDER — ATORVASTATIN CALCIUM 40 MG/1
80 TABLET, FILM COATED ORAL NIGHTLY
Status: DISCONTINUED | OUTPATIENT
Start: 2023-12-24 | End: 2023-12-24

## 2023-12-24 RX ORDER — NICOTINE POLACRILEX 4 MG
15 LOZENGE BUCCAL
Status: DISCONTINUED | OUTPATIENT
Start: 2023-12-24 | End: 2023-12-26 | Stop reason: HOSPADM

## 2023-12-24 RX ADMIN — INSULIN LISPRO 4 UNITS: 100 INJECTION, SOLUTION INTRAVENOUS; SUBCUTANEOUS at 23:00

## 2023-12-24 RX ADMIN — LABETALOL HYDROCHLORIDE 10 MG: 5 INJECTION, SOLUTION INTRAVENOUS at 23:01

## 2023-12-24 RX ADMIN — HEPARIN SODIUM 4000 UNITS: 5000 INJECTION INTRAVENOUS; SUBCUTANEOUS at 21:21

## 2023-12-24 RX ADMIN — INSULIN GLARGINE 15 UNITS: 100 INJECTION, SOLUTION SUBCUTANEOUS at 22:59

## 2023-12-24 RX ADMIN — NITROFURANTOIN MONOHYDRATE/MACROCRYSTALLINE 100 MG: 25; 75 CAPSULE ORAL at 14:04

## 2023-12-24 RX ADMIN — IOPAMIDOL 90 ML: 755 INJECTION, SOLUTION INTRAVENOUS at 17:39

## 2023-12-24 RX ADMIN — NITROGLYCERIN 0.4 MG: 0.4 TABLET, ORALLY DISINTEGRATING SUBLINGUAL at 17:40

## 2023-12-24 RX ADMIN — HEPARIN SODIUM 12 UNITS/KG/HR: 10000 INJECTION, SOLUTION INTRAVENOUS at 21:22

## 2023-12-24 RX ADMIN — ASPIRIN 324 MG: 81 TABLET, CHEWABLE ORAL at 12:45

## 2023-12-24 RX ADMIN — CEFTRIAXONE SODIUM 2000 MG: 2 INJECTION, POWDER, FOR SOLUTION INTRAMUSCULAR; INTRAVENOUS at 21:30

## 2023-12-24 RX ADMIN — INSULIN HUMAN 8 UNITS: 100 INJECTION, SOLUTION PARENTERAL at 14:04

## 2023-12-24 RX ADMIN — Medication 10 ML: at 23:01

## 2023-12-24 NOTE — ED PROVIDER NOTES
Subjective   History of Present Illness  68-year-old female with past medical history of of congestive heart failure, coronary disease, diabetes, and hypertension presents to the ER due to concerns for increasing dysuria and urinary frequency.  Patient also noted 1-2 episodes of chest pain yesterday afternoon.  Patient denies chest pain today.  No shortness of breath.  No nausea.  No diaphoresis.  Patient is seeking evaluation for chest pain and urinary symptoms.  Vital stable.  Afebrile      Review of Systems   Cardiovascular:  Positive for chest pain.   Genitourinary:  Positive for dysuria and frequency.   All other systems reviewed and are negative.      Past Medical History:   Diagnosis Date    Acute congestive heart failure, unspecified heart failure type 03/28/2022    Arthritis     Chronic pain     Diabetes mellitus     Elevated cholesterol     GERD (gastroesophageal reflux disease)     Gout     Headache     Hypertension     Myocardial infarction     Neuropathy        Allergies   Allergen Reactions    Asa [Aspirin] GI Intolerance    Naproxen Nausea And Vomiting    Penicillins Hives and Rash       Past Surgical History:   Procedure Laterality Date    BREAST BIOPSY Left 1988    benign    CARDIAC CATHETERIZATION N/A 03/31/2022    Procedure: Left Heart Cath;  Surgeon: Tristan Marin MD;  Location: [facility] CATH INVASIVE LOCATION;  Service: Cardiology;  Laterality: N/A;    CARDIAC CATHETERIZATION N/A 03/31/2022    Procedure: Percutaneous Coronary Intervention;  Surgeon: Tristan Marin MD;  Location: [facility] CATH INVASIVE LOCATION;  Service: Cardiology;  Laterality: N/A;    COLONOSCOPY N/A 02/17/2020    Procedure: COLONOSCOPY FOR SCREENING CPT CODE: ;  Surgeon: Mariano Prescott MD;  Location: [facility];  Service: Gastroenterology;  Laterality: N/A;    CORONARY STENT PLACEMENT      HYSTERECTOMY      Partial    KNEE SURGERY      THYROIDECTOMY, PARTIAL      Removal of goiter       Family History    Problem Relation Age of Onset    COPD Mother     Heart disease Brother     Heart attack Brother     Hypertension Daughter     Diabetes Daughter     Hypertension Daughter     Diabetes Daughter     Breast cancer Neg Hx        Social History     Socioeconomic History    Marital status:    Tobacco Use    Smoking status: Never    Smokeless tobacco: Never   Vaping Use    Vaping Use: Never used   Substance and Sexual Activity    Alcohol use: No    Drug use: No    Sexual activity: Defer           Objective   Physical Exam  Constitutional:       General: She is not in acute distress.     Appearance: Normal appearance. She is not ill-appearing.   HENT:      Head: Normocephalic and atraumatic.      Right Ear: External ear normal.      Left Ear: External ear normal.      Nose: Nose normal.      Mouth/Throat:      Mouth: Mucous membranes are moist.   Eyes:      Extraocular Movements: Extraocular movements intact.      Pupils: Pupils are equal, round, and reactive to light.   Cardiovascular:      Rate and Rhythm: Normal rate and regular rhythm.      Heart sounds: No murmur heard.  Pulmonary:      Effort: Pulmonary effort is normal. No respiratory distress.      Breath sounds: Normal breath sounds. No wheezing.   Abdominal:      General: Bowel sounds are normal.      Palpations: Abdomen is soft.      Tenderness: There is no abdominal tenderness.   Musculoskeletal:         General: No deformity or signs of injury. Normal range of motion.      Cervical back: Normal range of motion and neck supple.   Skin:     General: Skin is warm and dry.      Findings: No erythema.   Neurological:      General: No focal deficit present.      Mental Status: She is alert and oriented to person, place, and time. Mental status is at baseline.      Cranial Nerves: No cranial nerve deficit.   Psychiatric:         Mood and Affect: Mood normal.         Behavior: Behavior normal.         Thought Content: Thought content normal.          Procedures           ED Course  ED Course as of 12/28/23 0114   [place] Dec 24, 2023   1151 EKG notes sinus tachycardia.  105 bpm.  No acute ST elevation.  QTc 465.  Electronically signed by Sarmad Osorio DO, 12/24/23, 11:51 AM EST.   [SF]   1601 Shortness or slightly elevated with a non-ACS trend.  No acute changes on EKG listed.  Patient did have a recent stress test in July 2023 that noted a low risk study.  I discussed these findings with on-call cardiologist, Dr. Regalado.  He recommended obtaining CT angiogram of the coronary arteries.  He noted if this was negative, the patient to be discharged home. [SF]   1847 Care of this patient was transferred to the next attending physician at shift change.  Complete discussion of presentation, labs, imaging, care, and expected course occurred during transition of providers.  Vitals stable at transfer of care.    Electronically signed by Sarmad Osorio DO, 12/24/23, 6:47 PM EST.   [SF]   2042 Patient is agreeable to be admitted after speaking with Dr. Regalado showing extensive coronary calcifications needing a left heart cath.  Patient states that she has had a heart attack in the past with PCI stenting. [LK]      ED Course User Index  [LK] Deyanira Galloway DO  [SF] Sarmad Osorio DO                                             Medical Decision Making  Amount and/or Complexity of Data Reviewed  Labs: ordered.  Radiology: ordered.  ECG/medicine tests: ordered.    Risk  OTC drugs.  Prescription drug management.  Decision regarding hospitalization.        Final diagnoses:   Type 2 diabetes mellitus with diabetic neuropathy, with long-term current use of insulin       ED Disposition  ED Disposition       ED Disposition   Decision to Admit    Condition   --    Comment   Level of Care: Telemetry [5]   Diagnosis: Unstable angina [826115]   Admitting Physician: KATHY CANDELARIO [[id number]]   Attending Physician: KATHY CANDELARIO [[id number]]   Certification: I Certify That  Inpatient Hospital Services Are Medically Necessary For Greater Than 2 Midnights                 Myrna Ramos MD  [address]  [address]  [facility] [address]  [phone]    Follow up on 1/2/2024  @ 2:45PM    Shu Regalado MD  [address] [place]  [facility] [address]  [phone]    Follow up on 2/8/2024  @ 9:15AM.with Da Fox.         Medication List        New Prescriptions      cefdinir 300 MG capsule  Commonly known as: OMNICEF  Take 1 capsule by mouth 2 (Two) Times a Day for 3 days.            Stop      glipizide 5 MG tablet  Commonly known as: GLUCOTROL               Where to Get Your Medications        These medications were sent to [facility] - [place], [place] - [address] [address] - [phone]  - [phone]   [address] [facility] [address], [facility] [address]      Phone: [phone]   BASAGLAR KWIKPEN 100 UNIT/ML injection pen  cefdinir 300 MG capsule       Information about where to get these medications is not yet available    Ask your nurse or doctor about these medications  BASAGLAR KWIKPEN 100 UNIT/ML injection pen            Sarmad Osorio,   12/28/23 0114

## 2023-12-25 ENCOUNTER — APPOINTMENT (OUTPATIENT)
Dept: NUCLEAR MEDICINE | Facility: HOSPITAL | Age: 68
End: 2023-12-25
Payer: MEDICARE

## 2023-12-25 ENCOUNTER — APPOINTMENT (OUTPATIENT)
Dept: CARDIOLOGY | Facility: HOSPITAL | Age: 68
End: 2023-12-25
Payer: MEDICARE

## 2023-12-25 LAB
ALBUMIN SERPL-MCNC: 3.8 G/DL (ref 3.5–5.2)
ALBUMIN/GLOB SERPL: 1.4 G/DL
ALP SERPL-CCNC: 137 U/L (ref 39–117)
ALT SERPL W P-5'-P-CCNC: 13 U/L (ref 1–33)
ANION GAP SERPL CALCULATED.3IONS-SCNC: 12.4 MMOL/L (ref 5–15)
AST SERPL-CCNC: 13 U/L (ref 1–32)
BASOPHILS # BLD AUTO: 0.05 10*3/MM3 (ref 0–0.2)
BASOPHILS NFR BLD AUTO: 0.5 % (ref 0–1.5)
BH CV ECHO MEAS - ACS: 1.5 CM
BH CV ECHO MEAS - AO MAX PG: 9.2 MMHG
BH CV ECHO MEAS - AO MEAN PG: 6 MMHG
BH CV ECHO MEAS - AO ROOT DIAM: 2.8 CM
BH CV ECHO MEAS - AO V2 MAX: 152 CM/SEC
BH CV ECHO MEAS - AO V2 VTI: 31.1 CM
BH CV ECHO MEAS - EDV(CUBED): 54.9 ML
BH CV ECHO MEAS - EDV(MOD-SP4): 56.5 ML
BH CV ECHO MEAS - EF(MOD-SP4): 78.8 %
BH CV ECHO MEAS - ESV(CUBED): 19.7 ML
BH CV ECHO MEAS - ESV(MOD-SP4): 12 ML
BH CV ECHO MEAS - FS: 28.9 %
BH CV ECHO MEAS - IVS/LVPW: 0.85 CM
BH CV ECHO MEAS - IVSD: 1.1 CM
BH CV ECHO MEAS - LA DIMENSION: 4.3 CM
BH CV ECHO MEAS - LAT PEAK E' VEL: 5.9 CM/SEC
BH CV ECHO MEAS - LV DIASTOLIC VOL/BSA (35-75): 33.5 CM2
BH CV ECHO MEAS - LV MASS(C)D: 153.2 GRAMS
BH CV ECHO MEAS - LV SYSTOLIC VOL/BSA (12-30): 7.1 CM2
BH CV ECHO MEAS - LVIDD: 3.8 CM
BH CV ECHO MEAS - LVIDS: 2.7 CM
BH CV ECHO MEAS - LVOT AREA: 3.5 CM2
BH CV ECHO MEAS - LVOT DIAM: 2.1 CM
BH CV ECHO MEAS - LVPWD: 1.3 CM
BH CV ECHO MEAS - MED PEAK E' VEL: 4.2 CM/SEC
BH CV ECHO MEAS - MV A MAX VEL: 132 CM/SEC
BH CV ECHO MEAS - MV DEC SLOPE: 346 CM/SEC2
BH CV ECHO MEAS - MV DEC TIME: 0.25 SEC
BH CV ECHO MEAS - MV E MAX VEL: 85.9 CM/SEC
BH CV ECHO MEAS - MV E/A: 0.65
BH CV ECHO MEAS - PA ACC TIME: 0.13 SEC
BH CV ECHO MEAS - SI(MOD-SP4): 26.4 ML/M2
BH CV ECHO MEAS - SV(MOD-SP4): 44.5 ML
BH CV ECHO MEAS - TAPSE (>1.6): 2.8 CM
BH CV ECHO MEASUREMENTS AVERAGE E/E' RATIO: 17.01
BILIRUB SERPL-MCNC: 0.2 MG/DL (ref 0–1.2)
BUN SERPL-MCNC: 26 MG/DL (ref 8–23)
BUN/CREAT SERPL: 25.5 (ref 7–25)
CALCIUM SPEC-SCNC: 9.1 MG/DL (ref 8.6–10.5)
CHLORIDE SERPL-SCNC: 102 MMOL/L (ref 98–107)
CHOLEST SERPL-MCNC: 141 MG/DL (ref 0–200)
CO2 SERPL-SCNC: 22.6 MMOL/L (ref 22–29)
CREAT SERPL-MCNC: 1.02 MG/DL (ref 0.57–1)
DEPRECATED RDW RBC AUTO: 40.1 FL (ref 37–54)
EGFRCR SERPLBLD CKD-EPI 2021: 59.3 ML/MIN/1.73
EOSINOPHIL # BLD AUTO: 0.18 10*3/MM3 (ref 0–0.4)
EOSINOPHIL NFR BLD AUTO: 1.7 % (ref 0.3–6.2)
ERYTHROCYTE [DISTWIDTH] IN BLOOD BY AUTOMATED COUNT: 13.2 % (ref 12.3–15.4)
GLOBULIN UR ELPH-MCNC: 2.8 GM/DL
GLUCOSE BLDC GLUCOMTR-MCNC: 171 MG/DL (ref 70–130)
GLUCOSE BLDC GLUCOMTR-MCNC: 217 MG/DL (ref 70–130)
GLUCOSE BLDC GLUCOMTR-MCNC: 336 MG/DL (ref 70–130)
GLUCOSE BLDC GLUCOMTR-MCNC: 339 MG/DL (ref 70–130)
GLUCOSE SERPL-MCNC: 264 MG/DL (ref 65–99)
HCT VFR BLD AUTO: 37.6 % (ref 34–46.6)
HDLC SERPL-MCNC: 41 MG/DL (ref 40–60)
HGB BLD-MCNC: 12.3 G/DL (ref 12–15.9)
IMM GRANULOCYTES # BLD AUTO: 0.03 10*3/MM3 (ref 0–0.05)
IMM GRANULOCYTES NFR BLD AUTO: 0.3 % (ref 0–0.5)
LDLC SERPL CALC-MCNC: 73 MG/DL (ref 0–100)
LDLC/HDLC SERPL: 1.68 {RATIO}
LEFT ATRIUM VOLUME INDEX: 37.5 ML/M2
LYMPHOCYTES # BLD AUTO: 2.27 10*3/MM3 (ref 0.7–3.1)
LYMPHOCYTES NFR BLD AUTO: 22 % (ref 19.6–45.3)
MCH RBC QN AUTO: 27.1 PG (ref 26.6–33)
MCHC RBC AUTO-ENTMCNC: 32.7 G/DL (ref 31.5–35.7)
MCV RBC AUTO: 82.8 FL (ref 79–97)
MONOCYTES # BLD AUTO: 0.85 10*3/MM3 (ref 0.1–0.9)
MONOCYTES NFR BLD AUTO: 8.2 % (ref 5–12)
NEUTROPHILS NFR BLD AUTO: 6.93 10*3/MM3 (ref 1.7–7)
NEUTROPHILS NFR BLD AUTO: 67.3 % (ref 42.7–76)
NRBC BLD AUTO-RTO: 0 /100 WBC (ref 0–0.2)
PLATELET # BLD AUTO: 273 10*3/MM3 (ref 140–450)
PMV BLD AUTO: 10.9 FL (ref 6–12)
POTASSIUM SERPL-SCNC: 3.7 MMOL/L (ref 3.5–5.2)
PROT SERPL-MCNC: 6.6 G/DL (ref 6–8.5)
RBC # BLD AUTO: 4.54 10*6/MM3 (ref 3.77–5.28)
SODIUM SERPL-SCNC: 137 MMOL/L (ref 136–145)
TRIGL SERPL-MCNC: 155 MG/DL (ref 0–150)
UFH PPP CHRO-ACNC: 0.21 IU/ML (ref 0.3–0.7)
UFH PPP CHRO-ACNC: 0.21 IU/ML (ref 0.3–0.7)
UFH PPP CHRO-ACNC: 0.23 IU/ML (ref 0.3–0.7)
VLDLC SERPL-MCNC: 27 MG/DL (ref 5–40)
WBC NRBC COR # BLD AUTO: 10.31 10*3/MM3 (ref 3.4–10.8)

## 2023-12-25 PROCEDURE — 80053 COMPREHEN METABOLIC PANEL: CPT | Performed by: HOSPITALIST

## 2023-12-25 PROCEDURE — 82948 REAGENT STRIP/BLOOD GLUCOSE: CPT

## 2023-12-25 PROCEDURE — 25810000003 SODIUM CHLORIDE 0.9 % SOLUTION

## 2023-12-25 PROCEDURE — 63710000001 INSULIN LISPRO (HUMAN) PER 5 UNITS: Performed by: HOSPITALIST

## 2023-12-25 PROCEDURE — 85025 COMPLETE CBC W/AUTO DIFF WBC: CPT | Performed by: HOSPITALIST

## 2023-12-25 PROCEDURE — 93306 TTE W/DOPPLER COMPLETE: CPT | Performed by: INTERNAL MEDICINE

## 2023-12-25 PROCEDURE — 85520 HEPARIN ASSAY: CPT

## 2023-12-25 PROCEDURE — 85520 HEPARIN ASSAY: CPT | Performed by: STUDENT IN AN ORGANIZED HEALTH CARE EDUCATION/TRAINING PROGRAM

## 2023-12-25 PROCEDURE — 80061 LIPID PANEL: CPT | Performed by: HOSPITALIST

## 2023-12-25 PROCEDURE — 63710000001 INSULIN GLARGINE PER 5 UNITS: Performed by: HOSPITALIST

## 2023-12-25 PROCEDURE — 25010000002 CEFTRIAXONE PER 250 MG: Performed by: HOSPITALIST

## 2023-12-25 PROCEDURE — 99222 1ST HOSP IP/OBS MODERATE 55: CPT | Performed by: INTERNAL MEDICINE

## 2023-12-25 PROCEDURE — 93306 TTE W/DOPPLER COMPLETE: CPT

## 2023-12-25 PROCEDURE — 85520 HEPARIN ASSAY: CPT | Performed by: HOSPITALIST

## 2023-12-25 RX ORDER — LOSARTAN POTASSIUM 25 MG/1
25 TABLET ORAL
Status: DISCONTINUED | OUTPATIENT
Start: 2023-12-25 | End: 2023-12-26 | Stop reason: HOSPADM

## 2023-12-25 RX ADMIN — CEFTRIAXONE SODIUM 2000 MG: 2 INJECTION, POWDER, FOR SOLUTION INTRAMUSCULAR; INTRAVENOUS at 21:22

## 2023-12-25 RX ADMIN — INSULIN LISPRO 2 UNITS: 100 INJECTION, SOLUTION INTRAVENOUS; SUBCUTANEOUS at 09:00

## 2023-12-25 RX ADMIN — INSULIN LISPRO 7 UNITS: 100 INJECTION, SOLUTION INTRAVENOUS; SUBCUTANEOUS at 16:34

## 2023-12-25 RX ADMIN — LOSARTAN POTASSIUM 25 MG: 25 TABLET, FILM COATED ORAL at 17:43

## 2023-12-25 RX ADMIN — ATORVASTATIN CALCIUM 80 MG: 40 TABLET, FILM COATED ORAL at 09:00

## 2023-12-25 RX ADMIN — Medication 10 ML: at 09:00

## 2023-12-25 RX ADMIN — INSULIN LISPRO 4 UNITS: 100 INJECTION, SOLUTION INTRAVENOUS; SUBCUTANEOUS at 12:31

## 2023-12-25 RX ADMIN — ASPIRIN 81 MG: 81 TABLET, CHEWABLE ORAL at 09:01

## 2023-12-25 RX ADMIN — INSULIN LISPRO 7 UNITS: 100 INJECTION, SOLUTION INTRAVENOUS; SUBCUTANEOUS at 21:19

## 2023-12-25 RX ADMIN — Medication 10 ML: at 21:20

## 2023-12-25 RX ADMIN — DOCUSATE SODIUM 50 MG AND SENNOSIDES 8.6 MG 2 TABLET: 8.6; 5 TABLET, FILM COATED ORAL at 09:00

## 2023-12-25 RX ADMIN — INSULIN GLARGINE 15 UNITS: 100 INJECTION, SOLUTION SUBCUTANEOUS at 21:19

## 2023-12-25 RX ADMIN — SODIUM CHLORIDE 75 ML/HR: 9 INJECTION, SOLUTION INTRAVENOUS at 15:38

## 2023-12-25 RX ADMIN — SODIUM CHLORIDE 75 ML/HR: 9 INJECTION, SOLUTION INTRAVENOUS at 00:44

## 2023-12-25 NOTE — PLAN OF CARE
Goal Outcome Evaluation:    Pt is resting in bed with no s/s of distress noted. VSS. IV access maintained. Fluids infusing per order. Heparin infusing per order. NPO at midnight for stress test in the AM. Will continue with plan of care.

## 2023-12-25 NOTE — PROGRESS NOTES
[facility] HOSPITALIST PROGRESS NOTE     Patient Identification:  Name:  Breanna Kaba  Age:  68 y.o.  Sex:  female  :  1955  MRN:  [id number]  Visit Number:  [id number]  ROOM: [address] [place] [address]     Primary Care Provider:  Myrna Ramos MD    Length of stay in inpatient status:  1    Subjective     Chief Compliant:    Chief Complaint   Patient presents with    Chest Pain    Dysuria       History of Presenting Illness:    Patient denies any chest pain. She notes that she presented with urinary symptoms in hopes her UTI would not worsen. Denies any new problems and is hopeful she can go home tomorrow if stress test in unrevealing.     ROS:  Otherwise 10 point ROS negative other than documented above in HPI.     Objective     Current Hospital Meds:aspirin, 81 mg, Oral, Daily  atorvastatin, 80 mg, Oral, Daily  cefTRIAXone, 2,000 mg, Intravenous, Q24H  insulin glargine, 15 Units, Subcutaneous, Nightly  insulin lispro, 2-9 Units, Subcutaneous, 4x Daily AC & at Bedtime  senna-docusate sodium, 2 tablet, Oral, BID  sodium chloride, 10 mL, Intravenous, Q12H    heparin, 14 Units/kg/hr (Dosing Weight), Last Rate: 14 Units/kg/hr (23 1114)  Pharmacy to Dose Heparin,   sodium chloride, 75 mL/hr, Last Rate: 75 mL/hr (23 1538)        Current Antimicrobial Therapy:  Anti-Infectives (From admission, onward)      Ordered     Dose/Rate Route Frequency Start Stop    23  cefTRIAXone (ROCEPHIN) 2,000 mg in sodium chloride 0.9 % 100 mL IVPB-VTB        Ordering Provider: Александр Tobin MD    2,000 mg  200 mL/hr over 30 Minutes Intravenous Every 24 Hours 23 2200 23 2159    23 1304  nitrofurantoin (macrocrystal-monohydrate) (MACROBID) capsule 100 mg        Ordering Provider: Sarmad Osorio DO    100 mg Oral Once 23 1320 23 1404          Current Diuretic Therapy:  Diuretics (From admission, onward)      None           ----------------------------------------------------------------------------------------------------------------------  Vital Signs:  Temp:  [97.9 °F (36.6 °C)-99.1 °F (37.3 °C)] 98.5 °F (36.9 °C)  Heart Rate:  [] 100  Resp:  [18-20] 20  BP: (122-182)/(63-98) 162/78  SpO2:  [95 %-100 %] 95 %  on   ;   Device (Oxygen Therapy): room air  Body mass index is 27.33 kg/m².    Wt Readings from Last 3 Encounters:   12/25/23 67.8 kg (149 lb 6.4 oz)   11/07/23 69.6 kg (153 lb 6.4 oz)   09/28/23 68.6 kg (151 lb 3.2 oz)     Intake & Output (last 3 days)         12/22 0701 12/23 0700 12/23 0701 12/24 0700 12/24 0701 12/25 0700 12/25 0701  12/26 0700    P.O.   120 720    Total Intake(mL/kg)   120 (1.8) 720 (10.6)    Net   +120 +720            Urine Unmeasured Occurrence    5 x          Diet: Regular/House Diet, Cardiac Diets, Diabetic Diets; Healthy Heart (2-3 Na+); Consistent Carbohydrate; Texture: Regular Texture (IDDSI 7); Fluid Consistency: Thin (IDDSI 0)  NPO Diet NPO Type: Strict NPO  ----------------------------------------------------------------------------------------------------------------------  Physical exam:  Constitutional:  Well-developed and well-nourished.  No respiratory distress.      HENT:  Head:  Normocephalic and atraumatic.  Mouth:  Moist mucous membranes.    Eyes:  Conjunctivae and EOM are normal. No scleral icterus.    Neck:  Neck supple.  No JVD present.    Cardiovascular:  Normal rate, regular rhythm and normal heart sounds with no murmur.  Pulmonary/Chest:  No respiratory distress, no wheezes, no crackles, with normal breath sounds and good air movement.  Abdominal:  Soft.  Bowel sounds are normal.  No distension and no tenderness.   Musculoskeletal:  No edema, no tenderness, and no deformity.  No red or swollen joints anywhere.    Neurological:  Alert and oriented to person, place, and time.  No cranial nerve deficit.  No tongue deviation.  No facial droop.  No slurred speech.   Skin:   "Skin is warm and dry. No rash noted. No pallor.   Peripheral vascular:  Pulses in all 4 extremities with no clubbing, no cyanosis, no edema.  ----------------------------------------------------------------------------------------------------------------------  Tele:    ----------------------------------------------------------------------------------------------------------------------  Results from last 7 days   Lab Units 12/25/23  0310 12/24/23  2104 12/24/23  1249   WBC 10*3/mm3 10.31  --  10.87*   HEMOGLOBIN g/dL 12.3  --  13.1   HEMATOCRIT % 37.6  --  38.6   MCV fL 82.8  --  82.5   MCHC g/dL 32.7  --  33.9   PLATELETS 10*3/mm3 273  --  324   INR   --  0.86*  --          Results from last 7 days   Lab Units 12/25/23  0310 12/24/23  1249   SODIUM mmol/L 137 135*   POTASSIUM mmol/L 3.7 5.2   CHLORIDE mmol/L 102 99   CO2 mmol/L 22.6 26.2   BUN mg/dL 26* 28*   CREATININE mg/dL 1.02* 1.24*   CALCIUM mg/dL 9.1 9.4   GLUCOSE mg/dL 264* 410*   ALBUMIN g/dL 3.8 4.1   BILIRUBIN mg/dL 0.2 0.6   ALK PHOS U/L 137* 156*   AST (SGOT) U/L 13 17   ALT (SGPT) U/L 13 7   Estimated Creatinine Clearance: 46.3 mL/min (A) (by C-G formula based on SCr of 1.02 mg/dL (H)).  No results found for: \"AMMONIA\"  Results from last 7 days   Lab Units 12/24/23  1446 12/24/23  1249   HSTROP T ng/L 29* 31*     Results from last 7 days   Lab Units 12/24/23  1446   PROBNP pg/mL 148.3     Results from last 7 days   Lab Units 12/25/23  0310   CHOLESTEROL mg/dL 141   TRIGLYCERIDES mg/dL 155*   HDL CHOL mg/dL 41   LDL CHOL mg/dL 73     Hemoglobin A1C   Date/Time Value Ref Range Status   12/24/2023 1249 10.40 (H) 4.80 - 5.60 % Final     Glucose   Date/Time Value Ref Range Status   12/25/2023 1102 217 (H) 70 - 130 mg/dL Final   12/25/2023 0639 171 (H) 70 - 130 mg/dL Final   12/24/2023 2228 200 (H) 70 - 130 mg/dL Final   12/24/2023 1607 197 (H) 70 - 130 mg/dL Final     Lab Results   Component Value Date    TSH 1.260 06/06/2022    FREET4 1.42 03/31/2022 " "    No results found for: \"PREGTESTUR\", \"PREGSERUM\", \"HCG\", \"HCGQUANT\"  Pain Management Panel  More data may exist         Latest Ref Rng & Units 11/28/2022 4/1/2022   Pain Management Panel   Creatinine, Urine mg/dL  mg/dL 51.0  51.0  20.3      Brief Urine Lab Results  (Last result in the past 365 days)        Color   Clarity   Blood   Leuk Est   Nitrite   Protein   CREAT   Urine HCG        12/24/23 1241 Yellow   Turbid   Small (1+)   Moderate (2+)   Negative   >=300 mg/dL (3+)                 No results found for: \"BLOODCX\"  Urine Culture   Date Value Ref Range Status   12/24/2023 >100,000 CFU/mL Gram Negative Bacilli (A)  Preliminary     No results found for: \"WOUNDCX\"  No results found for: \"STOOLCX\"  No results found for: \"RESPCX\"  No results found for: \"AFBCX\"        I have personally looked at the labs and they are summarized above.  ----------------------------------------------------------------------------------------------------------------------  Detailed radiology reports for the last 24 hours:    Imaging Results (Last 24 Hours)       Procedure Component Value Units Date/Time    CT Angiogram Coronary [[id number]] Collected: 12/24/23 1844     Updated: 12/24/23 1858    Narrative:      EXAM:    CT Angiography Heart With Intravenous Contrast     EXAM DATE:    12/24/2023 4:56 PM     CLINICAL HISTORY:    Chest pain/anginal equiv, intermediate CAD risk, not treadmill  candidate     TECHNIQUE:    Axial computed tomographic angiography images of the coronary arteries  with intravenous contrast.  Sublingual nitroglycerine for coronary  vasodilation and IV metoprolol to reduce heart rate were administered as  needed.  This CT exam was performed using one or more of the following  dose reduction techniques:  automated exposure control, adjustment of  the mA and/or kV according to patient size, and/or use of iterative  reconstruction technique.    MIP reconstructed images were created and reviewed.     COMPARISON:    " CT of the chest performed 3/28/2023     FINDINGS:    Reformatted images are not yet evaluated due to extensive calcific  burden of this patient's coronary artery system. For example the  proximal LAD shows mild to moderate calcific stenosis while the mid to  distal LAD shows severe coronary artery stenosis. The proximal LAD  stent, is not well evaluated due to bloom artifact and possible  associated calcification. The RCA mid/distal shows moderate to severe  coronary artery stenosis with more distal RCA mild to moderate calcific  stenosis. The circumflex artery shows multifocal calcification resulting  in mostly mild stenosis. The ramus demonstrates moderate to severe  proximal stenosis secondary to calcification. With this extent of  calcific burden, bloom artifact can overestimate degree of stenosis,  however in this patient the coronary artery calcium score alone and  history of CAD places this patient at very high risk of a coronary  event.       Impression:      1.  Reformatted images are not yet evaluated but extensive calcific  burden of this patient's coronary artery system is noted. For example  the proximal LAD shows mild to moderate calcific stenosis while the mid  to distal LAD shows severe coronary artery stenosis. The proximal LAD  stent, is not well evaluated due to bloom artifact and possible  associated calcification. The RCA mid/distal shows moderate to severe  coronary artery stenosis with more distal RCA mild to moderate calcific  stenosis. The circumflex artery shows multifocal calcification resulting  in mostly mild stenosis. The ramus demonstrates moderate to severe  proximal stenosis secondary to calcification. With this extent of  calcific burden, bloom artifact can overestimate degree of stenosis,  however in this patient the coronary artery calcium score alone and  history of CAD places this patient at very high risk of a coronary  event.  2.  An addendum will be made at time of available  reformatted images to  provide more detailed analysis.        This report was finalized on 12/24/2023 6:56 PM by Dr. Jovan Casanova MD.             Assessment & Plan      #UTI  - Urine culture pending. Continue ceftriaxone.     #NSTEMI, type II in setting of renal disease   #Hx of CAD s/p LAD stent   - Troponin 31=>29. Denies chest pain. CT showed extensive calcific burden on coronary artery system  - Cardiology opting to start with stress test in AM. Currently on heparin gtt.     Chronic medical problems   #CKD Iia  #GERD  #HTN  #DM II    Code status: Full     Dispo: Pending stress test in AM.     VTE Prophylaxis:   Mechanical Order History:       None          Pharmalogical Order History:        Ordered     Dose Route Frequency Stop    12/24/23 2046  heparin 21763 units/250 mL (100 units/mL) in 0.45 % NaCl infusion  9.52 mL/hr         14 Units/kg/hr (Dosing Weight) IV Titrated --    12/24/23 2046  heparin (porcine) 5000 UNIT/ML injection 4,000 Units         4,000 Units IV Once 12/24/23 2121 12/24/23 2042  heparin (porcine) 5000 UNIT/ML injection 5,500 Units  Status:  Discontinued         80 Units/kg IV Once 12/24/23 2044 12/24/23 2041  heparin (porcine) 5000 UNIT/ML injection 5,500 Units  Status:  Discontinued         80 Units/kg IV Once 12/24/23 2042 12/24/23 2041  heparin 49073 units/250 mL (100 units/mL) in 0.45 % NaCl infusion  12.24 mL/hr,   Status:  Discontinued         18 Units/kg/hr IV Titrated 12/24/23 2044 12/24/23 2046  heparin (porcine) 5000 UNIT/ML injection 3,400 Units  Status:  Discontinued         50 Units/kg IV As Needed 12/24/23 2048 12/24/23 2046  heparin (porcine) 5000 UNIT/ML injection 1,700 Units  Status:  Discontinued         25 Units/kg IV As Needed 12/24/23 2048 12/24/23 2046  Pharmacy to Dose Heparin         -- XX Continuous PRN --    12/24/23 2041  heparin (porcine) 5000 UNIT/ML injection 3,400 Units  Status:  Discontinued         50 Units/kg IV As Needed 12/24/23 2046     12/24/23 2041  heparin (porcine) 5000 UNIT/ML injection 1,700 Units  Status:  Discontinued         25 Units/kg IV As Needed 12/24/23 2046 12/24/23 2041  Pharmacy to Dose Heparin  Status:  Discontinued         -- XX Continuous PRN 12/24/23 2044                      Ham Watts MD  [facility]  12/25/23  16:04 EST

## 2023-12-25 NOTE — PROGRESS NOTES
Pharmacy was consulted to dose rocephin for an UTI. The physician mentioned that the patient may need a graded challenge due to a PCN allergy in his consult, but I can see that the patient has tolerated this in the past without any reaction documented. Based on an estimated CrCl of 38mL/min, a rocephin dose of 2g q24hr has been ordered. Thank you for the consult.     Thank you,   Kasandra Oh, PharmD  21:22 EST

## 2023-12-25 NOTE — PAYOR COMM NOTE
"[facility]  [place]  PHONE  [phone]  FAX  [phone]  NPI:  [id number]    REQUEST FOR INPATIENT AUTH    Breanna Kaba (68 y.o. Female)       Date of Birth   1955    Social Security Number       Address   [address] [id number]    Home Phone   [phone]    MRN   [id number]       Synagogue   Non-Scientologist    Marital Status                               Admission Date   12/24/23    Admission Type   Emergency    Admitting Provider   Александр Tobin MD    Attending Provider   Ham Watts MD    Department, Room/Bed   [address], 3302/2S       Discharge Date       Discharge Disposition       Discharge Destination                                 Attending Provider: Ham Watts MD    Allergies: Asa [Aspirin], Naproxen, Penicillins    Isolation: None   Infection: None   Code Status: CPR    Ht: 157.5 cm (62\")   Wt: 67.8 kg (149 lb 6.4 oz)    Admission Cmt: None   Principal Problem: Unstable angina [I20.0]                   Active Insurance as of 12/24/2023       Primary Coverage       Payor Plan Insurance Group Employer/Plan Group    WELLCARE [facility] MEDICARE REPLACEMENT WELLCARE MED ADV HMO        Payor Plan Address Payor Plan Phone Number Payor Plan Fax Number Effective Dates    [address] [address]   11/1/2023 - None Entered    [facility] [address]         Subscriber Name Subscriber Birth Date Member ID       BREANNA KABA 1955 [id number]                     Emergency Contacts        (Rel.) Home Phone Work Phone Mobile Phone    Melanie Finney (Daughter) [phone] -- [phone]                 History & Physical        Gretel Melo PA-C at 12/24/23 2153       Attestation signed by Александр Tobin MD at 12/25/23 1111    I have discussed this patient with Gretel Melo PA-C, and have reviewed this documentation and agree.                       [facility] Medicine Services  HISTORY " "& PHYSICAL    Patient Identification:  Name:  Breanna Kaba  Age:  68 y.o.  Sex:  female  :  1955  MRN:  [id number]   Visit Number:  [id number]  Admit Date: 2023   Primary Care Physician:  Myrna Ramos MD     Subjective     Chief complaint:   Chief Complaint   Patient presents with    Chest Pain    Dysuria     History of presenting illness:   Patient is a 68 y.o. female with past medical history significant for DM, GERD, HFrEF with recovered EF, hypertension, hyperlipidemia that presented to the [facility] emergency department for evaluation of dysuria.  Patient case was discussed with cardiology and while patient was still in the ED.  Cardiologist recommended cardiac CT symptoms and recent low risk stress test.  Due to findings of cardiac CT, cardiology recommended admission, initiated on heparin drip and LHC in AM.    Patient examined at bedside on 3S. Patient states she has had worsening dysuria and urinary frequency over the past several days. She also noted that last night/early this morning she was having episodes of chest pressure she described as an \"elephant on my chest.\" Denies any current chest pain. Denies cough, shortness of breath, fever, chills, leg swelling, or palpitations.  Notes she did have a stress test back in July of this year.  On chart review this was low risk.  Also noted patient had a LHC with PCI to the LAD on 3/31/2022.    Upon arrival to the ED, vitals were temperature 98, , RR 18, /74, SpO2 98% on room air. Labs significant for, creatinine 1.24, BUN 28, glucose 410, alk phos 156.  Hemoglobin A1c 10.4. Initial troponin 31, repeat 29.  BBC 10.87.  UA turbid, trace glucose, trace ketones, small blood, moderate leukocytes, large protein, too numerous WBC to count, 4+ bacteria, 7-12 squamous epithelial cells.  CXR normal heart size, mild hypoinflated lungs.  No edema or pneumonia.  Small bands of scar versus atelectasis in the lung " bases.    Coronary CT angiogram shows extensive calcific burden on patient's coronary artery system.    Patient has been admitted to the Telemetry unit.   ---------------------------------------------------------------------------------------------------------------------   Review of Systems   Constitutional:  Negative for chills, diaphoresis, fatigue and fever.   Respiratory:  Negative for cough, shortness of breath and wheezing.    Cardiovascular:  Positive for chest pain. Negative for palpitations and leg swelling.   Gastrointestinal:  Negative for abdominal pain, constipation, diarrhea, nausea and vomiting.   Genitourinary:  Positive for difficulty urinating, dysuria and frequency. Negative for flank pain.   Musculoskeletal:  Negative for arthralgias, myalgias and neck stiffness.   Skin:  Negative for rash and wound.   Neurological:  Negative for dizziness, weakness and light-headedness.   Psychiatric/Behavioral:  Negative for agitation and confusion.       ---------------------------------------------------------------------------------------------------------------------   Past Medical History:   Diagnosis Date    Acute congestive heart failure, unspecified heart failure type 03/28/2022    Arthritis     Chronic pain     Diabetes mellitus     Elevated cholesterol     GERD (gastroesophageal reflux disease)     Gout     Headache     Hypertension     Myocardial infarction     Neuropathy      Past Surgical History:   Procedure Laterality Date    BREAST BIOPSY Left 1988    benign    CARDIAC CATHETERIZATION N/A 03/31/2022    Procedure: Left Heart Cath;  Surgeon: Tristan Marin MD;  Location: [facility] CATH INVASIVE LOCATION;  Service: Cardiology;  Laterality: N/A;    CARDIAC CATHETERIZATION N/A 03/31/2022    Procedure: Percutaneous Coronary Intervention;  Surgeon: Tristan Marin MD;  Location: [facility] INVASIVE LOCATION;  Service: Cardiology;  Laterality: N/A;    COLONOSCOPY N/A 02/17/2020    Procedure: COLONOSCOPY  FOR SCREENING CPT CODE: ;  Surgeon: Mariano Prescott MD;  Location: [facility];  Service: Gastroenterology;  Laterality: N/A;    CORONARY STENT PLACEMENT      HYSTERECTOMY      Partial    KNEE SURGERY      THYROIDECTOMY, PARTIAL      Removal of goiter     Family History   Problem Relation Age of Onset    COPD Mother     Heart disease Brother     Heart attack Brother     Hypertension Daughter     Diabetes Daughter     Hypertension Daughter     Diabetes Daughter     Breast cancer Neg Hx      Social History     Socioeconomic History    Marital status:    Tobacco Use    Smoking status: Never    Smokeless tobacco: Never   Vaping Use    Vaping Use: Never used   Substance and Sexual Activity    Alcohol use: No    Drug use: No    Sexual activity: Defer     ---------------------------------------------------------------------------------------------------------------------   Allergies:  Asa [aspirin], Naproxen, and Penicillins  ---------------------------------------------------------------------------------------------------------------------   Medications below are reported home medications pulling from within the system; at this time, these medications have not been reconciled unless otherwise specified and are in the verification process for further verifcation as current home medications.    Prior to Admission Medications       Prescriptions Last Dose Informant Patient Reported? Taking?    acetaminophen (TYLENOL) 500 MG tablet   Yes No    Take 1 tablet by mouth Every 6 (Six) Hours As Needed for Mild Pain.    allopurinol (ZYLOPRIM) 300 MG tablet   No No    Take 1 tablet by mouth Daily.    aspirin 81 MG EC tablet   No No    Take 1 tablet by mouth Daily.    atorvastatin (LIPITOR) 80 MG tablet   No No    Take 1 tablet by mouth Daily.    Blood Glucose Monitoring Suppl (ONE TOUCH ULTRA 2) w/Device kit   No No    Use to test blood sugar before meals and at bedtime.    glipizide (GLUCOTROL) 5 MG tablet    No No    Take 1 tablet by mouth Daily.    glucose blood (OneTouch Ultra) test strip   No No    Use to test blood sugar before meals and at bedtime.    Insulin Glargine (BASAGLAR KWIKPEN) 100 UNIT/ML injection pen   No No    Inject 30 Units under the skin into the appropriate area as directed Every Night.    insulin glulisine (Apidra) 100 UNIT/ML injection   No No    Inject 10 Units under the skin into the appropriate area as directed 3 (Three) Times a Day Before Meals for 30 days.    insulin glulisine (Apidra) 100 UNIT/ML injection   Yes No    Inject  under the skin into the appropriate area as directed 3 (Three) Times a Day Before Meals.    Lancets (onetouch ultrasoft) lancets   No No    Use to test blood sugar before meals and at bedtime.    losartan (Cozaar) 50 MG tablet   No No    Take 1 tablet by mouth Daily.    metoprolol succinate XL (TOPROL-XL) 25 MG 24 hr tablet   No No    Take 1 tablet by mouth Daily.    spironolactone (ALDACTONE) 25 MG tablet   No No    Take 1 tablet by mouth Daily.    trospium (SANCTURA) 20 MG tablet   Yes No    Take 1 tablet by mouth Every 12 (Twelve) Hours.          ---------------------------------------------------------------------------------------------------------------------    Objective     Hospital Scheduled Meds:  aspirin, 81 mg, Oral, Daily  atorvastatin, 80 mg, Oral, Daily  cefTRIAXone, 2,000 mg, Intravenous, Q24H  insulin glargine, 15 Units, Subcutaneous, Nightly  insulin lispro, 2-9 Units, Subcutaneous, 4x Daily AC & at Bedtime  senna-docusate sodium, 2 tablet, Oral, BID  sodium chloride, 10 mL, Intravenous, Q12H      heparin, 12 Units/kg/hr (Dosing Weight), Last Rate: 12 Units/kg/hr (12/24/23 2122)  Pharmacy to Dose Heparin,   sodium chloride, 75 mL/hr        Current listed hospital scheduled medications may not yet reflect those currently placed in orders that are signed and held, awaiting patient's arrival to floor/unit.     ---------------------------------------------------------------------------------------------------------------------   Vital Signs:  Temp:  [98 °F (36.7 °C)-98.1 °F (36.7 °C)] 98 °F (36.7 °C)  Heart Rate:  [] 85  Resp:  [18] 18  BP: (110-182)/(55-98) 149/77  Mean Arterial Pressure (Non-Invasive) for the past 24 hrs (Last 3 readings):   Noninvasive MAP (mmHg)   12/24/23 2338 97   12/24/23 1800 107   12/24/23 1745 93     SpO2 Percentage    12/24/23 2150 12/24/23 2223 12/24/23 2338   SpO2: 96% 96% 96%     SpO2:  [93 %-100 %] 96 %  on   ;   Device (Oxygen Therapy): room air    Body mass index is 27.33 kg/m².  Wt Readings from Last 3 Encounters:   12/24/23 67.8 kg (149 lb 6.4 oz)   11/07/23 69.6 kg (153 lb 6.4 oz)   09/28/23 68.6 kg (151 lb 3.2 oz)     ---------------------------------------------------------------------------------------------------------------------   Physical Exam:  Physical Exam  Constitutional:       General: She is not in acute distress.     Appearance: Normal appearance.   HENT:      Head: Normocephalic and atraumatic.      Right Ear: External ear normal.      Left Ear: External ear normal.      Nose: No nasal deformity.      Mouth/Throat:      Lips: Pink.      Mouth: Mucous membranes are moist.   Eyes:      Conjunctiva/sclera: Conjunctivae normal.      Pupils: Pupils are equal, round, and reactive to light.   Cardiovascular:      Rate and Rhythm: Normal rate and regular rhythm.      Pulses:           Dorsalis pedis pulses are 2+ on the right side and 2+ on the left side.      Heart sounds: Normal heart sounds.   Pulmonary:      Effort: Pulmonary effort is normal.      Breath sounds: Normal breath sounds. No wheezing, rhonchi or rales.   Abdominal:      General: Abdomen is flat. Bowel sounds are normal.      Palpations: Abdomen is soft.      Tenderness: There is no guarding or rebound.   Genitourinary:     Comments: No carney catheter in place   Musculoskeletal:      Cervical back: Neck  "supple. Normal range of motion.      Right lower leg: No edema.      Left lower leg: No edema.   Skin:     General: Skin is warm and dry.   Neurological:      General: No focal deficit present.      Mental Status: She is alert and oriented to person, place, and time.   Psychiatric:         Mood and Affect: Mood normal.         Behavior: Behavior normal.       ---------------------------------------------------------------------------------------------------------------------  EKG: Sinus tachycardia.  Heart rate 105.  No acute ST elevation.      Telemetry:        I have personally reviewed the EKG/Telemetry strip  ---------------------------------------------------------------------------------------------------------------------   Results from last 7 days   Lab Units 12/24/23  1446 12/24/23  1249   HSTROP T ng/L 29* 31*     Results from last 7 days   Lab Units 12/24/23  1446   PROBNP pg/mL 148.3         Results from last 7 days   Lab Units 12/24/23  2104 12/24/23  1249   WBC 10*3/mm3  --  10.87*   HEMOGLOBIN g/dL  --  13.1   HEMATOCRIT %  --  38.6   MCV fL  --  82.5   MCHC g/dL  --  33.9   PLATELETS 10*3/mm3  --  324   INR  0.86*  --      Results from last 7 days   Lab Units 12/24/23  1249   SODIUM mmol/L 135*   POTASSIUM mmol/L 5.2   CHLORIDE mmol/L 99   CO2 mmol/L 26.2   BUN mg/dL 28*   CREATININE mg/dL 1.24*   CALCIUM mg/dL 9.4   GLUCOSE mg/dL 410*   ALBUMIN g/dL 4.1   BILIRUBIN mg/dL 0.6   ALK PHOS U/L 156*   AST (SGOT) U/L 17   ALT (SGPT) U/L 7   Estimated Creatinine Clearance: 38.1 mL/min (A) (by C-G formula based on SCr of 1.24 mg/dL (H)).  No results found for: \"AMMONIA\"    Hemoglobin A1C   Date/Time Value Ref Range Status   12/24/2023 1249 10.40 (H) 4.80 - 5.60 % Final     Glucose   Date/Time Value Ref Range Status   12/24/2023 2228 200 (H) 70 - 130 mg/dL Final   12/24/2023 1607 197 (H) 70 - 130 mg/dL Final     Lab Results   Component Value Date    HGBA1C 10.40 (H) 12/24/2023     Lab Results   Component " "Value Date    TSH 1.260 06/06/2022    FREET4 1.42 03/31/2022       No results found for: \"BLOODCX\"  No results found for: \"URINECX\"  No results found for: \"WOUNDCX\"  No results found for: \"STOOLCX\"  No results found for: \"RESPCX\"  No results found for: \"MRSACX\"  Pain Management Panel  More data may exist         Latest Ref Rng & Units 11/28/2022 4/1/2022   Pain Management Panel   Creatinine, Urine mg/dL  mg/dL 51.0  51.0  20.3      I have personally reviewed the above laboratory results.   ---------------------------------------------------------------------------------------------------------------------  Imaging Results (Last 7 Days)       Procedure Component Value Units Date/Time    CT Angiogram Coronary [[id number]] Collected: 12/24/23 1844     Updated: 12/24/23 1858    Narrative:      EXAM:    CT Angiography Heart With Intravenous Contrast     EXAM DATE:    12/24/2023 4:56 PM     CLINICAL HISTORY:    Chest pain/anginal equiv, intermediate CAD risk, not treadmill  candidate     TECHNIQUE:    Axial computed tomographic angiography images of the coronary arteries  with intravenous contrast.  Sublingual nitroglycerine for coronary  vasodilation and IV metoprolol to reduce heart rate were administered as  needed.  This CT exam was performed using one or more of the following  dose reduction techniques:  automated exposure control, adjustment of  the mA and/or kV according to patient size, and/or use of iterative  reconstruction technique.    MIP reconstructed images were created and reviewed.     COMPARISON:    CT of the chest performed 3/28/2023     FINDINGS:    Reformatted images are not yet evaluated due to extensive calcific  burden of this patient's coronary artery system. For example the  proximal LAD shows mild to moderate calcific stenosis while the mid to  distal LAD shows severe coronary artery stenosis. The proximal LAD  stent, is not well evaluated due to bloom artifact and possible  associated " calcification. The RCA mid/distal shows moderate to severe  coronary artery stenosis with more distal RCA mild to moderate calcific  stenosis. The circumflex artery shows multifocal calcification resulting  in mostly mild stenosis. The ramus demonstrates moderate to severe  proximal stenosis secondary to calcification. With this extent of  calcific burden, bloom artifact can overestimate degree of stenosis,  however in this patient the coronary artery calcium score alone and  history of CAD places this patient at very high risk of a coronary  event.       Impression:      1.  Reformatted images are not yet evaluated but extensive calcific  burden of this patient's coronary artery system is noted. For example  the proximal LAD shows mild to moderate calcific stenosis while the mid  to distal LAD shows severe coronary artery stenosis. The proximal LAD  stent, is not well evaluated due to bloom artifact and possible  associated calcification. The RCA mid/distal shows moderate to severe  coronary artery stenosis with more distal RCA mild to moderate calcific  stenosis. The circumflex artery shows multifocal calcification resulting  in mostly mild stenosis. The ramus demonstrates moderate to severe  proximal stenosis secondary to calcification. With this extent of  calcific burden, bloom artifact can overestimate degree of stenosis,  however in this patient the coronary artery calcium score alone and  history of CAD places this patient at very high risk of a coronary  event.  2.  An addendum will be made at time of available reformatted images to  provide more detailed analysis.        This report was finalized on 12/24/2023 6:56 PM by Dr. Jovan Casanova MD.       XR Chest 1 View [[id number]] Collected: 12/24/23 1327     Updated: 12/24/23 1330    Narrative:      PROCEDURE: Portable chest x-ray examination performed on December 24, 2023. Single view. Upright position.     HISTORY: Chest pain. Dysuria.     COMPARISON:  None.     FINDINGS:     Normal heart size.  No lobar consolidation or edema.  No pleural effusion or pneumothorax.  Small bands of scar versus atelectasis at the lung bases, left greater  than right.  Mild osteoarthritis at the glenohumeral joints, left greater than right.  No free air in the upper abdomen.       Impression:         1.  Normal heart size.  2.  Mild hypoinflated lungs.  3.  No edema or pneumonia.  4.  Small bands of scar versus atelectasis at the lung bases.     This report was finalized on 12/24/2023 1:28 PM by Konstantin Smart MD.             I have personally reviewed the above radiology results.     Last Echocardiogram:  Results for orders placed during the hospital encounter of 07/19/22    Adult Transthoracic Echo Complete W/ Cont if Necessary Per Protocol    Interpretation Summary  · Normal left ventricular cavity size and wall thickness noted. There is mild left ventricular global hypokinesis noted.  · Left ventricular ejection fraction appears to be 51 - 55%. Left ventricular systolic function is low normal.  · The aortic valve is structurally normal with no regurgitation or stenosis present.  · The mitral valve is structurally normal with no significant stenosis present. Mild mitral valve regurgitation is present.  · There is no evidence of pericardial effusion.    ---------------------------------------------------------------------------------------------------------------------    Assessment & Plan      Active Hospital Problems    Diagnosis  POA    **Unstable angina [I20.0]  Yes     Unstable angina, POA  CAD s/p stenting of LAD  HFrEF with recovered EF  Hyperlipidemia  Initial troponin 31, repeat 29  EKG with no acute ST changes.  Reviewed CT shows extensive calcific burden on coronary artery system  Continue trend troponin obtain serial EKGs  Received aspirin and initiated on heparin drip in ED  Continue with daily aspirin, statin  Echo from 7/19/2022 reviewed.  EF had recovered to 51 to  55%.  Has been as low as 21 to 25% in the past; obtain updated TTE  Obtain a.m. lipid panel  Cardiology has been consulted, input/assistance is much appreciated.  While in the ED, cardiology recommended admission for cath in the morning, and initiating heparin drip  Patient is to be n.p.o. in anticipation for cath in the a.m.  Continue monitor closely on telemetry   Type II diabetes mellitus with acute hyperglycemia  Hemoglobin A1C ordered to evaluate glycemic control.   Start sliding scale insulin for now, titrate insulin therapy as necessary.   Hold any oral hypoglycemics to prevent hypoglycemia. Will review home diabetes medications once available per pharmacy.   Hypoglycemia protocol in place if necessary.   AccuCheks before meals and at bedtime.  Acute Kidney Injury on Chronic Kidney Disease stage II, POA  Baseline Cr 0.85, was up to 1.24 on admission  Continue mIVFs, Trend Cr and urine output, avoid nephrotoxins, NSAIDs, dehydration and contrast as able.  Repeat BMP in the a.m.   Acute Urinary Tract Infection, POA  UA showed leukocytes, WBCs, bacteria  Continue Ceftriaxone, follow up cultures  Patient does not meet sepsis criteria at this time. Continue to monitor vital signs.  GERD: continue home PPI pending pharmacy med rec  Essential hypertension  BP appears variably controlled   Plan to resume home antihypertensive regimen once reconciled per pharmacy with appropriate holding parameters to prevent hypotension and/or bradycardia   Closely monitor BP per hospital protocol, titrate medications as necessary    F/E/N: NS at 75 mill per hour.  Continue monitor electrolytes.  N.p.o. midnight.    ---------------------------------------------------  DVT Prophylaxis: Heparin drip  Activity: Bowel regimen    The patient is considered to be a high risk patient due to: Unstable angina    INPATIENT status due to the need for care which can only be reasonably provided in an hospital setting such as aggressive/expedited  ancillary services and/or consultation services, the necessity for IV medications, close physician monitoring and/or the possible need for procedures.  In such, I feel patient’s risk for adverse outcomes and need for care warrant INPATIENT evaluation and predict the patient’s care encounter to likely last beyond 2 midnights.    Code Status: Full code    Disposition/Discharge planning: Pending clinical course    I have discussed the patient's assessment and plan with Dr. Mahad Melo PA-C  Hospitalist Service -- [facility]       12/25/23  00:25 EST    Attending Physician: Александр Tobin MD        Electronically signed by Александр Tobin MD at 12/25/23 0341          Emergency Department Notes        Lashae Shirley RN at 12/24/23 2146          Called report to Giselle COLLINS on [address] at this time.    Electronically signed by Lashae Shirley RN at 12/24/23 2146       Lashae Shirley RN at 12/24/23 2139          Attempted to call report.  The nurse is in another patient's room.  RN will call back once ready.    Electronically signed by Lashae Shirley RN at 12/24/23 2140       Nagi Kelly at 12/24/23 1149          EKG performed @1149 given to  @1150    Electronically signed by Nagi Kelly at 12/24/23 1155       Current Facility-Administered Medications   Medication Dose Route Frequency Provider Last Rate Last Admin    aspirin chewable tablet 81 mg  81 mg Oral Daily Александр Tobin MD        atorvastatin (LIPITOR) tablet 80 mg  80 mg Oral Daily Александр Tobin MD        sennosides-docusate (PERICOLACE) 8.6-50 MG per tablet 2 tablet  2 tablet Oral BID Александр Tobin MD        And    polyethylene glycol (MIRALAX) packet 17 g  17 g Oral Daily PRN Александр Tobin MD        And    bisacodyl (DULCOLAX) EC tablet 5 mg  5 mg Oral Daily PRN Александр Tobin MD        And    bisacodyl (DULCOLAX) suppository 10 mg  10 mg Rectal Daily  PRN Александр Tobin MD        cefTRIAXone (ROCEPHIN) 2,000 mg in sodium chloride 0.9 % 100 mL IVPB-VTB  2,000 mg Intravenous Q24H Александр Tobin  mL/hr at 12/24/23 2130 2,000 mg at 12/24/23 2130    dextrose (D50W) (25 g/50 mL) IV injection 25 g  25 g Intravenous Q15 Min PRN Александр Tobin MD        dextrose (GLUTOSE) oral gel 15 g  15 g Oral Q15 Min PRN Александр Tobin MD        glucagon HCl (Diagnostic) injection 1 mg  1 mg Intramuscular Q15 Min PRN Александр Tobin MD        heparin 04506 units/250 mL (100 units/mL) in 0.45 % NaCl infusion  13 Units/kg/hr (Dosing Weight) Intravenous Titrated Александр Tobin MD 8.84 mL/hr at 12/25/23 0421 13 Units/kg/hr at 12/25/23 0421    insulin glargine (LANTUS, SEMGLEE) injection 15 Units  15 Units Subcutaneous Nightly Александр Tobin MD   15 Units at 12/24/23 2259    Insulin Lispro (humaLOG) injection 2-9 Units  2-9 Units Subcutaneous 4x Daily AC & at Bedtime Александр Tobin MD   4 Units at 12/24/23 2300    nitroglycerin (NITROSTAT) SL tablet 0.4 mg  0.4 mg Sublingual PRN Александр Tobin MD   0.4 mg at 12/24/23 1740    Pharmacy to Dose Heparin   Does not apply Continuous PRN Александр Tobin MD        sodium chloride 0.9 % flush 10 mL  10 mL Intravenous PRN лАександр Tobin MD        sodium chloride 0.9 % flush 10 mL  10 mL Intravenous Q12H Александр Tobin MD   10 mL at 12/24/23 2301    sodium chloride 0.9 % flush 10 mL  10 mL Intravenous PRN Александр Tobin MD        sodium chloride 0.9 % infusion 40 mL  40 mL Intravenous PRN Александр Tobin MD        sodium chloride 0.9 % infusion  75 mL/hr Intravenous Continuous Gretel Melo PA-C 75 mL/hr at 12/25/23 0044 75 mL/hr at 12/25/23 0044     Orders (last 24 hrs)        Start     Ordered    12/25/23 1000  Heparin Anti-Xa  Timed         12/25/23 0409    12/25/23 0900  aspirin chewable tablet 81 mg  Daily         12/24/23 2053     12/25/23 0900  atorvastatin (LIPITOR) tablet 80 mg  Daily         12/24/23 2116 12/25/23 0800  Oral Care  2 Times Daily       12/24/23 2206 12/25/23 0723  POC Glucose Once  PROCEDURE ONCE        Comments: Complete no more than 45 minutes prior to patient eating      12/25/23 0639    12/25/23 0700  Adult Transthoracic Echo Complete w/ Color, Spectral and Contrast if necessary per protocol  Once         12/24/23 2206 12/25/23 0600  CBC & Differential  Every Third Day,   Status:  Canceled      Comments: Discontinue After Heparin Stopped      12/24/23 2041    12/25/23 0600  CBC Auto Differential  Morning Draw         12/24/23 2206 12/25/23 0600  Comprehensive Metabolic Panel  Morning Draw         12/24/23 2206 12/25/23 0600  Lipid Panel  Morning Draw         12/24/23 2206 12/25/23 0300  Heparin Anti-Xa  Timed,   Status:  Canceled         12/24/23 2054 12/25/23 0300  Heparin Anti-Xa  Timed         12/24/23 2126 12/25/23 0045  sodium chloride 0.9 % infusion  Continuous         12/24/23 2350 12/25/23 0001  NPO Diet NPO Type: Sips with Meds  Diet Effective Midnight         12/24/23 2206 12/25/23 0000  Vital Signs  Every 8 Hours        Comments: Per per hospital policy    12/24/23 2206 12/25/23 0000  Strict Intake & Output  Every 6 Hours         12/24/23 2206 12/24/23 2330  labetalol (NORMODYNE,TRANDATE) injection 10 mg  Once         12/24/23 2239 12/24/23 2300  sodium chloride 0.9 % flush 10 mL  Every 12 Hours Scheduled         12/24/23 2206 12/24/23 2300  sennosides-docusate (PERICOLACE) 8.6-50 MG per tablet 2 tablet  2 Times Daily        See Hyperspace for full Linked Orders Report.    12/24/23 2206 12/24/23 2235  POC Glucose Once  PROCEDURE ONCE        Comments: Complete no more than 45 minutes prior to patient eating      12/24/23 2228 12/24/23 2207  Notify Physician (with Parameters)  Until Discontinued         12/24/23 2206 12/24/23 2207  Insert Peripheral IV  Once          12/24/23 2206 12/24/23 2207  Saline Lock & Maintain IV Access  Continuous,   Status:  Canceled         12/24/23 2206 12/24/23 2207  VTE Prophylaxis Not Indicated: Other: Patient Currently Anticoagulated / Receiving Prophylaxis  Once         12/24/23 2206 12/24/23 2207  Continuous Cardiac Monitoring  Continuous        Comments: Follow Standing Orders As Outlined in Process Instructions (Open Order Report to View Full Instructions)    12/24/23 2206 12/24/23 2207  Telemetry - Maintain IV Access  Continuous         12/24/23 2206 12/24/23 2207  Telemetry - Place Orders & Notify Provider of Results When Patient Experiences Acute Chest Pain, Dysrhythmia or Respiratory Distress  Until Discontinued         12/24/23 2206 12/24/23 2207  May Be Off Telemetry for Tests  Continuous         12/24/23 2206 12/24/23 2207  Pulse Oximetry, Continuous  Continuous         12/24/23 2206 12/24/23 2207  Daily Weights  Daily       12/24/23 2206 12/24/23 2207  Diet: Cardiac Diets, Diabetic Diets; Healthy Heart (2-3 Na+); Consistent Carbohydrate; Texture: Regular Texture (IDDSI 7); Fluid Consistency: Thin (IDDSI 0)  Diet Effective Now,   Status:  Canceled         12/24/23 2206 12/24/23 2207  Inpatient Cardiology Consult  Once        Specialty:  Cardiology  Provider:  (Not yet assigned)    12/24/23 2206 12/24/23 2206  sodium chloride 0.9 % flush 10 mL  As Needed         12/24/23 2206 12/24/23 2206  sodium chloride 0.9 % infusion 40 mL  As Needed         12/24/23 2206 12/24/23 2206  polyethylene glycol (MIRALAX) packet 17 g  Daily PRN        See Hyperspace for full Linked Orders Report.    12/24/23 2206 12/24/23 2206  bisacodyl (DULCOLAX) EC tablet 5 mg  Daily PRN        See Hyperspace for full Linked Orders Report.    12/24/23 2206 12/24/23 2206  bisacodyl (DULCOLAX) suppository 10 mg  Daily PRN        See Hyperspace for full Linked Orders Report.    12/24/23 2206 12/24/23 2200   cefTRIAXone (ROCEPHIN) 2,000 mg in sodium chloride 0.9 % 100 mL IVPB-VTB  Every 24 Hours         12/24/23 2119 12/24/23 2200  POC Glucose 4x Daily Before Meals & at Bedtime  4 Times Daily Before Meals & at Bedtime      Comments: Complete no more than 45 minutes prior to patient eating      12/24/23 2124 12/24/23 2140  insulin glargine (LANTUS, SEMGLEE) injection 15 Units  Nightly         12/24/23 2124 12/24/23 2140  Insulin Lispro (humaLOG) injection 2-9 Units  4 Times Daily Before Meals & Nightly         12/24/23 2124 12/24/23 2130  atorvastatin (LIPITOR) tablet 80 mg  Nightly,   Status:  Discontinued         12/24/23 2057 12/24/23 2125  Hemoglobin A1c  STAT         12/24/23 2124 12/24/23 2123  dextrose (GLUTOSE) oral gel 15 g  Every 15 Minutes PRN         12/24/23 2124 12/24/23 2123  dextrose (D50W) (25 g/50 mL) IV injection 25 g  Every 15 Minutes PRN         12/24/23 2124 12/24/23 2123  glucagon HCl (Diagnostic) injection 1 mg  Every 15 Minutes PRN         12/24/23 2124 12/24/23 2119  Inpatient Admission  Once         12/24/23 2122 12/24/23 2119  Code Status and Medical Interventions:  Continuous         12/24/23 2122 12/24/23 2117  Pharmacy Consult - Pharmacy to dose  Continuous PRN,   Status:  Discontinued         12/24/23 2118 12/24/23 2102  heparin 28791 units/250 mL (100 units/mL) in 0.45 % NaCl infusion  Titrated         12/24/23 2046 12/24/23 2100  heparin (porcine) 5000 UNIT/ML injection 4,000 Units  Once         12/24/23 2046 12/24/23 2058  heparin (porcine) 5000 UNIT/ML injection 5,500 Units  Once,   Status:  Discontinued         12/24/23 2042 12/24/23 2057  heparin (porcine) 5000 UNIT/ML injection 5,500 Units  Once,   Status:  Discontinued         12/24/23 2041 12/24/23 2057  heparin 68926 units/250 mL (100 units/mL) in 0.45 % NaCl infusion  Titrated,   Status:  Discontinued         12/24/23 2041 12/24/23 2047  aPTT  STAT        Comments: Prior to  Initial Heparin Bolus      12/24/23 2046 12/24/23 2047  Protime-INR  STAT        Comments: Prior to Initial Heparin Bolus      12/24/23 2046 12/24/23 2046  Initiate & Follow Heparin Protocol  Continuous         12/24/23 2046 12/24/23 2046  Notify Provider Platelet Count Less Than 10215  Until Discontinued         12/24/23 2046 12/24/23 2046  Stop Infusion & Notify Provider if Bleeding Occurs  Until Discontinued         12/24/23 2046 12/24/23 2045  heparin (porcine) 5000 UNIT/ML injection 3,400 Units  As Needed,   Status:  Discontinued         12/24/23 2046 12/24/23 2045  heparin (porcine) 5000 UNIT/ML injection 1,700 Units  As Needed,   Status:  Discontinued         12/24/23 2046 12/24/23 2045  Pharmacy to Dose Heparin  Continuous PRN         12/24/23 2046 12/24/23 2041  heparin (porcine) 5000 UNIT/ML injection 3,400 Units  As Needed,   Status:  Discontinued         12/24/23 2041 12/24/23 2041  heparin (porcine) 5000 UNIT/ML injection 1,700 Units  As Needed,   Status:  Discontinued         12/24/23 2041 12/24/23 2041  Pharmacy to Dose Heparin  Continuous PRN,   Status:  Discontinued         12/24/23 2041 12/24/23 2041  Initiate & Follow Heparin Protocol  Continuous         12/24/23 2041 12/24/23 2041  Notify Provider Platelet Count Less Than 09808  Until Discontinued         12/24/23 2041 12/24/23 2041  Stop Infusion & Notify Provider if Bleeding Occurs  Until Discontinued         12/24/23 2041 12/24/23 2041  Heparin Anti-Xa  STAT        Comments: Prior to Initial Heparin Bolus      12/24/23 2041 12/24/23 2041  Protime-INR  STAT,   Status:  Canceled        Comments: Prior to Initial Heparin Bolus      12/24/23 2041 12/24/23 2041  aPTT  STAT,   Status:  Canceled        Comments: Prior to Initial Heparin Bolus      12/24/23 2041 12/24/23 2040  Diet: Regular/House Diet; Texture: Regular Texture (IDDSI 7); Fluid Consistency: Thin (IDDSI 0)  Diet Effective Now,   Status:   Canceled         12/24/23 2039 12/24/23 1958  BNP  STAT         12/24/23 1957    12/24/23 1755  iopamidol (ISOVUE-370) 76 % injection 100 mL  Once in Imaging         12/24/23 1739    12/24/23 1730  nitroglycerin (NITROSTAT) SL tablet 0.4 mg  As Needed         12/24/23 1740    12/24/23 1624  POC Glucose Once  PROCEDURE ONCE        Comments: Complete no more than 45 minutes prior to patient eating      12/24/23 1607    12/24/23 1558  CT Angiogram Coronary  1 Time Imaging         12/24/23 1557    12/24/23 1516  POC Glucose STAT  STAT        Comments: Complete no more than 45 minutes prior to patient eating      12/24/23 1515    12/24/23 1449  High Sensitivity Troponin T 2Hr  PROCEDURE ONCE         12/24/23 1317    12/24/23 1355  insulin regular (humuLIN R,novoLIN R) injection 8 Units  Once         12/24/23 1339    12/24/23 1320  nitrofurantoin (macrocrystal-monohydrate) (MACROBID) capsule 100 mg  Once         12/24/23 1304    12/24/23 1302  Urine Culture - Urine, Urine, Clean Catch  Once         12/24/23 1301    12/24/23 1258  Urinalysis, Microscopic Only - Urine, Clean Catch  Once         12/24/23 1257    12/24/23 1238  aspirin chewable tablet 324 mg  Once         12/24/23 1222    12/24/23 1223  Cardiac Monitoring  Continuous,   Status:  Canceled        Comments: Follow Standing Orders As Outlined in Process Instructions (Open Order Report to View Full Instructions)    12/24/23 1222    12/24/23 1223  Continuous Pulse Oximetry  Continuous,   Status:  Canceled         12/24/23 1222    12/24/23 1223  Vital Signs  Per Hospital Policy         12/24/23 1222    12/24/23 1223  ECG 12 Lead Chest Pain  Once         12/24/23 1222    12/24/23 1223  XR Chest 1 View  1 Time Imaging         12/24/23 1222    12/24/23 1223  Insert Peripheral IV  Once         12/24/23 1222    12/24/23 1223  Tiff Draw  Once         12/24/23 1222    12/24/23 1223  CBC & Differential  Once         12/24/23 1222    12/24/23 1223  Comprehensive  Metabolic Panel  Once         12/24/23 1222    12/24/23 1223  High Sensitivity Troponin T  Once         12/24/23 1222    12/24/23 1223  Urinalysis With Culture If Indicated - Urine, Clean Catch  Once         12/24/23 1222    12/24/23 1223  Green Top (Gel)  PROCEDURE ONCE         12/24/23 1222    12/24/23 1223  Lavender Top  PROCEDURE ONCE         12/24/23 1222    12/24/23 1223  Gold Top - SST  PROCEDURE ONCE         12/24/23 1222    12/24/23 1223  Light Blue Top  PROCEDURE ONCE         12/24/23 1222    12/24/23 1223  CBC Auto Differential  PROCEDURE ONCE         12/24/23 1222    12/24/23 1222  sodium chloride 0.9 % flush 10 mL  As Needed         12/24/23 1222    Unscheduled  Oxygen Therapy- Nasal Cannula; Titrate 1-6 LPM Per SpO2; 90 - 95%  Continuous PRN       12/24/23 1222    Unscheduled  ECG 12 Lead Chest Pain  As Needed      Comments: Persistent, Unrelieved or Recurrent Chest Pain    12/24/23 1222    Unscheduled  Up With Assistance  As Needed       12/24/23 2206    Unscheduled  Follow Hypoglycemia Standing Orders For Blood Glucose <70 & Notify Provider of Treatment  As Needed      Comments: Follow Hypoglycemia Orders As Outlined in Process Instructions (Open Order Report to View Full Instructions)  Notify Provider Any Time Hypoglycemia Treatment is Administered    12/24/23 2124                  Physician Progress Notes (last 24 hours)  Notes from 12/24/23 0736 through 12/25/23 0736   No notes of this type exist for this encounter.       Consult Notes (last 24 hours)  Notes from 12/24/23 0736 through 12/25/23 0736   No notes of this type exist for this encounter.

## 2023-12-25 NOTE — CONSULTS
Consults    Patient Identification:    Name:  Breanna Kaba  Age:  68 y.o.  Sex:  female  :  1955  MRN:  [id number]  Visit Number:  [id number]  Primary care provider:  Myrna Ramos MD    Chief complaint:   Chest pain and dysuria, trace positive troponin    History of presenting illness:   68-year-old white female who has a known history of hypertension diabetes GERD, patient complaining of symptoms of dysuria she says she had 1 episode of chest pain lasted about few seconds.  Patient is physically active she states she goes about half a mile every day to his her grandson house no complaint of chest pain during this activity she said before the stent was brain she was having shortness of breath and she has to stop but this time she does not stop after the stent and feeling fine without any reproducible symptoms on exertion  ---------------------------------------------------------------------------------------------------------------------  ROS: All systems reviewed and negative except noted above.  ---------------------------------------------------------------------------------------------------------------------   Past History:   Family History   Problem Relation Age of Onset    COPD Mother     Heart disease Brother     Heart attack Brother     Hypertension Daughter     Diabetes Daughter     Hypertension Daughter     Diabetes Daughter     Breast cancer Neg Hx      Past Medical History:   Diagnosis Date    Acute congestive heart failure, unspecified heart failure type 2022    Arthritis     Chronic pain     Diabetes mellitus     Elevated cholesterol     GERD (gastroesophageal reflux disease)     Gout     Headache     Hypertension     Myocardial infarction     Neuropathy      Past Surgical History:   Procedure Laterality Date    BREAST BIOPSY Left     benign    CARDIAC CATHETERIZATION N/A 2022    Procedure: Left Heart Cath;  Surgeon: Tristan Marin MD;  Location: Located within [facility] INVASIVE  LOCATION;  Service: Cardiology;  Laterality: N/A;    CARDIAC CATHETERIZATION N/A 03/31/2022    Procedure: Percutaneous Coronary Intervention;  Surgeon: Tristan Marin MD;  Location: [facility] CATH INVASIVE LOCATION;  Service: Cardiology;  Laterality: N/A;    COLONOSCOPY N/A 02/17/2020    Procedure: COLONOSCOPY FOR SCREENING CPT CODE: ;  Surgeon: Mariano Prescott MD;  Location: [facility] OR;  Service: Gastroenterology;  Laterality: N/A;    CORONARY STENT PLACEMENT      HYSTERECTOMY      Partial    KNEE SURGERY      THYROIDECTOMY, PARTIAL      Removal of goiter     Social History     Socioeconomic History    Marital status:    Tobacco Use    Smoking status: Never    Smokeless tobacco: Never   Vaping Use    Vaping Use: Never used   Substance and Sexual Activity    Alcohol use: No    Drug use: No    Sexual activity: Defer     ---------------------------------------------------------------------------------------------------------------------   Allergies:  Asa [aspirin], Naproxen, and Penicillins  ---------------------------------------------------------------------------------------------------------------------   Prior to Admission Medications       Prescriptions Last Dose Informant Patient Reported? Taking?    acetaminophen (TYLENOL) 500 MG tablet   Yes No    Take 1 tablet by mouth Every 6 (Six) Hours As Needed for Mild Pain.    allopurinol (ZYLOPRIM) 300 MG tablet   No No    Take 1 tablet by mouth Daily.    aspirin 81 MG EC tablet   No No    Take 1 tablet by mouth Daily.    atorvastatin (LIPITOR) 80 MG tablet   No No    Take 1 tablet by mouth Daily.    Blood Glucose Monitoring Suppl (ONE TOUCH ULTRA 2) w/Device kit   No No    Use to test blood sugar before meals and at bedtime.    glipizide (GLUCOTROL) 5 MG tablet   No No    Take 1 tablet by mouth Daily.    glucose blood (OneTouch Ultra) test strip   No No    Use to test blood sugar before meals and at bedtime.    Insulin Glargine (BASAGLAR  KWIKPEN) 100 UNIT/ML injection pen   No No    Inject 30 Units under the skin into the appropriate area as directed Every Night.    insulin glulisine (Apidra) 100 UNIT/ML injection   No No    Inject 10 Units under the skin into the appropriate area as directed 3 (Three) Times a Day Before Meals for 30 days.    insulin glulisine (Apidra) 100 UNIT/ML injection   Yes No    Inject  under the skin into the appropriate area as directed 3 (Three) Times a Day Before Meals.    Lancets (onetouch ultrasoft) lancets   No No    Use to test blood sugar before meals and at bedtime.    losartan (Cozaar) 50 MG tablet   No No    Take 1 tablet by mouth Daily.    metoprolol succinate XL (TOPROL-XL) 25 MG 24 hr tablet   No No    Take 1 tablet by mouth Daily.    spironolactone (ALDACTONE) 25 MG tablet   No No    Take 1 tablet by mouth Daily.    trospium (SANCTURA) 20 MG tablet   Yes No    Take 1 tablet by mouth Every 12 (Twelve) Hours.          Hospital Meds:  aspirin, 81 mg, Oral, Daily  atorvastatin, 80 mg, Oral, Daily  cefTRIAXone, 2,000 mg, Intravenous, Q24H  insulin glargine, 15 Units, Subcutaneous, Nightly  insulin lispro, 2-9 Units, Subcutaneous, 4x Daily AC & at Bedtime  senna-docusate sodium, 2 tablet, Oral, BID  sodium chloride, 10 mL, Intravenous, Q12H      heparin, 14 Units/kg/hr (Dosing Weight), Last Rate: 14 Units/kg/hr (12/25/23 1114)  Pharmacy to Dose Heparin,   sodium chloride, 75 mL/hr, Last Rate: 75 mL/hr (12/25/23 0044)      ---------------------------------------------------------------------------------------------------------------------   Vital Signs:  Temp:  [97.9 °F (36.6 °C)-99.1 °F (37.3 °C)] 99.1 °F (37.3 °C)  Heart Rate:  [82-96] 83  Resp:  [18-20] 20  BP: (110-182)/(63-98) 127/63      12/24/23  1205 12/24/23  2223 12/25/23  0500   Weight: 68 kg (150 lb) 67.8 kg (149 lb 6.4 oz) 67.8 kg (149 lb 6.4 oz) (Admit weight less than 24hrs.)     Body mass index is 27.33  kg/m².  ---------------------------------------------------------------------------------------------------------------------   Physical exam:   Constitutional:  Well-developed and well-nourished.  No respiratory distress.      HENT:  Head: Normocephalic and atraumatic.  Mouth:  Moist mucous membranes.    Eyes:  Conjunctivae and EOM are normal.  Pupils are equal, round, and reactive to light.  No scleral icterus.  Neck:  Neck supple.  No JVD present.    Cardiovascular:  Normal rate, regular rhythm and normal heart sounds with no murmur.  Pulmonary/Chest:  No respiratory distress, no wheezes, no crackles, with normal breath sounds and good air movement.  Abdominal:  Soft.  Bowel sounds are normal.  No distension and no tenderness.   Musculoskeletal:  No edema, no tenderness, and no deformity.  No red or swollen joints anywhere.    Neurological:  Alert and oriented to person, place, and time.  No cranial nerve deficit.  No tongue deviation.  No facial droop.  No slurred speech.   Skin:  Skin is warm and dry.  No rash noted.  No pallor.   Psychiatric:  Normal mood and affect.  Behavior is normal.  Judgment and thought content normal.   Peripheral vascular:  No edema and strong pulses on all 4 extremities.    ---------------------------------------------------------------------------------------------------------------------   EKG: Normal sinus rhythm, poor R wave progression  Telemetry: Normal sinus rhythm  I have personally looked at both the EKG and the telemetry strips.  Echo:  Results for orders placed during the hospital encounter of 07/19/22    Adult Transthoracic Echo Complete W/ Cont if Necessary Per Protocol    Interpretation Summary  · Normal left ventricular cavity size and wall thickness noted. There is mild left ventricular global hypokinesis noted.  · Left ventricular ejection fraction appears to be 51 - 55%. Left ventricular systolic function is low normal.  · The aortic valve is structurally normal with  "no regurgitation or stenosis present.  · The mitral valve is structurally normal with no significant stenosis present. Mild mitral valve regurgitation is present.  · There is no evidence of pericardial effusion.    ---------------------------------------------------------------------------------------------------------------------   Results from last 7 days   Lab Units 12/25/23  0310 12/24/23  2104 12/24/23  1249   WBC 10*3/mm3 10.31  --  10.87*   HEMOGLOBIN g/dL 12.3  --  13.1   HEMATOCRIT % 37.6  --  38.6   MCV fL 82.8  --  82.5   MCHC g/dL 32.7  --  33.9   PLATELETS 10*3/mm3 273  --  324   INR   --  0.86*  --          Results from last 7 days   Lab Units 12/25/23  0310 12/24/23  1249   SODIUM mmol/L 137 135*   POTASSIUM mmol/L 3.7 5.2   CHLORIDE mmol/L 102 99   CO2 mmol/L 22.6 26.2   BUN mg/dL 26* 28*   CREATININE mg/dL 1.02* 1.24*   CALCIUM mg/dL 9.1 9.4   GLUCOSE mg/dL 264* 410*   ALBUMIN g/dL 3.8 4.1   BILIRUBIN mg/dL 0.2 0.6   ALK PHOS U/L 137* 156*   AST (SGOT) U/L 13 17   ALT (SGPT) U/L 13 7   Estimated Creatinine Clearance: 46.3 mL/min (A) (by C-G formula based on SCr of 1.02 mg/dL (H)).  No results found for: \"AMMONIA\"  Results from last 7 days   Lab Units 12/24/23  1446 12/24/23  1249   HSTROP T ng/L 29* 31*     Results from last 7 days   Lab Units 12/24/23  1446   PROBNP pg/mL 148.3     Lab Results   Component Value Date    HGBA1C 10.40 (H) 12/24/2023     Lab Results   Component Value Date    TSH 1.260 06/06/2022    FREET4 1.42 03/31/2022     No results found for: \"PREGTESTUR\", \"PREGSERUM\", \"HCG\", \"HCGQUANT\"  Pain Management Panel  More data may exist         Latest Ref Rng & Units 11/28/2022 4/1/2022   Pain Management Panel   Creatinine, Urine mg/dL  mg/dL 51.0  51.0  20.3      No results found for: \"BLOODCX\"  Urine Culture   Date Value Ref Range Status   12/24/2023 >100,000 CFU/mL Gram Negative Bacilli (A)  Preliminary     No results found for: \"WOUNDCX\"  No results found for: \"STOOLCX\"  Results from " last 7 days   Lab Units 12/25/23  0310   CHOLESTEROL mg/dL 141   TRIGLYCERIDES mg/dL 155*   HDL CHOL mg/dL 41   LDL CHOL mg/dL 73       ---------------------------------------------------------------------------------------------------------------------   Imaging Results (Last 7 Days)       Procedure Component Value Units Date/Time    CT Angiogram Coronary [[id number]] Collected: 12/24/23 1844     Updated: 12/24/23 1858    Narrative:      EXAM:    CT Angiography Heart With Intravenous Contrast     EXAM DATE:    12/24/2023 4:56 PM     CLINICAL HISTORY:    Chest pain/anginal equiv, intermediate CAD risk, not treadmill  candidate     TECHNIQUE:    Axial computed tomographic angiography images of the coronary arteries  with intravenous contrast.  Sublingual nitroglycerine for coronary  vasodilation and IV metoprolol to reduce heart rate were administered as  needed.  This CT exam was performed using one or more of the following  dose reduction techniques:  automated exposure control, adjustment of  the mA and/or kV according to patient size, and/or use of iterative  reconstruction technique.    MIP reconstructed images were created and reviewed.     COMPARISON:    CT of the chest performed 3/28/2023     FINDINGS:    Reformatted images are not yet evaluated due to extensive calcific  burden of this patient's coronary artery system. For example the  proximal LAD shows mild to moderate calcific stenosis while the mid to  distal LAD shows severe coronary artery stenosis. The proximal LAD  stent, is not well evaluated due to bloom artifact and possible  associated calcification. The RCA mid/distal shows moderate to severe  coronary artery stenosis with more distal RCA mild to moderate calcific  stenosis. The circumflex artery shows multifocal calcification resulting  in mostly mild stenosis. The ramus demonstrates moderate to severe  proximal stenosis secondary to calcification. With this extent of  calcific burden, bloom  artifact can overestimate degree of stenosis,  however in this patient the coronary artery calcium score alone and  history of CAD places this patient at very high risk of a coronary  event.       Impression:      1.  Reformatted images are not yet evaluated but extensive calcific  burden of this patient's coronary artery system is noted. For example  the proximal LAD shows mild to moderate calcific stenosis while the mid  to distal LAD shows severe coronary artery stenosis. The proximal LAD  stent, is not well evaluated due to bloom artifact and possible  associated calcification. The RCA mid/distal shows moderate to severe  coronary artery stenosis with more distal RCA mild to moderate calcific  stenosis. The circumflex artery shows multifocal calcification resulting  in mostly mild stenosis. The ramus demonstrates moderate to severe  proximal stenosis secondary to calcification. With this extent of  calcific burden, bloom artifact can overestimate degree of stenosis,  however in this patient the coronary artery calcium score alone and  history of CAD places this patient at very high risk of a coronary  event.  2.  An addendum will be made at time of available reformatted images to  provide more detailed analysis.        This report was finalized on 12/24/2023 6:56 PM by Dr. Jovan Casanova MD.       XR Chest 1 View [[id number]] Collected: 12/24/23 1327     Updated: 12/24/23 1330    Narrative:      PROCEDURE: Portable chest x-ray examination performed on December 24, 2023. Single view. Upright position.     HISTORY: Chest pain. Dysuria.     COMPARISON: None.     FINDINGS:     Normal heart size.  No lobar consolidation or edema.  No pleural effusion or pneumothorax.  Small bands of scar versus atelectasis at the lung bases, left greater  than right.  Mild osteoarthritis at the glenohumeral joints, left greater than right.  No free air in the upper abdomen.       Impression:         1.  Normal heart size.  2.  Mild  hypoinflated lungs.  3.  No edema or pneumonia.  4.  Small bands of scar versus atelectasis at the lung bases.     This report was finalized on 12/24/2023 1:28 PM by Konstantin Smart MD.             ----------------------------------------------------------------------------------------------------------------------  Assessment:   History of CAD  Atypical chest pain  UTI      Plan:   Will plan for stress test tomorrow  Thank you for the consult.    Roe Galaviz MD  12/25/23  15:00 EST

## 2023-12-25 NOTE — PROGRESS NOTES
HEPARIN INFUSION  Breanna Kaba is a  68 y.o. female receiving heparin infusion.     Therapy for (VTE/Cardiac):   Cardiac  Patient Dosing Weight: 68 kg  Initial Bolus (Y/N):   Y  Any Bolus (Y/N):   Y        Signs or Symptoms of Bleeding: N    Cardiac or Other (Not VTE)   Initial Bolus: 60 units/kg (Max 4,000 units)  Initial rate: 12 units/kg/hr (Max 1,000 units/hr)   Anti Xa Rebolus Infusion Hold time Change infusion Dose (Units/kg/hr) Next Anti Xa or aPTT Level Due   < 0.11 50 Units/kg  (4000 Units Max) None Increase by  3 Units/kg/hr 6 hours   0.11- 0.19 25 Units/kg  (2000 Units Max) None Increase by  2 Units/kg/hr 6 hours   0.2 - 0.29 0 None Increase by  1 Units/kg/hr 6 hours   0.3 - 0.5 0 None No Change 6 hours (after 2 consecutive levels in range check q24h @0700)   0.51 - 0.6 0 None Decrease by  1 Units/kg/hr 6 hours   0.61 - 0.8 0 30 Minutes Decrease by  2 Units/kg/hr 6 hours   0.81 - 1 0 60 Minutes Decrease by  3 Units/kg/hr 6 hours   >1 0 Hold  After Anti Xa less than 0.5 decrease previous rate by  4 Units/kg/hr  Every 2 hours until Anti Xa  less than 0.5 then when infusion restarts in 6 hours       Recommend anti-Xa every 6 hours.          Date   Time   Anti-Xa Current Rate (Unit/kg/hr) Bolus   (Units) Rate Change   (Unit/kg/hr) New Rate (Unit/kg/hr) Next   Anti-Xa Comments  Pump Check Daily   12/24 1951 Drawn - 4000 +12 12 0300 VTE heparin drip order clarified as ACS heparin drip with Judith Galloway. No s/s of bleeding per nurse Lashae   12/25 0409 0.21 12 - +1 13 1000 Discussed the rate increase with Giselle. No s/s of bleeding.                                                                                                                                                                                                                       Pharmacy will continue to follow anti-Xa results and monitor for signs and symptoms of bleeding or thrombosis.    Kasandra Oh, PharmD  04:08 EST.  12/25/23

## 2023-12-25 NOTE — H&P
"     [facility]  HISTORY & PHYSICAL    Patient Identification:  Name:  Breanna Kaba  Age:  68 y.o.  Sex:  female  :  1955  MRN:  [id number]   Visit Number:  [id number]  Admit Date: 2023   Primary Care Physician:  Myrna Ramos MD     Subjective     Chief complaint:   Chief Complaint   Patient presents with    Chest Pain    Dysuria     History of presenting illness:   Patient is a 68 y.o. female with past medical history significant for DM, GERD, HFrEF with recovered EF, hypertension, hyperlipidemia that presented to the [facility] emergency department for evaluation of dysuria.  Patient case was discussed with cardiology and while patient was still in the ED.  Cardiologist recommended cardiac CT symptoms and recent low risk stress test.  Due to findings of cardiac CT, cardiology recommended admission, initiated on heparin drip and LHC in AM.    Patient examined at bedside on 3S. Patient states she has had worsening dysuria and urinary frequency over the past several days. She also noted that last night/early this morning she was having episodes of chest pressure she described as an \"elephant on my chest.\" Denies any current chest pain. Denies cough, shortness of breath, fever, chills, leg swelling, or palpitations.  Notes she did have a stress test back in July of this year.  On chart review this was low risk.  Also noted patient had a LHC with PCI to the LAD on 3/31/2022.    Upon arrival to the ED, vitals were temperature 98, , RR 18, /74, SpO2 98% on room air. Labs significant for, creatinine 1.24, BUN 28, glucose 410, alk phos 156.  Hemoglobin A1c 10.4. Initial troponin 31, repeat 29.  BBC 10.87.  UA turbid, trace glucose, trace ketones, small blood, moderate leukocytes, large protein, too numerous WBC to count, 4+ bacteria, 7-12 squamous epithelial cells.  CXR normal heart size, mild hypoinflated lungs.  No edema or pneumonia.  " Small bands of scar versus atelectasis in the lung bases.    Coronary CT angiogram shows extensive calcific burden on patient's coronary artery system.    Patient has been admitted to the Telemetry unit.   ---------------------------------------------------------------------------------------------------------------------   Review of Systems   Constitutional:  Negative for chills, diaphoresis, fatigue and fever.   Respiratory:  Negative for cough, shortness of breath and wheezing.    Cardiovascular:  Positive for chest pain. Negative for palpitations and leg swelling.   Gastrointestinal:  Negative for abdominal pain, constipation, diarrhea, nausea and vomiting.   Genitourinary:  Positive for difficulty urinating, dysuria and frequency. Negative for flank pain.   Musculoskeletal:  Negative for arthralgias, myalgias and neck stiffness.   Skin:  Negative for rash and wound.   Neurological:  Negative for dizziness, weakness and light-headedness.   Psychiatric/Behavioral:  Negative for agitation and confusion.       ---------------------------------------------------------------------------------------------------------------------   Past Medical History:   Diagnosis Date    Acute congestive heart failure, unspecified heart failure type 03/28/2022    Arthritis     Chronic pain     Diabetes mellitus     Elevated cholesterol     GERD (gastroesophageal reflux disease)     Gout     Headache     Hypertension     Myocardial infarction     Neuropathy      Past Surgical History:   Procedure Laterality Date    BREAST BIOPSY Left 1988    benign    CARDIAC CATHETERIZATION N/A 03/31/2022    Procedure: Left Heart Cath;  Surgeon: Tristan Marin MD;  Location: [facility] CATH INVASIVE LOCATION;  Service: Cardiology;  Laterality: N/A;    CARDIAC CATHETERIZATION N/A 03/31/2022    Procedure: Percutaneous Coronary Intervention;  Surgeon: Tristan Marin MD;  Location: [facility] CATH INVASIVE LOCATION;  Service: Cardiology;  Laterality: N/A;     COLONOSCOPY N/A 02/17/2020    Procedure: COLONOSCOPY FOR SCREENING CPT CODE: ;  Surgeon: Mariano Prescott MD;  Location: [facility];  Service: Gastroenterology;  Laterality: N/A;    CORONARY STENT PLACEMENT      HYSTERECTOMY      Partial    KNEE SURGERY      THYROIDECTOMY, PARTIAL      Removal of goiter     Family History   Problem Relation Age of Onset    COPD Mother     Heart disease Brother     Heart attack Brother     Hypertension Daughter     Diabetes Daughter     Hypertension Daughter     Diabetes Daughter     Breast cancer Neg Hx      Social History     Socioeconomic History    Marital status:    Tobacco Use    Smoking status: Never    Smokeless tobacco: Never   Vaping Use    Vaping Use: Never used   Substance and Sexual Activity    Alcohol use: No    Drug use: No    Sexual activity: Defer     ---------------------------------------------------------------------------------------------------------------------   Allergies:  Asa [aspirin], Naproxen, and Penicillins  ---------------------------------------------------------------------------------------------------------------------   Medications below are reported home medications pulling from within the system; at this time, these medications have not been reconciled unless otherwise specified and are in the verification process for further verifcation as current home medications.    Prior to Admission Medications       Prescriptions Last Dose Informant Patient Reported? Taking?    acetaminophen (TYLENOL) 500 MG tablet   Yes No    Take 1 tablet by mouth Every 6 (Six) Hours As Needed for Mild Pain.    allopurinol (ZYLOPRIM) 300 MG tablet   No No    Take 1 tablet by mouth Daily.    aspirin 81 MG EC tablet   No No    Take 1 tablet by mouth Daily.    atorvastatin (LIPITOR) 80 MG tablet   No No    Take 1 tablet by mouth Daily.    Blood Glucose Monitoring Suppl (ONE TOUCH ULTRA 2) w/Device kit   No No    Use to test blood sugar before meals  and at bedtime.    glipizide (GLUCOTROL) 5 MG tablet   No No    Take 1 tablet by mouth Daily.    glucose blood (OneTouch Ultra) test strip   No No    Use to test blood sugar before meals and at bedtime.    Insulin Glargine (BASAGLAR KWIKPEN) 100 UNIT/ML injection pen   No No    Inject 30 Units under the skin into the appropriate area as directed Every Night.    insulin glulisine (Apidra) 100 UNIT/ML injection   No No    Inject 10 Units under the skin into the appropriate area as directed 3 (Three) Times a Day Before Meals for 30 days.    insulin glulisine (Apidra) 100 UNIT/ML injection   Yes No    Inject  under the skin into the appropriate area as directed 3 (Three) Times a Day Before Meals.    Lancets (onetouch ultrasoft) lancets   No No    Use to test blood sugar before meals and at bedtime.    losartan (Cozaar) 50 MG tablet   No No    Take 1 tablet by mouth Daily.    metoprolol succinate XL (TOPROL-XL) 25 MG 24 hr tablet   No No    Take 1 tablet by mouth Daily.    spironolactone (ALDACTONE) 25 MG tablet   No No    Take 1 tablet by mouth Daily.    trospium (SANCTURA) 20 MG tablet   Yes No    Take 1 tablet by mouth Every 12 (Twelve) Hours.          ---------------------------------------------------------------------------------------------------------------------    Objective     Hospital Scheduled Meds:  aspirin, 81 mg, Oral, Daily  atorvastatin, 80 mg, Oral, Daily  cefTRIAXone, 2,000 mg, Intravenous, Q24H  insulin glargine, 15 Units, Subcutaneous, Nightly  insulin lispro, 2-9 Units, Subcutaneous, 4x Daily AC & at Bedtime  senna-docusate sodium, 2 tablet, Oral, BID  sodium chloride, 10 mL, Intravenous, Q12H      heparin, 12 Units/kg/hr (Dosing Weight), Last Rate: 12 Units/kg/hr (12/24/23 2122)  Pharmacy to Dose Heparin,   sodium chloride, 75 mL/hr        Current listed hospital scheduled medications may not yet reflect those currently placed in orders that are signed and held, awaiting patient's arrival to  floor/unit.    ---------------------------------------------------------------------------------------------------------------------   Vital Signs:  Temp:  [98 °F (36.7 °C)-98.1 °F (36.7 °C)] 98 °F (36.7 °C)  Heart Rate:  [] 85  Resp:  [18] 18  BP: (110-182)/(55-98) 149/77  Mean Arterial Pressure (Non-Invasive) for the past 24 hrs (Last 3 readings):   Noninvasive MAP (mmHg)   12/24/23 2338 97   12/24/23 1800 107   12/24/23 1745 93     SpO2 Percentage    12/24/23 2150 12/24/23 2223 12/24/23 2338   SpO2: 96% 96% 96%     SpO2:  [93 %-100 %] 96 %  on   ;   Device (Oxygen Therapy): room air    Body mass index is 27.33 kg/m².  Wt Readings from Last 3 Encounters:   12/24/23 67.8 kg (149 lb 6.4 oz)   11/07/23 69.6 kg (153 lb 6.4 oz)   09/28/23 68.6 kg (151 lb 3.2 oz)     ---------------------------------------------------------------------------------------------------------------------   Physical Exam:  Physical Exam  Constitutional:       General: She is not in acute distress.     Appearance: Normal appearance.   HENT:      Head: Normocephalic and atraumatic.      Right Ear: External ear normal.      Left Ear: External ear normal.      Nose: No nasal deformity.      Mouth/Throat:      Lips: Pink.      Mouth: Mucous membranes are moist.   Eyes:      Conjunctiva/sclera: Conjunctivae normal.      Pupils: Pupils are equal, round, and reactive to light.   Cardiovascular:      Rate and Rhythm: Normal rate and regular rhythm.      Pulses:           Dorsalis pedis pulses are 2+ on the right side and 2+ on the left side.      Heart sounds: Normal heart sounds.   Pulmonary:      Effort: Pulmonary effort is normal.      Breath sounds: Normal breath sounds. No wheezing, rhonchi or rales.   Abdominal:      General: Abdomen is flat. Bowel sounds are normal.      Palpations: Abdomen is soft.      Tenderness: There is no guarding or rebound.   Genitourinary:     Comments: No carney catheter in place   Musculoskeletal:      Cervical  "back: Neck supple. Normal range of motion.      Right lower leg: No edema.      Left lower leg: No edema.   Skin:     General: Skin is warm and dry.   Neurological:      General: No focal deficit present.      Mental Status: She is alert and oriented to person, place, and time.   Psychiatric:         Mood and Affect: Mood normal.         Behavior: Behavior normal.       ---------------------------------------------------------------------------------------------------------------------  EKG: Sinus tachycardia.  Heart rate 105.  No acute ST elevation.      Telemetry:        I have personally reviewed the EKG/Telemetry strip  ---------------------------------------------------------------------------------------------------------------------   Results from last 7 days   Lab Units 12/24/23  1446 12/24/23  1249   HSTROP T ng/L 29* 31*     Results from last 7 days   Lab Units 12/24/23  1446   PROBNP pg/mL 148.3         Results from last 7 days   Lab Units 12/24/23  2104 12/24/23  1249   WBC 10*3/mm3  --  10.87*   HEMOGLOBIN g/dL  --  13.1   HEMATOCRIT %  --  38.6   MCV fL  --  82.5   MCHC g/dL  --  33.9   PLATELETS 10*3/mm3  --  324   INR  0.86*  --      Results from last 7 days   Lab Units 12/24/23  1249   SODIUM mmol/L 135*   POTASSIUM mmol/L 5.2   CHLORIDE mmol/L 99   CO2 mmol/L 26.2   BUN mg/dL 28*   CREATININE mg/dL 1.24*   CALCIUM mg/dL 9.4   GLUCOSE mg/dL 410*   ALBUMIN g/dL 4.1   BILIRUBIN mg/dL 0.6   ALK PHOS U/L 156*   AST (SGOT) U/L 17   ALT (SGPT) U/L 7   Estimated Creatinine Clearance: 38.1 mL/min (A) (by C-G formula based on SCr of 1.24 mg/dL (H)).  No results found for: \"AMMONIA\"    Hemoglobin A1C   Date/Time Value Ref Range Status   12/24/2023 1249 10.40 (H) 4.80 - 5.60 % Final     Glucose   Date/Time Value Ref Range Status   12/24/2023 2228 200 (H) 70 - 130 mg/dL Final   12/24/2023 1607 197 (H) 70 - 130 mg/dL Final     Lab Results   Component Value Date    HGBA1C 10.40 (H) 12/24/2023     Lab Results " "  Component Value Date    TSH 1.260 06/06/2022    FREET4 1.42 03/31/2022       No results found for: \"BLOODCX\"  No results found for: \"URINECX\"  No results found for: \"WOUNDCX\"  No results found for: \"STOOLCX\"  No results found for: \"RESPCX\"  No results found for: \"MRSACX\"  Pain Management Panel  More data may exist         Latest Ref Rng & Units 11/28/2022 4/1/2022   Pain Management Panel   Creatinine, Urine mg/dL  mg/dL 51.0  51.0  20.3      I have personally reviewed the above laboratory results.   ---------------------------------------------------------------------------------------------------------------------  Imaging Results (Last 7 Days)       Procedure Component Value Units Date/Time    CT Angiogram Coronary [[id number]] Collected: 12/24/23 1844     Updated: 12/24/23 1858    Narrative:      EXAM:    CT Angiography Heart With Intravenous Contrast     EXAM DATE:    12/24/2023 4:56 PM     CLINICAL HISTORY:    Chest pain/anginal equiv, intermediate CAD risk, not treadmill  candidate     TECHNIQUE:    Axial computed tomographic angiography images of the coronary arteries  with intravenous contrast.  Sublingual nitroglycerine for coronary  vasodilation and IV metoprolol to reduce heart rate were administered as  needed.  This CT exam was performed using one or more of the following  dose reduction techniques:  automated exposure control, adjustment of  the mA and/or kV according to patient size, and/or use of iterative  reconstruction technique.    MIP reconstructed images were created and reviewed.     COMPARISON:    CT of the chest performed 3/28/2023     FINDINGS:    Reformatted images are not yet evaluated due to extensive calcific  burden of this patient's coronary artery system. For example the  proximal LAD shows mild to moderate calcific stenosis while the mid to  distal LAD shows severe coronary artery stenosis. The proximal LAD  stent, is not well evaluated due to bloom artifact and " possible  associated calcification. The RCA mid/distal shows moderate to severe  coronary artery stenosis with more distal RCA mild to moderate calcific  stenosis. The circumflex artery shows multifocal calcification resulting  in mostly mild stenosis. The ramus demonstrates moderate to severe  proximal stenosis secondary to calcification. With this extent of  calcific burden, bloom artifact can overestimate degree of stenosis,  however in this patient the coronary artery calcium score alone and  history of CAD places this patient at very high risk of a coronary  event.       Impression:      1.  Reformatted images are not yet evaluated but extensive calcific  burden of this patient's coronary artery system is noted. For example  the proximal LAD shows mild to moderate calcific stenosis while the mid  to distal LAD shows severe coronary artery stenosis. The proximal LAD  stent, is not well evaluated due to bloom artifact and possible  associated calcification. The RCA mid/distal shows moderate to severe  coronary artery stenosis with more distal RCA mild to moderate calcific  stenosis. The circumflex artery shows multifocal calcification resulting  in mostly mild stenosis. The ramus demonstrates moderate to severe  proximal stenosis secondary to calcification. With this extent of  calcific burden, bloom artifact can overestimate degree of stenosis,  however in this patient the coronary artery calcium score alone and  history of CAD places this patient at very high risk of a coronary  event.  2.  An addendum will be made at time of available reformatted images to  provide more detailed analysis.        This report was finalized on 12/24/2023 6:56 PM by Dr. Jovan Casanova MD.       XR Chest 1 View [[id number]] Collected: 12/24/23 1327     Updated: 12/24/23 1330    Narrative:      PROCEDURE: Portable chest x-ray examination performed on December 24, 2023. Single view. Upright position.     HISTORY: Chest pain.  Dysuria.     COMPARISON: None.     FINDINGS:     Normal heart size.  No lobar consolidation or edema.  No pleural effusion or pneumothorax.  Small bands of scar versus atelectasis at the lung bases, left greater  than right.  Mild osteoarthritis at the glenohumeral joints, left greater than right.  No free air in the upper abdomen.       Impression:         1.  Normal heart size.  2.  Mild hypoinflated lungs.  3.  No edema or pneumonia.  4.  Small bands of scar versus atelectasis at the lung bases.     This report was finalized on 12/24/2023 1:28 PM by Konstantin Smart MD.             I have personally reviewed the above radiology results.     Last Echocardiogram:  Results for orders placed during the hospital encounter of 07/19/22    Adult Transthoracic Echo Complete W/ Cont if Necessary Per Protocol    Interpretation Summary  · Normal left ventricular cavity size and wall thickness noted. There is mild left ventricular global hypokinesis noted.  · Left ventricular ejection fraction appears to be 51 - 55%. Left ventricular systolic function is low normal.  · The aortic valve is structurally normal with no regurgitation or stenosis present.  · The mitral valve is structurally normal with no significant stenosis present. Mild mitral valve regurgitation is present.  · There is no evidence of pericardial effusion.    ---------------------------------------------------------------------------------------------------------------------    Assessment & Plan      Active Hospital Problems    Diagnosis  POA    **Unstable angina [I20.0]  Yes     Unstable angina, POA  CAD s/p stenting of LAD  HFrEF with recovered EF  Hyperlipidemia  Initial troponin 31, repeat 29  EKG with no acute ST changes.  Reviewed CT shows extensive calcific burden on coronary artery system  Continue trend troponin obtain serial EKGs  Received aspirin and initiated on heparin drip in ED  Continue with daily aspirin, statin  Echo from 7/19/2022 reviewed.  EF  had recovered to 51 to 55%.  Has been as low as 21 to 25% in the past; obtain updated TTE  Obtain a.m. lipid panel  Cardiology has been consulted, input/assistance is much appreciated.  While in the ED, cardiology recommended admission for cath in the morning, and initiating heparin drip  Patient is to be n.p.o. in anticipation for cath in the a.m.  Continue monitor closely on telemetry   Type II diabetes mellitus with acute hyperglycemia  Hemoglobin A1C ordered to evaluate glycemic control.   Start sliding scale insulin for now, titrate insulin therapy as necessary.   Hold any oral hypoglycemics to prevent hypoglycemia. Will review home diabetes medications once available per pharmacy.   Hypoglycemia protocol in place if necessary.   AccuCheks before meals and at bedtime.  Acute Kidney Injury on Chronic Kidney Disease stage II, POA  Baseline Cr 0.85, was up to 1.24 on admission  Continue mIVFs, Trend Cr and urine output, avoid nephrotoxins, NSAIDs, dehydration and contrast as able.  Repeat BMP in the a.m.   Acute Urinary Tract Infection, POA  UA showed leukocytes, WBCs, bacteria  Continue Ceftriaxone, follow up cultures  Patient does not meet sepsis criteria at this time. Continue to monitor vital signs.  GERD: continue home PPI pending pharmacy med rec  Essential hypertension  BP appears variably controlled   Plan to resume home antihypertensive regimen once reconciled per pharmacy with appropriate holding parameters to prevent hypotension and/or bradycardia   Closely monitor BP per hospital protocol, titrate medications as necessary    F/E/N: NS at 75 mill per hour.  Continue monitor electrolytes.  N.p.o. midnight.    ---------------------------------------------------  DVT Prophylaxis: Heparin drip  Activity: Bowel regimen    The patient is considered to be a high risk patient due to: Unstable angina    INPATIENT status due to the need for care which can only be reasonably provided in an hospital setting such as  aggressive/expedited ancillary services and/or consultation services, the necessity for IV medications, close physician monitoring and/or the possible need for procedures.  In such, I feel patient’s risk for adverse outcomes and need for care warrant INPATIENT evaluation and predict the patient’s care encounter to likely last beyond 2 midnights.    Code Status: Full code    Disposition/Discharge planning: Pending clinical course    I have discussed the patient's assessment and plan with Dr. Mahad Melo PA-C  Hospitalist Service -- [facility]       12/25/23  00:25 EST    Attending Physician: Александр Tobin MD

## 2023-12-25 NOTE — PLAN OF CARE
Goal Outcome Evaluation:   Patient resting well overnight. No complaints. Denies chest pain this shift. Vital signs stable at this time. At admission BP elevated. See chart. Provider made aware. See new order. IV access maintained. Heparin gtt infusing per order. NPO since midnight. Tolerated interventions well. Will continue to monitor.   Problem: Adult Inpatient Plan of Care  Goal: Plan of Care Review  Outcome: Ongoing, Progressing

## 2023-12-26 ENCOUNTER — APPOINTMENT (OUTPATIENT)
Dept: NUCLEAR MEDICINE | Facility: HOSPITAL | Age: 68
End: 2023-12-26
Payer: MEDICARE

## 2023-12-26 ENCOUNTER — READMISSION MANAGEMENT (OUTPATIENT)
Dept: CALL CENTER | Facility: HOSPITAL | Age: 68
End: 2023-12-26
Payer: MEDICARE

## 2023-12-26 ENCOUNTER — TELEPHONE (OUTPATIENT)
Dept: FAMILY MEDICINE CLINIC | Facility: CLINIC | Age: 68
End: 2023-12-26
Payer: MEDICARE

## 2023-12-26 ENCOUNTER — APPOINTMENT (OUTPATIENT)
Dept: CARDIOLOGY | Facility: HOSPITAL | Age: 68
End: 2023-12-26
Payer: MEDICARE

## 2023-12-26 VITALS
OXYGEN SATURATION: 99 % | SYSTOLIC BLOOD PRESSURE: 162 MMHG | DIASTOLIC BLOOD PRESSURE: 89 MMHG | RESPIRATION RATE: 20 BRPM | BODY MASS INDEX: 27.71 KG/M2 | HEIGHT: 62 IN | WEIGHT: 150.6 LBS | TEMPERATURE: 98.5 F | HEART RATE: 78 BPM

## 2023-12-26 LAB
ANION GAP SERPL CALCULATED.3IONS-SCNC: 10.1 MMOL/L (ref 5–15)
BACTERIA SPEC AEROBE CULT: ABNORMAL
BASOPHILS # BLD AUTO: 0.04 10*3/MM3 (ref 0–0.2)
BASOPHILS NFR BLD AUTO: 0.5 % (ref 0–1.5)
BH CV NUCLEAR PRIOR STUDY: 3
BH CV REST NUCLEAR ISOTOPE DOSE: 10.4 MCI
BH CV STRESS BP STAGE 1: NORMAL
BH CV STRESS COMMENTS STAGE 1: NORMAL
BH CV STRESS DOSE REGADENOSON STAGE 1: 0.4
BH CV STRESS DURATION MIN STAGE 1: 0
BH CV STRESS DURATION SEC STAGE 1: 10
BH CV STRESS HR STAGE 1: 98
BH CV STRESS NUCLEAR ISOTOPE DOSE: 30.5 MCI
BH CV STRESS PROTOCOL 1: NORMAL
BH CV STRESS RECOVERY HR: 2 BPM
BH CV STRESS STAGE 1: 1
BUN SERPL-MCNC: 25 MG/DL (ref 8–23)
BUN/CREAT SERPL: 23.6 (ref 7–25)
CALCIUM SPEC-SCNC: 8.5 MG/DL (ref 8.6–10.5)
CHLORIDE SERPL-SCNC: 105 MMOL/L (ref 98–107)
CO2 SERPL-SCNC: 22.9 MMOL/L (ref 22–29)
CREAT SERPL-MCNC: 1.06 MG/DL (ref 0.57–1)
DEPRECATED RDW RBC AUTO: 41.2 FL (ref 37–54)
EGFRCR SERPLBLD CKD-EPI 2021: 56.6 ML/MIN/1.73
EOSINOPHIL # BLD AUTO: 0.27 10*3/MM3 (ref 0–0.4)
EOSINOPHIL NFR BLD AUTO: 3.3 % (ref 0.3–6.2)
ERYTHROCYTE [DISTWIDTH] IN BLOOD BY AUTOMATED COUNT: 13.5 % (ref 12.3–15.4)
GLUCOSE BLDC GLUCOMTR-MCNC: 233 MG/DL (ref 70–130)
GLUCOSE BLDC GLUCOMTR-MCNC: 96 MG/DL (ref 70–130)
GLUCOSE SERPL-MCNC: 231 MG/DL (ref 65–99)
HCT VFR BLD AUTO: 34.5 % (ref 34–46.6)
HGB BLD-MCNC: 11.2 G/DL (ref 12–15.9)
IMM GRANULOCYTES # BLD AUTO: 0.02 10*3/MM3 (ref 0–0.05)
IMM GRANULOCYTES NFR BLD AUTO: 0.2 % (ref 0–0.5)
LV EF NUC BP: 65 %
LYMPHOCYTES # BLD AUTO: 2.35 10*3/MM3 (ref 0.7–3.1)
LYMPHOCYTES NFR BLD AUTO: 29 % (ref 19.6–45.3)
MAXIMAL PREDICTED HEART RATE: 150 BPM
MCH RBC QN AUTO: 27.1 PG (ref 26.6–33)
MCHC RBC AUTO-ENTMCNC: 32.5 G/DL (ref 31.5–35.7)
MCV RBC AUTO: 83.3 FL (ref 79–97)
MONOCYTES # BLD AUTO: 0.66 10*3/MM3 (ref 0.1–0.9)
MONOCYTES NFR BLD AUTO: 8.1 % (ref 5–12)
NEUTROPHILS NFR BLD AUTO: 4.76 10*3/MM3 (ref 1.7–7)
NEUTROPHILS NFR BLD AUTO: 58.9 % (ref 42.7–76)
NRBC BLD AUTO-RTO: 0 /100 WBC (ref 0–0.2)
PERCENT MAX PREDICTED HR: 65.33 %
PLATELET # BLD AUTO: 312 10*3/MM3 (ref 140–450)
PMV BLD AUTO: 10.7 FL (ref 6–12)
POTASSIUM SERPL-SCNC: 4.2 MMOL/L (ref 3.5–5.2)
RBC # BLD AUTO: 4.14 10*6/MM3 (ref 3.77–5.28)
SODIUM SERPL-SCNC: 138 MMOL/L (ref 136–145)
STRESS BASELINE BP: NORMAL MMHG
STRESS BASELINE HR: 96 BPM
STRESS PERCENT HR: 77 %
STRESS POST PEAK BP: NORMAL MMHG
STRESS POST PEAK HR: 98 BPM
STRESS TARGET HR: 128 BPM
UFH PPP CHRO-ACNC: 0.21 IU/ML (ref 0.3–0.7)
UFH PPP CHRO-ACNC: 0.4 IU/ML (ref 0.3–0.7)
WBC NRBC COR # BLD AUTO: 8.1 10*3/MM3 (ref 3.4–10.8)

## 2023-12-26 PROCEDURE — A9500 TC99M SESTAMIBI: HCPCS | Performed by: INTERNAL MEDICINE

## 2023-12-26 PROCEDURE — 85025 COMPLETE CBC W/AUTO DIFF WBC: CPT | Performed by: INTERNAL MEDICINE

## 2023-12-26 PROCEDURE — 85520 HEPARIN ASSAY: CPT | Performed by: INTERNAL MEDICINE

## 2023-12-26 PROCEDURE — 80048 BASIC METABOLIC PNL TOTAL CA: CPT | Performed by: INTERNAL MEDICINE

## 2023-12-26 PROCEDURE — 25810000003 SODIUM CHLORIDE 0.9 % SOLUTION

## 2023-12-26 PROCEDURE — 93018 CV STRESS TEST I&R ONLY: CPT | Performed by: INTERNAL MEDICINE

## 2023-12-26 PROCEDURE — 78452 HT MUSCLE IMAGE SPECT MULT: CPT | Performed by: INTERNAL MEDICINE

## 2023-12-26 PROCEDURE — 93017 CV STRESS TEST TRACING ONLY: CPT

## 2023-12-26 PROCEDURE — 0 TECHNETIUM SESTAMIBI: Performed by: INTERNAL MEDICINE

## 2023-12-26 PROCEDURE — 78452 HT MUSCLE IMAGE SPECT MULT: CPT

## 2023-12-26 PROCEDURE — 25010000002 REGADENOSON 0.4 MG/5ML SOLUTION: Performed by: INTERNAL MEDICINE

## 2023-12-26 PROCEDURE — 82948 REAGENT STRIP/BLOOD GLUCOSE: CPT

## 2023-12-26 PROCEDURE — 99232 SBSQ HOSP IP/OBS MODERATE 35: CPT | Performed by: NURSE PRACTITIONER

## 2023-12-26 PROCEDURE — 25010000002 HEPARIN (PORCINE) 25000-0.45 UT/250ML-% SOLUTION: Performed by: INTERNAL MEDICINE

## 2023-12-26 PROCEDURE — 63710000001 INSULIN LISPRO (HUMAN) PER 5 UNITS: Performed by: HOSPITALIST

## 2023-12-26 RX ORDER — ALLOPURINOL 300 MG/1
300 TABLET ORAL DAILY
Status: CANCELLED | OUTPATIENT
Start: 2023-12-26

## 2023-12-26 RX ORDER — ATORVASTATIN CALCIUM 40 MG/1
80 TABLET, FILM COATED ORAL DAILY
Status: CANCELLED | OUTPATIENT
Start: 2023-12-26

## 2023-12-26 RX ORDER — INSULIN GLARGINE 100 [IU]/ML
30 INJECTION, SOLUTION SUBCUTANEOUS NIGHTLY
Qty: 9 ML | Refills: 0 | COMMUNITY
Start: 2023-12-26 | End: 2024-01-02

## 2023-12-26 RX ORDER — LOSARTAN POTASSIUM 50 MG/1
50 TABLET ORAL DAILY
Status: CANCELLED | OUTPATIENT
Start: 2023-12-26

## 2023-12-26 RX ORDER — REGADENOSON 0.08 MG/ML
0.4 INJECTION, SOLUTION INTRAVENOUS
Status: COMPLETED | OUTPATIENT
Start: 2023-12-26 | End: 2023-12-26

## 2023-12-26 RX ORDER — CEFDINIR 300 MG/1
300 CAPSULE ORAL 2 TIMES DAILY
Qty: 6 CAPSULE | Refills: 0 | Status: SHIPPED | OUTPATIENT
Start: 2023-12-26 | End: 2023-12-29

## 2023-12-26 RX ORDER — OXYBUTYNIN CHLORIDE 5 MG/1
10 TABLET, EXTENDED RELEASE ORAL DAILY
Status: CANCELLED | OUTPATIENT
Start: 2023-12-26

## 2023-12-26 RX ORDER — INSULIN GLARGINE 100 [IU]/ML
10 INJECTION, SOLUTION SUBCUTANEOUS DAILY
Status: ON HOLD | COMMUNITY
End: 2023-12-26 | Stop reason: SDUPTHER

## 2023-12-26 RX ORDER — GLIPIZIDE 5 MG/1
5 TABLET ORAL DAILY
Status: CANCELLED | OUTPATIENT
Start: 2023-12-26

## 2023-12-26 RX ORDER — ASPIRIN 81 MG/1
81 TABLET ORAL DAILY
Status: CANCELLED | OUTPATIENT
Start: 2023-12-26

## 2023-12-26 RX ORDER — SPIRONOLACTONE 25 MG/1
25 TABLET ORAL DAILY
Status: CANCELLED | OUTPATIENT
Start: 2023-12-26

## 2023-12-26 RX ORDER — INSULIN GLARGINE 100 [IU]/ML
10 INJECTION, SOLUTION SUBCUTANEOUS DAILY
Qty: 3 ML | Refills: 0 | Status: SHIPPED | OUTPATIENT
Start: 2023-12-26 | End: 2024-01-02 | Stop reason: SDUPTHER

## 2023-12-26 RX ADMIN — LOSARTAN POTASSIUM 25 MG: 25 TABLET, FILM COATED ORAL at 11:23

## 2023-12-26 RX ADMIN — HEPARIN SODIUM 16 UNITS/KG/HR: 10000 INJECTION, SOLUTION INTRAVENOUS at 01:14

## 2023-12-26 RX ADMIN — TECHNETIUM TC 99M SESTAMIBI 1 DOSE: 1 INJECTION INTRAVENOUS at 07:35

## 2023-12-26 RX ADMIN — DOCUSATE SODIUM 50 MG AND SENNOSIDES 8.6 MG 2 TABLET: 8.6; 5 TABLET, FILM COATED ORAL at 11:23

## 2023-12-26 RX ADMIN — ASPIRIN 81 MG: 81 TABLET, CHEWABLE ORAL at 11:24

## 2023-12-26 RX ADMIN — TECHNETIUM TC 99M SESTAMIBI 1 DOSE: 1 INJECTION INTRAVENOUS at 08:39

## 2023-12-26 RX ADMIN — Medication 10 ML: at 11:24

## 2023-12-26 RX ADMIN — REGADENOSON 0.4 MG: 0.08 INJECTION, SOLUTION INTRAVENOUS at 08:39

## 2023-12-26 RX ADMIN — SODIUM CHLORIDE 75 ML/HR: 9 INJECTION, SOLUTION INTRAVENOUS at 01:14

## 2023-12-26 RX ADMIN — INSULIN LISPRO 4 UNITS: 100 INJECTION, SOLUTION INTRAVENOUS; SUBCUTANEOUS at 11:53

## 2023-12-26 RX ADMIN — ATORVASTATIN CALCIUM 80 MG: 40 TABLET, FILM COATED ORAL at 11:23

## 2023-12-26 NOTE — OUTREACH NOTE
Prep Survey      Flowsheet Row Responses   [facility] patient discharged from? [facility]   Is LACE score < 7 ? No   Eligibility [facility]   Date of Admission 12/24/23   Date of Discharge 12/26/23   Discharge Disposition Home or Self Care   Discharge diagnosis Chest PainUnstable angina, POA  CAD s/p stenting of LAD  HFrEF with recovered EF  Hyperlipidemia   Does the patient have one of the following disease processes/diagnoses(primary or secondary)? Other   Does the patient have Home health ordered? No   Is there a DME ordered? No   Prep survey completed? Yes            DAVID CASAS - Registered Nurse

## 2023-12-26 NOTE — TELEPHONE ENCOUNTER
Caller: Breanna Kaba    Relationship: Self    Best call back number: [phone]     What was the call regarding: PATIENT CALLED STATING THAT SHE IS IN THE HOSPITAL WITH A SEVERE UTI. SHE STATED THAT THEY CHECKED HER HEART.  PATIENT IS UNSURE THAT SHE IS GOING TO HAVE HER SURGERY.

## 2023-12-26 NOTE — PROGRESS NOTES
HEPARIN INFUSION  Breanna Kaba is a  68 y.o. female receiving heparin infusion.     Therapy for (VTE/Cardiac):   Cardiac  Patient Dosing Weight: 68 kg  Initial Bolus (Y/N):   Y  Any Bolus (Y/N):   Y        Signs or Symptoms of Bleeding: N    Cardiac or Other (Not VTE)   Initial Bolus: 60 units/kg (Max 4,000 units)  Initial rate: 12 units/kg/hr (Max 1,000 units/hr)   Anti Xa Rebolus Infusion Hold time Change infusion Dose (Units/kg/hr) Next Anti Xa or aPTT Level Due   < 0.11 50 Units/kg  (4000 Units Max) None Increase by  3 Units/kg/hr 6 hours   0.11- 0.19 25 Units/kg  (2000 Units Max) None Increase by  2 Units/kg/hr 6 hours   0.2 - 0.29 0 None Increase by  1 Units/kg/hr 6 hours   0.3 - 0.5 0 None No Change 6 hours (after 2 consecutive levels in range check q24h @0700)   0.51 - 0.6 0 None Decrease by  1 Units/kg/hr 6 hours   0.61 - 0.8 0 30 Minutes Decrease by  2 Units/kg/hr 6 hours   0.81 - 1 0 60 Minutes Decrease by  3 Units/kg/hr 6 hours   >1 0 Hold  After Anti Xa less than 0.5 decrease previous rate by  4 Units/kg/hr  Every 2 hours until Anti Xa  less than 0.5 then when infusion restarts in 6 hours       Recommend anti-Xa every 6 hours.          Date   Time   Anti-Xa Current Rate (Unit/kg/hr) Bolus   (Units) Rate Change   (Unit/kg/hr) New Rate (Unit/kg/hr) Next   Anti-Xa Comments  Pump Check Daily   12/24 1951 Drawn - 4000 +12 12 0300 VTE heparin drip order clarified as ACS heparin drip with Judith Galloway. No s/s of bleeding per nurse Lashae   12/25 0409 0.21 12 - +1 13 1000 Discussed the rate increase with Giselle. No s/s of bleeding.    12/25 0941 0.23 13 - +1 14 1700 Discussed rate change and no s/s bleeding with Tirera RN   12/25 1430 - 14   14  Rate and weight correct   12/25 1754 0.21 14  +1 15 0000 Jackie   12/26 0109 0.21 15  +1 16 0700 Spoke with DENNIS Heath . No s/s of bleeding.    12/26 1044 0.4 16 - - 16 1700 Therapeutic x 1. Discussed with nurse Mejia. No s/sx bleeding.                                                                                                                                                                 Pharmacy will continue to follow anti-Xa results and monitor for signs and symptoms of bleeding or thrombosis.    Thank you,    Jennifer Panda, PharmD  12/26/2023  13:56 EST

## 2023-12-26 NOTE — TELEPHONE ENCOUNTER
Is she changing her insurance? She has [employer]vantage.  What surgery? I think at this point, needs to focus on whatever cardiology recommends for her.

## 2023-12-26 NOTE — TELEPHONE ENCOUNTER
Spoke with patient she reports after the first of the year she will have [employer],reports they found some problems with her heart she is not sure yet if she will have to have surgery or not waiting on the cardiologist right now,had a dye test this morning told her if could not fix her problems there would send to [facility] she just wanted you to be aware.

## 2023-12-26 NOTE — PLAN OF CARE
Goal Outcome Evaluation:   Pt resting in bed this shift. No complaints voiced at this time. No visible acute s/s of distress noted. Pt educated on NPO status, verbalizes understanding. Heparin gtt infusing per order. Plan of care ongoing.

## 2023-12-26 NOTE — PROGRESS NOTES
HEPARIN INFUSION  Breanna Kaba is a  68 y.o. female receiving heparin infusion.     Therapy for (VTE/Cardiac):   Cardiac  Patient Dosing Weight: 68 kg  Initial Bolus (Y/N):   Y  Any Bolus (Y/N):   Y        Signs or Symptoms of Bleeding: N    Cardiac or Other (Not VTE)   Initial Bolus: 60 units/kg (Max 4,000 units)  Initial rate: 12 units/kg/hr (Max 1,000 units/hr)   Anti Xa Rebolus Infusion Hold time Change infusion Dose (Units/kg/hr) Next Anti Xa or aPTT Level Due   < 0.11 50 Units/kg  (4000 Units Max) None Increase by  3 Units/kg/hr 6 hours   0.11- 0.19 25 Units/kg  (2000 Units Max) None Increase by  2 Units/kg/hr 6 hours   0.2 - 0.29 0 None Increase by  1 Units/kg/hr 6 hours   0.3 - 0.5 0 None No Change 6 hours (after 2 consecutive levels in range check q24h @0700)   0.51 - 0.6 0 None Decrease by  1 Units/kg/hr 6 hours   0.61 - 0.8 0 30 Minutes Decrease by  2 Units/kg/hr 6 hours   0.81 - 1 0 60 Minutes Decrease by  3 Units/kg/hr 6 hours   >1 0 Hold  After Anti Xa less than 0.5 decrease previous rate by  4 Units/kg/hr  Every 2 hours until Anti Xa  less than 0.5 then when infusion restarts in 6 hours       Recommend anti-Xa every 6 hours.          Date   Time   Anti-Xa Current Rate (Unit/kg/hr) Bolus   (Units) Rate Change   (Unit/kg/hr) New Rate (Unit/kg/hr) Next   Anti-Xa Comments  Pump Check Daily   12/24 1951 Drawn - 4000 +12 12 0300 VTE heparin drip order clarified as ACS heparin drip with Judith Galloway. No s/s of bleeding per nurse Lashae   12/25 0409 0.21 12 - +1 13 1000 Discussed the rate increase with Giselle. No s/s of bleeding.    12/25 0941 0.23 13 - +1 14 1700 Discussed rate change and no s/s bleeding with Tierra RN   12/25 1430 - 14   14  Rate and weight correct   12/25 1754 0.21 14  +1 15 0000 Jackie   12/26 0109 0.21 15  +1 16 0700 Spoke with DENNIS Heath . No s/s of bleeding.                                                                                                                                                                            Pharmacy will continue to follow anti-Xa results and monitor for signs and symptoms of bleeding or thrombosis.    Kasandra Oh PharmD  01:09 EST.  12/26/23

## 2023-12-27 ENCOUNTER — TRANSITIONAL CARE MANAGEMENT TELEPHONE ENCOUNTER (OUTPATIENT)
Dept: CALL CENTER | Facility: HOSPITAL | Age: 68
End: 2023-12-27
Payer: MEDICARE

## 2023-12-27 NOTE — OUTREACH NOTE
Call Center TCM Note      Flowsheet Row Responses   [facility] patient discharged from? [facility]   Does the patient have one of the following disease processes/diagnoses(primary or secondary)? Other   TCM attempt successful? Yes   Call start time 1133   Call end time 1136   Discharge diagnosis Chest PainUnstable angina, POA  CAD s/p stenting of LAD  HFrEF with recovered EF  Hyperlipidemia   Person spoke with today (if not patient) and relationship patient   Meds reviewed with patient/caregiver? Yes   Is the patient having any side effects they believe may be caused by any medication additions or changes? No   Does the patient have all medications ordered at discharge? Yes   Is the patient taking all medications as directed (includes completed medication regime)? Yes   Comments Patient has a hospital followup scheduled with Dr. Ramos on 1/2/24   Does the patient have an appointment with their PCP within 7-14 days of discharge? Yes   Psychosocial issues? No   Did the patient receive a copy of their discharge instructions? Yes   Nursing interventions Reviewed instructions with patient   What is the patient's perception of their health status since discharge? Improving   Is the patient/caregiver able to teach back signs and symptoms related to disease process for when to call PCP? Yes   Is the patient/caregiver able to teach back signs and symptoms related to disease process for when to call 911? Yes   Is the patient/caregiver able to teach back the hierarchy of who to call/visit for symptoms/problems? PCP, Specialist, Home health nurse, Urgent Care, ED, 911 Yes   If the patient is a current smoker, are they able to teach back resources for cessation? Not a smoker   TCM call completed? Yes   Wrap up additional comments Patient is doing better. She was very apprecative of the wonderful care she received!   Call end time 1136   Would this patient benefit from a Referral to [facility] Social Work? No   Is the patient  interested in additional calls from an ambulatory ? No            Isrrael Maldonado RN    12/27/2023, 11:36 EST

## 2023-12-27 NOTE — PROGRESS NOTES
LOS: 2 days     Name: Breanna Kaba  Age/Sex: 68 y.o. female  :  1955        PCP: yMrna Ramos MD    Principal Problem:    Unstable angina      Admission Information: Breanna Kaba is a 68 y.o. female with coronary artery disease status post stent to the LAD 2022, hypertension, diabetes, and GERD    Chief Complaint: Chest pain and dysuria    Interval history: Patient underwent stress test today which was negative for ischemia.    Subjective     Patient awake and anxious about the results of her stress test.  She has not had any chest pain since her admission.  She reports that she had 1 episode that was extremely brief.  It did not have associated nausea, shortness of air, or palpitations.    Vital Signs  Vital Signs (last 72 hrs)          0700   0659  0700   0659  0700   0659  0700  1926   Most Recent      Temp (°F)   97.9 -  98.1    98.1 -  99.1    98.2 -  98.5     98.5 (36.9)  1556    Heart Rate   82 -  109    83 -  105    78 -  95     78  1556    Resp   18 -  20    18 -  20    18 -  20     20  1556    BP   110/69 -  182/98    127/63 -  176/84    147/68 -  168/83     162/89  1556    SpO2 (%)   93 -  100    95 -  100    97 -  99     99  155          Temp:  [98.1 °F (36.7 °C)-98.5 °F (36.9 °C)] 98.5 °F (36.9 °C)  Heart Rate:  [] 78  Resp:  [18-20] 20  BP: (139-176)/(58-89) 162/89  Body mass index is 27.54 kg/m².      Intake/Output Summary (Last 24 hours) at 2023  Last data filed at 2023 1541  Gross per 24 hour   Intake 755.63 ml   Output --   Net 755.63 ml       Vitals and nursing note reviewed.   Constitutional:       Appearance: Overweight and not in distress.   Pulmonary:      Effort: Pulmonary effort is normal.      Breath sounds: Normal breath sounds.   Cardiovascular:      Normal rate. Regular rhythm.      Murmurs: There is no murmur.   Edema:     Peripheral edema absent.   Skin:     General:  Skin is warm and dry.   Neurological:      Mental Status: Alert.         Telemetry: Sinus rhythm 90s     Results Review:     Results from last 7 days   Lab Units 12/26/23  0018 12/25/23  0310 12/24/23  1249   WBC 10*3/mm3 8.10 10.31 10.87*   HEMOGLOBIN g/dL 11.2* 12.3 13.1   PLATELETS 10*3/mm3 312 273 324     Results from last 7 days   Lab Units 12/26/23  0018 12/25/23  0310 12/24/23  1249   SODIUM mmol/L 138 137 135*   POTASSIUM mmol/L 4.2 3.7 5.2   CHLORIDE mmol/L 105 102 99   CO2 mmol/L 22.9 22.6 26.2   BUN mg/dL 25* 26* 28*   CREATININE mg/dL 1.06* 1.02* 1.24*   CALCIUM mg/dL 8.5* 9.1 9.4   GLUCOSE mg/dL 231* 264* 410*     Results from last 7 days   Lab Units 12/24/23  1446 12/24/23  1249   HSTROP T ng/L 29* 31*       Results from last 7 days   Lab Units 12/24/23  2104   INR  0.86*       I reviewed the patient's new clinical results.  I reviewed the patient's new imaging results and agree with the interpretation.  I personally viewed and interpreted the patient's EKG/Telemetry data      Medication Review:     No current facility-administered medications for this encounter.      Assessment:  Coronary artery disease status post stent to the LAD  Atypical chest pain  UTI      Recommendations:  Stress test negative for signs of ischemia.  Patient is cleared for discharge from a cardiology standpoint.  Plan to follow-up in the outpatient setting.    I discussed the patients findings and my recommendations with patient.  Plan of care reflects Dr. Galaviz's recommendations.      Electronically signed by REGINALDO Diaz, 12/26/23, 7:30 PM EST.

## 2023-12-28 NOTE — DISCHARGE SUMMARY
[facility] DISCHARGE SUMMARY    Patient Identification:  Name:  Breanna Kaba  Age:  68 y.o.  Sex:  female  :  1955  MRN:  [id number]  Visit Number:  [id number]    Date of Admission: 2023  Date of Discharge:  2023    PCP: Myrna Ramos MD    DISCHARGE DIAGNOSIS  #E. Coli UTI  #NSTEMI, type II in setting of renal disease  #Hx of CAD s/p LAD stent    Chronic medical problems   #CKD Iia  #GERD  #HTN  #DM II    CONSULTS   Cardiology     PROCEDURES PERFORMED  Stress test :    Impressions are consistent with a low risk study.    Left ventricular ejection fraction is normal (Calculated EF = 65%).    Breast attenuation and diaphragmatic attenuation artifacts are present.    Normal LV function No reversible ischemia noted Apical and inferior wall attenuation artifact noted Low risk study Will recommend outpatient follow-up.    HOSPITAL COURSE  Patient is a 68 y.o. female presented on  to [facility] complaining of dysuria.  Please see the admitting history and physical for further details.      Ms. Kaba is our 67 yo F with hx of DM, GERD, HFrEF with recovered EF, hypertension, hyperlipidemia who presented with dysuria. Patient noted she thought she was getting an UTI and did not want it to get worse. She had noted chest pain to ED staff per notes but denied any chest pain on my evaluation. Cardiac CTA performed with coronary calcifications however repeat stress test still low risk. In setting of    VITAL SIGNS:        on   ;        Body mass index is 27.54 kg/m².  Wt Readings from Last 3 Encounters:   23 68.3 kg (150 lb 9.6 oz)   23 69.6 kg (153 lb 6.4 oz)   23 68.6 kg (151 lb 3.2 oz)       PHYSICAL EXAM:  Constitutional:  Well-developed and well-nourished.  No respiratory distress.      HENT:  Head:  Normocephalic and atraumatic.  Mouth:  Moist mucous membranes.    Eyes:  Conjunctivae and EOM are normal.  Pupils are equal,  round, and reactive to light.  No scleral icterus.    Cardiovascular:  Normal rate, regular rhythm and normal heart sounds with no murmur.  Pulmonary/Chest:  No respiratory distress, no wheezes, no crackles, with normal breath sounds and good air movement.  Abdominal:  Soft.  Bowel sounds are normal.  No distension and no tenderness.   Musculoskeletal:  No edema, no tenderness, and no deformity.  No red or swollen joints anywhere.    Neurological:  Alert and oriented to person, place, and time.  No gross neurological deficit.   Skin:  Skin is warm and dry. No rash noted. No pallor.   Peripheral vascular:  Strong pulses in all 4 extremities with no clubbing, no cyanosis, no edema.    DISCHARGE DISPOSITION   Stable    DISCHARGE MEDICATIONS:     Discharge Medications        New Medications        Instructions Start Date   cefdinir 300 MG capsule  Commonly known as: OMNICEF   300 mg, Oral, 2 Times Daily             Continue These Medications        Instructions Start Date   allopurinol 300 MG tablet  Commonly known as: ZYLOPRIM   300 mg, Oral, Daily      aspirin 81 MG EC tablet   81 mg, Oral, Daily      atorvastatin 80 MG tablet  Commonly known as: LIPITOR   80 mg, Oral, Daily      BASAGLAR KWIKPEN 100 UNIT/ML injection pen   10 Units, Subcutaneous, Daily      BASAGLAR KWIKPEN 100 UNIT/ML injection pen   30 Units, Subcutaneous, Nightly      losartan 50 MG tablet  Commonly known as: Cozaar   50 mg, Oral, Daily      spironolactone 25 MG tablet  Commonly known as: ALDACTONE   25 mg, Oral, Daily      trospium 20 MG tablet  Commonly known as: SANCTURA   1 tablet, Oral, Every 12 Hours Scheduled             Stop These Medications      glipizide 5 MG tablet  Commonly known as: GLUCOTROL              Diet Instructions    Resume diet as tolerated         Activity Instructions    Resume activity as tolerated          Follow-up Information       Myrna Ramos MD Follow up on 1/2/2024.    Specialty: Family Medicine  Why: @  2:45PM  Contact information:  [address]  [address]  [place] [address]  [phone]               Shu Regalado MD Follow up on 2/8/2024.    Specialties: Cardiology, Interventional Cardiology  Why: @ 9:15AM.with Da Fox.  Contact information:  [address] [place] [address]  [phone]                              TEST  RESULTS PENDING AT DISCHARGE       CODE STATUS  Code Status and Medical Interventions:   Ordered at: 12/24/23 2122     Code Status (Patient has no pulse and is not breathing):    CPR (Attempt to Resuscitate)     Medical Interventions (Patient has pulse or is breathing):    Full Support       Ham Watts MD  [facility]  12/27/23  19:22 EST    Please note that this discharge summary required more than 30 minutes to complete.

## 2024-01-02 ENCOUNTER — OFFICE VISIT (OUTPATIENT)
Dept: FAMILY MEDICINE CLINIC | Facility: CLINIC | Age: 69
End: 2024-01-02
Payer: MEDICARE

## 2024-01-02 VITALS
HEART RATE: 85 BPM | SYSTOLIC BLOOD PRESSURE: 124 MMHG | OXYGEN SATURATION: 97 % | DIASTOLIC BLOOD PRESSURE: 76 MMHG | WEIGHT: 148.4 LBS | TEMPERATURE: 97.5 F | HEIGHT: 62 IN | BODY MASS INDEX: 27.31 KG/M2

## 2024-01-02 DIAGNOSIS — I25.5 ISCHEMIC CARDIOMYOPATHY: ICD-10-CM

## 2024-01-02 DIAGNOSIS — Z09 HOSPITAL DISCHARGE FOLLOW-UP: Primary | ICD-10-CM

## 2024-01-02 DIAGNOSIS — I25.10 CORONARY ARTERY DISEASE DUE TO LIPID RICH PLAQUE: ICD-10-CM

## 2024-01-02 DIAGNOSIS — M1A.09X0 IDIOPATHIC CHRONIC GOUT OF MULTIPLE SITES WITHOUT TOPHUS: ICD-10-CM

## 2024-01-02 DIAGNOSIS — E11.69 HYPERLIPIDEMIA DUE TO TYPE 2 DIABETES MELLITUS: ICD-10-CM

## 2024-01-02 DIAGNOSIS — E11.40 TYPE 2 DIABETES MELLITUS WITH DIABETIC NEUROPATHY, WITH LONG-TERM CURRENT USE OF INSULIN: ICD-10-CM

## 2024-01-02 DIAGNOSIS — E55.9 VITAMIN D DEFICIENCY: ICD-10-CM

## 2024-01-02 DIAGNOSIS — E78.5 HYPERLIPIDEMIA DUE TO TYPE 2 DIABETES MELLITUS: ICD-10-CM

## 2024-01-02 DIAGNOSIS — Z79.4 TYPE 2 DIABETES MELLITUS WITH DIABETIC NEUROPATHY, WITH LONG-TERM CURRENT USE OF INSULIN: ICD-10-CM

## 2024-01-02 DIAGNOSIS — I25.83 CORONARY ARTERY DISEASE DUE TO LIPID RICH PLAQUE: ICD-10-CM

## 2024-01-02 PROCEDURE — 80053 COMPREHEN METABOLIC PANEL: CPT | Performed by: FAMILY MEDICINE

## 2024-01-02 PROCEDURE — 82043 UR ALBUMIN QUANTITATIVE: CPT | Performed by: FAMILY MEDICINE

## 2024-01-02 PROCEDURE — 36415 COLL VENOUS BLD VENIPUNCTURE: CPT | Performed by: FAMILY MEDICINE

## 2024-01-02 PROCEDURE — 83036 HEMOGLOBIN GLYCOSYLATED A1C: CPT | Performed by: FAMILY MEDICINE

## 2024-01-02 PROCEDURE — 82306 VITAMIN D 25 HYDROXY: CPT | Performed by: FAMILY MEDICINE

## 2024-01-02 RX ORDER — OFLOXACIN 3 MG/ML
1 SOLUTION/ DROPS OPHTHALMIC 4 TIMES DAILY
COMMUNITY
Start: 2023-11-09

## 2024-01-02 RX ORDER — ATORVASTATIN CALCIUM 80 MG/1
80 TABLET, FILM COATED ORAL DAILY
Qty: 90 TABLET | Refills: 1 | Status: SHIPPED | OUTPATIENT
Start: 2024-01-02

## 2024-01-02 RX ORDER — ERYTHROMYCIN 5 MG/G
1 OINTMENT OPHTHALMIC 3 TIMES DAILY
COMMUNITY
Start: 2023-11-09 | End: 2024-01-02

## 2024-01-02 RX ORDER — INSULIN GLARGINE 100 [IU]/ML
INJECTION, SOLUTION SUBCUTANEOUS
Qty: 36 ML | Refills: 1 | Status: SHIPPED | OUTPATIENT
Start: 2024-01-02

## 2024-01-02 NOTE — PROGRESS NOTES
Transitional Care Follow Up Visit  Subjective     Vitals:    01/02/24 1453   BP: 124/76   Pulse: 85   Temp: 97.5 °F (36.4 °C)   SpO2: 97%       Within 48 business hours after discharge our office contacted her via telephone to coordinate her care and needs.      I reviewed and discussed the details of that call along with the discharge summary, hospital problems, inpatient lab results, inpatient diagnostic studies, and consultation reports with Breanna.     Current outpatient and discharge medications have been reconciled for the patient.  Reviewed by: Myrna Ramos MD          12/26/2023     6:08 PM   Date of TCM Phone Call   [facility]   Date of Admission 12/24/2023   Date of Discharge 12/26/2023   Discharge Disposition Home or Self Care     Risk for Readmission (LACE) Score: 9 (12/26/2023  6:00 AM)      History of Present Illness   Course During Hospital Stay:    Breanna Kaba is a 68 y.o. female who presents for a transitional care management visit    Date of admission: 12/24/2023  Date of discharge: 12/26/2023    Discharge diagnoses: E. coli UTI, NSTEMI type 2 in the setting of renal disease, history of CAD status post LAD stent procedures performed.     Stress test 12/26/2023.   Impressions are consistent with a low risk study with left ventricular EF being normal and normal LV function. No reversible ischemia noted. Apical and inferior wall attenuation artifact noted.     Patient presented to the ER secondary to dysuria. She had noted chest pain.  Patient was admitted and underwent a cardiac workup.  Cardiac CTA performed with results indicating coronary calcifications. Repeat stress test low risk. She was stable for discharge home.    The patient is feeling fine today.  She is worried about her elevated glucose levels.    The patient is taking 10 units of Basaglar as prescribed by the hospital. She was on 30 units at night and 10 units in the morning prior to presentation.     The patient  is taking trospium for her bladder and it keeps her from urinating on herself.  It is somewhat working.    The patient was told that she had a heart attack. She was told that she had a buildup of calcium.    The patient has lost a little weight.    The patient has had her influenza vaccine. She has had her RSV vaccine.    The following portions of the patient's history were reviewed and updated as appropriate: allergies, current medications, past family history, past medical history, past social history, past surgical history, and problem list.    Objective   Physical Exam  Gen: Patient in NAD. Pleasant and answers appropriately. A&Ox3.    Skin: Warm and dry with normal turgor. No purpura, rashes, or unusual pigmentation noted. Hair is normal in appearance and distribution.    HEENT: NC/AT. No lesions noted. Conjunctiva clear, sclera nonicteric. PERRL. EOMI without nystagmus or strabismus. Fundi appear benign. No hemorrhages or exudates of eyes. Auditory canals are patent bilaterally without lesions. TMs intact,  nonerythematous, nonbulging without lesions. Nasal mucosa erythematous, and nonedematous. Frontal and maxillary sinuses are nontender. O/P nonerythematous and moist without exudate.    Neck: Supple without lymph nodes palpated. FROM.     Lungs: Slightly decreased B/L without rales, rhonchi, crackles, or wheezes.    Heart: RRR. S1 and S2 normal. No S3 or S4. No MRGT.    Abd: Soft, nontender,nondistended. (+)BSx4 quadrants.     Extrem: No CCE. Radial pulses 2+/4 and equal B/L. FROMx4.  Positive joint tenderness noted.    Neuro: No focal motor/sensory deficits.    Assessment & Plan     This is a 68-year-old female who presents to clinic for TCM.    Diagnoses and all orders for this visit:    1. Hospital discharge follow-up (Primary)    2. Type 2 diabetes mellitus with diabetic neuropathy, with long-term current use of insulin  Comments:  I will restart the patient on Basaglar 30 units at night and 10 units  injected in the AM.  I advised the patient to drink plenty of fluids.  Orders:  -     Comprehensive Metabolic Panel; Future  -     Hemoglobin A1c; Future  -     MicroAlbumin, Urine, Random - Urine, Clean Catch; Future  -     Insulin Glargine (BASAGLAR KWIKPEN) 100 UNIT/ML injection pen; Take 30 units INJ at night and 10 units INJ in the AM.  Dispense: 36 mL; Refill: 1  -     Comprehensive Metabolic Panel  -     Hemoglobin A1c  -     MicroAlbumin, Urine, Random - Urine, Clean Catch    3. Vitamin D deficiency  -     Vitamin D,25-Hydroxy; Future  -     Vitamin D,25-Hydroxy    4. Coronary artery disease due to lipid rich plaque  -     aspirin 81 MG EC tablet; Take 1 tablet by mouth Daily.  Dispense: 90 tablet; Refill: 1  -     losartan (Cozaar) 50 MG tablet; Take 1 tablet by mouth Daily.  Dispense: 90 tablet; Refill: 1  -     spironolactone (ALDACTONE) 25 MG tablet; Take 1 tablet by mouth Daily.  Dispense: 90 tablet; Refill: 1    5. Ischemic cardiomyopathy  -     aspirin 81 MG EC tablet; Take 1 tablet by mouth Daily.  Dispense: 90 tablet; Refill: 1  -     losartan (Cozaar) 50 MG tablet; Take 1 tablet by mouth Daily.  Dispense: 90 tablet; Refill: 1  -     spironolactone (ALDACTONE) 25 MG tablet; Take 1 tablet by mouth Daily.  Dispense: 90 tablet; Refill: 1    6. Idiopathic chronic gout of multiple sites without tophus  -     allopurinol (ZYLOPRIM) 300 MG tablet; Take 1 tablet by mouth Daily.  Dispense: 90 tablet; Refill: 1    7.  Hyperlipidemia  -     atorvastatin (LIPITOR) 80 MG tablet; Take 1 tablet by mouth Daily.  Dispense: 90 tablet; Refill: 1                 Transcribed from ambient dictation for Myrna Ramos MD by Enedina Anaya.  01/02/24   18:00 EST    Patient or patient representative verbalized consent to the visit recording.  I have personally performed the services described in this document as transcribed by the above individual, and it is both accurate and complete.

## 2024-01-03 LAB
25(OH)D3 SERPL-MCNC: 30.3 NG/ML (ref 30–100)
ALBUMIN SERPL-MCNC: 4.8 G/DL (ref 3.5–5.2)
ALBUMIN UR-MCNC: 46 MG/DL
ALBUMIN/GLOB SERPL: 1.5 G/DL
ALP SERPL-CCNC: 161 U/L (ref 39–117)
ALT SERPL W P-5'-P-CCNC: 26 U/L (ref 1–33)
ANION GAP SERPL CALCULATED.3IONS-SCNC: 12.9 MMOL/L (ref 5–15)
AST SERPL-CCNC: 17 U/L (ref 1–32)
BILIRUB SERPL-MCNC: 0.5 MG/DL (ref 0–1.2)
BUN SERPL-MCNC: 32 MG/DL (ref 8–23)
BUN/CREAT SERPL: 30.5 (ref 7–25)
CALCIUM SPEC-SCNC: 10.3 MG/DL (ref 8.6–10.5)
CHLORIDE SERPL-SCNC: 98 MMOL/L (ref 98–107)
CO2 SERPL-SCNC: 25.1 MMOL/L (ref 22–29)
CREAT SERPL-MCNC: 1.05 MG/DL (ref 0.57–1)
EGFRCR SERPLBLD CKD-EPI 2021: 57.3 ML/MIN/1.73
GLOBULIN UR ELPH-MCNC: 3.1 GM/DL
GLUCOSE SERPL-MCNC: 321 MG/DL (ref 65–99)
HBA1C MFR BLD: 10.8 % (ref 4.8–5.6)
POTASSIUM SERPL-SCNC: 4.1 MMOL/L (ref 3.5–5.2)
PROT SERPL-MCNC: 7.9 G/DL (ref 6–8.5)
SODIUM SERPL-SCNC: 136 MMOL/L (ref 136–145)

## 2024-01-03 RX ORDER — ALLOPURINOL 300 MG/1
300 TABLET ORAL DAILY
Qty: 90 TABLET | Refills: 1 | Status: SHIPPED | OUTPATIENT
Start: 2024-01-03

## 2024-01-03 RX ORDER — ASPIRIN 81 MG/1
81 TABLET ORAL DAILY
Qty: 90 TABLET | Refills: 1 | Status: SHIPPED | OUTPATIENT
Start: 2024-01-03

## 2024-01-03 RX ORDER — SPIRONOLACTONE 25 MG/1
25 TABLET ORAL DAILY
Qty: 90 TABLET | Refills: 1 | Status: SHIPPED | OUTPATIENT
Start: 2024-01-03

## 2024-01-03 RX ORDER — LOSARTAN POTASSIUM 50 MG/1
50 TABLET ORAL DAILY
Qty: 90 TABLET | Refills: 1 | Status: SHIPPED | OUTPATIENT
Start: 2024-01-03

## 2024-01-09 ENCOUNTER — TELEPHONE (OUTPATIENT)
Dept: FAMILY MEDICINE CLINIC | Facility: CLINIC | Age: 69
End: 2024-01-09
Payer: MEDICARE

## 2024-01-09 NOTE — TELEPHONE ENCOUNTER
Caller: Sendy Breanna    Relationship: Self    Best call back number: [phone]     What is the best time to reach you: ANY    Who are you requesting to speak with (clinical staff, provider,  specific staff member): NURSE    Do you know the name of the person who called: PATIENT    What was the call regarding: SUGAR IS STAYING UP, BASAGLAR NOT HELPING.  SLEEPY, NO ENERGY.  WHAT TO DO?  175 THIS MORNING, LAST NIGHT 232.  HAS BEEN AS HIGH .  NOT COMING DOWN.      Is it okay if the provider responds through MyChart:  EITHER

## 2024-01-10 RX ORDER — INSULIN ASPART 100 [IU]/ML
24 INJECTION, SOLUTION INTRAVENOUS; SUBCUTANEOUS
Qty: 66 ML | Refills: 1 | Status: SHIPPED | OUTPATIENT
Start: 2024-01-10

## 2024-01-10 NOTE — TELEPHONE ENCOUNTER
Okay. So she used to be on apidra/lispro something similar like that in addition to the basaglar and it got dropped by the hospital, I think. We could do 2 things - what's her opinion? 1) Can work on increasing the basaglar dosing. 2) Keep basaglar the same and add on the short term sliding scale that she's used to have in the past. Would probably have to find one that is covered by insurance, but one will be covered.

## 2024-01-10 NOTE — TELEPHONE ENCOUNTER
It looks like novolog is covered on her insurance so I sent it in. See if she can get it.     Her sliding scale will be:  110-149 - 2 units  150-200 - 4 units  201 - 250 - 6 units  251-300 - 8 units  301 - 350 - 12 units  351-400 - 16 units  400+ - 20 units.    To be taken before meals.

## 2024-01-10 NOTE — TELEPHONE ENCOUNTER
Spoke with patient she stated she was agreeable to either,whichever you feel would be her best option.

## 2024-01-25 ENCOUNTER — OFFICE VISIT (OUTPATIENT)
Dept: FAMILY MEDICINE CLINIC | Facility: CLINIC | Age: 69
End: 2024-01-25
Payer: MEDICARE

## 2024-01-25 VITALS
OXYGEN SATURATION: 96 % | SYSTOLIC BLOOD PRESSURE: 122 MMHG | HEIGHT: 62 IN | HEART RATE: 92 BPM | BODY MASS INDEX: 27.6 KG/M2 | WEIGHT: 150 LBS | TEMPERATURE: 97.7 F | DIASTOLIC BLOOD PRESSURE: 68 MMHG

## 2024-01-25 DIAGNOSIS — M1A.09X0 IDIOPATHIC CHRONIC GOUT OF MULTIPLE SITES WITHOUT TOPHUS: ICD-10-CM

## 2024-01-25 DIAGNOSIS — M54.9 BACK PAIN, UNSPECIFIED BACK LOCATION, UNSPECIFIED BACK PAIN LATERALITY, UNSPECIFIED CHRONICITY: Primary | ICD-10-CM

## 2024-01-25 DIAGNOSIS — E78.5 HYPERLIPIDEMIA DUE TO TYPE 2 DIABETES MELLITUS: ICD-10-CM

## 2024-01-25 DIAGNOSIS — E11.69 HYPERLIPIDEMIA DUE TO TYPE 2 DIABETES MELLITUS: ICD-10-CM

## 2024-01-25 DIAGNOSIS — I25.83 CORONARY ARTERY DISEASE DUE TO LIPID RICH PLAQUE: ICD-10-CM

## 2024-01-25 DIAGNOSIS — E11.40 TYPE 2 DIABETES MELLITUS WITH DIABETIC NEUROPATHY, WITH LONG-TERM CURRENT USE OF INSULIN: ICD-10-CM

## 2024-01-25 DIAGNOSIS — Z79.4 TYPE 2 DIABETES MELLITUS WITH DIABETIC NEUROPATHY, WITH LONG-TERM CURRENT USE OF INSULIN: ICD-10-CM

## 2024-01-25 DIAGNOSIS — I25.10 CORONARY ARTERY DISEASE DUE TO LIPID RICH PLAQUE: ICD-10-CM

## 2024-01-25 LAB
BILIRUB BLD-MCNC: NEGATIVE MG/DL
CLARITY, POC: ABNORMAL
COLOR UR: ABNORMAL
EXPIRATION DATE: ABNORMAL
GLUCOSE UR STRIP-MCNC: ABNORMAL MG/DL
KETONES UR QL: NEGATIVE
LEUKOCYTE EST, POC: ABNORMAL
Lab: ABNORMAL
NITRITE UR-MCNC: NEGATIVE MG/ML
PH UR: 6 [PH] (ref 5–8)
PROT UR STRIP-MCNC: ABNORMAL MG/DL
RBC # UR STRIP: NEGATIVE /UL
SP GR UR: 1.02 (ref 1–1.03)
UROBILINOGEN UR QL: NORMAL

## 2024-01-25 PROCEDURE — 87086 URINE CULTURE/COLONY COUNT: CPT | Performed by: FAMILY MEDICINE

## 2024-01-25 RX ORDER — GLIPIZIDE 5 MG/1
1 TABLET ORAL DAILY
COMMUNITY
Start: 2024-01-01

## 2024-01-25 RX ORDER — BLOOD SUGAR DIAGNOSTIC
1 STRIP MISCELLANEOUS AS NEEDED
COMMUNITY
Start: 2024-01-01

## 2024-01-25 RX ORDER — METHOCARBAMOL 500 MG/1
500 TABLET, FILM COATED ORAL
Qty: 7 TABLET | Refills: 0 | Status: SHIPPED | OUTPATIENT
Start: 2024-01-25

## 2024-01-25 NOTE — PROGRESS NOTES
"Chief Complaint  Back Pain    Subjective        Breanna aKba presents to [facility]  History of Present Illness    Patient is a 68-year-old female with medical conditions significant for CAD, type 2 diabetes, hypertension, and gout who presents to clinic for medical follow-up.    Diabetes  She has been checking her blood glucose at home. She is taking 30 units of Basaglar at night before she goes to bed. Last night, she fell asleep and forgot to take Basaglar, so she got up and took it. She is taking 10 units in the morning and 30 units at night. She had been using the sliding scale for the Novolog. She cannot eat anything because every time she eats, her blood glucose shoots up. She eats a bowl of grits, a banana, or yogurt for breakfast. For lunch, she will eat a sandwich or bread. She does not eat a protein snack before bedtime. She conveys that one night she ate a cup of soup before bed and woke up at 2:00 A.M. shaking and sweating. She checked her sugar, and it was high.    Back pain  She thinks she has a UTI. She thought she had another UTI because it was \"smelling down there\".    Myocardial infarction  She had a heart attack at [place], but she had no damage. She was dehydrated.    Heel pain  She has a sore spot on her heel. She is not sure if it is a muscle or a bone. It was so bad that she could not get out of bed. She had to roll a certain way and get out of bed. She could not sit down, stand up, or do anything.    Health maintenance  The patient's last eye exam was in 2017.    Objective   Vital Signs:  /68 (BP Location: Right arm, Patient Position: Sitting, Cuff Size: Adult)   Pulse 92   Temp 97.7 °F (36.5 °C) (Temporal)   Ht 157.5 cm (62.01\")   Wt 68 kg (150 lb)   SpO2 96%   BMI 27.43 kg/m²   Estimated body mass index is 27.43 kg/m² as calculated from the following:    Height as of this encounter: 157.5 cm (62.01\").    Weight as of this encounter: 68 kg " (150 lb).     Physical Exam   Gen: Patient in NAD. Pleasant and answers appropriately. A&Ox3.    Skin: Warm and dry with normal turgor. No purpura, rashes, or unusual pigmentation noted. Hair is normal in appearance and distribution.    HEENT: NC/AT. No lesions noted. Conjunctiva clear, sclera nonicteric. PERRL. EOMI without nystagmus or strabismus. Fundi appear benign. No hemorrhages or exudates of eyes. Auditory canals are patent bilaterally without lesions. TMs intact,  nonerythematous, bulging without lesions. Nasal mucosa pink, nonerythematous, and nonedematous. Frontal and maxillary sinuses are nontender. O/P erythematous and moist without exudate.    Neck: Supple without lymph nodes palpated. FROM.     Lungs: Decreased B/L without rales, rhonchi, crackles, or wheezes.    Heart: RRR. S1 and S2 normal. No S3 or S4. No MRGT.    Abd: Soft, nontender,nondistended. (+)BSx4 quadrants.     Extrem: No CC.  Trace edema bilateral lower extremities.  Radial pulses 2+/4 and equal B/L. FROMx4.  Positive joint tenderness noted.    Back: Decreased range of motion.    Neuro: No focal motor/sensory deficits.    Result Review :                     Assessment and Plan   This is a 68-year-old female presents to clinic for medical follow-up.    Diagnoses and all orders for this visit:    1. Back pain, unspecified back location, unspecified back pain laterality, unspecified chronicity (Primary)  -     POCT urinalysis dipstick, automated  -     Urine Culture - Urine, Urine, Clean Catch; Future  -     methocarbamol (ROBAXIN) 500 MG tablet; Take 1 tablet by mouth every night at bedtime.  Dispense: 7 tablet; Refill: 0  -     Urine Culture - Urine, Urine, Clean Catch    2. Type 2 diabetes mellitus with diabetic neuropathy, with long-term current use of insulin    3. Coronary artery disease due to lipid rich plaque    4. Hyperlipidemia due to type 2 diabetes mellitus    5. Idiopathic chronic gout of multiple sites without tophus    1.  Type 2 diabetes.  - Continue Basaglar 30 units at night and 10 units in the morning.  - Continue NovoLog with sliding scale.  - Consider protein snack before bedtime.    2. Urinary tract infection.  - Urine culture ordered.    3. Back pain.  - Start muscle relaxer at night for 1 week.    4. Health maintenance.  - No labs.    Follow up: Her follow up is 02/14/2024.         Follow Up     Return (already has an appt).  Patient was given instructions and counseling regarding her condition or for health maintenance advice. Please see specific information pulled into the AVS if appropriate.       Transcribed from ambient dictation for Myrna Ramos MD by Atiya Gutierrez.  01/25/24   12:21 EST    Patient or patient representative verbalized consent to the visit recording.  I have personally performed the services described in this document as transcribed by the above individual, and it is both accurate and complete.

## 2024-01-26 LAB — BACTERIA SPEC AEROBE CULT: NO GROWTH

## 2024-02-02 DIAGNOSIS — M54.9 BACK PAIN, UNSPECIFIED BACK LOCATION, UNSPECIFIED BACK PAIN LATERALITY, UNSPECIFIED CHRONICITY: ICD-10-CM

## 2024-02-02 RX ORDER — METHOCARBAMOL 500 MG/1
500 TABLET, FILM COATED ORAL
Qty: 7 TABLET | Refills: 0 | OUTPATIENT
Start: 2024-02-02

## 2024-02-05 RX ORDER — GLIPIZIDE 5 MG/1
5 TABLET ORAL DAILY
Qty: 30 TABLET | Refills: 0 | OUTPATIENT
Start: 2024-02-05

## 2024-02-08 ENCOUNTER — OFFICE VISIT (OUTPATIENT)
Dept: CARDIOLOGY | Facility: CLINIC | Age: 69
End: 2024-02-08
Payer: MEDICARE

## 2024-02-08 VITALS
SYSTOLIC BLOOD PRESSURE: 146 MMHG | BODY MASS INDEX: 27.79 KG/M2 | DIASTOLIC BLOOD PRESSURE: 82 MMHG | RESPIRATION RATE: 18 BRPM | HEART RATE: 102 BPM | OXYGEN SATURATION: 100 % | WEIGHT: 151 LBS | HEIGHT: 62 IN

## 2024-02-08 DIAGNOSIS — I50.22 CHRONIC HFREF (HEART FAILURE WITH REDUCED EJECTION FRACTION): Primary | ICD-10-CM

## 2024-02-08 DIAGNOSIS — I25.10 CORONARY ARTERY DISEASE DUE TO LIPID RICH PLAQUE: ICD-10-CM

## 2024-02-08 DIAGNOSIS — E78.2 MIXED HYPERLIPIDEMIA: ICD-10-CM

## 2024-02-08 DIAGNOSIS — I25.5 ISCHEMIC CARDIOMYOPATHY: ICD-10-CM

## 2024-02-08 DIAGNOSIS — I25.83 CORONARY ARTERY DISEASE DUE TO LIPID RICH PLAQUE: ICD-10-CM

## 2024-02-08 PROCEDURE — 3079F DIAST BP 80-89 MM HG: CPT | Performed by: PHYSICIAN ASSISTANT

## 2024-02-08 PROCEDURE — 3077F SYST BP >= 140 MM HG: CPT | Performed by: PHYSICIAN ASSISTANT

## 2024-02-08 PROCEDURE — 1159F MED LIST DOCD IN RCRD: CPT | Performed by: PHYSICIAN ASSISTANT

## 2024-02-08 PROCEDURE — 99213 OFFICE O/P EST LOW 20 MIN: CPT | Performed by: PHYSICIAN ASSISTANT

## 2024-02-08 PROCEDURE — 1160F RVW MEDS BY RX/DR IN RCRD: CPT | Performed by: PHYSICIAN ASSISTANT

## 2024-02-08 RX ORDER — ATORVASTATIN CALCIUM 80 MG/1
80 TABLET, FILM COATED ORAL DAILY
Qty: 90 TABLET | Refills: 1 | Status: SHIPPED | OUTPATIENT
Start: 2024-02-08

## 2024-02-08 RX ORDER — NITROGLYCERIN 0.4 MG/1
TABLET SUBLINGUAL
Qty: 100 TABLET | Refills: 11 | Status: SHIPPED | OUTPATIENT
Start: 2024-02-08

## 2024-02-08 RX ORDER — SPIRONOLACTONE 25 MG/1
25 TABLET ORAL DAILY
Qty: 90 TABLET | Refills: 1 | Status: SHIPPED | OUTPATIENT
Start: 2024-02-08

## 2024-02-08 RX ORDER — LOSARTAN POTASSIUM 50 MG/1
50 TABLET ORAL DAILY
Qty: 90 TABLET | Refills: 1 | Status: SHIPPED | OUTPATIENT
Start: 2024-02-08

## 2024-02-08 NOTE — PROGRESS NOTES
Myrna Ramos MD  Breanna Kaba  1955 02/08/2024    Patient Active Problem List   Diagnosis    Neuropathy    Gout    GERD (gastroesophageal reflux disease)    Diabetes mellitus    Chronic pain    Family history of GI malignancy    History of adenomatous polyp of colon    COVID-19    Acute congestive heart failure, unspecified heart failure type    NSTEMI, initial episode of care    Chronic HFrEF (heart failure with reduced ejection fraction)    Benign essential hypertension    Mixed hyperlipidemia    Unstable angina       Dear Myrna Ramos MD:    Subjective     History of Present Illness:    Chief Complaint   Patient presents with    Follow-up     HOSPITAL FOLLOW UP       Breanna Kaba is a pleasant 68 y.o. female with a past medical history significant for  coronary artery disease with recent stenting of the LAD on 3/31/2022 after she presented with an acute NSTEMI, ischemic cardiomyopathy with recovered LVEF now at 50 to 55% previously as low as 20 to 25%, diabetes mellitus, essential hypertension, and dyslipidemia.  No history of tobacco abuse.  She comes in today for cardiology follow-up.      Breanna was hospitalized since she was last seen for chest pains she did have a CT coronary angiogram, echocardiogram, and stress test.  CT coronary angiogram revealed a calcium score of 2253 however overall assessment was limited given LAD stent, left circumflex stent making it difficult to assess severity of stenosis there is also noted RCA multifocal stenosis.  She subsequently did have echocardiogram and stress test stress test revealed normal myocardial perfusion with no evidence of ischemia echocardiogram showed normal LVEF.  Clinically today she reports since discharge she has been chest pain-free denying any further recurrent symptoms or worsening shortness of breath from baseline.  She also denies any palpitations or syncope.  Her only complaint being lumbar pain radiating into her  leg      Allergies   Allergen Reactions    Asa [Aspirin] GI Intolerance    Naproxen Nausea And Vomiting    Penicillins Hives and Rash   :      Current Outpatient Medications:     aspirin 81 MG EC tablet, Take 1 tablet by mouth Daily., Disp: 90 tablet, Rfl: 1    atorvastatin (LIPITOR) 80 MG tablet, Take 1 tablet by mouth Daily., Disp: 90 tablet, Rfl: 1    insulin aspart (NovoLOG FlexPen) 100 UNIT/ML solution pen-injector sc pen, Inject 24 Units under the skin into the appropriate area as directed 3 (Three) Times a Day With Meals., Disp: 66 mL, Rfl: 1    Insulin Glargine (BASAGLAR KWIKPEN) 100 UNIT/ML injection pen, Take 30 units INJ at night and 10 units INJ in the AM., Disp: 36 mL, Rfl: 1    losartan (Cozaar) 50 MG tablet, Take 1 tablet by mouth Daily., Disp: 90 tablet, Rfl: 1    OneTouch Verio test strip, 1 each by Other route As Needed., Disp: , Rfl:     spironolactone (ALDACTONE) 25 MG tablet, Take 1 tablet by mouth Daily., Disp: 90 tablet, Rfl: 1    trospium (SANCTURA) 20 MG tablet, Take 1 tablet by mouth Every 12 (Twelve) Hours., Disp: , Rfl:     allopurinol (ZYLOPRIM) 300 MG tablet, Take 1 tablet by mouth Daily. (Patient not taking: Reported on 2/8/2024), Disp: 90 tablet, Rfl: 1    glipizide (GLUCOTROL) 5 MG tablet, Take 1 tablet by mouth Daily. (Patient not taking: Reported on 2/8/2024), Disp: , Rfl:     methocarbamol (ROBAXIN) 500 MG tablet, Take 1 tablet by mouth every night at bedtime. (Patient not taking: Reported on 2/8/2024), Disp: 7 tablet, Rfl: 0    nitroglycerin (NITROSTAT) 0.4 MG SL tablet, 1 under the tongue as needed for angina, may repeat q5mins for up three doses, Disp: 100 tablet, Rfl: 11    The following portions of the patient's history were reviewed and updated as appropriate: allergies, current medications, past family history, past medical history, past social history, past surgical history and problem list.    Social History     Tobacco Use    Smoking status: Never     Passive exposure:  "Past    Smokeless tobacco: Never   Vaping Use    Vaping Use: Never used   Substance Use Topics    Alcohol use: No    Drug use: No         Objective   Vitals:    02/08/24 0812   BP: 146/82   BP Location: Right arm   Patient Position: Sitting   Cuff Size: Adult   Pulse: 102   Resp: 18   SpO2: 100%   Weight: 68.5 kg (151 lb)   Height: 157.5 cm (62\")     Body mass index is 27.62 kg/m².    ROS    Physical Exam    Lab Results   Component Value Date     01/02/2024    K 4.1 01/02/2024    CL 98 01/02/2024    CO2 25.1 01/02/2024    BUN 32 (H) 01/02/2024    CREATININE 1.05 (H) 01/02/2024    GLUCOSE 321 (H) 01/02/2024    CALCIUM 10.3 01/02/2024    AST 17 01/02/2024    ALT 26 01/02/2024    ALKPHOS 161 (H) 01/02/2024     No results found for: \"CKTOTAL\"  Lab Results   Component Value Date    WBC 8.10 12/26/2023    HGB 11.2 (L) 12/26/2023    HCT 34.5 12/26/2023     12/26/2023     Lab Results   Component Value Date    INR 0.86 (L) 12/24/2023    INR 1.04 03/28/2022     Lab Results   Component Value Date    MG 1.6 04/02/2022     Lab Results   Component Value Date    TSH 1.260 06/06/2022    CHLPL 256 (H) 03/01/2017    TRIG 155 (H) 12/25/2023    HDL 41 12/25/2023    LDL 73 12/25/2023      No results found for: \"BNP\"    During this visit the following were done:  Labs Reviewed []    Labs Ordered []    Radiology Reports Reviewed []    Radiology Ordered []    PCP Records Reviewed []    Referring Provider Records Reviewed []    ER Records Reviewed []    Hospital Records Reviewed []    History Obtained From Family []    Radiology Images Reviewed []    Other Reviewed []    Records Requested []       Procedures    Assessment & Plan    Diagnosis Plan   1. Chronic HFrEF (heart failure with reduced ejection fraction)        2. Mixed hyperlipidemia        3. Coronary artery disease due to lipid rich plaque  losartan (Cozaar) 50 MG tablet    spironolactone (ALDACTONE) 25 MG tablet      4. Ischemic cardiomyopathy  losartan (Cozaar) 50 " MG tablet    spironolactone (ALDACTONE) 25 MG tablet               Recommendations:  ASCVD  Reviewed recent hospital stay for chest pains.  Since she is now chest pain-free we will continue with GDMT for now with aspirin, Lipitor, losartan, spironolactone.  I did explain to her that should she develop chest pains that we will proceed with invasive evaluation with coronary angiography    Return in about 3 months (around 5/8/2024).    As always, I appreciate very much the opportunity to participate in the cardiovascular care of your patients.      With Best Regards,    Da Fox PA-C

## 2024-02-14 ENCOUNTER — OFFICE VISIT (OUTPATIENT)
Dept: FAMILY MEDICINE CLINIC | Facility: CLINIC | Age: 69
End: 2024-02-14
Payer: MEDICARE

## 2024-02-14 VITALS
BODY MASS INDEX: 27.75 KG/M2 | HEART RATE: 99 BPM | HEIGHT: 62 IN | OXYGEN SATURATION: 98 % | DIASTOLIC BLOOD PRESSURE: 64 MMHG | WEIGHT: 150.8 LBS | SYSTOLIC BLOOD PRESSURE: 126 MMHG | TEMPERATURE: 97.3 F

## 2024-02-14 DIAGNOSIS — E11.40 TYPE 2 DIABETES MELLITUS WITH DIABETIC NEUROPATHY, WITH LONG-TERM CURRENT USE OF INSULIN: Primary | ICD-10-CM

## 2024-02-14 DIAGNOSIS — M1A.09X0 IDIOPATHIC CHRONIC GOUT OF MULTIPLE SITES WITHOUT TOPHUS: ICD-10-CM

## 2024-02-14 DIAGNOSIS — I25.5 ISCHEMIC CARDIOMYOPATHY: ICD-10-CM

## 2024-02-14 DIAGNOSIS — Z79.4 TYPE 2 DIABETES MELLITUS WITH DIABETIC NEUROPATHY, WITH LONG-TERM CURRENT USE OF INSULIN: Primary | ICD-10-CM

## 2024-02-14 PROCEDURE — 80053 COMPREHEN METABOLIC PANEL: CPT | Performed by: FAMILY MEDICINE

## 2024-02-14 PROCEDURE — 36415 COLL VENOUS BLD VENIPUNCTURE: CPT | Performed by: FAMILY MEDICINE

## 2024-02-15 LAB
ALBUMIN SERPL-MCNC: 4.2 G/DL (ref 3.5–5.2)
ALBUMIN/GLOB SERPL: 1.2 G/DL
ALP SERPL-CCNC: 145 U/L (ref 39–117)
ALT SERPL W P-5'-P-CCNC: 24 U/L (ref 1–33)
ANION GAP SERPL CALCULATED.3IONS-SCNC: 12.8 MMOL/L (ref 5–15)
AST SERPL-CCNC: 24 U/L (ref 1–32)
BILIRUB SERPL-MCNC: 0.4 MG/DL (ref 0–1.2)
BUN SERPL-MCNC: 31 MG/DL (ref 8–23)
BUN/CREAT SERPL: 29.2 (ref 7–25)
CALCIUM SPEC-SCNC: 10.5 MG/DL (ref 8.6–10.5)
CHLORIDE SERPL-SCNC: 97 MMOL/L (ref 98–107)
CO2 SERPL-SCNC: 26.2 MMOL/L (ref 22–29)
CREAT SERPL-MCNC: 1.06 MG/DL (ref 0.57–1)
EGFRCR SERPLBLD CKD-EPI 2021: 56.6 ML/MIN/1.73
GLOBULIN UR ELPH-MCNC: 3.5 GM/DL
GLUCOSE SERPL-MCNC: 319 MG/DL (ref 65–99)
POTASSIUM SERPL-SCNC: 4.9 MMOL/L (ref 3.5–5.2)
PROT SERPL-MCNC: 7.7 G/DL (ref 6–8.5)
SODIUM SERPL-SCNC: 136 MMOL/L (ref 136–145)

## 2024-02-15 NOTE — PROGRESS NOTES
"Breanna Kaba     VITALS: Blood pressure 126/64, pulse 99, temperature 97.3 °F (36.3 °C), temperature source Temporal, height 157.5 cm (62\"), weight 68.4 kg (150 lb 12.8 oz), SpO2 98%, not currently breastfeeding.    Subjective  Chief Complaint  Diabetes    Subjective          History of Present Illness:  Patient is a 68 y.o.  female with medical conditions significant for CAD, type 2 diabetes, hypertension, and gout, who presents to clinic for medical follow-up.    The patient saw Darrius REHAMN, who was upset about her heart. He ordered a stress test when she was in the hospital. She went into the hospital on 12/02/2023. The first time she had a heart attack, she was hurting in her arms and could not breathe. When she got to the emergency room, he asked her what was the problem. She told him that she could not breathe. She was hurting all down and it would not stop. They told her that she was having a heart attack. It was not a real one, it was triggered like her heart was stressed out because of the infection and she did not have enough fluid in her. They ordered the outpatient testing for her.  She said that the nurse was supposed to come today, but she had to change it because she wanted to come and talk to her primary care physician. It is going to be in 03/2024.    Her blood glucose levels went down to 35 mg/dL. She took an injection. She thought she was dying. She did not have orange juice. She took a big spoonful of peanut butter. She went in there in the kitchen. She thinks she got some jelly. She started drinking something, but she cannot remember what it was. She got it built back up. She takes 16 to 18 units of short-acting insulin at night. She waits a while to read and then she takes her 30 units. She skips the 10 units in the morning. She is scared if she does not take it.  When she gets up the next morning, she will eat oatmeal or a cup of yogurt or banana. She takes between 15 and 18 units of " short-acting insulin. She is taking 30 units of Basaglar at night. She has used the sliding scale.    No complaints about any of the medications.    The following portions of the patient's history were reviewed and updated as appropriate: allergies, current medications, past family history, past medical history, past social history, past surgical history and problem list.    Past Medical History  Past Medical History:   Diagnosis Date    Acute congestive heart failure, unspecified heart failure type 03/28/2022    Arthritis     Chronic pain     Diabetes mellitus     Elevated cholesterol     GERD (gastroesophageal reflux disease)     Gout     Headache     Hypertension     Myocardial infarction     Neuropathy        Surgical History  Past Surgical History:   Procedure Laterality Date    BREAST BIOPSY Left 1988    benign    CARDIAC CATHETERIZATION N/A 03/31/2022    Procedure: Left Heart Cath;  Surgeon: Tristan Marin MD;  Location: [facility] CATH INVASIVE LOCATION;  Service: Cardiology;  Laterality: N/A;    CARDIAC CATHETERIZATION N/A 03/31/2022    Procedure: Percutaneous Coronary Intervention;  Surgeon: Tristan Marin MD;  Location: [facility] CATH INVASIVE LOCATION;  Service: Cardiology;  Laterality: N/A;    COLONOSCOPY N/A 02/17/2020    Procedure: COLONOSCOPY FOR SCREENING CPT CODE: ;  Surgeon: Mariano Prescott MD;  Location: [facility] OR;  Service: Gastroenterology;  Laterality: N/A;    CORONARY STENT PLACEMENT      HYSTERECTOMY      Partial    KNEE SURGERY      THYROIDECTOMY, PARTIAL      Removal of goiter       Family History  Family History   Problem Relation Age of Onset    COPD Mother     Heart disease Brother     Heart attack Brother     Hypertension Daughter     Diabetes Daughter     Hypertension Daughter     Diabetes Daughter     Breast cancer Neg Hx        Social History  Social History     Socioeconomic History    Marital status:    Tobacco Use    Smoking status: Never     Passive  "exposure: Past    Smokeless tobacco: Never   Vaping Use    Vaping Use: Never used   Substance and Sexual Activity    Alcohol use: No    Drug use: No    Sexual activity: Defer       Objective   Vital Signs:   /64 (BP Location: Right arm, Patient Position: Sitting, Cuff Size: Adult)   Pulse 99   Temp 97.3 °F (36.3 °C) (Temporal)   Ht 157.5 cm (62\")   Wt 68.4 kg (150 lb 12.8 oz)   SpO2 98%   BMI 27.58 kg/m²     Physical Exam     Gen: Patient in NAD. Pleasant and answers appropriately. A&Ox3.    Skin: Warm and dry with normal turgor. No purpura, rashes, or unusual pigmentation noted. Hair is normal in appearance and distribution.    HEENT: NC/AT. No lesions noted. Conjunctiva clear, sclera nonicteric. PERRL. EOMI without nystagmus or strabismus. Fundi appear benign. No hemorrhages or exudates of eyes. Auditory canals are patent bilaterally without lesions. TMs intact,  nonerythematous, nonbulging without lesions. Nasal mucosa pink, nonerythematous, and nonedematous. Frontal and maxillary sinuses are nontender. O/P nonerythematous and moist without exudate.    Neck: Supple without lymph nodes palpated. FROM.     Lungs: Decreased B/L without rales, rhonchi, crackles, or wheezes.    Heart: RRR. S1 and S2 normal. No S3 or S4. No MRGT.    Abd: Soft, nontender,nondistended. (+)BSx4 quadrants.     Extrem: No CC.  Trace edema bilateral lower extremities.  Radial pulses 2+/4 and equal B/L. FROMx4.  Positive joint tenderness noted.    Neuro: No focal motor/sensory deficits.    Procedures    Result Review :   The following data was reviewed by: Myrna Ramos MD on 02/14/2024:                Assessment and Plan    Breanna Kaba is a 68 y.o. here for medical followup.    Diagnoses and all orders for this visit:    1. Type 2 diabetes mellitus with diabetic neuropathy, with long-term current use of insulin (Primary)  -     Comprehensive Metabolic Panel; Future  -     Comprehensive Metabolic Panel    2. Ischemic " cardiomyopathy  Per cardiology    3. Idiopathic chronic gout of multiple sites without tophus  Patient currently on allopurinol 300 mg orally daily.        Problem List Items Addressed This Visit       Diabetes mellitus - Primary    Relevant Orders    Comprehensive Metabolic Panel (Completed)       1. Coronary artery disease involving native coronary artery of native heart without angina pectoris.  - The patient will continue to follow up with Darrius Fox.    2. Type 2 diabetes mellitus  - The patient will increase her Basaglar to 32 units at night.  - She will continue to monitor her blood glucose levels in the morning.  - If her blood glucose levels are lower, she will decrease her Basaglar to 10 units or 12 units.  - She will continue to monitor her blood glucose levels at home.    3. Hypertension, unspecified type.  - The patient will continue her current medication regimen.    4. Gout, unspecified cause, unspecified chronicity, unspecified site.  - The patient will continue her current medication regimen.    5. Health maintenance.  - The patient will have blood work done today.          Follow Up   Return in about 6 weeks (around 3/27/2024), or LABS.  Findings and plans discussed with patient who verbalizes understanding and agreement. Will followup with patient once results are in. Patient was given instructions and counseling regarding her condition or for health maintenance advice. Please see specific information pulled into the AVS if appropriate.       Myrna Ramos MD    Transcribed from ambient dictation for Myrna Ramos MD by Gabino Alonzo.  02/15/24   09:34 EST    Patient or patient representative verbalized consent to the visit recording.  I have personally performed the services described in this document as transcribed by the above individual, and it is both accurate and complete.

## 2024-03-06 ENCOUNTER — OFFICE VISIT (OUTPATIENT)
Dept: FAMILY MEDICINE CLINIC | Facility: CLINIC | Age: 69
End: 2024-03-06
Payer: MEDICARE

## 2024-03-06 ENCOUNTER — TELEPHONE (OUTPATIENT)
Dept: FAMILY MEDICINE CLINIC | Facility: CLINIC | Age: 69
End: 2024-03-06

## 2024-03-06 VITALS
WEIGHT: 149.8 LBS | TEMPERATURE: 98.4 F | OXYGEN SATURATION: 99 % | SYSTOLIC BLOOD PRESSURE: 144 MMHG | HEART RATE: 102 BPM | DIASTOLIC BLOOD PRESSURE: 80 MMHG | HEIGHT: 62 IN | BODY MASS INDEX: 27.57 KG/M2

## 2024-03-06 DIAGNOSIS — M54.9 BACK PAIN, UNSPECIFIED BACK LOCATION, UNSPECIFIED BACK PAIN LATERALITY, UNSPECIFIED CHRONICITY: ICD-10-CM

## 2024-03-06 DIAGNOSIS — M25.551 PAIN OF RIGHT HIP: Primary | ICD-10-CM

## 2024-03-06 PROCEDURE — 99213 OFFICE O/P EST LOW 20 MIN: CPT | Performed by: FAMILY MEDICINE

## 2024-03-06 PROCEDURE — 1160F RVW MEDS BY RX/DR IN RCRD: CPT | Performed by: FAMILY MEDICINE

## 2024-03-06 PROCEDURE — 1159F MED LIST DOCD IN RCRD: CPT | Performed by: FAMILY MEDICINE

## 2024-03-06 PROCEDURE — 3079F DIAST BP 80-89 MM HG: CPT | Performed by: FAMILY MEDICINE

## 2024-03-06 PROCEDURE — 3046F HEMOGLOBIN A1C LEVEL >9.0%: CPT | Performed by: FAMILY MEDICINE

## 2024-03-06 PROCEDURE — 3077F SYST BP >= 140 MM HG: CPT | Performed by: FAMILY MEDICINE

## 2024-03-06 RX ORDER — METHOCARBAMOL 500 MG/1
500 TABLET, FILM COATED ORAL
Qty: 7 TABLET | Refills: 0 | Status: SHIPPED | OUTPATIENT
Start: 2024-03-06

## 2024-03-06 NOTE — TELEPHONE ENCOUNTER
----- Message from REGINALDO Alberto sent at 3/6/2024 11:47 AM EST -----  Please let patient know results are normal. Thank you.

## 2024-03-06 NOTE — PROGRESS NOTES
"Chief Complaint  Hip Pain (Right Hip )    Subjective          Breanna Kaba presents to [facility]  Hip Pain   There was no injury mechanism. The pain is present in the right hip. The pain is moderate. Pertinent negatives include no loss of motion, loss of sensation, numbness or tingling. The symptoms are aggravated by movement and weight bearing. She has tried NSAIDs for the symptoms.       Review of Systems   Neurological:  Negative for tingling and numbness.         Objective   Vital Signs:   /80 (BP Location: Right arm, Patient Position: Sitting)   Pulse 102   Temp 98.4 °F (36.9 °C) (Temporal)   Ht 157.5 cm (62.01\")   Wt 67.9 kg (149 lb 12.8 oz)   SpO2 99%   BMI 27.39 kg/m²     Physical Exam  Constitutional:       General: She is not in acute distress.     Appearance: Normal appearance. She is well-developed and well-groomed. She is not ill-appearing, toxic-appearing or diaphoretic.   HENT:      Head: Normocephalic.      Nose: Nose normal. No congestion or rhinorrhea.      Mouth/Throat:      Mouth: Mucous membranes are moist.      Pharynx: Oropharynx is clear. No oropharyngeal exudate or posterior oropharyngeal erythema.   Eyes:      General: Lids are normal.         Right eye: No discharge.         Left eye: No discharge.      Extraocular Movements: Extraocular movements intact.      Pupils: Pupils are equal, round, and reactive to light.   Neck:      Vascular: No carotid bruit.   Cardiovascular:      Rate and Rhythm: Normal rate and regular rhythm.      Pulses: Normal pulses.      Heart sounds: Normal heart sounds. No murmur heard.     No friction rub. No gallop.   Pulmonary:      Effort: Pulmonary effort is normal. No respiratory distress.      Breath sounds: Normal breath sounds. No stridor. No wheezing, rhonchi or rales.   Chest:      Chest wall: No tenderness.   Abdominal:      General: Bowel sounds are normal. There is no distension.      Palpations: Abdomen " is soft. There is no mass.      Tenderness: There is no abdominal tenderness. There is no right CVA tenderness, left CVA tenderness, guarding or rebound.      Hernia: No hernia is present.   Musculoskeletal:         General: No swelling or tenderness. Normal range of motion.      Cervical back: Normal range of motion and neck supple. No rigidity or tenderness.      Right hip: Tenderness present.      Right lower leg: No edema.      Left lower leg: No edema.   Lymphadenopathy:      Cervical: No cervical adenopathy.   Skin:     General: Skin is warm.      Capillary Refill: Capillary refill takes less than 2 seconds.      Coloration: Skin is not jaundiced.      Findings: No bruising, erythema or rash.   Neurological:      General: No focal deficit present.      Mental Status: She is alert and oriented to person, place, and time.      Motor: Motor function is intact. No weakness.      Coordination: Coordination is intact.      Gait: Gait is intact. Gait normal.   Psychiatric:         Attention and Perception: Attention normal.         Mood and Affect: Mood normal.         Speech: Speech normal.         Behavior: Behavior normal.         Cognition and Memory: Cognition normal.         Judgment: Judgment normal.        Result Review :                 Assessment and Plan    Diagnoses and all orders for this visit:    1. Pain of right hip (Primary)  -     XR Hip With or Without Pelvis 2 - 3 View Right    2. Back pain, unspecified back location, unspecified back pain laterality, unspecified chronicity  -     methocarbamol (ROBAXIN) 500 MG tablet; Take 1 tablet by mouth every night at bedtime.  Dispense: 7 tablet; Refill: 0      Patient's Body mass index is 27.39 kg/m². indicating that she is overweight (BMI 25-29.9). Patient's (Body mass index is 27.39 kg/m².) indicates that they are overweight with health conditions that include hypertension, diabetes mellitus, and dyslipidemias . Weight is unchanged. BMI is is above  average; BMI management plan is completed. We discussed low calorie, low carb based diet program, portion control, and increasing exercise. .    Follow Up   Return xray today, for Next scheduled follow up.  Patient was given instructions and counseling regarding her condition or for health maintenance advice. Please see specific information pulled into the AVS if appropriate.     This document has been electronically signed by REGINALDO Alberto  March 6, 2024 09:10 EST

## 2024-03-08 ENCOUNTER — TELEPHONE (OUTPATIENT)
Dept: FAMILY MEDICINE CLINIC | Facility: CLINIC | Age: 69
End: 2024-03-08
Payer: MEDICARE

## 2024-03-08 NOTE — TELEPHONE ENCOUNTER
03/08/2024 PA for Methocarbamol (Robaxin) 500MG submitted and approved from 01/01/2024 to 12/31/2024. [facility] [place] notified.

## 2024-03-11 ENCOUNTER — HOSPITAL ENCOUNTER (EMERGENCY)
Facility: HOSPITAL | Age: 69
Discharge: HOME | End: 2024-03-11
Attending: EMERGENCY MEDICINE | Admitting: EMERGENCY MEDICINE
Payer: MEDICARE

## 2024-03-11 VITALS
SYSTOLIC BLOOD PRESSURE: 153 MMHG | BODY MASS INDEX: 27.23 KG/M2 | RESPIRATION RATE: 16 BRPM | DIASTOLIC BLOOD PRESSURE: 85 MMHG | HEIGHT: 62 IN | HEART RATE: 95 BPM | TEMPERATURE: 97.9 F | WEIGHT: 148 LBS | OXYGEN SATURATION: 97 %

## 2024-03-11 DIAGNOSIS — M25.551 RIGHT HIP PAIN: Primary | ICD-10-CM

## 2024-03-11 PROCEDURE — 25010000002 KETOROLAC TROMETHAMINE PER 15 MG: Performed by: EMERGENCY MEDICINE

## 2024-03-11 PROCEDURE — 63710000001 ONDANSETRON ODT 4 MG TABLET DISPERSIBLE: Performed by: EMERGENCY MEDICINE

## 2024-03-11 PROCEDURE — 96372 THER/PROPH/DIAG INJ SC/IM: CPT

## 2024-03-11 PROCEDURE — 25010000002 MORPHINE PER 10 MG: Performed by: EMERGENCY MEDICINE

## 2024-03-11 PROCEDURE — 99283 EMERGENCY DEPT VISIT LOW MDM: CPT

## 2024-03-11 RX ORDER — ONDANSETRON 4 MG/1
4 TABLET, ORALLY DISINTEGRATING ORAL ONCE
Status: COMPLETED | OUTPATIENT
Start: 2024-03-11 | End: 2024-03-11

## 2024-03-11 RX ORDER — HYDROCODONE BITARTRATE AND ACETAMINOPHEN 5; 325 MG/1; MG/1
1 TABLET ORAL EVERY 6 HOURS PRN
Qty: 12 TABLET | Refills: 0 | Status: SHIPPED | OUTPATIENT
Start: 2024-03-11

## 2024-03-11 RX ORDER — KETOROLAC TROMETHAMINE 30 MG/ML
15 INJECTION, SOLUTION INTRAMUSCULAR; INTRAVENOUS ONCE
Status: DISCONTINUED | OUTPATIENT
Start: 2024-03-11 | End: 2024-03-11

## 2024-03-11 RX ORDER — KETOROLAC TROMETHAMINE 30 MG/ML
15 INJECTION, SOLUTION INTRAMUSCULAR; INTRAVENOUS ONCE
Status: COMPLETED | OUTPATIENT
Start: 2024-03-11 | End: 2024-03-11

## 2024-03-11 RX ADMIN — KETOROLAC TROMETHAMINE 15 MG: 30 INJECTION, SOLUTION INTRAMUSCULAR at 09:50

## 2024-03-11 RX ADMIN — ONDANSETRON 4 MG: 4 TABLET, ORALLY DISINTEGRATING ORAL at 11:12

## 2024-03-11 RX ADMIN — MORPHINE SULFATE 4 MG: 4 INJECTION, SOLUTION INTRAMUSCULAR; INTRAVENOUS at 11:12

## 2024-03-11 NOTE — ED PROVIDER NOTES
Subjective   History of Present Illness  68 year-old female here today for hip pain.  Patient complains of right hip and buttock pain over the past 4 days.  This is worse when she walks or lies down in certain positions.  Today it seems to be radiating on the lateral side of her leg down toward her knee.  She denied any recent trauma or other triggering events.  She saw Dr. Rodrigues 3 to 4 days ago and had the plain x-ray of her hip which was unremarkable.  She was started on muscle relaxer and Tylenol but says this has not really relieved her symptoms.  She denies any numbness, weakness, tingling, incontinence.  She has a history of CHF, diabetes, hypertension, neuropathy, chronic pain.      Review of Systems   All other systems reviewed and are negative.      Past Medical History:   Diagnosis Date    Acute congestive heart failure, unspecified heart failure type 03/28/2022    Arthritis     Chronic pain     Diabetes mellitus     Elevated cholesterol     GERD (gastroesophageal reflux disease)     Gout     Headache     Hypertension     Myocardial infarction     Neuropathy        Allergies   Allergen Reactions    Asa [Aspirin] GI Intolerance    Naproxen Nausea And Vomiting    Penicillins Hives and Rash       Past Surgical History:   Procedure Laterality Date    BREAST BIOPSY Left 1988    benign    CARDIAC CATHETERIZATION N/A 03/31/2022    Procedure: Left Heart Cath;  Surgeon: Tristan Marin MD;  Location: [facility] CATH INVASIVE LOCATION;  Service: Cardiology;  Laterality: N/A;    CARDIAC CATHETERIZATION N/A 03/31/2022    Procedure: Percutaneous Coronary Intervention;  Surgeon: Tristan Marin MD;  Location: [facility] CATH INVASIVE LOCATION;  Service: Cardiology;  Laterality: N/A;    COLONOSCOPY N/A 02/17/2020    Procedure: COLONOSCOPY FOR SCREENING CPT CODE: ;  Surgeon: Mariano Prescott MD;  Location: [facility] OR;  Service: Gastroenterology;  Laterality: N/A;    CORONARY STENT PLACEMENT      HYSTERECTOMY       Partial    KNEE SURGERY      THYROIDECTOMY, PARTIAL      Removal of goiter       Family History   Problem Relation Age of Onset    COPD Mother     Heart disease Brother     Heart attack Brother     Hypertension Daughter     Diabetes Daughter     Hypertension Daughter     Diabetes Daughter     Breast cancer Neg Hx        Social History     Socioeconomic History    Marital status:    Tobacco Use    Smoking status: Never     Passive exposure: Past    Smokeless tobacco: Never   Vaping Use    Vaping status: Never Used   Substance and Sexual Activity    Alcohol use: No    Drug use: No    Sexual activity: Defer           Objective   Physical Exam  Vitals and nursing note reviewed. Exam conducted with a chaperone present.   Constitutional:       Appearance: Normal appearance. She is normal weight.   HENT:      Head: Normocephalic and atraumatic.   Cardiovascular:      Rate and Rhythm: Normal rate and regular rhythm.      Heart sounds: Normal heart sounds. No murmur heard.     No friction rub. No gallop.   Pulmonary:      Effort: Pulmonary effort is normal. No respiratory distress.      Breath sounds: Normal breath sounds. No wheezing, rhonchi or rales.   Chest:      Chest wall: No tenderness.   Abdominal:      General: Abdomen is flat. Bowel sounds are normal.      Palpations: Abdomen is soft.   Musculoskeletal:         General: Normal range of motion.      Lumbar back: No swelling, deformity, tenderness or bony tenderness. Normal range of motion. Negative right straight leg raise test and negative left straight leg raise test.      Right hip: Tenderness (Mildly tender over the superior buttocks/lower lumbar area.  Nontender over the greater trochanter.) present. No deformity. Normal range of motion.      Right lower leg: No edema.      Left lower leg: No edema.      Comments: Full range of motion with no crepitus.   Skin:     General: Skin is warm and dry.   Neurological:      General: No focal deficit present.       Mental Status: She is alert and oriented to person, place, and time.      Motor: No weakness.      Deep Tendon Reflexes:      Reflex Scores:       Patellar reflexes are 2+ on the right side and 2+ on the left side.       Achilles reflexes are 0 on the right side and 0 on the left side.  Psychiatric:         Mood and Affect: Mood normal.         Behavior: Behavior normal.         Procedures           ED Course  ED Course as of 03/11/24 1124   Mon Mar 11, 2024   1117 Reviewed patient's recent hip x-rays which were unremarkable.  Suspect this problem is more due to lumbar degenerative changes.  Would likely benefit from outpatient MRI of the L-spine.  In the meantime we will continue her muscle relaxers, NSAIDs, add short course of Norco and follow-up with PMD. [BC]      ED Course User Index  [BC] Nish Gregory MD                                             Medical Decision Making  Risk  Prescription drug management.        Final diagnoses:   Right hip pain       ED Disposition  ED Disposition       ED Disposition   Discharge    Condition   Stable    Comment   --               Myrna Ramos MD  [address] [place] [address]  [phone]    In 1 week           Medication List        New Prescriptions      HYDROcodone-acetaminophen 5-325 MG per tablet  Commonly known as: NORCO  Take 1 tablet by mouth Every 6 (Six) Hours As Needed for Severe Pain.            Changed      NovoLOG FlexPen 100 UNIT/ML solution pen-injector sc pen  Generic drug: insulin aspart  Inject 24 Units under the skin into the appropriate area as directed 3 (Three) Times a Day With Meals.  What changed: how much to take               Where to Get Your Medications        These medications were sent to [facility] [address] [place] [address] [facility] [phone] [place] [phone] [address] [address]      Phone: [phone]   HYDROcodone-acetaminophen 5-325 MG per tablet            Nish Gregory,  MD  03/11/24 1127

## 2024-03-18 ENCOUNTER — TELEPHONE (OUTPATIENT)
Dept: CARDIOLOGY | Facility: CLINIC | Age: 69
End: 2024-03-18
Payer: MEDICARE

## 2024-03-18 NOTE — TELEPHONE ENCOUNTER
Caller: Breanna Kaba    Relationship to patient: Self    Best call back number: [phone]     Chief complaint: HIP PAIN     Type of visit: F/U     Requested date: ASAP      If rescheduling, when is the original appointment: 5/10     Additional notes:PT STATES SHE IS HAVING HIP PAIN, ISN'T SURE IF THIS WAS A BLOCKAGE. WENT TO THE DR AND WAS TOLD THEY DIDN'T SEE ANYTHING. PT STATES SHE CAN NOT WALK. UNSURE IF THIS IS HEART RELATED.     STATES THE LAST TIME SHE WAS HURTING LIKE THIS SHE HAD A HEART ATTACK. PLEASE ADVISE

## 2024-03-21 ENCOUNTER — OFFICE VISIT (OUTPATIENT)
Dept: CARDIOLOGY | Facility: CLINIC | Age: 69
End: 2024-03-21
Payer: MEDICARE

## 2024-03-21 ENCOUNTER — OFFICE VISIT (OUTPATIENT)
Dept: FAMILY MEDICINE CLINIC | Facility: CLINIC | Age: 69
End: 2024-03-21
Payer: MEDICARE

## 2024-03-21 VITALS
HEIGHT: 62 IN | DIASTOLIC BLOOD PRESSURE: 71 MMHG | SYSTOLIC BLOOD PRESSURE: 136 MMHG | BODY MASS INDEX: 26.87 KG/M2 | HEART RATE: 110 BPM | OXYGEN SATURATION: 97 % | WEIGHT: 146 LBS

## 2024-03-21 VITALS
DIASTOLIC BLOOD PRESSURE: 78 MMHG | TEMPERATURE: 96.9 F | HEIGHT: 62 IN | SYSTOLIC BLOOD PRESSURE: 138 MMHG | BODY MASS INDEX: 26.98 KG/M2 | OXYGEN SATURATION: 100 % | HEART RATE: 86 BPM | WEIGHT: 146.6 LBS

## 2024-03-21 DIAGNOSIS — M54.31 SCIATICA OF RIGHT SIDE: ICD-10-CM

## 2024-03-21 DIAGNOSIS — M25.551 RIGHT HIP PAIN: Primary | ICD-10-CM

## 2024-03-21 DIAGNOSIS — I25.10 CORONARY ARTERY DISEASE DUE TO LIPID RICH PLAQUE: Primary | ICD-10-CM

## 2024-03-21 DIAGNOSIS — E11.40 TYPE 2 DIABETES MELLITUS WITH DIABETIC NEUROPATHY, WITH LONG-TERM CURRENT USE OF INSULIN: ICD-10-CM

## 2024-03-21 DIAGNOSIS — Z79.4 TYPE 2 DIABETES MELLITUS WITH DIABETIC NEUROPATHY, WITH LONG-TERM CURRENT USE OF INSULIN: ICD-10-CM

## 2024-03-21 DIAGNOSIS — I10 BENIGN ESSENTIAL HYPERTENSION: ICD-10-CM

## 2024-03-21 DIAGNOSIS — I50.22 CHRONIC HFREF (HEART FAILURE WITH REDUCED EJECTION FRACTION): ICD-10-CM

## 2024-03-21 DIAGNOSIS — I25.83 CORONARY ARTERY DISEASE DUE TO LIPID RICH PLAQUE: Primary | ICD-10-CM

## 2024-03-21 PROCEDURE — 3046F HEMOGLOBIN A1C LEVEL >9.0%: CPT | Performed by: FAMILY MEDICINE

## 2024-03-21 PROCEDURE — 99214 OFFICE O/P EST MOD 30 MIN: CPT | Performed by: FAMILY MEDICINE

## 2024-03-21 PROCEDURE — G2211 COMPLEX E/M VISIT ADD ON: HCPCS | Performed by: FAMILY MEDICINE

## 2024-03-21 PROCEDURE — 3078F DIAST BP <80 MM HG: CPT | Performed by: FAMILY MEDICINE

## 2024-03-21 PROCEDURE — 3075F SYST BP GE 130 - 139MM HG: CPT | Performed by: FAMILY MEDICINE

## 2024-03-21 RX ORDER — CYCLOBENZAPRINE HCL 10 MG
10 TABLET ORAL 2 TIMES DAILY PRN
Qty: 30 TABLET | Refills: 0 | Status: SHIPPED | OUTPATIENT
Start: 2024-03-21 | End: 2024-03-27 | Stop reason: SDUPTHER

## 2024-03-21 RX ORDER — AMITRIPTYLINE HYDROCHLORIDE 10 MG/1
10 TABLET, FILM COATED ORAL NIGHTLY
Qty: 14 TABLET | Refills: 0 | Status: SHIPPED | OUTPATIENT
Start: 2024-03-21 | End: 2024-03-27 | Stop reason: SDUPTHER

## 2024-03-21 NOTE — PROGRESS NOTES
Myrna Ramos MD  Breanna Kaba  1955 03/21/2024    Patient Active Problem List   Diagnosis    Neuropathy    Gout    GERD (gastroesophageal reflux disease)    Diabetes mellitus    Chronic pain    Family history of GI malignancy    History of adenomatous polyp of colon    COVID-19    Acute congestive heart failure, unspecified heart failure type    NSTEMI, initial episode of care    Chronic HFrEF (heart failure with reduced ejection fraction)    Benign essential hypertension    Mixed hyperlipidemia    Unstable angina       Dear Myrna Ramos MD:    Subjective     History of Present Illness:    Chief Complaint   Patient presents with    Follow-up     Hip pain       Breanna Kaba is a pleasant 68 y.o. female with a past medical history significant for coronary artery disease with recent stenting of the LAD on 3/31/2022 after she presented with an acute NSTEMI, ischemic cardiomyopathy with recovered LVEF now at 50 to 55% previously as low as 20 to 25%, diabetes mellitus, essential hypertension, and dyslipidemia.  No history of tobacco abuse.  She comes in today for cardiology follow-up.       Breanna comes in today after developing severe right-sided hip pain over the last month or so but has continues to get worse until the point to where she did present to the ER on 3/11/2024 she reports that this pain starts in her right buttocks radiating down into her right knee on the posterior portion of her thigh.  She reports that this pain is much worse when she stands or puts pressure in the area and has caused her to sit mostly on her left side because of this pain.  She was concerned because when she originally presented in 2022 with an MI she was also having similar pain but also was associated with arm pain as well and was associated with significant dyspnea and she denies any current dyspnea.      Allergies   Allergen Reactions    Asa [Aspirin] GI Intolerance    Naproxen Nausea And Vomiting    Penicillins  Hives and Rash   :      Current Outpatient Medications:     allopurinol (ZYLOPRIM) 300 MG tablet, Take 1 tablet by mouth Daily., Disp: 90 tablet, Rfl: 1    aspirin 81 MG EC tablet, Take 1 tablet by mouth Daily., Disp: 90 tablet, Rfl: 1    atorvastatin (LIPITOR) 80 MG tablet, Take 1 tablet by mouth Daily., Disp: 90 tablet, Rfl: 1    glipizide (GLUCOTROL) 5 MG tablet, Take 1 tablet by mouth Daily., Disp: , Rfl:     HYDROcodone-acetaminophen (NORCO) 5-325 MG per tablet, Take 1 tablet by mouth Every 6 (Six) Hours As Needed for Severe Pain., Disp: 12 tablet, Rfl: 0    insulin aspart (NovoLOG FlexPen) 100 UNIT/ML solution pen-injector sc pen, Inject 24 Units under the skin into the appropriate area as directed 3 (Three) Times a Day With Meals. (Patient taking differently: Inject 16 Units under the skin into the appropriate area as directed 3 (Three) Times a Day With Meals.), Disp: 66 mL, Rfl: 1    Insulin Glargine (BASAGLAR KWIKPEN) 100 UNIT/ML injection pen, Take 30 units INJ at night and 10 units INJ in the AM., Disp: 36 mL, Rfl: 1    losartan (Cozaar) 50 MG tablet, Take 1 tablet by mouth Daily., Disp: 90 tablet, Rfl: 1    methocarbamol (ROBAXIN) 500 MG tablet, Take 1 tablet by mouth every night at bedtime., Disp: 7 tablet, Rfl: 0    nitroglycerin (NITROSTAT) 0.4 MG SL tablet, 1 under the tongue as needed for angina, may repeat q5mins for up three doses, Disp: 100 tablet, Rfl: 11    OneTouch Verio test strip, 1 each by Other route As Needed., Disp: , Rfl:     spironolactone (ALDACTONE) 25 MG tablet, Take 1 tablet by mouth Daily., Disp: 90 tablet, Rfl: 1    trospium (SANCTURA) 20 MG tablet, Take 1 tablet by mouth Every 12 (Twelve) Hours., Disp: , Rfl:     The following portions of the patient's history were reviewed and updated as appropriate: allergies, current medications, past family history, past medical history, past social history, past surgical history and problem list.    Social History     Tobacco Use    Smoking  "status: Never     Passive exposure: Past    Smokeless tobacco: Never   Vaping Use    Vaping status: Never Used   Substance Use Topics    Alcohol use: No    Drug use: No         Objective   Vitals:    03/21/24 1015   BP: 136/71   BP Location: Left arm   Patient Position: Sitting   Cuff Size: Adult   Pulse: 110   SpO2: 97%   Weight: 66.2 kg (146 lb)   Height: 157.5 cm (62\")     Body mass index is 26.7 kg/m².    ROS    Constitutional:       General: Not in acute distress.     Appearance: Healthy appearance. Well-developed and not in distress. Not diaphoretic.   Eyes:      Conjunctiva/sclera: Conjunctivae normal.      Pupils: Pupils are equal, round, and reactive to light.   HENT:      Head: Normocephalic and atraumatic.   Neck:      Vascular: No carotid bruit or JVD.   Pulmonary:      Effort: Pulmonary effort is normal. No respiratory distress.      Breath sounds: Normal breath sounds.   Cardiovascular:      Normal rate. Regular rhythm.   Edema:     Peripheral edema absent.   Skin:     General: Skin is cool.   Neurological:      Mental Status: Alert, oriented to person, place, and time and oriented to person, place and time.         Lab Results   Component Value Date     02/14/2024    K 4.9 02/14/2024    CL 97 (L) 02/14/2024    CO2 26.2 02/14/2024    BUN 31 (H) 02/14/2024    CREATININE 1.06 (H) 02/14/2024    GLUCOSE 319 (H) 02/14/2024    CALCIUM 10.5 02/14/2024    AST 24 02/14/2024    ALT 24 02/14/2024    ALKPHOS 145 (H) 02/14/2024     No results found for: \"CKTOTAL\"  Lab Results   Component Value Date    WBC 8.10 12/26/2023    HGB 11.2 (L) 12/26/2023    HCT 34.5 12/26/2023     12/26/2023     Lab Results   Component Value Date    INR 0.86 (L) 12/24/2023    INR 1.04 03/28/2022     Lab Results   Component Value Date    MG 1.6 04/02/2022     Lab Results   Component Value Date    TSH 1.260 06/06/2022    CHLPL 256 (H) 03/01/2017    TRIG 155 (H) 12/25/2023    HDL 41 12/25/2023    LDL 73 12/25/2023      No " "results found for: \"BNP\"    During this visit the following were done:  Labs Reviewed []    Labs Ordered []    Radiology Reports Reviewed []    Radiology Ordered []    PCP Records Reviewed []    Referring Provider Records Reviewed []    ER Records Reviewed []    Hospital Records Reviewed []    History Obtained From Family []    Radiology Images Reviewed []    Other Reviewed []    Records Requested []         ECG 12 Lead    Date/Time: 3/21/2024 10:22 AM  Performed by: Da Fox PA-C    Authorized by: Da Fox PA-C  Comparison: compared with previous ECG   Similar to previous ECG  Rhythm: sinus rhythm  Conduction: conduction normal  Q waves: V3      Clinical impression: non-specific ECG          Assessment & Plan    Diagnosis Plan   1. Coronary artery disease due to lipid rich plaque  Ambulatory Referral to Cardiology      2. Chronic HFrEF (heart failure with reduced ejection fraction)               Recommendations:  Right hip pain  Although she did have hip pain in 2022 when she presented with an NSTEMI during this time she also had arm pain and severe dyspnea which she currently does not have.  Her hip pain is very tender to palpation and essentially unable to even sit on her right side due to the pain being worse when she does.  Overall this makes my concern for underlying coronary disease low and seems more typical for sciatica or spinal issues.  I did encourage her to see PCP for this  However given her known coronary artery disease and CT coronary angiogram showing potential worsening coronary artery disease I do think she should still be referred for interventional cardiology for consideration of left heart catheterization.  She was agreeable to this.  Chronic HFrEF with improved EF  Euvolemic today denies any dyspnea.  Continue GDMT with aspirin, Lipitor, spironolactone    No follow-ups on file.    As always, I appreciate very much the opportunity to participate in the cardiovascular care of " your patients.      With Best Regards,    Da Fox PA-C

## 2024-03-21 NOTE — PROGRESS NOTES
"Breanna Kaba     VITALS: Blood pressure 138/78, pulse 86, temperature 96.9 °F (36.1 °C), temperature source Temporal, height 157.5 cm (62\"), weight 66.5 kg (146 lb 9.6 oz), SpO2 100%, not currently breastfeeding.    Subjective  Chief Complaint  Hip Pain (Right)    Subjective          History of Present Illness:  Patient is a 68 y.o. female with medical conditions significant for type 2 diabetes mellitus, gout, CHD, hyperlipidemia, and GERD who presents to clinic secondary to medical follow-up. Her right hip is painful.    She is in pain and is unable to stand, sit, bend, or sleep. During sleeping she assumes a fetal position to lessen the pain. The pain radiates down her right lower extremity. She had an x-ray which was reported normal. She has a wheelchair. The muscle relaxer did not help that was prescribed on 03/06/2024 by Gena HAIR. She went to the hospital the next day and they gave her hydrocodone with mild improvement Admits being dehydrated the last time she visited the healthcare facilty.     Her blood glucose is 148 mg/dl today. She is taking dietary measures, monitors her blood glucose and injects insulin regularly that has helped control her blood glucose.    She saw Da REHMAN to rule out cardiac causes of pain. No cardiac cause was identified and she was prescribed nitroglycerin.     The following portions of the patient's history were reviewed and updated as appropriate: allergies, current medications, past family history, past medical history, past social history, past surgical history and problem list.    Past Medical History  Past Medical History:   Diagnosis Date    Acute congestive heart failure, unspecified heart failure type 03/28/2022    Arthritis     Chronic pain     Diabetes mellitus     Elevated cholesterol     GERD (gastroesophageal reflux disease)     Gout     Headache     Hypertension     Myocardial infarction     Neuropathy        Surgical History  Past Surgical History: " "  Procedure Laterality Date    BREAST BIOPSY Left 1988    benign    CARDIAC CATHETERIZATION N/A 03/31/2022    Procedure: Left Heart Cath;  Surgeon: Tristan Marin MD;  [facility] CATH INVASIVE LOCATION;  Service: Cardiology;  Laterality: N/A;    CARDIAC CATHETERIZATION N/A 03/31/2022    Procedure: Percutaneous Coronary Intervention;  Surgeon: Tristan Marin MD;  Location:  [facility] CATH INVASIVE LOCATION;  Service: Cardiology;  Laterality: N/A;    COLONOSCOPY N/A 02/17/2020    Procedure: COLONOSCOPY FOR SCREENING CPT CODE: ;  Surgeon: Mariano Prescott MD;  Location: [facility] OR;  Service: Gastroenterology;  Laterality: N/A;    CORONARY STENT PLACEMENT      HYSTERECTOMY      Partial    KNEE SURGERY      THYROIDECTOMY, PARTIAL      Removal of goiter       Family History  Family History   Problem Relation Age of Onset    COPD Mother     Heart disease Brother     Heart attack Brother     Hypertension Daughter     Diabetes Daughter     Hypertension Daughter     Diabetes Daughter     Breast cancer Neg Hx        Social History  Social History     Socioeconomic History    Marital status:    Tobacco Use    Smoking status: Never     Passive exposure: Past    Smokeless tobacco: Never   Vaping Use    Vaping status: Never Used   Substance and Sexual Activity    Alcohol use: No    Drug use: No    Sexual activity: Defer       Objective   Vital Signs:   /78 (BP Location: Right arm, Patient Position: Sitting, Cuff Size: Adult)   Pulse 86   Temp 96.9 °F (36.1 °C) (Temporal)   Ht 157.5 cm (62\")   Wt 66.5 kg (146 lb 9.6 oz)   SpO2 100%   BMI 26.81 kg/m²     Physical Exam     Gen: Patient in NAD. Pleasant and answers appropriately. A&Ox3.    Skin: Warm and dry with normal turgor. No purpura, rashes, or unusual pigmentation noted. Hair is normal in appearance and distribution.    HEENT: NC/AT. No lesions noted. Conjunctiva clear, sclera nonicteric. PERRL. EOMI without nystagmus or strabismus. Fundi " appear benign. No hemorrhages or exudates of eyes. Auditory canals are patent bilaterally without lesions. TMs intact,  nonerythematous, nonbulging without lesions. Nasal mucosa erythematous, and nonedematous. Frontal and maxillary sinuses are nontender. O/P nonerythematous and moist without exudate.    Neck: Supple without lymph nodes palpated.  Decreased ROM.     Lungs: Decreased B/L without rales, rhonchi, crackles, or wheezes.    Heart: RRR. S1 and S2 normal. No S3 or S4. No MRGT.    Abd: Soft, nontender,nondistended. (+)BSx4 quadrants.     Extrem: No CCE. Radial pulses 2+/4 and equal B/L.  Positive joint tenderness noted.  Decreased range of motion of the right hip.  Patient is able to bear weight.    Neuro: No focal motor/sensory deficits.    Procedures    Result Review :   The following data was reviewed by: Myrna Ramos MD on 03/21/2024:              Assessment and Plan    This is a 68-year-old female presents to clinic for medical follow-up.    Diagnoses and all orders for this visit:    1. Right hip pain (Primary)  -     cyclobenzaprine (FLEXERIL) 10 MG tablet; Take 1 tablet by mouth 2 (Two) Times a Day As Needed for Muscle Spasms.  Dispense: 30 tablet; Refill: 0  -     amitriptyline (ELAVIL) 10 MG tablet; Take 1 tablet by mouth Every Night.  Dispense: 14 tablet; Refill: 0    2. Sciatica of right side  Comments:  Drink plenty of water to improve renal function to safely use injectable pain medication after renal function tests.  Consider referral to physical therapy.  Orders:  -     cyclobenzaprine (FLEXERIL) 10 MG tablet; Take 1 tablet by mouth 2 (Two) Times a Day As Needed for Muscle Spasms.  Dispense: 30 tablet; Refill: 0  -     amitriptyline (ELAVIL) 10 MG tablet; Take 1 tablet by mouth Every Night.  Dispense: 14 tablet; Refill: 0    3. Type 2 diabetes mellitus with diabetic neuropathy, with long-term current use of insulin  We will repeat renal function tests.      Problem List Items Addressed  This Visit    None  Visit Diagnoses       Right hip pain    -  Primary    Relevant Medications    cyclobenzaprine (FLEXERIL) 10 MG tablet    amitriptyline (ELAVIL) 10 MG tablet    Sciatica of right side        Drink plenty of water to improve renal function to safely use injectable pain medication after renal function tests.  Consider referral to physical therapy.    Relevant Medications    cyclobenzaprine (FLEXERIL) 10 MG tablet    amitriptyline (ELAVIL) 10 MG tablet              Follow Up   Return (keep next appt).  Findings and plans discussed with patient who verbalizes understanding and agreement. Will followup with patient once results are in. Patient was given instructions and counseling regarding her condition or for health maintenance advice. Please see specific information pulled into the AVS if appropriate.       Myrna Ramos MD    Transcribed from ambient dictation for Myrna Ramos MD by Melania Cardoso.  03/21/24   14:10 EDT    Patient or patient representative verbalized consent to the visit recording.  I have personally performed the services described in this document as transcribed by the above individual, and it is both accurate and complete.

## 2024-03-27 ENCOUNTER — OFFICE VISIT (OUTPATIENT)
Dept: FAMILY MEDICINE CLINIC | Facility: CLINIC | Age: 69
End: 2024-03-27
Payer: MEDICARE

## 2024-03-27 VITALS
SYSTOLIC BLOOD PRESSURE: 124 MMHG | BODY MASS INDEX: 27.23 KG/M2 | WEIGHT: 148 LBS | HEART RATE: 92 BPM | HEIGHT: 62 IN | OXYGEN SATURATION: 98 % | TEMPERATURE: 97.7 F | DIASTOLIC BLOOD PRESSURE: 72 MMHG

## 2024-03-27 DIAGNOSIS — Z79.4 TYPE 2 DIABETES MELLITUS WITH DIABETIC NEUROPATHY, WITH LONG-TERM CURRENT USE OF INSULIN: ICD-10-CM

## 2024-03-27 DIAGNOSIS — M54.31 SCIATICA OF RIGHT SIDE: ICD-10-CM

## 2024-03-27 DIAGNOSIS — M25.551 RIGHT HIP PAIN: Primary | ICD-10-CM

## 2024-03-27 DIAGNOSIS — E11.40 TYPE 2 DIABETES MELLITUS WITH DIABETIC NEUROPATHY, WITH LONG-TERM CURRENT USE OF INSULIN: ICD-10-CM

## 2024-03-27 RX ORDER — AMITRIPTYLINE HYDROCHLORIDE 10 MG/1
10 TABLET, FILM COATED ORAL NIGHTLY
Qty: 30 TABLET | Refills: 0 | Status: SHIPPED | OUTPATIENT
Start: 2024-03-27

## 2024-03-27 RX ORDER — CYCLOBENZAPRINE HCL 10 MG
10 TABLET ORAL 2 TIMES DAILY PRN
Qty: 30 TABLET | Refills: 0 | Status: SHIPPED | OUTPATIENT
Start: 2024-03-27

## 2024-03-27 NOTE — PROGRESS NOTES
"Chief Complaint  Right hip pain    Subjective        Breanna Kaba presents to [facility]  History of Present Illness  The patient is a 68-year-old female with medical conditions significant for type 2 diabetes mellitus, gout, CHD, hyperlipidemia who presents to clinic secondary to medical follow-up.    She still has right hip pain; however, it is not as bad. It is hard for her to get up and ambulate. She thinks the muscle relaxer is helping. She must lie on her side at nighttime. She has a heating pad. She has not done physical therapy. She is using the methocarbamol.    Objective   Vital Signs:  /72 (BP Location: Right arm, Patient Position: Sitting, Cuff Size: Adult)   Pulse 92   Temp 97.7 °F (36.5 °C) (Temporal)   Ht 157.5 cm (62\")   Wt 67.1 kg (148 lb)   SpO2 98%   BMI 27.07 kg/m²   Estimated body mass index is 27.07 kg/m² as calculated from the following:    Height as of this encounter: 157.5 cm (62\").    Weight as of this encounter: 67.1 kg (148 lb).               Physical Exam     Gen: Patient in NAD. Pleasant and answers appropriately. A&Ox3.    Skin: Warm and dry with normal turgor. No purpura, rashes, or unusual pigmentation noted. Hair is normal in appearance and distribution.    HEENT: NC/AT. No lesions noted. Conjunctiva clear, sclera nonicteric. PERRL. EOMI without nystagmus or strabismus. Fundi appear benign. No hemorrhages or exudates of eyes. Auditory canals are patent bilaterally without lesions. TMs intact,  nonerythematous, bulging without lesions. Nasal mucosa pink, nonerythematous, and nonedematous. Frontal and maxillary sinuses are nontender. O/P nonerythematous and moist without exudate.    Neck: Supple without lymph nodes palpated. FROM.     Lungs: Decreased B/L without rales, rhonchi, crackles, or wheezes.    Heart: RRR. S1 and S2 normal. No S3 or S4. No MRGT.    Abd: Soft, nontender,distended. (+)BSx4 quadrants.  No HSM or masses.    Extrem: " No CCE. Radial pulses 2+/4 and equal B/L.  Right hip tenderness positive joint tenderness noted.    Neuro: No focal motor/sensory deficits.           Assessment and Plan   This is a 68-year-old female presents to clinic for medical follow-up.    Diagnoses and all orders for this visit:    1. Right hip pain  -     cyclobenzaprine (FLEXERIL) 10 MG tablet; Take 1 tablet by mouth 2 (Two) Times a Day As Needed for Muscle Spasms.  Dispense: 30 tablet; Refill: 0  -     amitriptyline (ELAVIL) 10 MG tablet; Take 1 tablet by mouth Every Night.  Dispense: 30 tablet; Refill: 0    2. Sciatica of right side  Comments:  Drink plenty of water to improve renal function to safely use injectable pain medication after renal function tests.  Consider referral to physical therapy.  Orders:  -     cyclobenzaprine (FLEXERIL) 10 MG tablet; Take 1 tablet by mouth 2 (Two) Times a Day As Needed for Muscle Spasms.  Dispense: 30 tablet; Refill: 0  -     amitriptyline (ELAVIL) 10 MG tablet; Take 1 tablet by mouth Every Night.  Dispense: 30 tablet; Refill: 0    1. Hip pain.  Her hip looks good. Her kidneys are stable. She can use a heating pad. I will refill her medications. If her pain worsens, she will call me.         Follow Up     Return in about 2 months (around 5/27/2024).  Patient was given instructions and counseling regarding her condition or for health maintenance advice. Please see specific information pulled into the AVS if appropriate.     Transcribed from ambient dictation for Myrna Ramos MD by Lindy Chase.  03/27/24   12:35 EDT    Patient or patient representative verbalized consent to the visit recording.  I have personally performed the services described in this document as transcribed by the above individual, and it is both accurate and complete.

## 2024-04-25 ENCOUNTER — OFFICE VISIT (OUTPATIENT)
Dept: CARDIOLOGY | Facility: CLINIC | Age: 69
End: 2024-04-25
Payer: MEDICARE

## 2024-04-25 VITALS
WEIGHT: 148 LBS | BODY MASS INDEX: 27.23 KG/M2 | HEART RATE: 112 BPM | HEIGHT: 62 IN | OXYGEN SATURATION: 97 % | SYSTOLIC BLOOD PRESSURE: 124 MMHG | DIASTOLIC BLOOD PRESSURE: 73 MMHG

## 2024-04-25 DIAGNOSIS — I25.118 CORONARY ARTERY DISEASE OF NATIVE ARTERY OF NATIVE HEART WITH STABLE ANGINA PECTORIS: ICD-10-CM

## 2024-04-25 DIAGNOSIS — E78.2 MIXED HYPERLIPIDEMIA: Chronic | ICD-10-CM

## 2024-04-25 DIAGNOSIS — Z79.4 TYPE 2 DIABETES MELLITUS WITHOUT COMPLICATION, WITH LONG-TERM CURRENT USE OF INSULIN: ICD-10-CM

## 2024-04-25 DIAGNOSIS — E11.9 TYPE 2 DIABETES MELLITUS WITHOUT COMPLICATION, WITH LONG-TERM CURRENT USE OF INSULIN: ICD-10-CM

## 2024-04-25 DIAGNOSIS — I10 BENIGN ESSENTIAL HYPERTENSION: Chronic | ICD-10-CM

## 2024-04-25 DIAGNOSIS — I25.10 CORONARY ARTERY DISEASE INVOLVING NATIVE CORONARY ARTERY OF NATIVE HEART WITHOUT ANGINA PECTORIS: Primary | ICD-10-CM

## 2024-04-25 NOTE — PROGRESS NOTES
Office Note    Subjective     Breanna Kaba is a 68 y.o. female who presents to day for evaluation of coronary artery disease      Active Problems:  Problem List Items Addressed This Visit          Cardiac and Vasculature    Coronary artery disease involving native coronary artery of native heart without angina pectoris - Primary    Relevant Orders    Case Request Cath Lab: Left Heart Cath (Completed)       Endocrine and Metabolic    Diabetes mellitus    Relevant Orders    Case Request Cath Lab: Left Heart Cath (Completed)       HPI  Patient is a pleasant 68-year-old female past medical history significant of coronary disease s/p PTCA and stent placement on March 31, 2022.  She has been doing well.  In December 2023 she had a CTA coronaries done which showed significant disease in the LAD territory.  Patient was referred for further evaluation.  Patient is a diabetic she denies any chest pains or dyspnea on exertion she has had right leg pain and was treated for possible sciatic nerve problems.  She CTA chest was done while she was in the emergency room couple of months back.  Patient denies any dyspnea on exertion no paroxysmal dyspnea no palpitations denies any blackouts.    PRIOR MEDS  Current Outpatient Medications on File Prior to Visit   Medication Sig Dispense Refill    allopurinol (ZYLOPRIM) 300 MG tablet Take 1 tablet by mouth Daily. 90 tablet 1    aspirin 81 MG EC tablet Take 1 tablet by mouth Daily. 90 tablet 1    atorvastatin (LIPITOR) 80 MG tablet Take 1 tablet by mouth Daily. 90 tablet 1    Insulin Glargine (BASAGLAR KWIKPEN) 100 UNIT/ML injection pen Take 30 units INJ at night and 10 units INJ in the AM. 36 mL 1    losartan (Cozaar) 50 MG tablet Take 1 tablet by mouth Daily. 90 tablet 1    nitroglycerin (NITROSTAT) 0.4 MG SL tablet 1 under the tongue as needed for angina, may repeat q5mins for up three doses 100 tablet 11    OneTouch Verio test strip 1 each by Other route As Needed.       spironolactone (ALDACTONE) 25 MG tablet Take 1 tablet by mouth Daily. 90 tablet 1    trospium (SANCTURA) 20 MG tablet Take 1 tablet by mouth Every 12 (Twelve) Hours.      [DISCONTINUED] insulin aspart (NovoLOG FlexPen) 100 UNIT/ML solution pen-injector sc pen Inject 24 Units under the skin into the appropriate area as directed 3 (Three) Times a Day With Meals. (Patient taking differently: Inject 16 Units under the skin into the appropriate area as directed 3 (Three) Times a Day With Meals.) 66 mL 1    [DISCONTINUED] amitriptyline (ELAVIL) 10 MG tablet Take 1 tablet by mouth Every Night. (Patient not taking: Reported on 4/25/2024) 30 tablet 0    [DISCONTINUED] cyclobenzaprine (FLEXERIL) 10 MG tablet Take 1 tablet by mouth 2 (Two) Times a Day As Needed for Muscle Spasms. (Patient not taking: Reported on 4/25/2024) 30 tablet 0    [DISCONTINUED] glipizide (GLUCOTROL) 5 MG tablet Take 1 tablet by mouth Daily. (Patient not taking: Reported on 4/25/2024)       No current facility-administered medications on file prior to visit.       ALLERGIES  Asa [aspirin], Naproxen, and Penicillins    HISTORY  Past Medical History:   Diagnosis Date    Acute congestive heart failure, unspecified heart failure type 03/28/2022    Arthritis     Chronic pain     Coronary artery disease involving native coronary artery of native heart without angina pectoris 4/25/2024    Diabetes mellitus     Elevated cholesterol     GERD (gastroesophageal reflux disease)     Gout     Headache     Hypertension     Myocardial infarction     Neuropathy        Social History     Socioeconomic History    Marital status:    Tobacco Use    Smoking status: Never     Passive exposure: Past    Smokeless tobacco: Never   Vaping Use    Vaping status: Never Used   Substance and Sexual Activity    Alcohol use: No    Drug use: No    Sexual activity: Defer       Family History   Problem Relation Age of Onset    COPD Mother     Heart disease Brother     Heart attack  "Brother     Hypertension Daughter     Diabetes Daughter     Hypertension Daughter     Diabetes Daughter     Breast cancer Neg Hx        Review of Systems   Respiratory:  Positive for cough. Negative for apnea, chest tightness, shortness of breath and wheezing.    Cardiovascular:  Negative for chest pain, palpitations and leg swelling.   Musculoskeletal:  Negative for back pain.   Skin:  Negative for pallor and rash.       Objective     VITALS: /73 (BP Location: Left arm, Patient Position: Sitting, Cuff Size: Adult)   Pulse 112   Ht 157.5 cm (62\")   Wt 67.1 kg (148 lb)   LMP  (LMP Unknown) Comment: partial hysterectomy  SpO2 97%   BMI 27.07 kg/m²     LABS:   Office Visit on 02/14/2024   Component Date Value Ref Range Status    Glucose 02/14/2024 319 (H)  65 - 99 mg/dL Final    BUN 02/14/2024 31 (H)  8 - 23 mg/dL Final    Creatinine 02/14/2024 1.06 (H)  0.57 - 1.00 mg/dL Final    Sodium 02/14/2024 136  136 - 145 mmol/L Final    Potassium 02/14/2024 4.9  3.5 - 5.2 mmol/L Final    Chloride 02/14/2024 97 (L)  98 - 107 mmol/L Final    CO2 02/14/2024 26.2  22.0 - 29.0 mmol/L Final    Calcium 02/14/2024 10.5  8.6 - 10.5 mg/dL Final    Total Protein 02/14/2024 7.7  6.0 - 8.5 g/dL Final    Albumin 02/14/2024 4.2  3.5 - 5.2 g/dL Final    ALT (SGPT) 02/14/2024 24  1 - 33 U/L Final    AST (SGOT) 02/14/2024 24  1 - 32 U/L Final    Alkaline Phosphatase 02/14/2024 145 (H)  39 - 117 U/L Final    Total Bilirubin 02/14/2024 0.4  0.0 - 1.2 mg/dL Final    Globulin 02/14/2024 3.5  gm/dL Final    A/G Ratio 02/14/2024 1.2  g/dL Final    BUN/Creatinine Ratio 02/14/2024 29.2 (H)  7.0 - 25.0 Final    Anion Gap 02/14/2024 12.8  5.0 - 15.0 mmol/L Final    eGFR 02/14/2024 56.6 (L)  >60.0 mL/min/1.73 Final         IMAGING:   XR Hip With or Without Pelvis 2 - 3 View Right    Result Date: 3/6/2024  No fracture or dislocation.     This report was finalized on 3/6/2024 9:51 AM by Miguel Mcfarlane M.D..      Results for orders placed " during the hospital encounter of 12/24/23    Stress Test With Myocardial Perfusion One Day    Interpretation Summary    Impressions are consistent with a low risk study.    Left ventricular ejection fraction is normal (Calculated EF = 65%).    Breast attenuation and diaphragmatic attenuation artifacts are present.    Normal LV function No reversible ischemia noted Apical and inferior wall attenuation artifact noted Low risk study Will recommend outpatient follow-up.     No results found for this or any previous visit.          EXAM:  Constitutional:       General: Awake.      Appearance: Healthy appearance. Not in distress.   Eyes:      Conjunctiva/sclera: Conjunctivae normal.   HENT:      Head: Normocephalic and atraumatic.      Nose: Nose normal.    Mouth/Throat:      Pharynx: Oropharynx is clear.   Neck:      Thyroid: Thyroid normal.      Vascular: No carotid bruit or JVD.   Pulmonary:      Effort: Pulmonary effort is normal.      Breath sounds: Normal breath sounds.   Chest:      Chest wall: Not tender to palpatation.   Cardiovascular:      PMI at left midclavicular line. Normal rate. Regular rhythm. Normal S1 with normal intensity. Normal S2 with normal intensity.       Murmurs: There is no murmur.      No gallop.  No rub.   Pulses:     Intact distal pulses.   Edema:     Peripheral edema absent.   Abdominal:      General: Bowel sounds are normal. There is no distension.      Palpations: Abdomen is soft. There is no hepatomegaly.      Tenderness: There is no abdominal tenderness.   Musculoskeletal: Normal range of motion.      Cervical back: Normal range of motion. Skin:     General: Skin is warm and dry. There is no cyanosis.      Coloration: Skin is not jaundiced.   Neurological:      Mental Status: Alert and oriented to person, place and time.      Motor: Motor function is intact.      Gait: Gait is intact.   Psychiatric:         Attention and Perception: Attention and perception normal.         Speech:  Speech normal.         Behavior: Behavior is cooperative.         Cognition and Memory: Cognition and memory normal.         Judgment: Judgment normal.          Procedure   Procedures       Assessment & Plan     Diagnoses and all orders for this visit:    1. Coronary artery disease involving native coronary artery of native heart without angina pectoris (Primary)  -     Case Request Cath Lab: Left Heart Cath    2. Type 2 diabetes mellitus without complication, with long-term current use of insulin  -     Case Request Cath Lab: Left Heart Cath          PLAN  1 Cardiac. patient with history of coronary artery disease with stenting to the LAD in the past.  Recent CTA showed significant disease in the LAD territory.  Patient is a diabetic and does not have any angina symptoms currently.  She was requesting if cath could be done so her heart could be checked out.  Will do cath and see if she has any significant disease or not.  Will continue current therapy.  continue aspirin    2.  Hyperlipidemia.  Will continue atorvastatin.           MEDS ORDERED DURING VISIT:    Medications Discontinued During This Encounter   Medication Reason    amitriptyline (ELAVIL) 10 MG tablet *Therapy completed    cyclobenzaprine (FLEXERIL) 10 MG tablet *Therapy completed    glipizide (GLUCOTROL) 5 MG tablet *Therapy completed    insulin aspart (NovoLOG FlexPen) 100 UNIT/ML solution pen-injector sc pen *Therapy completed        No orders of the defined types were placed in this encounter.        Follow Up:   Return in about 8 weeks (around 6/20/2024) for Recheck.    Patient was given instructions and counseling regarding her condition or for health maintenance advice. Please see specific information pulled into the AVS if appropriate.   As always, Myrna Ramos MD  I appreciate very much the opportunity to participate in the cardiovascular care of your patients. Please do not hesitate to call me with any questions with regards to Breanna  Sendy evaluation and management.         This document has been electronically signed by Shu Regalado MD [facility], Interventional Cardiology  April 25, 2024 14:12 EDT    Dictated Utilizing Dragon Dictation: Part of this note may be an electronic transcription/translation of spoken language to printed text using the Dragon Dictation System.

## 2024-04-30 ENCOUNTER — TELEPHONE (OUTPATIENT)
Dept: FAMILY MEDICINE CLINIC | Facility: CLINIC | Age: 69
End: 2024-04-30
Payer: MEDICARE

## 2024-04-30 NOTE — TELEPHONE ENCOUNTER
I totally understand 100% because I worry about the same thing. Will discuss more with her at her next visit to see if there are any concerns. Can reassure her by also telling her that it is normal to have more problems going as we age.

## 2024-04-30 NOTE — TELEPHONE ENCOUNTER
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

## 2024-04-30 NOTE — TELEPHONE ENCOUNTER
You can let her know she's not due for one yet. Last time she got one was 2/2020 and they said 5 years so it will be next year when I send her. Is she having problems?

## 2024-04-30 NOTE — TELEPHONE ENCOUNTER
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

## 2024-05-06 ENCOUNTER — TELEPHONE (OUTPATIENT)
Dept: CARDIOLOGY | Facility: CLINIC | Age: 69
End: 2024-05-06

## 2024-05-06 NOTE — TELEPHONE ENCOUNTER
Caller: Breanna Kaba    Relationship: Self    Best call back number: [phone]    What is the best time to reach you: ANYTIME    What was the call regarding: PATIENT WOULD LIKE TO KNOW HER ARRIVAL TIME FOR HER HEART CATH TOMORROW AND WHERE SHE SHOULD GO.

## 2024-05-08 ENCOUNTER — HOSPITAL ENCOUNTER (OUTPATIENT)
Facility: HOSPITAL | Age: 69
Discharge: HOME | End: 2024-05-09
Attending: INTERNAL MEDICINE | Admitting: INTERNAL MEDICINE
Payer: MEDICARE

## 2024-05-08 DIAGNOSIS — E11.9 TYPE 2 DIABETES MELLITUS WITHOUT COMPLICATION, WITH LONG-TERM CURRENT USE OF INSULIN: ICD-10-CM

## 2024-05-08 DIAGNOSIS — Z79.4 TYPE 2 DIABETES MELLITUS WITHOUT COMPLICATION, WITH LONG-TERM CURRENT USE OF INSULIN: ICD-10-CM

## 2024-05-08 DIAGNOSIS — E11.40 TYPE 2 DIABETES MELLITUS WITH DIABETIC NEUROPATHY, WITH LONG-TERM CURRENT USE OF INSULIN: ICD-10-CM

## 2024-05-08 DIAGNOSIS — I25.10 CORONARY ARTERY DISEASE INVOLVING NATIVE CORONARY ARTERY OF NATIVE HEART WITHOUT ANGINA PECTORIS: ICD-10-CM

## 2024-05-08 DIAGNOSIS — Z79.4 TYPE 2 DIABETES MELLITUS WITH DIABETIC NEUROPATHY, WITH LONG-TERM CURRENT USE OF INSULIN: ICD-10-CM

## 2024-05-08 LAB
ANION GAP SERPL CALCULATED.3IONS-SCNC: 11.7 MMOL/L (ref 5–15)
BUN SERPL-MCNC: 31 MG/DL (ref 8–23)
BUN/CREAT SERPL: 28.7 (ref 7–25)
CALCIUM SPEC-SCNC: 10.2 MG/DL (ref 8.6–10.5)
CATH EF ESTIMATED: 55 %
CHLORIDE SERPL-SCNC: 101 MMOL/L (ref 98–107)
CO2 SERPL-SCNC: 24.3 MMOL/L (ref 22–29)
CREAT SERPL-MCNC: 1.08 MG/DL (ref 0.57–1)
DEPRECATED RDW RBC AUTO: 42.9 FL (ref 37–54)
EGFRCR SERPLBLD CKD-EPI 2021: 55.4 ML/MIN/1.73
ERYTHROCYTE [DISTWIDTH] IN BLOOD BY AUTOMATED COUNT: 14.1 % (ref 12.3–15.4)
GLUCOSE BLDC GLUCOMTR-MCNC: 323 MG/DL (ref 70–130)
GLUCOSE SERPL-MCNC: 201 MG/DL (ref 65–99)
HCT VFR BLD AUTO: 38.6 % (ref 34–46.6)
HGB BLD-MCNC: 13 G/DL (ref 12–15.9)
MCH RBC QN AUTO: 28.2 PG (ref 26.6–33)
MCHC RBC AUTO-ENTMCNC: 33.7 G/DL (ref 31.5–35.7)
MCV RBC AUTO: 83.7 FL (ref 79–97)
PLATELET # BLD AUTO: 332 10*3/MM3 (ref 140–450)
PMV BLD AUTO: 9.9 FL (ref 6–12)
POTASSIUM SERPL-SCNC: 4.8 MMOL/L (ref 3.5–5.2)
RBC # BLD AUTO: 4.61 10*6/MM3 (ref 3.77–5.28)
SODIUM SERPL-SCNC: 137 MMOL/L (ref 136–145)
WBC NRBC COR # BLD AUTO: 9.76 10*3/MM3 (ref 3.4–10.8)

## 2024-05-08 PROCEDURE — 92928 PRQ TCAT PLMT NTRAC ST 1 LES: CPT | Performed by: INTERNAL MEDICINE

## 2024-05-08 PROCEDURE — C1760 CLOSURE DEV, VASC: HCPCS | Performed by: INTERNAL MEDICINE

## 2024-05-08 PROCEDURE — 93571 IV DOP VEL&/PRESS C FLO 1ST: CPT | Performed by: INTERNAL MEDICINE

## 2024-05-08 PROCEDURE — G0378 HOSPITAL OBSERVATION PER HR: HCPCS

## 2024-05-08 PROCEDURE — C1887 CATHETER, GUIDING: HCPCS | Performed by: INTERNAL MEDICINE

## 2024-05-08 PROCEDURE — 25010000002 MIDAZOLAM PER 1 MG: Performed by: INTERNAL MEDICINE

## 2024-05-08 PROCEDURE — C1894 INTRO/SHEATH, NON-LASER: HCPCS | Performed by: INTERNAL MEDICINE

## 2024-05-08 PROCEDURE — 25810000003 SODIUM CHLORIDE 0.9 % SOLUTION: Performed by: INTERNAL MEDICINE

## 2024-05-08 PROCEDURE — 93458 L HRT ARTERY/VENTRICLE ANGIO: CPT | Performed by: INTERNAL MEDICINE

## 2024-05-08 PROCEDURE — C1725 CATH, TRANSLUMIN NON-LASER: HCPCS | Performed by: INTERNAL MEDICINE

## 2024-05-08 PROCEDURE — 25510000001 IOPAMIDOL PER 1 ML: Performed by: INTERNAL MEDICINE

## 2024-05-08 PROCEDURE — 93005 ELECTROCARDIOGRAM TRACING: CPT | Performed by: INTERNAL MEDICINE

## 2024-05-08 PROCEDURE — 80048 BASIC METABOLIC PNL TOTAL CA: CPT | Performed by: INTERNAL MEDICINE

## 2024-05-08 PROCEDURE — 63710000001 INSULIN GLARGINE PER 5 UNITS: Performed by: INTERNAL MEDICINE

## 2024-05-08 PROCEDURE — 25010000002 FENTANYL CITRATE (PF) 50 MCG/ML SOLUTION: Performed by: INTERNAL MEDICINE

## 2024-05-08 PROCEDURE — S0260 H&P FOR SURGERY: HCPCS | Performed by: INTERNAL MEDICINE

## 2024-05-08 PROCEDURE — 82948 REAGENT STRIP/BLOOD GLUCOSE: CPT

## 2024-05-08 PROCEDURE — 25010000002 HEPARIN (PORCINE) PER 1000 UNITS: Performed by: INTERNAL MEDICINE

## 2024-05-08 PROCEDURE — C1769 GUIDE WIRE: HCPCS | Performed by: INTERNAL MEDICINE

## 2024-05-08 PROCEDURE — 93799 UNLISTED CV SVC/PROCEDURE: CPT | Performed by: INTERNAL MEDICINE

## 2024-05-08 PROCEDURE — 94761 N-INVAS EAR/PLS OXIMETRY MLT: CPT

## 2024-05-08 PROCEDURE — C9600 PERC DRUG-EL COR STENT SING: HCPCS | Performed by: INTERNAL MEDICINE

## 2024-05-08 PROCEDURE — 94799 UNLISTED PULMONARY SVC/PX: CPT

## 2024-05-08 PROCEDURE — 85027 COMPLETE CBC AUTOMATED: CPT | Performed by: INTERNAL MEDICINE

## 2024-05-08 PROCEDURE — C1874 STENT, COATED/COV W/DEL SYS: HCPCS | Performed by: INTERNAL MEDICINE

## 2024-05-08 PROCEDURE — 25010000002 HYDRALAZINE PER 20 MG: Performed by: INTERNAL MEDICINE

## 2024-05-08 DEVICE — XIENCE SKYPOINT™ EVEROLIMUS ELUTING CORONARY STENT SYSTEM 2.50 MM X 33 MM / RAPID-EXCHANGE
Type: IMPLANTABLE DEVICE | Site: CORONARY | Status: FUNCTIONAL
Brand: XIENCE SKYPOINT™

## 2024-05-08 DEVICE — XIENCE SKYPOINT™ EVEROLIMUS ELUTING CORONARY STENT SYSTEM 2.75 MM X 12 MM / RAPID-EXCHANGE
Type: IMPLANTABLE DEVICE | Site: CORONARY | Status: FUNCTIONAL
Brand: XIENCE SKYPOINT™

## 2024-05-08 RX ORDER — SODIUM CHLORIDE 9 MG/ML
INJECTION, SOLUTION INTRAVENOUS
Status: COMPLETED | OUTPATIENT
Start: 2024-05-08 | End: 2024-05-08

## 2024-05-08 RX ORDER — HYDRALAZINE HYDROCHLORIDE 20 MG/ML
INJECTION INTRAMUSCULAR; INTRAVENOUS
Status: DISCONTINUED | OUTPATIENT
Start: 2024-05-08 | End: 2024-05-08 | Stop reason: HOSPADM

## 2024-05-08 RX ORDER — NITROGLYCERIN 0.4 MG/1
0.4 TABLET SUBLINGUAL DAILY
Status: DISCONTINUED | OUTPATIENT
Start: 2024-05-08 | End: 2024-05-08

## 2024-05-08 RX ORDER — ACETAMINOPHEN 325 MG/1
650 TABLET ORAL EVERY 4 HOURS PRN
Status: DISCONTINUED | OUTPATIENT
Start: 2024-05-08 | End: 2024-05-09 | Stop reason: HOSPADM

## 2024-05-08 RX ORDER — NITROGLYCERIN 0.4 MG/1
0.4 TABLET SUBLINGUAL
Status: DISCONTINUED | OUTPATIENT
Start: 2024-05-08 | End: 2024-05-09 | Stop reason: HOSPADM

## 2024-05-08 RX ORDER — ALLOPURINOL 300 MG/1
300 TABLET ORAL DAILY
Status: DISCONTINUED | OUTPATIENT
Start: 2024-05-08 | End: 2024-05-09 | Stop reason: HOSPADM

## 2024-05-08 RX ORDER — ASPIRIN 81 MG/1
81 TABLET ORAL DAILY
Status: DISCONTINUED | OUTPATIENT
Start: 2024-05-08 | End: 2024-05-08 | Stop reason: SDUPTHER

## 2024-05-08 RX ORDER — SPIRONOLACTONE 25 MG/1
25 TABLET ORAL DAILY
Status: DISCONTINUED | OUTPATIENT
Start: 2024-05-08 | End: 2024-05-09 | Stop reason: HOSPADM

## 2024-05-08 RX ORDER — ESTRADIOL 0.1 MG/G
1 CREAM VAGINAL 2 TIMES WEEKLY
COMMUNITY

## 2024-05-08 RX ORDER — HEPARIN SODIUM 1000 [USP'U]/ML
INJECTION, SOLUTION INTRAVENOUS; SUBCUTANEOUS
Status: DISCONTINUED | OUTPATIENT
Start: 2024-05-08 | End: 2024-05-08 | Stop reason: HOSPADM

## 2024-05-08 RX ORDER — SODIUM CHLORIDE 9 MG/ML
1 INJECTION, SOLUTION INTRAVENOUS CONTINUOUS
Status: ACTIVE | OUTPATIENT
Start: 2024-05-08 | End: 2024-05-08

## 2024-05-08 RX ORDER — MIDAZOLAM HYDROCHLORIDE 1 MG/ML
INJECTION INTRAMUSCULAR; INTRAVENOUS
Status: DISCONTINUED | OUTPATIENT
Start: 2024-05-08 | End: 2024-05-08 | Stop reason: HOSPADM

## 2024-05-08 RX ORDER — LOSARTAN POTASSIUM 50 MG/1
50 TABLET ORAL DAILY
Status: DISCONTINUED | OUTPATIENT
Start: 2024-05-08 | End: 2024-05-09 | Stop reason: HOSPADM

## 2024-05-08 RX ORDER — DIPHENHYDRAMINE HCL 25 MG
25 CAPSULE ORAL EVERY 6 HOURS PRN
Status: DISCONTINUED | OUTPATIENT
Start: 2024-05-08 | End: 2024-05-09 | Stop reason: HOSPADM

## 2024-05-08 RX ORDER — ASPIRIN 81 MG/1
81 TABLET ORAL DAILY
Status: DISCONTINUED | OUTPATIENT
Start: 2024-05-09 | End: 2024-05-09 | Stop reason: HOSPADM

## 2024-05-08 RX ORDER — ALPRAZOLAM 0.5 MG/1
0.5 TABLET ORAL 3 TIMES DAILY PRN
Status: DISCONTINUED | OUTPATIENT
Start: 2024-05-08 | End: 2024-05-09 | Stop reason: HOSPADM

## 2024-05-08 RX ORDER — FENTANYL CITRATE 50 UG/ML
INJECTION, SOLUTION INTRAMUSCULAR; INTRAVENOUS
Status: DISCONTINUED | OUTPATIENT
Start: 2024-05-08 | End: 2024-05-08 | Stop reason: HOSPADM

## 2024-05-08 RX ORDER — ASPIRIN 81 MG/1
TABLET, CHEWABLE ORAL
Status: DISCONTINUED | OUTPATIENT
Start: 2024-05-08 | End: 2024-05-08 | Stop reason: HOSPADM

## 2024-05-08 RX ORDER — LIDOCAINE HYDROCHLORIDE 20 MG/ML
INJECTION, SOLUTION INFILTRATION; PERINEURAL
Status: DISCONTINUED | OUTPATIENT
Start: 2024-05-08 | End: 2024-05-08 | Stop reason: HOSPADM

## 2024-05-08 RX ORDER — ATORVASTATIN CALCIUM 40 MG/1
80 TABLET, FILM COATED ORAL DAILY
Status: DISCONTINUED | OUTPATIENT
Start: 2024-05-08 | End: 2024-05-09 | Stop reason: HOSPADM

## 2024-05-08 RX ORDER — ESTRADIOL 0.1 MG/G
1 CREAM VAGINAL 2 TIMES WEEKLY
Status: CANCELLED | OUTPATIENT
Start: 2024-05-09

## 2024-05-08 RX ORDER — CYCLOBENZAPRINE HCL 10 MG
10 TABLET ORAL 2 TIMES DAILY PRN
COMMUNITY

## 2024-05-08 RX ORDER — INSULIN GLARGINE 100 [IU]/ML
32 INJECTION, SOLUTION SUBCUTANEOUS NIGHTLY
COMMUNITY

## 2024-05-08 RX ORDER — ALUMINA, MAGNESIA, AND SIMETHICONE 2400; 2400; 240 MG/30ML; MG/30ML; MG/30ML
15 SUSPENSION ORAL EVERY 6 HOURS PRN
Status: DISCONTINUED | OUTPATIENT
Start: 2024-05-08 | End: 2024-05-09 | Stop reason: HOSPADM

## 2024-05-08 RX ORDER — ONDANSETRON 4 MG/1
4 TABLET, ORALLY DISINTEGRATING ORAL EVERY 6 HOURS PRN
Status: DISCONTINUED | OUTPATIENT
Start: 2024-05-08 | End: 2024-05-09 | Stop reason: HOSPADM

## 2024-05-08 RX ORDER — OXYBUTYNIN CHLORIDE 5 MG/1
5 TABLET, EXTENDED RELEASE ORAL DAILY
Status: DISCONTINUED | OUTPATIENT
Start: 2024-05-08 | End: 2024-05-09 | Stop reason: HOSPADM

## 2024-05-08 RX ORDER — TEMAZEPAM 15 MG/1
15 CAPSULE ORAL NIGHTLY PRN
Status: DISCONTINUED | OUTPATIENT
Start: 2024-05-08 | End: 2024-05-09 | Stop reason: HOSPADM

## 2024-05-08 RX ORDER — ONDANSETRON 2 MG/ML
4 INJECTION INTRAMUSCULAR; INTRAVENOUS EVERY 6 HOURS PRN
Status: DISCONTINUED | OUTPATIENT
Start: 2024-05-08 | End: 2024-05-09 | Stop reason: HOSPADM

## 2024-05-08 RX ORDER — CYCLOBENZAPRINE HCL 10 MG
10 TABLET ORAL 2 TIMES DAILY PRN
Status: CANCELLED | OUTPATIENT
Start: 2024-05-08

## 2024-05-08 RX ADMIN — TICAGRELOR 90 MG: 90 TABLET ORAL at 21:38

## 2024-05-08 RX ADMIN — ATORVASTATIN CALCIUM 80 MG: 40 TABLET, FILM COATED ORAL at 15:09

## 2024-05-08 RX ADMIN — INSULIN GLARGINE 30 UNITS: 100 INJECTION, SOLUTION SUBCUTANEOUS at 21:39

## 2024-05-08 RX ADMIN — NITROGLYCERIN 0.4 MG: 0.4 TABLET, ORALLY DISINTEGRATING SUBLINGUAL at 15:09

## 2024-05-08 RX ADMIN — OXYBUTYNIN CHLORIDE 5 MG: 5 TABLET, EXTENDED RELEASE ORAL at 15:09

## 2024-05-08 RX ADMIN — LOSARTAN POTASSIUM 50 MG: 50 TABLET, FILM COATED ORAL at 15:09

## 2024-05-08 RX ADMIN — SPIRONOLACTONE 25 MG: 25 TABLET ORAL at 15:09

## 2024-05-08 RX ADMIN — ALLOPURINOL 300 MG: 300 TABLET ORAL at 15:09

## 2024-05-08 NOTE — H&P
.      Patient Identification:  Name:  Breanna Kaba  Age:  68 y.o.  Sex:  female  :  1955  MRN:  [id number]   Visit Number:  [id number]  Primary Care Physician:  Myrna Ramos MD    Chief complaint:   Chest pain  History of presenting illness:    Patient is a 68-year-old female past medical history significant for coronary disease s/p PTCA and stent placement to the LAD in 2022.  Patient had a CTA done  which showed significant disease in the LAD territory.  Patient was referred for cath.  Patient has not had any discomfort in the chest or dyspnea on exertion has leg pain and has been checked for sciatic nerve problem.  Patient last saw Darrius Fox on 2023.  Patient used to have 20 to 25% EF in the past and is up to 50 to 55% now.    ROS: All systems reviewed and negative except as mentioned above.     ---------------------------------------------------------------------------------------------------------------------   Past Medical History:   Diagnosis Date    Acute congestive heart failure, unspecified heart failure type 2022    Arthritis     Chronic pain     Coronary artery disease involving native coronary artery of native heart without angina pectoris 2024    Diabetes mellitus     Elevated cholesterol     GERD (gastroesophageal reflux disease)     Gout     Headache     Hypertension     Myocardial infarction     Neuropathy      Past Surgical History:   Procedure Laterality Date    BREAST BIOPSY Left     benign    CARDIAC CATHETERIZATION N/A 2022    Procedure: Left Heart Cath;  Surgeon: Tristan Marin MD;  Location:  [facility];  Service: Cardiology;  Laterality: N/A;    CARDIAC CATHETERIZATION N/A 2022    Procedure: Percutaneous Coronary Intervention;  Surgeon: Tristan Marin MD;  Location:  [facility];  Service: Cardiology;  Laterality: N/A;    COLONOSCOPY N/A 2020    Procedure: COLONOSCOPY  FOR SCREENING CPT CODE: ;  Surgeon: Mariano Prescott MD;  [facility];  Service: Gastroenterology;  Laterality: N/A;    CORONARY STENT PLACEMENT      HYSTERECTOMY      Partial    KNEE SURGERY      THYROIDECTOMY, PARTIAL      Removal of goiter     Family History   Problem Relation Age of Onset    COPD Mother     Heart disease Brother     Heart attack Brother     Hypertension Daughter     Diabetes Daughter     Hypertension Daughter     Diabetes Daughter     Breast cancer Neg Hx      Social History     Socioeconomic History    Marital status:    Tobacco Use    Smoking status: Never     Passive exposure: Past    Smokeless tobacco: Never   Vaping Use    Vaping status: Never Used   Substance and Sexual Activity    Alcohol use: No    Drug use: No    Sexual activity: Defer     ---------------------------------------------------------------------------------------------------------------------   Allergies:  Asa [aspirin], Naproxen, and Penicillins  ---------------------------------------------------------------------------------------------------------------------   Prior to Admission Medications       Prescriptions Last Dose Informant Patient Reported? Taking?    allopurinol (ZYLOPRIM) 300 MG tablet 5/7/2024  No Yes    Take 1 tablet by mouth Daily.    aspirin 81 MG EC tablet 5/7/2024  No Yes    Take 1 tablet by mouth Daily.    atorvastatin (LIPITOR) 80 MG tablet 5/7/2024  No Yes    Take 1 tablet by mouth Daily.    Insulin Glargine (BASAGLAR KWIKPEN) 100 UNIT/ML injection pen 5/7/2024  No Yes    Take 30 units INJ at night and 10 units INJ in the AM.    losartan (Cozaar) 50 MG tablet 5/7/2024  No Yes    Take 1 tablet by mouth Daily.    nitroglycerin (NITROSTAT) 0.4 MG SL tablet Patient Taking Differently  No Yes    1 under the tongue as needed for angina, may repeat q5mins for up three doses    Patient taking differently:  Place 1 tablet under the tongue Daily. 1 under the tongue as needed for  angina, may repeat q5mins for up three doses    OneTouch Verio test strip 5/7/2024  Yes Yes    1 each by Other route As Needed.    spironolactone (ALDACTONE) 25 MG tablet 5/7/2024  No Yes    Take 1 tablet by mouth Daily.    trospium (SANCTURA) 20 MG tablet 5/7/2024  Yes Yes    Take 1 tablet by mouth Every 12 (Twelve) Hours.          Hospital Scheduled Meds:    No current facility-administered medications for this encounter.    ---------------------------------------------------------------------------------------------------------------------   Vital Signs:  Temp:  [98 °F (36.7 °C)] 98 °F (36.7 °C)  Heart Rate:  [104] 104  Resp:  [16] 16  BP: (178)/(95) 178/95      05/08/24  0802   Weight: 67.1 kg (148 lb)     Body mass index is 27.07 kg/m².  ---------------------------------------------------------------------------------------------------------------------   Physical Exam:  Constitutional:  Well-developed and well-nourished.  No respiratory distress.      HENT:  Head: Normocephalic and atraumatic.  Mouth:  Moist mucous membranes.    Eyes:  Conjunctivae and EOM are normal.  Pupils are equal, round, and reactive to light.  No scleral icterus.  Neck:  Neck supple.  No JVD present.    Cardiovascular:  Normal rate, regular rhythm and normal heart sounds with no murmur.  Pulmonary/Chest:  No respiratory distress, no wheezes, no crackles, with normal breath sounds and good air movement.  Abdominal:  Soft.  Bowel sounds are normal.  No distension and no tenderness.   Musculoskeletal:  No edema, no tenderness, and no deformity.  No red or swollen joints anywhere.    Neurological:  Alert and oriented to person, place, and time.  No cranial nerve deficit.  No tongue deviation.  No facial droop.  No slurred speech.   Skin:  Skin is warm and dry.  No rash noted.  No pallor.   Psychiatric:  Normal mood and affect.  Behavior is normal.  Judgment and thought content normal.   Peripheral vascular:  No edema and strong pulses on  "all 4 extremities.  ---------------------------------------------------------------------------------------------------------------------  EKG: Sinus rhythm  Telemetry: Sinus rhythm  I have personally looked at both the EKG and the telemetry strips.  ---------------------------------------------------------------------------------------------------------------------   Results from last 7 days   Lab Units 05/08/24  0747   WBC 10*3/mm3 9.76   HEMOGLOBIN g/dL 13.0   HEMATOCRIT % 38.6   MCV fL 83.7   MCHC g/dL 33.7   PLATELETS 10*3/mm3 332         Results from last 7 days   Lab Units 05/08/24  0747   SODIUM mmol/L 137   POTASSIUM mmol/L 4.8   CHLORIDE mmol/L 101   CO2 mmol/L 24.3   BUN mg/dL 31*   CREATININE mg/dL 1.08*   CALCIUM mg/dL 10.2   GLUCOSE mg/dL 201*   Estimated Creatinine Clearance: 43.5 mL/min (A) (by C-G formula based on SCr of 1.08 mg/dL (H)).  No results found for: \"AMMONIA\"          Lab Results   Component Value Date    HGBA1C 10.80 (H) 01/02/2024     Lab Results   Component Value Date    TSH 1.260 06/06/2022    FREET4 1.42 03/31/2022     No results found for: \"PREGTESTUR\", \"PREGSERUM\", \"HCG\", \"HCGQUANT\"  Pain Management Panel  More data may exist         Latest Ref Rng & Units 11/28/2022 4/1/2022   Pain Management Panel   Creatinine, Urine mg/dL  mg/dL 51.0  51.0  20.3      No results found for: \"BLOODCX\"  No results found for: \"URINECX\"  No results found for: \"WOUNDCX\"  No results found for: \"STOOLCX\"      ---------------------------------------------------------------------------------------------------------------------  Imaging Results (Last 7 Days)       ** No results found for the last 168 hours. **            I have personally reviewed the radiology images and read the final radiology report.  ---------------------------------------------------------------------------------------------------------------------  Assessment and Plan:   #1 cardiac.  Patient with history of coronary disease with PCI " to the LAD in the past.  CT coronary done in December 2023 showed LAD calcium score of 1730 with total calcium score of 2253.  Circumflex calcium score was 352 and RCA calcium score was 171.  Distal to LAD stent moderate to severe stenosis may be noted.  Patient was referred for a cath and will proceed with same.          This document has been electronically signed by Shu Regalado MD [facility], Interventional Cardiology  May 8, 2024 08:30 EDT

## 2024-05-08 NOTE — Clinical Note
First balloon inflation max pressure = 12 arielle. First balloon inflation duration = 16 seconds. Second inflation of balloon - Max pressure = 16 arielle. 2nd Inflation of balloon - Duration = 12 seconds.

## 2024-05-08 NOTE — Clinical Note
Catheter Pulled back from LV to AO. Measurement captured. Rhomboid Transposition Flap Text: The defect edges were debeveled with a #15 scalpel blade.  Given the location of the defect and the proximity to free margins a rhomboid transposition flap was deemed most appropriate.  Using a sterile surgical marker, an appropriate rhomboid flap was drawn incorporating the defect.    The area thus outlined was incised deep to adipose tissue with a #15 scalpel blade.  The skin margins were undermined to an appropriate distance in all directions utilizing iris scissors.

## 2024-05-08 NOTE — Clinical Note
Second inflation of balloon - Max pressure = 14 arielle. 2nd Inflation of balloon - Duration = 5 seconds.

## 2024-05-08 NOTE — CASE MANAGEMENT/SOCIAL WORK
Discharge Planning Assessment   [place]     Patient Name: Breanna Kaba  MRN: [id number]  Today's Date: 5/8/2024    Admit Date: 5/8/2024    Plan: SS received a consult for discharge planning. SS spoke with pt at bedside on this date. Pt lives alone. Pt does not utilize home health or DME at this time. PCP is Myrna Ramos. Pt does not have a POA/Living will. Pt plans to return home at discharge with familiy to provide transportation at discharge. SS to sign off at this time. SS can be reconsulted at a later date if needed.   Discharge Needs Assessment       Row Name 05/08/24 1647       Living Environment    People in Home alone    Current Living Arrangements home    Potentially Unsafe Housing Conditions none    Primary Care Provided by self    Provides Primary Care For no one    Family Caregiver if Needed child(gael), adult;grandchild(gael), adult    Quality of Family Relationships helpful;involved;supportive    Able to Return to Prior Arrangements yes       Resource/Environmental Concerns    Resource/Environmental Concerns none    Transportation Concerns none       Transition Planning    Patient/Family Anticipates Transition to home    Patient/Family Anticipated Services at Transition none    Transportation Anticipated family or friend will provide       Discharge Needs Assessment    Equipment Currently Used at Home none    Anticipated Changes Related to Illness none    Equipment Needed After Discharge none           Discharge Plan       Row Name 05/08/24 1648       Plan    Plan SS received a consult for discharge planning. SS spoke with pt at bedside on this date. Pt lives alone. Pt does not utilize home health or DME at this time. PCP is Myrna Ramos. Pt does not have a POA/Living will. Pt plans to return home at discharge with familiy to provide transportation at discharge. SS to sign off at this time. SS can be reconsulted at a later date if needed.    Patient/Family in Agreement with Plan yes            Demographic Summary       Row Name 05/08/24 8413       General Information    Admission Type observation    Referral Source nursing    Reason for Consult discharge planning  SS received a consult for discharge planning.          ANGELA Rodríguez

## 2024-05-08 NOTE — PLAN OF CARE
Goal Outcome Evaluation:         Patient resting in bed comfortably at this time. Patient pleasant and cooperative with care this shift. Pt R femoral cardiac cath site clean/dry/intact. No complaints or concerns being voiced at this time. No s/s of acute distress noted at this time. Plan of care ongoing.

## 2024-05-08 NOTE — NURSING NOTE
Patient's daughter updated on patient status and transfer from cath lab to [address] [place] [address]

## 2024-05-08 NOTE — Clinical Note
First balloon inflation max pressure = 14 arielle. First balloon inflation duration = 7 seconds. Second inflation of balloon - Max pressure = 14 arielle. 2nd Inflation of balloon - Duration = 5 seconds. Third inflation of balloon - Max pressure = 14 arielle. 3rd Inflation of balloon - Duration = 10 seconds. Fourth inflation of balloon - Max pressure = 18 arielle. 4th Inflation of balloon - Duration = 12 seconds.

## 2024-05-08 NOTE — Clinical Note
First balloon inflation max pressure = 12 arielle. First balloon inflation duration = 8 seconds. Second inflation of balloon - Max pressure = 12 arielle. 2nd Inflation of balloon - Duration = 6 seconds. Third inflation of balloon - Max pressure = 12 arielle. 3rd Inflation of balloon - Duration = 7 seconds. Fourth inflation of balloon - Max pressure = 12 arielle. 4th Inflation of balloon - Duration = 4 seconds.

## 2024-05-09 ENCOUNTER — READMISSION MANAGEMENT (OUTPATIENT)
Dept: CALL CENTER | Facility: HOSPITAL | Age: 69
End: 2024-05-09
Payer: MEDICARE

## 2024-05-09 ENCOUNTER — TELEPHONE (OUTPATIENT)
Dept: FAMILY MEDICINE CLINIC | Facility: CLINIC | Age: 69
End: 2024-05-09
Payer: MEDICARE

## 2024-05-09 VITALS
HEIGHT: 62 IN | DIASTOLIC BLOOD PRESSURE: 63 MMHG | BODY MASS INDEX: 27.02 KG/M2 | OXYGEN SATURATION: 98 % | WEIGHT: 146.83 LBS | SYSTOLIC BLOOD PRESSURE: 107 MMHG | HEART RATE: 95 BPM | TEMPERATURE: 98 F | RESPIRATION RATE: 18 BRPM

## 2024-05-09 DIAGNOSIS — Z95.5 STENTED CORONARY ARTERY: Primary | ICD-10-CM

## 2024-05-09 LAB
ANION GAP SERPL CALCULATED.3IONS-SCNC: 10.7 MMOL/L (ref 5–15)
BUN SERPL-MCNC: 37 MG/DL (ref 8–23)
BUN/CREAT SERPL: 30.6 (ref 7–25)
CALCIUM SPEC-SCNC: 9.2 MG/DL (ref 8.6–10.5)
CHLORIDE SERPL-SCNC: 102 MMOL/L (ref 98–107)
CHOLEST SERPL-MCNC: 150 MG/DL (ref 0–200)
CO2 SERPL-SCNC: 21.3 MMOL/L (ref 22–29)
CREAT SERPL-MCNC: 1.21 MG/DL (ref 0.57–1)
DEPRECATED RDW RBC AUTO: 44.1 FL (ref 37–54)
EGFRCR SERPLBLD CKD-EPI 2021: 48.3 ML/MIN/1.73
ERYTHROCYTE [DISTWIDTH] IN BLOOD BY AUTOMATED COUNT: 14.3 % (ref 12.3–15.4)
GLUCOSE BLDC GLUCOMTR-MCNC: 165 MG/DL (ref 70–130)
GLUCOSE BLDC GLUCOMTR-MCNC: 267 MG/DL (ref 70–130)
GLUCOSE SERPL-MCNC: 269 MG/DL (ref 65–99)
HBA1C MFR BLD: 9.2 % (ref 4.8–5.6)
HCT VFR BLD AUTO: 34.7 % (ref 34–46.6)
HDLC SERPL-MCNC: 39 MG/DL (ref 40–60)
HGB BLD-MCNC: 11.3 G/DL (ref 12–15.9)
LDLC SERPL CALC-MCNC: 75 MG/DL (ref 0–100)
LDLC/HDLC SERPL: 1.73 {RATIO}
MCH RBC QN AUTO: 27.7 PG (ref 26.6–33)
MCHC RBC AUTO-ENTMCNC: 32.6 G/DL (ref 31.5–35.7)
MCV RBC AUTO: 85 FL (ref 79–97)
PLATELET # BLD AUTO: 284 10*3/MM3 (ref 140–450)
PMV BLD AUTO: 10.2 FL (ref 6–12)
POTASSIUM SERPL-SCNC: 4.2 MMOL/L (ref 3.5–5.2)
RBC # BLD AUTO: 4.08 10*6/MM3 (ref 3.77–5.28)
SODIUM SERPL-SCNC: 134 MMOL/L (ref 136–145)
TRIGL SERPL-MCNC: 217 MG/DL (ref 0–150)
VLDLC SERPL-MCNC: 36 MG/DL (ref 5–40)
WBC NRBC COR # BLD AUTO: 9.75 10*3/MM3 (ref 3.4–10.8)

## 2024-05-09 PROCEDURE — G0378 HOSPITAL OBSERVATION PER HR: HCPCS

## 2024-05-09 PROCEDURE — 80061 LIPID PANEL: CPT | Performed by: INTERNAL MEDICINE

## 2024-05-09 PROCEDURE — 85027 COMPLETE CBC AUTOMATED: CPT | Performed by: INTERNAL MEDICINE

## 2024-05-09 PROCEDURE — 80048 BASIC METABOLIC PNL TOTAL CA: CPT | Performed by: INTERNAL MEDICINE

## 2024-05-09 PROCEDURE — 99214 OFFICE O/P EST MOD 30 MIN: CPT | Performed by: INTERNAL MEDICINE

## 2024-05-09 PROCEDURE — 82948 REAGENT STRIP/BLOOD GLUCOSE: CPT

## 2024-05-09 PROCEDURE — 83036 HEMOGLOBIN GLYCOSYLATED A1C: CPT | Performed by: INTERNAL MEDICINE

## 2024-05-09 RX ORDER — METOPROLOL SUCCINATE 25 MG/1
12.5 TABLET, EXTENDED RELEASE ORAL
Status: DISCONTINUED | OUTPATIENT
Start: 2024-05-09 | End: 2024-05-09 | Stop reason: HOSPADM

## 2024-05-09 RX ORDER — METOPROLOL SUCCINATE 25 MG/1
12.5 TABLET, EXTENDED RELEASE ORAL
Qty: 15 TABLET | Refills: 1 | Status: SHIPPED | OUTPATIENT
Start: 2024-05-09

## 2024-05-09 RX ADMIN — ATORVASTATIN CALCIUM 80 MG: 40 TABLET, FILM COATED ORAL at 08:34

## 2024-05-09 RX ADMIN — LOSARTAN POTASSIUM 50 MG: 50 TABLET, FILM COATED ORAL at 08:35

## 2024-05-09 RX ADMIN — ALLOPURINOL 300 MG: 300 TABLET ORAL at 08:34

## 2024-05-09 RX ADMIN — OXYBUTYNIN CHLORIDE 5 MG: 5 TABLET, EXTENDED RELEASE ORAL at 08:33

## 2024-05-09 RX ADMIN — SPIRONOLACTONE 25 MG: 25 TABLET ORAL at 10:12

## 2024-05-09 RX ADMIN — METOPROLOL SUCCINATE 12.5 MG: 25 TABLET, EXTENDED RELEASE ORAL at 08:34

## 2024-05-09 RX ADMIN — TICAGRELOR 90 MG: 90 TABLET ORAL at 08:34

## 2024-05-09 RX ADMIN — ASPIRIN 81 MG: 81 TABLET, COATED ORAL at 08:35

## 2024-05-09 NOTE — NURSING NOTE
Called and spoke to Hilda Ordoñez to let her know that the patient's blood glucose is 267. Patient has sliding scale at home. She has been instructed to not eat until she checks her blood sugar when she gets home and then treat according to her sliding scale. She states that she understands and will do this.

## 2024-05-09 NOTE — DISCHARGE SUMMARY
"Patient Identification  Name:  Breanna Kaba  Age:  68 y.o.  Sex:  female  :  1955  MRN:  [id number]  Visit Number:  [id number]    Date of Admission: 2024  Date of Discharge: 2024    PCP: Myrna Ramos MD    DISCHARGE DIAGNOSIS  Coronary artery disease status post stent x 2 to the LAD    CONSULTS   None    PROCEDURES PERFORMED  Selective right and left coronary angiogram with left heart catheterization  IFR of LAD  Placement of 2 stents in the LAD    HOSPITAL COURSE  Patient is a 68 y.o. female with a past medical history significant for coronary artery disease status post stent to the LAD in 2022, HFrecEF, uncontrolled diabetes mellitus-insulin-dependent, hypertension, and hyperlipidemia.  Please see the admitting history and physical for further details.  She presented 2024 for planned invasive coronary angiogram.  She was found to have significant blockages distal from her previous LAD stent.  iFR was abnormal and so 2 stents were placed.  They attempted to place a stent in the second obtuse marginal but encountered resistance and due to contrast thresholds abandoned the procedure.  Her postprocedural course was uncomplicated and she was deemed stable for discharge.    CONDITION ON DISCHARGE  Stable.    VITAL SIGNS  /70 (BP Location: Left arm, Patient Position: Lying)   Pulse 89   Temp 98.3 °F (36.8 °C) (Oral)   Resp 18   Ht 157.5 cm (62\")   Wt 66.6 kg (146 lb 13.2 oz)   LMP  (LMP Unknown) Comment: partial hysterectomy  SpO2 98%   BMI 26.85 kg/m²     DISCHARGE PHYSICAL EXAM  Vitals and nursing note reviewed.   Constitutional:       Appearance: Not in distress.   Eyes:      Conjunctiva/sclera: Conjunctivae normal.   Pulmonary:      Effort: Pulmonary effort is normal.      Breath sounds: Normal breath sounds.   Cardiovascular:      Normal rate. Regular rhythm.   Edema:     Peripheral edema absent.   Skin:     General: Skin is warm and dry.   Neurological:      " Mental Status: Alert.         RESULTS REVIEW  I reviewed the patient's new clinical results.  Results for orders placed during the hospital encounter of 05/08/24    Cardiac Catheterization/Vascular Study    Conclusion    The ejection fraction was greater than 55% by visual estimate.    Prox LAD lesion is 20% stenosed.    Mid LAD-2 lesion is 70% stenosed.    Mid LAD-3 lesion is 80% stenosed.    2nd Mrg lesion is 99% stenosed.    1.  Cardiac.  Patient with history of coronary disease with stenting to the LAD.  Abnormal CTA coronaries.  IFR guided PTCA and stent placement to LAD x 2 done.  Dual antiplatelet therapy to continue.  Aggressive risk factor modification for coronary disease to continue.    [unfilled]        Results from last 7 days   Lab Units 05/09/24  0320 05/08/24  0747   WBC 10*3/mm3 9.75 9.76   HEMOGLOBIN g/dL 11.3* 13.0   PLATELETS 10*3/mm3 284 332     Results from last 7 days   Lab Units 05/09/24  0320 05/08/24  0747   SODIUM mmol/L 134* 137   POTASSIUM mmol/L 4.2 4.8   CHLORIDE mmol/L 102 101   CO2 mmol/L 21.3* 24.3   BUN mg/dL 37* 31*   CREATININE mg/dL 1.21* 1.08*   CALCIUM mg/dL 9.2 10.2   GLUCOSE mg/dL 269* 201*     Lab Results   Component Value Date    INR 0.86 (L) 12/24/2023    INR 1.04 03/28/2022     Lab Results   Component Value Date    MG 1.6 04/02/2022    MG 1.9 02/25/2021     Lab Results   Component Value Date    TSH 1.260 06/06/2022    CHLPL 256 (H) 03/01/2017    TRIG 217 (H) 05/09/2024    HDL 39 (L) 05/09/2024    LDL 75 05/09/2024      Lab Results   Component Value Date    PROBNP 148.3 12/24/2023    PROBNP 2,011.0 (H) 04/01/2022    PROBNP 3,360.0 (H) 03/28/2022       DISCHARGE DISPOSITION   Home or Self CareHome    DISCHARGE MEDICATIONS     Discharge Medications        New Medications        Instructions Start Date   metoprolol succinate XL 25 MG 24 hr tablet  Commonly known as: TOPROL-XL   12.5 mg, Oral, Every 24 Hours Scheduled      ticagrelor 90 MG tablet  tablet  Commonly known as: BRILINTA   90 mg, Oral, 2 Times Daily             Continue These Medications        Instructions Start Date   allopurinol 300 MG tablet  Commonly known as: ZYLOPRIM   300 mg, Oral, Daily      aspirin 81 MG EC tablet   81 mg, Oral, Daily      atorvastatin 80 MG tablet  Commonly known as: LIPITOR   80 mg, Oral, Daily      BASAGLAR KWIKPEN 100 UNIT/ML injection pen   32 Units, Subcutaneous, Nightly      cyclobenzaprine 10 MG tablet  Commonly known as: FLEXERIL   10 mg, Oral, 2 Times Daily PRN      estradiol 0.1 MG/GM vaginal cream  Commonly known as: ESTRACE   1 g, Vaginal, 2 Times Weekly      insulin aspart 100 UNIT/ML solution pen-injector sc pen  Commonly known as: novoLOG FLEXPEN   10-20 Units, Subcutaneous, 3 Times Daily PRN      losartan 50 MG tablet  Commonly known as: Cozaar   50 mg, Oral, Daily      nitroglycerin 0.4 MG SL tablet  Commonly known as: NITROSTAT   1 under the tongue as needed for angina, may repeat q5mins for up three doses      spironolactone 25 MG tablet  Commonly known as: ALDACTONE   25 mg, Oral, Daily      trospium 20 MG tablet  Commonly known as: SANCTURA   1 tablet, Oral, Every 12 Hours Scheduled                        Your Scheduled Appointments      May 10, 2024  8:30 AM  Follow Up with Da Fox PA-C  [facility] ([place]) [address] [place] [address] [phone]   -Bring photo ID, insurance card, and list of medications to appointment  -If testing was completed outside of [facility] then patient must bring images on a disc  -Copay will be collected at time of appointment  -Established patients should arrive 10 minutes prior to appointment         Jun 05, 2024  9:00 AM  Office Visit with Myrna Ramos MD  [facility] ([place]) [address] [place] [address] [phone]   Arrive 15 minutes prior to appointment.  If you are in need of a language or hearing   please call the Department.                    TEST  RESULTS PENDING AT DISCHARGE    None    Electronically signed by REGINALDO Diaz, 05/09/24, 8:15 AM EDT.      Time: greater than 30 minutes.     .Electronically signed by Shu Regalado MD, 05/10/24, 4:14 PM EDT.

## 2024-05-09 NOTE — DISCHARGE INSTR - APPOINTMENTS
Pt  has  an  apointment  with  dariana webster  for  may  16 a t  9 :30  and    jayden campbell   for   may 24  at   11 am  and  sailaja quinn  for  may 10  at  8 :30

## 2024-05-09 NOTE — NURSING NOTE
Order received for Cardiac Rehab Consultation.     Patient is scheduled at [facility] for an initial assessment on Monday May 18 2024 at 8 am       Information discussed with: Patient        Educated on: Benefits of Exercise,  Educated on Cardiac Rehab and Program Protocol, Brochure and/or educational material provided, Contact information given, and Teach Back Verified            Thank you for the referral. Please contact the Cardiac Rehab Dept. (ext. [phone]) with any further questions or concerns.

## 2024-05-09 NOTE — PROGRESS NOTES
Pharmacy reviewed the cost-coverage of Brilinta. According to the insurance she will have a $100 co-pay. We provided a free 30 day trial card at [facility]. We discussed the cost with the patient, and offered assistance if needed. There is an assistance program through the . She said she was going to review her finances and discuss it with the physician and would let us know if she needed our help. We also told her there was a cheaper alternative called Plavix if the doctor wanted to switch her.     Thank you.  Edgardo Cooper, Pharmacy Intern  05/09/24  10:11 EDT

## 2024-05-09 NOTE — PLAN OF CARE
Goal Outcome Evaluation:  Pt resting in bed this shift. VSS. No signs or symptoms of acute distress noted. Pt denies any questions or concerns at this time. Plan of care ongoing.

## 2024-05-10 ENCOUNTER — OFFICE VISIT (OUTPATIENT)
Dept: CARDIOLOGY | Facility: CLINIC | Age: 69
End: 2024-05-10
Payer: MEDICARE

## 2024-05-10 ENCOUNTER — TRANSITIONAL CARE MANAGEMENT TELEPHONE ENCOUNTER (OUTPATIENT)
Dept: CALL CENTER | Facility: HOSPITAL | Age: 69
End: 2024-05-10
Payer: MEDICARE

## 2024-05-10 VITALS
HEIGHT: 62 IN | BODY MASS INDEX: 28.19 KG/M2 | DIASTOLIC BLOOD PRESSURE: 77 MMHG | SYSTOLIC BLOOD PRESSURE: 152 MMHG | WEIGHT: 153.2 LBS | HEART RATE: 97 BPM | OXYGEN SATURATION: 97 %

## 2024-05-10 DIAGNOSIS — I25.10 CORONARY ARTERY DISEASE INVOLVING NATIVE CORONARY ARTERY OF NATIVE HEART WITHOUT ANGINA PECTORIS: Primary | ICD-10-CM

## 2024-05-10 PROCEDURE — 99214 OFFICE O/P EST MOD 30 MIN: CPT | Performed by: PHYSICIAN ASSISTANT

## 2024-05-10 PROCEDURE — 3077F SYST BP >= 140 MM HG: CPT | Performed by: PHYSICIAN ASSISTANT

## 2024-05-10 PROCEDURE — 3078F DIAST BP <80 MM HG: CPT | Performed by: PHYSICIAN ASSISTANT

## 2024-05-10 PROCEDURE — 1160F RVW MEDS BY RX/DR IN RCRD: CPT | Performed by: PHYSICIAN ASSISTANT

## 2024-05-10 PROCEDURE — 1159F MED LIST DOCD IN RCRD: CPT | Performed by: PHYSICIAN ASSISTANT

## 2024-05-11 LAB
QT INTERVAL: 380 MS
QTC INTERVAL: 477 MS

## 2024-05-16 ENCOUNTER — OFFICE VISIT (OUTPATIENT)
Dept: FAMILY MEDICINE CLINIC | Facility: CLINIC | Age: 69
End: 2024-05-16
Payer: MEDICARE

## 2024-05-16 VITALS
BODY MASS INDEX: 27.6 KG/M2 | SYSTOLIC BLOOD PRESSURE: 118 MMHG | DIASTOLIC BLOOD PRESSURE: 70 MMHG | TEMPERATURE: 97.5 F | OXYGEN SATURATION: 98 % | HEIGHT: 62 IN | WEIGHT: 150 LBS | HEART RATE: 91 BPM

## 2024-05-16 DIAGNOSIS — I25.83 CORONARY ARTERY DISEASE DUE TO LIPID RICH PLAQUE: Primary | ICD-10-CM

## 2024-05-16 DIAGNOSIS — I25.10 CORONARY ARTERY DISEASE DUE TO LIPID RICH PLAQUE: Primary | ICD-10-CM

## 2024-05-16 NOTE — PROGRESS NOTES
Transitional Care Follow Up Visit  Subjective     Breanna Kaba is a 68 y.o. female who presents for a transitional care management visit.    Within 48 business hours after discharge our office contacted her via telephone to coordinate her care and needs.      I reviewed and discussed the details of that call along with the discharge summary, hospital problems, inpatient lab results, inpatient diagnostic studies, and consultation reports with Breanna.     Current outpatient and discharge medications have been reconciled for the patient.  Reviewed by: REGINALDO Alberto          5/9/2024     5:54 PM   Date of TCM Phone Call   [facility]   Date of Admission 5/8/2024   Date of Discharge 5/9/2024   Discharge Disposition Home or Self Care     Risk for Readmission (LACE) Score: 7 (5/9/2024  6:00 AM)      History of Present Illness   Course During Hospital Stay: Hospital records reviewed discharge summary copied below  Patient is a 68 y.o. female with a past medical history significant for coronary artery disease status post stent to the LAD in March 2022, HFrecEF, uncontrolled diabetes mellitus-insulin-dependent, hypertension, and hyperlipidemia.  Please see the admitting history and physical for further details.  She presented 05/08/2024 for planned invasive coronary angiogram.  She was found to have significant blockages distal from her previous LAD stent.  iFR was abnormal and so 2 stents were placed.  They attempted to place a stent in the second obtuse marginal but encountered resistance and due to contrast thresholds abandoned the procedure.  Her postprocedural course was uncomplicated and she was deemed stable for discharge.     Patient states she is doing well at this time.  States she is tolerating her new medications as well.  She has been making dietary changes.  Has a follow-up scheduled with cardiology on May 24.    Records reviewed and agreed with discharge plan.     The following  portions of the patient's history were reviewed and updated as appropriate: allergies, current medications, past family history, past medical history, past social history, past surgical history, and problem list.    Review of Systems    Objective   Physical Exam  Constitutional:       General: She is not in acute distress.     Appearance: Normal appearance. She is well-developed and well-groomed. She is not ill-appearing, toxic-appearing or diaphoretic.   HENT:      Head: Normocephalic.      Nose: Nose normal. No congestion or rhinorrhea.      Mouth/Throat:      Mouth: Mucous membranes are moist.      Pharynx: Oropharynx is clear. No oropharyngeal exudate or posterior oropharyngeal erythema.   Eyes:      General: Lids are normal.         Right eye: No discharge.         Left eye: No discharge.      Extraocular Movements: Extraocular movements intact.      Pupils: Pupils are equal, round, and reactive to light.   Neck:      Vascular: No carotid bruit.   Cardiovascular:      Rate and Rhythm: Normal rate and regular rhythm.      Pulses: Normal pulses.      Heart sounds: Normal heart sounds. No murmur heard.     No friction rub. No gallop.   Pulmonary:      Effort: Pulmonary effort is normal. No respiratory distress.      Breath sounds: Normal breath sounds. No stridor. No wheezing, rhonchi or rales.   Chest:      Chest wall: No tenderness.   Abdominal:      General: Bowel sounds are normal. There is no distension.      Palpations: Abdomen is soft. There is no mass.      Tenderness: There is no abdominal tenderness. There is no right CVA tenderness, left CVA tenderness, guarding or rebound.      Hernia: No hernia is present.   Musculoskeletal:         General: No swelling or tenderness. Normal range of motion.      Cervical back: Normal range of motion and neck supple. No rigidity or tenderness.      Right lower leg: No edema.      Left lower leg: No edema.   Lymphadenopathy:      Cervical: No cervical adenopathy.    Skin:     General: Skin is warm.      Capillary Refill: Capillary refill takes less than 2 seconds.      Coloration: Skin is not jaundiced.      Findings: No bruising, erythema or rash.   Neurological:      General: No focal deficit present.      Mental Status: She is alert and oriented to person, place, and time.      Motor: Motor function is intact. No weakness.      Coordination: Coordination is intact.      Gait: Gait is intact. Gait normal.   Psychiatric:         Attention and Perception: Attention normal.         Mood and Affect: Mood normal.         Speech: Speech normal.         Behavior: Behavior normal.         Cognition and Memory: Cognition normal.         Judgment: Judgment normal.         Assessment & Plan   Diagnoses and all orders for this visit:    1. Coronary artery disease due to lipid rich plaque (Primary)  Comments:  Continue current medications               Procedures     Return if symptoms worsen or fail to improve, for Next scheduled follow up.     This document has been electronically signed by REGINALDO Alberto  May 16, 2024 13:30 EDT

## 2024-05-20 ENCOUNTER — TREATMENT (OUTPATIENT)
Dept: CARDIAC REHAB | Facility: HOSPITAL | Age: 69
End: 2024-05-20
Payer: MEDICARE

## 2024-05-20 VITALS — HEART RATE: 85 BPM | DIASTOLIC BLOOD PRESSURE: 56 MMHG | OXYGEN SATURATION: 97 % | SYSTOLIC BLOOD PRESSURE: 115 MMHG

## 2024-05-20 DIAGNOSIS — Z95.5 STENTED CORONARY ARTERY: Primary | ICD-10-CM

## 2024-05-20 PROCEDURE — 93798 PHYS/QHP OP CAR RHAB W/ECG: CPT

## 2024-05-20 NOTE — PROGRESS NOTES
Pt tolerated 6MWT well, NAD noted, no complaints voiced,  skin warm, pink and dry, resp within normal limits and even, denies chest discomfort, chest pressure ,SOA .  Monitor shows NSR-ST , V/S WDL ,see [facility] documentation for exercise data. [facility] physician immediately available     Pt educated on Cardiac Rehab Program,  warm up, stretching, use of RPE scale, application of heart monitor, home exercise and exercise guidelines, pre exercise meal, Diabetic guidelines for Cardiac Rehab,  Dietary information provided by Carmencita Barger (dietary) Steps to your health,  s/s to report ,Cleaning and use of equipment, see Epic for all other education completed with patient today.  Verbal teach back verified

## 2024-05-21 ENCOUNTER — DISEASE STATE MANAGEMENT VISIT (OUTPATIENT)
Dept: PHARMACY | Facility: HOSPITAL | Age: 69
End: 2024-05-21
Payer: MEDICARE

## 2024-05-21 ENCOUNTER — SPECIALTY PHARMACY (OUTPATIENT)
Dept: PHARMACY | Facility: HOSPITAL | Age: 69
End: 2024-05-21
Payer: MEDICARE

## 2024-05-21 ENCOUNTER — TREATMENT (OUTPATIENT)
Dept: CARDIAC REHAB | Facility: HOSPITAL | Age: 69
End: 2024-05-21
Payer: MEDICARE

## 2024-05-21 VITALS — DIASTOLIC BLOOD PRESSURE: 60 MMHG | SYSTOLIC BLOOD PRESSURE: 116 MMHG | HEART RATE: 89 BPM

## 2024-05-21 DIAGNOSIS — Z95.5 STENTED CORONARY ARTERY: Primary | ICD-10-CM

## 2024-05-21 DIAGNOSIS — E78.2 MIXED HYPERLIPIDEMIA: Primary | Chronic | ICD-10-CM

## 2024-05-21 PROCEDURE — 93798 PHYS/QHP OP CAR RHAB W/ECG: CPT

## 2024-05-21 NOTE — PROGRESS NOTES
[facility]  Lipid Management Program - PCSK9i       Breanna Kaba is a 68 y.o. female referred to the [facility] by Da Fox for clinical pharmacy and specialty pharmacy management of PCSK9i.  Breanna Kaba is treated for clinical ASCVD and hyperlipidemia and currently takes atorvastatin 80 mg daily.  Patient has not tried anything else for cholesterol in the past. The patient denies any allergies to latex.      Relevant Past Medical History and Comorbidities  Past Medical History:   Diagnosis Date    Acute congestive heart failure, unspecified heart failure type 03/28/2022    Arthritis     Chronic pain     Coronary artery disease involving native coronary artery of native heart without angina pectoris 4/25/2024    Diabetes mellitus     Elevated cholesterol     GERD (gastroesophageal reflux disease)     Gout     Headache     Hypertension     Myocardial infarction     Neuropathy      Social History     Socioeconomic History    Marital status:    Tobacco Use    Smoking status: Never     Passive exposure: Past    Smokeless tobacco: Never   Vaping Use    Vaping status: Never Used   Substance and Sexual Activity    Alcohol use: No    Drug use: No    Sexual activity: Defer       Allergies  Asa [aspirin], Naproxen, and Penicillins    Current Medication List    Current Outpatient Medications:     allopurinol (ZYLOPRIM) 300 MG tablet, Take 1 tablet by mouth Daily., Disp: 90 tablet, Rfl: 1    aspirin 81 MG EC tablet, Take 1 tablet by mouth Daily., Disp: 90 tablet, Rfl: 1    atorvastatin (LIPITOR) 80 MG tablet, Take 1 tablet by mouth Daily., Disp: 90 tablet, Rfl: 1    cyclobenzaprine (FLEXERIL) 10 MG tablet, Take 1 tablet by mouth 2 (Two) Times a Day As Needed for Muscle Spasms. (Patient not taking: Reported on 5/20/2024), Disp: , Rfl:     estradiol (ESTRACE) 0.1 MG/GM vaginal cream, Insert 1 g into the vagina 2 (Two) Times a Week. (Patient not taking: Reported on  5/20/2024), Disp: , Rfl:     Evolocumab (REPATHA) solution auto-injector SureClick injection, Inject 1 mL under the skin into the appropriate area as directed Every 14 (Fourteen) Days., Disp: 2 mL, Rfl: 5    insulin aspart (novoLOG FLEXPEN) 100 UNIT/ML solution pen-injector sc pen, Inject 10-20 Units under the skin into the appropriate area as directed 3 (Three) Times a Day As Needed (blood glucose)., Disp: , Rfl:     Insulin Glargine (BASAGLAR KWIKPEN) 100 UNIT/ML injection pen, Inject 32 Units under the skin into the appropriate area as directed Every Night., Disp: , Rfl:     losartan (Cozaar) 50 MG tablet, Take 1 tablet by mouth Daily., Disp: 90 tablet, Rfl: 1    metoprolol succinate XL (TOPROL-XL) 25 MG 24 hr tablet, Take 0.5 tablets by mouth Daily., Disp: 15 tablet, Rfl: 1    nitroglycerin (NITROSTAT) 0.4 MG SL tablet, 1 under the tongue as needed for angina, may repeat q5mins for up three doses, Disp: 100 tablet, Rfl: 11    spironolactone (ALDACTONE) 25 MG tablet, Take 1 tablet by mouth Daily., Disp: 90 tablet, Rfl: 1    ticagrelor (BRILINTA) 90 MG tablet tablet, Take 1 tablet by mouth 2 (Two) Times a Day., Disp: 60 tablet, Rfl: 1    trospium (SANCTURA) 20 MG tablet, Take 1 tablet by mouth Every 12 (Twelve) Hours., Disp: , Rfl:   No current facility-administered medications for this visit.    Drug Interactions  None with Repatha    Relevant Laboratory Values  Lab Results   Component Value Date    CHOL 150 05/09/2024    CHLPL 256 (H) 03/01/2017    TRIG 217 (H) 05/09/2024    HDL 39 (L) 05/09/2024    LDL 75 05/09/2024         Initial Education Provided for Praluent/Repatha    Patient seen in the [facility] for initial education and injection training for PCSK9 inhibitors. The patient was introduced to services offered by [facility], including: regular assessments, refill coordination, curbside pick-up or mail order delivery options, prior authorization maintenance, and  financial assistance programs as applicable. The patient was also provided with contact information for the pharmacy team. Welcome information and patient satisfaction survey to be sent by retail team with patient's initial fill.    Patient Instructions    Repatha/Praluent is used to lower LDL, or bad cholesterol, to help reduce your chance of a heart attack or stroke.  You should give your injection once every 14 days.  Your doctor will likely keep you on this medication indefinitely as long as it is working for you and not causing any adverse effects.      This medication should be kept in the refrigerator until you are ready to use it.  Once removed from the refrigerator, it is good at room temperature for 30 days.  Do not leave in your car or expose to extreme heat.  Do not shake or freeze.  Dispose the used syringe/device in a sharps container.     This injection is a subcutaneous injection, which means just under the skin.  It is important to choose an area of your skin that is not tender, bruised, cut or has scars or stretch marks.  You can inject into your abdomen (except for 2 inches around your navel), your thigh or your upper arm.  Do not administer with other drugs.   Rotate injection sites each time you give the injection. Wash your hands prior to giving yourself an injection and use an alcohol wipe to clean the area of the injection and allow to dry prior to injecting.      If you miss a dose, take it as soon as you remember and resume the original schedule if it is within 7 days from the missed dose.  If an every 2 week dose is not administered within 7 days, wait until the next dose on the original schedule.  If a once-monthly dose is not administered within 7 days, administer the dose and start a new schedule based on this date.     Be sure to let your doctor or pharmacist know of any medication changes, including OTC and herbal supplements.     Adverse Effects  Reviewed with patient and education  on management provided.     Sore Throat  Injection site pain  Itching or irritation   Flu-like symptoms  Signs of an allergic reaction     Immunizations  While there are no immunizations that you need to get specifically because you are on this drug, it is important to keep up with your recommended routine vaccinations.     Adherence and Self-Administration    Barriers to Patient Adherence and/or Self-Administration: None  Methods for Supporting Patient Adherence and/or Self-Administration: None Required     Goals of Therapy  Patient Goals of Therapy: To not miss any doses  Clinical Goals or Therapeutic Targets, If Applicable: LDL Reduction      Attestation  I attest that the initiated specialty medication(s) are appropriate for the patient based on my assessment.  If the prescribed therapy is at any point deemed not appropriate based on the current or future assessments, a consultation will be initiated with the patient's specialty care provider to determine the best course of action. The revised plan of therapy will be documented along with any additional patient education provided.     The patient has been provided with the following education and any applicable administration techniques (i.e. self-injection) have been demonstrated for the therapies indicated. All questions and concerns have been addressed prior to the patient receiving the medication, and the patient has verbalized understanding of the education and any materials provided.  Additional patient education shall be provided and documented upon request by the patient, provider or payer.        Medication Assessment & Plan    Patient started today on Repatha 140 mg every weeks. Injection training and medication education provided.     Pt administered Repatha in left thigh. Patient waited in clinic for 15 minutes following administration without any issues.    Patient will need lipid panel in 6 weeks, order placed.     Patient will continue regular  follow-up with cardiology. Next appointment scheduled for 5/24/24.    The patient would like to receive specialty mail-out services for the next injection. Care Coordinator to set up future refill outreaches, coordinate prescription delivery, and escalate clinical questions to pharmacist.     Will follow-up in 6 months, or sooner if needed.     Ashley Hamm, PharmD  5/21/2024  13:19 EDT

## 2024-05-21 NOTE — PROGRESS NOTES
[facility]  Lipid Management Program - PCSK9i       Breanna Kaba is a 68 y.o. female referred to the [facility] by Da Fox for clinical pharmacy and specialty pharmacy management of PCSK9i.  Breanna Kaba is treated for clinical ASCVD and hyperlipidemia and currently takes atorvastatin 80 mg daily.  Patient has not tried anything else for cholesterol in the past. The patient denies any allergies to latex.      Relevant Past Medical History and Comorbidities  Past Medical History:   Diagnosis Date    Acute congestive heart failure, unspecified heart failure type 03/28/2022    Arthritis     Chronic pain     Coronary artery disease involving native coronary artery of native heart without angina pectoris 4/25/2024    Diabetes mellitus     Elevated cholesterol     GERD (gastroesophageal reflux disease)     Gout     Headache     Hypertension     Myocardial infarction     Neuropathy      Social History     Socioeconomic History    Marital status:    Tobacco Use    Smoking status: Never     Passive exposure: Past    Smokeless tobacco: Never   Vaping Use    Vaping status: Never Used   Substance and Sexual Activity    Alcohol use: No    Drug use: No    Sexual activity: Defer       Allergies  Asa [aspirin], Naproxen, and Penicillins    Current Medication List    Current Outpatient Medications:     allopurinol (ZYLOPRIM) 300 MG tablet, Take 1 tablet by mouth Daily., Disp: 90 tablet, Rfl: 1    aspirin 81 MG EC tablet, Take 1 tablet by mouth Daily., Disp: 90 tablet, Rfl: 1    atorvastatin (LIPITOR) 80 MG tablet, Take 1 tablet by mouth Daily., Disp: 90 tablet, Rfl: 1    cyclobenzaprine (FLEXERIL) 10 MG tablet, Take 1 tablet by mouth 2 (Two) Times a Day As Needed for Muscle Spasms., Disp: , Rfl:     estradiol (ESTRACE) 0.1 MG/GM vaginal cream, Insert 1 g into the vagina 2 (Two) Times a Week., Disp: , Rfl:     Evolocumab (REPATHA) solution auto-injector SureClick injection,  Inject 1 mL under the skin into the appropriate area as directed Every 14 (Fourteen) Days., Disp: 2 mL, Rfl: 5    insulin aspart (novoLOG FLEXPEN) 100 UNIT/ML solution pen-injector sc pen, Inject 10-20 Units under the skin into the appropriate area as directed 3 (Three) Times a Day As Needed (blood glucose)., Disp: , Rfl:     Insulin Glargine (BASAGLAR KWIKPEN) 100 UNIT/ML injection pen, Inject 32 Units under the skin into the appropriate area as directed Every Night., Disp: , Rfl:     losartan (Cozaar) 50 MG tablet, Take 1 tablet by mouth Daily., Disp: 90 tablet, Rfl: 1    metoprolol succinate XL (TOPROL-XL) 25 MG 24 hr tablet, Take 0.5 tablets by mouth Daily., Disp: 15 tablet, Rfl: 1    nitroglycerin (NITROSTAT) 0.4 MG SL tablet, 1 under the tongue as needed for angina, may repeat q5mins for up three doses (Patient not taking: Reported on 5/21/2024), Disp: 100 tablet, Rfl: 11    spironolactone (ALDACTONE) 25 MG tablet, Take 1 tablet by mouth Daily., Disp: 90 tablet, Rfl: 1    ticagrelor (BRILINTA) 90 MG tablet tablet, Take 1 tablet by mouth 2 (Two) Times a Day., Disp: 60 tablet, Rfl: 1    trospium (SANCTURA) 20 MG tablet, Take 1 tablet by mouth Every 12 (Twelve) Hours., Disp: , Rfl:   No current facility-administered medications for this visit.    Drug Interactions  None with Repatha    Relevant Laboratory Values  Lab Results   Component Value Date    CHOL 150 05/09/2024    CHLPL 256 (H) 03/01/2017    TRIG 217 (H) 05/09/2024    HDL 39 (L) 05/09/2024    LDL 75 05/09/2024         Initial Education Provided for Praluent/Repatha    Patient seen in the [facility] for initial education and injection training for PCSK9 inhibitors. The patient was introduced to services offered by [facility], including: regular assessments, refill coordination, curbside pick-up or mail order delivery options, prior authorization maintenance, and financial assistance programs as applicable. The  patient was also provided with contact information for the pharmacy team. Welcome information and patient satisfaction survey to be sent by retail team with patient's initial fill.    Patient Instructions    Repatha/Praluent is used to lower LDL, or bad cholesterol, to help reduce your chance of a heart attack or stroke.  You should give your injection once every 14 days.  Your doctor will likely keep you on this medication indefinitely as long as it is working for you and not causing any adverse effects.      This medication should be kept in the refrigerator until you are ready to use it.  Once removed from the refrigerator, it is good at room temperature for 30 days.  Do not leave in your car or expose to extreme heat.  Do not shake or freeze.  Dispose the used syringe/device in a sharps container.     This injection is a subcutaneous injection, which means just under the skin.  It is important to choose an area of your skin that is not tender, bruised, cut or has scars or stretch marks.  You can inject into your abdomen (except for 2 inches around your navel), your thigh or your upper arm.  Do not administer with other drugs.   Rotate injection sites each time you give the injection. Wash your hands prior to giving yourself an injection and use an alcohol wipe to clean the area of the injection and allow to dry prior to injecting.      If you miss a dose, take it as soon as you remember and resume the original schedule if it is within 7 days from the missed dose.  If an every 2 week dose is not administered within 7 days, wait until the next dose on the original schedule.  If a once-monthly dose is not administered within 7 days, administer the dose and start a new schedule based on this date.     Be sure to let your doctor or pharmacist know of any medication changes, including OTC and herbal supplements.     Adverse Effects  Reviewed with patient and education on management provided.     Sore Throat  Injection  site pain  Itching or irritation   Flu-like symptoms  Signs of an allergic reaction     Immunizations  While there are no immunizations that you need to get specifically because you are on this drug, it is important to keep up with your recommended routine vaccinations.     Adherence and Self-Administration    Barriers to Patient Adherence and/or Self-Administration: None  Methods for Supporting Patient Adherence and/or Self-Administration: None Required     Goals of Therapy  Patient Goals of Therapy: To not miss any doses  Clinical Goals or Therapeutic Targets, If Applicable: LDL Reduction      Attestation  I attest that the initiated specialty medication(s) are appropriate for the patient based on my assessment.  If the prescribed therapy is at any point deemed not appropriate based on the current or future assessments, a consultation will be initiated with the patient's specialty care provider to determine the best course of action. The revised plan of therapy will be documented along with any additional patient education provided.     The patient has been provided with the following education and any applicable administration techniques (i.e. self-injection) have been demonstrated for the therapies indicated. All questions and concerns have been addressed prior to the patient receiving the medication, and the patient has verbalized understanding of the education and any materials provided.  Additional patient education shall be provided and documented upon request by the patient, provider or payer.        Medication Assessment & Plan    Patient started today on Repatha 140 mg every weeks. Injection training and medication education provided.     Pt administered Repatha in left thigh. Patient waited in clinic for 15 minutes following administration without any issues.    Patient will need lipid panel in 6 weeks, order placed.     Patient will continue regular follow-up with cardiology. Next appointment scheduled  for 5/24/24.    The patient would like to receive specialty mail-out services for the next injection. Care Coordinator to set up future refill outreaches, coordinate prescription delivery, and escalate clinical questions to pharmacist.     Will follow-up in 6 months, or sooner if needed.     Ashley Hamm, PharmD  5/21/2024  13:39 EDT

## 2024-05-21 NOTE — PROGRESS NOTES
Pt attended phase II visit.  Tolerated exercise well, NAD noted, no complaints voiced, skin warm, pink, dry, resp nl and even, denies chest discomfort, denies SOA.   Monitor shows NSR-ST, V/S WDL ,see [facility] documentation for exercise data.  [facility] cardiology physician immediately available

## 2024-05-24 ENCOUNTER — OFFICE VISIT (OUTPATIENT)
Dept: CARDIOLOGY | Facility: CLINIC | Age: 69
End: 2024-05-24
Payer: MEDICARE

## 2024-05-24 VITALS
HEIGHT: 62 IN | HEART RATE: 88 BPM | WEIGHT: 149 LBS | DIASTOLIC BLOOD PRESSURE: 75 MMHG | SYSTOLIC BLOOD PRESSURE: 118 MMHG | BODY MASS INDEX: 27.42 KG/M2 | OXYGEN SATURATION: 98 %

## 2024-05-24 DIAGNOSIS — E78.2 MIXED HYPERLIPIDEMIA: Chronic | ICD-10-CM

## 2024-05-24 DIAGNOSIS — I25.118 CORONARY ARTERY DISEASE OF NATIVE ARTERY OF NATIVE HEART WITH STABLE ANGINA PECTORIS: Primary | Chronic | ICD-10-CM

## 2024-05-24 DIAGNOSIS — I10 BENIGN ESSENTIAL HYPERTENSION: Chronic | ICD-10-CM

## 2024-05-24 NOTE — PROGRESS NOTES
"Chief Complaint  Coronary Artery Disease (Denies ) and Follow-up (Follow up / denies chest pain or SOA today )    Subjective          Breanna Kaba presents to [facility] for follow up.    History of Present Illness  Ms. Kaba presents after continued follow-up after having undergone invasive coronary angiogram 05/08/2024.  She is not having any chest pain, shortness of breath or palpitations.  She is tolerating dual antiplatelet therapy without complication.  She has gone to the lipid clinic to begin PCSK9 therapy.    Her blood pressure is at goal on current medications.  She denies any troublesome side effects.  Tobacco Use: Low Risk  (6/13/2024)    Patient History     Smoking Tobacco Use: Never     Smokeless Tobacco Use: Never     Passive Exposure: Past       Objective     Vital Signs:   /75 (BP Location: Left arm, Patient Position: Sitting, Cuff Size: Adult)   Pulse 88   Ht 157.5 cm (62\")   Wt 67.6 kg (149 lb)   SpO2 98%   BMI 27.25 kg/m²       Physical Exam  Vitals and nursing note reviewed.   Constitutional:       General: She is not in acute distress.  HENT:      Head: Normocephalic and atraumatic.   Neck:      Vascular: No carotid bruit.   Cardiovascular:      Rate and Rhythm: Normal rate and regular rhythm.      Heart sounds: No murmur heard.     No friction rub. No gallop.   Pulmonary:      Effort: Pulmonary effort is normal.      Breath sounds: Normal breath sounds.   Musculoskeletal:      Right lower leg: No edema.      Left lower leg: No edema.   Skin:     General: Skin is warm and dry.   Neurological:      General: No focal deficit present.      Mental Status: She is alert.   Psychiatric:         Mood and Affect: Mood normal.         Behavior: Behavior normal.          Result Review :   The following data was reviewed by: REGINALDO Diaz on 05/24/2024:  Common labs          5/8/2024    07:47 5/9/2024    03:20 6/5/2024    10:32   Common Labs   Glucose " 201  269  302    BUN 31  37  33    Creatinine 1.08  1.21  1.17    Sodium 137  134  137    Potassium 4.8  4.2  4.7    Chloride 101  102  102    Calcium 10.2  9.2  9.8    Albumin   4.3    Total Bilirubin   0.5    Alkaline Phosphatase   142    AST (SGOT)   13    ALT (SGPT)   16    WBC 9.76  9.75     Hemoglobin 13.0  11.3     Hematocrit 38.6  34.7     Platelets 332  284     Total Cholesterol  150     Triglycerides  217     HDL Cholesterol  39     LDL Cholesterol   75     Hemoglobin A1C  9.20       Lipid Panel          6/27/2023    13:31 12/25/2023    03:10 5/9/2024    03:20   Lipid Panel   Total Cholesterol 181  141  150    Triglycerides 176  155  217    HDL Cholesterol 59  41  39    VLDL Cholesterol 30  27  36    LDL Cholesterol  92  73  75    LDL/HDL Ratio 1.47  1.68  1.73           Last Cardiac Cath  Results for orders placed during the hospital encounter of 05/08/24    Cardiac Catheterization/Vascular Study    Conclusion    The ejection fraction was greater than 55% by visual estimate.    Prox LAD lesion is 20% stenosed.    Mid LAD-2 lesion is 70% stenosed.    Mid LAD-3 lesion is 80% stenosed.    2nd Mrg lesion is 99% stenosed.    1.  Cardiac.  Patient with history of coronary disease with stenting to the LAD.  Abnormal CTA coronaries.  IFR guided PTCA and stent placement to LAD x 2 done.  Dual antiplatelet therapy to continue.  Aggressive risk factor modification for coronary disease to continue.      Last Stress test  Results for orders placed during the hospital encounter of 12/24/23    Stress Test With Myocardial Perfusion One Day    Interpretation Summary    Impressions are consistent with a low risk study.    Left ventricular ejection fraction is normal (Calculated EF = 65%).    Breast attenuation and diaphragmatic attenuation artifacts are present.    Normal LV function No reversible ischemia noted Apical and inferior wall attenuation artifact noted Low risk study Will recommend outpatient follow-up.        Last Echo  Results for orders placed during the hospital encounter of 12/24/23    Adult Transthoracic Echo Complete w/ Color, Spectral and Contrast if necessary per protocol    Interpretation Summary    Left ventricular ejection fraction appears to be 61 - 65%.    Left ventricular wall thickness is consistent with mild concentric hypertrophy.    Left ventricular diastolic function is consistent with (grade I) impaired relaxation.    The left atrial cavity is dilated.             Current Outpatient Medications   Medication Sig Dispense Refill    aspirin 81 MG EC tablet Take 1 tablet by mouth Daily. 90 tablet 1    atorvastatin (LIPITOR) 80 MG tablet Take 1 tablet by mouth Daily. 90 tablet 1    cyclobenzaprine (FLEXERIL) 10 MG tablet Take 1 tablet by mouth 2 (Two) Times a Day As Needed for Muscle Spasms.      estradiol (ESTRACE) 0.1 MG/GM vaginal cream Insert 1 g into the vagina 2 (Two) Times a Week.      Evolocumab (REPATHA) solution auto-injector SureClick injection Inject 1 mL under the skin into the appropriate area as directed Every 14 (Fourteen) Days. 2 mL 5    insulin aspart (novoLOG FLEXPEN) 100 UNIT/ML solution pen-injector sc pen Inject 10-20 Units under the skin into the appropriate area as directed 3 (Three) Times a Day As Needed (blood glucose).      Insulin Glargine (BASAGLAR KWIKPEN) 100 UNIT/ML injection pen Inject 32 Units under the skin into the appropriate area as directed Every Night.      losartan (Cozaar) 50 MG tablet Take 1 tablet by mouth Daily. 90 tablet 1    metoprolol succinate XL (TOPROL-XL) 25 MG 24 hr tablet Take 0.5 tablets by mouth Daily. 15 tablet 1    nitroglycerin (NITROSTAT) 0.4 MG SL tablet 1 under the tongue as needed for angina, may repeat q5mins for up three doses 100 tablet 11    spironolactone (ALDACTONE) 25 MG tablet Take 1 tablet by mouth Daily. 90 tablet 1    trospium (SANCTURA) 20 MG tablet Take 1 tablet by mouth Every 12 (Twelve) Hours.      allopurinol (ZYLOPRIM) 300  MG tablet Take 1 tablet by mouth Daily. 90 tablet 1    clopidogrel (PLAVIX) 75 MG tablet Take 600 mg (8 tabs) by mouth for a single dose 24 hours after your last dose of Brilinta.  The next day after loading dose, you will begin 75 mg daily 98 tablet 3     No current facility-administered medications for this visit.            Assessment and Plan      Problem List Items Addressed This Visit       Benign essential hypertension (Chronic)    Mixed hyperlipidemia (Chronic)    Overview     12/25/2023-total cholesterol 141, triglycerides 155, HDL 41, LDL 73  05/09/2024-total cholesterol 150, triglycerides 217, HDL 39, LDL 75         Coronary artery disease of native artery of native heart with stable angina pectoris - Primary (Chronic)    Overview     05/08/20241501-GNX-[id number] stent to the LAD in place, 2 more stents to the LAD placed          Diagnoses and all orders for this visit:    1. Coronary artery disease of native artery of native heart with stable angina pectoris (Primary)    2. Mixed hyperlipidemia    3. Benign essential hypertension            Follow Up     No follow-ups on file.    Patient was given instructions and counseling regarding her condition or for health maintenance advice. Please see specific information pulled into the AVS if appropriate.       Electronically signed by REGINALDO Diaz, 06/15/24, 8:11 PM EDT.      Dictated Utilizing Dragon Dictation: Part of this note may be an electronic transcription/translation of spoken language to printed text using the Dragon Dictation System

## 2024-05-28 ENCOUNTER — TREATMENT (OUTPATIENT)
Dept: CARDIAC REHAB | Facility: HOSPITAL | Age: 69
End: 2024-05-28
Payer: MEDICARE

## 2024-05-28 VITALS — SYSTOLIC BLOOD PRESSURE: 114 MMHG | DIASTOLIC BLOOD PRESSURE: 60 MMHG | HEART RATE: 89 BPM

## 2024-05-28 DIAGNOSIS — Z95.5 STENTED CORONARY ARTERY: Primary | ICD-10-CM

## 2024-05-28 PROCEDURE — 93798 PHYS/QHP OP CAR RHAB W/ECG: CPT

## 2024-05-28 NOTE — PROGRESS NOTES
Pt attended phase II visit.  Tolerated exercise well, NAD noted, no complaints voiced, skin warm, pink, dry, resp nl and even, denies chest discomfort, denies SOA.   Monitor shows NSR-ST, V/S WDL ,see [facility] documentation for exercise data.  [facility] cardiology physician immediately available

## 2024-05-30 ENCOUNTER — APPOINTMENT (OUTPATIENT)
Dept: CARDIAC REHAB | Facility: HOSPITAL | Age: 69
End: 2024-05-30
Payer: MEDICARE

## 2024-05-30 ENCOUNTER — TREATMENT (OUTPATIENT)
Dept: CARDIAC REHAB | Facility: HOSPITAL | Age: 69
End: 2024-05-30
Payer: MEDICARE

## 2024-05-30 VITALS — SYSTOLIC BLOOD PRESSURE: 112 MMHG | DIASTOLIC BLOOD PRESSURE: 56 MMHG | HEART RATE: 81 BPM

## 2024-05-30 DIAGNOSIS — Z95.5 STENTED CORONARY ARTERY: Primary | ICD-10-CM

## 2024-05-30 PROCEDURE — 93798 PHYS/QHP OP CAR RHAB W/ECG: CPT

## 2024-05-30 NOTE — PROGRESS NOTES
Pt attended phase II visit.  Tolerated exercise well, NAD noted, no complaints voiced, skin warm, pink, dry, resp nl and even, denies chest discomfort, denies SOA.   Monitor shows NSR, V/S WDL ,see [facility] documentation for exercise data.  [facility] cardiology physician immediately available

## 2024-06-04 ENCOUNTER — TREATMENT (OUTPATIENT)
Dept: CARDIAC REHAB | Facility: HOSPITAL | Age: 69
End: 2024-06-04
Payer: MEDICARE

## 2024-06-04 VITALS — HEART RATE: 88 BPM | SYSTOLIC BLOOD PRESSURE: 116 MMHG | DIASTOLIC BLOOD PRESSURE: 58 MMHG

## 2024-06-04 DIAGNOSIS — Z95.5 STENTED CORONARY ARTERY: Primary | ICD-10-CM

## 2024-06-04 PROCEDURE — 93798 PHYS/QHP OP CAR RHAB W/ECG: CPT

## 2024-06-04 NOTE — PROGRESS NOTES
Pt attended phase II visit.  Tolerated exercise well, NAD noted, no complaints voiced, skin warm, pink, dry, resp nl and even, denies chest discomfort, denies SOA.   Monitor shows NSR, V/S WDL ,see [facility] documentation for exercise data.  [facility] cardiology physician immediately available

## 2024-06-05 ENCOUNTER — LAB (OUTPATIENT)
Dept: FAMILY MEDICINE CLINIC | Facility: CLINIC | Age: 69
End: 2024-06-05
Payer: MEDICARE

## 2024-06-05 ENCOUNTER — OFFICE VISIT (OUTPATIENT)
Dept: FAMILY MEDICINE CLINIC | Facility: CLINIC | Age: 69
End: 2024-06-05
Payer: MEDICARE

## 2024-06-05 VITALS
SYSTOLIC BLOOD PRESSURE: 116 MMHG | TEMPERATURE: 97 F | BODY MASS INDEX: 27.86 KG/M2 | HEART RATE: 94 BPM | DIASTOLIC BLOOD PRESSURE: 72 MMHG | HEIGHT: 62 IN | WEIGHT: 151.4 LBS | OXYGEN SATURATION: 97 %

## 2024-06-05 DIAGNOSIS — M1A.09X0 IDIOPATHIC CHRONIC GOUT OF MULTIPLE SITES WITHOUT TOPHUS: ICD-10-CM

## 2024-06-05 DIAGNOSIS — Z23 IMMUNIZATION DUE: ICD-10-CM

## 2024-06-05 DIAGNOSIS — R79.89 ELEVATED SERUM CREATININE: Primary | ICD-10-CM

## 2024-06-05 DIAGNOSIS — E11.40 TYPE 2 DIABETES MELLITUS WITH DIABETIC NEUROPATHY, WITH LONG-TERM CURRENT USE OF INSULIN: ICD-10-CM

## 2024-06-05 DIAGNOSIS — Z79.4 TYPE 2 DIABETES MELLITUS WITH DIABETIC NEUROPATHY, WITH LONG-TERM CURRENT USE OF INSULIN: ICD-10-CM

## 2024-06-05 DIAGNOSIS — R79.89 ELEVATED SERUM CREATININE: ICD-10-CM

## 2024-06-05 LAB
ALBUMIN SERPL-MCNC: 4.3 G/DL (ref 3.5–5.2)
ALBUMIN/GLOB SERPL: 1.6 G/DL
ALP SERPL-CCNC: 142 U/L (ref 39–117)
ALT SERPL W P-5'-P-CCNC: 16 U/L (ref 1–33)
ANION GAP SERPL CALCULATED.3IONS-SCNC: 11 MMOL/L (ref 5–15)
AST SERPL-CCNC: 13 U/L (ref 1–32)
BILIRUB SERPL-MCNC: 0.5 MG/DL (ref 0–1.2)
BUN SERPL-MCNC: 33 MG/DL (ref 8–23)
BUN/CREAT SERPL: 28.2 (ref 7–25)
CALCIUM SPEC-SCNC: 9.8 MG/DL (ref 8.6–10.5)
CHLORIDE SERPL-SCNC: 102 MMOL/L (ref 98–107)
CO2 SERPL-SCNC: 24 MMOL/L (ref 22–29)
CREAT SERPL-MCNC: 1.17 MG/DL (ref 0.57–1)
EGFRCR SERPLBLD CKD-EPI 2021: 50 ML/MIN/1.73
GLOBULIN UR ELPH-MCNC: 2.7 GM/DL
GLUCOSE SERPL-MCNC: 302 MG/DL (ref 65–99)
POTASSIUM SERPL-SCNC: 4.7 MMOL/L (ref 3.5–5.2)
PROT SERPL-MCNC: 7 G/DL (ref 6–8.5)
SODIUM SERPL-SCNC: 137 MMOL/L (ref 136–145)

## 2024-06-05 PROCEDURE — 1159F MED LIST DOCD IN RCRD: CPT | Performed by: FAMILY MEDICINE

## 2024-06-05 PROCEDURE — 1160F RVW MEDS BY RX/DR IN RCRD: CPT | Performed by: FAMILY MEDICINE

## 2024-06-05 PROCEDURE — 80053 COMPREHEN METABOLIC PANEL: CPT | Performed by: FAMILY MEDICINE

## 2024-06-05 PROCEDURE — 3074F SYST BP LT 130 MM HG: CPT | Performed by: FAMILY MEDICINE

## 2024-06-05 PROCEDURE — 99214 OFFICE O/P EST MOD 30 MIN: CPT | Performed by: FAMILY MEDICINE

## 2024-06-05 PROCEDURE — 3078F DIAST BP <80 MM HG: CPT | Performed by: FAMILY MEDICINE

## 2024-06-05 PROCEDURE — 90677 PCV20 VACCINE IM: CPT | Performed by: FAMILY MEDICINE

## 2024-06-05 PROCEDURE — 1126F AMNT PAIN NOTED NONE PRSNT: CPT | Performed by: FAMILY MEDICINE

## 2024-06-05 PROCEDURE — 3046F HEMOGLOBIN A1C LEVEL >9.0%: CPT | Performed by: FAMILY MEDICINE

## 2024-06-05 PROCEDURE — 36415 COLL VENOUS BLD VENIPUNCTURE: CPT

## 2024-06-05 PROCEDURE — G0009 ADMIN PNEUMOCOCCAL VACCINE: HCPCS | Performed by: FAMILY MEDICINE

## 2024-06-05 RX ORDER — ALLOPURINOL 300 MG/1
300 TABLET ORAL DAILY
Qty: 90 TABLET | Refills: 1 | Status: SHIPPED | OUTPATIENT
Start: 2024-06-05

## 2024-06-06 ENCOUNTER — APPOINTMENT (OUTPATIENT)
Dept: CARDIAC REHAB | Facility: HOSPITAL | Age: 69
End: 2024-06-06
Payer: MEDICARE

## 2024-06-10 ENCOUNTER — SPECIALTY PHARMACY (OUTPATIENT)
Dept: PHARMACY | Facility: HOSPITAL | Age: 69
End: 2024-06-10
Payer: MEDICARE

## 2024-06-10 NOTE — PROGRESS NOTES
Specialty Pharmacy Refill Coordination Note     Breanna is a 69 y.o. female contacted today regarding refills of  Repatha SureClick specialty medication(s).    Reviewed and verified with patient:       Specialty medication(s) and dose(s) confirmed: yes    Refill Questions      Flowsheet Row Most Recent Value   Changes to allergies? No   Changes to medications? No   New conditions or infections since last clinic visit No   Unplanned office visit, urgent care, ED, or hospital admission in the last 4 weeks  No   How does patient/caregiver feel medication is working? Very good   Financial problems or insurance changes  No   Since the previous refill, were any specialty medication doses or scheduled injections missed or delayed?  No   Does this patient require a clinical escalation to a pharmacist? No            Delivery Questions      Flowsheet Row Most Recent Value   Copay verified? Yes   Copay amount 0   Copay form of payment No copayment ($0)                   Follow-up: 84 day(s)     Jennifer Casillas, Pharmacy Technician  Specialty Pharmacy Technician

## 2024-06-11 ENCOUNTER — TREATMENT (OUTPATIENT)
Dept: CARDIAC REHAB | Facility: HOSPITAL | Age: 69
End: 2024-06-11
Payer: MEDICARE

## 2024-06-11 VITALS — SYSTOLIC BLOOD PRESSURE: 122 MMHG | DIASTOLIC BLOOD PRESSURE: 54 MMHG | HEART RATE: 86 BPM

## 2024-06-11 DIAGNOSIS — Z95.5 STENTED CORONARY ARTERY: Primary | ICD-10-CM

## 2024-06-11 PROCEDURE — 93798 PHYS/QHP OP CAR RHAB W/ECG: CPT

## 2024-06-11 NOTE — PROGRESS NOTES
Pt attended phase II visit.  Tolerated exercise well, NAD noted, no complaints voiced, skin warm, pink, dry, resp nl and even, denies chest discomfort, denies SOA.   Monitor shows NSR-ST, V/S WDL ,see [facility] documentation for exercise data.  [facility] cardiology physician immediately available

## 2024-06-13 ENCOUNTER — TREATMENT (OUTPATIENT)
Dept: CARDIAC REHAB | Facility: HOSPITAL | Age: 69
End: 2024-06-13
Payer: MEDICARE

## 2024-06-13 VITALS — SYSTOLIC BLOOD PRESSURE: 138 MMHG | DIASTOLIC BLOOD PRESSURE: 68 MMHG | HEART RATE: 80 BPM

## 2024-06-13 DIAGNOSIS — Z95.5 STENTED CORONARY ARTERY: Primary | ICD-10-CM

## 2024-06-13 PROCEDURE — 93798 PHYS/QHP OP CAR RHAB W/ECG: CPT

## 2024-06-13 NOTE — PROGRESS NOTES
Pt attended phase II visit.  Tolerated exercise well, NAD noted, no complaints voiced, skin warm, pink, dry, resp nl and even, denies chest discomfort, denies SOA. Monitor shows NSR-ST, V/S WDL ,see [facility] documentation for exercise data.  [facility] cardiology physician immediately available

## 2024-06-14 ENCOUNTER — TELEPHONE (OUTPATIENT)
Dept: CARDIOLOGY | Facility: CLINIC | Age: 69
End: 2024-06-14

## 2024-06-14 ENCOUNTER — TELEPHONE (OUTPATIENT)
Dept: CARDIAC REHAB | Facility: HOSPITAL | Age: 69
End: 2024-06-14
Payer: MEDICARE

## 2024-06-14 DIAGNOSIS — I25.118 CORONARY ARTERY DISEASE OF NATIVE ARTERY OF NATIVE HEART WITH STABLE ANGINA PECTORIS: Primary | Chronic | ICD-10-CM

## 2024-06-14 RX ORDER — CLOPIDOGREL BISULFATE 75 MG/1
TABLET ORAL
Qty: 98 TABLET | Refills: 3 | Status: SHIPPED | OUTPATIENT
Start: 2024-06-14

## 2024-06-14 NOTE — TELEPHONE ENCOUNTER
Patient is calling with complaints of the Brilinta causing persistent SOA and generalized weakness and is wondering if you would have another suggestion and wants you to know that medications cost her $100 dollars a bottle.

## 2024-06-14 NOTE — TELEPHONE ENCOUNTER
"Patient called wanting clarification on medication instructions, advised interventional cardiology has already left for the day. Gave following instructions per note by Carlos Gabriel. Patient states she was on her way to  new medicine. I told her if she did not understand to speak with pharmacist at .     \"Clopidogrel Bisulfate 75 MG Take 600 mg (8 tabs) by mouth for a single dose 24 hours after your last dose of Brilinta.  The next day after loading dose, you will begin 75 mg daily     "

## 2024-06-14 NOTE — TELEPHONE ENCOUNTER
Patient is aware of these medications changes and expresses understanding, also the 1 month f/u appt was made with the patient.

## 2024-06-15 PROBLEM — I21.4 NSTEMI, INITIAL EPISODE OF CARE: Status: RESOLVED | Noted: 2022-03-28 | Resolved: 2024-06-15

## 2024-06-15 PROBLEM — I25.10 CAD (CORONARY ARTERY DISEASE): Status: RESOLVED | Noted: 2024-05-08 | Resolved: 2024-06-15

## 2024-06-15 PROBLEM — I20.0 UNSTABLE ANGINA: Status: RESOLVED | Noted: 2023-12-24 | Resolved: 2024-06-15

## 2024-06-18 ENCOUNTER — TELEPHONE (OUTPATIENT)
Dept: FAMILY MEDICINE CLINIC | Facility: CLINIC | Age: 69
End: 2024-06-18
Payer: MEDICARE

## 2024-06-18 ENCOUNTER — TREATMENT (OUTPATIENT)
Dept: CARDIAC REHAB | Facility: HOSPITAL | Age: 69
End: 2024-06-18
Payer: MEDICARE

## 2024-06-18 VITALS — SYSTOLIC BLOOD PRESSURE: 122 MMHG | DIASTOLIC BLOOD PRESSURE: 60 MMHG | HEART RATE: 84 BPM

## 2024-06-18 DIAGNOSIS — Z95.5 STENTED CORONARY ARTERY: Primary | ICD-10-CM

## 2024-06-18 PROCEDURE — 93798 PHYS/QHP OP CAR RHAB W/ECG: CPT

## 2024-06-18 NOTE — TELEPHONE ENCOUNTER
Pt came in to let Dr Ramos know that her cardiologist she couldn't remember her name changed her brilinta 90mg to clopidogril 75mg 1x daily due to soa and couldn't walk due to being out of breath

## 2024-06-18 NOTE — PROGRESS NOTES
Pt attended phase II visit.  Tolerated exercise well, NAD noted, no complaints voiced, skin warm, pink, dry, resp nl and even, denies chest discomfort, denies SOA. Monitor shows NSR-ST, V/S WDL ,see [facility] documentation for exercise data.  [facility] cardiology physician immediately available

## 2024-06-20 ENCOUNTER — TREATMENT (OUTPATIENT)
Dept: CARDIAC REHAB | Facility: HOSPITAL | Age: 69
End: 2024-06-20
Payer: MEDICARE

## 2024-06-20 VITALS — DIASTOLIC BLOOD PRESSURE: 58 MMHG | HEART RATE: 86 BPM | SYSTOLIC BLOOD PRESSURE: 106 MMHG

## 2024-06-20 DIAGNOSIS — Z95.5 STENTED CORONARY ARTERY: Primary | ICD-10-CM

## 2024-06-20 PROCEDURE — 93798 PHYS/QHP OP CAR RHAB W/ECG: CPT

## 2024-06-20 NOTE — PROGRESS NOTES
"Breanna Kaba     VITALS: Blood pressure 116/72, pulse 94, temperature 97 °F (36.1 °C), temperature source Temporal, height 157.5 cm (62\"), weight 68.7 kg (151 lb 6.4 oz), SpO2 97%, not currently breastfeeding.    Subjective  Chief Complaint  Diabetes    Subjective          History of Present Illness:  Patient is a 69 y.o.  female with medical conditions significant for type 2 diabetes, hyperlipidemia, CAD, and hypertension along with history of heart failure who presents to clinic secondary to medical followup.  Patient is doing better.  She denies any new or acute concerns.  She has been utilizing diet and exercise to manage her diabetes.  During her last labs, she had an elevated creatinine.  She states that she tries not to drink pop or coffee.    No complaints about any of the medications.    The following portions of the patient's history were reviewed and updated as appropriate: allergies, current medications, past family history, past medical history, past social history, past surgical history and problem list.    Past Medical History  Past Medical History:   Diagnosis Date    Acute congestive heart failure, unspecified heart failure type 03/28/2022    Arthritis     Chronic pain     Coronary artery disease involving native coronary artery of native heart without angina pectoris 4/25/2024    Diabetes mellitus     Elevated cholesterol     GERD (gastroesophageal reflux disease)     Gout     Headache     Hypertension     Myocardial infarction     Neuropathy        Surgical History  Past Surgical History:   Procedure Laterality Date    BREAST BIOPSY Left 1988    benign    CARDIAC CATHETERIZATION N/A 03/31/2022    Procedure: Left Heart Cath;  Surgeon: Tristan Marin MD;  Location:  [facility];  Service: Cardiology;  Laterality: N/A;    CARDIAC CATHETERIZATION N/A 03/31/2022    Procedure: Percutaneous Coronary Intervention;  Surgeon: Tristan Marin MD;  Location:  [facility]ATH [facility]VAS[facility] LOCATION;  " "Service: Cardiology;  Laterality: N/A;    CARDIAC CATHETERIZATION N/A 5/8/2024    Procedure: Left Heart Cath;  Surgeon: Shu Regalado MD;  Location:  [facility]ATH [facility] LOCATION;  Service: Cardiology;  Laterality: N/A;    CARDIAC CATHETERIZATION N/A 5/8/2024    Procedure: Percutaneous Coronary Intervention;  Surgeon: Shu Regalado MD;  Location:  [facility]ATH INVASIVE LOCATION;  Service: Cardiology;  Laterality: N/A;    COLONOSCOPY N/A 02/17/2020    Procedure: COLONOSCOPY FOR SCREENING CPT CODE: ;  Surgeon: Mariano Prescott MD;  Location:  [facility] OR;  Service: Gastroenterology;  Laterality: N/A;    CORONARY STENT PLACEMENT      HYSTERECTOMY      Partial    KNEE SURGERY      THYROIDECTOMY, PARTIAL      Removal of goiter       Family History  Family History   Problem Relation Age of Onset    COPD Mother     Heart disease Brother     Heart attack Brother     Hypertension Daughter     Diabetes Daughter     Hypertension Daughter     Diabetes Daughter     Breast cancer Neg Hx        Social History  Social History     Socioeconomic History    Marital status:    Tobacco Use    Smoking status: Never     Passive exposure: Past    Smokeless tobacco: Never   Vaping Use    Vaping status: Never Used   Substance and Sexual Activity    Alcohol use: No    Drug use: No    Sexual activity: Defer       Objective   Vital Signs:   /72 (BP Location: Right arm, Patient Position: Sitting, Cuff Size: Adult)   Pulse 94   Temp 97 °F (36.1 °C) (Temporal)   Ht 157.5 cm (62\")   Wt 68.7 kg (151 lb 6.4 oz)   SpO2 97%   BMI 27.69 kg/m²     Physical Exam     Gen: Patient in NAD. Pleasant and answers appropriately. A&Ox3.    Skin: Warm and dry with normal turgor. No purpura, rashes, or unusual pigmentation noted. Hair is normal in appearance and distribution.    HEENT: NC/AT. No lesions noted. Conjunctiva clear, sclera nonicteric. PERRL. EOMI without nystagmus or strabismus. Fundi appear benign. No hemorrhages or " exudates of eyes. Auditory canals are patent bilaterally without lesions. TMs intact,  nonerythematous, bulging without lesions. Nasal mucosa pink, nonerythematous, and nonedematous. Frontal and maxillary sinuses are nontender. O/P nonerythematous and moist without exudate.    Neck: Supple without lymph nodes palpated. FROM.     Lungs: Decreased B/L without rales, rhonchi, crackles, or wheezes.    Heart: RRR. S1 and S2 normal. No S3 or S4. No MRGT.    Abd: Soft, nontender,nondistended. (+)BSx4 quadrants.     Extrem: No CC.  Trace edema bilateral lower extremities.  Radial pulses 2+/4 and equal B/L. FROMx4.  Positive joint tenderness noted.    Neuro: No focal motor/sensory deficits.    Procedures    Result Review :   The following data was reviewed by: Myrna Ramos MD on 06/05/2024:                Assessment and Plan    Breanna Kaba is a 69 y.o. here for medical followup.    Diagnoses and all orders for this visit:    1. Elevated serum creatinine (Primary)  -     Comprehensive Metabolic Panel; Future    2. Idiopathic chronic gout of multiple sites without tophus  -     allopurinol (ZYLOPRIM) 300 MG tablet; Take 1 tablet by mouth Daily.  Dispense: 90 tablet; Refill: 1    3. Immunization due  -     Pneumococcal Conjugate Vaccine 20-Valent (PCV20)    4. Type 2 diabetes mellitus with diabetic neuropathy, with long-term current use of insulin  Patient to continue current treatment of Basaglar and NovoLog sliding scale.        Problem List Items Addressed This Visit          Endocrine and Metabolic    Diabetes mellitus       Musculoskeletal and Injuries    Gout    Relevant Medications    allopurinol (ZYLOPRIM) 300 MG tablet     Other Visit Diagnoses       Elevated serum creatinine    -  Primary    Relevant Orders    Comprehensive Metabolic Panel (Completed)    Immunization due                         Follow Up   Return in about 3 months (around 9/5/2024), or LABS.  Findings and plans discussed with patient who  verbalizes understanding and agreement. Will followup with patient once results are in. Patient was given instructions and counseling regarding her condition or for health maintenance advice. Please see specific information pulled into the AVS if appropriate.       Myrna Ramos MD

## 2024-06-20 NOTE — PROGRESS NOTES
Pt attended phase II visit.  Tolerated exercise well, NAD noted, no complaints voiced, skin warm, pink, dry, resp nl and even, denies chest discomfort, denies SOA. Monitor shows NSR-ST, V/S WDL ,see [facility] documentation for exercise data.  [facility] cardiology physician immediately available

## 2024-06-21 RX ORDER — INSULIN GLARGINE 100 [IU]/ML
32 INJECTION, SOLUTION SUBCUTANEOUS NIGHTLY
Qty: 12 ML | Refills: 2 | Status: SHIPPED | OUTPATIENT
Start: 2024-06-21

## 2024-06-21 NOTE — TELEPHONE ENCOUNTER
Caller: Breanna Kaba    Relationship: Self    Best call back number: [phone]    Requested Prescriptions:   Requested Prescriptions      No prescriptions requested or ordered in this encounter        Insulin Glargine (BASAGLAR KWIKPEN) 100 UNIT/ML injection pen     Pharmacy where request should be sent: [facility] [address] [place] [address] [phone] [facility] [phone]      Last office visit with prescribing clinician: 6/5/2024   Last telemedicine visit with prescribing clinician: Visit date not found   Next office visit with prescribing clinician: 9/5/2024     Does the patient have less than a 3 day supply:  [x] Yes  [] No    Would you like a call back once the refill request has been completed: [] Yes [x] No    If the office needs to give you a call back, can they leave a voicemail: [] Yes [x] No    Iraida Castro, PCT   06/21/24 08:36 EDT

## 2024-06-25 ENCOUNTER — TREATMENT (OUTPATIENT)
Dept: CARDIAC REHAB | Facility: HOSPITAL | Age: 69
End: 2024-06-25
Payer: MEDICARE

## 2024-06-25 VITALS
OXYGEN SATURATION: 98 % | SYSTOLIC BLOOD PRESSURE: 122 MMHG | WEIGHT: 152.2 LBS | HEART RATE: 79 BPM | DIASTOLIC BLOOD PRESSURE: 68 MMHG | BODY MASS INDEX: 27.84 KG/M2

## 2024-06-25 DIAGNOSIS — Z95.5 STENTED CORONARY ARTERY: Primary | ICD-10-CM

## 2024-06-25 PROBLEM — R80.9 PROTEINURIA: Status: ACTIVE | Noted: 2023-04-24

## 2024-06-25 PROBLEM — N39.41 URGE INCONTINENCE: Status: ACTIVE | Noted: 2023-04-21

## 2024-06-25 PROBLEM — N81.11 MIDLINE CYSTOCELE: Status: ACTIVE | Noted: 2023-04-21

## 2024-06-25 PROBLEM — R35.0 INCREASED FREQUENCY OF URINATION: Status: ACTIVE | Noted: 2023-04-21

## 2024-06-25 PROBLEM — N81.5 VAGINAL ENTEROCELE: Status: ACTIVE | Noted: 2023-04-21

## 2024-06-25 PROBLEM — R15.9 INCONTINENCE OF FECES: Status: ACTIVE | Noted: 2023-04-21

## 2024-06-25 PROBLEM — T83.712A: Status: ACTIVE | Noted: 2023-04-21

## 2024-06-25 PROCEDURE — 93798 PHYS/QHP OP CAR RHAB W/ECG: CPT

## 2024-06-25 NOTE — PROGRESS NOTES
Pt attended phase II visit.  Tolerated exercise well, NAD noted, no complaints voiced, skin warm, pink, dry, resp nl and even, denies chest discomfort, denies SOA. Monitor shows NSR-ST, V/S WDL ,see [facility] documentation for exercise data.  [facility] cardiology physician immediately available     Educated on Cholesterol and foods

## 2024-06-27 ENCOUNTER — APPOINTMENT (OUTPATIENT)
Dept: CARDIAC REHAB | Facility: HOSPITAL | Age: 69
End: 2024-06-27
Payer: MEDICARE

## 2024-07-01 ENCOUNTER — TREATMENT (OUTPATIENT)
Dept: CARDIAC REHAB | Facility: HOSPITAL | Age: 69
End: 2024-07-01
Payer: MEDICARE

## 2024-07-01 VITALS — HEART RATE: 88 BPM | SYSTOLIC BLOOD PRESSURE: 118 MMHG | DIASTOLIC BLOOD PRESSURE: 60 MMHG

## 2024-07-01 DIAGNOSIS — Z95.5 STENTED CORONARY ARTERY: Primary | ICD-10-CM

## 2024-07-01 PROCEDURE — 93798 PHYS/QHP OP CAR RHAB W/ECG: CPT

## 2024-07-01 NOTE — PROGRESS NOTES
Pt attended phase II visit.  Tolerated exercise well, NAD noted, no complaints voiced, skin warm, pink, dry, resp nl and even, denies chest discomfort, denies SOA. Monitor shows NSR-ST, V/S WDL ,see [facility] documentation for exercise data.  [facility] cardiology physician immediately available

## 2024-07-02 ENCOUNTER — APPOINTMENT (OUTPATIENT)
Dept: CARDIAC REHAB | Facility: HOSPITAL | Age: 69
End: 2024-07-02
Payer: MEDICARE

## 2024-07-03 ENCOUNTER — TREATMENT (OUTPATIENT)
Dept: CARDIAC REHAB | Facility: HOSPITAL | Age: 69
End: 2024-07-03
Payer: MEDICARE

## 2024-07-03 VITALS — HEART RATE: 90 BPM | DIASTOLIC BLOOD PRESSURE: 50 MMHG | SYSTOLIC BLOOD PRESSURE: 112 MMHG

## 2024-07-03 DIAGNOSIS — Z95.5 STENTED CORONARY ARTERY: Primary | ICD-10-CM

## 2024-07-03 PROCEDURE — 93798 PHYS/QHP OP CAR RHAB W/ECG: CPT

## 2024-07-03 NOTE — PROGRESS NOTES
Pt attended phase II visit.  Tolerated exercise well, NAD noted, no complaints voiced, skin warm, pink, dry, resp nl and even, denies chest discomfort, denies SOA. Monitor shows NSR-ST, V/S WDL ,see [facility] documentation for exercise data.  [facility] cardiology physician immediately available

## 2024-07-06 ENCOUNTER — APPOINTMENT (OUTPATIENT)
Dept: GENERAL RADIOLOGY | Facility: HOSPITAL | Age: 69
End: 2024-07-06
Payer: MEDICARE

## 2024-07-06 ENCOUNTER — HOSPITAL ENCOUNTER (EMERGENCY)
Facility: HOSPITAL | Age: 69
Discharge: HOME | End: 2024-07-06
Attending: EMERGENCY MEDICINE
Payer: MEDICARE

## 2024-07-06 VITALS
HEIGHT: 62 IN | HEART RATE: 81 BPM | WEIGHT: 150 LBS | RESPIRATION RATE: 20 BRPM | DIASTOLIC BLOOD PRESSURE: 73 MMHG | OXYGEN SATURATION: 100 % | BODY MASS INDEX: 27.6 KG/M2 | SYSTOLIC BLOOD PRESSURE: 124 MMHG | TEMPERATURE: 98.5 F
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
## 2024-07-06 PROCEDURE — 73130 X-RAY EXAM OF HAND: CPT

## 2024-07-06 PROCEDURE — 99283 EMERGENCY DEPT VISIT LOW MDM: CPT

## 2024-07-06 PROCEDURE — 90715 TDAP VACCINE 7 YRS/> IM: CPT

## 2024-07-06 PROCEDURE — 25010000002 TETANUS-DIPHTH-ACELL PERTUSSIS 5-2.5-18.5 LF-MCG/0.5 SUSPENSION PREFILLED SYRINGE

## 2024-07-06 PROCEDURE — 73130 X-RAY EXAM OF HAND: CPT | Performed by: RADIOLOGY

## 2024-07-06 PROCEDURE — 90471 IMMUNIZATION ADMIN: CPT

## 2024-07-06 RX ORDER — OXYCODONE AND ACETAMINOPHEN 7.5; 325 MG/1; MG/1
1 TABLET ORAL ONCE
Status: COMPLETED | OUTPATIENT
Start: 2024-07-06 | End: 2024-07-06

## 2024-07-06 RX ORDER — DOXYCYCLINE 100 MG/1
100 CAPSULE ORAL ONCE
Status: COMPLETED | OUTPATIENT
Start: 2024-07-06 | End: 2024-07-06

## 2024-07-06 RX ORDER — DOXYCYCLINE HYCLATE 100 MG/1
100 CAPSULE ORAL 2 TIMES DAILY
Qty: 20 CAPSULE | Refills: 0 | Status: SHIPPED | OUTPATIENT
Start: 2024-07-06

## 2024-07-06 RX ADMIN — DOXYCYCLINE 100 MG: 100 CAPSULE ORAL at 18:13

## 2024-07-06 RX ADMIN — TETANUS TOXOID, REDUCED DIPHTHERIA TOXOID AND ACELLULAR PERTUSSIS VACCINE, ADSORBED 0.5 ML: 5; 2.5; 8; 8; 2.5 SUSPENSION INTRAMUSCULAR at 16:47

## 2024-07-06 RX ADMIN — OXYCODONE AND ACETAMINOPHEN 1 TABLET: 7.5; 325 TABLET ORAL at 16:47

## 2024-07-06 NOTE — ED PROVIDER NOTES
Subjective   History of Present Illness  Breanna is a 69-year-old female presents for evaluation for a dog bite.  She was bitten by a dog to her left middle finger 3 to 4 days ago.  She complains of having increased swelling as well as pain to the area.  Denies any discharge.  She states that she is unsure of who his dog it was and if it is up-to-date on vaccines.  She is not up-to-date on tetanus.  States that she believes she did receive the rabies vaccine within the past several years.      Review of Systems   Musculoskeletal:  Positive for joint swelling.   Skin:  Positive for color change and wound.       Past Medical History:   Diagnosis Date    Acute congestive heart failure, unspecified heart failure type 03/28/2022    Arthritis     Chronic pain     Coronary artery disease involving native coronary artery of native heart without angina pectoris 4/25/2024    Diabetes mellitus     Elevated cholesterol     GERD (gastroesophageal reflux disease)     Gout     Headache     Hypertension     Myocardial infarction     Neuropathy        Allergies   Allergen Reactions    Asa [Aspirin] GI Intolerance    Naproxen Nausea And Vomiting    Penicillins Hives and Rash       Past Surgical History:   Procedure Laterality Date    BREAST BIOPSY Left 1988    benign    CARDIAC CATHETERIZATION N/A 03/31/2022    Procedure: Left Heart Cath;  Surgeon: Tristan Marin MD;  Location:  [facility] CATH INVASIVE LOCATION;  Service: Cardiology;  Laterality: N/A;    CARDIAC CATHETERIZATION N/A 03/31/2022    Procedure: Percutaneous Coronary Intervention;  Surgeon: Tristan Marin MD;  Location:  [facility] CATH INVASIVE LOCATION;  Service: Cardiology;  Laterality: N/A;    CARDIAC CATHETERIZATION N/A 5/8/2024    Procedure: Left Heart Cath;  Surgeon: Shu Regalado MD;  Location:  [facility] CATH INVASIVE LOCATION;  Service: Cardiology;  Laterality: N/A;    CARDIAC CATHETERIZATION N/A 5/8/2024    Procedure: Percutaneous Coronary Intervention;  Surgeon: Fabricio  MD Shu;  Location: [facility] CATH INVASIVE LOCATION;  Service: Cardiology;  Laterality: N/A;    COLONOSCOPY N/A 02/17/2020    Procedure: COLONOSCOPY FOR SCREENING CPT CODE: ;  Surgeon: Mariano Prescott MD;  Location: [facility] OR;  Service: Gastroenterology;  Laterality: N/A;    CORONARY STENT PLACEMENT      HYSTERECTOMY      Partial    KNEE SURGERY      THYROIDECTOMY, PARTIAL      Removal of goiter       Family History   Problem Relation Age of Onset    COPD Mother     Heart disease Brother     Heart attack Brother     Hypertension Daughter     Diabetes Daughter     Hypertension Daughter     Diabetes Daughter     Breast cancer Neg Hx        Social History     Socioeconomic History    Marital status:    Tobacco Use    Smoking status: Never     Passive exposure: Past    Smokeless tobacco: Never   Vaping Use    Vaping status: Never Used   Substance and Sexual Activity    Alcohol use: No    Drug use: No    Sexual activity: Defer           Objective   Physical Exam  Constitutional:       Appearance: Normal appearance.   HENT:      Head: Normocephalic and atraumatic.      Right Ear: External ear normal.      Left Ear: External ear normal.   Eyes:      Conjunctiva/sclera: Conjunctivae normal.   Pulmonary:      Effort: Pulmonary effort is normal.   Abdominal:      General: Abdomen is flat.   Musculoskeletal:         General: Swelling (Swelling to left middle finger, patient does have some range of motion) present. Normal range of motion.      Cervical back: Normal range of motion and neck supple.   Skin:     General: Skin is warm and dry.      Capillary Refill: Capillary refill takes less than 2 seconds.      Findings: Erythema and lesion (There is an approximately 0.5 cm abrasion to posterior left middle finger as well as a 1.5 cm abrasion to anterior left middle finger) present.   Neurological:      General: No focal deficit present.      Mental Status: She is alert and oriented to person, place,  and time.   Psychiatric:         Mood and Affect: Mood normal.         Behavior: Behavior normal.         Procedures           ED Course  ED Course as of 07/06/24 1738   Sat Jul 06, 2024   1727 Narrative & Impression  PROCEDURE: X-ray examination of the left hand performed on July 6, 2024.  3 views.     HISTORY: Swelling to the left middle finger. Injury.     COMPARISON: None.     FINDINGS:     Soft tissue only noted to surround the phalanges of the left third  finger consistent with posttraumatic edema.  No acute fracture or dislocation.  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  Mild osteoarthritis at the interphalangeal joints throughout the hand.  Mild osteoarthritis at the left first CMC joint.  No air in the soft tissues.   No lytic or blastic lesion.  No foreign body.     IMPRESSION:     1.  Posttraumatic edema noted to surround the phalanges of the left  third finger consistent with posttraumatic change.  2.  No acute fracture or dislocation.  3.  Mild osteoarthritis at the MCP joints, PIP joints, and DIP joints.  4.  Mild osteoarthritis at the left first CMC joint.  5.  No acute foreign body.     This report was finalized on 7/6/2024 5:27 PM by Konstantin Smart MD.      [CE]      ED Course User Index  [CE] Herson Bowie APRN                                             Medical Decision Making  Breanna is a 69-year-old female presents for evaluation for a dog bite.  She was bitten by a dog to her left middle finger 3 to 4 days ago.  She complains of having increased swelling as well as pain to the area.  Denies any discharge.  She states that she is unsure of who his dog it was and if it is up-to-date on vaccines.  She is not up-to-date on tetanus.  States that she believes she did receive the rabies vaccine within the past several years.    Amount and/or Complexity of Data Reviewed  Radiology: ordered.    Risk  Prescription drug management.  Risk Details: ED stay uneventful.  Patient be discharged with  instructions to begin antibiotics and take as directed.  She will follow-up with Ortho.  Will also follow-up with primary care.  Will return Emergency Department for any new needs, concerns or changes arise.  She has range of motion at this finger as well as no drainage identified upon physical examination.        Final diagnoses:   Dog bite, initial encounter       ED Disposition  ED Disposition       ED Disposition   Discharge    Condition   Stable    Comment   --               Myrna Ramos MD  [address]   [facility] [address]  [phone]    In 3 days      [facility] EMERGENCY DEPARTMENT  [address] [facility] [address] [phone]  In 3 days      Gonzales Dolan, DO  [address] [facility] [id number] [phone]    In 2 days           Medication List        New Prescriptions      doxycycline 100 MG capsule  Commonly known as: VIBRAMYCIN  Take 1 capsule by mouth 2 (Two) Times a Day.               Where to Get Your Medications        These medications were sent to [facility] [address] [place] [address] - [phone] [place] [phone] [address] [address]      Phone: [phone]   doxycycline 100 MG capsule            Herson Bowie, REGINALDO  07/06/24 8349

## 2024-07-09 ENCOUNTER — TREATMENT (OUTPATIENT)
Dept: CARDIAC REHAB | Facility: HOSPITAL | Age: 69
End: 2024-07-09
Payer: MEDICARE

## 2024-07-09 VITALS — SYSTOLIC BLOOD PRESSURE: 124 MMHG | HEART RATE: 84 BPM | DIASTOLIC BLOOD PRESSURE: 64 MMHG

## 2024-07-09 DIAGNOSIS — Z95.5 STENTED CORONARY ARTERY: Primary | ICD-10-CM

## 2024-07-09 PROCEDURE — 93798 PHYS/QHP OP CAR RHAB W/ECG: CPT

## 2024-07-09 RX ORDER — METOPROLOL SUCCINATE 25 MG/1
12.5 TABLET, EXTENDED RELEASE ORAL
Qty: 15 TABLET | Refills: 3 | Status: SHIPPED | OUTPATIENT
Start: 2024-07-09

## 2024-07-09 NOTE — PROGRESS NOTES
Pt attended phase II visit.  Tolerated exercise well, NAD noted, no complaints voiced, skin warm, pink, dry, resp nl and even, denies chest discomfort, denies SOA. Monitor shows NSR-ST, V/S WDL ,see [facility] documentation for exercise data.  [facility] cardiology physician immediately available

## 2024-07-11 ENCOUNTER — APPOINTMENT (OUTPATIENT)
Dept: GENERAL RADIOLOGY | Facility: HOSPITAL | Age: 69
End: 2024-07-11
Payer: MEDICARE

## 2024-07-11 ENCOUNTER — HOSPITAL ENCOUNTER (EMERGENCY)
Facility: HOSPITAL | Age: 69
Discharge: HOME | End: 2024-07-11
Attending: EMERGENCY MEDICINE
Payer: MEDICARE

## 2024-07-11 ENCOUNTER — APPOINTMENT (OUTPATIENT)
Dept: CARDIAC REHAB | Facility: HOSPITAL | Age: 69
End: 2024-07-11
Payer: MEDICARE

## 2024-07-11 ENCOUNTER — APPOINTMENT (OUTPATIENT)
Dept: CT IMAGING | Facility: HOSPITAL | Age: 69
End: 2024-07-11
Payer: MEDICARE

## 2024-07-11 VITALS
HEIGHT: 62 IN | OXYGEN SATURATION: 98 % | HEART RATE: 76 BPM | BODY MASS INDEX: 27.6 KG/M2 | WEIGHT: 150 LBS | SYSTOLIC BLOOD PRESSURE: 156 MMHG | DIASTOLIC BLOOD PRESSURE: 85 MMHG | TEMPERATURE: 97.9 F | RESPIRATION RATE: 16 BRPM

## 2024-07-11 DIAGNOSIS — R06.00 DYSPNEA, UNSPECIFIED TYPE: Primary | ICD-10-CM

## 2024-07-11 LAB
ALBUMIN SERPL-MCNC: 4.2 G/DL (ref 3.5–5.2)
ALBUMIN/GLOB SERPL: 1.5 G/DL
ALP SERPL-CCNC: 150 U/L (ref 39–117)
ALT SERPL W P-5'-P-CCNC: 13 U/L (ref 1–33)
ANION GAP SERPL CALCULATED.3IONS-SCNC: 8.5 MMOL/L (ref 5–15)
AST SERPL-CCNC: 13 U/L (ref 1–32)
BASOPHILS # BLD AUTO: 0.04 10*3/MM3 (ref 0–0.2)
BASOPHILS NFR BLD AUTO: 0.5 % (ref 0–1.5)
BILIRUB SERPL-MCNC: 0.4 MG/DL (ref 0–1.2)
BUN SERPL-MCNC: 28 MG/DL (ref 8–23)
BUN/CREAT SERPL: 24.8 (ref 7–25)
CALCIUM SPEC-SCNC: 9.9 MG/DL (ref 8.6–10.5)
CHLORIDE SERPL-SCNC: 99 MMOL/L (ref 98–107)
CO2 SERPL-SCNC: 25.5 MMOL/L (ref 22–29)
CREAT SERPL-MCNC: 1.13 MG/DL (ref 0.57–1)
DEPRECATED RDW RBC AUTO: 41.8 FL (ref 37–54)
EGFRCR SERPLBLD CKD-EPI 2021: 52.1 ML/MIN/1.73
EOSINOPHIL # BLD AUTO: 0.24 10*3/MM3 (ref 0–0.4)
EOSINOPHIL NFR BLD AUTO: 2.9 % (ref 0.3–6.2)
ERYTHROCYTE [DISTWIDTH] IN BLOOD BY AUTOMATED COUNT: 13.5 % (ref 12.3–15.4)
GLOBULIN UR ELPH-MCNC: 2.8 GM/DL
GLUCOSE SERPL-MCNC: 357 MG/DL (ref 65–99)
HCT VFR BLD AUTO: 35.7 % (ref 34–46.6)
HGB BLD-MCNC: 11.9 G/DL (ref 12–15.9)
HOLD SPECIMEN: NORMAL
HOLD SPECIMEN: NORMAL
IMM GRANULOCYTES # BLD AUTO: 0.02 10*3/MM3 (ref 0–0.05)
IMM GRANULOCYTES NFR BLD AUTO: 0.2 % (ref 0–0.5)
LYMPHOCYTES # BLD AUTO: 2.14 10*3/MM3 (ref 0.7–3.1)
LYMPHOCYTES NFR BLD AUTO: 25.4 % (ref 19.6–45.3)
MCH RBC QN AUTO: 28.1 PG (ref 26.6–33)
MCHC RBC AUTO-ENTMCNC: 33.3 G/DL (ref 31.5–35.7)
MCV RBC AUTO: 84.2 FL (ref 79–97)
MONOCYTES # BLD AUTO: 0.66 10*3/MM3 (ref 0.1–0.9)
MONOCYTES NFR BLD AUTO: 7.8 % (ref 5–12)
NEUTROPHILS NFR BLD AUTO: 5.31 10*3/MM3 (ref 1.7–7)
NEUTROPHILS NFR BLD AUTO: 63.2 % (ref 42.7–76)
NRBC BLD AUTO-RTO: 0 /100 WBC (ref 0–0.2)
NT-PROBNP SERPL-MCNC: 179.6 PG/ML (ref 0–900)
PLATELET # BLD AUTO: 342 10*3/MM3 (ref 140–450)
PMV BLD AUTO: 9.8 FL (ref 6–12)
POTASSIUM SERPL-SCNC: 5.5 MMOL/L (ref 3.5–5.2)
PROT SERPL-MCNC: 7 G/DL (ref 6–8.5)
RBC # BLD AUTO: 4.24 10*6/MM3 (ref 3.77–5.28)
SODIUM SERPL-SCNC: 133 MMOL/L (ref 136–145)
TROPONIN T SERPL HS-MCNC: 27 NG/L
TROPONIN T SERPL HS-MCNC: 29 NG/L
WBC NRBC COR # BLD AUTO: 8.41 10*3/MM3 (ref 3.4–10.8)
WHOLE BLOOD HOLD COAG: NORMAL
WHOLE BLOOD HOLD SPECIMEN: NORMAL

## 2024-07-11 PROCEDURE — 25810000003 SODIUM CHLORIDE 0.9 % SOLUTION: Performed by: PHYSICIAN ASSISTANT

## 2024-07-11 PROCEDURE — 80053 COMPREHEN METABOLIC PANEL: CPT | Performed by: EMERGENCY MEDICINE

## 2024-07-11 PROCEDURE — 84484 ASSAY OF TROPONIN QUANT: CPT | Performed by: NURSE PRACTITIONER

## 2024-07-11 PROCEDURE — 83880 ASSAY OF NATRIURETIC PEPTIDE: CPT | Performed by: EMERGENCY MEDICINE

## 2024-07-11 PROCEDURE — 84484 ASSAY OF TROPONIN QUANT: CPT | Performed by: EMERGENCY MEDICINE

## 2024-07-11 PROCEDURE — 36415 COLL VENOUS BLD VENIPUNCTURE: CPT

## 2024-07-11 PROCEDURE — 85025 COMPLETE CBC W/AUTO DIFF WBC: CPT | Performed by: EMERGENCY MEDICINE

## 2024-07-11 PROCEDURE — 93005 ELECTROCARDIOGRAM TRACING: CPT | Performed by: EMERGENCY MEDICINE

## 2024-07-11 PROCEDURE — 99285 EMERGENCY DEPT VISIT HI MDM: CPT

## 2024-07-11 PROCEDURE — 71045 X-RAY EXAM CHEST 1 VIEW: CPT

## 2024-07-11 PROCEDURE — 71045 X-RAY EXAM CHEST 1 VIEW: CPT | Performed by: RADIOLOGY

## 2024-07-11 PROCEDURE — 71275 CT ANGIOGRAPHY CHEST: CPT

## 2024-07-11 PROCEDURE — 71275 CT ANGIOGRAPHY CHEST: CPT | Performed by: RADIOLOGY

## 2024-07-11 PROCEDURE — 25510000001 IOPAMIDOL PER 1 ML: Performed by: EMERGENCY MEDICINE

## 2024-07-11 RX ORDER — SODIUM POLYSTYRENE SULFONATE 4.1 MEQ/G
30 POWDER, FOR SUSPENSION ORAL; RECTAL ONCE
Status: COMPLETED | OUTPATIENT
Start: 2024-07-11 | End: 2024-07-11

## 2024-07-11 RX ORDER — SODIUM CHLORIDE 0.9 % (FLUSH) 0.9 %
10 SYRINGE (ML) INJECTION AS NEEDED
Status: DISCONTINUED | OUTPATIENT
Start: 2024-07-11 | End: 2024-07-12 | Stop reason: HOSPADM

## 2024-07-11 RX ADMIN — IOPAMIDOL 88 ML: 755 INJECTION, SOLUTION INTRAVENOUS at 18:59

## 2024-07-11 RX ADMIN — SODIUM POLYSTYRENE SULFONATE 30 G: 1 POWDER ORAL; RECTAL at 18:36

## 2024-07-11 RX ADMIN — SODIUM CHLORIDE 1000 ML: 9 INJECTION, SOLUTION INTRAVENOUS at 18:34

## 2024-07-11 NOTE — ED PROVIDER NOTES
Subjective   History of Present Illness  69-year-old female who presents to the emergency department with complaint dyspnea.  Patient states she has been getting short of breath with walking.  Does state recently had cardiac stents placed approximately 2 months ago.  Patient does have history of CHF, coronary artery disease x 3 stents placed, diabetes, hypertension, hyperlipidemia.    History provided by:  Patient   used: No    Shortness of Breath  Severity:  Moderate  Onset quality:  Gradual  Duration:  2 days  Timing:  Intermittent  Progression:  Worsening  Chronicity:  New  Context: not animal exposure, not occupational exposure and not pollens    Relieved by:  Nothing  Worsened by:  Nothing  Ineffective treatments:  None tried  Associated symptoms: no cough, no diaphoresis, no ear pain, no headaches and no PND    Risk factors: no recent alcohol use, no family hx of DVT, no hx of cancer, no hx of PE/DVT and no obesity        Review of Systems   Constitutional: Negative.  Negative for appetite change and diaphoresis.   HENT: Negative.  Negative for ear pain and hearing loss.    Eyes: Negative.  Negative for photophobia and itching.   Respiratory:  Positive for shortness of breath. Negative for cough and chest tightness.    Cardiovascular:  Negative for palpitations, leg swelling and PND.   Endocrine: Negative.  Negative for heat intolerance, polydipsia and polyphagia.   Musculoskeletal: Negative.    Skin: Negative.    Neurological:  Negative for headaches.   Psychiatric/Behavioral: Negative.     All other systems reviewed and are negative.      Past Medical History:   Diagnosis Date    Acute congestive heart failure, unspecified heart failure type 03/28/2022    Arthritis     Chronic pain     Coronary artery disease involving native coronary artery of native heart without angina pectoris 4/25/2024    Diabetes mellitus     Elevated cholesterol     GERD (gastroesophageal reflux disease)     Gout      Headache     Hypertension     Myocardial infarction     Neuropathy        Allergies   Allergen Reactions    Asa [Aspirin] GI Intolerance    Naproxen Nausea And Vomiting    Penicillins Hives and Rash       Past Surgical History:   Procedure Laterality Date    BREAST BIOPSY Left 1988    benign    CARDIAC CATHETERIZATION N/A 03/31/2022    Procedure: Left Heart Cath;  Surgeon: Tristan Marin MD;  Location: [facility] CATH INVASIVE LOCATION;  Service: Cardiology;  Laterality: N/A;    CARDIAC CATHETERIZATION N/A 03/31/2022    Procedure: Percutaneous Coronary Intervention;  Surgeon: Tristan Marin MD;  Location:  [facility] CATH INVASIVE LOCATION;  Service: Cardiology;  Laterality: N/A;    CARDIAC CATHETERIZATION N/A 5/8/2024    Procedure: Left Heart Cath;  Surgeon: Shu Regalado MD;  Location:  [facility] CATH INVASIVE LOCATION;  Service: Cardiology;  Laterality: N/A;    CARDIAC CATHETERIZATION N/A 5/8/2024    Procedure: Percutaneous Coronary Intervention;  Surgeon: Shu Regalado MD;  Location: [facility] CATH INVASIVE LOCATION;  Service: Cardiology;  Laterality: N/A;    COLONOSCOPY N/A 02/17/2020    Procedure: COLONOSCOPY FOR SCREENING CPT CODE: ;  Surgeon: Mariano Prescott MD;  Location: [facility] OR;  Service: Gastroenterology;  Laterality: N/A;    CORONARY STENT PLACEMENT      HYSTERECTOMY      Partial    KNEE SURGERY      THYROIDECTOMY, PARTIAL      Removal of goiter       Family History   Problem Relation Age of Onset    COPD Mother     Heart disease Brother     Heart attack Brother     Hypertension Daughter     Diabetes Daughter     Hypertension Daughter     Diabetes Daughter     Breast cancer Neg Hx        Social History     Socioeconomic History    Marital status:    Tobacco Use    Smoking status: Never     Passive exposure: Past    Smokeless tobacco: Never   Vaping Use    Vaping status: Never Used   Substance and Sexual Activity    Alcohol use: No    Drug use: No    Sexual activity: Defer            Objective   Physical Exam  Vitals and nursing note reviewed.   Constitutional:       General: She is not in acute distress.     Appearance: She is well-developed and normal weight. She is not ill-appearing or toxic-appearing.      Interventions: She is not intubated.  HENT:      Head: Normocephalic and atraumatic.      Mouth/Throat:      Mouth: Mucous membranes are moist.      Pharynx: Oropharynx is clear. No pharyngeal swelling or oropharyngeal exudate.   Eyes:      Extraocular Movements: Extraocular movements intact.      Pupils: Pupils are equal, round, and reactive to light.   Neck:      Thyroid: No thyromegaly.      Vascular: No hepatojugular reflux.      Trachea: No tracheal deviation.   Cardiovascular:      Rate and Rhythm: Normal rate and regular rhythm. No extrasystoles are present.     Pulses: Normal pulses.           Carotid pulses are  on the right side with bruit and  on the left side with bruit.     Heart sounds: No murmur heard.     No gallop.   Pulmonary:      Effort: Pulmonary effort is normal. No tachypnea, bradypnea, accessory muscle usage or respiratory distress. She is not intubated.      Breath sounds: Normal breath sounds. No decreased breath sounds.   Chest:      Chest wall: No mass, tenderness, crepitus or edema.   Abdominal:      Palpations: Abdomen is soft. There is no hepatomegaly, splenomegaly or mass.      Tenderness: There is no abdominal tenderness. There is no guarding.   Musculoskeletal:         General: Normal range of motion.      Cervical back: Normal range of motion and neck supple.      Right lower leg: No tenderness. No edema.      Left lower leg: No edema.   Lymphadenopathy:      Cervical: No cervical adenopathy.   Skin:     General: Skin is warm and dry.      Capillary Refill: Capillary refill takes less than 2 seconds.      Coloration: Skin is not cyanotic or pale.      Findings: No ecchymosis, erythema or rash.   Neurological:      General: No focal deficit  present.      Mental Status: She is alert and oriented to person, place, and time.      Cranial Nerves: No cranial nerve deficit.   Psychiatric:         Mood and Affect: Mood normal. Mood is not anxious.         Behavior: Behavior normal. Behavior is not agitated.         Procedures       Results for orders placed or performed during the hospital encounter of 07/11/24   Comprehensive Metabolic Panel    Specimen: Arm, Left; Blood   Result Value Ref Range    Glucose 357 (H) 65 - 99 mg/dL    BUN 28 (H) 8 - 23 mg/dL    Creatinine 1.13 (H) 0.57 - 1.00 mg/dL    Sodium 133 (L) 136 - 145 mmol/L    Potassium 5.5 (H) 3.5 - 5.2 mmol/L    Chloride 99 98 - 107 mmol/L    CO2 25.5 22.0 - 29.0 mmol/L    Calcium 9.9 8.6 - 10.5 mg/dL    Total Protein 7.0 6.0 - 8.5 g/dL    Albumin 4.2 3.5 - 5.2 g/dL    ALT (SGPT) 13 1 - 33 U/L    AST (SGOT) 13 1 - 32 U/L    Alkaline Phosphatase 150 (H) 39 - 117 U/L    Total Bilirubin 0.4 0.0 - 1.2 mg/dL    Globulin 2.8 gm/dL    A/G Ratio 1.5 g/dL    BUN/Creatinine Ratio 24.8 7.0 - 25.0    Anion Gap 8.5 5.0 - 15.0 mmol/L    eGFR 52.1 (L) >60.0 mL/min/1.73   BNP    Specimen: Arm, Left; Blood   Result Value Ref Range    proBNP 179.6 0.0 - 900.0 pg/mL   Single High Sensitivity Troponin T    Specimen: Arm, Left; Blood   Result Value Ref Range    HS Troponin T 27 (H) <14 ng/L   CBC Auto Differential    Specimen: Arm, Left; Blood   Result Value Ref Range    WBC 8.41 3.40 - 10.80 10*3/mm3    RBC 4.24 3.77 - 5.28 10*6/mm3    Hemoglobin 11.9 (L) 12.0 - 15.9 g/dL    Hematocrit 35.7 34.0 - 46.6 %    MCV 84.2 79.0 - 97.0 fL    MCH 28.1 26.6 - 33.0 pg    MCHC 33.3 31.5 - 35.7 g/dL    RDW 13.5 12.3 - 15.4 %    RDW-SD 41.8 37.0 - 54.0 fl    MPV 9.8 6.0 - 12.0 fL    Platelets 342 140 - 450 10*3/mm3    Neutrophil % 63.2 42.7 - 76.0 %    Lymphocyte % 25.4 19.6 - 45.3 %    Monocyte % 7.8 5.0 - 12.0 %    Eosinophil % 2.9 0.3 - 6.2 %    Basophil % 0.5 0.0 - 1.5 %    Immature Grans % 0.2 0.0 - 0.5 %    Neutrophils, Absolute  5.31 1.70 - 7.00 10*3/mm3    Lymphocytes, Absolute 2.14 0.70 - 3.10 10*3/mm3    Monocytes, Absolute 0.66 0.10 - 0.90 10*3/mm3    Eosinophils, Absolute 0.24 0.00 - 0.40 10*3/mm3    Basophils, Absolute 0.04 0.00 - 0.20 10*3/mm3    Immature Grans, Absolute 0.02 0.00 - 0.05 10*3/mm3    nRBC 0.0 0.0 - 0.2 /100 WBC   Single High Sensitivity Troponin T    Specimen: Blood   Result Value Ref Range    HS Troponin T 29 (H) <14 ng/L   ECG 12 Lead ED Triage Standing Order; SOA   Result Value Ref Range    QT Interval 390 ms    QTC Interval 444 ms   Green Top (Gel)   Result Value Ref Range    Extra Tube Hold for add-ons.    Lavender Top   Result Value Ref Range    Extra Tube hold for add-on    Gold Top - SST   Result Value Ref Range    Extra Tube Hold for add-ons.    Light Blue Top   Result Value Ref Range    Extra Tube Hold for add-ons.       CT Angiogram Chest Pulmonary Embolism   Final Result       1.  No thoracic aortic aneurysm or dissection.    2.  No pulmonary embolus.   3.  Aberrant right subclavian artery.   4.  No mass or adenopathy.   5.  No lobar consolidation, edema, pleural effusion, or pneumothorax.   6.  Minimal atelectasis versus scarring in the lung bases.   7.  Cholelithiasis.   8.  Nonobstructive left nephrolithiasis.       This report was finalized on 7/11/2024 9:23 PM by Konstantin Smart MD.          XR Chest 1 View   Final Result       No evidence of acute disease in the chest.       This report was finalized on 7/11/2024 6:13 PM by Jackie Paulson MD.               ED Course  ED Course as of 07/11/24 2218   Thu Jul 11, 2024   1701 ECG 16:56 NSR, rate 78. Cannot r/o anterior infarction, age undetermined. QT/qTc 390/444 [UDAY]   1815 IMPRESSION:     No evidence of acute disease in the chest.   [BH]   1957 Endorsed to Mariama Gomes   []   2112 [facility] [MB]   2217 CT Angiogram Chest Pulmonary Embolism  IMPRESSION:     1.  No thoracic aortic aneurysm or dissection.   2.  No  pulmonary embolus.  3.  Aberrant right subclavian artery.  4.  No mass or adenopathy.  5.  No lobar consolidation, edema, pleural effusion, or pneumothorax.  6.  Minimal atelectasis versus scarring in the lung bases.  7.  Cholelithiasis.  8.  Nonobstructive left nephrolithiasis.   [MB]      ED Course User Index  [BH] Sanford Warren PA-C  [UDAY] Delfin Olea MD  [MB] Mariama Gomes APRN                                             Medical Decision Making  Problems Addressed:  Dyspnea, unspecified type: complicated acute illness or injury    Amount and/or Complexity of Data Reviewed  Labs: ordered.  Radiology: ordered. Decision-making details documented in ED Course.  ECG/medicine tests: ordered.    Risk  Prescription drug management.        Final diagnoses:   Dyspnea, unspecified type       ED Disposition  ED Disposition       ED Disposition   Discharge    Condition   Stable    Comment   --               Myrna Ramos MD  [address] [place] [address]  [phone]    Call in 2 days           Medication List      No changes were made to your prescriptions during this visit.            Mariama Gomes APRN  07/11/24 2675

## 2024-07-12 LAB
QT INTERVAL: 390 MS
QTC INTERVAL: 444 MS

## 2024-07-15 NOTE — TELEPHONE ENCOUNTER
Caller: Breanna Kaba    Relationship: Self    Best call back number: [phone]    Requested Prescriptions:   Requested Prescriptions      No prescriptions requested or ordered in this encounter      TEST STRIPS FOR ONE TOUCH VERIO FLEX  PIN NEEDLES    Pharmacy where request should be sent: [facility] [address] [place] [address] [phone] [facility] [phone]      Last office visit with prescribing clinician: 6/5/2024   Last telemedicine visit with prescribing clinician: Visit date not found   Next office visit with prescribing clinician: 9/5/2024     Additional details provided by patient:     PATIENT SAID SHE KEEPS GETTING ERROR ON HER GLUCOMETER WHEN SHE PUTS IN THE TEST STRIP.  SHE MAY NEED NEW GLUCOMETER    Does the patient have less than a 3 day supply:  [x] Yes  [] No    Would you like a call back once the refill request has been completed: [] Yes [x] No    If the office needs to give you a call back, can they leave a voicemail: [] Yes [x] No    Iraida Castro, PCT   07/15/24 15:13 EDT

## 2024-07-16 ENCOUNTER — TREATMENT (OUTPATIENT)
Dept: CARDIAC REHAB | Facility: HOSPITAL | Age: 69
End: 2024-07-16
Payer: MEDICARE

## 2024-07-16 ENCOUNTER — TELEPHONE (OUTPATIENT)
Dept: CARDIOLOGY | Facility: CLINIC | Age: 69
End: 2024-07-16

## 2024-07-16 ENCOUNTER — OFFICE VISIT (OUTPATIENT)
Dept: CARDIOLOGY | Facility: CLINIC | Age: 69
End: 2024-07-16
Payer: MEDICARE

## 2024-07-16 VITALS — HEART RATE: 71 BPM | DIASTOLIC BLOOD PRESSURE: 58 MMHG | SYSTOLIC BLOOD PRESSURE: 118 MMHG

## 2024-07-16 VITALS
SYSTOLIC BLOOD PRESSURE: 103 MMHG | HEART RATE: 78 BPM | BODY MASS INDEX: 26.94 KG/M2 | DIASTOLIC BLOOD PRESSURE: 60 MMHG | HEIGHT: 62 IN | WEIGHT: 146.4 LBS | OXYGEN SATURATION: 96 %

## 2024-07-16 DIAGNOSIS — I25.118 CORONARY ARTERY DISEASE OF NATIVE ARTERY OF NATIVE HEART WITH STABLE ANGINA PECTORIS: Chronic | ICD-10-CM

## 2024-07-16 DIAGNOSIS — Z95.5 STENTED CORONARY ARTERY: Primary | ICD-10-CM

## 2024-07-16 DIAGNOSIS — E78.2 MIXED HYPERLIPIDEMIA: Chronic | ICD-10-CM

## 2024-07-16 DIAGNOSIS — I50.22 CHRONIC HFREF (HEART FAILURE WITH REDUCED EJECTION FRACTION): Primary | Chronic | ICD-10-CM

## 2024-07-16 PROCEDURE — 93798 PHYS/QHP OP CAR RHAB W/ECG: CPT

## 2024-07-16 PROCEDURE — 99214 OFFICE O/P EST MOD 30 MIN: CPT | Performed by: NURSE PRACTITIONER

## 2024-07-16 PROCEDURE — 3074F SYST BP LT 130 MM HG: CPT | Performed by: NURSE PRACTITIONER

## 2024-07-16 PROCEDURE — 3078F DIAST BP <80 MM HG: CPT | Performed by: NURSE PRACTITIONER

## 2024-07-16 RX ORDER — CLOPIDOGREL BISULFATE 75 MG/1
TABLET ORAL
Qty: 90 TABLET | Refills: 3 | Status: SHIPPED | OUTPATIENT
Start: 2024-07-16

## 2024-07-16 NOTE — TELEPHONE ENCOUNTER
Caller: Breanna Kaba     Relationship:SELF    Best call back number: [phone]    What is your medical concern? BP    How long has this issue been going on? TODAY    Is your provider already aware of this issue? NO    Have you been treated for this issue? NO    PATIENT WAS IN OFFICE TODAY AND SAW REGINALDO RUTHERFORD. PATIENT HAD HOME HEALTH CARE NURSE COME OUT WHEN SHE GOT HOME AND BP WAS 96/58 PULSE 77. PATIENT WAS TOLD BY HOME HEALTH TO CALL IN AND LET REGINALDO RUTHERFORD KNOW ABOUT BP READING.  PLEASE REACH OUT TO PATIENT. BP WAS TAKEN 2 TIMES.

## 2024-07-16 NOTE — PROGRESS NOTES
"Chief Complaint  Follow-up (Patient was in ED on 07/11/24 with resp diff )    Subjective          Breanna Kaba presents to [facility] CARDIOLOG[facility] for follow up.    History of Present Illness  Ms. Kaba presents for continued follow-up of ASCVD, hyperlipidemia, and hypertension.  Her last visit to clinic was 05/24/2024 with me.  At that time no changes were made to her medication regimen.      In review of her medications, she reports that she has gone a month without taking her aspirin.  She did not realize that she had omitted it from her medication planner.  I reinforced the importance of dual antiplatelet therapy for at least 1 year after stent placement in order to keep the stent open.  She verbalizes understanding and agreement to continue with dual antiplatelet therapy in an uninterrupted fashion.    Her blood pressure is at goal on current medications.  She is following with the lipid clinic for PCSK9 therapy.  Her LDL is still not quite at goal.  Her A1c was greater than 9 in May 2024.  I discussed the correlation between uncontrolled glucose and ASCVD and strongly encouraged her to work with her primary care provider to gain better control.    She is participating with cardiac rehab and can feel her exercise tolerance improving.  She denies chest pain or shortness of air.  She has not noticed any new lower extremity edema to indicate deterioration in heart failure symptoms.  Tobacco Use: Low Risk  (7/25/2024)    Patient History     Smoking Tobacco Use: Never     Smokeless Tobacco Use: Never     Passive Exposure: Past       Objective     Vital Signs:   /60 (BP Location: Left arm, Patient Position: Sitting, Cuff Size: Adult)   Pulse 78   Ht 157.5 cm (62\")   Wt 66.4 kg (146 lb 6.4 oz)   SpO2 96%   BMI 26.78 kg/m²       Physical Exam  Vitals and nursing note reviewed.   Constitutional:       General: She is not in acute distress.  HENT:      Head: Normocephalic and atraumatic. "   Neck:      Vascular: No carotid bruit.   Cardiovascular:      Rate and Rhythm: Normal rate and regular rhythm.      Heart sounds: No murmur heard.     No friction rub. No gallop.   Pulmonary:      Effort: Pulmonary effort is normal.      Breath sounds: Normal breath sounds.   Musculoskeletal:      Right lower leg: No edema.      Left lower leg: No edema.   Skin:     General: Skin is warm and dry.   Neurological:      General: No focal deficit present.      Mental Status: She is alert.   Psychiatric:         Mood and Affect: Mood normal.         Behavior: Behavior normal.          Result Review :   The following data was reviewed by: REGINALDO Diaz on 07/16/2024:  Common labs          5/9/2024    03:20 6/5/2024    10:32 7/11/2024    17:06   Common Labs   Glucose 269  302  357    BUN 37  33  28    Creatinine 1.21  1.17  1.13    Sodium 134  137  133    Potassium 4.2  4.7  5.5    Chloride 102  102  99    Calcium 9.2  9.8  9.9    Albumin  4.3  4.2    Total Bilirubin  0.5  0.4    Alkaline Phosphatase  142  150    AST (SGOT)  13  13    ALT (SGPT)  16  13    WBC 9.75   8.41    Hemoglobin 11.3   11.9    Hematocrit 34.7   35.7    Platelets 284   342    Total Cholesterol 150      Triglycerides 217      HDL Cholesterol 39      LDL Cholesterol  75      Hemoglobin A1C 9.20        Lipid Panel          12/25/2023    03:10 5/9/2024    03:20   Lipid Panel   Total Cholesterol 141  150    Triglycerides 155  217    HDL Cholesterol 41  39    VLDL Cholesterol 27  36    LDL Cholesterol  73  75    LDL/HDL Ratio 1.68  1.73           Last Cardiac Cath  Results for orders placed during the hospital encounter of 05/08/24    Cardiac Catheterization/Vascular Study    Conclusion    The ejection fraction was greater than 55% by visual estimate.    Prox LAD lesion is 20% stenosed.    Mid LAD-2 lesion is 70% stenosed.    Mid LAD-3 lesion is 80% stenosed.    2nd Mrg lesion is 99% stenosed.    1.  Cardiac.  Patient with history of coronary  disease with stenting to the LAD.  Abnormal CTA coronaries.  IFR guided PTCA and stent placement to LAD x 2 done.  Dual antiplatelet therapy to continue.  Aggressive risk factor modification for coronary disease to continue.      Last Stress test  Results for orders placed during the hospital encounter of 12/24/23    Stress Test With Myocardial Perfusion One Day    Interpretation Summary    Impressions are consistent with a low risk study.    Left ventricular ejection fraction is normal (Calculated EF = 65%).    Breast attenuation and diaphragmatic attenuation artifacts are present.    Normal LV function No reversible ischemia noted Apical and inferior wall attenuation artifact noted Low risk study Will recommend outpatient follow-up.       Last Echo  Results for orders placed during the hospital encounter of 12/24/23    Adult Transthoracic Echo Complete w/ Color, Spectral and Contrast if necessary per protocol    Interpretation Summary    Left ventricular ejection fraction appears to be 61 - 65%.    Left ventricular wall thickness is consistent with mild concentric hypertrophy.    Left ventricular diastolic function is consistent with (grade I) impaired relaxation.    The left atrial cavity is dilated.             Current Outpatient Medications   Medication Sig Dispense Refill    allopurinol (ZYLOPRIM) 300 MG tablet Take 1 tablet by mouth Daily. 90 tablet 1    aspirin 81 MG EC tablet Take 1 tablet by mouth Daily. 90 tablet 1    atorvastatin (LIPITOR) 80 MG tablet Take 1 tablet by mouth Daily. 90 tablet 1    clopidogrel (PLAVIX) 75 MG tablet Take one tablet by mouth daily 90 tablet 3    cyclobenzaprine (FLEXERIL) 10 MG tablet Take 1 tablet by mouth 2 (Two) Times a Day As Needed for Muscle Spasms. Takes prn , not daily      doxycycline (VIBRAMYCIN) 100 MG capsule Take 1 capsule by mouth 2 (Two) Times a Day. 20 capsule 0    estradiol (ESTRACE) 0.1 MG/GM vaginal cream Insert 1 g into the vagina 2 (Two) Times a Week.       Evolocumab (REPATHA) solution auto-injector SureClick injection Inject 1 mL under the skin into the appropriate area as directed Every 14 (Fourteen) Days. 2 mL 5    losartan (Cozaar) 50 MG tablet Take 1 tablet by mouth Daily. 90 tablet 1    metoprolol succinate XL (TOPROL-XL) 25 MG 24 hr tablet Take 0.5 tablets by mouth Daily. 15 tablet 3    nitroglycerin (NITROSTAT) 0.4 MG SL tablet 1 under the tongue as needed for angina, may repeat q5mins for up three doses 100 tablet 11    spironolactone (ALDACTONE) 25 MG tablet Take 1 tablet by mouth Daily. 90 tablet 1    trospium (SANCTURA) 20 MG tablet Take 1 tablet by mouth Every 12 (Twelve) Hours.      glucose blood test strip For QID testing  E11.65 400 each 1    glucose blood test strip 1 each by Other route 4 (Four) Times a Day. USE TO TEST SUGAR BEFORE MEALS AND AT BEDTIME 400 each 1    glucose monitor monitoring kit For QID testing  E11.65 1 each 0    glucose monitor monitoring kit ForQID testing E11.65 (On sliding scale insulin) 1 each 0    insulin aspart (novoLOG FLEXPEN) 100 UNIT/ML solution pen-injector sc pen Inject 10-20 Units under the skin into the appropriate area as directed 3 (Three) Times a Day As Needed (blood glucose). 54 mL 1    Insulin Glargine (BASAGLAR KWIKPEN) 100 UNIT/ML injection pen Inject 32 Units under the skin into the appropriate area as directed Every Night. 30 mL 1    Insulin Pen Needle (Pen Needles) 32G X 4 MM misc Use 1 syringe 4 (Four) Times a Day. E11.65 400 each 1    Insulin Pen Needle 31G X 8 MM misc Use 1 stick 4 (Four) Times a Day. USE THREE TIMES A DAY WITH NOVOLOG BEFORE MEALS 400 each 1     No current facility-administered medications for this visit.            Assessment and Plan    Problem List Items Addressed This Visit       Chronic HFrEF (heart failure with preserved ejection fraction) - Primary (Chronic)    Overview     12/24/2023-LVEF 61 to 65%, mild LVH, grade 1 diastolic dysfunction, dilated left atrium          Relevant Medications    clopidogrel (PLAVIX) 75 MG tablet    Mixed hyperlipidemia (Chronic)    Overview     12/25/2023-total cholesterol 141, triglycerides 155, HDL 41, LDL 73  05/09/2024-total cholesterol 150, triglycerides 217, HDL 39, LDL 75         Coronary artery disease of native artery of native heart with stable angina pectoris (Chronic)    Overview     05/08/20244602-[id number] stent to the LAD in place, 2 more stents to the LAD placed         Relevant Medications    clopidogrel (PLAVIX) 75 MG tablet     Diagnoses and all orders for this visit:    1. Chronic HFrEF (heart failure with preserved ejection fraction) (Primary)    2. Coronary artery disease of native artery of native heart with stable angina pectoris  -     clopidogrel (PLAVIX) 75 MG tablet; Take one tablet by mouth daily  Dispense: 90 tablet; Refill: 3    3. Mixed hyperlipidemia    Plan to continue current medications at current doses.        Follow Up     Return in about 3 months (around 10/16/2024).    Patient was given instructions and counseling regarding her condition or for health maintenance advice. Please see specific information pulled into the AVS if appropriate.         Electronically signed by REGINALDO Diaz, 07/28/24, 10:33 AM EDT.    Dictated Utilizing Dragon Dictation: Part of this note may be an electronic transcription/translation of spoken language to printed text using the Dragon Dictation System

## 2024-07-16 NOTE — TELEPHONE ENCOUNTER
Patient has submitted a call through the HUB wanting you to know her BP was low 96/58 PULSE 77 when it was checked by home health nurse a couple of times and wants you to know.

## 2024-07-16 NOTE — PROGRESS NOTES
Pt attended phase II visit.  Tolerated exercise well, NAD noted, no complaints voiced, skin warm, pink, dry, resp nl and even, denies chest discomfort, denies SOA. Monitor shows NSR-ST, V/S WDL ,see [facility] documentation for exercise data.  [facility] cardiology physician immediately available

## 2024-07-17 RX ORDER — PEN NEEDLE, DIABETIC 30 GX3/16"
1 NEEDLE, DISPOSABLE MISCELLANEOUS 4 TIMES DAILY
Qty: 400 EACH | Refills: 1 | Status: SHIPPED | OUTPATIENT
Start: 2024-07-17

## 2024-07-17 RX ORDER — BLOOD-GLUCOSE METER
KIT MISCELLANEOUS
Qty: 1 EACH | Refills: 0 | Status: SHIPPED | OUTPATIENT
Start: 2024-07-17

## 2024-07-18 ENCOUNTER — TREATMENT (OUTPATIENT)
Dept: CARDIAC REHAB | Facility: HOSPITAL | Age: 69
End: 2024-07-18
Payer: MEDICARE

## 2024-07-18 ENCOUNTER — TELEPHONE (OUTPATIENT)
Dept: FAMILY MEDICINE CLINIC | Facility: CLINIC | Age: 69
End: 2024-07-18
Payer: MEDICARE

## 2024-07-18 VITALS — DIASTOLIC BLOOD PRESSURE: 48 MMHG | HEART RATE: 71 BPM | SYSTOLIC BLOOD PRESSURE: 102 MMHG

## 2024-07-18 DIAGNOSIS — Z95.5 STENTED CORONARY ARTERY: Primary | ICD-10-CM

## 2024-07-18 PROCEDURE — 93798 PHYS/QHP OP CAR RHAB W/ECG: CPT

## 2024-07-18 RX ORDER — INSULIN GLARGINE 100 [IU]/ML
32 INJECTION, SOLUTION SUBCUTANEOUS NIGHTLY
Qty: 30 ML | Refills: 1 | Status: SHIPPED | OUTPATIENT
Start: 2024-07-18

## 2024-07-18 RX ORDER — BLOOD-GLUCOSE METER
KIT MISCELLANEOUS
Qty: 1 EACH | Refills: 0 | Status: SHIPPED | OUTPATIENT
Start: 2024-07-18

## 2024-07-18 NOTE — TELEPHONE ENCOUNTER
Pt came in this morning after cardiac rehab and wanted to make you aware of her /48 and pulse is 71 any orders

## 2024-07-18 NOTE — TELEPHONE ENCOUNTER
Pt expressed understanding.  Pt states that the BP level she called about this morning is what she was concerned about (the 102/48). It has not been checked since this morning.Pt states that the only thing she is feeling is fatigued, but no other symptoms.

## 2024-07-18 NOTE — TELEPHONE ENCOUNTER
"Breanna returned call and indicated she feels \"ok\" but is concerned because her BP is running so low.  She stated Home Health was there and also expressed concern.    Cardiac rehab advised her to keep a log and if BP is below a certain level she is not to take medication however she wanted to know what you advised...  "

## 2024-07-18 NOTE — PROGRESS NOTES
Pt attended phase II visit.  Tolerated exercise well, NAD noted, no complaints voiced, skin warm, pink, dry, resp nl and even, denies chest discomfort, denies SOA. Monitor shows NSR, V/S WDL ,see [facility] documentation for exercise data.  [facility] cardiology physician immediately available.

## 2024-07-18 NOTE — TELEPHONE ENCOUNTER
"What's the BP \"level\" that's she's talking about? If she is not dizzy, short of breath, or having chest pain, that BP number is fine.  "

## 2024-07-23 ENCOUNTER — TREATMENT (OUTPATIENT)
Dept: CARDIAC REHAB | Facility: HOSPITAL | Age: 69
End: 2024-07-23
Payer: MEDICARE

## 2024-07-23 VITALS — HEART RATE: 72 BPM | SYSTOLIC BLOOD PRESSURE: 110 MMHG | DIASTOLIC BLOOD PRESSURE: 60 MMHG

## 2024-07-23 DIAGNOSIS — Z95.5 STENTED CORONARY ARTERY: Primary | ICD-10-CM

## 2024-07-23 PROCEDURE — 93798 PHYS/QHP OP CAR RHAB W/ECG: CPT

## 2024-07-23 NOTE — PROGRESS NOTES
Pt attended phase II visit.  Tolerated exercise well, NAD noted, no complaints voiced, skin warm, pink, dry, resp nl and even, denies chest discomfort, denies SOA. Monitor shows NSR, V/S WDL ,see [facility] documentation for exercise data.  [facility] cardiology physician immediately available.

## 2024-07-25 ENCOUNTER — TREATMENT (OUTPATIENT)
Dept: CARDIAC REHAB | Facility: HOSPITAL | Age: 69
End: 2024-07-25
Payer: MEDICARE

## 2024-07-25 VITALS — DIASTOLIC BLOOD PRESSURE: 42 MMHG | SYSTOLIC BLOOD PRESSURE: 98 MMHG | HEART RATE: 73 BPM

## 2024-07-25 DIAGNOSIS — Z95.5 STENTED CORONARY ARTERY: Primary | ICD-10-CM

## 2024-07-25 PROCEDURE — 93798 PHYS/QHP OP CAR RHAB W/ECG: CPT

## 2024-07-25 NOTE — PROGRESS NOTES
Pt attended phase II visit.  Tolerated exercise well, NAD noted, no complaints voiced, skin warm, pink, dry, resp nl and even, denies chest discomfort, denies SOA. Monitor shows NSR-ST, V/S WDL ,see [facility] documentation for exercise data.  [facility] cardiology physician immediately available.

## 2024-07-28 PROBLEM — I50.9 ACUTE CONGESTIVE HEART FAILURE, UNSPECIFIED HEART FAILURE TYPE: Status: RESOLVED | Noted: 2022-03-28 | Resolved: 2024-07-28

## 2024-07-28 PROBLEM — I50.22 CHRONIC HFREF (HEART FAILURE WITH REDUCED EJECTION FRACTION): Chronic | Status: ACTIVE | Noted: 2022-07-21

## 2024-07-30 ENCOUNTER — TREATMENT (OUTPATIENT)
Dept: CARDIAC REHAB | Facility: HOSPITAL | Age: 69
End: 2024-07-30
Payer: MEDICARE

## 2024-07-30 VITALS — SYSTOLIC BLOOD PRESSURE: 110 MMHG | DIASTOLIC BLOOD PRESSURE: 60 MMHG | OXYGEN SATURATION: 98 % | HEART RATE: 73 BPM

## 2024-07-30 DIAGNOSIS — Z95.5 STENTED CORONARY ARTERY: Primary | ICD-10-CM

## 2024-07-30 PROCEDURE — 93798 PHYS/QHP OP CAR RHAB W/ECG: CPT

## 2024-07-30 NOTE — PROGRESS NOTES
Pt attended phase II visit.  Tolerated exercise well, NAD noted, no complaints voiced, skin warm, pink, dry, resp nl and even, denies chest discomfort, denies SOA. Monitor shows NSR-ST, V/S WDL ,see [facility] documentation for exercise data.  [facility] cardiology physician immediately available.

## 2024-07-31 ENCOUNTER — TELEPHONE (OUTPATIENT)
Dept: FAMILY MEDICINE CLINIC | Facility: CLINIC | Age: 69
End: 2024-07-31
Payer: MEDICARE

## 2024-07-31 NOTE — TELEPHONE ENCOUNTER
Spoke with patient she reports her BP yesterday morning was 158/79 HR-77 this am 157/77 HR-74, she denies any dizziness,reports on the 22 nd her BP read 207/64 but she waited awhile & rechecked it was 121/64,those above morning blood pressures are before any meds.

## 2024-07-31 NOTE — TELEPHONE ENCOUNTER
Okay, just please have her keep an eye on the BPs AFTER she's taken the meds. If she gets symptomatic on low BPs, then we'll have to know about it. Thanks.

## 2024-07-31 NOTE — TELEPHONE ENCOUNTER
----- Message from Myrna Juan Ramonchavez sent at 7/30/2024  1:23 PM EDT -----  Please call and see how her BPs have been. They had been a little low lately; just wanted to make sure that she was not symptomatic.

## 2024-08-01 ENCOUNTER — TELEPHONE (OUTPATIENT)
Dept: CARDIOLOGY | Facility: CLINIC | Age: 69
End: 2024-08-01
Payer: MEDICARE

## 2024-08-01 ENCOUNTER — TREATMENT (OUTPATIENT)
Dept: CARDIAC REHAB | Facility: HOSPITAL | Age: 69
End: 2024-08-01
Payer: MEDICARE

## 2024-08-01 VITALS
HEART RATE: 87 BPM | DIASTOLIC BLOOD PRESSURE: 60 MMHG | BODY MASS INDEX: 27.47 KG/M2 | SYSTOLIC BLOOD PRESSURE: 120 MMHG | WEIGHT: 150.2 LBS

## 2024-08-01 DIAGNOSIS — Z95.5 STENTED CORONARY ARTERY: Primary | ICD-10-CM

## 2024-08-01 PROCEDURE — 93798 PHYS/QHP OP CAR RHAB W/ECG: CPT

## 2024-08-01 NOTE — PROGRESS NOTES
Pt attended phase II visit.  Tolerated exercise well, NAD noted, no complaints voiced, skin warm, pink, dry, resp nl and even, denies chest discomfort, denies SOA. Monitor shows NSR-ST, V/S WDL ,see [facility] documentation for exercise data.  [facility] cardiology physician immediately available.

## 2024-08-01 NOTE — TELEPHONE ENCOUNTER
Spoke to patient at length and relayed REGINALDO Paul, instructions for patient to cut her spironolactone in half (12.5mg) also to check her blood pressure before taking her metoprolol and if her systolic is 90 or below she is not to take metoprolol. In addition, if her systolic is 100 or lower and she feels syncopal she is not to take the metoprolol. Patient voiced understanding.

## 2024-08-01 NOTE — TELEPHONE ENCOUNTER
Patient stopped into the office and expressed concerns of low BP, we notified the REGINALDO Paul she has advised patient to: She needs to cut her spironolactone in half (12.5 mg). The level of hypotension and her symptoms matter. If her systolic is less than 90 she needs to stop metoprolol. If it's less than 100 and she feels near syncopal, she needs to stop metoprolol.    I have attempted to call the patient to inform of the above in bold orders from the REGINALDO Paul. Patient is not answering telephone. We will pass this message until patient is reached.

## 2024-08-06 ENCOUNTER — TREATMENT (OUTPATIENT)
Dept: CARDIAC REHAB | Facility: HOSPITAL | Age: 69
End: 2024-08-06
Payer: MEDICARE

## 2024-08-06 VITALS — HEART RATE: 85 BPM | DIASTOLIC BLOOD PRESSURE: 60 MMHG | SYSTOLIC BLOOD PRESSURE: 134 MMHG

## 2024-08-06 DIAGNOSIS — Z95.5 STENTED CORONARY ARTERY: Primary | ICD-10-CM

## 2024-08-06 PROCEDURE — 93798 PHYS/QHP OP CAR RHAB W/ECG: CPT

## 2024-08-06 NOTE — PROGRESS NOTES
Pt attended phase II visit.  Tolerated exercise well, NAD noted, no complaints voiced, skin warm, pink, dry, resp nl and even, denies chest discomfort, denies SOA. Monitor shows NSR-ST, V/S WDL ,see [facility] documentation for exercise data.  [facility] cardiology physician immediately available.

## 2024-08-08 ENCOUNTER — APPOINTMENT (OUTPATIENT)
Dept: CARDIAC REHAB | Facility: HOSPITAL | Age: 69
End: 2024-08-08
Payer: MEDICARE

## 2024-08-09 ENCOUNTER — SPECIALTY PHARMACY (OUTPATIENT)
Dept: PHARMACY | Facility: HOSPITAL | Age: 69
End: 2024-08-09
Payer: MEDICARE

## 2024-08-09 NOTE — PROGRESS NOTES
Specialty Pharmacy Refill Coordination Note     Breanna is a 69 y.o. female contacted today regarding refills of  Repatha SureClick specialty medication(s).    Reviewed and verified with patient:       Specialty medication(s) and dose(s) confirmed: yes    Refill Questions      Flowsheet Row Most Recent Value   Changes to allergies? No   Changes to medications? No   New conditions or infections since last clinic visit No   Unplanned office visit, urgent care, ED, or hospital admission in the last 4 weeks  No   How does patient/caregiver feel medication is working? Very good   Financial problems or insurance changes  No   Since the previous refill, were any specialty medication doses or scheduled injections missed or delayed?  No   Does this patient require a clinical escalation to a pharmacist? No            Delivery Questions      Flowsheet Row Most Recent Value   Copay verified? Yes   Copay amount 0   Copay form of payment No copayment ($0)                   Follow-up: 56 day(s)     Jennifer Casillas, Pharmacy Technician  Specialty Pharmacy Technician

## 2024-08-13 ENCOUNTER — TREATMENT (OUTPATIENT)
Dept: CARDIAC REHAB | Facility: HOSPITAL | Age: 69
End: 2024-08-13
Payer: MEDICARE

## 2024-08-13 VITALS
BODY MASS INDEX: 27.11 KG/M2 | HEART RATE: 72 BPM | SYSTOLIC BLOOD PRESSURE: 130 MMHG | DIASTOLIC BLOOD PRESSURE: 60 MMHG | WEIGHT: 148.2 LBS | OXYGEN SATURATION: 98 %

## 2024-08-13 DIAGNOSIS — Z95.5 STENTED CORONARY ARTERY: Primary | ICD-10-CM

## 2024-08-13 PROCEDURE — 93798 PHYS/QHP OP CAR RHAB W/ECG: CPT

## 2024-08-13 NOTE — PROGRESS NOTES
Pt attended phase II visit.  Tolerated exercise well, NAD noted, no complaints voiced, skin warm, pink, dry, resp nl and even, denies chest discomfort, denies SOA. Monitor shows NSR-ST, V/S WDL ,see [facility] documentation for exercise data.  [facility] cardiology physician immediately available.

## 2024-08-15 ENCOUNTER — TREATMENT (OUTPATIENT)
Dept: CARDIAC REHAB | Facility: HOSPITAL | Age: 69
End: 2024-08-15
Payer: MEDICARE

## 2024-08-15 VITALS — DIASTOLIC BLOOD PRESSURE: 62 MMHG | SYSTOLIC BLOOD PRESSURE: 142 MMHG | HEART RATE: 78 BPM

## 2024-08-15 DIAGNOSIS — Z95.5 STENTED CORONARY ARTERY: Primary | ICD-10-CM

## 2024-08-15 PROCEDURE — 93798 PHYS/QHP OP CAR RHAB W/ECG: CPT

## 2024-08-15 NOTE — PROGRESS NOTES
Pt attended phase II visit.  Tolerated exercise well, NAD noted, no complaints voiced, skin warm, pink, dry, resp nl and even, denies chest discomfort, denies SOA. Monitor shows NSR-ST, V/S WDL ,see [facility] documentation for exercise data.  [facility] cardiology physician immediately available.

## 2024-08-20 ENCOUNTER — TREATMENT (OUTPATIENT)
Dept: CARDIAC REHAB | Facility: HOSPITAL | Age: 69
End: 2024-08-20
Payer: MEDICARE

## 2024-08-20 VITALS — DIASTOLIC BLOOD PRESSURE: 68 MMHG | SYSTOLIC BLOOD PRESSURE: 124 MMHG | HEART RATE: 77 BPM

## 2024-08-20 DIAGNOSIS — Z95.5 STENTED CORONARY ARTERY: Primary | ICD-10-CM

## 2024-08-20 PROCEDURE — 93798 PHYS/QHP OP CAR RHAB W/ECG: CPT

## 2024-08-20 NOTE — PROGRESS NOTES
Pt attended phase II visit.  Tolerated exercise well, NAD noted, no complaints voiced, skin warm, pink, dry, resp nl and even, denies chest discomfort, denies SOA. Monitor shows NSR-ST, V/S WDL ,see [facility] documentation for exercise data.  [facility] cardiology physician immediately available.     Pt here with c/o abscess to back of head X 3 days, painful and tender to touch.

## 2024-08-22 ENCOUNTER — TREATMENT (OUTPATIENT)
Dept: CARDIAC REHAB | Facility: HOSPITAL | Age: 69
End: 2024-08-22
Payer: MEDICARE

## 2024-08-22 VITALS — SYSTOLIC BLOOD PRESSURE: 134 MMHG | DIASTOLIC BLOOD PRESSURE: 82 MMHG | OXYGEN SATURATION: 98 % | HEART RATE: 75 BPM

## 2024-08-22 DIAGNOSIS — Z95.5 STENTED CORONARY ARTERY: Primary | ICD-10-CM

## 2024-08-22 PROCEDURE — 93798 PHYS/QHP OP CAR RHAB W/ECG: CPT

## 2024-08-22 NOTE — PROGRESS NOTES
Pt attended phase II visit.  Tolerated exercise well, NAD noted, no complaints voiced, skin warm, pink, dry, resp nl and even, denies chest discomfort, denies SOA. Monitor shows NSR-ST, V/S WDL ,see [facility] documentation for exercise data.  [facility] cardiology physician immediately available.

## 2024-08-27 ENCOUNTER — TREATMENT (OUTPATIENT)
Dept: CARDIAC REHAB | Facility: HOSPITAL | Age: 69
End: 2024-08-27
Payer: MEDICARE

## 2024-08-27 VITALS — SYSTOLIC BLOOD PRESSURE: 122 MMHG | DIASTOLIC BLOOD PRESSURE: 70 MMHG | HEART RATE: 91 BPM | OXYGEN SATURATION: 98 %

## 2024-08-27 DIAGNOSIS — Z95.5 STENTED CORONARY ARTERY: Primary | ICD-10-CM

## 2024-08-27 PROCEDURE — 93798 PHYS/QHP OP CAR RHAB W/ECG: CPT

## 2024-08-27 NOTE — PROGRESS NOTES
Pt attended phase II visit.  Tolerated exercise well, NAD noted, no complaints voiced, skin warm, pink, dry, resp nl and even, denies chest discomfort, denies SOA. Monitor shows NSR-ST, V/S WDL ,see [facility] documentation for exercise data.  [facility] cardiology physician immediately available.

## 2024-08-28 ENCOUNTER — TELEPHONE (OUTPATIENT)
Dept: FAMILY MEDICINE CLINIC | Facility: CLINIC | Age: 69
End: 2024-08-28
Payer: MEDICARE

## 2024-08-28 RX ORDER — LANCETS 33 GAUGE
1 EACH MISCELLANEOUS 4 TIMES DAILY
Qty: 400 EACH | Refills: 1 | Status: CANCELLED | OUTPATIENT
Start: 2024-08-28

## 2024-08-29 ENCOUNTER — APPOINTMENT (OUTPATIENT)
Dept: CARDIAC REHAB | Facility: HOSPITAL | Age: 69
End: 2024-08-29
Payer: MEDICARE

## 2024-08-30 RX ORDER — LANCETS 30 GAUGE
EACH MISCELLANEOUS
Qty: 200 EACH | Refills: 11 | Status: SHIPPED | OUTPATIENT
Start: 2024-08-30

## 2024-09-05 ENCOUNTER — OFFICE VISIT (OUTPATIENT)
Dept: FAMILY MEDICINE CLINIC | Facility: CLINIC | Age: 69
End: 2024-09-05
Payer: MEDICARE

## 2024-09-05 VITALS
OXYGEN SATURATION: 98 % | HEIGHT: 62 IN | SYSTOLIC BLOOD PRESSURE: 120 MMHG | BODY MASS INDEX: 27.09 KG/M2 | DIASTOLIC BLOOD PRESSURE: 62 MMHG | RESPIRATION RATE: 16 BRPM | HEART RATE: 85 BPM | WEIGHT: 147.2 LBS | TEMPERATURE: 96.8 F

## 2024-09-05 DIAGNOSIS — I25.5 ISCHEMIC CARDIOMYOPATHY: ICD-10-CM

## 2024-09-05 DIAGNOSIS — Z00.00 ENCOUNTER FOR SUBSEQUENT ANNUAL WELLNESS VISIT IN MEDICARE PATIENT: Primary | ICD-10-CM

## 2024-09-05 DIAGNOSIS — U07.1 COVID-19 VIRUS DETECTED: ICD-10-CM

## 2024-09-05 DIAGNOSIS — Z79.4 TYPE 2 DIABETES MELLITUS WITH DIABETIC NEUROPATHY, WITH LONG-TERM CURRENT USE OF INSULIN: ICD-10-CM

## 2024-09-05 DIAGNOSIS — E11.40 TYPE 2 DIABETES MELLITUS WITH DIABETIC NEUROPATHY, WITH LONG-TERM CURRENT USE OF INSULIN: ICD-10-CM

## 2024-09-05 DIAGNOSIS — I25.83 CORONARY ARTERY DISEASE DUE TO LIPID RICH PLAQUE: ICD-10-CM

## 2024-09-05 DIAGNOSIS — N32.81 OAB (OVERACTIVE BLADDER): ICD-10-CM

## 2024-09-05 DIAGNOSIS — R05.1 ACUTE COUGH: ICD-10-CM

## 2024-09-05 DIAGNOSIS — M1A.09X0 IDIOPATHIC CHRONIC GOUT OF MULTIPLE SITES WITHOUT TOPHUS: ICD-10-CM

## 2024-09-05 DIAGNOSIS — I25.10 CORONARY ARTERY DISEASE DUE TO LIPID RICH PLAQUE: ICD-10-CM

## 2024-09-05 LAB
EXPIRATION DATE: ABNORMAL
FLUAV AG UPPER RESP QL IA.RAPID: NOT DETECTED
FLUBV AG UPPER RESP QL IA.RAPID: NOT DETECTED
GLUCOSE BLDC GLUCOMTR-MCNC: 210 MG/DL (ref 70–130)
INTERNAL CONTROL: ABNORMAL
Lab: ABNORMAL
SARS-COV-2 AG UPPER RESP QL IA.RAPID: DETECTED

## 2024-09-05 RX ORDER — GUAIFENESIN 600 MG/1
1200 TABLET, EXTENDED RELEASE ORAL 2 TIMES DAILY
Qty: 120 TABLET | Refills: 0 | Status: SHIPPED | OUTPATIENT
Start: 2024-09-05

## 2024-09-05 RX ORDER — TROSPIUM CHLORIDE 20 MG/1
20 TABLET, FILM COATED ORAL EVERY 12 HOURS SCHEDULED
Qty: 180 TABLET | Refills: 1 | Status: SHIPPED | OUTPATIENT
Start: 2024-09-05

## 2024-09-05 RX ORDER — ATORVASTATIN CALCIUM 80 MG/1
80 TABLET, FILM COATED ORAL DAILY
Qty: 90 TABLET | Refills: 1 | Status: SHIPPED | OUTPATIENT
Start: 2024-09-05

## 2024-09-05 RX ORDER — LOSARTAN POTASSIUM 50 MG/1
50 TABLET ORAL DAILY
Qty: 90 TABLET | Refills: 1 | Status: SHIPPED | OUTPATIENT
Start: 2024-09-05

## 2024-09-05 RX ORDER — SPIRONOLACTONE 25 MG/1
25 TABLET ORAL DAILY
Qty: 90 TABLET | Refills: 1 | Status: SHIPPED | OUTPATIENT
Start: 2024-09-05

## 2024-09-05 RX ORDER — PSEUDOEPHEDRINE HCL 60 MG
60 TABLET ORAL 2 TIMES DAILY
Qty: 60 TABLET | Refills: 0 | Status: SHIPPED | OUTPATIENT
Start: 2024-09-05

## 2024-09-05 RX ORDER — ASPIRIN 81 MG/1
81 TABLET ORAL DAILY
Qty: 90 TABLET | Refills: 1 | Status: SHIPPED | OUTPATIENT
Start: 2024-09-05

## 2024-09-07 ENCOUNTER — HOSPITAL ENCOUNTER (EMERGENCY)
Facility: HOSPITAL | Age: 69
Discharge: HOME | End: 2024-09-07
Attending: EMERGENCY MEDICINE
Payer: MEDICARE

## 2024-09-07 VITALS
DIASTOLIC BLOOD PRESSURE: 68 MMHG | OXYGEN SATURATION: 99 % | WEIGHT: 145 LBS | HEART RATE: 85 BPM | TEMPERATURE: 98.5 F | BODY MASS INDEX: 26.68 KG/M2 | HEIGHT: 62 IN | RESPIRATION RATE: 14 BRPM | SYSTOLIC BLOOD PRESSURE: 167 MMHG

## 2024-09-07 DIAGNOSIS — U07.1 COVID-19: Primary | ICD-10-CM

## 2024-09-07 LAB
FLUAV RNA RESP QL NAA+PROBE: NOT DETECTED
FLUBV RNA RESP QL NAA+PROBE: NOT DETECTED
SARS-COV-2 RNA RESP QL NAA+PROBE: DETECTED

## 2024-09-07 PROCEDURE — 87636 SARSCOV2 & INF A&B AMP PRB: CPT | Performed by: PHYSICIAN ASSISTANT

## 2024-09-07 PROCEDURE — 99283 EMERGENCY DEPT VISIT LOW MDM: CPT

## 2024-09-07 RX ORDER — CETIRIZINE HYDROCHLORIDE 10 MG/1
10 TABLET ORAL DAILY
Qty: 30 TABLET | Refills: 0 | Status: SHIPPED | OUTPATIENT
Start: 2024-09-07

## 2024-09-07 RX ORDER — GUAIFENESIN AND DEXTROMETHORPHAN HYDROBROMIDE 600; 30 MG/1; MG/1
2 TABLET, EXTENDED RELEASE ORAL 2 TIMES DAILY PRN
Qty: 10 TABLET | Refills: 0 | Status: SHIPPED | OUTPATIENT
Start: 2024-09-07 | End: 2024-09-12

## 2024-09-07 RX ORDER — FLUTICASONE PROPIONATE 50 MCG
2 SPRAY, SUSPENSION (ML) NASAL DAILY
Qty: 9.9 ML | Refills: 0 | Status: SHIPPED | OUTPATIENT
Start: 2024-09-07

## 2024-09-07 NOTE — ED PROVIDER NOTES
Subjective   History of Present Illness  69-year-old female patient with a past medical history of coronary artery disease, diabetes, GERD, hyperlipidemia, hypertension, arthritis, and congestive heart failure presents to the emergency room today for a COVID swab.  Patient states that she has been exposed to COVID by her neighbors.  She states the only symptom she is having is fatigue and congestion.  She denies any other symptoms or concerns.  Denies any chest pain or shortness of breath.  She states she has not had a fever.    History provided by:  Patient   used: No        Review of Systems   Constitutional: Negative.    HENT: Negative.     Eyes: Negative.    Respiratory: Negative.     Cardiovascular: Negative.    Gastrointestinal: Negative.    Endocrine: Negative.    Genitourinary: Negative.    Musculoskeletal: Negative.    Skin: Negative.    Allergic/Immunologic: Negative.    Neurological: Negative.    Hematological: Negative.    Psychiatric/Behavioral: Negative.     All other systems reviewed and are negative.      Past Medical History:   Diagnosis Date    Acute congestive heart failure, unspecified heart failure type 03/28/2022    Arthritis     Chronic pain     Coronary artery disease involving native coronary artery of native heart without angina pectoris 4/25/2024    Diabetes mellitus     Elevated cholesterol     GERD (gastroesophageal reflux disease)     Gout     Headache     Hypertension     Myocardial infarction     Neuropathy        Allergies   Allergen Reactions    Asa [Aspirin] GI Intolerance    Naproxen Nausea And Vomiting    Penicillins Hives and Rash       Past Surgical History:   Procedure Laterality Date    BREAST BIOPSY Left 1988    benign    CARDIAC CATHETERIZATION N/A 03/31/2022    Procedure: Left Heart Cath;  Surgeon: Tristan Marin MD;  Location: [facility] CATH INVASIVE LOCATION;  Service: Cardiology;  Laterality: N/A;    CARDIAC CATHETERIZATION N/A 03/31/2022    Procedure:  Percutaneous Coronary Intervention;  Surgeon: Tristan Marin MD;  Location:  [facility] CATH INVASIVE LOCATION;  Service: Cardiology;  Laterality: N/A;    CARDIAC CATHETERIZATION N/A 5/8/2024    Procedure: Left Heart Cath;  Surgeon: Shu Regalado MD;  Location:  [facility] CATH INVASIVE LOCATION;  Service: Cardiology;  Laterality: N/A;    CARDIAC CATHETERIZATION N/A 5/8/2024    Procedure: Percutaneous Coronary Intervention;  Surgeon: Shu Regalado MD;  Location:  [facility] CATH INVASIVE LOCATION;  Service: Cardiology;  Laterality: N/A;    COLONOSCOPY N/A 02/17/2020    Procedure: COLONOSCOPY FOR SCREENING CPT CODE: ;  Surgeon: Mariano Prescott MD;  Location: [facility] OR;  Service: Gastroenterology;  Laterality: N/A;    CORONARY STENT PLACEMENT      HYSTERECTOMY      Partial    KNEE SURGERY      THYROIDECTOMY, PARTIAL      Removal of goiter       Family History   Problem Relation Age of Onset    COPD Mother     Heart disease Brother     Heart attack Brother     Hypertension Daughter     Diabetes Daughter     Hypertension Daughter     Diabetes Daughter     Breast cancer Neg Hx        Social History     Socioeconomic History    Marital status:    Tobacco Use    Smoking status: Never     Passive exposure: Past    Smokeless tobacco: Never   Vaping Use    Vaping status: Never Used   Substance and Sexual Activity    Alcohol use: No    Drug use: No    Sexual activity: Defer           Objective   Physical Exam  Vitals and nursing note reviewed.   Constitutional:       Appearance: Normal appearance. She is normal weight.   HENT:      Head: Normocephalic and atraumatic.      Right Ear: External ear normal.      Left Ear: External ear normal.      Nose: Nose normal.      Mouth/Throat:      Mouth: Mucous membranes are moist.      Pharynx: Oropharynx is clear.   Eyes:      Extraocular Movements: Extraocular movements intact.      Conjunctiva/sclera: Conjunctivae normal.      Pupils: Pupils are equal, round, and  reactive to light.   Cardiovascular:      Rate and Rhythm: Normal rate and regular rhythm.      Pulses: Normal pulses.      Heart sounds: Normal heart sounds.   Pulmonary:      Effort: Pulmonary effort is normal.      Breath sounds: Normal breath sounds.   Abdominal:      General: Abdomen is flat. Bowel sounds are normal.      Palpations: Abdomen is soft.   Musculoskeletal:         General: Normal range of motion.      Cervical back: Normal range of motion and neck supple.   Skin:     General: Skin is warm and dry.      Capillary Refill: Capillary refill takes less than 2 seconds.   Neurological:      General: No focal deficit present.      Mental Status: She is alert and oriented to person, place, and time. Mental status is at baseline.   Psychiatric:         Mood and Affect: Mood normal.         Behavior: Behavior normal.         Thought Content: Thought content normal.         Judgment: Judgment normal.         Procedures           ED Course  ED Course as of 09/07/24 1529   Sat Sep 07, 2024   1432 COVID19(!!): Detected [ML]      ED Course User Index  [ML] Akanksha Flores PA                                             Medical Decision Making  69-year-old female patient with a past medical history of coronary artery disease, diabetes, GERD, hyperlipidemia, hypertension, arthritis, and congestive heart failure presents to the emergency room today for a COVID swab.  Patient states that she has been exposed to COVID by her neighbors.  She states the only symptom she is having is fatigue and congestion.  She denies any other symptoms or concerns.  Denies any chest pain or shortness of breath.  She states she has not had a fever.  ED stay has been uncomplicated uneventful.  Patient is in no distress.  Vitals are stable and within normal limits.  I recommend close follow-up with primary care.  Discussed symptoms and red flags with her that would warrant return to the emergency room.    Problems  Addressed:  COVID-19: acute illness or injury    Amount and/or Complexity of Data Reviewed  Labs:  Decision-making details documented in ED Course.    Risk  OTC drugs.        Final diagnoses:   COVID-19       ED Disposition  ED Disposition       ED Disposition   Discharge    Condition   Stable    Comment   --               Myrna Ramos MD  [address]   [place] [address]  [phone]    Schedule an appointment as soon as possible for a visit in 3 days           Medication List        New Prescriptions      cetirizine 10 MG tablet  Commonly known as: zyrTEC  Take 1 tablet by mouth Daily.     fluticasone 50 MCG/ACT nasal spray  Commonly known as: FLONASE  2 sprays into the nostril(s) as directed by provider Daily.     guaifenesin-dextromethorphan 600-30 mg  MG tablet sustained-release 12 hour  Take 2 tablets by mouth 2 (Two) Times a Day As Needed (congestion) for up to 5 days.               Where to Get Your Medications        These medications were sent to [facility] [address] [place] [address] [phone] [place] [phone] [address] [address]      Phone: [phone]   cetirizine 10 MG tablet  fluticasone 50 MCG/ACT nasal spray  guaifenesin-dextromethorphan 600-30 mg  MG tablet sustained-release 12 hour            Akanksha Flores PA  09/07/24 0633

## 2024-10-01 ENCOUNTER — APPOINTMENT (OUTPATIENT)
Dept: CARDIAC REHAB | Facility: HOSPITAL | Age: 69
End: 2024-10-01
Payer: MEDICARE

## 2024-10-08 ENCOUNTER — TREATMENT (OUTPATIENT)
Dept: CARDIAC REHAB | Facility: HOSPITAL | Age: 69
End: 2024-10-08
Payer: MEDICARE

## 2024-10-08 VITALS
OXYGEN SATURATION: 98 % | DIASTOLIC BLOOD PRESSURE: 66 MMHG | SYSTOLIC BLOOD PRESSURE: 118 MMHG | WEIGHT: 146 LBS | HEART RATE: 74 BPM | BODY MASS INDEX: 26.7 KG/M2

## 2024-10-08 DIAGNOSIS — Z95.5 STENTED CORONARY ARTERY: Primary | ICD-10-CM

## 2024-10-08 PROCEDURE — 93798 PHYS/QHP OP CAR RHAB W/ECG: CPT

## 2024-10-08 RX ORDER — EVOLOCUMAB 140 MG/ML
140 INJECTION, SOLUTION SUBCUTANEOUS
Qty: 2 ML | Refills: 5 | Status: SHIPPED | OUTPATIENT
Start: 2024-10-08 | End: 2024-10-10 | Stop reason: SDUPTHER

## 2024-10-08 NOTE — PROGRESS NOTES
Pt attended phase II visit.  Tolerated exercise well, NAD noted, no complaints voiced, skin warm, pink, dry, resp nl and even, denies chest discomfort, denies SOA. Monitor shows NSR-ST, V/S WDL ,see [facility] documentation for exercise data.  [facility] cardiology physician immediately available.    Patient stated she had to put cardiac rehab on hold until her daughter got out of the hospital. She stated her daughter had to have a left leg amputation and had no one but her to take care of the kids and her. She stated she has returned to finish the program because she feels so much better mentally and physically coming to the program.

## 2024-10-10 ENCOUNTER — APPOINTMENT (OUTPATIENT)
Dept: CARDIAC REHAB | Facility: HOSPITAL | Age: 69
End: 2024-10-10
Payer: MEDICARE

## 2024-10-10 ENCOUNTER — TREATMENT (OUTPATIENT)
Dept: CARDIAC REHAB | Facility: HOSPITAL | Age: 69
End: 2024-10-10
Payer: MEDICARE

## 2024-10-10 VITALS — DIASTOLIC BLOOD PRESSURE: 70 MMHG | OXYGEN SATURATION: 98 % | HEART RATE: 85 BPM | SYSTOLIC BLOOD PRESSURE: 120 MMHG

## 2024-10-10 DIAGNOSIS — Z95.5 STENTED CORONARY ARTERY: Primary | ICD-10-CM

## 2024-10-10 PROCEDURE — 93798 PHYS/QHP OP CAR RHAB W/ECG: CPT

## 2024-10-10 RX ORDER — EVOLOCUMAB 140 MG/ML
140 INJECTION, SOLUTION SUBCUTANEOUS
Qty: 2 ML | Refills: 5 | OUTPATIENT
Start: 2024-10-10

## 2024-10-10 RX ORDER — EVOLOCUMAB 140 MG/ML
140 INJECTION, SOLUTION SUBCUTANEOUS
Qty: 2 ML | Refills: 5 | Status: SHIPPED | OUTPATIENT
Start: 2024-10-10 | End: 2024-10-14 | Stop reason: SDUPTHER

## 2024-10-10 NOTE — PROGRESS NOTES
Pt attended phase II visit.  Tolerated exercise well, NAD noted, no complaints voiced, skin warm, pink, dry, resp nl and even, denies chest discomfort, denies SOA. Monitor shows NSR-ST, V/S WDL ,see [facility] documentation for exercise data.  [facility] cardiology physician immediately available.    Patient stated she had to put cardiac rehab on hold until her daughter got out of the hospital. She stated her daughter had to have a left leg amputation and had no one but her to take care of the kids and her. She stated she has returned to finish the program because she feels so much better mentally and physically coming to the program.

## 2024-10-11 ENCOUNTER — OFFICE VISIT (OUTPATIENT)
Dept: FAMILY MEDICINE CLINIC | Facility: CLINIC | Age: 69
End: 2024-10-11
Payer: MEDICARE

## 2024-10-11 ENCOUNTER — LAB (OUTPATIENT)
Dept: FAMILY MEDICINE CLINIC | Facility: CLINIC | Age: 69
End: 2024-10-11
Payer: MEDICARE

## 2024-10-11 VITALS
HEART RATE: 82 BPM | BODY MASS INDEX: 26.79 KG/M2 | RESPIRATION RATE: 16 BRPM | TEMPERATURE: 96.8 F | SYSTOLIC BLOOD PRESSURE: 108 MMHG | HEIGHT: 62 IN | WEIGHT: 145.6 LBS | OXYGEN SATURATION: 97 % | DIASTOLIC BLOOD PRESSURE: 54 MMHG

## 2024-10-11 DIAGNOSIS — I25.83 CORONARY ARTERY DISEASE DUE TO LIPID RICH PLAQUE: ICD-10-CM

## 2024-10-11 DIAGNOSIS — M1A.09X0 IDIOPATHIC CHRONIC GOUT OF MULTIPLE SITES WITHOUT TOPHUS: ICD-10-CM

## 2024-10-11 DIAGNOSIS — Z79.4 TYPE 2 DIABETES MELLITUS WITH DIABETIC NEUROPATHY, WITH LONG-TERM CURRENT USE OF INSULIN: Primary | ICD-10-CM

## 2024-10-11 DIAGNOSIS — I25.10 CORONARY ARTERY DISEASE DUE TO LIPID RICH PLAQUE: ICD-10-CM

## 2024-10-11 DIAGNOSIS — E11.40 TYPE 2 DIABETES MELLITUS WITH DIABETIC NEUROPATHY, WITH LONG-TERM CURRENT USE OF INSULIN: ICD-10-CM

## 2024-10-11 DIAGNOSIS — Z79.4 TYPE 2 DIABETES MELLITUS WITH DIABETIC NEUROPATHY, WITH LONG-TERM CURRENT USE OF INSULIN: ICD-10-CM

## 2024-10-11 DIAGNOSIS — E11.40 TYPE 2 DIABETES MELLITUS WITH DIABETIC NEUROPATHY, WITH LONG-TERM CURRENT USE OF INSULIN: Primary | ICD-10-CM

## 2024-10-11 PROCEDURE — 84550 ASSAY OF BLOOD/URIC ACID: CPT | Performed by: FAMILY MEDICINE

## 2024-10-11 PROCEDURE — 80053 COMPREHEN METABOLIC PANEL: CPT | Performed by: FAMILY MEDICINE

## 2024-10-11 PROCEDURE — 83036 HEMOGLOBIN GLYCOSYLATED A1C: CPT | Performed by: FAMILY MEDICINE

## 2024-10-11 PROCEDURE — 36415 COLL VENOUS BLD VENIPUNCTURE: CPT

## 2024-10-12 LAB
ALBUMIN SERPL-MCNC: 4.4 G/DL (ref 3.5–5.2)
ALBUMIN/GLOB SERPL: 1.4 G/DL
ALP SERPL-CCNC: 122 U/L (ref 39–117)
ALT SERPL W P-5'-P-CCNC: 15 U/L (ref 1–33)
ANION GAP SERPL CALCULATED.3IONS-SCNC: 10 MMOL/L (ref 5–15)
AST SERPL-CCNC: 19 U/L (ref 1–32)
BILIRUB SERPL-MCNC: 0.5 MG/DL (ref 0–1.2)
BUN SERPL-MCNC: 32 MG/DL (ref 8–23)
BUN/CREAT SERPL: 22.7 (ref 7–25)
CALCIUM SPEC-SCNC: 9.9 MG/DL (ref 8.6–10.5)
CHLORIDE SERPL-SCNC: 101 MMOL/L (ref 98–107)
CO2 SERPL-SCNC: 25 MMOL/L (ref 22–29)
CREAT SERPL-MCNC: 1.41 MG/DL (ref 0.57–1)
EGFRCR SERPLBLD CKD-EPI 2021: 40 ML/MIN/1.73
GLOBULIN UR ELPH-MCNC: 3.1 GM/DL
GLUCOSE SERPL-MCNC: 398 MG/DL (ref 65–99)
HBA1C MFR BLD: 9.5 % (ref 4.8–5.6)
POTASSIUM SERPL-SCNC: 4.9 MMOL/L (ref 3.5–5.2)
PROT SERPL-MCNC: 7.5 G/DL (ref 6–8.5)
SODIUM SERPL-SCNC: 136 MMOL/L (ref 136–145)
URATE SERPL-MCNC: 2.7 MG/DL (ref 2.4–5.7)

## 2024-10-14 ENCOUNTER — SPECIALTY PHARMACY (OUTPATIENT)
Dept: PHARMACY | Facility: HOSPITAL | Age: 69
End: 2024-10-14
Payer: MEDICARE

## 2024-10-14 NOTE — PROGRESS NOTES
[facility] Patient Management Program  Lipid Management Program - [id number] Reassessment     Breanna Kaba is a 69 y.o. female referred by their provider, Da Fox, to the Hyperlipidemia Patient Management program offered by [facility] for Lipid Management.  A follow-up outreach was conducted, including assessment of therapy appropriateness and specialty medication education for Repatha or Praluent. The patient was introduced to services offered by [facility], including: regular assessments, refill coordination, curbside pick-up or mail order delivery options, prior authorization maintenance, and financial assistance programs as applicable. The patient was also provided with contact information for the pharmacy team.     Breanna Kaba is  treated for clinical ASCVD and hyperlipidemia and currently takes Repatha sureclick and atorvastatin 80 mg daily.  Patient has not tried anything else for cholesterol in the past. The patient denies any allergies to latex.     Insurance Coverage & Financial Support  Rx [employer]   Repatha [place]    Relevant Past Medical History and Comorbidities  Relevant medical history and concomitant health conditions were discussed with the patient. The patient's chart has been reviewed for relevant past medical history and comorbid conditions and updated as necessary.  Past Medical History:   Diagnosis Date    Acute congestive heart failure, unspecified heart failure type 03/28/2022    Arthritis     Chronic pain     Coronary artery disease involving native coronary artery of native heart without angina pectoris 4/25/2024    Diabetes mellitus     Elevated cholesterol     GERD (gastroesophageal reflux disease)     Gout     Headache     Hypertension     Myocardial infarction     Neuropathy      Social History     Socioeconomic History    Marital status:    Tobacco Use     Smoking status: Never     Passive exposure: Past    Smokeless tobacco: Never   Vaping Use    Vaping status: Never Used   Substance and Sexual Activity    Alcohol use: No    Drug use: No    Sexual activity: Defer       Problem list reviewed by Blanca Pa PharmD on 10/14/2024 at  2:17 PM    Allergies  Known allergies and reactions were discussed with the patient. The patient's chart has been reviewed for  allergy information and updated as necessary.   Allergies   Allergen Reactions    Asa [Aspirin] GI Intolerance    Naproxen Nausea And Vomiting    Penicillins Hives and Rash       Allergies reviewed by Blanca Pa PharmD on 10/14/2024 at  2:13 PM    Relevant Laboratory Values  Relevant laboratory values were discussed with the patient. The following specialty medication dose adjustment(s) are recommended: See plan, if applicable   Lab Results   Component Value Date    CHOL 150 05/09/2024    CHLPL 256 (H) 03/01/2017    TRIG 217 (H) 05/09/2024    HDL 39 (L) 05/09/2024    LDL 75 05/09/2024       Current Medication List  This medication list has been reviewed with the patient and evaluated for any interactions or necessary modifications/recommendations, and updated to include all prescription medications, OTC medications, and supplements the patient is currently taking.  This list reflects what is contained in the patient's profile, which has also been marked as reviewed to communicate to other providers it is the most up to date version of the patient's current medication therapy.     Current Outpatient Medications:     allopurinol (ZYLOPRIM) 300 MG tablet, Take 1 tablet by mouth Daily., Disp: 90 tablet, Rfl: 1    aspirin 81 MG EC tablet, Take 1 tablet by mouth Daily., Disp: 90 tablet, Rfl: 1    atorvastatin (LIPITOR) 80 MG tablet, Take 1 tablet by mouth Daily., Disp: 90 tablet, Rfl: 1    clopidogrel (PLAVIX) 75 MG tablet, Take one tablet by mouth daily, Disp: 90 tablet, Rfl: 3    estradiol (ESTRACE) 0.1  MG/GM vaginal cream, Insert 1 g into the vagina 2 (Two) Times a Week., Disp: , Rfl:     fluticasone (FLONASE) 50 MCG/ACT nasal spray, 2 sprays into the nostril(s) as directed by provider Daily., Disp: 9.9 mL, Rfl: 0    glucose blood test strip, For QID testing  E11.65, Disp: 400 each, Rfl: 1    glucose blood test strip, 1 each by Other route 4 (Four) Times a Day. USE TO TEST SUGAR BEFORE MEALS AND AT BEDTIME, Disp: 400 each, Rfl: 1    glucose monitor monitoring kit, For QID testing  E11.65, Disp: 1 each, Rfl: 0    glucose monitor monitoring kit, ForQID testing E11.65 (On sliding scale insulin), Disp: 1 each, Rfl: 0    insulin aspart (novoLOG FLEXPEN) 100 UNIT/ML solution pen-injector sc pen, Inject 10-20 Units under the skin into the appropriate area as directed 3 (Three) Times a Day As Needed (blood glucose)., Disp: 54 mL, Rfl: 1    Insulin Glargine (BASAGLAR KWIKPEN) 100 UNIT/ML injection pen, Inject 32 Units under the skin into the appropriate area as directed Every Night., Disp: 30 mL, Rfl: 1    Insulin Pen Needle (Pen Needles) 32G X 4 MM misc, Use 1 syringe 4 (Four) Times a Day. E11.65, Disp: 400 each, Rfl: 1    Insulin Pen Needle 31G X 8 MM misc, Use 1 stick 4 (Four) Times a Day. USE THREE TIMES A DAY WITH NOVOLOG BEFORE MEALS, Disp: 400 each, Rfl: 1    Lancets misc, For QID testing  E11.65, Disp: 200 each, Rfl: 11    losartan (Cozaar) 50 MG tablet, Take 1 tablet by mouth Daily., Disp: 90 tablet, Rfl: 1    metoprolol succinate XL (TOPROL-XL) 25 MG 24 hr tablet, Take 0.5 tablets by mouth Daily., Disp: 15 tablet, Rfl: 3    nitroglycerin (NITROSTAT) 0.4 MG SL tablet, 1 under the tongue as needed for angina, may repeat q5mins for up three doses, Disp: 100 tablet, Rfl: 11    spironolactone (ALDACTONE) 25 MG tablet, Take 1 tablet by mouth Daily., Disp: 90 tablet, Rfl: 1    trospium (SANCTURA) 20 MG tablet, Take 1 tablet by mouth Every 12 (Twelve) Hours., Disp: 180 tablet, Rfl: 1    cetirizine (zyrTEC) 10 MG tablet,  Take 1 tablet by mouth Daily. (Patient not taking: Reported on 10/14/2024), Disp: 30 tablet, Rfl: 0    cyclobenzaprine (FLEXERIL) 10 MG tablet, Take 1 tablet by mouth 2 (Two) Times a Day As Needed for Muscle Spasms. Takes prn , not daily (Patient not taking: Reported on 10/14/2024), Disp: , Rfl:     Evolocumab (REPATHA) solution auto-injector SureClick injection, Inject 1 mL under the skin into the appropriate area as directed Every 14 (Fourteen) Days., Disp: 2 mL, Rfl: 5    guaiFENesin (Mucinex) 600 MG 12 hr tablet, Take 2 tablets by mouth 2 (Two) Times a Day. (Patient not taking: Reported on 10/14/2024), Disp: 120 tablet, Rfl: 0    pseudoephedrine (SUDAFED) 60 MG tablet, Take 1 tablet by mouth 2 (Two) Times a Day. (Patient not taking: Reported on 10/14/2024), Disp: 60 tablet, Rfl: 0    Medicines reviewed by Blanca Pa, PharmD on 10/14/2024 at  2:17 PM    Drug Interactions  None with repatha    Goals of Therapy  Goals related to the patient's specialty therapy were discussed with the patient. The Patient Goals segment of this outreach has been reviewed and updated.   Goals Addressed Today        Specialty Pharmacy General Goal      LDL reduction            Adverse Drug Reactions  Medication tolerability: Tolerating with no to minimal ADRs  Medication plan: Continue therapy with normal follow-up  Plan for ADR Management: Addressed in Plan, if applicable    Adherence, Self-Administration, and Current Therapy Problems  Adherence related to the patient's specialty therapy was discussed with the patient. The Adherence segment of this outreach has been reviewed and updated.     Adherence Questions  Linked Medication(s) Assessed: Evolocumab (Repatha SureClick)  On average, how many doses/injections does the patient miss per month?: 0  What are the identified reasons for non-adherence or missed doses? : no problems identified  What is the estimated medication adherence level?: %  Based on the patient/caregiver  response and refill history, does this patient require an MTP to track adherence improvements?: no    Additional Barriers to Patient Self-Administration: Addressed in Plan, if applicable  Methods for Supporting Patient Self-Administration: Addressed in Plan, if applicable    Open Medication Therapy Problems  No medication therapy recommendations to display    Quality of Life Assessment   Quality of Life related to the patient's enrollment in the patient management program and services provided was discussed with the patient. The QOL segment of this outreach has been reviewed and updated.  Quality of Life Improvement Scale: 8-Moderately better    Medication Assessment & Plan  Medication Therapy Changes: Patient started continued on repatha 140 mg SC every 2 weeks.   Welcome information and patient satisfaction survey to be sent by specialty pharmacy team with patient's initial fill.  Related Plans, Therapy Recommendations, or Therapy Problems to Be Addressed: none   Patient will continue regular follow-up with cardiology. Next appointment 11/18/24  Patient will follow up with specialty pharmacy for next injection. Care Coordinator to set up future refill outreaches, coordinate prescription delivery, and escalate clinical questions to pharmacist.  Patient verified address on file  She does not need a refill coordination at this time  Pharmacist to perform regular assessments no more than (6) months from the previous assessment. Will follow-up in 6 months, or sooner if needed.    Attestation  Therapeutic appropriateness: Appropriate   I attest the patient was actively involved in and has agreed to the above plan of care. If the prescribed therapy is at any point deemed not appropriate based on the current or future assessments, a consultation will be initiated with the patient's specialty care provider to determine the best course of action. The revised plan of therapy will be documented along with any required  assessments and/or additional patient education provided.     Blanca Pa, PharmD  10/14/2024  14:18 EDT

## 2024-10-15 ENCOUNTER — TREATMENT (OUTPATIENT)
Dept: CARDIAC REHAB | Facility: HOSPITAL | Age: 69
End: 2024-10-15
Payer: MEDICARE

## 2024-10-15 VITALS — HEART RATE: 83 BPM | SYSTOLIC BLOOD PRESSURE: 112 MMHG | DIASTOLIC BLOOD PRESSURE: 52 MMHG

## 2024-10-15 DIAGNOSIS — Z95.5 STENTED CORONARY ARTERY: Primary | ICD-10-CM

## 2024-10-15 PROCEDURE — 93798 PHYS/QHP OP CAR RHAB W/ECG: CPT

## 2024-10-15 NOTE — PROGRESS NOTES
Pt attended phase II visit.  Tolerated exercise well, NAD noted, no complaints voiced, skin warm, pink, dry, resp nl and even, denies chest discomfort, denies SOA. Monitor shows NSR-ST, V/S WDL ,see [facility] documentation for exercise data.  [facility] cardiology physician immediately available.

## 2024-10-17 ENCOUNTER — TREATMENT (OUTPATIENT)
Dept: CARDIAC REHAB | Facility: HOSPITAL | Age: 69
End: 2024-10-17
Payer: MEDICARE

## 2024-10-17 ENCOUNTER — TELEPHONE (OUTPATIENT)
Dept: FAMILY MEDICINE CLINIC | Facility: CLINIC | Age: 69
End: 2024-10-17
Payer: MEDICARE

## 2024-10-17 ENCOUNTER — OFFICE VISIT (OUTPATIENT)
Dept: FAMILY MEDICINE CLINIC | Facility: CLINIC | Age: 69
End: 2024-10-17
Payer: MEDICARE

## 2024-10-17 VITALS
DIASTOLIC BLOOD PRESSURE: 60 MMHG | OXYGEN SATURATION: 100 % | SYSTOLIC BLOOD PRESSURE: 102 MMHG | WEIGHT: 144.8 LBS | HEIGHT: 62 IN | HEART RATE: 82 BPM | TEMPERATURE: 97.7 F | BODY MASS INDEX: 26.65 KG/M2

## 2024-10-17 VITALS — DIASTOLIC BLOOD PRESSURE: 64 MMHG | HEART RATE: 72 BPM | SYSTOLIC BLOOD PRESSURE: 142 MMHG

## 2024-10-17 DIAGNOSIS — Z95.5 STENTED CORONARY ARTERY: Primary | ICD-10-CM

## 2024-10-17 DIAGNOSIS — N30.01 ACUTE CYSTITIS WITH HEMATURIA: Primary | ICD-10-CM

## 2024-10-17 DIAGNOSIS — R30.0 DYSURIA: ICD-10-CM

## 2024-10-17 LAB
BILIRUB BLD-MCNC: NEGATIVE MG/DL
CLARITY, POC: ABNORMAL
COLOR UR: YELLOW
EXPIRATION DATE: ABNORMAL
GLUCOSE UR STRIP-MCNC: ABNORMAL MG/DL
KETONES UR QL: NEGATIVE
LEUKOCYTE EST, POC: ABNORMAL
Lab: ABNORMAL
NITRITE UR-MCNC: NEGATIVE MG/ML
PH UR: 6 [PH] (ref 5–8)
PROT UR STRIP-MCNC: NEGATIVE MG/DL
RBC # UR STRIP: ABNORMAL /UL
SP GR UR: 1.01 (ref 1–1.03)
UROBILINOGEN UR QL: NORMAL

## 2024-10-17 PROCEDURE — 1159F MED LIST DOCD IN RCRD: CPT | Performed by: NURSE PRACTITIONER

## 2024-10-17 PROCEDURE — 87086 URINE CULTURE/COLONY COUNT: CPT | Performed by: NURSE PRACTITIONER

## 2024-10-17 PROCEDURE — 99213 OFFICE O/P EST LOW 20 MIN: CPT | Performed by: NURSE PRACTITIONER

## 2024-10-17 PROCEDURE — 81003 URINALYSIS AUTO W/O SCOPE: CPT | Performed by: NURSE PRACTITIONER

## 2024-10-17 PROCEDURE — 3074F SYST BP LT 130 MM HG: CPT | Performed by: NURSE PRACTITIONER

## 2024-10-17 PROCEDURE — 87077 CULTURE AEROBIC IDENTIFY: CPT | Performed by: NURSE PRACTITIONER

## 2024-10-17 PROCEDURE — 1160F RVW MEDS BY RX/DR IN RCRD: CPT | Performed by: NURSE PRACTITIONER

## 2024-10-17 PROCEDURE — 3046F HEMOGLOBIN A1C LEVEL >9.0%: CPT | Performed by: NURSE PRACTITIONER

## 2024-10-17 PROCEDURE — 81001 URINALYSIS AUTO W/SCOPE: CPT | Performed by: NURSE PRACTITIONER

## 2024-10-17 PROCEDURE — 1126F AMNT PAIN NOTED NONE PRSNT: CPT | Performed by: NURSE PRACTITIONER

## 2024-10-17 PROCEDURE — 87186 SC STD MICRODIL/AGAR DIL: CPT | Performed by: NURSE PRACTITIONER

## 2024-10-17 PROCEDURE — 93798 PHYS/QHP OP CAR RHAB W/ECG: CPT

## 2024-10-17 PROCEDURE — 3078F DIAST BP <80 MM HG: CPT | Performed by: NURSE PRACTITIONER

## 2024-10-17 RX ORDER — CEFDINIR 300 MG/1
300 CAPSULE ORAL 2 TIMES DAILY
Qty: 14 CAPSULE | Refills: 0 | Status: SHIPPED | OUTPATIENT
Start: 2024-10-17 | End: 2024-10-24

## 2024-10-17 NOTE — PROGRESS NOTES
[facility]     Chief Complaint  Dysuria    Breanna Kaba is a 69 y.o. female who presents today to [facility] for Dysuria.    HPI:   HPI     Patient reports about 2 days ago she developed symptoms that was similar to when she gets a UTI. Pelvic pain and pressure, slight amount of dysuria, urgency and frequency.  No fever, chills, nausea or vomiting.       Previous History:   Past Medical History:   Diagnosis Date    Acute congestive heart failure, unspecified heart failure type 03/28/2022    Arthritis     Chronic pain     Coronary artery disease involving native coronary artery of native heart without angina pectoris 4/25/2024    Diabetes mellitus     Elevated cholesterol     GERD (gastroesophageal reflux disease)     Gout     Headache     Hypertension     Myocardial infarction     Neuropathy       Past Surgical History:   Procedure Laterality Date    BREAST BIOPSY Left 1988    benign    CARDIAC CATHETERIZATION N/A 03/31/2022    Procedure: Left Heart Cath;  Surgeon: Tristan Marin MD;  Location: [facility] CATH INVASIVE LOCATION;  Service: Cardiology;  Laterality: N/A;    CARDIAC CATHETERIZATION N/A 03/31/2022    Procedure: Percutaneous Coronary Intervention;  Surgeon: Tristan Marin MD;  Location: [facility] CATH INVASIVE LOCATION;  Service: Cardiology;  Laterality: N/A;    CARDIAC CATHETERIZATION N/A 5/8/2024    Procedure: Left Heart Cath;  Surgeon: Shu Regalado MD;  Location: [facility] CATH INVASIVE LOCATION;  Service: Cardiology;  Laterality: N/A;    CARDIAC CATHETERIZATION N/A 5/8/2024    Procedure: Percutaneous Coronary Intervention;  Surgeon: Shu Regalado MD;  Location: [facility] CATH INVASIVE LOCATION;  Service: Cardiology;  Laterality: N/A;    COLONOSCOPY N/A 02/17/2020    Procedure: COLONOSCOPY FOR SCREENING CPT CODE: ;  Surgeon: Mariano Prescott MD;  Location: [facility];  Service: Gastroenterology;  Laterality: N/A;    CORONARY STENT PLACEMENT       HYSTERECTOMY      Partial    KNEE SURGERY      THYROIDECTOMY, PARTIAL      Removal of goiter      Social History     Socioeconomic History    Marital status:    Tobacco Use    Smoking status: Never     Passive exposure: Past    Smokeless tobacco: Never   Vaping Use    Vaping status: Never Used   Substance and Sexual Activity    Alcohol use: No    Drug use: No    Sexual activity: Defer      Health Maintenance Due   Topic Date Due    HEPATITIS C SCREENING  Never done    ZOSTER VACCINE (3 of 3) 07/09/2018    DIABETIC EYE EXAM  08/16/2022    DXA SCAN  09/03/2023    DIABETIC FOOT EXAM  06/27/2024        Current Medications:  Current Outpatient Medications   Medication Sig Dispense Refill    allopurinol (ZYLOPRIM) 300 MG tablet Take 1 tablet by mouth Daily. 90 tablet 1    aspirin 81 MG EC tablet Take 1 tablet by mouth Daily. 90 tablet 1    atorvastatin (LIPITOR) 80 MG tablet Take 1 tablet by mouth Daily. 90 tablet 1    clopidogrel (PLAVIX) 75 MG tablet Take one tablet by mouth daily 90 tablet 3    estradiol (ESTRACE) 0.1 MG/GM vaginal cream Insert 1 g into the vagina 2 (Two) Times a Week.      Evolocumab (REPATHA) solution auto-injector SureClick injection Inject 1 mL under the skin into the appropriate area as directed Every 14 (Fourteen) Days. 2 mL 5    fluticasone (FLONASE) 50 MCG/ACT nasal spray 2 sprays into the nostril(s) as directed by provider Daily. 9.9 mL 0    glucose blood test strip 1 each by Other route 4 (Four) Times a Day. USE TO TEST SUGAR BEFORE MEALS AND AT BEDTIME 400 each 1    glucose monitor monitoring kit For QID testing  E11.65 1 each 0    glucose monitor monitoring kit ForQID testing E11.65 (On sliding scale insulin) 1 each 0    insulin aspart (novoLOG FLEXPEN) 100 UNIT/ML solution pen-injector sc pen Inject 10-20 Units under the skin into the appropriate area as directed 3 (Three) Times a Day As Needed (blood glucose). 54 mL 1    Insulin Glargine (BASAGLAR KWIKPEN) 100 UNIT/ML injection pen  Inject 32 Units under the skin into the appropriate area as directed Every Night. 30 mL 1    Insulin Pen Needle (Pen Needles) 32G X 4 MM misc Use 1 syringe 4 (Four) Times a Day. E11.65 400 each 1    Insulin Pen Needle 31G X 8 MM misc Use 1 stick 4 (Four) Times a Day. USE THREE TIMES A DAY WITH NOVOLOG BEFORE MEALS 400 each 1    Lancets misc For QID testing  E11.65 200 each 11    losartan (Cozaar) 50 MG tablet Take 1 tablet by mouth Daily. 90 tablet 1    metoprolol succinate XL (TOPROL-XL) 25 MG 24 hr tablet Take 0.5 tablets by mouth Daily. 15 tablet 3    nitroglycerin (NITROSTAT) 0.4 MG SL tablet 1 under the tongue as needed for angina, may repeat q5mins for up three doses 100 tablet 11    spironolactone (ALDACTONE) 25 MG tablet Take 1 tablet by mouth Daily. 90 tablet 1    trospium (SANCTURA) 20 MG tablet Take 1 tablet by mouth Every 12 (Twelve) Hours. 180 tablet 1    cefdinir (OMNICEF) 300 MG capsule Take 1 capsule by mouth 2 (Two) Times a Day for 7 days. 14 capsule 0    cetirizine (zyrTEC) 10 MG tablet Take 1 tablet by mouth Daily. (Patient not taking: Reported on 10/14/2024) 30 tablet 0    cyclobenzaprine (FLEXERIL) 10 MG tablet Take 1 tablet by mouth 2 (Two) Times a Day As Needed for Muscle Spasms. Takes prn , not daily (Patient not taking: Reported on 10/11/2024)      glucose blood test strip For QID testing  E11.65 400 each 1    guaiFENesin (Mucinex) 600 MG 12 hr tablet Take 2 tablets by mouth 2 (Two) Times a Day. (Patient not taking: Reported on 10/11/2024) 120 tablet 0    pseudoephedrine (SUDAFED) 60 MG tablet Take 1 tablet by mouth 2 (Two) Times a Day. (Patient not taking: Reported on 10/11/2024) 60 tablet 0     No current facility-administered medications for this visit.       Allergies:   Allergies   Allergen Reactions    Asa [Aspirin] GI Intolerance    Naproxen Nausea And Vomiting    Penicillins Hives and Rash       Vitals:   /60 (BP Location: Right arm, Patient Position: Sitting)   Pulse 82    "Temp 97.7 °F (36.5 °C) (Temporal)   Ht 157.5 cm (62\")   Wt 65.7 kg (144 lb 12.8 oz)   LMP  (LMP Unknown) Comment: partial hysterectomy  SpO2 100%   BMI 26.48 kg/m²   Estimated body mass index is 26.48 kg/m² as calculated from the following:    Height as of this encounter: 157.5 cm (62\").    Weight as of this encounter: 65.7 kg (144 lb 12.8 oz).             Physical Exam:   Physical Exam  Vitals reviewed.   Constitutional:       Appearance: Normal appearance.   HENT:      Head: Normocephalic.   Eyes:      Extraocular Movements: Extraocular movements intact.      Conjunctiva/sclera: Conjunctivae normal.   Cardiovascular:      Rate and Rhythm: Normal rate.      Heart sounds: Normal heart sounds.   Pulmonary:      Effort: Pulmonary effort is normal.      Breath sounds: Normal breath sounds.   Abdominal:      General: Bowel sounds are normal. There is no distension.      Palpations: Abdomen is soft.      Tenderness: There is no abdominal tenderness. There is no right CVA tenderness or left CVA tenderness.   Skin:     General: Skin is warm and dry.   Neurological:      Mental Status: She is alert. Mental status is at baseline.      Comments: Normal gait    Psychiatric:         Mood and Affect: Mood normal.          Lab Results:   Office Visit on 10/17/2024   Component Date Value Ref Range Status    Color 10/17/2024 Yellow  Yellow, Straw, Dark Yellow, Katharine Final    Clarity, UA 10/17/2024 Cloudy (A)  Clear Final    Specific Gravity  10/17/2024 1.010  1.005 - 1.030 Final    pH, Urine 10/17/2024 6.0  5.0 - 8.0 Final    Leukocytes 10/17/2024 Large (3+) (A)  Negative Final    Nitrite, UA 10/17/2024 Negative  Negative Final    Protein, POC 10/17/2024 Negative  Negative mg/dL Final    Glucose, UA 10/17/2024 2+ (A)  Negative mg/dL Final    Ketones, UA 10/17/2024 Negative  Negative Final    Urobilinogen, UA 10/17/2024 Normal  Normal, 0.2 E.U./dL Final    Bilirubin 10/17/2024 Negative  Negative Final    Blood, UA 10/17/2024 1+ " (A)  Negative Final    Lot Number 10/17/2024 [id number]   Final    Expiration Date 10/17/2024 03/03/2026   Final    Urine Culture 10/17/2024 >100,000 CFU/mL Escherichia coli (A)   Final    Color, UA 10/17/2024 Yellow  Yellow, Straw Final    Appearance, UA 10/17/2024 Clear  Clear Final    pH, UA 10/17/2024 6.5  5.0 - 8.0 Final    Specific Gravity, UA 10/17/2024 1.008  1.005 - 1.030 Final    Glucose, UA 10/17/2024 500 mg/dL (2+) (A)  Negative Final    Ketones, UA 10/17/2024 Negative  Negative Final    Bilirubin, UA 10/17/2024 Negative  Negative Final    Blood, UA 10/17/2024 Small (1+) (A)  Negative Final    Protein, UA 10/17/2024 Trace (A)  Negative Final    Leuk Esterase, UA 10/17/2024 Large (3+) (A)  Negative Final    Nitrite, UA 10/17/2024 Negative  Negative Final    Urobilinogen, UA 10/17/2024 0.2 E.U./dL  0.2 - 1.0 E.U./dL Final    RBC, UA 10/17/2024 0-2  None Seen, 0-2 /HPF Final    WBC, UA 10/17/2024 Too Numerous to Count (A)  None Seen, 0-2 /HPF Final    Bacteria, UA 10/17/2024 Trace (A)  None Seen /HPF Final    Squamous Epithelial Cells, UA 10/17/2024 3-6 (A)  None Seen, 0-2 /HPF Final    Hyaline Casts, UA 10/17/2024 None Seen  None Seen /LPF Final    Methodology 10/17/2024 Automated Microscopy   Final   Lab on 10/11/2024   Component Date Value Ref Range Status    Glucose 10/11/2024 398 (H)  65 - 99 mg/dL Final    BUN 10/11/2024 32 (H)  8 - 23 mg/dL Final    Creatinine 10/11/2024 1.41 (H)  0.57 - 1.00 mg/dL Final    Sodium 10/11/2024 136  136 - 145 mmol/L Final    Potassium 10/11/2024 4.9  3.5 - 5.2 mmol/L Final    Chloride 10/11/2024 101  98 - 107 mmol/L Final    CO2 10/11/2024 25.0  22.0 - 29.0 mmol/L Final    Calcium 10/11/2024 9.9  8.6 - 10.5 mg/dL Final    Total Protein 10/11/2024 7.5  6.0 - 8.5 g/dL Final    Albumin 10/11/2024 4.4  3.5 - 5.2 g/dL Final    ALT (SGPT) 10/11/2024 15  1 - 33 U/L Final    AST (SGOT) 10/11/2024 19  1 - 32 U/L Final    Alkaline Phosphatase 10/11/2024 122 (H)  39 - 117 U/L  Final    Total Bilirubin 10/11/2024 0.5  0.0 - 1.2 mg/dL Final    Globulin 10/11/2024 3.1  gm/dL Final    A/G Ratio 10/11/2024 1.4  g/dL Final    BUN/Creatinine Ratio 10/11/2024 22.7  7.0 - 25.0 Final    Anion Gap 10/11/2024 10.0  5.0 - 15.0 mmol/L Final    eGFR 10/11/2024 40.0 (L)  >60.0 mL/min/1.73 Final    Uric Acid 10/11/2024 2.7  2.4 - 5.7 mg/dL Final    Hemoglobin A1C 10/11/2024 9.50 (H)  4.80 - 5.60 % Final   Admission on 09/07/2024, Discharged on 09/07/2024   Component Date Value Ref Range Status    COVID19 09/07/2024 Detected (C)  Not Detected - Ref. Range Final    Influenza A PCR 09/07/2024 Not Detected  Not Detected Final    Influenza B PCR 09/07/2024 Not Detected  Not Detected Final   Office Visit on 09/05/2024   Component Date Value Ref Range Status    SARS Antigen 09/05/2024 Detected (A)  Not Detected, Presumptive Negative Final    Influenza A Antigen ELISEO 09/05/2024 Not Detected  Not Detected Final    Influenza B Antigen ELISEO 09/05/2024 Not Detected  Not Detected Final    Internal Control 09/05/2024 Passed  Passed Final    Lot Number 09/05/2024 [id number]   Final    Expiration Date 09/05/2024 10/23/2025   Final    Glucose 09/05/2024 210 (A)  70 - 130 mg/dL Final       Results review: During today's encounter, all relevant clinical data has been reviewed.      Assessment and Plan  Diagnoses and all orders for this visit:    1. Acute cystitis with hematuria (Primary)  -     cefdinir (OMNICEF) 300 MG capsule; Take 1 capsule by mouth 2 (Two) Times a Day for 7 days.  Dispense: 14 capsule; Refill: 0    2. Dysuria  -     POCT urinalysis dipstick, automated  -     Urinalysis With Microscopic - Urine, Clean Catch; Future  -     Urine Culture - Urine, Urine, Clean Catch; Future  -     Urinalysis With Microscopic - Urine, Clean Catch  -     Urine Culture - Urine, Urine, Clean Catch      1-2) Will treat today for suspected UTI. She is going to notify if no improvement or worsening signs and symptoms. Crea Clearance  37 and taken into consideration with ABX selection. UA in office reviewed and sent out for further testing. Will notify when results are available.           New Medications:   New Medications Ordered This Visit   Medications    cefdinir (OMNICEF) 300 MG capsule     Sig: Take 1 capsule by mouth 2 (Two) Times a Day for 7 days.     Dispense:  14 capsule     Refill:  0       Discontinued Medications:   There are no discontinued medications.           Visit Diagnoses:    ICD-10-CM ICD-9-CM   1. Acute cystitis with hematuria  N30.01 595.0   2. Dysuria  R30.0 788.1            Follow Up:   Return if symptoms worsen or fail to improve.    Patient was given instructions and counseling regarding her condition or for health maintenance advice. Please see specific information pulled into the AVS if appropriate.       This document has been electronically signed by REGINALDO Nixon   October 20, 2024 00:05 EDT    Dictated Utilizing Dragon Dictation: Part of this note may be an electronic transcription/translation of spoken language to printed text using the Dragon Dictation System.

## 2024-10-17 NOTE — PROGRESS NOTES
Pt attended phase II visit.  Tolerated exercise well, NAD noted, no complaints voiced, skin warm, pink, dry, resp nl and even, denies chest discomfort, denies SOA. Monitor shows NSR-ST, V/S WDL ,see [facility] documentation for exercise data.  [facility] cardiology physician immediately available.

## 2024-10-17 NOTE — TELEPHONE ENCOUNTER
Breanna called stated she saw another provider today for a UTI & was told about her kidneys being bad wants to know if you think her kidneys are failing?

## 2024-10-18 ENCOUNTER — TELEPHONE (OUTPATIENT)
Dept: FAMILY MEDICINE CLINIC | Facility: CLINIC | Age: 69
End: 2024-10-18
Payer: MEDICARE

## 2024-10-18 LAB
BACTERIA UR QL AUTO: ABNORMAL /HPF
BILIRUB UR QL STRIP: NEGATIVE
CLARITY UR: CLEAR
COLOR UR: YELLOW
GLUCOSE UR STRIP-MCNC: ABNORMAL MG/DL
HGB UR QL STRIP.AUTO: ABNORMAL
HYALINE CASTS UR QL AUTO: ABNORMAL /LPF
KETONES UR QL STRIP: NEGATIVE
LEUKOCYTE ESTERASE UR QL STRIP.AUTO: ABNORMAL
NITRITE UR QL STRIP: NEGATIVE
PH UR STRIP.AUTO: 6.5 [PH] (ref 5–8)
PROT UR QL STRIP: ABNORMAL
RBC # UR STRIP: ABNORMAL /HPF
REF LAB TEST METHOD: ABNORMAL
SP GR UR STRIP: 1.01 (ref 1–1.03)
SQUAMOUS #/AREA URNS HPF: ABNORMAL /HPF
UROBILINOGEN UR QL STRIP: ABNORMAL
WBC # UR STRIP: ABNORMAL /HPF

## 2024-10-18 NOTE — TELEPHONE ENCOUNTER
----- Message from Veronica Stubbs sent at 10/18/2024  4:47 PM EDT -----      Please let know it does look like she has a UTI, we are waiting for the final culture report. Is she feeling any better yet? Should her symptoms worsen or not improve over the weekend go to ER for further eval. We will notify when final report is available, Thank you

## 2024-10-18 NOTE — PROGRESS NOTES
Please let know it does look like she has a UTI, we are waiting for the final culture report. Is she feeling any better yet? Should her symptoms worsen or not improve over the weekend go to ER for further eval. We will notify when final report is available, Thank you

## 2024-10-19 LAB — BACTERIA SPEC AEROBE CULT: ABNORMAL

## 2024-10-21 ENCOUNTER — TELEPHONE (OUTPATIENT)
Dept: FAMILY MEDICINE CLINIC | Facility: CLINIC | Age: 69
End: 2024-10-21
Payer: MEDICARE

## 2024-10-21 RX ORDER — METOPROLOL SUCCINATE 25 MG/1
12.5 TABLET, EXTENDED RELEASE ORAL DAILY
Qty: 45 TABLET | Refills: 3 | Status: SHIPPED | OUTPATIENT
Start: 2024-10-21

## 2024-10-21 NOTE — PROGRESS NOTES
Urine culture results returned and antibiotic we sent for UTI bacteria should have worked and cleared her up. Please let us know if this is not the case, Thank you

## 2024-10-21 NOTE — TELEPHONE ENCOUNTER
----- Message from Veronica Stubbs sent at 10/21/2024 12:57 PM EDT -----      Urine culture results returned and antibiotic we sent for UTI bacteria should have worked and cleared her up. Please let us know if this is not the case, Thank you

## 2024-10-22 ENCOUNTER — TREATMENT (OUTPATIENT)
Dept: CARDIAC REHAB | Facility: HOSPITAL | Age: 69
End: 2024-10-22
Payer: MEDICARE

## 2024-10-22 ENCOUNTER — TRANSCRIBE ORDERS (OUTPATIENT)
Dept: ADMINISTRATIVE | Facility: HOSPITAL | Age: 69
End: 2024-10-22
Payer: MEDICARE

## 2024-10-22 VITALS — OXYGEN SATURATION: 99 % | HEART RATE: 77 BPM | DIASTOLIC BLOOD PRESSURE: 60 MMHG | SYSTOLIC BLOOD PRESSURE: 110 MMHG

## 2024-10-22 DIAGNOSIS — Z12.31 VISIT FOR SCREENING MAMMOGRAM: Primary | ICD-10-CM

## 2024-10-22 DIAGNOSIS — Z95.5 STENTED CORONARY ARTERY: Primary | ICD-10-CM

## 2024-10-22 PROCEDURE — 93798 PHYS/QHP OP CAR RHAB W/ECG: CPT

## 2024-10-22 NOTE — PROGRESS NOTES
Pt attended phase II visit.  Tolerated exercise well, NAD noted, no complaints voiced, skin warm, pink, dry, resp nl and even, denies chest discomfort, denies SOA. Monitor shows NSR-ST, V/S WDL ,see [facility] documentation for exercise data.  [facility] cardiology physician immediately available.

## 2024-10-23 NOTE — TELEPHONE ENCOUNTER
They are not failing at all. They are just 69 year old kidneys. Occasionally, she does not get as much fluid as she usually does and it spikes up a little. Can you schedule her to see me sometime in November so I can discuss this with her and recheck her kidney levels for her? Thanks.

## 2024-10-24 ENCOUNTER — TREATMENT (OUTPATIENT)
Dept: CARDIAC REHAB | Facility: HOSPITAL | Age: 69
End: 2024-10-24
Payer: MEDICARE

## 2024-10-24 VITALS — OXYGEN SATURATION: 98 % | SYSTOLIC BLOOD PRESSURE: 120 MMHG | HEART RATE: 77 BPM | DIASTOLIC BLOOD PRESSURE: 60 MMHG

## 2024-10-24 DIAGNOSIS — Z95.5 STENTED CORONARY ARTERY: Primary | ICD-10-CM

## 2024-10-24 PROCEDURE — 93798 PHYS/QHP OP CAR RHAB W/ECG: CPT

## 2024-10-24 NOTE — PROGRESS NOTES
Pt attended phase II visit.  Tolerated exercise well, NAD noted, no complaints voiced, skin warm, pink, dry, resp nl and even, denies chest discomfort, denies SOA. Monitor shows NSR-ST, V/S WDL ,see [facility] documentation for exercise data.  [facility] cardiology physician immediately available.

## 2024-10-28 NOTE — PROGRESS NOTES
"Breanna Kaba     VITALS: Blood pressure 108/54, pulse 82, temperature 96.8 °F (36 °C), temperature source Temporal, resp. rate 16, height 157.5 cm (62\"), weight 66 kg (145 lb 9.6 oz), SpO2 97%, not currently breastfeeding.    Subjective  Chief Complaint  Diabetes and Heart Problem    Subjective          History of Present Illness:  Patient is a 69 y.o.  female with medical conditions significant for type 2 diabetes, hyperlipidemia, CAD, and hypertension who presents to clinic secondary to medical followup.  No new acute concerns.  Patient brings in a list of her blood pressures and her glucose levels.  Her blood pressures are stable.  Her glucose levels have improved.    No complaints about any of the medications.    The following portions of the patient's history were reviewed and updated as appropriate: allergies, current medications, past family history, past medical history, past social history, past surgical history and problem list.    Past Medical History  Past Medical History:   Diagnosis Date    Acute congestive heart failure, unspecified heart failure type 03/28/2022    Arthritis     Chronic pain     Coronary artery disease involving native coronary artery of native heart without angina pectoris 4/25/2024    Diabetes mellitus     Elevated cholesterol     GERD (gastroesophageal reflux disease)     Gout     Headache     Hypertension     Myocardial infarction     Neuropathy        Surgical History  Past Surgical History:   Procedure Laterality Date    BREAST BIOPSY Left 1988    benign    CARDIAC CATHETERIZATION N/A 03/31/2022    Procedure: Left Heart Cath;  Surgeon: Tristan Marin MD;  [facility];  Service: Cardiology;  Laterality: N/A;    CARDIAC CATHETERIZATION N/A 03/31/2022    Procedure: Percutaneous Coronary Intervention;  Surgeon: Tristan Marin MD;  Location:  [facility];  Service: Cardiology;  Laterality: N/A;    CARDIAC CATHETERIZATION N/A 5/8/2024 " "   Procedure: Left Heart Cath;  Surgeon: Shu Regalado MD;  Location: [facility] CATH INVASIVE LOCATION;  Service: Cardiology;  Laterality: N/A;    CARDIAC CATHETERIZATION N/A 5/8/2024    Procedure: Percutaneous Coronary Intervention;  Surgeon: Shu Regalado MD;  Location: [facility] CATH INVASIVE LOCATION;  Service: Cardiology;  Laterality: N/A;    COLONOSCOPY N/A 02/17/2020    Procedure: COLONOSCOPY FOR SCREENING CPT CODE: ;  Surgeon: Mariano Prescott MD;  Location: [facility] OR;  Service: Gastroenterology;  Laterality: N/A;    CORONARY STENT PLACEMENT      HYSTERECTOMY      Partial    KNEE SURGERY      THYROIDECTOMY, PARTIAL      Removal of goiter       Family History  Family History   Problem Relation Age of Onset    COPD Mother     Heart disease Brother     Heart attack Brother     Hypertension Daughter     Diabetes Daughter     Hypertension Daughter     Diabetes Daughter     Breast cancer Neg Hx        Social History  Social History     Socioeconomic History    Marital status:    Tobacco Use    Smoking status: Never     Passive exposure: Past    Smokeless tobacco: Never   Vaping Use    Vaping status: Never Used   Substance and Sexual Activity    Alcohol use: No    Drug use: No    Sexual activity: Defer       Objective   Vital Signs:   /54 (BP Location: Right arm, Patient Position: Sitting, Cuff Size: Adult)   Pulse 82   Temp 96.8 °F (36 °C) (Temporal)   Resp 16   Ht 157.5 cm (62\")   Wt 66 kg (145 lb 9.6 oz)   SpO2 97%   BMI 26.63 kg/m²     Physical Exam     Gen: Patient in NAD. Pleasant and answers appropriately. A&Ox3.    Skin: Warm and dry with normal turgor. No purpura, rashes, or unusual pigmentation noted. Hair is normal in appearance and distribution.    HEENT: NC/AT. No lesions noted. Conjunctiva clear, sclera nonicteric. PERRL. EOMI without nystagmus or strabismus. Fundi appear benign. No hemorrhages or exudates of eyes. Auditory canals are patent bilaterally without " lesions. TMs intact,  nonerythematous, bulging without lesions. Nasal mucosa pink, nonerythematous, and nonedematous. Frontal and maxillary sinuses are nontender. O/P erythematous and moist without exudate.    Neck: Supple without lymph nodes palpated.  Decreased ROM.     Lungs: Decreased B/L without rales, rhonchi, crackles, or wheezes.    Heart: RRR. S1 and S2 normal. No S3 or S4. No MRGT.    Abd: Soft, nontender,nondistended. (+)BSx4 quadrants.     Extrem: No CCE. Radial pulses 2+/4 and equal B/L. FROMx4.  Positive joint tenderness noted.    Neuro: No focal motor/sensory deficits.    Procedures    Result Review :   The following data was reviewed by: Myrna Ramos MD on 10/11/2024:                Assessment and Plan    Breanna Kaba is a 69 y.o. here for medical followup.    Diagnoses and all orders for this visit:    1. Type 2 diabetes mellitus with diabetic neuropathy, with long-term current use of insulin (Primary)  -     Comprehensive Metabolic Panel; Future  -     Hemoglobin A1c; Future    2. Idiopathic chronic gout of multiple sites without tophus  -     Comprehensive Metabolic Panel; Future  -     Uric Acid; Future    3. Coronary artery disease due to lipid rich plaque  -     Comprehensive Metabolic Panel; Future        Problem List Items Addressed This Visit          Endocrine and Metabolic    Diabetes mellitus - Primary    Relevant Orders    Comprehensive Metabolic Panel (Completed)    Hemoglobin A1c (Completed)       Musculoskeletal and Injuries    Gout    Relevant Orders    Comprehensive Metabolic Panel (Completed)    Uric Acid (Completed)     Other Visit Diagnoses       Coronary artery disease due to lipid rich plaque        Relevant Orders    Comprehensive Metabolic Panel (Completed)              Follow Up   Return in about 3 months (around 1/11/2025), or LABS.  Findings and plans discussed with patient who verbalizes understanding and agreement. Will followup with patient once results are in.  Patient was given instructions and counseling regarding her condition or for health maintenance advice. Please see specific information pulled into the AVS if appropriate.       Myrna Ramos MD

## 2024-10-29 ENCOUNTER — TREATMENT (OUTPATIENT)
Dept: CARDIAC REHAB | Facility: HOSPITAL | Age: 69
End: 2024-10-29
Payer: MEDICARE

## 2024-10-29 VITALS — OXYGEN SATURATION: 98 % | DIASTOLIC BLOOD PRESSURE: 60 MMHG | SYSTOLIC BLOOD PRESSURE: 110 MMHG | HEART RATE: 73 BPM

## 2024-10-29 DIAGNOSIS — Z95.5 STENTED CORONARY ARTERY: Primary | ICD-10-CM

## 2024-10-29 PROCEDURE — 93798 PHYS/QHP OP CAR RHAB W/ECG: CPT

## 2024-10-29 NOTE — PROGRESS NOTES
Pt attended phase II visit.  Tolerated exercise well, NAD noted, no complaints voiced, skin warm, pink, dry, resp nl and even, denies chest discomfort, denies SOA. Monitor shows NSR-ST, V/S WDL ,see [facility] documentation for exercise data.  [facility] cardiology physician immediately available.

## 2024-10-31 ENCOUNTER — SPECIALTY PHARMACY (OUTPATIENT)
Dept: PHARMACY | Facility: HOSPITAL | Age: 69
End: 2024-10-31
Payer: MEDICARE

## 2024-10-31 ENCOUNTER — TREATMENT (OUTPATIENT)
Dept: CARDIAC REHAB | Facility: HOSPITAL | Age: 69
End: 2024-10-31
Payer: MEDICARE

## 2024-10-31 VITALS — SYSTOLIC BLOOD PRESSURE: 112 MMHG | HEART RATE: 75 BPM | DIASTOLIC BLOOD PRESSURE: 58 MMHG | OXYGEN SATURATION: 98 %

## 2024-10-31 DIAGNOSIS — Z95.5 STENTED CORONARY ARTERY: Primary | ICD-10-CM

## 2024-10-31 PROCEDURE — 93798 PHYS/QHP OP CAR RHAB W/ECG: CPT

## 2024-10-31 NOTE — PROGRESS NOTES
Pt attended phase II visit.  Tolerated exercise well, NAD noted, no complaints voiced, skin warm, pink, dry, resp nl and even, denies chest discomfort, denies SOA. Monitor shows NSR-ST, V/S WDL ,see [facility] documentation for exercise data.  [facility] cardiology physician immediately available.

## 2024-10-31 NOTE — PROGRESS NOTES
Specialty Pharmacy Refill Coordination Note     Breanna is a 69 y.o. female contacted today regarding refills of  Repatha Sureclick specialty medication(s).    Reviewed and verified with patient:       Specialty medication(s) and dose(s) confirmed: yes    Refill Questions      Flowsheet Row Most Recent Value   Changes to allergies? No   Changes to medications? No   New conditions or infections since last clinic visit No   Unplanned office visit, urgent care, ED, or hospital admission in the last 4 weeks  No   How does patient/caregiver feel medication is working? Good   Financial problems or insurance changes  No   Since the previous refill, were any specialty medication doses or scheduled injections missed or delayed?  No   Does this patient require a clinical escalation to a pharmacist? No            Delivery Questions      Flowsheet Row Most Recent Value   Delivery method [employer]   Delivery address verified with patient/caregiver? Yes   Delivery address Home   Medication(s) being filled and delivered Evolocumab (Repatha SureClick)   Copay verified? Yes   Copay amount 0   Copay form of payment No copayment ($0)   Ship Date ship 11/04 deliver 11/05   Delivery Date ship 11/04 deliver 11/05   Signature Required No                   Follow-up: 84 day(s)     Jennifer Casillas, Pharmacy Technician  Specialty Pharmacy Technician

## 2024-11-05 ENCOUNTER — APPOINTMENT (OUTPATIENT)
Dept: CARDIAC REHAB | Facility: HOSPITAL | Age: 69
End: 2024-11-05
Payer: MEDICARE

## 2024-11-07 ENCOUNTER — TREATMENT (OUTPATIENT)
Dept: CARDIAC REHAB | Facility: HOSPITAL | Age: 69
End: 2024-11-07
Payer: MEDICARE

## 2024-11-07 VITALS — HEART RATE: 76 BPM | SYSTOLIC BLOOD PRESSURE: 116 MMHG | DIASTOLIC BLOOD PRESSURE: 68 MMHG | OXYGEN SATURATION: 98 %

## 2024-11-07 DIAGNOSIS — Z95.5 STENTED CORONARY ARTERY: Primary | ICD-10-CM

## 2024-11-07 PROCEDURE — 93798 PHYS/QHP OP CAR RHAB W/ECG: CPT

## 2024-11-07 NOTE — PROGRESS NOTES
Discussed Breanna's goal set at the beginning of the program of beginning a personal exercise program. Informed patient of options for exercise after discharge, including Phase III. We also discussed  Checking on fitness centers in their area.  We discussed the Silver Sneakers Program , Educated on calling insurance to see if they have Silver Sneakers or other exercise  programs available .         Pt attended phase II visit.  Tolerated exercise well, NAD noted, no complaints voiced, skin warm, pink, dry, resp nl and even, denies chest discomfort, denies SOA. Monitor shows NSR-ST, V/S WDL ,see [facility] documentation for exercise data.  [facility] cardiology physician immediately available.    Increased level on scifit bike and nustep

## 2024-11-12 ENCOUNTER — TREATMENT (OUTPATIENT)
Dept: CARDIAC REHAB | Facility: HOSPITAL | Age: 69
End: 2024-11-12
Payer: MEDICARE

## 2024-11-12 VITALS — SYSTOLIC BLOOD PRESSURE: 124 MMHG | HEART RATE: 86 BPM | DIASTOLIC BLOOD PRESSURE: 58 MMHG

## 2024-11-12 DIAGNOSIS — Z95.5 STENTED CORONARY ARTERY: Primary | ICD-10-CM

## 2024-11-12 PROCEDURE — 93798 PHYS/QHP OP CAR RHAB W/ECG: CPT

## 2024-11-12 NOTE — PROGRESS NOTES
Pt attended phase II visit.  Tolerated exercise well, NAD noted, no complaints voiced, skin warm, pink, dry, resp nl and even, denies chest discomfort, denies SOA. Monitor shows NSR-ST, V/S WDL ,see [facility] documentation for exercise data.  [facility] cardiology physician immediately available.

## 2024-11-14 ENCOUNTER — TREATMENT (OUTPATIENT)
Dept: CARDIAC REHAB | Facility: HOSPITAL | Age: 69
End: 2024-11-14
Payer: MEDICARE

## 2024-11-14 VITALS
RESPIRATION RATE: 13 BRPM | SYSTOLIC BLOOD PRESSURE: 112 MMHG | OXYGEN SATURATION: 98 % | WEIGHT: 142.6 LBS | DIASTOLIC BLOOD PRESSURE: 60 MMHG | BODY MASS INDEX: 26.08 KG/M2 | HEART RATE: 74 BPM

## 2024-11-14 DIAGNOSIS — Z95.5 STENTED CORONARY ARTERY: Primary | ICD-10-CM

## 2024-11-14 PROCEDURE — 93798 PHYS/QHP OP CAR RHAB W/ECG: CPT

## 2024-11-19 ENCOUNTER — OFFICE VISIT (OUTPATIENT)
Dept: FAMILY MEDICINE CLINIC | Facility: CLINIC | Age: 69
End: 2024-11-19
Payer: MEDICARE

## 2024-11-19 VITALS
HEART RATE: 91 BPM | TEMPERATURE: 96.8 F | WEIGHT: 144.8 LBS | HEIGHT: 62 IN | BODY MASS INDEX: 26.65 KG/M2 | DIASTOLIC BLOOD PRESSURE: 68 MMHG | SYSTOLIC BLOOD PRESSURE: 128 MMHG | OXYGEN SATURATION: 96 %

## 2024-11-19 DIAGNOSIS — E11.40 TYPE 2 DIABETES MELLITUS WITH DIABETIC NEUROPATHY, WITH LONG-TERM CURRENT USE OF INSULIN: ICD-10-CM

## 2024-11-19 DIAGNOSIS — E11.69 HYPERLIPIDEMIA DUE TO TYPE 2 DIABETES MELLITUS: ICD-10-CM

## 2024-11-19 DIAGNOSIS — M62.89 PELVIC FLOOR DYSFUNCTION: Primary | ICD-10-CM

## 2024-11-19 DIAGNOSIS — Z79.4 TYPE 2 DIABETES MELLITUS WITH DIABETIC NEUROPATHY, WITH LONG-TERM CURRENT USE OF INSULIN: ICD-10-CM

## 2024-11-19 DIAGNOSIS — E78.5 HYPERLIPIDEMIA DUE TO TYPE 2 DIABETES MELLITUS: ICD-10-CM

## 2024-11-19 PROCEDURE — G2211 COMPLEX E/M VISIT ADD ON: HCPCS | Performed by: FAMILY MEDICINE

## 2024-11-19 PROCEDURE — 1126F AMNT PAIN NOTED NONE PRSNT: CPT | Performed by: FAMILY MEDICINE

## 2024-11-19 PROCEDURE — 1160F RVW MEDS BY RX/DR IN RCRD: CPT | Performed by: FAMILY MEDICINE

## 2024-11-19 PROCEDURE — 3078F DIAST BP <80 MM HG: CPT | Performed by: FAMILY MEDICINE

## 2024-11-19 PROCEDURE — 3074F SYST BP LT 130 MM HG: CPT | Performed by: FAMILY MEDICINE

## 2024-11-19 PROCEDURE — 99214 OFFICE O/P EST MOD 30 MIN: CPT | Performed by: FAMILY MEDICINE

## 2024-11-19 PROCEDURE — 1159F MED LIST DOCD IN RCRD: CPT | Performed by: FAMILY MEDICINE

## 2024-11-19 PROCEDURE — 3046F HEMOGLOBIN A1C LEVEL >9.0%: CPT | Performed by: FAMILY MEDICINE

## 2024-11-25 ENCOUNTER — OFFICE VISIT (OUTPATIENT)
Dept: CARDIOLOGY | Facility: CLINIC | Age: 69
End: 2024-11-25
Payer: MEDICARE

## 2024-11-25 ENCOUNTER — LAB (OUTPATIENT)
Dept: FAMILY MEDICINE CLINIC | Facility: CLINIC | Age: 69
End: 2024-11-25
Payer: MEDICARE

## 2024-11-25 VITALS
HEART RATE: 84 BPM | SYSTOLIC BLOOD PRESSURE: 117 MMHG | HEIGHT: 62 IN | OXYGEN SATURATION: 97 % | WEIGHT: 146 LBS | DIASTOLIC BLOOD PRESSURE: 67 MMHG | BODY MASS INDEX: 26.87 KG/M2

## 2024-11-25 DIAGNOSIS — I50.22 CHRONIC HFREF (HEART FAILURE WITH REDUCED EJECTION FRACTION): Primary | Chronic | ICD-10-CM

## 2024-11-25 DIAGNOSIS — E78.2 MIXED HYPERLIPIDEMIA: Chronic | ICD-10-CM

## 2024-11-25 DIAGNOSIS — I50.22 CHRONIC HFREF (HEART FAILURE WITH REDUCED EJECTION FRACTION): Chronic | ICD-10-CM

## 2024-11-25 DIAGNOSIS — I10 BENIGN ESSENTIAL HYPERTENSION: Chronic | ICD-10-CM

## 2024-11-25 LAB
ANION GAP SERPL CALCULATED.3IONS-SCNC: 12.1 MMOL/L (ref 5–15)
BUN SERPL-MCNC: 27 MG/DL (ref 8–23)
BUN/CREAT SERPL: 27 (ref 7–25)
CALCIUM SPEC-SCNC: 9.5 MG/DL (ref 8.6–10.5)
CHLORIDE SERPL-SCNC: 101 MMOL/L (ref 98–107)
CO2 SERPL-SCNC: 22.9 MMOL/L (ref 22–29)
CREAT SERPL-MCNC: 1 MG/DL (ref 0.57–1)
EGFRCR SERPLBLD CKD-EPI 2021: 60.4 ML/MIN/1.73
GLUCOSE SERPL-MCNC: 131 MG/DL (ref 65–99)
POTASSIUM SERPL-SCNC: 4.2 MMOL/L (ref 3.5–5.2)
SODIUM SERPL-SCNC: 136 MMOL/L (ref 136–145)

## 2024-11-25 PROCEDURE — 36415 COLL VENOUS BLD VENIPUNCTURE: CPT

## 2024-11-25 PROCEDURE — 80048 BASIC METABOLIC PNL TOTAL CA: CPT | Performed by: PHYSICIAN ASSISTANT

## 2024-11-25 NOTE — PROGRESS NOTES
Myrna Ramos MD  Breanna Kaba  1955 11/25/2024    Patient Active Problem List   Diagnosis    Neuropathy    Gout    GERD (gastroesophageal reflux disease)    Diabetes mellitus    Chronic pain    Family history of GI malignancy    History of adenomatous polyp of colon    COVID-19    Chronic HFrEF (heart failure with preserved ejection fraction)    Benign essential hypertension    Mixed hyperlipidemia    Coronary artery disease of native artery of native heart with stable angina pectoris    Erosion of implanted urethral mesh to surrounding organ or tissue, initial encounter    Incontinence of feces    Increased frequency of urination    Midline cystocele    Proteinuria    Urge incontinence    Vaginal enterocele       Dear Myrna Ramos MD:    Subjective     History of Present Illness:    Chief Complaint   Patient presents with    Follow-up       Breanna Kaba is a pleasant 69 y.o. female with a past medical history significant for coronary artery disease with history of stenting of the LAD on 3/31/2022 after she presented with an acute NSTEMI. She also had repeat LHC on 5/8/2024 for recurrent chest pain with subsequent PCI to the LAD x2 with other noted disease in the 2nd marginal. Ischemic cardiomyopathy with recovered LVEF now at 50 to 55% previously as low as 20 to 25%, diabetes mellitus, essential hypertension, and dyslipidemia.  No history of tobacco abuse.  She comes in today for cardiology follow-up.     Breanna reports that she has been doing well. Patient denies any chest pain, shortness of breath, palpitations, dizziness, syncope or near syncope.     Allergies   Allergen Reactions    Asa [Aspirin] GI Intolerance    Naproxen Nausea And Vomiting    Penicillins Hives and Rash   :      Current Outpatient Medications:     allopurinol (ZYLOPRIM) 300 MG tablet, Take 1 tablet by mouth Daily., Disp: 90 tablet, Rfl: 1    aspirin 81 MG EC tablet, Take 1 tablet by mouth Daily., Disp: 90 tablet, Rfl: 1     atorvastatin (LIPITOR) 80 MG tablet, Take 1 tablet by mouth Daily., Disp: 90 tablet, Rfl: 1    clopidogrel (PLAVIX) 75 MG tablet, Take one tablet by mouth daily, Disp: 90 tablet, Rfl: 3    Evolocumab (REPATHA) solution auto-injector SureClick injection, Inject 1 mL under the skin into the appropriate area as directed Every 14 (Fourteen) Days., Disp: 2 mL, Rfl: 5    fluticasone (FLONASE) 50 MCG/ACT nasal spray, 2 sprays into the nostril(s) as directed by provider Daily., Disp: 9.9 mL, Rfl: 0    glucose blood test strip, 1 each by Other route 4 (Four) Times a Day. USE TO TEST SUGAR BEFORE MEALS AND AT BEDTIME, Disp: 400 each, Rfl: 1    glucose monitor monitoring kit, For QID testing  E11.65, Disp: 1 each, Rfl: 0    glucose monitor monitoring kit, ForQID testing E11.65 (On sliding scale insulin), Disp: 1 each, Rfl: 0    insulin aspart (novoLOG FLEXPEN) 100 UNIT/ML solution pen-injector sc pen, Inject 10-20 Units under the skin into the appropriate area as directed 3 (Three) Times a Day As Needed (blood glucose)., Disp: 54 mL, Rfl: 1    Insulin Glargine (BASAGLAR KWIKPEN) 100 UNIT/ML injection pen, Inject 32 Units under the skin into the appropriate area as directed Every Night., Disp: 30 mL, Rfl: 1    Insulin Pen Needle (Pen Needles) 32G X 4 MM misc, Use 1 syringe 4 (Four) Times a Day. E11.65, Disp: 400 each, Rfl: 1    Insulin Pen Needle 31G X 8 MM misc, Use 1 stick 4 (Four) Times a Day. USE THREE TIMES A DAY WITH NOVOLOG BEFORE MEALS, Disp: 400 each, Rfl: 1    Lancets misc, For QID testing  E11.65, Disp: 200 each, Rfl: 11    losartan (Cozaar) 50 MG tablet, Take 1 tablet by mouth Daily., Disp: 90 tablet, Rfl: 1    metoprolol succinate XL (TOPROL-XL) 25 MG 24 hr tablet, Take 1/2 (one-half) tablet by mouth once daily, Disp: 45 tablet, Rfl: 3    nitroglycerin (NITROSTAT) 0.4 MG SL tablet, 1 under the tongue as needed for angina, may repeat q5mins for up three doses, Disp: 100 tablet, Rfl: 11    spironolactone (ALDACTONE)  "25 MG tablet, Take 1 tablet by mouth Daily., Disp: 90 tablet, Rfl: 1    trospium (SANCTURA) 20 MG tablet, Take 1 tablet by mouth Every 12 (Twelve) Hours., Disp: 180 tablet, Rfl: 1    estradiol (ESTRACE) 0.1 MG/GM vaginal cream, Insert 1 g into the vagina 2 (Two) Times a Week. (Patient not taking: Reported on 11/25/2024), Disp: , Rfl:     glucose blood test strip, For QID testing  E11.65, Disp: 400 each, Rfl: 1    The following portions of the patient's history were reviewed and updated as appropriate: allergies, current medications, past family history, past medical history, past social history, past surgical history and problem list.    Social History     Tobacco Use    Smoking status: Never     Passive exposure: Past    Smokeless tobacco: Never   Vaping Use    Vaping status: Never Used   Substance Use Topics    Alcohol use: No    Drug use: No         Objective   Vitals:    11/25/24 0813   BP: 117/67   BP Location: Left arm   Patient Position: Sitting   Cuff Size: Adult   Pulse: 84   SpO2: 97%   Weight: 66.2 kg (146 lb)   Height: 157.5 cm (62.01\")     Body mass index is 26.7 kg/m².    ROS    Constitutional:       General: Not in acute distress.     Appearance: Healthy appearance. Well-developed and not in distress. Not diaphoretic.   Eyes:      Conjunctiva/sclera: Conjunctivae normal.      Pupils: Pupils are equal, round, and reactive to light.   HENT:      Head: Normocephalic and atraumatic.   Neck:      Vascular: No carotid bruit or JVD.   Pulmonary:      Effort: Pulmonary effort is normal. No respiratory distress.      Breath sounds: Normal breath sounds.   Cardiovascular:      Normal rate. Regular rhythm.   Edema:     Peripheral edema absent.   Skin:     General: Skin is cool.   Neurological:      Mental Status: Alert, oriented to person, place, and time and oriented to person, place and time.         Lab Results   Component Value Date     11/25/2024    K 4.2 11/25/2024     11/25/2024    CO2 22.9 " "11/25/2024    BUN 27 (H) 11/25/2024    CREATININE 1.00 11/25/2024    GLUCOSE 131 (H) 11/25/2024    CALCIUM 9.5 11/25/2024    AST 19 10/11/2024    ALT 15 10/11/2024    ALKPHOS 122 (H) 10/11/2024     No results found for: \"CKTOTAL\"  Lab Results   Component Value Date    WBC 8.41 07/11/2024    HGB 11.9 (L) 07/11/2024    HCT 35.7 07/11/2024     07/11/2024     Lab Results   Component Value Date    INR 0.86 (L) 12/24/2023    INR 1.04 03/28/2022     Lab Results   Component Value Date    MG 1.6 04/02/2022     Lab Results   Component Value Date    TSH 1.260 06/06/2022    CHLPL 256 (H) 03/01/2017    TRIG 217 (H) 05/09/2024    HDL 39 (L) 05/09/2024    LDL 75 05/09/2024      No results found for: \"BNP\"    During this visit the following were done:  Labs Reviewed []    Labs Ordered []    Radiology Reports Reviewed []    Radiology Ordered []    PCP Records Reviewed []    Referring Provider Records Reviewed []    ER Records Reviewed []    Hospital Records Reviewed []    History Obtained From Family []    Radiology Images Reviewed []    Other Reviewed []    Records Requested []       Procedures    Assessment & Plan    Diagnosis Plan   1. Chronic HFrEF (heart failure with preserved ejection fraction)  Basic Metabolic Panel      2. Mixed hyperlipidemia  Basic Metabolic Panel      3. Benign essential hypertension                 Recommendations:  Chronic HFrEF  Appears euvolemic doing well clinically denies any worsening dyspnea or orthopnea.  Continue aspirin, Lipitor, Plavix, losartan, metoprolol, spironolactone.  Order BMP to monitor renal function  Coronary artery disease  Denies any anginal symptoms today continue DAPT therapy and Lipitor.  Patient also now on Repatha  Dyslipidemia  Patient on Lipitor high dose and Repatha    No follow-ups on file.    As always, I appreciate very much the opportunity to participate in the cardiovascular care of your patients.      With Best Regards,    Da Fox PA-C          "

## 2024-12-02 ENCOUNTER — HOSPITAL ENCOUNTER (OUTPATIENT)
Dept: MAMMOGRAPHY | Facility: HOSPITAL | Age: 69
Discharge: HOME | End: 2024-12-02
Admitting: FAMILY MEDICINE
Payer: MEDICARE

## 2024-12-02 DIAGNOSIS — Z12.31 VISIT FOR SCREENING MAMMOGRAM: ICD-10-CM

## 2024-12-02 PROCEDURE — 77067 SCR MAMMO BI INCL CAD: CPT

## 2024-12-02 PROCEDURE — 77063 BREAST TOMOSYNTHESIS BI: CPT

## 2024-12-04 NOTE — PROGRESS NOTES
"Breanna Kaba     VITALS: Blood pressure 128/68, pulse 91, temperature 96.8 °F (36 °C), temperature source Temporal, height 157.5 cm (62\"), weight 65.7 kg (144 lb 12.8 oz), SpO2 96%, not currently breastfeeding.    Subjective  Chief Complaint  Diabetes    Subjective          History of Present Illness:  Patient is a 69 y.o.  female with medical conditions significant for diabetes, hyperlipidemia, CAD, and hypertension who presents to clinic secondary to medical followup.  No new or acute concerns.  Patient is doing well.    No complaints about any of the medications.    The following portions of the patient's history were reviewed and updated as appropriate: allergies, current medications, past family history, past medical history, past social history, past surgical history and problem list.    Past Medical History  Past Medical History:   Diagnosis Date    Acute congestive heart failure, unspecified heart failure type 03/28/2022    Arthritis     Chronic pain     Coronary artery disease involving native coronary artery of native heart without angina pectoris 4/25/2024    Diabetes mellitus     Elevated cholesterol     GERD (gastroesophageal reflux disease)     Gout     Headache     Hypertension     Myocardial infarction     Neuropathy        Surgical History  Past Surgical History:   Procedure Laterality Date    BREAST BIOPSY Left 1988    benign    CARDIAC CATHETERIZATION N/A 03/31/2022    Procedure: Left Heart Cath;  Surgeon: Tristan Marin MD;  Location:  [facility] C[facility]VAS[facility];  Service: Cardiology;  Laterality: N/A;    CARDIAC CATHETERIZATION N/A 03/31/2022    Procedure: Percutaneous Coronary Intervention;  Surgeon: Tristan Marin MD;  Location:  [facility];  Service: Cardiology;  Laterality: N/A;    CARDIAC CATHETERIZATION N/A 5/8/2024    Procedure: Left Heart Cath;  Surgeon: Shu Regalado MD;  Location:  [facility];  Service: Cardiology;  Laterality: N/A;    CARDIAC " "CATHETERIZATION N/A 5/8/2024    Procedure: Percutaneous Coronary Intervention;  Surgeon: Shu Regalado MD;  Location: [facility] CATH INVASIVE LOCATION;  Service: Cardiology;  Laterality: N/A;    COLONOSCOPY N/A 02/17/2020    Procedure: COLONOSCOPY FOR SCREENING CPT CODE: ;  Surgeon: Mariano Prescott MD;  Location: [facility] OR;  Service: Gastroenterology;  Laterality: N/A;    CORONARY STENT PLACEMENT      HYSTERECTOMY      Partial    KNEE SURGERY      THYROIDECTOMY, PARTIAL      Removal of goiter       Family History  Family History   Problem Relation Age of Onset    COPD Mother     Heart disease Brother     Heart attack Brother     Hypertension Daughter     Diabetes Daughter     Hypertension Daughter     Diabetes Daughter     Breast cancer Neg Hx        Social History  Social History     Socioeconomic History    Marital status:    Tobacco Use    Smoking status: Never     Passive exposure: Past    Smokeless tobacco: Never   Vaping Use    Vaping status: Never Used   Substance and Sexual Activity    Alcohol use: No    Drug use: No    Sexual activity: Defer       Objective   Vital Signs:   /68 (BP Location: Right arm, Patient Position: Sitting, Cuff Size: Adult)   Pulse 91   Temp 96.8 °F (36 °C) (Temporal)   Ht 157.5 cm (62\")   Wt 65.7 kg (144 lb 12.8 oz)   SpO2 96%   BMI 26.48 kg/m²     Physical Exam     Gen: Patient in NAD. Pleasant and answers appropriately. A&Ox3.    Skin: Warm and dry with normal turgor. No purpura, rashes, or unusual pigmentation noted. Hair is normal in appearance and distribution.    HEENT: NC/AT. No lesions noted. Conjunctiva clear, sclera nonicteric. PERRL. EOMI without nystagmus or strabismus. Fundi appear benign. No hemorrhages or exudates of eyes. Auditory canals are patent bilaterally without lesions. TMs intact,  nonerythematous, bulging without lesions. Nasal mucosa pink, nonerythematous, and nonedematous. Frontal and maxillary sinuses are nontender. O/P " nonerythematous and moist without exudate.    Neck: Supple without lymph nodes palpated. FROM.     Lungs: Decreased B/L without rales, rhonchi, crackles, or wheezes.    Heart: RRR. S1 and S2 normal. No S3 or S4. No MRGT.    Abd: Soft, nontender,nondistended. (+)BSx4 quadrants.     Extrem: No CC.  Trace edema bilateral lower extremities.  Radial pulses 2+/4 and equal B/L. FROMx4.  Positive joint tenderness noted.    Neuro: No focal motor/sensory deficits.    Procedures    Result Review :   The following data was reviewed by: Myrna Ramos MD on 11/19/2024:                Assessment and Plan    Breanna Kaba is a 69 y.o. here for medical followup.    Diagnoses and all orders for this visit:    1. Pelvic floor dysfunction (Primary)  -     Ambulatory Referral to Physical Therapy for Evaluation & Treatment    2. Type 2 diabetes mellitus with diabetic neuropathy, with long-term current use of insulin  Patient states that glucose levels are improved.  She continues on 6 units of Basaglar daily and does a sliding scale of NovoLog 3 times a day with meals.    3. Hyperlipidemia due to type 2 diabetes mellitus  Continues on Lipitor 80 mg orally daily and Repatha 40 mg every 2 weeks.        Problem List Items Addressed This Visit          Endocrine and Metabolic    Diabetes mellitus     Other Visit Diagnoses       Pelvic floor dysfunction    -  Primary    Relevant Orders    Ambulatory Referral to Physical Therapy for Evaluation & Treatment (Completed)    Hyperlipidemia due to type 2 diabetes mellitus                  I spent 32 minutes caring for Breanna on this date of service. This time includes time spent by me in the following activities:obtaining and/or reviewing a separately obtained history, performing a medically appropriate examination and/or evaluation , and counseling and educating the patient/family/caregiver  Follow Up   Return in about 6 weeks (around 12/31/2024).  Findings and plans discussed with patient who  verbalizes understanding and agreement. Will followup with patient once results are in. Patient was given instructions and counseling regarding her condition or for health maintenance advice. Please see specific information pulled into the AVS if appropriate.       Myrna Ramos MD

## 2024-12-20 ENCOUNTER — SPECIALTY PHARMACY (OUTPATIENT)
Dept: PHARMACY | Facility: HOSPITAL | Age: 69
End: 2024-12-20
Payer: MEDICARE

## 2024-12-20 NOTE — PROGRESS NOTES
Medication Management Clinic/ Specialty Pharmacy Patient Management Program  Lipid Management Program - PCSK9i Initial Assessment     Breanna Kaba is a 69 y.o. female referred by their provider, Da Fox, to the Hyperlipidemia Patient Management program offered by [facility] for Lipid Management.  An initial outreach was conducted, including assessment of therapy appropriateness and specialty medication education for Repatha. The patient was introduced to services offered by [facility], including: regular assessments, refill coordination, curbside pick-up or mail order delivery options, prior authorization maintenance, and financial assistance programs as applicable. The patient was also provided with contact information for the pharmacy team.     Breanna Kaba is  treated for clinical ASCVD and hyperlipidemia and currently takes Repatha sureclick and atorvastatin 80 mg daily.  Patient has not tried anything else for cholesterol in the past. The patient denies any allergies to latex.      Insurance Coverage & Financial Support  [facility]  Lipid [place]     Relevant Past Medical History and Comorbidities  Relevant medical history and concomitant health conditions were discussed with the patient. The patient's chart has been reviewed for relevant past medical history and comorbid conditions and updated as necessary.  Past Medical History:   Diagnosis Date    Acute congestive heart failure, unspecified heart failure type 03/28/2022    Arthritis     Chronic pain     Coronary artery disease involving native coronary artery of native heart without angina pectoris 4/25/2024    Diabetes mellitus     Elevated cholesterol     GERD (gastroesophageal reflux disease)     Gout     Headache     Hypertension     Myocardial infarction     Neuropathy      Social History     Socioeconomic History    Marital status:    Tobacco Use    Smoking  status: Never     Passive exposure: Past    Smokeless tobacco: Never   Vaping Use    Vaping status: Never Used   Substance and Sexual Activity    Alcohol use: No    Drug use: No    Sexual activity: Defer       Problem list reviewed by Blanca Pa PharmD on 12/20/2024 at  9:59 AM    Allergies  Known allergies and reactions were discussed with the patient. The patient's chart has been reviewed for  allergy information and updated as necessary.   Allergies   Allergen Reactions    Asa [Aspirin] GI Intolerance    Naproxen Nausea And Vomiting    Penicillins Hives and Rash       Allergies reviewed by Blanca Pa PharmD on 12/20/2024 at  9:59 AM    Relevant Laboratory Values  Relevant laboratory values were discussed with the patient. The following specialty medication dose adjustment(s) are recommended: See plan, if applicable   Lab Results   Component Value Date    CHOL 150 05/09/2024    CHLPL 256 (H) 03/01/2017    TRIG 217 (H) 05/09/2024    HDL 39 (L) 05/09/2024    LDL 75 05/09/2024       Current Medication List  This medication list has been reviewed with the patient and evaluated for any interactions or necessary modifications/recommendations, and updated to include all prescription medications, OTC medications, and supplements the patient is currently taking.  This list reflects what is contained in the patient's profile, which has also been marked as reviewed to communicate to other providers it is the most up to date version of the patient's current medication therapy.     Current Outpatient Medications:     allopurinol (ZYLOPRIM) 300 MG tablet, Take 1 tablet by mouth Daily., Disp: 90 tablet, Rfl: 1    aspirin 81 MG EC tablet, Take 1 tablet by mouth Daily., Disp: 90 tablet, Rfl: 1    atorvastatin (LIPITOR) 80 MG tablet, Take 1 tablet by mouth Daily., Disp: 90 tablet, Rfl: 1    clopidogrel (PLAVIX) 75 MG tablet, Take one tablet by mouth daily, Disp: 90 tablet, Rfl: 3    estradiol (ESTRACE) 0.1 MG/GM  vaginal cream, Insert 1 g into the vagina 2 (Two) Times a Week. (Patient not taking: Reported on 11/25/2024), Disp: , Rfl:     Evolocumab (REPATHA) solution auto-injector SureClick injection, Inject 1 mL under the skin into the appropriate area as directed Every 14 (Fourteen) Days., Disp: 2 mL, Rfl: 5    fluticasone (FLONASE) 50 MCG/ACT nasal spray, 2 sprays into the nostril(s) as directed by provider Daily., Disp: 9.9 mL, Rfl: 0    glucose blood test strip, For QID testing  E11.65, Disp: 400 each, Rfl: 1    glucose blood test strip, 1 each by Other route 4 (Four) Times a Day. USE TO TEST SUGAR BEFORE MEALS AND AT BEDTIME, Disp: 400 each, Rfl: 1    glucose monitor monitoring kit, For QID testing  E11.65, Disp: 1 each, Rfl: 0    glucose monitor monitoring kit, ForQID testing E11.65 (On sliding scale insulin), Disp: 1 each, Rfl: 0    insulin aspart (novoLOG FLEXPEN) 100 UNIT/ML solution pen-injector sc pen, Inject 10-20 Units under the skin into the appropriate area as directed 3 (Three) Times a Day As Needed (blood glucose)., Disp: 54 mL, Rfl: 1    Insulin Glargine (BASAGLAR KWIKPEN) 100 UNIT/ML injection pen, Inject 32 Units under the skin into the appropriate area as directed Every Night., Disp: 30 mL, Rfl: 1    Insulin Pen Needle (Pen Needles) 32G X 4 MM misc, Use 1 syringe 4 (Four) Times a Day. E11.65, Disp: 400 each, Rfl: 1    Insulin Pen Needle 31G X 8 MM misc, Use 1 stick 4 (Four) Times a Day. USE THREE TIMES A DAY WITH NOVOLOG BEFORE MEALS, Disp: 400 each, Rfl: 1    Lancets misc, For QID testing  E11.65, Disp: 200 each, Rfl: 11    losartan (Cozaar) 50 MG tablet, Take 1 tablet by mouth Daily., Disp: 90 tablet, Rfl: 1    metoprolol succinate XL (TOPROL-XL) 25 MG 24 hr tablet, Take 1/2 (one-half) tablet by mouth once daily, Disp: 45 tablet, Rfl: 3    nitroglycerin (NITROSTAT) 0.4 MG SL tablet, 1 under the tongue as needed for angina, may repeat q5mins for up three doses, Disp: 100 tablet, Rfl: 11     spironolactone (ALDACTONE) 25 MG tablet, Take 1 tablet by mouth Daily., Disp: 90 tablet, Rfl: 1    trospium (SANCTURA) 20 MG tablet, Take 1 tablet by mouth Every 12 (Twelve) Hours., Disp: 180 tablet, Rfl: 1    Medicines reviewed by Blanca Pa, PharmD on 12/20/2024 at  9:59 AM    Drug Interactions  None with repatha    Adherence and Self-Administration  Adherence related to the patient's specialty therapy was discussed with the patient. The Adherence segment of this outreach has been reviewed and updated.     Is there a concern with patient's ability to self administer the medication correctly and without issue?: No  Were any potential barriers to adherence identified during the initial assessment or patient education?: No  Are there any concerns regarding the patient's understanding of the importance of medication adherence?: No  Methods for Supporting Patient Adherence and/or Self-Administration: see plan, if applicable     Open Medication Therapy Problems  No medication therapy recommendations to display    Goals of Therapy  Goals related to the patient's specialty therapy were discussed with the patient. The Patient Goals segment of this outreach has been reviewed and updated.   Goals Addressed Today        Specialty Pharmacy General Goal      LDL < 70 mg/dl    12/20/24 [place]: LDL 75 mg/dl on 5/9/24, but this was prior to initiation of Repatha. Patient was informed she would need updated LP. Orders are on file              Medication Assessment & Plan  Medication Therapy Changes: Patient continued on repatha 140 mg SC every 2 weeks.   Injection training and medication education provided.   Welcome information and patient satisfaction survey to be sent by specialty pharmacy team with patient's initial fill.  Related Plans, Therapy Recommendations, or Therapy Problems to Be Addressed: patient needs more current lipid panel to determine repatha progress. Discussed with patient and there is an order on  file  Patient will continue regular follow-up with cardiology. Next on 5/27/2025  Patient will follow up with specialty pharmacy mail-out services for next injection. Care Coordinator to set up future refill outreaches, coordinate prescription delivery, and escalate clinical questions to pharmacist.  Pharmacist to perform regular assessments no more than (6) months from the previous assessment. Will follow-up in 6 months, or sooner if needed.    Initial Education Provided for Specialty Medication  The patient has been provided with the following education and any applicable administration techniques (i.e. self-injection) have been demonstrated for the therapies indicated. All questions and concerns have been addressed prior to the patient receiving the medication, and the patient has verbalized comprehension of the education and any materials provided. Additional patient education shall be provided and documented upon request by the patient, provider, or payer.  REPATHA® (evolocumab)  Medication Expectations   Why am I taking this medication? You are taking Repatha to lower your “bad” cholesterol (LDL-C). This medication can be used in adults with high blood cholesterol including primary hyperlipidemia and familial hypercholesterolemia.    What should I expect while on this medication? You should expect to see your cholesterol improve over time. Specifically, you should see your LDL-C decrease.    How does the medication work? Repatha works by blocking a protein called PCSK9 that contributes to high levels of bad cholesterol. It helps increase your liver's ability to remove bad cholesterol from your blood.     How long will I be on this medication for? The amount of time you will be on this medication will be determined by your doctor based on your cholesterol and/or your risk of having a cardiac event. You will most likely be on this medication or another cholesterol medication throughout your lifetime. Do not  abruptly stop this medication without talking to your doctor first.    How do I take this medication? Take as directed on your prescription label. Repatha is injected under the skin (subcutaneously) of your stomach, thigh, or upper arm. This medication is usually given one or twice a month.   What are some possible side effects? The most common side effects of Repatha include redness, itching, swelling, or pain/tenderness at the injection site, symptoms of the common cold, flu or flu-like symptoms or back pain.    What happens if I miss a dose? If you miss a dose, take it as soon as you remember if it is within 7 days from the usual day of administration then resume your original schedule. If it is beyond 7 days and you use the mesfin-2-week dose, skip the missed dose and resume your normal dosing schedule.If it is beyond 7 days and you use the once-monthly dose, inject the dose and start a new schedule based on that date.      Medication Safety   What are things I should warn my doctor immediately about? Talk to your doctor if you are pregnant, planning to become pregnant, or breastfeeding. Stop the medication and tell your doctor or seek emergency medical help if you notice any signs/symptoms of an allergic reaction (severe rash, redness, hives, severe itching, trouble breathing, or swelling of the face, lips, or tongue). If you have a rubber or latex allergy, you should not use the Repatha SureClick® Autoinjector pen or the prefilled syringe, please notify your doctor or pharmacist.   What are things that I should be cautious of? Be cautious of any side effects from this medication. Talk to your doctor if any new ones develop or aren't getting better.   What are some medications that can interact with this one? There are no known significant drug interactions with Repatha. Always tell your doctor or pharmacist immediately if you start taking any new medications, including over-the-counter medications, vitamins, and  herbal supplements.      Medication Storage/Handling   How should I handle this medication? Do not shake or expose the pens, cartridges, or syringes to extreme heat or direct sunlight. Keep this medication out of reach of pets/children. Allow medication to warm at room temperature prior to administration.   How does this medication need to be stored? Store unused pens, cartridges, or syringes in the refrigerator in the original cartons to protect from light. If needed, Repatha may be kept at room temperature in the original carton for up to 30 days. Do not freeze.    How should I dispose of this medication? All the Repatha devices are single-dose and should be discarded in a sharps container after use. If your doctor decides to stop this medication, take to your local police station for proper disposal. Some pharmacies also have take-back bins for medication drop-off.      Resources/Support   How can I remind myself to take this medication? You can download reminder apps to help you manage your refills. You may also set an alarm on your phone to remind you to take your dose.    Is financial support available?  [employer] can provide co-pay cards if you have commercial insurance or patient assistance if you have Medicare or no insurance.    Which vaccines are recommended for me? Talk to your doctor about these vaccines: Flu, Coronavirus (COVID-19), Pneumococcal (pneumonia), Tdap, Hepatitis B, Zoster (shingles)         Attestation  Therapeutic appropriateness: Appropriate   I attest the patient was actively involved in and has agreed to the above plan of care. If the prescribed therapy is at any point deemed not appropriate based on the current or future assessments, a consultation will be initiated with the patient's specialty care provider to determine the best course of action. The revised plan of therapy will be documented along with any required assessments and/or additional patient education provided.     Blanca  Pietro Pa  12/20/2024  10:02 EST

## 2024-12-30 ENCOUNTER — SPECIALTY PHARMACY (OUTPATIENT)
Dept: PHARMACY | Facility: HOSPITAL | Age: 69
End: 2024-12-30
Payer: MEDICARE

## 2024-12-30 NOTE — PROGRESS NOTES
Specialty Pharmacy Refill Coordination Note      Name:  Breanna Kaba  :  1955  Date:  2024         Past Medical History:   Diagnosis Date    Acute congestive heart failure, unspecified heart failure type 2022    Arthritis     Chronic pain     Coronary artery disease involving native coronary artery of native heart without angina pectoris 2024    Diabetes mellitus     Elevated cholesterol     GERD (gastroesophageal reflux disease)     Gout     Headache     Hypertension     Myocardial infarction     Neuropathy        Past Surgical History:   Procedure Laterality Date    BREAST BIOPSY Left 1988    benign    CARDIAC CATHETERIZATION N/A 2022    Procedure: Left Heart Cath;  Surgeon: Tristan Marin MD;  Location: [facility] CATH INVASIVE LOCATION;  Service: Cardiology;  Laterality: N/A;    CARDIAC CATHETERIZATION N/A 2022    Procedure: Percutaneous Coronary Intervention;  Surgeon: Tristan Marin MD;  Location: [facility] CATH INVASIVE LOCATION;  Service: Cardiology;  Laterality: N/A;    CARDIAC CATHETERIZATION N/A 2024    Procedure: Left Heart Cath;  Surgeon: Shu Regalado MD;  Location: [facility] CATH INVASIVE LOCATION;  Service: Cardiology;  Laterality: N/A;    CARDIAC CATHETERIZATION N/A 2024    Procedure: Percutaneous Coronary Intervention;  Surgeon: Shu Regalado MD;  Location: [facility] CATH INVASIVE LOCATION;  Service: Cardiology;  Laterality: N/A;    COLONOSCOPY N/A 2020    Procedure: COLONOSCOPY FOR SCREENING CPT CODE: ;  Surgeon: Mariano Prescott MD;  Location: [facility];  Service: Gastroenterology;  Laterality: N/A;    CORONARY STENT PLACEMENT      HYSTERECTOMY      Partial    KNEE SURGERY      THYROIDECTOMY, PARTIAL      Removal of goiter       Social History     Socioeconomic History    Marital status:    Tobacco Use    Smoking status: Never     Passive exposure: Past    Smokeless tobacco: Never   Vaping Use    Vaping status: Never Used    Substance and Sexual Activity    Alcohol use: No    Drug use: No    Sexual activity: Defer       Family History   Problem Relation Age of Onset    COPD Mother     Heart disease Brother     Heart attack Brother     Hypertension Daughter     Diabetes Daughter     Hypertension Daughter     Diabetes Daughter     Breast cancer Neg Hx        Allergies   Allergen Reactions    Asa [Aspirin] GI Intolerance    Naproxen Nausea And Vomiting    Penicillins Hives and Rash       Current Outpatient Medications   Medication Sig Dispense Refill    allopurinol (ZYLOPRIM) 300 MG tablet Take 1 tablet by mouth Daily. 90 tablet 1    aspirin 81 MG EC tablet Take 1 tablet by mouth Daily. 90 tablet 1    atorvastatin (LIPITOR) 80 MG tablet Take 1 tablet by mouth Daily. 90 tablet 1    clopidogrel (PLAVIX) 75 MG tablet Take one tablet by mouth daily 90 tablet 3    estradiol (ESTRACE) 0.1 MG/GM vaginal cream Insert 1 g into the vagina 2 (Two) Times a Week. (Patient not taking: Reported on 11/25/2024)      Evolocumab (REPATHA) solution auto-injector SureClick injection Inject 1 mL under the skin into the appropriate area as directed Every 14 (Fourteen) Days. 6 mL 2    fluticasone (FLONASE) 50 MCG/ACT nasal spray 2 sprays into the nostril(s) as directed by provider Daily. 9.9 mL 0    glucose blood test strip For QID testing  E11.65 400 each 1    glucose blood test strip 1 each by Other route 4 (Four) Times a Day. USE TO TEST SUGAR BEFORE MEALS AND AT BEDTIME 400 each 1    glucose monitor monitoring kit For QID testing  E11.65 1 each 0    glucose monitor monitoring kit ForQID testing E11.65 (On sliding scale insulin) 1 each 0    insulin aspart (novoLOG FLEXPEN) 100 UNIT/ML solution pen-injector sc pen Inject 10-20 Units under the skin into the appropriate area as directed 3 (Three) Times a Day As Needed (blood glucose). 54 mL 1    Insulin Glargine (BASAGLAR KWIKPEN) 100 UNIT/ML injection pen Inject 32 Units under the skin into the appropriate  area as directed Every Night. 30 mL 1    Insulin Pen Needle (Pen Needles) 32G X 4 MM misc Use 1 syringe 4 (Four) Times a Day. E11.65 400 each 1    Insulin Pen Needle 31G X 8 MM misc Use 1 stick 4 (Four) Times a Day. USE THREE TIMES A DAY WITH NOVOLOG BEFORE MEALS 400 each 1    Lancets misc For QID testing  E11.65 200 each 11    losartan (Cozaar) 50 MG tablet Take 1 tablet by mouth Daily. 90 tablet 1    metoprolol succinate XL (TOPROL-XL) 25 MG 24 hr tablet Take 1/2 (one-half) tablet by mouth once daily 45 tablet 3    nitroglycerin (NITROSTAT) 0.4 MG SL tablet 1 under the tongue as needed for angina, may repeat q5mins for up three doses 100 tablet 11    spironolactone (ALDACTONE) 25 MG tablet Take 1 tablet by mouth Daily. 90 tablet 1    trospium (SANCTURA) 20 MG tablet Take 1 tablet by mouth Every 12 (Twelve) Hours. 180 tablet 1     No current facility-administered medications for this visit.         ASSESSMENT/PLAN:      Breanna is a 69 y.o. female contacted today regarding refills of  Repatha specialty medication(s).    Reviewed and verified with patient:       Specialty medication(s) and dose(s) confirmed: yes    Refill Questions      Flowsheet Row Most Recent Value   Changes to allergies? No   Changes to medications? No   New conditions or infections since last clinic visit No   Unplanned office visit, urgent care, ED, or hospital admission in the last 4 weeks  No   How does patient/caregiver feel medication is working? Very good   Financial problems or insurance changes  No   Since the previous refill, were any specialty medication doses or scheduled injections missed or delayed?  No   Does this patient require a clinical escalation to a pharmacist? No            Delivery Questions      Flowsheet Row Most Recent Value   Copay verified? Yes   Copay amount $0   Copay form of payment No copayment ($0)                   Follow-up: 84 day(s)     Mariama Hernández, PharmD  Specialty Pharmacy Technician

## 2024-12-31 ENCOUNTER — OFFICE VISIT (OUTPATIENT)
Dept: FAMILY MEDICINE CLINIC | Facility: CLINIC | Age: 69
End: 2024-12-31
Payer: MEDICARE

## 2024-12-31 ENCOUNTER — LAB (OUTPATIENT)
Dept: FAMILY MEDICINE CLINIC | Facility: CLINIC | Age: 69
End: 2024-12-31
Payer: MEDICARE

## 2024-12-31 VITALS
TEMPERATURE: 97.3 F | OXYGEN SATURATION: 97 % | DIASTOLIC BLOOD PRESSURE: 56 MMHG | HEART RATE: 68 BPM | HEIGHT: 62 IN | BODY MASS INDEX: 26.72 KG/M2 | RESPIRATION RATE: 16 BRPM | WEIGHT: 145.2 LBS | SYSTOLIC BLOOD PRESSURE: 122 MMHG

## 2024-12-31 DIAGNOSIS — E78.5 HYPERLIPIDEMIA DUE TO TYPE 2 DIABETES MELLITUS: ICD-10-CM

## 2024-12-31 DIAGNOSIS — M1A.09X0 IDIOPATHIC CHRONIC GOUT OF MULTIPLE SITES WITHOUT TOPHUS: ICD-10-CM

## 2024-12-31 DIAGNOSIS — E11.69 HYPERLIPIDEMIA DUE TO TYPE 2 DIABETES MELLITUS: ICD-10-CM

## 2024-12-31 DIAGNOSIS — E11.40 TYPE 2 DIABETES MELLITUS WITH DIABETIC NEUROPATHY, WITH LONG-TERM CURRENT USE OF INSULIN: Primary | ICD-10-CM

## 2024-12-31 DIAGNOSIS — Z79.4 TYPE 2 DIABETES MELLITUS WITH DIABETIC NEUROPATHY, WITH LONG-TERM CURRENT USE OF INSULIN: ICD-10-CM

## 2024-12-31 DIAGNOSIS — Z79.4 TYPE 2 DIABETES MELLITUS WITH DIABETIC NEUROPATHY, WITH LONG-TERM CURRENT USE OF INSULIN: Primary | ICD-10-CM

## 2024-12-31 DIAGNOSIS — E11.40 TYPE 2 DIABETES MELLITUS WITH DIABETIC NEUROPATHY, WITH LONG-TERM CURRENT USE OF INSULIN: ICD-10-CM

## 2024-12-31 PROCEDURE — 3052F HG A1C>EQUAL 8.0%<EQUAL 9.0%: CPT | Performed by: FAMILY MEDICINE

## 2024-12-31 PROCEDURE — 1125F AMNT PAIN NOTED PAIN PRSNT: CPT | Performed by: FAMILY MEDICINE

## 2024-12-31 PROCEDURE — G2211 COMPLEX E/M VISIT ADD ON: HCPCS | Performed by: FAMILY MEDICINE

## 2024-12-31 PROCEDURE — 99214 OFFICE O/P EST MOD 30 MIN: CPT | Performed by: FAMILY MEDICINE

## 2024-12-31 PROCEDURE — 3074F SYST BP LT 130 MM HG: CPT | Performed by: FAMILY MEDICINE

## 2024-12-31 PROCEDURE — 3078F DIAST BP <80 MM HG: CPT | Performed by: FAMILY MEDICINE

## 2024-12-31 PROCEDURE — 80053 COMPREHEN METABOLIC PANEL: CPT | Performed by: FAMILY MEDICINE

## 2024-12-31 RX ORDER — LANCETS 30 GAUGE
EACH MISCELLANEOUS
Qty: 400 EACH | Refills: 1 | Status: SHIPPED | OUTPATIENT
Start: 2024-12-31

## 2024-12-31 RX ORDER — INSULIN GLARGINE 100 [IU]/ML
16 INJECTION, SOLUTION SUBCUTANEOUS 2 TIMES DAILY
Qty: 30 ML | Refills: 1 | Status: SHIPPED | OUTPATIENT
Start: 2024-12-31 | End: 2025-01-16 | Stop reason: SDUPTHER

## 2024-12-31 RX ORDER — PEN NEEDLE, DIABETIC 30 GX3/16"
1 NEEDLE, DISPOSABLE MISCELLANEOUS 4 TIMES DAILY
Qty: 400 EACH | Refills: 1 | Status: SHIPPED | OUTPATIENT
Start: 2024-12-31

## 2024-12-31 RX ORDER — ALLOPURINOL 300 MG/1
300 TABLET ORAL DAILY
Qty: 90 TABLET | Refills: 1 | Status: SHIPPED | OUTPATIENT
Start: 2024-12-31

## 2025-01-01 LAB
ALBUMIN SERPL-MCNC: 4.5 G/DL (ref 3.5–5.2)
ALBUMIN/GLOB SERPL: 1.5 G/DL
ALP SERPL-CCNC: 145 U/L (ref 39–117)
ALT SERPL W P-5'-P-CCNC: 20 U/L (ref 1–33)
ANION GAP SERPL CALCULATED.3IONS-SCNC: 10.4 MMOL/L (ref 5–15)
AST SERPL-CCNC: 20 U/L (ref 1–32)
BILIRUB SERPL-MCNC: 0.4 MG/DL (ref 0–1.2)
BUN SERPL-MCNC: 27 MG/DL (ref 8–23)
BUN/CREAT SERPL: 24.1 (ref 7–25)
CALCIUM SPEC-SCNC: 9.7 MG/DL (ref 8.6–10.5)
CHLORIDE SERPL-SCNC: 100 MMOL/L (ref 98–107)
CO2 SERPL-SCNC: 26.6 MMOL/L (ref 22–29)
CREAT SERPL-MCNC: 1.12 MG/DL (ref 0.57–1)
EGFRCR SERPLBLD CKD-EPI 2021: 52.7 ML/MIN/1.73
GLOBULIN UR ELPH-MCNC: 3 GM/DL
GLUCOSE SERPL-MCNC: 373 MG/DL (ref 65–99)
POTASSIUM SERPL-SCNC: 4.9 MMOL/L (ref 3.5–5.2)
PROT SERPL-MCNC: 7.5 G/DL (ref 6–8.5)
SODIUM SERPL-SCNC: 137 MMOL/L (ref 136–145)

## 2025-01-16 ENCOUNTER — LAB (OUTPATIENT)
Dept: FAMILY MEDICINE CLINIC | Facility: CLINIC | Age: 70
End: 2025-01-16
Payer: MEDICARE

## 2025-01-16 ENCOUNTER — OFFICE VISIT (OUTPATIENT)
Dept: FAMILY MEDICINE CLINIC | Facility: CLINIC | Age: 70
End: 2025-01-16
Payer: MEDICARE

## 2025-01-16 VITALS
SYSTOLIC BLOOD PRESSURE: 130 MMHG | HEART RATE: 88 BPM | TEMPERATURE: 97.1 F | RESPIRATION RATE: 16 BRPM | OXYGEN SATURATION: 98 % | HEIGHT: 62 IN | WEIGHT: 143.4 LBS | BODY MASS INDEX: 26.39 KG/M2 | DIASTOLIC BLOOD PRESSURE: 58 MMHG

## 2025-01-16 DIAGNOSIS — I25.10 CORONARY ARTERY DISEASE DUE TO LIPID RICH PLAQUE: ICD-10-CM

## 2025-01-16 DIAGNOSIS — E11.69 HYPERLIPIDEMIA DUE TO TYPE 2 DIABETES MELLITUS: ICD-10-CM

## 2025-01-16 DIAGNOSIS — Z79.4 TYPE 2 DIABETES MELLITUS WITH DIABETIC NEUROPATHY, WITH LONG-TERM CURRENT USE OF INSULIN: ICD-10-CM

## 2025-01-16 DIAGNOSIS — I25.83 CORONARY ARTERY DISEASE DUE TO LIPID RICH PLAQUE: ICD-10-CM

## 2025-01-16 DIAGNOSIS — I50.22 CHRONIC HFREF (HEART FAILURE WITH REDUCED EJECTION FRACTION): ICD-10-CM

## 2025-01-16 DIAGNOSIS — E11.40 TYPE 2 DIABETES MELLITUS WITH DIABETIC NEUROPATHY, WITH LONG-TERM CURRENT USE OF INSULIN: ICD-10-CM

## 2025-01-16 DIAGNOSIS — E78.5 HYPERLIPIDEMIA DUE TO TYPE 2 DIABETES MELLITUS: ICD-10-CM

## 2025-01-16 DIAGNOSIS — N30.01 ACUTE CYSTITIS WITH HEMATURIA: ICD-10-CM

## 2025-01-16 DIAGNOSIS — R32 URINARY INCONTINENCE, UNSPECIFIED TYPE: Primary | ICD-10-CM

## 2025-01-16 DIAGNOSIS — E61.1 LOW IRON: ICD-10-CM

## 2025-01-16 DIAGNOSIS — Z12.11 ENCOUNTER FOR SCREENING COLONOSCOPY: ICD-10-CM

## 2025-01-16 DIAGNOSIS — R32 URINARY INCONTINENCE, UNSPECIFIED TYPE: ICD-10-CM

## 2025-01-16 DIAGNOSIS — Z12.11 ENCOUNTER FOR SCREENING FOR MALIGNANT NEOPLASM OF COLON: Primary | ICD-10-CM

## 2025-01-16 LAB
ALBUMIN SERPL-MCNC: 4.5 G/DL (ref 3.5–5.2)
ALBUMIN/GLOB SERPL: 1.5 G/DL
ALP SERPL-CCNC: 126 U/L (ref 39–117)
ALT SERPL W P-5'-P-CCNC: 20 U/L (ref 1–33)
ANION GAP SERPL CALCULATED.3IONS-SCNC: 12 MMOL/L (ref 5–15)
AST SERPL-CCNC: 16 U/L (ref 1–32)
BILIRUB BLD-MCNC: NEGATIVE MG/DL
BILIRUB SERPL-MCNC: 0.5 MG/DL (ref 0–1.2)
BUN SERPL-MCNC: 33 MG/DL (ref 8–23)
BUN/CREAT SERPL: 31.1 (ref 7–25)
CALCIUM SPEC-SCNC: 10 MG/DL (ref 8.6–10.5)
CHLORIDE SERPL-SCNC: 98 MMOL/L (ref 98–107)
CLARITY, POC: ABNORMAL
CO2 SERPL-SCNC: 27 MMOL/L (ref 22–29)
COLOR UR: YELLOW
CREAT SERPL-MCNC: 1.06 MG/DL (ref 0.57–1)
EGFRCR SERPLBLD CKD-EPI 2021: 56.3 ML/MIN/1.73
FERRITIN SERPL-MCNC: 97.4 NG/ML (ref 13–150)
GLOBULIN UR ELPH-MCNC: 3 GM/DL
GLUCOSE SERPL-MCNC: 253 MG/DL (ref 65–99)
GLUCOSE UR STRIP-MCNC: ABNORMAL MG/DL
HBA1C MFR BLD: 8.6 % (ref 4.8–5.6)
IRON 24H UR-MRATE: 83 MCG/DL (ref 37–145)
IRON SATN MFR SERPL: 22 % (ref 20–50)
KETONES UR QL: NEGATIVE
LEUKOCYTE EST, POC: ABNORMAL
NITRITE UR-MCNC: NEGATIVE MG/ML
PH UR: 5.5 [PH] (ref 5–8)
POTASSIUM SERPL-SCNC: 4.5 MMOL/L (ref 3.5–5.2)
PROT SERPL-MCNC: 7.5 G/DL (ref 6–8.5)
PROT UR STRIP-MCNC: ABNORMAL MG/DL
RBC # UR STRIP: ABNORMAL /UL
SODIUM SERPL-SCNC: 137 MMOL/L (ref 136–145)
SP GR UR: 1.02 (ref 1–1.03)
TIBC SERPL-MCNC: 371 MCG/DL (ref 298–536)
TRANSFERRIN SERPL-MCNC: 249 MG/DL (ref 200–360)
UROBILINOGEN UR QL: NORMAL

## 2025-01-16 PROCEDURE — 83540 ASSAY OF IRON: CPT | Performed by: FAMILY MEDICINE

## 2025-01-16 PROCEDURE — 82728 ASSAY OF FERRITIN: CPT | Performed by: FAMILY MEDICINE

## 2025-01-16 PROCEDURE — 87086 URINE CULTURE/COLONY COUNT: CPT | Performed by: FAMILY MEDICINE

## 2025-01-16 PROCEDURE — 84466 ASSAY OF TRANSFERRIN: CPT | Performed by: FAMILY MEDICINE

## 2025-01-16 PROCEDURE — 82043 UR ALBUMIN QUANTITATIVE: CPT | Performed by: FAMILY MEDICINE

## 2025-01-16 PROCEDURE — 82570 ASSAY OF URINE CREATININE: CPT | Performed by: FAMILY MEDICINE

## 2025-01-16 PROCEDURE — 83036 HEMOGLOBIN GLYCOSYLATED A1C: CPT | Performed by: FAMILY MEDICINE

## 2025-01-16 PROCEDURE — 80053 COMPREHEN METABOLIC PANEL: CPT | Performed by: FAMILY MEDICINE

## 2025-01-16 PROCEDURE — 87077 CULTURE AEROBIC IDENTIFY: CPT | Performed by: FAMILY MEDICINE

## 2025-01-16 PROCEDURE — 36415 COLL VENOUS BLD VENIPUNCTURE: CPT

## 2025-01-16 RX ORDER — CIPROFLOXACIN 500 MG/1
500 TABLET, FILM COATED ORAL 2 TIMES DAILY
Qty: 14 TABLET | Refills: 0 | Status: SHIPPED | OUTPATIENT
Start: 2025-01-16

## 2025-01-16 RX ORDER — INSULIN GLARGINE 100 [IU]/ML
16 INJECTION, SOLUTION SUBCUTANEOUS 2 TIMES DAILY
Qty: 33 ML | Refills: 1 | Status: SHIPPED | OUTPATIENT
Start: 2025-01-16

## 2025-01-17 LAB
ALBUMIN UR-MCNC: 27.3 MG/DL
CREAT UR-MCNC: 103.6 MG/DL
MICROALBUMIN/CREAT UR: 263.5 MG/G (ref 0–29)

## 2025-01-18 LAB — BACTERIA SPEC AEROBE CULT: ABNORMAL

## 2025-01-20 NOTE — PROGRESS NOTES
"Breanna Kaba     VITALS: Blood pressure 122/56, pulse 68, temperature 97.3 °F (36.3 °C), temperature source Temporal, resp. rate 16, height 157.5 cm (62\"), weight 65.9 kg (145 lb 3.2 oz), SpO2 97%, not currently breastfeeding.    Subjective  Chief Complaint  Diabetes, Hyperlipidemia, and Pelvic Floor Dysfunction    Subjective          History of Present Illness:    History of Present Illness  The patient is a 69-year-old female with medical conditions significant for diabetes, hyperlipidemia, coronary artery disease (CAD), congestive heart failure (CHF), and hypertension who presents to clinic for a medical follow-up.    She has been experiencing hypoglycemic episodes between 9:00 PM and 11:00 PM, with glucose levels dropping to the 50s and 60s. Her morning glucose levels typically range from the 110s to 250s, while her afternoon readings are elevated, often reaching the 300s and 400s. She has brought her diabetic log for review. She reports an incident where she administered NovoLog at 6:00 PM, consumed peanut butter crackers, and then experienced sweating around 9:00 PM, which she attributes to a drop in her glucose level. She hypothesizes that her fluctuating glucose levels may be due to dietary changes during the holiday season. She maintains hydration with green tea and water, occasionally indulging in soda but generally avoiding it. Her current medication regimen includes NovoLog, which she takes every morning unless her glucose level is below 100, and Basaglar, administered at bedtime after checking her glucose level.    MEDICATIONS  NovoLog, Basaglar    No complaints regarding medications.     The following portions of the patient's history were reviewed and updated as appropriate: allergies, current medications, past family history, past medical history, past social history, past surgical history and problem list.    Past Medical History  Past Medical History:   Diagnosis Date    Acute congestive heart " failure, unspecified heart failure type 03/28/2022    Arthritis     Chronic pain     Coronary artery disease involving native coronary artery of native heart without angina pectoris 4/25/2024    Diabetes mellitus     Elevated cholesterol     GERD (gastroesophageal reflux disease)     Gout     Headache     Hypertension     Myocardial infarction     Neuropathy        Surgical History  Past Surgical History:   Procedure Laterality Date    BREAST BIOPSY Left 1988    benign    CARDIAC CATHETERIZATION N/A 03/31/2022    Procedure: Left Heart Cath;  Surgeon: Tristan Marin MD;  Location:  [facility]ATH [facility];  Service: Cardiology;  Laterality: N/A;    CARDIAC CATHETERIZATION N/A 03/31/2022    Procedure: Percutaneous Coronary Intervention;  Surgeon: Tristan Marin MD;  Location:  [facility];  Service: Cardiology;  Laterality: N/A;    CARDIAC CATHETERIZATION N/A 5/8/2024    Procedure: Left Heart Cath;  Surgeon: Shu Regalado MD;  Location:  [facility]ATH [facility]VAS[facility];  Service: Cardiology;  Laterality: N/A;    CARDIAC CATHETERIZATION N/A 5/8/2024    Procedure: Percutaneous Coronary Intervention;  Surgeon: Shu Regalado MD;  Location: [facility] CATH INVASIVE LOCATION;  Service: Cardiology;  Laterality: N/A;    COLONOSCOPY N/A 02/17/2020    Procedure: COLONOSCOPY FOR SCREENING CPT CODE: ;  Surgeon: Mariano Prescott MD;  Location: [facility];  Service: Gastroenterology;  Laterality: N/A;    CORONARY STENT PLACEMENT      HYSTERECTOMY      Partial    KNEE SURGERY      THYROIDECTOMY, PARTIAL      Removal of goiter       Family History  Family History   Problem Relation Age of Onset    COPD Mother     Heart disease Brother     Heart attack Brother     Hypertension Daughter     Diabetes Daughter     Hypertension Daughter     Diabetes Daughter     Breast cancer Neg Hx        Social History  Social History     Socioeconomic History    Marital status:    Tobacco Use    Smoking  "status: Never     Passive exposure: Past    Smokeless tobacco: Never   Vaping Use    Vaping status: Never Used   Substance and Sexual Activity    Alcohol use: No    Drug use: No    Sexual activity: Defer       Objective   Vital Signs:   /56 (BP Location: Right arm, Patient Position: Sitting, Cuff Size: Adult)   Pulse 68   Temp 97.3 °F (36.3 °C) (Temporal)   Resp 16   Ht 157.5 cm (62\")   Wt 65.9 kg (145 lb 3.2 oz)   SpO2 97%   BMI 26.56 kg/m²       Physical Exam     Physical Exam      Gen: Patient in NAD. Pleasant and answers appropriately. A&Ox3.    Skin: Warm and dry with normal turgor. No purpura, rashes, or unusual pigmentation noted. Hair is normal in appearance and distribution.    HEENT: NC/AT. No lesions noted. Conjunctiva clear, sclera nonicteric. PERRL. EOMI without nystagmus or strabismus. Fundi appear benign. No hemorrhages or exudates of eyes. Auditory canals are patent bilaterally without lesions. TMs intact,  nonerythematous, nonbulging without lesions. Nasal mucosa pink, nonerythematous, and nonedematous. Frontal and maxillary sinuses are nontender. O/P nonerythematous and moist without exudate.    Neck: Supple without lymph nodes palpated. FROM. No carotid bruits appreciated bilaterally.    Lungs: CTA B/L without rales, rhonchi, crackles, or wheezes.    Heart: RRR. S1 and S2 normal. No S3 or S4. No MRGT.    Abd: Soft, nontender,nondistended. (+)BSx4 quadrants.     Extrem: No CCE. Radial pulses 2+/4 and equal B/L. FROMx4. No bone, joint, or muscle tenderness noted.    Neuro: No focal motor/sensory deficits.    Procedures    Result Review :   The following data was reviewed by: Myrna Ramos MD on 12/31/2024:       Results  Laboratory Studies  Morning glucose levels range from 110s to 250s. Afternoon glucose levels are in the 300s, 400s. Glucose numbers bottoming out at 9:00 to 11:00 PM in the 50s and 60s.           Assessment and Plan      Breanna Kaba is a 69 y.o. here for medical " followup.    Assessment & Plan  1. Diabetes Mellitus.  Her glucose levels have been fluctuating significantly, with episodes of hypoglycemia occurring between 9:00 PM and 11:00 PM, likely due to inadequate food intake after insulin administration. She is not yet a brittle diabetic. She is advised to ensure adequate food intake when administering NovoLog and to avoid taking it if only consuming a small snack. She should continue her current regimen of Basaglar, taking it in the morning and at night. A blood test will be conducted today to monitor her kidney function.                 Patient or patient representative verbalized consent for the use of Ambient Listening during the visit with  Myrna Ramos MD for chart documentation. 1/20/2025  00:01 EST        Follow Up   Return (already has appt) LABS.  Findings and plans discussed with patient who verbalizes understanding and agreement. Will followup with patient once results are in. Patient was given instructions and counseling regarding her condition or for health maintenance advice. Please see specific information pulled into the AVS if appropriate.       Myrna Ramos MD

## 2025-01-28 NOTE — PROGRESS NOTES
I was contacted by Randa payne MA with Dr. Prescott who is planning on proceeding with colonoscopy on April 21.  Reviewing her case she did have PCI performed on May 9, 2024.  She will need to come in for cardiac risk assessment.    Da Fox PA-C

## 2025-02-03 ENCOUNTER — TELEPHONE (OUTPATIENT)
Dept: CARDIOLOGY | Facility: CLINIC | Age: 70
End: 2025-02-03
Payer: MEDICARE

## 2025-02-03 NOTE — TELEPHONE ENCOUNTER
REQUEST FOR CARDIAC CLEARANCE    Caller name: Breanna Kaba     Phone Number: [phone]    Surgeon's name: [facility]     Type of planned surgery: BLADDER     Date of planned surgery: UNKNOWN    Type of anesthesia: UNKNOWN    Have you been experiencing chest pain or shortness of breath? NO    Is your doctor requesting for you to stop any of your medications prior to your surgery? UNKNOWN    Where should we fax the clearance to? MAY NOT BE [phone]

## 2025-02-03 NOTE — PROGRESS NOTES
"Breanna Kaba     VITALS: Blood pressure 130/58, pulse 88, temperature 97.1 °F (36.2 °C), temperature source Temporal, resp. rate 16, height 157.5 cm (62\"), weight 65 kg (143 lb 6.4 oz), SpO2 98%, not currently breastfeeding.    Subjective  Chief Complaint  Urinary Incontinence and Urine Odor    Subjective          History of Present Illness:    History of Present Illness  The patient presents for evaluation of urinary tract infection, diabetes mellitus, and health maintenance.    She reports experiencing urinary incontinence and malodorous urine, which she attributes to her recent transition from showers to baths due to a broken shower curtain alvino. She has been unable to apply estradiol vaginal cream internally due to bladder obstruction but has been applying it externally using rubber gloves. Her last UTI episode was in October 2024, for which she received treatment from a nurse practitioner.    She is currently on a regimen of Basaglar, administered at a dosage of 16 units at bedtime and 16 units the following morning. She also takes an additional 16 units as needed, based on a sliding scale.    She is due for a colonoscopy on 02/17/2025.    Supplemental Information  She took her cholesterol medication injection yesterday. She can not take it in her stomach because she can not make it flat, so she has to take it in her arm.    MEDICATIONS  Current: Basaglar, estradiol vaginal cream    No complaints regarding medications.     The following portions of the patient's history were reviewed and updated as appropriate: allergies, current medications, past family history, past medical history, past social history, past surgical history and problem list.    Past Medical History  Past Medical History:   Diagnosis Date    Acute congestive heart failure, unspecified heart failure type 03/28/2022    Arthritis     Chronic pain     Coronary artery disease involving native coronary artery of native heart without angina " pectoris 4/25/2024    Diabetes mellitus     Elevated cholesterol     GERD (gastroesophageal reflux disease)     Gout     Headache     Hypertension     Myocardial infarction     Neuropathy        Surgical History  Past Surgical History:   Procedure Laterality Date    BREAST BIOPSY Left 1988    benign    CARDIAC CATHETERIZATION N/A 03/31/2022    Procedure: Left Heart Cath;  Surgeon: Tristan Marin MD;  Location:  [facility];  Service: Cardiology;  Laterality: N/A;    CARDIAC CATHETERIZATION N/A 03/31/2022    Procedure: Percutaneous Coronary Intervention;  Surgeon: Tristan Marin MD;  Location:  [facility] CATH [facility]VASIVE LOCATION;  Service: Cardiology;  Laterality: N/A;    CARDIAC CATHETERIZATION N/A 5/8/2024    Procedure: Left Heart Cath;  Surgeon: Shu Regalado MD;  Location:  [facility] CATH INVASIVE [facility];  Service: Cardiology;  Laterality: N/A;    CARDIAC CATHETERIZATION N/A 5/8/2024    Procedure: Percutaneous Coronary Intervention;  Surgeon: Shu Regalado MD;  Location: [facility] CATH INVASIVE LOCATION;  Service: Cardiology;  Laterality: N/A;    COLONOSCOPY N/A 02/17/2020    Procedure: COLONOSCOPY FOR SCREENING CPT CODE: ;  Surgeon: Mariano Prescott MD;  Location: [facility] OR;  Service: Gastroenterology;  Laterality: N/A;    CORONARY STENT PLACEMENT      HYSTERECTOMY      Partial    KNEE SURGERY      THYROIDECTOMY, PARTIAL      Removal of goiter       Family History  Family History   Problem Relation Age of Onset    COPD Mother     Heart disease Brother     Heart attack Brother     Hypertension Daughter     Diabetes Daughter     Hypertension Daughter     Diabetes Daughter     Breast cancer Neg Hx        Social History  Social History     Socioeconomic History    Marital status:    Tobacco Use    Smoking status: Never     Passive exposure: Past    Smokeless tobacco: Never   Vaping Use    Vaping status: Never Used   Substance and Sexual Activity    Alcohol use: No    Drug use: No  "   Sexual activity: Defer       Objective   Vital Signs:   /58 (BP Location: Right arm, Patient Position: Sitting, Cuff Size: Adult)   Pulse 88   Temp 97.1 °F (36.2 °C) (Temporal)   Resp 16   Ht 157.5 cm (62\")   Wt 65 kg (143 lb 6.4 oz)   SpO2 98%   BMI 26.23 kg/m²       Physical Exam     Physical Exam      Gen: Patient in NAD. Pleasant and answers appropriately. A&Ox3.    Skin: Warm and dry with normal turgor. No purpura, rashes, or unusual pigmentation noted. Hair is normal in appearance and distribution.    HEENT: NC/AT. No lesions noted. Conjunctiva clear, sclera nonicteric. PERRL. EOMI without nystagmus or strabismus. Fundi appear benign. No hemorrhages or exudates of eyes. Auditory canals are patent bilaterally without lesions. TMs intact,  nonerythematous, nonbulging without lesions. Nasal mucosa pink, nonerythematous, and nonedematous. Frontal and maxillary sinuses are nontender. O/P nonerythematous and moist without exudate.    Neck: Supple without lymph nodes palpated. FROM. No carotid bruits appreciated bilaterally.    Lungs: CTA B/L without rales, rhonchi, crackles, or wheezes.    Heart: RRR. S1 and S2 normal. No S3 or S4. No MRGT.    Abd: Soft, nontender,nondistended. (+)BSx4 quadrants.     Extrem: No CCE. Radial pulses 2+/4 and equal B/L. FROMx4. No bone, joint, or muscle tenderness noted.    Neuro: No focal motor/sensory deficits.    Procedures    Result Review :   The following data was reviewed by: Myrna Ramos MD on 01/16/2025:       Results  Laboratory Studies  Kidney function was 1.12. In October 2024, kidney function was 1.4.           Assessment and Plan      Breanna Kaba is a 69 y.o. here for medical followup.    Assessment & Plan  1. Urinary tract infection (UTI).  The patient's last UTI was in October 2024. She reports symptoms of urine incontinence and a foul smell. She is advised to urinate immediately after bathing and to use a spray bottle with warm water to rinse " the area, followed by urination to ensure complete emptying of the bladder. The application of estradiol cream around the vaginal area is recommended to prevent dryness and subsequent infections. A prescription for Ciprofloxacin will be provided.    2. Diabetes mellitus.  The dosage of Basaglar will be increased to 18 units, to be administered twice daily. A new prescription for Basaglar will be sent to the pharmacy.    3. Health maintenance.  A colonoscopy is scheduled for 02/17/2025. Laboratory tests will be conducted to monitor liver function, kidney function, electrolyte levels, blood glucose levels, and proteinuria.    Follow-up  The patient will follow up in 3 months.         Patient or patient representative verbalized consent for the use of Ambient Listening during the visit with  Myrna Ramos MD for chart documentation. 2/2/2025  23:52 EST        Follow Up   Return in about 3 months (around 4/16/2025), or LABS.  Findings and plans discussed with patient who verbalizes understanding and agreement. Will followup with patient once results are in. Patient was given instructions and counseling regarding her condition or for health maintenance advice. Please see specific information pulled into the AVS if appropriate.       Myrna Ramos MD

## 2025-02-21 ENCOUNTER — TELEPHONE (OUTPATIENT)
Dept: FAMILY MEDICINE CLINIC | Facility: CLINIC | Age: 70
End: 2025-02-21
Payer: MEDICARE

## 2025-02-21 NOTE — TELEPHONE ENCOUNTER
I honestly don't have any idea. We haven't gotten anything from the insurance company. We can discuss at her next appointment or if she would like to discuss now, please set her up with an appointment sometime.

## 2025-02-21 NOTE — TELEPHONE ENCOUNTER
Breanna called stated a Doctor came out to her house from her insurance company she thinks did a test on her legs & told her it looked like she had blockages but that his computer was not always accurate,she wants to know what she needs to do? Does she need further testing?

## 2025-02-27 ENCOUNTER — TELEPHONE (OUTPATIENT)
Dept: FAMILY MEDICINE CLINIC | Facility: CLINIC | Age: 70
End: 2025-02-27
Payer: MEDICARE

## 2025-02-27 NOTE — TELEPHONE ENCOUNTER
Left a message to return call,will need a foot exam which is required so if calls back Hub to relay & schedule with provider for a Diabetic Foot Exam.

## 2025-02-27 NOTE — TELEPHONE ENCOUNTER
Name: Breanna Kaba      Relationship: Self      Best Callback Number: [phone]       HUB PROVIDED THE RELAY MESSAGE FROM THE OFFICE      PATIENT: VOICED UNDERSTANDING AND HAS NO FURTHER QUESTIONS AT THIS TIME

## 2025-02-27 NOTE — TELEPHONE ENCOUNTER
Caller: Breanna Kaba    Relationship: Self    Best call back number: [phone]     Equipment requested: DIABETIC SHOES    Reason for the request: [facility] [employer] REQUIRES AN ORDER FOR PATIENT TO BE ABLE TO GET DIABETIC SHOES AND FOR INSURANCE TO COVER THEM.    Additional information or concerns: FAX NUMBER FOR [employer] [phone]

## 2025-03-03 ENCOUNTER — OFFICE VISIT (OUTPATIENT)
Dept: FAMILY MEDICINE CLINIC | Facility: CLINIC | Age: 70
End: 2025-03-03
Payer: MEDICARE

## 2025-03-03 VITALS
DIASTOLIC BLOOD PRESSURE: 72 MMHG | HEIGHT: 62 IN | BODY MASS INDEX: 26.98 KG/M2 | HEART RATE: 98 BPM | OXYGEN SATURATION: 98 % | TEMPERATURE: 96.9 F | SYSTOLIC BLOOD PRESSURE: 138 MMHG | WEIGHT: 146.6 LBS | RESPIRATION RATE: 16 BRPM

## 2025-03-03 DIAGNOSIS — R09.89 OTHER SPECIFIED SYMPTOMS AND SIGNS INVOLVING THE CIRCULATORY AND RESPIRATORY SYSTEMS: ICD-10-CM

## 2025-03-03 DIAGNOSIS — Z79.4 TYPE 2 DIABETES MELLITUS WITH DIABETIC NEUROPATHY, WITH LONG-TERM CURRENT USE OF INSULIN: ICD-10-CM

## 2025-03-03 DIAGNOSIS — E11.40 TYPE 2 DIABETES MELLITUS WITH DIABETIC NEUROPATHY, WITH LONG-TERM CURRENT USE OF INSULIN: ICD-10-CM

## 2025-03-03 DIAGNOSIS — I25.83 CORONARY ARTERY DISEASE DUE TO LIPID RICH PLAQUE: ICD-10-CM

## 2025-03-03 DIAGNOSIS — I25.10 CORONARY ARTERY DISEASE DUE TO LIPID RICH PLAQUE: ICD-10-CM

## 2025-03-03 DIAGNOSIS — I25.5 ISCHEMIC CARDIOMYOPATHY: ICD-10-CM

## 2025-03-03 DIAGNOSIS — N32.81 OAB (OVERACTIVE BLADDER): ICD-10-CM

## 2025-03-03 DIAGNOSIS — R39.198 DIFFICULTY URINATING: Primary | ICD-10-CM

## 2025-03-03 DIAGNOSIS — M1A.09X0 IDIOPATHIC CHRONIC GOUT OF MULTIPLE SITES WITHOUT TOPHUS: ICD-10-CM

## 2025-03-03 DIAGNOSIS — N76.1 SUBACUTE VAGINITIS: ICD-10-CM

## 2025-03-03 PROBLEM — R35.0 INCREASED FREQUENCY OF URINATION: Status: RESOLVED | Noted: 2023-04-21 | Resolved: 2025-03-03

## 2025-03-03 PROBLEM — Z12.11 ENCOUNTER FOR SCREENING FOR MALIGNANT NEOPLASM OF COLON: Status: RESOLVED | Noted: 2025-01-16 | Resolved: 2025-03-03

## 2025-03-03 LAB
BILIRUB BLD-MCNC: NEGATIVE MG/DL
CLARITY, POC: ABNORMAL
COLOR UR: YELLOW
GLUCOSE UR STRIP-MCNC: ABNORMAL MG/DL
KETONES UR QL: NEGATIVE
LEUKOCYTE EST, POC: ABNORMAL
NITRITE UR-MCNC: POSITIVE MG/ML
PH UR: 6 [PH] (ref 5–8)
PROT UR STRIP-MCNC: ABNORMAL MG/DL
RBC # UR STRIP: ABNORMAL /UL
SP GR UR: 1.02 (ref 1–1.03)
UROBILINOGEN UR QL: NORMAL

## 2025-03-03 PROCEDURE — 87077 CULTURE AEROBIC IDENTIFY: CPT | Performed by: FAMILY MEDICINE

## 2025-03-03 PROCEDURE — 87086 URINE CULTURE/COLONY COUNT: CPT | Performed by: FAMILY MEDICINE

## 2025-03-03 PROCEDURE — 87186 SC STD MICRODIL/AGAR DIL: CPT | Performed by: FAMILY MEDICINE

## 2025-03-03 RX ORDER — TROSPIUM CHLORIDE 20 MG/1
20 TABLET, FILM COATED ORAL EVERY 12 HOURS SCHEDULED
Qty: 180 TABLET | Refills: 1 | Status: SHIPPED | OUTPATIENT
Start: 2025-03-03

## 2025-03-03 RX ORDER — ASPIRIN 81 MG/1
81 TABLET ORAL DAILY
Qty: 90 TABLET | Refills: 1 | Status: SHIPPED | OUTPATIENT
Start: 2025-03-03

## 2025-03-03 RX ORDER — ATORVASTATIN CALCIUM 80 MG/1
80 TABLET, FILM COATED ORAL DAILY
Qty: 90 TABLET | Refills: 1 | Status: SHIPPED | OUTPATIENT
Start: 2025-03-03

## 2025-03-03 RX ORDER — SPIRONOLACTONE 25 MG/1
25 TABLET ORAL DAILY
Qty: 90 TABLET | Refills: 1 | Status: SHIPPED | OUTPATIENT
Start: 2025-03-03

## 2025-03-03 RX ORDER — LOSARTAN POTASSIUM 50 MG/1
50 TABLET ORAL DAILY
Qty: 90 TABLET | Refills: 1 | Status: SHIPPED | OUTPATIENT
Start: 2025-03-03

## 2025-03-03 RX ORDER — ALLOPURINOL 300 MG/1
300 TABLET ORAL DAILY
Qty: 90 TABLET | Refills: 1 | Status: SHIPPED | OUTPATIENT
Start: 2025-03-03

## 2025-03-04 ENCOUNTER — SPECIALTY PHARMACY (OUTPATIENT)
Dept: PHARMACY | Facility: HOSPITAL | Age: 70
End: 2025-03-04
Payer: MEDICARE

## 2025-03-04 NOTE — PROGRESS NOTES
Specialty Pharmacy Patient Management Program  Medication Management Clinic Refill Outreach      Breanna was contacted today regarding refills of her medication(s).    Specialty medication(s) and dose(s) confirmed: repatha [employer]    Refill Questions      Flowsheet Row Most Recent Value   Changes to allergies? No   Changes to medications? No   New conditions or infections since last clinic visit No   Unplanned office visit, urgent care, ED, or hospital admission in the last 4 weeks  No   How does patient/caregiver feel medication is working? Very good   Financial problems or insurance changes  No   Since the previous refill, were any specialty medication doses or scheduled injections missed or delayed?  No   Does this patient require a clinical escalation to a pharmacist? No          Delivery Questions      Flowsheet Row Most Recent Value   Delivery method [employer]   Delivery address verified with patient/caregiver? Yes   Delivery address Home   Number of medications in delivery 1   Medication(s) being filled and delivered Evolocumab (REPATHA)   Doses left of specialty medications 0   Copay verified? Yes   Copay amount $0.00   Copay form of payment No copayment ($0)   Delivery Date Selection 03/06/25   Signature Required No            Follow-Up: 84 days    Blanca Pa, PharmD  3/4/2025  09:32 EST

## 2025-03-05 ENCOUNTER — TELEPHONE (OUTPATIENT)
Dept: FAMILY MEDICINE CLINIC | Facility: CLINIC | Age: 70
End: 2025-03-05
Payer: MEDICARE

## 2025-03-05 LAB
A VAGINAE DNA VAG QL NAA+PROBE: ABNORMAL SCORE
BACTERIA SPEC AEROBE CULT: ABNORMAL
BVAB2 DNA VAG QL NAA+PROBE: ABNORMAL SCORE
C ALBICANS DNA VAG QL NAA+PROBE: NEGATIVE
C GLABRATA DNA VAG QL NAA+PROBE: NEGATIVE
MEGA1 DNA VAG QL NAA+PROBE: ABNORMAL SCORE
T VAGINALIS DNA VAG QL NAA+PROBE: NEGATIVE

## 2025-03-05 NOTE — TELEPHONE ENCOUNTER
Caller: Breanna Kaba     Relationship: [unfilled]     Best call back number:[phone]     What is your medical concern? UPSET STOMACH, FREQUENT URGE TO URINATE, TROUBLE URINATING    How long has this issue been going on? SINCE 3/2    Is your provider already aware of this issue? YES    Have you been treated for this issue? NO   PLEASE CALL PATIENT TO FURTHER DISCUSS POTENTIAL TREATMENT NEEDED.

## 2025-03-06 RX ORDER — METRONIDAZOLE 500 MG/1
500 TABLET ORAL 2 TIMES DAILY
Qty: 14 TABLET | Refills: 0 | Status: SHIPPED | OUTPATIENT
Start: 2025-03-06

## 2025-03-06 RX ORDER — SULFAMETHOXAZOLE AND TRIMETHOPRIM 800; 160 MG/1; MG/1
1 TABLET ORAL 2 TIMES DAILY
Qty: 6 TABLET | Refills: 0 | Status: SHIPPED | OUTPATIENT
Start: 2025-03-06

## 2025-03-06 NOTE — TELEPHONE ENCOUNTER
Please let her know that she has both bacterial vaginosis and an UTI. I have sent metronidazole and bactrim to her pharmacy. Please let her know that she NEEDS to hydrate because of the bactrim interacting with the kidneys. It is okay for now, but the bactrim is mean to the kidneys.

## 2025-03-07 ENCOUNTER — HOSPITAL ENCOUNTER (OUTPATIENT)
Dept: ULTRASOUND IMAGING | Facility: HOSPITAL | Age: 70
Discharge: HOME | End: 2025-03-07
Payer: MEDICARE

## 2025-03-07 DIAGNOSIS — Z79.4 TYPE 2 DIABETES MELLITUS WITH DIABETIC NEUROPATHY, WITH LONG-TERM CURRENT USE OF INSULIN: ICD-10-CM

## 2025-03-07 DIAGNOSIS — E11.40 TYPE 2 DIABETES MELLITUS WITH DIABETIC NEUROPATHY, WITH LONG-TERM CURRENT USE OF INSULIN: ICD-10-CM

## 2025-03-07 DIAGNOSIS — I25.10 CORONARY ARTERY DISEASE DUE TO LIPID RICH PLAQUE: ICD-10-CM

## 2025-03-07 DIAGNOSIS — R09.89 OTHER SPECIFIED SYMPTOMS AND SIGNS INVOLVING THE CIRCULATORY AND RESPIRATORY SYSTEMS: ICD-10-CM

## 2025-03-07 DIAGNOSIS — I25.83 CORONARY ARTERY DISEASE DUE TO LIPID RICH PLAQUE: ICD-10-CM

## 2025-03-07 PROCEDURE — 93923 UPR/LXTR ART STDY 3+ LVLS: CPT

## 2025-03-11 ENCOUNTER — TELEPHONE (OUTPATIENT)
Dept: FAMILY MEDICINE CLINIC | Facility: CLINIC | Age: 70
End: 2025-03-11
Payer: MEDICARE

## 2025-03-11 NOTE — TELEPHONE ENCOUNTER
Caller: Breanna Kaba    Relationship: Self    Best call back number: [phone]     Caller requesting test results: SELF    What test was performed: TEST ON FEET AND LEGS    When was the test performed: 03/07/25    Where was the test performed: [facility]    Additional notes: PLEASE CALL BACK TO DISCUSS RESULTS

## 2025-03-13 NOTE — TELEPHONE ENCOUNTER
The vascular test was WNL so it is not circulation but neuropathy is the problem. Is she still having any vaginal symptoms? All the medications should have cured her symptoms.

## 2025-03-13 NOTE — TELEPHONE ENCOUNTER
Spoke with patient & she verbalized understanding,all symptoms have resolved related to her Vaginal problems.

## 2025-03-23 NOTE — PROGRESS NOTES
"Breanna Kaba     VITALS: Blood pressure 138/72, pulse 98, temperature 96.9 °F (36.1 °C), temperature source Temporal, resp. rate 16, height 157.5 cm (62\"), weight 66.5 kg (146 lb 9.6 oz), SpO2 98%, not currently breastfeeding.    Subjective  Chief Complaint  Difficulty Urinating, Back Pain, Headache, Abdominal Pain, and Diabetes    Subjective          History of Present Illness:    History of Present Illness  The patient is a 69-year-old female with medical conditions significant for congestive heart failure (CHF), coronary artery disease (CAD), type 2 diabetes, hyperlipidemia, hypertension, and neuropathy who presents to the clinic for a medical follow-up and a foot exam today.    She reports persistent tenderness in her pelvic area, which she attributes to a mesh implant. Over the past few days, she has experienced urinary retention, necessitating prolonged periods in the bathroom and straining during urination. She suspects a recurrence of a kidney infection. She also reports an unpleasant odor emanating from her urine, a symptom that has recently developed. She has been advised by her urologist at [facility] to avoid bladder surgery for a year following her last cardiac event. She is currently seeking information regarding the date of her most recent myocardial infarction or stent placement. She has been contacted by her urologist for a follow-up visit but is unable to comply until her cardiac condition stabilizes.    She reports experiencing muscle spasms in her feet and legs, which occasionally cause significant discomfort. She admits to inadequate hydration, consuming only 3 to 4 glasses of water daily. She plans to increase her water intake by adding lemon juice and drinking [place] water.    A recent home visit by a healthcare professional from her insurance revealed potential blockages in her lower extremities, as indicated by a device placed on her big toe. She expresses concern about this " finding and wishes to ensure there are no underlying issues with her legs.    She suspects she may have bacterial vaginosis, as she has noticed a decrease in symptoms when she showers compared to when she takes baths. She has a cream for vaginal application.    Her A1c has improved from 9.5 to 8.6, which is the best it has been in 2 years. She is currently taking 16 units of insulin in the morning and 16 units at night. She has plenty of insulin but received a letter stating that her insurance will no longer cover it.      No complaints regarding medications.     The following portions of the patient's history were reviewed and updated as appropriate: allergies, current medications, past family history, past medical history, past social history, past surgical history and problem list.    Past Medical History  Past Medical History:   Diagnosis Date    Acute congestive heart failure, unspecified heart failure type 03/28/2022    Arthritis     Chronic pain     Coronary artery disease involving native coronary artery of native heart without angina pectoris 4/25/2024    Diabetes mellitus     Elevated cholesterol     GERD (gastroesophageal reflux disease)     Gout     Headache     Hypertension     Myocardial infarction     Neuropathy        Surgical History  Past Surgical History:   Procedure Laterality Date    BREAST BIOPSY Left 1988    benign    CARDIAC CATHETERIZATION N/A 03/31/2022    Procedure: Left Heart Cath;  Surgeon: Tristan Marin MD;  Location:  [facility];  Service: Cardiology;  Laterality: N/A;    CARDIAC CATHETERIZATION N/A 03/31/2022    Procedure: Percutaneous Coronary Intervention;  Surgeon: Tristan Marin MD;  Location:  [facility];  Service: Cardiology;  Laterality: N/A;    CARDIAC CATHETERIZATION N/A 5/8/2024    Procedure: Left Heart Cath;  Surgeon: Shu Regalado MD;  Location:  [facility];  Service: Cardiology;  Laterality: N/A;    CARDIAC  "CATHETERIZATION N/A 5/8/2024    Procedure: Percutaneous Coronary Intervention;  Surgeon: Shu Regalado MD;  Location: [facility] CATH INVASIVE LOCATION;  Service: Cardiology;  Laterality: N/A;    COLONOSCOPY N/A 02/17/2020    Procedure: COLONOSCOPY FOR SCREENING CPT CODE: ;  Surgeon: Mariano Prescott MD;  Location: [facility] OR;  Service: Gastroenterology;  Laterality: N/A;    CORONARY STENT PLACEMENT      HYSTERECTOMY      Partial    KNEE SURGERY      THYROIDECTOMY, PARTIAL      Removal of goiter       Family History  Family History   Problem Relation Age of Onset    COPD Mother     Heart disease Brother     Heart attack Brother     Hypertension Daughter     Diabetes Daughter     Hypertension Daughter     Diabetes Daughter     Breast cancer Neg Hx        Social History  Social History     Socioeconomic History    Marital status:    Tobacco Use    Smoking status: Never     Passive exposure: Past    Smokeless tobacco: Never   Vaping Use    Vaping status: Never Used   Substance and Sexual Activity    Alcohol use: No    Drug use: No    Sexual activity: Defer       Objective   Vital Signs:   /72 (BP Location: Right arm, Patient Position: Sitting, Cuff Size: Adult)   Pulse 98   Temp 96.9 °F (36.1 °C) (Temporal)   Resp 16   Ht 157.5 cm (62\")   Wt 66.5 kg (146 lb 9.6 oz)   SpO2 98%   BMI 26.81 kg/m²       Physical Exam  Cardiovascular:      Pulses:           Dorsalis pedis pulses are 1+ on the right side and 1+ on the left side.        Posterior tibial pulses are 1+ on the right side and 1+ on the left side.   Musculoskeletal:      Right foot: Decreased range of motion.      Left foot: Decreased range of motion.   Feet:      Right foot:      Protective Sensation: 5 sites tested.  2 sites sensed.      Skin integrity: Callus and dry skin present.      Toenail Condition: Right toenails are normal.      Left foot:      Protective Sensation: 5 sites tested.  2 sites sensed.      Skin integrity: " Callus and dry skin present.      Toenail Condition: Left toenails are normal.      Comments: Diabetic Foot Exam Performed and Monofilament Test Performed            Physical Exam      Gen: Patient in NAD. Pleasant and answers appropriately. A&Ox3.    Skin: Warm and dry with normal turgor. No purpura, rashes, or unusual pigmentation noted. Hair is normal in appearance and distribution.    HEENT: NC/AT. No lesions noted. Conjunctiva clear, sclera nonicteric. PERRL. EOMI without nystagmus or strabismus. Fundi appear benign. No hemorrhages or exudates of eyes. Auditory canals are patent bilaterally without lesions. TMs intact,  nonerythematous, bulging without lesions. Nasal mucosa erythematous, and nonedematous. Frontal and maxillary sinuses are nontender. O/P erythematous and moist without exudate.    Neck: Supple without lymph nodes palpated.  Decreased ROM. No carotid bruits appreciated bilaterally.    Lungs: Decreased B/L without rales, rhonchi, crackles, or wheezes.    Heart: RRR. S1 and S2 normal. No S3 or S4. No MRGT.    Abd: Soft, nontender, slightly distended. (+)BSx4 quadrants.  No HSM or masses.    Extrem: No CC.  Trace edema bilateral lower extremities.  Radial pulses 2+/4 and equal B/L. FROMx4.  Positive joint tenderness noted.    Neuro: No focal motor/sensory deficits.    Procedures    Result Review :   The following data was reviewed by: Myrna Ramos MD on 03/03/2025:       Results  Laboratory Studies  Urinalysis today showed white blood cells.  January 2025 A1c improved from 9.5 to 8.6. Iron, liver, kidney function, and electrolytes are stable.           Assessment and Plan      Breanna Kaba is a 69 y.o. here for medical followup.    Diagnoses and all orders for this visit:    1. Difficulty urinating (Primary)  -     POC Urinalysis Dipstick  -     Urine Culture - Urine, Urine, Clean Catch; Future  -     Urine Culture - Urine, Urine, Clean Catch    2. Idiopathic chronic gout of multiple sites  without tophus  -     allopurinol (ZYLOPRIM) 300 MG tablet; Take 1 tablet by mouth Daily.  Dispense: 90 tablet; Refill: 1    3. Coronary artery disease due to lipid rich plaque  -     aspirin 81 MG EC tablet; Take 1 tablet by mouth Daily.  Dispense: 90 tablet; Refill: 1  -     losartan (Cozaar) 50 MG tablet; Take 1 tablet by mouth Daily.  Dispense: 90 tablet; Refill: 1  -     spironolactone (ALDACTONE) 25 MG tablet; Take 1 tablet by mouth Daily.  Dispense: 90 tablet; Refill: 1  -     US Ankle / Brachial Indices Extremity Complete; Future    4. Ischemic cardiomyopathy  -     aspirin 81 MG EC tablet; Take 1 tablet by mouth Daily.  Dispense: 90 tablet; Refill: 1  -     atorvastatin (LIPITOR) 80 MG tablet; Take 1 tablet by mouth Daily.  Dispense: 90 tablet; Refill: 1  -     losartan (Cozaar) 50 MG tablet; Take 1 tablet by mouth Daily.  Dispense: 90 tablet; Refill: 1  -     spironolactone (ALDACTONE) 25 MG tablet; Take 1 tablet by mouth Daily.  Dispense: 90 tablet; Refill: 1    5. Type 2 diabetes mellitus with diabetic neuropathy, with long-term current use of insulin  -     insulin aspart (novoLOG FLEXPEN) 100 UNIT/ML solution pen-injector sc pen; Inject 10-20 Units under the skin into the appropriate area as directed 3 (Three) Times a Day As Needed (blood glucose).  Dispense: 60 mL; Refill: 1  -     US Ankle / Brachial Indices Extremity Complete; Future    6. OAB (overactive bladder)  -     trospium (SANCTURA) 20 MG tablet; Take 1 tablet by mouth Every 12 (Twelve) Hours.  Dispense: 180 tablet; Refill: 1    7. Subacute vaginitis  -     Cancel: NuSwab Vaginitis (VG) - Swab, Vagina; Future  -     NuSwab Vaginitis (VG) - Swab, Vagina    8. Other specified symptoms and signs involving the circulatory and respiratory systems  -     US Ankle / Brachial Indices Extremity Complete; Future        Assessment & Plan  1.  Difficulty urinating.    The urine culture from the previous visit on 01/16/2025, indicated the presence of  skin bacteria, which typically does not necessitate antibiotic treatment. However, recent changes in urine cultures have raised concerns. The current urine sample appears contaminated. A urine culture will be ordered for further analysis.     2. Type 2 Diabetes Mellitus.  Her A1c has improved from 9.5 to 8.6, which is the best it has been in 2 years. She is advised to increase her morning insulin dose to 20 units and continue with 16 units at night. She should monitor her blood sugar levels and adjust as necessary. If her insurance stops covering her current insulin, she will switch back to Lantus.    3. Peripheral neuropathy.  She reports muscle spasms in her feet and legs, which may be related to her diabetes. She is advised to ensure adequate hydration, aiming for more than 3-4 glasses of water per day. Adding lemon to her water is suggested to improve taste and encourage increased water intake.    4.  Subacute vaginitis.    She reports a foul-smelling discharge and suspects bacterial vaginosis. A vaginal swab will be obtained for further analysis. If the swab confirms bacterial vaginosis, treatment with metronidazole (Flagyl) will be initiated. She is advised to avoid bubble baths and consider switching to showers to reduce the risk of infection.    5. Peripheral artery disease (PAD) screening.  She reports being told by a wellness visit provider that she has blockages in her legs. An official test to measure blood flow in her groin and ankle will be ordered to confirm this diagnosis. If abnormal blood flow is detected, further workup will be conducted.                   Patient or patient representative verbalized consent for the use of Ambient Listening during the visit with  Myrna Ramos MD for chart documentation. 3/23/2025  11:46 EDT        Follow Up   Return (already has appt).  Findings and plans discussed with patient who verbalizes understanding and agreement. Will followup with patient once  results are in. Patient was given instructions and counseling regarding her condition or for health maintenance advice. Please see specific information pulled into the AVS if appropriate.       Myrna Ramos MD

## 2025-04-01 ENCOUNTER — TELEPHONE (OUTPATIENT)
Dept: FAMILY MEDICINE CLINIC | Facility: CLINIC | Age: 70
End: 2025-04-01
Payer: MEDICARE

## 2025-04-01 NOTE — TELEPHONE ENCOUNTER
PA for Relion Novolog FlexPen has been submitted. Currently awaiting determination from the insurance company.

## 2025-04-01 NOTE — TELEPHONE ENCOUNTER
PA for Relion Novolog has been approved through 12/31/2025. I contacted the pt and informed her of the approval and informed her to contact us back with any further issues or concerns. The pt verbalized understanding.

## 2025-04-08 PROBLEM — Z12.11 ENCOUNTER FOR SCREENING FOR MALIGNANT NEOPLASM OF COLON: Status: ACTIVE | Noted: 2025-01-16

## 2025-04-16 ENCOUNTER — OFFICE VISIT (OUTPATIENT)
Dept: CARDIOLOGY | Facility: CLINIC | Age: 70
End: 2025-04-16
Payer: MEDICARE

## 2025-04-16 VITALS
HEIGHT: 62 IN | DIASTOLIC BLOOD PRESSURE: 60 MMHG | SYSTOLIC BLOOD PRESSURE: 131 MMHG | WEIGHT: 150.4 LBS | OXYGEN SATURATION: 98 % | HEART RATE: 62 BPM | BODY MASS INDEX: 27.68 KG/M2

## 2025-04-16 DIAGNOSIS — I25.10 ASCVD (ARTERIOSCLEROTIC CARDIOVASCULAR DISEASE): Primary | ICD-10-CM

## 2025-04-16 DIAGNOSIS — I25.83 CORONARY ARTERY DISEASE DUE TO LIPID RICH PLAQUE: ICD-10-CM

## 2025-04-16 DIAGNOSIS — Z01.810 PREOPERATIVE CARDIOVASCULAR EXAMINATION: ICD-10-CM

## 2025-04-16 DIAGNOSIS — Z79.4 TYPE 2 DIABETES MELLITUS WITHOUT COMPLICATION, WITH LONG-TERM CURRENT USE OF INSULIN: ICD-10-CM

## 2025-04-16 DIAGNOSIS — E78.5 DYSLIPIDEMIA: ICD-10-CM

## 2025-04-16 DIAGNOSIS — E11.9 TYPE 2 DIABETES MELLITUS WITHOUT COMPLICATION, WITH LONG-TERM CURRENT USE OF INSULIN: ICD-10-CM

## 2025-04-16 DIAGNOSIS — I25.10 CORONARY ARTERY DISEASE DUE TO LIPID RICH PLAQUE: ICD-10-CM

## 2025-04-16 DIAGNOSIS — I25.5 ISCHEMIC CARDIOMYOPATHY: ICD-10-CM

## 2025-04-16 DIAGNOSIS — I25.118 CORONARY ARTERY DISEASE OF NATIVE ARTERY OF NATIVE HEART WITH STABLE ANGINA PECTORIS: Chronic | ICD-10-CM

## 2025-04-16 DIAGNOSIS — I10 ESSENTIAL HYPERTENSION: ICD-10-CM

## 2025-04-16 RX ORDER — ATORVASTATIN CALCIUM 80 MG/1
80 TABLET, FILM COATED ORAL DAILY
Qty: 90 TABLET | Refills: 1 | Status: SHIPPED | OUTPATIENT
Start: 2025-04-16

## 2025-04-16 RX ORDER — LOSARTAN POTASSIUM 50 MG/1
50 TABLET ORAL DAILY
Qty: 90 TABLET | Refills: 1 | Status: SHIPPED | OUTPATIENT
Start: 2025-04-16

## 2025-04-16 RX ORDER — CLOPIDOGREL BISULFATE 75 MG/1
TABLET ORAL
Qty: 90 TABLET | Refills: 3 | Status: SHIPPED | OUTPATIENT
Start: 2025-04-16

## 2025-04-16 RX ORDER — ASPIRIN 81 MG/1
81 TABLET ORAL DAILY
Qty: 90 TABLET | Refills: 1 | Status: SHIPPED | OUTPATIENT
Start: 2025-04-16

## 2025-04-16 RX ORDER — METOPROLOL SUCCINATE 25 MG/1
12.5 TABLET, EXTENDED RELEASE ORAL DAILY
Qty: 45 TABLET | Refills: 3 | Status: SHIPPED | OUTPATIENT
Start: 2025-04-16

## 2025-04-16 NOTE — LETTER
April 16, 2025     Myrna Ramos MD  [address] [place] [address] [place] [address]    Patient: Breanna Kaba   YOB: 1955   Date of Visit: 4/16/2025     Dear Myrna Ramos MD:       Thank you for referring Breanna Kaba to me for evaluation. Below are the relevant portions of my assessment and plan of care.    If you have questions, please do not hesitate to call me. I look forward to following Breanna along with you.         Sincerely,        Anshul Johnson MD        CC: No Recipients    Anshul Johnson MD  04/16/25 1723  Sign when Signing Visit  Myrna Ramos MD  Breanna Kaba  1955 04/16/2025    Patient Active Problem List   Diagnosis   • Neuropathy   • Gout   • GERD (gastroesophageal reflux disease)   • Diabetes mellitus   • Chronic pain   • Family history of GI malignancy   • History of adenomatous polyp of colon   • COVID-19   • Chronic HFrEF (heart failure with preserved ejection fraction)   • Benign essential hypertension   • Mixed hyperlipidemia   • Coronary artery disease of native artery of native heart with stable angina pectoris   • Erosion of implanted urethral mesh to surrounding organ or tissue, initial encounter   • Incontinence of feces   • Midline cystocele   • Proteinuria   • Urge incontinence   • Vaginal enterocele   • Encounter for screening for malignant neoplasm of colon       Dear Myrna Ramos MD:    Subjective     Breanna Kaba is a 69 y.o. female with the problems as listed above, presents    Chief complaint: Follow-up of CAD and ischemic cardiomyopathy preoperative cardiac evaluation and clearance for colonoscopy..    History of Present Illness: Ms. Breanna Kaba is a pleasant 69-year-old  female with history of known CAD with previous non-STEMI, status post stenting of the LAD in March 2022 and then and may have 24 x 2 in the mid LAD, with previous history of advanced cardiomyopathy with LV Jek fraction of 25% and March 2022 which has since improved  and normalized as noted on the recent echo Doppler study on 2023.  She is here today as a regular cardiology follow-up and seeking cardiac clearance for colonoscopy.  On further questioning she denies any complaints of chest pains or shortness of breath, PND, orthopnea or pedal edema.  Overall she feels pretty good.    Complete Transthoracic Echocardiogram with Complete Doppler and Color Flow    Patient Name: Breanna Kaba   Patient MRN: [id number]   Patient : 1955 (68 y.o.)   Legal Sex: Female    Accession Number: [id number]   Date of Study: 23   Ordering Provider: Александр Tobin MD    Clinical Indications: Chest Pain       Reading Physicians  Performing Staff   Cardiology: Roe Galaviz MD     Tech: Bell Orosco           Show images for Adult Transthoracic Echo Complete w/ Color, Spectral and Contrast if necessary per protocol   Clinical Indication    Chest Pain     Interpretation Summary   •  Left ventricular ejection fraction appears to be 61 - 65%.  •  Left ventricular wall thickness is consistent with mild concentric hypertrophy.  •  Left ventricular diastolic function is consistent with (grade I) impaired relaxation.  •  The left atrial cavity is dilated.     Cardiac Catheterization/Vascular Study     Performing Physician  Performing Staff   Primary: Shu Regalado MD     Scrub Person: Randa Barajas    Documenter: Cantu, Luis   Invasive Nurse: Marvin Tiwari RN         Procedures    Left Heart Cath   Percutaneous Coronary Intervention     Patient Information    Patient Name  Breanna Kaba MRN  [id number] Legal Sex  Female  (Age)  1955 (69 y.o.)     Race Ethnicity Encounter Category   White or  Not  or  Elective       Indications  Coronary artery disease involving native coronary artery of native heart without angina pectoris [I25.10 (ICD-10-CM)]   Type 2 diabetes mellitus without complication, with long-term current use of insulin  [E11.9, Z79.4 (ICD-10-CM)]         Conclusion   •  The ejection fraction was greater than 55% by visual estimate.  •  Prox LAD lesion is 20% stenosed.  •  Mid LAD-2 lesion is 70% stenosed.  •  Mid LAD-3 lesion is 80% stenosed.  •  2nd Mrg lesion is 99% stenosed.     1.  Cardiac.  Patient with history of coronary disease with stenting to the LAD.  Abnormal CTA coronaries.  IFR guided PTCA and stent placement to LAD x 2 done.  Dual antiplatelet therapy to continue.  Aggressive risk factor modification for coronary disease to continue.     Recommendations    •     Dual antiplatelet therapy.            Name Panel Role Time Period   Shu Regalado MD Panel 1 Primary 5/8/2024 0838 - 5/8/2024 0942    Total Sedation Time    Moderate sedation event time was not documented.   Coronary Findings    Diagnostic  Dominance: Right  Number of lesions: 5    Left Main   The vessel was visualized by angiography, is moderate in size and is angiographically normal.      Left Anterior Descending   The vessel was visualized by angiography and is moderate in size. LAD is large vessel arising from the left main event interventricular groove proximal 20% lesion noted. Stent noted in the midportion which is patent. Beyond the stent about 70% lesion followed by sequential 80% lesion noted. First diagonal is large normal, smaller sized second diagonal noted. Medium size septal branch is noted normal   Prox LAD lesion is 20% stenosed.   Previously placed Mid LAD-1 stent (unknown type) is widely patent.   Mid LAD-2 lesion is 70% stenosed.   Mid LAD-3 lesion is 80% stenosed.      Left Circumflex   The vessel was visualized by angiography and is moderate in size. Circumflex large vessel arising from the left main revealed left AV groove and is normal. Gives off a large sized first obtuse marginal artery which is normal. Second obtuse marginal artery is medium sized with proximal 99% lesion noted. Third obtuse marginal artery is large vessel is a  continuation of the circumflex and is normal      Second Obtuse Marginal Branch   2nd Mrg lesion is 99% stenosed.      Right Coronary Artery   The vessel was visualized by angiography and is moderate in size. Right coronary artery is large vessel rest of the right coronary ostium in the right AV groove and is normal. PDA is a large vessel with mild luminal irregularities noted. Posterolateral branch is small and normal      Intervention  Number of interventions: 2      Mid LAD-1 lesion   Stent   Stent was successfully placed.   Stent (Also treats lesions: Mid LAD-2, and Mid LAD-3)   Stent was successfully placed.   Post-Intervention Lesion Assessment   The intervention was successful. Embolic protection device was not deployed. Post-intervention CALI flow is 3. There were no complications. iFR was measured for this lesion with a ratio of 0.81.   There is a 0% residual stenosis post intervention.      Mid LAD-2 lesion   Stent (Also treats lesions: Mid LAD-1, and Mid LAD-3)   See details in Mid LAD-1 lesion.   Post-Intervention Lesion Assessment   There is a 0% residual stenosis post intervention.      Mid LAD-3 lesion   Stent (Also treats lesions: Mid LAD-1, and Mid LAD-2)   See details in Mid LAD-1 lesion.   Post-Intervention Lesion Assessment   There is a 0% residual stenosis post intervention.        Left Heart Findings    Left Ventricle    The estimated ejection fraction is 55%. The left ventricular ejection fraction is greater than 55% by visual estimation.       Allergies   Allergen Reactions   • Asa [Aspirin] GI Intolerance   • Naproxen Nausea And Vomiting   • Penicillins Hives and Rash   :    Current Outpatient Medications:   •  allopurinol (ZYLOPRIM) 300 MG tablet, Take 1 tablet by mouth Daily., Disp: 90 tablet, Rfl: 1  •  aspirin 81 MG EC tablet, Take 1 tablet by mouth Daily., Disp: 90 tablet, Rfl: 1  •  atorvastatin (LIPITOR) 80 MG tablet, Take 1 tablet by mouth Daily., Disp: 90 tablet, Rfl: 1  •   clopidogrel (PLAVIX) 75 MG tablet, Take one tablet by mouth daily, Disp: 90 tablet, Rfl: 3  •  Evolocumab (REPATHA) solution auto-injector SureClick injection, Inject 1 mL under the skin into the appropriate area as directed Every 14 (Fourteen) Days., Disp: 6 mL, Rfl: 2  •  glucose blood test strip, For QID testing  E11.65, Disp: 400 each, Rfl: 1  •  glucose blood test strip, 1 each by Other route 4 (Four) Times a Day. USE TO TEST SUGAR BEFORE MEALS AND AT BEDTIME, Disp: 400 each, Rfl: 1  •  glucose monitor monitoring kit, For QID testing  E11.65, Disp: 1 each, Rfl: 0  •  glucose monitor monitoring kit, ForQID testing E11.65 (On sliding scale insulin), Disp: 1 each, Rfl: 0  •  insulin aspart (novoLOG FLEXPEN) 100 UNIT/ML solution pen-injector sc pen, Inject 10-20 Units under the skin into the appropriate area as directed 3 (Three) Times a Day As Needed (blood glucose)., Disp: 60 mL, Rfl: 1  •  Insulin Glargine (BASAGLAR KWIKPEN) 100 UNIT/ML injection pen, Inject 16 Units under the skin into the appropriate area as directed 2 (Two) Times a Day., Disp: 33 mL, Rfl: 1  •  Insulin Pen Needle (Pen Needles) 32G X 4 MM misc, Use 1 syringe 4 (Four) Times a Day. E11.65, Disp: 400 each, Rfl: 1  •  Insulin Pen Needle 31G X 8 MM misc, Use 1 stick 4 (Four) Times a Day. USE THREE TIMES A DAY WITH NOVOLOG BEFORE MEALS, Disp: 400 each, Rfl: 1  •  Lancets misc, For QID testing  E11.65, Disp: 400 each, Rfl: 1  •  losartan (Cozaar) 50 MG tablet, Take 1 tablet by mouth Daily., Disp: 90 tablet, Rfl: 1  •  metoprolol succinate XL (TOPROL-XL) 25 MG 24 hr tablet, Take 1/2 (one-half) tablet by mouth once daily, Disp: 45 tablet, Rfl: 3  •  metroNIDAZOLE (FLAGYL) 500 MG tablet, Take 1 tablet by mouth 2 (Two) Times a Day., Disp: 14 tablet, Rfl: 0  •  nitroglycerin (NITROSTAT) 0.4 MG SL tablet, 1 under the tongue as needed for angina, may repeat q5mins for up three doses, Disp: 100 tablet, Rfl: 11  •  spironolactone (ALDACTONE) 25 MG tablet,  "Take 1 tablet by mouth Daily., Disp: 90 tablet, Rfl: 1  •  trospium (SANCTURA) 20 MG tablet, Take 1 tablet by mouth Every 12 (Twelve) Hours., Disp: 180 tablet, Rfl: 1    The following portions of the patient's history were reviewed and updated as appropriate: allergies, current medications, past family history, past medical history, past social history, past surgical history and problem list.    Social History     Tobacco Use   • Smoking status: Never     Passive exposure: Past   • Smokeless tobacco: Never   Vaping Use   • Vaping status: Never Used   Substance Use Topics   • Alcohol use: No   • Drug use: No     Review of Systems   Constitutional: Negative for chills and fever.   HENT:  Negative for nosebleeds and sore throat.    Respiratory:  Negative for cough, hemoptysis and wheezing.    Gastrointestinal:  Negative for abdominal pain, hematemesis, hematochezia, melena, nausea and vomiting.   Genitourinary:  Negative for dysuria and hematuria.   Neurological:  Negative for headaches.     Objective   Vitals:    04/16/25 0930   BP: 131/60   Pulse: 62   SpO2: 98%   Weight: 68.2 kg (150 lb 6.4 oz)   Height: 157.5 cm (62\")     Body mass index is 27.51 kg/m².    Constitutional:       Appearance: Well-developed.   Eyes:      Conjunctiva/sclera: Conjunctivae normal.   HENT:      Head: Normocephalic.   Neck:      Thyroid: No thyromegaly.      Vascular: No JVD.      Trachea: No tracheal deviation.   Pulmonary:      Effort: No respiratory distress.      Breath sounds: Normal breath sounds. No wheezing. No rales.   Cardiovascular:      PMI at left midclavicular line. Normal rate. Regular rhythm. Normal S1. Normal S2.       Murmurs: There is no murmur.      No gallop.  No click. No rub.   Pulses:     Intact distal pulses.   Edema:     Peripheral edema absent.   Abdominal:      General: Bowel sounds are normal.      Palpations: Abdomen is soft. There is no abdominal mass.      Tenderness: There is no abdominal tenderness. " "  Musculoskeletal:      Cervical back: Normal range of motion and neck supple. Skin:     General: Skin is warm and dry.   Neurological:      Mental Status: Alert and oriented to person, place, and time.      Cranial Nerves: No cranial nerve deficit.       Lab Results   Component Value Date     01/16/2025    K 4.5 01/16/2025    CL 98 01/16/2025    CO2 27.0 01/16/2025    BUN 33 (H) 01/16/2025    CREATININE 1.06 (H) 01/16/2025    GLUCOSE 253 (H) 01/16/2025    CALCIUM 10.0 01/16/2025    AST 16 01/16/2025    ALT 20 01/16/2025    ALKPHOS 126 (H) 01/16/2025     No results found for: \"CKTOTAL\"  Lab Results   Component Value Date    WBC 8.41 07/11/2024    HGB 11.9 (L) 07/11/2024    HCT 35.7 07/11/2024     07/11/2024     Lab Results   Component Value Date    INR 0.86 (L) 12/24/2023    INR 1.04 03/28/2022     Lab Results   Component Value Date    MG 1.6 04/02/2022     Lab Results   Component Value Date    TSH 1.260 06/06/2022    CHLPL 256 (H) 03/01/2017    TRIG 217 (H) 05/09/2024    HDL 39 (L) 05/09/2024    LDL 75 05/09/2024        Assessment & Plan    Diagnosis Plan   1. ASCVD (arteriosclerotic cardiovascular disease), s/p stenting of the mid LAD in March 2022 and May 2024, clinically asymptomatic and stable.        2. Preoperative cardiovascular examination        3. Dyslipidemia        4. Essential hypertension, seems controlled.        5. Type 2 diabetes mellitus without complication, with long-term current use of insulin          Recommendations  For her CAD, continue with aspirin, clopidogrel and atorvastatin  For her dyslipidemia, continue with atorvastatin and Repatha.  With regards to clearance for colonoscopy, she appears to be stable for colonoscopy at this time.  However with regards to holding the antiplatelet therapy, I would prefer to wait at least for 1 more month (if the procedure is not urgent) before holding the antiplatelet agents for colonoscopy as she has had PCI in May 2024.  I have " discussed this with Ms. Kaba and she expressed understanding.  She is going to talk to her gastroenterologist about this.    Return in about 3 months (around 7/16/2025).    As always, Myrna Ramos MD  I appreciate very much the opportunity to participate in the cardiovascular care of your patients. Please do not hesitate to call me with any questions with regards to Breanna Kaba's evaluation and management.       With Best Regards,        Anshul Johnson MD, FACC    Please note that portions of this note were completed with a voice recognition program.

## 2025-04-16 NOTE — PROGRESS NOTES
Myrna Ramos MD  Breanna Kaba  2025    Patient Active Problem List   Diagnosis    Neuropathy    Gout    GERD (gastroesophageal reflux disease)    Diabetes mellitus    Chronic pain    Family history of GI malignancy    History of adenomatous polyp of colon    COVID-19    Chronic HFrEF (heart failure with preserved ejection fraction)    Benign essential hypertension    Mixed hyperlipidemia    Coronary artery disease of native artery of native heart with stable angina pectoris    Erosion of implanted urethral mesh to surrounding organ or tissue, initial encounter    Incontinence of feces    Midline cystocele    Proteinuria    Urge incontinence    Vaginal enterocele    Encounter for screening for malignant neoplasm of colon       Dear Myrna Ramos MD:    Subjective     Breanna Kaba is a 69 y.o. female with the problems as listed above, presents    Chief complaint: Follow-up of CAD and ischemic cardiomyopathy preoperative cardiac evaluation and clearance for colonoscopy..    History of Present Illness: Ms. Breanna Kaba is a pleasant 69-year-old  female with history of known CAD with previous non-STEMI, status post stenting of the LAD in 2022 and then and may have 24 x 2 in the mid LAD, with previous history of advanced cardiomyopathy with LV Jek fraction of 25% and 2022 which has since improved and normalized as noted on the recent echo Doppler study on 2023.  She is here today as a regular cardiology follow-up and seeking cardiac clearance for colonoscopy.  On further questioning she denies any complaints of chest pains or shortness of breath, PND, orthopnea or pedal edema.  Overall she feels pretty good.    Complete Transthoracic Echocardiogram with Complete Doppler and Color Flow    Patient Name: Breanna Kaba   Patient MRN: [id number]   Patient : 1955 (68 y.o.)   Legal Sex: Female    Accession Number: [id number]   Date of Study: 23    Ordering Provider: Александр Tobin MD    Clinical Indications: Chest Pain       Reading Physicians  Performing Staff   Cardiology: Roe Galaviz MD     Tech: Bell Orosco           Show images for Adult Transthoracic Echo Complete w/ Color, Spectral and Contrast if necessary per protocol   Clinical Indication    Chest Pain     Interpretation Summary     Left ventricular ejection fraction appears to be 61 - 65%.    Left ventricular wall thickness is consistent with mild concentric hypertrophy.    Left ventricular diastolic function is consistent with (grade I) impaired relaxation.    The left atrial cavity is dilated.     Cardiac Catheterization/Vascular Study     Performing Physician  Performing Staff   Primary: Shu Regalado MD     Scrub Person: Randa Barajas    Documenter: Cantu, Luis   Invasive Nurse: Marvin Tiwari RN         Procedures    Left Heart Cath   Percutaneous Coronary Intervention     Patient Information    Patient Name  Breanna Kaba MRN  [id number] Legal Sex  Female  (Age)  1955 (69 y.o.)     Race Ethnicity Encounter Category   White or  Not  or  Elective       Indications  Coronary artery disease involving native coronary artery of native heart without angina pectoris [I25.10 (ICD-10-CM)]   Type 2 diabetes mellitus without complication, with long-term current use of insulin [E11.9, Z79.4 (ICD-10-CM)]         Conclusion     The ejection fraction was greater than 55% by visual estimate.    Prox LAD lesion is 20% stenosed.    Mid LAD-2 lesion is 70% stenosed.    Mid LAD-3 lesion is 80% stenosed.    2nd Mrg lesion is 99% stenosed.     1.  Cardiac.  Patient with history of coronary disease with stenting to the LAD.  Abnormal CTA coronaries.  IFR guided PTCA and stent placement to LAD x 2 done.  Dual antiplatelet therapy to continue.  Aggressive risk factor modification for coronary disease to continue.     Recommendations         Dual antiplatelet  therapy.            Name Panel Role Time Period   Shu Regalado MD Panel 1 Primary 5/8/2024 0838 - 5/8/2024 0981    Total Sedation Time    Moderate sedation event time was not documented.   Coronary Findings    Diagnostic  Dominance: Right  Number of lesions: 5    Left Main   The vessel was visualized by angiography, is moderate in size and is angiographically normal.      Left Anterior Descending   The vessel was visualized by angiography and is moderate in size. LAD is large vessel arising from the left main event interventricular groove proximal 20% lesion noted. Stent noted in the midportion which is patent. Beyond the stent about 70% lesion followed by sequential 80% lesion noted. First diagonal is large normal, smaller sized second diagonal noted. Medium size septal branch is noted normal   Prox LAD lesion is 20% stenosed.   Previously placed Mid LAD-1 stent (unknown type) is widely patent.   Mid LAD-2 lesion is 70% stenosed.   Mid LAD-3 lesion is 80% stenosed.      Left Circumflex   The vessel was visualized by angiography and is moderate in size. Circumflex large vessel arising from the left main revealed left AV groove and is normal. Gives off a large sized first obtuse marginal artery which is normal. Second obtuse marginal artery is medium sized with proximal 99% lesion noted. Third obtuse marginal artery is large vessel is a continuation of the circumflex and is normal      Second Obtuse Marginal Branch   2nd Mrg lesion is 99% stenosed.      Right Coronary Artery   The vessel was visualized by angiography and is moderate in size. Right coronary artery is large vessel rest of the right coronary ostium in the right AV groove and is normal. PDA is a large vessel with mild luminal irregularities noted. Posterolateral branch is small and normal      Intervention  Number of interventions: 2      Mid LAD-1 lesion   Stent   Stent was successfully placed.   Stent (Also treats lesions: Mid LAD-2, and Mid LAD-3)    Stent was successfully placed.   Post-Intervention Lesion Assessment   The intervention was successful. Embolic protection device was not deployed. Post-intervention CALI flow is 3. There were no complications. iFR was measured for this lesion with a ratio of 0.81.   There is a 0% residual stenosis post intervention.      Mid LAD-2 lesion   Stent (Also treats lesions: Mid LAD-1, and Mid LAD-3)   See details in Mid LAD-1 lesion.   Post-Intervention Lesion Assessment   There is a 0% residual stenosis post intervention.      Mid LAD-3 lesion   Stent (Also treats lesions: Mid LAD-1, and Mid LAD-2)   See details in Mid LAD-1 lesion.   Post-Intervention Lesion Assessment   There is a 0% residual stenosis post intervention.        Left Heart Findings    Left Ventricle    The estimated ejection fraction is 55%. The left ventricular ejection fraction is greater than 55% by visual estimation.       Allergies   Allergen Reactions    Asa [Aspirin] GI Intolerance    Naproxen Nausea And Vomiting    Penicillins Hives and Rash   :    Current Outpatient Medications:     allopurinol (ZYLOPRIM) 300 MG tablet, Take 1 tablet by mouth Daily., Disp: 90 tablet, Rfl: 1    aspirin 81 MG EC tablet, Take 1 tablet by mouth Daily., Disp: 90 tablet, Rfl: 1    atorvastatin (LIPITOR) 80 MG tablet, Take 1 tablet by mouth Daily., Disp: 90 tablet, Rfl: 1    clopidogrel (PLAVIX) 75 MG tablet, Take one tablet by mouth daily, Disp: 90 tablet, Rfl: 3    Evolocumab (REPATHA) solution auto-injector SureClick injection, Inject 1 mL under the skin into the appropriate area as directed Every 14 (Fourteen) Days., Disp: 6 mL, Rfl: 2    glucose blood test strip, For QID testing  E11.65, Disp: 400 each, Rfl: 1    glucose blood test strip, 1 each by Other route 4 (Four) Times a Day. USE TO TEST SUGAR BEFORE MEALS AND AT BEDTIME, Disp: 400 each, Rfl: 1    glucose monitor monitoring kit, For QID testing  E11.65, Disp: 1 each, Rfl: 0    glucose monitor monitoring  kit, ForQID testing E11.65 (On sliding scale insulin), Disp: 1 each, Rfl: 0    insulin aspart (novoLOG FLEXPEN) 100 UNIT/ML solution pen-injector sc pen, Inject 10-20 Units under the skin into the appropriate area as directed 3 (Three) Times a Day As Needed (blood glucose)., Disp: 60 mL, Rfl: 1    Insulin Glargine (BASAGLAR KWIKPEN) 100 UNIT/ML injection pen, Inject 16 Units under the skin into the appropriate area as directed 2 (Two) Times a Day., Disp: 33 mL, Rfl: 1    Insulin Pen Needle (Pen Needles) 32G X 4 MM misc, Use 1 syringe 4 (Four) Times a Day. E11.65, Disp: 400 each, Rfl: 1    Insulin Pen Needle 31G X 8 MM misc, Use 1 stick 4 (Four) Times a Day. USE THREE TIMES A DAY WITH NOVOLOG BEFORE MEALS, Disp: 400 each, Rfl: 1    Lancets misc, For QID testing  E11.65, Disp: 400 each, Rfl: 1    losartan (Cozaar) 50 MG tablet, Take 1 tablet by mouth Daily., Disp: 90 tablet, Rfl: 1    metoprolol succinate XL (TOPROL-XL) 25 MG 24 hr tablet, Take 1/2 (one-half) tablet by mouth once daily, Disp: 45 tablet, Rfl: 3    metroNIDAZOLE (FLAGYL) 500 MG tablet, Take 1 tablet by mouth 2 (Two) Times a Day., Disp: 14 tablet, Rfl: 0    nitroglycerin (NITROSTAT) 0.4 MG SL tablet, 1 under the tongue as needed for angina, may repeat q5mins for up three doses, Disp: 100 tablet, Rfl: 11    spironolactone (ALDACTONE) 25 MG tablet, Take 1 tablet by mouth Daily., Disp: 90 tablet, Rfl: 1    trospium (SANCTURA) 20 MG tablet, Take 1 tablet by mouth Every 12 (Twelve) Hours., Disp: 180 tablet, Rfl: 1    The following portions of the patient's history were reviewed and updated as appropriate: allergies, current medications, past family history, past medical history, past social history, past surgical history and problem list.    Social History     Tobacco Use    Smoking status: Never     Passive exposure: Past    Smokeless tobacco: Never   Vaping Use    Vaping status: Never Used   Substance Use Topics    Alcohol use: No    Drug use: No     Review  "of Systems   Constitutional: Negative for chills and fever.   HENT:  Negative for nosebleeds and sore throat.    Respiratory:  Negative for cough, hemoptysis and wheezing.    Gastrointestinal:  Negative for abdominal pain, hematemesis, hematochezia, melena, nausea and vomiting.   Genitourinary:  Negative for dysuria and hematuria.   Neurological:  Negative for headaches.     Objective   Vitals:    04/16/25 0930   BP: 131/60   Pulse: 62   SpO2: 98%   Weight: 68.2 kg (150 lb 6.4 oz)   Height: 157.5 cm (62\")     Body mass index is 27.51 kg/m².    Constitutional:       Appearance: Well-developed.   Eyes:      Conjunctiva/sclera: Conjunctivae normal.   HENT:      Head: Normocephalic.   Neck:      Thyroid: No thyromegaly.      Vascular: No JVD.      Trachea: No tracheal deviation.   Pulmonary:      Effort: No respiratory distress.      Breath sounds: Normal breath sounds. No wheezing. No rales.   Cardiovascular:      PMI at left midclavicular line. Normal rate. Regular rhythm. Normal S1. Normal S2.       Murmurs: There is no murmur.      No gallop.  No click. No rub.   Pulses:     Intact distal pulses.   Edema:     Peripheral edema absent.   Abdominal:      General: Bowel sounds are normal.      Palpations: Abdomen is soft. There is no abdominal mass.      Tenderness: There is no abdominal tenderness.   Musculoskeletal:      Cervical back: Normal range of motion and neck supple. Skin:     General: Skin is warm and dry.   Neurological:      Mental Status: Alert and oriented to person, place, and time.      Cranial Nerves: No cranial nerve deficit.       Lab Results   Component Value Date     01/16/2025    K 4.5 01/16/2025    CL 98 01/16/2025    CO2 27.0 01/16/2025    BUN 33 (H) 01/16/2025    CREATININE 1.06 (H) 01/16/2025    GLUCOSE 253 (H) 01/16/2025    CALCIUM 10.0 01/16/2025    AST 16 01/16/2025    ALT 20 01/16/2025    ALKPHOS 126 (H) 01/16/2025     No results found for: \"CKTOTAL\"  Lab Results   Component Value " Date    WBC 8.41 07/11/2024    HGB 11.9 (L) 07/11/2024    HCT 35.7 07/11/2024     07/11/2024     Lab Results   Component Value Date    INR 0.86 (L) 12/24/2023    INR 1.04 03/28/2022     Lab Results   Component Value Date    MG 1.6 04/02/2022     Lab Results   Component Value Date    TSH 1.260 06/06/2022    CHLPL 256 (H) 03/01/2017    TRIG 217 (H) 05/09/2024    HDL 39 (L) 05/09/2024    LDL 75 05/09/2024        Assessment & Plan    Diagnosis Plan   1. ASCVD (arteriosclerotic cardiovascular disease), s/p stenting of the mid LAD in March 2022 and May 2024, clinically asymptomatic and stable.        2. Preoperative cardiovascular examination        3. Dyslipidemia        4. Essential hypertension, seems controlled.        5. Type 2 diabetes mellitus without complication, with long-term current use of insulin          Recommendations  For her CAD, continue with aspirin, clopidogrel and atorvastatin  For her dyslipidemia, continue with atorvastatin and Repatha.  With regards to clearance for colonoscopy, she appears to be stable for colonoscopy at this time.  However with regards to holding the antiplatelet therapy, I would prefer to wait at least for 1 more month (if the procedure is not urgent) before holding the antiplatelet agents for colonoscopy as she has had PCI in May 2024.  I have discussed this with Ms. Kaba and she expressed understanding.  She is going to talk to her gastroenterologist about this.    Return in about 3 months (around 7/16/2025).    As always, Myrna Ramos MD  I appreciate very much the opportunity to participate in the cardiovascular care of your patients. Please do not hesitate to call me with any questions with regards to Breanna Kaba's evaluation and management.       With Best Regards,        Anshul Johnson MD, [facility]    Please note that portions of this note were completed with a voice recognition program.

## 2025-04-17 ENCOUNTER — LAB (OUTPATIENT)
Dept: FAMILY MEDICINE CLINIC | Facility: CLINIC | Age: 70
End: 2025-04-17
Payer: MEDICARE

## 2025-04-17 ENCOUNTER — PREP FOR SURGERY (OUTPATIENT)
Dept: OTHER | Facility: HOSPITAL | Age: 70
End: 2025-04-17
Payer: MEDICARE

## 2025-04-17 ENCOUNTER — TELEPHONE (OUTPATIENT)
Dept: CARDIOLOGY | Facility: CLINIC | Age: 70
End: 2025-04-17
Payer: MEDICARE

## 2025-04-17 ENCOUNTER — OFFICE VISIT (OUTPATIENT)
Dept: FAMILY MEDICINE CLINIC | Facility: CLINIC | Age: 70
End: 2025-04-17
Payer: MEDICARE

## 2025-04-17 VITALS
BODY MASS INDEX: 27.12 KG/M2 | HEIGHT: 62 IN | RESPIRATION RATE: 16 BRPM | HEART RATE: 77 BPM | SYSTOLIC BLOOD PRESSURE: 132 MMHG | OXYGEN SATURATION: 98 % | WEIGHT: 147.4 LBS | DIASTOLIC BLOOD PRESSURE: 68 MMHG | TEMPERATURE: 97.1 F

## 2025-04-17 DIAGNOSIS — E11.59 HYPERTENSION ASSOCIATED WITH DIABETES: ICD-10-CM

## 2025-04-17 DIAGNOSIS — Z12.11 ENCOUNTER FOR SCREENING FOR MALIGNANT NEOPLASM OF COLON: Primary | ICD-10-CM

## 2025-04-17 DIAGNOSIS — N39.0 URINARY TRACT INFECTION WITH HEMATURIA, SITE UNSPECIFIED: ICD-10-CM

## 2025-04-17 DIAGNOSIS — Z79.4 TYPE 2 DIABETES MELLITUS WITH DIABETIC NEUROPATHY, WITH LONG-TERM CURRENT USE OF INSULIN: ICD-10-CM

## 2025-04-17 DIAGNOSIS — E11.40 TYPE 2 DIABETES MELLITUS WITH DIABETIC NEUROPATHY, WITH LONG-TERM CURRENT USE OF INSULIN: ICD-10-CM

## 2025-04-17 DIAGNOSIS — E78.5 HYPERLIPIDEMIA DUE TO TYPE 2 DIABETES MELLITUS: ICD-10-CM

## 2025-04-17 DIAGNOSIS — R31.9 URINARY TRACT INFECTION WITH HEMATURIA, SITE UNSPECIFIED: ICD-10-CM

## 2025-04-17 DIAGNOSIS — I15.2 HYPERTENSION ASSOCIATED WITH DIABETES: ICD-10-CM

## 2025-04-17 DIAGNOSIS — R39.198 DIFFICULTY URINATING: ICD-10-CM

## 2025-04-17 DIAGNOSIS — E11.69 HYPERLIPIDEMIA DUE TO TYPE 2 DIABETES MELLITUS: ICD-10-CM

## 2025-04-17 DIAGNOSIS — R39.198 DIFFICULTY URINATING: Primary | ICD-10-CM

## 2025-04-17 LAB
BILIRUB BLD-MCNC: NEGATIVE MG/DL
CLARITY, POC: ABNORMAL
COLOR UR: YELLOW
GLUCOSE UR STRIP-MCNC: ABNORMAL MG/DL
KETONES UR QL: NEGATIVE
LEUKOCYTE EST, POC: ABNORMAL
NITRITE UR-MCNC: POSITIVE MG/ML
PH UR: 5.5 [PH] (ref 5–8)
PROT UR STRIP-MCNC: ABNORMAL MG/DL
RBC # UR STRIP: ABNORMAL /UL
SP GR UR: 1.03 (ref 1–1.03)
UROBILINOGEN UR QL: NORMAL

## 2025-04-17 PROCEDURE — 80053 COMPREHEN METABOLIC PANEL: CPT | Performed by: FAMILY MEDICINE

## 2025-04-17 PROCEDURE — 87186 SC STD MICRODIL/AGAR DIL: CPT | Performed by: FAMILY MEDICINE

## 2025-04-17 PROCEDURE — 83036 HEMOGLOBIN GLYCOSYLATED A1C: CPT | Performed by: FAMILY MEDICINE

## 2025-04-17 PROCEDURE — 36415 COLL VENOUS BLD VENIPUNCTURE: CPT

## 2025-04-17 PROCEDURE — 85025 COMPLETE CBC W/AUTO DIFF WBC: CPT | Performed by: FAMILY MEDICINE

## 2025-04-17 PROCEDURE — 87086 URINE CULTURE/COLONY COUNT: CPT | Performed by: FAMILY MEDICINE

## 2025-04-17 PROCEDURE — 87077 CULTURE AEROBIC IDENTIFY: CPT | Performed by: FAMILY MEDICINE

## 2025-04-17 RX ORDER — FLUCONAZOLE 150 MG/1
150 TABLET ORAL ONCE
Qty: 1 TABLET | Refills: 0 | Status: SHIPPED | OUTPATIENT
Start: 2025-04-17 | End: 2025-04-17

## 2025-04-17 RX ORDER — CEFDINIR 300 MG/1
300 CAPSULE ORAL 2 TIMES DAILY
Qty: 14 CAPSULE | Refills: 0 | Status: SHIPPED | OUTPATIENT
Start: 2025-04-17

## 2025-04-17 RX ORDER — INSULIN GLARGINE 100 [IU]/ML
INJECTION, SOLUTION SUBCUTANEOUS
Qty: 36 ML | Refills: 1 | Status: SHIPPED | OUTPATIENT
Start: 2025-04-17

## 2025-04-18 LAB
ALBUMIN SERPL-MCNC: 4.5 G/DL (ref 3.5–5.2)
ALBUMIN/GLOB SERPL: 1.5 G/DL
ALP SERPL-CCNC: 132 U/L (ref 39–117)
ALT SERPL W P-5'-P-CCNC: 19 U/L (ref 1–33)
ANION GAP SERPL CALCULATED.3IONS-SCNC: 11.9 MMOL/L (ref 5–15)
AST SERPL-CCNC: 18 U/L (ref 1–32)
BASOPHILS # BLD AUTO: 0.05 10*3/MM3 (ref 0–0.2)
BASOPHILS NFR BLD AUTO: 0.5 % (ref 0–1.5)
BILIRUB SERPL-MCNC: 0.6 MG/DL (ref 0–1.2)
BUN SERPL-MCNC: 33 MG/DL (ref 8–23)
BUN/CREAT SERPL: 32 (ref 7–25)
CALCIUM SPEC-SCNC: 10.3 MG/DL (ref 8.6–10.5)
CHLORIDE SERPL-SCNC: 101 MMOL/L (ref 98–107)
CO2 SERPL-SCNC: 25.1 MMOL/L (ref 22–29)
CREAT SERPL-MCNC: 1.03 MG/DL (ref 0.57–1)
DEPRECATED RDW RBC AUTO: 42.4 FL (ref 37–54)
EGFRCR SERPLBLD CKD-EPI 2021: 58.3 ML/MIN/1.73
EOSINOPHIL # BLD AUTO: 0.19 10*3/MM3 (ref 0–0.4)
EOSINOPHIL NFR BLD AUTO: 1.8 % (ref 0.3–6.2)
ERYTHROCYTE [DISTWIDTH] IN BLOOD BY AUTOMATED COUNT: 13.7 % (ref 12.3–15.4)
GLOBULIN UR ELPH-MCNC: 3 GM/DL
GLUCOSE SERPL-MCNC: 217 MG/DL (ref 65–99)
HBA1C MFR BLD: 9.3 % (ref 4.8–5.6)
HCT VFR BLD AUTO: 39.2 % (ref 34–46.6)
HGB BLD-MCNC: 13.3 G/DL (ref 12–15.9)
IMM GRANULOCYTES # BLD AUTO: 0.03 10*3/MM3 (ref 0–0.05)
IMM GRANULOCYTES NFR BLD AUTO: 0.3 % (ref 0–0.5)
LYMPHOCYTES # BLD AUTO: 2.25 10*3/MM3 (ref 0.7–3.1)
LYMPHOCYTES NFR BLD AUTO: 21 % (ref 19.6–45.3)
MCH RBC QN AUTO: 28.5 PG (ref 26.6–33)
MCHC RBC AUTO-ENTMCNC: 33.9 G/DL (ref 31.5–35.7)
MCV RBC AUTO: 83.9 FL (ref 79–97)
MONOCYTES # BLD AUTO: 0.66 10*3/MM3 (ref 0.1–0.9)
MONOCYTES NFR BLD AUTO: 6.2 % (ref 5–12)
NEUTROPHILS NFR BLD AUTO: 7.52 10*3/MM3 (ref 1.7–7)
NEUTROPHILS NFR BLD AUTO: 70.2 % (ref 42.7–76)
NRBC BLD AUTO-RTO: 0 /100 WBC (ref 0–0.2)
PLATELET # BLD AUTO: 336 10*3/MM3 (ref 140–450)
PMV BLD AUTO: 10.7 FL (ref 6–12)
POTASSIUM SERPL-SCNC: 4.6 MMOL/L (ref 3.5–5.2)
PROT SERPL-MCNC: 7.5 G/DL (ref 6–8.5)
RBC # BLD AUTO: 4.67 10*6/MM3 (ref 3.77–5.28)
SODIUM SERPL-SCNC: 138 MMOL/L (ref 136–145)
WBC NRBC COR # BLD AUTO: 10.7 10*3/MM3 (ref 3.4–10.8)

## 2025-04-19 LAB — BACTERIA SPEC AEROBE CULT: ABNORMAL

## 2025-05-05 NOTE — PROGRESS NOTES
"Breanna Kaba     VITALS: Blood pressure 132/68, pulse 77, temperature 97.1 °F (36.2 °C), temperature source Temporal, resp. rate 16, height 157.5 cm (62\"), weight 66.9 kg (147 lb 6.4 oz), SpO2 98%, not currently breastfeeding.    Subjective  Chief Complaint  Nausea, Difficulty Urinating, Blood in Urine, and Urinary Frequency    Subjective          History of Present Illness:    History of Present Illness  The patient is a 69-year-old female with medical conditions significant for type 2 diabetes, hypertension, and hyperlipidemia who presents to the clinic for a medical follow-up.    She was scheduled for a colonoscopy on 07/21/2025, but it was postponed due to her recent need for cardiac workup.    No complaints regarding medications.     The following portions of the patient's history were reviewed and updated as appropriate: allergies, current medications, past family history, past medical history, past social history, past surgical history and problem list.    Past Medical History  Past Medical History:   Diagnosis Date    Acute congestive heart failure, unspecified heart failure type 03/28/2022    Arthritis     Chronic pain     Coronary artery disease involving native coronary artery of native heart without angina pectoris 4/25/2024    Diabetes mellitus     Elevated cholesterol     GERD (gastroesophageal reflux disease)     Gout     Headache     Hypertension     Myocardial infarction     Neuropathy        Surgical History  Past Surgical History:   Procedure Laterality Date    BREAST BIOPSY Left 1988    benign    CARDIAC CATHETERIZATION N/A 03/31/2022    Procedure: Left Heart Cath;  Surgeon: Tristan Marin MD;  Location:  [facility];  Service: Cardiology;  Laterality: N/A;    CARDIAC CATHETERIZATION N/A 03/31/2022    Procedure: Percutaneous Coronary Intervention;  Surgeon: Tristan Marin MD;  Location:  [facility];  Service: Cardiology;  Laterality: N/A;    CARDIAC " "CATHETERIZATION N/A 5/8/2024    Procedure: Left Heart Cath;  Surgeon: Shu Regalado MD;  Location:  [facility];  Service: Cardiology;  Laterality: N/A;    CARDIAC CATHETERIZATION N/A 5/8/2024    Procedure: Percutaneous Coronary Intervention;  Surgeon: Shu Regalado MD;  Location:  [facility];  Service: Cardiology;  Laterality: N/A;    COLONOSCOPY N/A 02/17/2020    Procedure: COLONOSCOPY FOR SCREENING CPT CODE: ;  Surgeon: Mariano Prescott MD;  Location: [facility] OR;  Service: Gastroenterology;  Laterality: N/A;    CORONARY STENT PLACEMENT      HYSTERECTOMY      Partial    KNEE SURGERY      THYROIDECTOMY, PARTIAL      Removal of goiter       Family History  Family History   Problem Relation Age of Onset    COPD Mother     Heart disease Brother     Heart attack Brother     Hypertension Daughter     Diabetes Daughter     Hypertension Daughter     Diabetes Daughter     Breast cancer Neg Hx        Social History  Social History     Socioeconomic History    Marital status:    Tobacco Use    Smoking status: Never     Passive exposure: Past    Smokeless tobacco: Never   Vaping Use    Vaping status: Never Used   Substance and Sexual Activity    Alcohol use: No    Drug use: No    Sexual activity: Defer       Objective   Vital Signs:   /68 (BP Location: Right arm, Patient Position: Sitting, Cuff Size: Adult)   Pulse 77   Temp 97.1 °F (36.2 °C) (Temporal)   Resp 16   Ht 157.5 cm (62\")   Wt 66.9 kg (147 lb 6.4 oz)   SpO2 98%   BMI 26.96 kg/m²       Physical Exam     Physical Exam      Gen: Patient in NAD. Pleasant and answers appropriately. A&Ox3.    Skin: Warm and dry with normal turgor. No purpura, rashes, or unusual pigmentation noted. Hair is normal in appearance and distribution.    HEENT: NC/AT. No lesions noted. Conjunctiva clear, sclera nonicteric. PERRL. EOMI without nystagmus or strabismus. Fundi appear benign. No hemorrhages or exudates of eyes. " Auditory canals are patent bilaterally without lesions. TMs intact,  nonerythematous, nonbulging without lesions. Nasal mucosa erythematous, and nonedematous. Frontal and maxillary sinuses are nontender. O/P erythematous and moist without exudate.    Neck: Supple without lymph nodes palpated. FROM. No carotid bruits appreciated bilaterally.    Lungs: Decreased B/L without rales, rhonchi, crackles, or wheezes.    Heart: RRR. S1 and S2 normal. No S3 or S4. No MRGT.    Abd: Soft, nontender,nondistended. (+)BSx4 quadrants.     Extrem: No CC.  Trace edema bilateral lower extremities.  Radial pulses 2+/4 and equal B/L. FROMx4.  Joint tenderness noted.    Neuro: No focal motor/sensory deficits.    Procedures    Result Review :   The following data was reviewed by: Myrna Ramos MD on 04/17/2025:       Results             Assessment and Plan      Breanna Kaba is a 69 y.o. here for medical followup.    Diagnoses and all orders for this visit:    1. Difficulty urinating (Primary)  -     POC Urinalysis Dipstick  -     CBC Auto Differential; Future  -     Comprehensive Metabolic Panel; Future    2. Type 2 diabetes mellitus with diabetic neuropathy, with long-term current use of insulin  -     CBC Auto Differential; Future  -     Comprehensive Metabolic Panel; Future  -     Hemoglobin A1c; Future  -     Insulin Glargine (BASAGLAR KWIKPEN) 100 UNIT/ML injection pen; Inject 22 units in the AM, 16 units at night  Dispense: 36 mL; Refill: 1    3. Urinary tract infection with hematuria, site unspecified  -     CBC Auto Differential; Future  -     Comprehensive Metabolic Panel; Future  -     Urine Culture - Urine, Urine, Clean Catch; Future  -     cefdinir (OMNICEF) 300 MG capsule; Take 1 capsule by mouth 2 (Two) Times a Day.  Dispense: 14 capsule; Refill: 0  -     fluconazole (Diflucan) 150 MG tablet; Take 1 tablet by mouth 1 (One) Time for 1 dose.  Dispense: 1 tablet; Refill: 0    4. Hyperlipidemia due to type 2 diabetes  mellitus    5. Hypertension associated with diabetes        Assessment & Plan  1. Type 2 Diabetes Mellitus.  Her blood glucose levels have been poorly controlled recently. She is advised to quit smoking to help manage her diabetes better. She should continue her current medication regimen and monitor her blood sugar levels closely. If her blood sugar levels do not improve, adjustments to her medication may be necessary.    2. Hypertension.  She should continue her current antihypertensive medications. Regular monitoring of her blood pressure is recommended to ensure it remains within the target range.    3. Hyperlipidemia.  She should continue her current lipid-lowering therapy. Regular lipid profile monitoring is recommended to ensure her levels remain within the target range.    4. Health Maintenance.  She was supposed to have a colonoscopy, but it was canceled due to a recent heart attack. It is rescheduled for 07/21/2025.            Patient or patient representative verbalized consent for the use of Ambient Listening during the visit with  Myrna Ramos MD for chart documentation. 5/4/2025  22:52 EDT        Follow Up   Return in about 6 weeks (around 5/29/2025), or (cancel 4/24).  Findings and plans discussed with patient who verbalizes understanding and agreement. Will followup with patient once results are in. Patient was given instructions and counseling regarding her condition or for health maintenance advice. Please see specific information pulled into the AVS if appropriate.       Myrna Ramos MD

## 2025-05-12 ENCOUNTER — SPECIALTY PHARMACY (OUTPATIENT)
Dept: PHARMACY | Facility: HOSPITAL | Age: 70
End: 2025-05-12
Payer: MEDICARE

## 2025-05-12 NOTE — PROGRESS NOTES
Specialty Pharmacy Patient Management Program  Refill Outreach     Breanna was contacted today regarding refills of their medication(s).    Refill Questions      Flowsheet Row Most Recent Value   Changes to allergies? No   Changes to medications? No   New conditions or infections since last clinic visit No   Unplanned office visit, urgent care, ED, or hospital admission in the last 4 weeks  No   How does patient/caregiver feel medication is working? Good   Financial problems or insurance changes  No   Since the previous refill, were any specialty medication doses or scheduled injections missed or delayed?  No   Does this patient require a clinical escalation to a pharmacist? No            Delivery Questions      Flowsheet Row Most Recent Value   Delivery method [employer]   Delivery address verified with patient/caregiver? Yes   Delivery address Home   Medication(s) being filled and delivered Evolocumab (REPATHA)   Copay verified? Yes   Copay amount $0   Copay form of payment No copayment ($0)   Delivery Date Selection 05/14/25   Signature Required No   Do you consent to receive electronic handouts?  No                 Follow-up: 84 day(s)     Jennifer Casillas, Pharmacy Technician  5/12/2025  10:14 EDT

## 2025-05-13 ENCOUNTER — TELEPHONE (OUTPATIENT)
Dept: FAMILY MEDICINE CLINIC | Facility: CLINIC | Age: 70
End: 2025-05-13
Payer: MEDICARE

## 2025-05-13 NOTE — TELEPHONE ENCOUNTER
Breanna called reports her back is hurting she has dysuria & frequency,wears a Kotex & has had some spots on blood on it also,can you call something in or does she need to have another urine checked,same symptoms as last month.

## 2025-05-14 ENCOUNTER — LAB (OUTPATIENT)
Dept: FAMILY MEDICINE CLINIC | Facility: CLINIC | Age: 70
End: 2025-05-14
Payer: MEDICARE

## 2025-05-14 DIAGNOSIS — R39.198 DIFFICULTY URINATING: ICD-10-CM

## 2025-05-14 DIAGNOSIS — R39.198 DIFFICULTY URINATING: Primary | ICD-10-CM

## 2025-05-14 PROCEDURE — 87086 URINE CULTURE/COLONY COUNT: CPT | Performed by: FAMILY MEDICINE

## 2025-05-14 NOTE — TELEPHONE ENCOUNTER
She said that she feels like it might be both because it cleared up and then came back. Still having some blood on pads, especially in the morning.

## 2025-05-14 NOTE — TELEPHONE ENCOUNTER
Did the cefdinir make the UTI go away? Or did it just temper the symptoms and it has flared up again?

## 2025-05-15 ENCOUNTER — TELEPHONE (OUTPATIENT)
Dept: FAMILY MEDICINE CLINIC | Facility: CLINIC | Age: 70
End: 2025-05-15
Payer: MEDICARE

## 2025-05-15 LAB — BACTERIA SPEC AEROBE CULT: NO GROWTH

## 2025-05-15 NOTE — TELEPHONE ENCOUNTER
Pt states probably not drinking enough water. Blood sugar has been varying up and down, not consistent. Symptoms are getting somewhat worse. States it's like she can't urinate. Often wonders if it's one of her medications causing the problems. Wants to know what she needs to do.

## 2025-05-15 NOTE — TELEPHONE ENCOUNTER
Caller: Breanna Kaba    Relationship: Self    Best call back number: [phone]     What was the call regarding: PATIENT WOULD LIKE A CALL BACK TO DISCUSS THE RECENT RESULTS OF URINALYSIS.  PLEASE CALL TO DISCUSS THE RESULTS.

## 2025-05-15 NOTE — TELEPHONE ENCOUNTER
It's normal. Is she drinking enough water? How are her glucose levels? Are her symptoms worsening?

## 2025-05-15 NOTE — TELEPHONE ENCOUNTER
It might be her trospium. Have her drink more water and cut the trospium in half. 1/2 tablet twice a day. Otherwise would just take a tablet at night.

## 2025-06-04 RX ORDER — EVOLOCUMAB 140 MG/ML
140 INJECTION, SOLUTION SUBCUTANEOUS
Qty: 6 ML | Refills: 2 | Status: SHIPPED | OUTPATIENT
Start: 2025-06-04

## 2025-06-10 ENCOUNTER — TELEPHONE (OUTPATIENT)
Dept: FAMILY MEDICINE CLINIC | Facility: CLINIC | Age: 70
End: 2025-06-10
Payer: MEDICARE

## 2025-06-10 NOTE — TELEPHONE ENCOUNTER
Spoke with Breanna she reports they did not do anything but the Pap & did not recommend anything further.Will continue the Plavix,as to her heart rate they told her they could barely hear it so she took it as low,did not say a number.

## 2025-06-10 NOTE — TELEPHONE ENCOUNTER
No. She needs to continue the plavix. It is for her heart. Did they do a pelvic ultrasound? Recommend an endometrial biopsy? Those are all the next steps for vaginal bleeding. Could also be from the rectocele.    How low was the heart rate? They have 90 in the chart note.

## 2025-06-10 NOTE — TELEPHONE ENCOUNTER
Breanna mchugh stated [facility] (Pulled note in)for vaginal bleeding,had a Pap was notified it was normal,could not find a reason for her Vaginal bleeding,she states she is still having the same bleeding,also thinks you told her to stop Plavix on last visit? She has not been wants to clarify if you told her that.They also told her that she had a low heart rate that they could barely feel or hear it.

## 2025-06-11 NOTE — TELEPHONE ENCOUNTER
Last labs were done 4/17. Please set her up for an appointment for re-evaluation and discussion about further steps.

## 2025-06-12 ENCOUNTER — SPECIALTY PHARMACY (OUTPATIENT)
Dept: PHARMACY | Facility: HOSPITAL | Age: 70
End: 2025-06-12
Payer: MEDICARE

## 2025-06-12 DIAGNOSIS — E78.2 MIXED HYPERLIPIDEMIA: Primary | ICD-10-CM

## 2025-06-12 RX ORDER — EVOLOCUMAB 140 MG/ML
140 INJECTION, SOLUTION SUBCUTANEOUS
Qty: 6 ML | Refills: 2 | Status: SHIPPED | OUTPATIENT
Start: 2025-06-12

## 2025-06-12 NOTE — PROGRESS NOTES
Medication Management Clinic/ Specialty Pharmacy Patient Management Program  Lipid Management Program - PCSK9i Initial Assessment     Breanna Kaba is a 70 y.o. female referred by their provider, Da Fox, to the Hyperlipidemia Patient Management program offered by [facility] for Lipid Management.  Breanna Kaba is  treated for clinical ASCVD and hyperlipidemia and currently takes Repatha sureclick and atorvastatin 80 mg daily.  Patient has not tried anything else for cholesterol in the past. The patient denies any allergies to latex.        A follow-up outreach was conducted, including assessment of continued therapy appropriateness, medication adherence, and side effect incidence and management for Repatha . The patient denies any trouble giving themself the injection.  They deny missing doses or adverse effects.     Pt reports that she has been having vaginal discharge/bleeding and has had appropraite follow up with both OBGYN and with Dr. Ramos. Patient continues to follow. Pt is also scheduled for a colonoscopy in July.     Initial Start Date of PCSK9i: 5/21/24  Initial LDL: 75    Changes to Insurance Coverage or Financial Support  [employer] + [employer]    Relevant Past Medical History and Comorbidities  Relevant medical history and concomitant health conditions were discussed with the patient. The patient's chart has been reviewed for relevant past medical history and comorbid health conditions and updated as necessary.   Past Medical History:   Diagnosis Date    Acute congestive heart failure, unspecified heart failure type 03/28/2022    Arthritis     Chronic pain     Coronary artery disease involving native coronary artery of native heart without angina pectoris 4/25/2024    Diabetes mellitus     Elevated cholesterol     GERD (gastroesophageal reflux disease)     Gout     Headache     Hypertension     Myocardial infarction     Neuropathy      Social  History     Socioeconomic History    Marital status:    Tobacco Use    Smoking status: Never     Passive exposure: Past    Smokeless tobacco: Never   Vaping Use    Vaping status: Never Used   Substance and Sexual Activity    Alcohol use: No    Drug use: No    Sexual activity: Defer     Problem list reviewed by Randa Salazar, Pietro on 6/12/2025 at  3:38 PM    Hospitalizations and Urgent Care Since Last Assessment  ED Visits, Admissions, or Hospitalizations: none  Urgent Office Visits: none    Allergies  Known allergies and reactions were discussed with the patient. The patient's chart has been reviewed for allergy information and updated as necessary.   Allergies   Allergen Reactions    Asa [Aspirin] GI Intolerance    Naproxen Nausea And Vomiting    Penicillins Hives and Rash     Allergies reviewed by Randa Salazar PharmD on 6/12/2025 at  3:34 PM    Relevant Laboratory Values  Relevant laboratory values were discussed with the patient. The following specialty medication dose adjustment(s) are recommended: none    Lab Results   Component Value Date    GLUCOSE 217 (H) 04/17/2025    CALCIUM 10.3 04/17/2025     04/17/2025    K 4.6 04/17/2025    CO2 25.1 04/17/2025     04/17/2025    BUN 33 (H) 04/17/2025    CREATININE 1.03 (H) 04/17/2025    EGFRIFNONA 73 02/25/2022    BCR 32.0 (H) 04/17/2025    ANIONGAP 11.9 04/17/2025     Lab Results   Component Value Date    CHOL 150 05/09/2024    CHLPL 256 (H) 03/01/2017    TRIG 217 (H) 05/09/2024    HDL 39 (L) 05/09/2024    LDL 75 05/09/2024       Current Medication List  This medication list has been reviewed with the patient and evaluated for any interactions or necessary modifications/recommendations, and updated to include all prescription medications, OTC medications, and supplements the patient is currently taking.  This list reflects what is contained in the patient's profile, which has also been marked as reviewed to communicate to other  providers it is the most up to date version of the patient's current medication therapy.     Current Outpatient Medications:     allopurinol (ZYLOPRIM) 300 MG tablet, Take 1 tablet by mouth Daily., Disp: 90 tablet, Rfl: 1    aspirin 81 MG EC tablet, Take 1 tablet by mouth Daily., Disp: 90 tablet, Rfl: 1    atorvastatin (LIPITOR) 80 MG tablet, Take 1 tablet by mouth Daily., Disp: 90 tablet, Rfl: 1    clopidogrel (PLAVIX) 75 MG tablet, Take one tablet by mouth daily, Disp: 90 tablet, Rfl: 3    Evolocumab (Repatha SureClick) solution auto-injector SureClick injection, Inject 1 mL under the skin into the appropriate area as directed Every 14 (Fourteen) Days., Disp: 6 mL, Rfl: 2    insulin aspart (novoLOG FLEXPEN) 100 UNIT/ML solution pen-injector sc pen, Inject 10-20 Units under the skin into the appropriate area as directed 3 (Three) Times a Day As Needed (blood glucose)., Disp: 60 mL, Rfl: 1    Insulin Glargine (BASAGLAR KWIKPEN) 100 UNIT/ML injection pen, Inject 22 units in the AM, 16 units at night, Disp: 36 mL, Rfl: 1    losartan (Cozaar) 50 MG tablet, Take 1 tablet by mouth Daily., Disp: 90 tablet, Rfl: 1    metoprolol succinate XL (TOPROL-XL) 25 MG 24 hr tablet, Take 0.5 tablets by mouth Daily., Disp: 45 tablet, Rfl: 3    nitroglycerin (NITROSTAT) 0.4 MG SL tablet, 1 under the tongue as needed for angina, may repeat q5mins for up three doses, Disp: 100 tablet, Rfl: 11    spironolactone (ALDACTONE) 25 MG tablet, Take 1 tablet by mouth Daily., Disp: 90 tablet, Rfl: 1    trospium (SANCTURA) 20 MG tablet, Take 1 tablet by mouth Every 12 (Twelve) Hours., Disp: 180 tablet, Rfl: 1    glucose blood test strip, For QID testing  E11.65, Disp: 400 each, Rfl: 1    glucose blood test strip, 1 each by Other route 4 (Four) Times a Day. USE TO TEST SUGAR BEFORE MEALS AND AT BEDTIME, Disp: 400 each, Rfl: 1    glucose monitor monitoring kit, For QID testing  E11.65, Disp: 1 each, Rfl: 0    glucose monitor monitoring kit, ForKENNEDY  testing E11.65 (On sliding scale insulin), Disp: 1 each, Rfl: 0    Insulin Pen Needle (Pen Needles) 32G X 4 MM misc, Use 1 syringe 4 (Four) Times a Day. E11.65, Disp: 400 each, Rfl: 1    Insulin Pen Needle 31G X 8 MM misc, Use 1 stick 4 (Four) Times a Day. USE THREE TIMES A DAY WITH NOVOLOG BEFORE MEALS, Disp: 400 each, Rfl: 1    Lancets misc, For QID testing  E11.65, Disp: 400 each, Rfl: 1    Medicines reviewed by Randa Salazar, PharmD on 6/12/2025 at  3:36 PM    Drug Interactions  No significant drug-drug interactions expected with Repatha according to literature    Adverse Drug Reactions  Medication tolerability: Tolerating with no to minimal ADRs  Medication plan: Continue therapy with normal follow-up  Plan for ADR Management: none    Adherence, Self-Administration, and Current Therapy Problems  Adherence related to the patient's specialty therapy was discussed with the patient. The Adherence segment of this outreach has been reviewed and updated.     Adherence Questions  Linked Medication(s) Assessed: Evolocumab (Repatha SureClick)  On average, how many doses/injections does the patient miss per month?: 0  What are the identified reasons for non-adherence or missed doses? : no problems identified  What is the estimated medication adherence level?: %  Based on the patient/caregiver response and refill history, does this patient require an MTP to track adherence improvements?: no    Additional Barriers to Patient Self-Administration: none  Methods for Supporting Patient Self-Administration: Pt utilizes calendar for reminder    Open Medication Therapy Problems  No medication therapy recommendations to display    Goals of Therapy  Goals related to the patient's specialty therapy were discussed with the patient. The Patient Goals segment of this outreach has been reviewed and updated.   Goals Addressed Today        Specialty Pharmacy General Goal      LDL < 70 mg/dl    12/20/24 MN: LDL 75 mg/dl on  5/9/24, but this was prior to initiation of Repatha. Patient was informed she would need updated LP. Orders are on file    6/12/25 ALANA: Pt has not had updated Lipids since starting Repatha. Have placed orders for patient to complete with Dr. Beck labs on Monday 6/15/25. Outreach scheduled to follow.               Quality of Life Assessment   Quality of Life related to the patient's enrollment in the patient management program and services provided was discussed with the patient. The QOL segment of this outreach has been reviewed and updated.  Quality of Life Improvement Scale: 6-A little better    Reassessment Plan & Follow-Up  1. Medication Therapy Changes: Pt will continue Repatha 140 mg every 2 weeks  2. Related Plans, Therapy Recommendations, or Issues to Be Addressed:    LDL. Needing repeated since starting Repatha. Order placed to complete Monday with Dr. Ramos office. One time out reach to follow.   3. Pharmacist to perform regular assessments no more than (6) months from the previous assessment.  Patient will continue regular follow-up with referring provider.   4. Care Coordinator to set up future refill outreaches, coordinate prescription delivery, and escalate clinical questions to pharmacist.    Attestation  Therapeutic appropriateness: Appropriate   I attest the patient was actively involved in and has agreed to the above plan of care.  If the prescribed therapy is at any point deemed not appropriate based on the current or future assessments, a consultation will be initiated with the patient's specialty care provider to determine the best course of action. The revised plan of therapy will be documented along with any required assessments and/or additional patient education provided.     Randa Schultz. Marie, PharmKENNEDY  6/12/2025  15:49 EDT

## 2025-06-16 ENCOUNTER — OFFICE VISIT (OUTPATIENT)
Dept: FAMILY MEDICINE CLINIC | Facility: CLINIC | Age: 70
End: 2025-06-16
Payer: MEDICARE

## 2025-06-16 VITALS
TEMPERATURE: 96.9 F | SYSTOLIC BLOOD PRESSURE: 120 MMHG | RESPIRATION RATE: 16 BRPM | HEIGHT: 62 IN | WEIGHT: 150.4 LBS | BODY MASS INDEX: 27.68 KG/M2 | HEART RATE: 85 BPM | DIASTOLIC BLOOD PRESSURE: 66 MMHG | OXYGEN SATURATION: 98 %

## 2025-06-16 DIAGNOSIS — Z79.4 TYPE 2 DIABETES MELLITUS WITH DIABETIC NEUROPATHY, WITH LONG-TERM CURRENT USE OF INSULIN: ICD-10-CM

## 2025-06-16 DIAGNOSIS — E78.5 HYPERLIPIDEMIA DUE TO TYPE 2 DIABETES MELLITUS: ICD-10-CM

## 2025-06-16 DIAGNOSIS — E11.40 TYPE 2 DIABETES MELLITUS WITH DIABETIC NEUROPATHY, WITH LONG-TERM CURRENT USE OF INSULIN: ICD-10-CM

## 2025-06-16 DIAGNOSIS — T83.711S EROSION OF VAGINAL MESH, SEQUELA: ICD-10-CM

## 2025-06-16 DIAGNOSIS — E11.69 HYPERLIPIDEMIA DUE TO TYPE 2 DIABETES MELLITUS: ICD-10-CM

## 2025-06-16 DIAGNOSIS — R31.9 HEMATURIA, UNSPECIFIED TYPE: Primary | ICD-10-CM

## 2025-06-16 LAB
BILIRUB BLD-MCNC: NEGATIVE MG/DL
CLARITY, POC: ABNORMAL
COLOR UR: YELLOW
GLUCOSE UR STRIP-MCNC: ABNORMAL MG/DL
KETONES UR QL: NEGATIVE
LEUKOCYTE EST, POC: ABNORMAL
NITRITE UR-MCNC: NEGATIVE MG/ML
PH UR: 6 [PH] (ref 5–8)
PROT UR STRIP-MCNC: ABNORMAL MG/DL
RBC # UR STRIP: ABNORMAL /UL
SP GR UR: 1.02 (ref 1–1.03)
UROBILINOGEN UR QL: NORMAL

## 2025-06-16 PROCEDURE — 3078F DIAST BP <80 MM HG: CPT | Performed by: FAMILY MEDICINE

## 2025-06-16 PROCEDURE — 1159F MED LIST DOCD IN RCRD: CPT | Performed by: FAMILY MEDICINE

## 2025-06-16 PROCEDURE — 87086 URINE CULTURE/COLONY COUNT: CPT | Performed by: FAMILY MEDICINE

## 2025-06-16 PROCEDURE — 3046F HEMOGLOBIN A1C LEVEL >9.0%: CPT | Performed by: FAMILY MEDICINE

## 2025-06-16 PROCEDURE — 81002 URINALYSIS NONAUTO W/O SCOPE: CPT | Performed by: FAMILY MEDICINE

## 2025-06-16 PROCEDURE — 99214 OFFICE O/P EST MOD 30 MIN: CPT | Performed by: FAMILY MEDICINE

## 2025-06-16 PROCEDURE — 3074F SYST BP LT 130 MM HG: CPT | Performed by: FAMILY MEDICINE

## 2025-06-16 PROCEDURE — 1160F RVW MEDS BY RX/DR IN RCRD: CPT | Performed by: FAMILY MEDICINE

## 2025-06-16 PROCEDURE — 1126F AMNT PAIN NOTED NONE PRSNT: CPT | Performed by: FAMILY MEDICINE

## 2025-06-17 ENCOUNTER — LAB (OUTPATIENT)
Dept: FAMILY MEDICINE CLINIC | Facility: CLINIC | Age: 70
End: 2025-06-17
Payer: MEDICARE

## 2025-06-17 DIAGNOSIS — E78.2 MIXED HYPERLIPIDEMIA: ICD-10-CM

## 2025-06-17 LAB
CHOLEST SERPL-MCNC: 85 MG/DL (ref 0–200)
HDLC SERPL-MCNC: 51 MG/DL (ref 40–60)
LDLC SERPL CALC-MCNC: 13 MG/DL (ref 0–100)
LDLC/HDLC SERPL: 0.2 {RATIO}
TRIGL SERPL-MCNC: 118 MG/DL (ref 0–150)
VLDLC SERPL-MCNC: 21 MG/DL (ref 5–40)

## 2025-06-17 PROCEDURE — 80061 LIPID PANEL: CPT | Performed by: PHYSICIAN ASSISTANT

## 2025-06-17 PROCEDURE — 36415 COLL VENOUS BLD VENIPUNCTURE: CPT

## 2025-06-18 ENCOUNTER — RESULTS FOLLOW-UP (OUTPATIENT)
Dept: FAMILY MEDICINE CLINIC | Facility: CLINIC | Age: 70
End: 2025-06-18
Payer: MEDICARE

## 2025-06-18 LAB — BACTERIA SPEC AEROBE CULT: NO GROWTH

## 2025-06-27 NOTE — PAYOR COMM NOTE
"[facility] [place]  [place]  PHONE  [phone]  FAX  [phone]  NPI:  [id number]    PATIENT D/C 4/3/2022    Breanna Kaba (66 y.o. Female)             Date of Birth   1955    Social Security Number       Address   [address] [place] [address]    Home Phone   [phone]    MRN   [id number]       Presybeterian   Non-Lutheran    Marital Status                               Admission Date   3/28/22    Admission Type   Emergency    Admitting Provider   Александр Tobin MD    Attending Provider       Department, Room/Bed   [facility], 3318/1P       Discharge Date   4/3/2022    Discharge Disposition   Home or Self Care    Discharge Destination                               Attending Provider: (none)   Allergies: Asa [Aspirin], Naproxen, Penicillins    Isolation: None   Infection: None   Code Status: CPR   Advance Care Planning Activity    Ht: 157.5 cm (62\")   Wt: 71.7 kg (158 lb 1.6 oz)    Admission Cmt: None   Principal Problem: Acute congestive heart failure, unspecified heart failure type (HCC) [I50.9]                 Active Insurance as of 3/28/2022     Primary Coverage     Payor Plan Insurance Group Employer/Plan Group    [facility] MEDICARE REPLACEMENT [employer] MEDICARE REPLACEMENT      Payor Plan Address Payor Plan Phone Number Payor Plan Fax Number Effective Dates    [address] [address] [phone]  5/1/2021 - None Entered    [facility] [address]       Subscriber Name Subscriber Birth Date Member ID       BREANNA KABA [id number]                 Emergency Contacts      (Rel.) Home Phone Work Phone Mobile Phone    Melanie Finney (Daughter) [phone] -- [phone]              " Attending Attestation (For Attendings USE Only)...

## 2025-07-01 NOTE — PROGRESS NOTES
"Breanna Kaba     VITALS: Blood pressure 120/66, pulse 85, temperature 96.9 °F (36.1 °C), temperature source Temporal, resp. rate 16, height 157.5 cm (62\"), weight 68.2 kg (150 lb 6.4 oz), SpO2 98%, not currently breastfeeding.    Subjective  Chief Complaint  Blood in Urine, Diabetes, Hypertension, and Hyperlipidemia    Subjective          History of Present Illness:    History of Present Illness  The patient is a 70-year-old female with medical conditions significant for type 2 diabetes, hypertension, and hyperlipidemia who presents to the clinic for a medical follow-up.    She reports an increase in her blood glucose levels, which she attributes to stress or dietary factors. She has recently relocated to live with her grandson. Additionally, she mentions weight gain, the cause of which she is uncertain but suspects may be related to stress.    She expresses concern about a potential bladder infection, noting that her pads have been stained with blood, which has since turned brown. She has a history of mesh placement in her vagina to support her bladder, performed at a women's clinic in [place], [place] approximately 20 years ago. A recent visit to the women's clinic revealed that the mesh was protruding into her bladder. She recalls a previous consultation with a nurse practitioner who informed her that the mesh would likely dislodge again in 4 to 5 years. She also reports persistent lower back pain. She underwent a Pap smear at [facility], which yielded normal results.    She is currently on a regimen of Repatha, administered every 14 days, for cholesterol management. She has been advised to undergo a blood test prior to her next dose.    She had a heart attack and stent placement a year ago. She has an appointment with her cardiologist in 07/2025.    PAST SURGICAL HISTORY:  Bladder surgery with mesh placement on 01/26/2009  Heart attack and stent placement in 05/2024    No complaints " regarding medications.     The following portions of the patient's history were reviewed and updated as appropriate: allergies, current medications, past family history, past medical history, past social history, past surgical history and problem list.    Past Medical History  Past Medical History:   Diagnosis Date    Acute congestive heart failure, unspecified heart failure type 03/28/2022    Arthritis     Chronic pain     Coronary artery disease involving native coronary artery of native heart without angina pectoris 4/25/2024    Diabetes mellitus     Elevated cholesterol     GERD (gastroesophageal reflux disease)     Gout     Headache     Hypertension     Myocardial infarction     Neuropathy        Surgical History  Past Surgical History:   Procedure Laterality Date    BREAST BIOPSY Left 1988    benign    CARDIAC CATHETERIZATION N/A 03/31/2022    Procedure: Left Heart Cath;  Surgeon: Tristan Marin MD;  Location: [facility] CATH INVASIVE LOCATION;  Service: Cardiology;  Laterality: N/A;    CARDIAC CATHETERIZATION N/A 03/31/2022    Procedure: Percutaneous Coronary Intervention;  Surgeon: Tristan Marin MD;  Location: [facility] CATH INVASIVE LOCATION;  Service: Cardiology;  Laterality: N/A;    CARDIAC CATHETERIZATION N/A 5/8/2024    Procedure: Left Heart Cath;  Surgeon: hSu Regalado MD;  [facility] CATH INVASIVE LOC[facility];  Service: Cardiology;  Laterality: N/A;    CARDIAC CATHETERIZATION N/A 5/8/2024    Procedure: Percutaneous Coronary Intervention;  Surgeon: Shu Regalado MD;  Location: [facility] CATH INVASIVE LOCATION;  Service: Cardiology;  Laterality: N/A;    COLONOSCOPY N/A 02/17/2020    Procedure: COLONOSCOPY FOR SCREENING CPT CODE: ;  Surgeon: Mariano Prescott MD;  Location: [facility];  Service: Gastroenterology;  Laterality: N/A;    CORONARY STENT PLACEMENT      HYSTERECTOMY      Partial    KNEE SURGERY      THYROIDECTOMY, PARTIAL      Removal of goiter       Family History  Family  "History   Problem Relation Age of Onset    COPD Mother     Heart disease Brother     Heart attack Brother     Hypertension Daughter     Diabetes Daughter     Hypertension Daughter     Diabetes Daughter     Breast cancer Neg Hx        Social History  Social History     Socioeconomic History    Marital status:    Tobacco Use    Smoking status: Never     Passive exposure: Past    Smokeless tobacco: Never   Vaping Use    Vaping status: Never Used   Substance and Sexual Activity    Alcohol use: No    Drug use: No    Sexual activity: Defer       Objective   Vital Signs:   /66 (BP Location: Right arm, Patient Position: Sitting, Cuff Size: Adult)   Pulse 85   Temp 96.9 °F (36.1 °C) (Temporal)   Resp 16   Ht 157.5 cm (62\")   Wt 68.2 kg (150 lb 6.4 oz)   SpO2 98%   BMI 27.51 kg/m²       Physical Exam     Physical Exam      Gen: Patient in NAD. Pleasant and answers appropriately. A&Ox3.    Skin: Warm and dry with normal turgor. No purpura, rashes, or unusual pigmentation noted. Hair is normal in appearance and distribution.    HEENT: NC/AT. No lesions noted. Conjunctiva clear, sclera nonicteric. PERRL. EOMI without nystagmus or strabismus. Fundi appear benign. No hemorrhages or exudates of eyes. Auditory canals are patent bilaterally without lesions. TMs intact,  nonerythematous, bulging without lesions. Nasal mucosa erythematous, and nonedematous. Frontal and maxillary sinuses are nontender. O/P nonerythematous and moist without exudate.    Neck: Supple without lymph nodes palpated. FROM. No carotid bruits appreciated bilaterally.    Lungs: Decreased B/L without rales, rhonchi, crackles, or wheezes.    Heart: RRR. S1 and S2 normal. No S3 or S4. No MRGT.    Abd: Soft, nontender,nondistended. (+)BSx4 quadrants.     Extrem: No CCE. Radial pulses 2+/4 and equal B/L. FROMx4.  Positive joint tenderness noted.    Neuro: No focal motor/sensory deficits.    Procedures    Result Review :   The following data was " reviewed by: Myrna Ramos MD on 06/16/2025:       Results             Assessment and Plan      Breanna Kaba is a 70 y.o. here for medical followup.    Diagnoses and all orders for this visit:    1. Hematuria, unspecified type (Primary)  -     POC Urinalysis Dipstick  -     Ambulatory Referral to Gynecologic Urology  -     Urine Culture - Urine, Urine, Clean Catch; Future  -     Urine Culture - Urine, Urine, Clean Catch    2. Erosion of vaginal mesh, sequela  -     Ambulatory Referral to Gynecologic Urology    3. Type 2 diabetes mellitus with diabetic neuropathy, with long-term current use of insulin    4. Hyperlipidemia due to type 2 diabetes mellitus        Assessment & Plan  1. Type 2 Diabetes Mellitus.  - Elevated blood glucose levels in the morning and evening.  - Increased dosage of Basaglar to 26 units in the morning and 16 units at night.  - Continue with the sliding scale for mealtime insulin.  - Follow-up in 5 weeks to assess the impact of the dosage adjustment.    2.  Hematuria.  - Reports staining on her pad, initially bloody and now brown.  - Urine culture will be ordered to rule out a urinary tract infection (UTI).  - If urine culture is negative, a scan will be considered to further investigate the cause of the symptoms.  - Referral to  for further evaluation of the mesh complication.    3. Hyperlipidemia.  - Currently on Repatha for cholesterol management.  - Fasting cholesterol test will be conducted.  - Advised to fast for 8 hours before the test and come to the clinic in the morning for the blood draw.           Patient or patient representative verbalized consent for the use of Ambient Listening during the visit with  Myrna Ramos MD for chart documentation. 6/30/2025  23:47 EDT      I spent 32 minutes caring for Breanna on this date of service. This time includes time spent by me in the following activities:obtaining and/or reviewing a separately obtained history, performing a  medically appropriate examination and/or evaluation , and counseling and educating the patient/family/caregiver  Follow Up   Return in about 5 weeks (around 7/21/2025), or will come back for fasting labs ([facility] Dr. Looney - urology ).  Findings and plans discussed with patient who verbalizes understanding and agreement. Will followup with patient once results are in. Patient was given instructions and counseling regarding her condition or for health maintenance advice. Please see specific information pulled into the AVS if appropriate.       Myrna Ramos MD

## 2025-07-07 ENCOUNTER — TELEPHONE (OUTPATIENT)
Dept: CARDIOLOGY | Facility: CLINIC | Age: 70
End: 2025-07-07
Payer: MEDICARE

## 2025-07-07 ENCOUNTER — TELEPHONE (OUTPATIENT)
Dept: GASTROENTEROLOGY | Facility: CLINIC | Age: 70
End: 2025-07-07

## 2025-07-07 NOTE — TELEPHONE ENCOUNTER
Caller: Breanna Kaba    Relationship: Self    Best call back number: [phone]    What is the best time to reach you: ANY      What was the call regarding: PATIENT HAS A COLONOSCOPY SCHEDULED FOR 7.21.25 AND NEEDS TO CONFIRM BEFOREHAND THAT SHE IS OK TO HAVE THIS PROCEDURE DONE. DR NELSON TOLD HER SHE HAD TO WAIT A YEAR AFTER HAVING A HEART ATTACK ON 5.12.24. PLEASE CALL AND ADVISE

## 2025-07-07 NOTE — TELEPHONE ENCOUNTER
Pt advised to have them fax us a request if they are needing a cardiac clearance. She expressed understanding.

## 2025-07-08 ENCOUNTER — TELEPHONE (OUTPATIENT)
Dept: FAMILY MEDICINE CLINIC | Facility: CLINIC | Age: 70
End: 2025-07-08
Payer: MEDICARE

## 2025-07-08 NOTE — TELEPHONE ENCOUNTER
Caller: Breanna Kaba    Relationship to patient: Self    Best call back number: [phone]    Chief complaint: CHECKING HER SUGAR PER DR. VARMA    Type of visit: FU    Requested date: BEFORE 07/21/25     If rescheduling, when is the original appointment: 07/21/25 ON WAIT LIST     Additional notes:HAVING A COLONOSCOPY AND WANTS TO HAVE THIS APPOINTMENT PRIOR IF POSSIBLE. IT IS ON THE [address].

## 2025-07-08 NOTE — TELEPHONE ENCOUNTER
Spoke with Breanna she has an appointment here on the [address] but is having a colonoscopy on the [address] so her question is is it ok to wait & be seen the following month,stated you were concerned about her sugar but she has gotten that under control.

## 2025-07-09 NOTE — TELEPHONE ENCOUNTER
Spoke with Breanna she reports mostly  has had a few in the low 200's but that has been diet related.

## 2025-07-14 ENCOUNTER — TELEPHONE (OUTPATIENT)
Dept: GASTROENTEROLOGY | Facility: CLINIC | Age: 70
End: 2025-07-14
Payer: MEDICARE

## 2025-07-14 ENCOUNTER — TELEPHONE (OUTPATIENT)
Dept: CARDIOLOGY | Facility: CLINIC | Age: 70
End: 2025-07-14
Payer: MEDICARE

## 2025-07-14 DIAGNOSIS — Z12.11 ENCOUNTER FOR SCREENING FOR MALIGNANT NEOPLASM OF COLON: Primary | ICD-10-CM

## 2025-07-14 RX ORDER — BISACODYL 5 MG
5 TABLET, DELAYED RELEASE (ENTERIC COATED) ORAL DAILY PRN
Qty: 4 TABLET | Refills: 0 | Status: SHIPPED | OUTPATIENT
Start: 2025-07-14

## 2025-07-14 RX ORDER — POLYETHYLENE GLYCOL 3350 17 G/17G
17 POWDER, FOR SOLUTION ORAL DAILY
Qty: 510 G | Refills: 0 | Status: SHIPPED | OUTPATIENT
Start: 2025-07-14

## 2025-07-14 NOTE — TELEPHONE ENCOUNTER
I called patient, no answer, I left a voicemail explaining that we will need to reschedule her screening colonoscopy scheduled on 07-21. I explained that Dr. Johnson will need to see her prior to providing a clearance for the procedure. Patient has an upcoming appt with Alex on 07-30 so we will work on getting her rescheduled after that. I left my name and number for patient to call me back. Thank you

## 2025-07-14 NOTE — TELEPHONE ENCOUNTER
They are wanting to make sure its okay to do her colonoscopy now. On the previous request from 4/16/25  stated to hold for a month where pt had a stent placed in 05/24.

## 2025-07-15 ENCOUNTER — SPECIALTY PHARMACY (OUTPATIENT)
Dept: PHARMACY | Facility: HOSPITAL | Age: 70
End: 2025-07-15
Payer: MEDICARE

## 2025-07-15 NOTE — PROGRESS NOTES
Specialty Pharmacy Patient Management Program  Refill Outreach     Breanna was contacted today regarding refills of their medication(s).    Refill Questions      Flowsheet Row Most Recent Value   Changes to allergies? No   Changes to medications? No   New conditions or infections since last clinic visit No   Unplanned office visit, urgent care, ED, or hospital admission in the last 4 weeks  No   How does patient/caregiver feel medication is working? Good   Financial problems or insurance changes  No   Since the previous refill, were any specialty medication doses or scheduled injections missed or delayed?  No   Does this patient require a clinical escalation to a pharmacist? No            Delivery Questions      Flowsheet Row Most Recent Value   Delivery method [employer]   Delivery address verified with patient/caregiver? Yes   Delivery address Home   Medication(s) being filled and delivered Evolocumab (REPATHA)   Copay verified? Yes   Copay amount $0   Copay form of payment No copayment ($0)   Delivery Date Selection 07/17/25   Signature Required No                 Follow-up: 84 day(s)     Jennifer Casillas, Pharmacy Technician  7/15/2025  16:06 EDT

## 2025-07-29 ENCOUNTER — LAB (OUTPATIENT)
Dept: FAMILY MEDICINE CLINIC | Facility: CLINIC | Age: 70
End: 2025-07-29
Payer: MEDICARE

## 2025-07-29 ENCOUNTER — OFFICE VISIT (OUTPATIENT)
Dept: FAMILY MEDICINE CLINIC | Facility: CLINIC | Age: 70
End: 2025-07-29
Payer: MEDICARE

## 2025-07-29 VITALS
DIASTOLIC BLOOD PRESSURE: 56 MMHG | TEMPERATURE: 97.7 F | BODY MASS INDEX: 28.23 KG/M2 | WEIGHT: 153.4 LBS | HEIGHT: 62 IN | RESPIRATION RATE: 16 BRPM | OXYGEN SATURATION: 97 % | HEART RATE: 83 BPM | SYSTOLIC BLOOD PRESSURE: 118 MMHG

## 2025-07-29 DIAGNOSIS — E11.69 HYPERLIPIDEMIA DUE TO TYPE 2 DIABETES MELLITUS: ICD-10-CM

## 2025-07-29 DIAGNOSIS — E11.40 TYPE 2 DIABETES MELLITUS WITH DIABETIC NEUROPATHY, WITH LONG-TERM CURRENT USE OF INSULIN: ICD-10-CM

## 2025-07-29 DIAGNOSIS — E11.59 HYPERTENSION ASSOCIATED WITH DIABETES: ICD-10-CM

## 2025-07-29 DIAGNOSIS — R06.02 SHORTNESS OF BREATH: ICD-10-CM

## 2025-07-29 DIAGNOSIS — I15.2 HYPERTENSION ASSOCIATED WITH DIABETES: ICD-10-CM

## 2025-07-29 DIAGNOSIS — Z79.4 TYPE 2 DIABETES MELLITUS WITH DIABETIC NEUROPATHY, WITH LONG-TERM CURRENT USE OF INSULIN: ICD-10-CM

## 2025-07-29 DIAGNOSIS — R53.82 CHRONIC FATIGUE, UNSPECIFIED: ICD-10-CM

## 2025-07-29 DIAGNOSIS — R79.9 ABNORMAL FINDING OF BLOOD CHEMISTRY, UNSPECIFIED: ICD-10-CM

## 2025-07-29 DIAGNOSIS — E78.5 HYPERLIPIDEMIA DUE TO TYPE 2 DIABETES MELLITUS: ICD-10-CM

## 2025-07-29 DIAGNOSIS — R06.02 SHORTNESS OF BREATH: Primary | ICD-10-CM

## 2025-07-29 LAB
ALBUMIN SERPL-MCNC: 4.3 G/DL (ref 3.5–5.2)
ALBUMIN/GLOB SERPL: 1.5 G/DL
ALP SERPL-CCNC: 139 U/L (ref 39–117)
ALT SERPL W P-5'-P-CCNC: 14 U/L (ref 1–33)
ANION GAP SERPL CALCULATED.3IONS-SCNC: 9.9 MMOL/L (ref 5–15)
AST SERPL-CCNC: 19 U/L (ref 1–32)
BASOPHILS # BLD AUTO: 0.05 10*3/MM3 (ref 0–0.2)
BASOPHILS NFR BLD AUTO: 0.6 % (ref 0–1.5)
BILIRUB SERPL-MCNC: 0.5 MG/DL (ref 0–1.2)
BUN SERPL-MCNC: 25 MG/DL (ref 8–23)
BUN/CREAT SERPL: 23.1 (ref 7–25)
CALCIUM SPEC-SCNC: 9.6 MG/DL (ref 8.6–10.5)
CHLORIDE SERPL-SCNC: 101 MMOL/L (ref 98–107)
CO2 SERPL-SCNC: 26.1 MMOL/L (ref 22–29)
CREAT SERPL-MCNC: 1.08 MG/DL (ref 0.57–1)
DEPRECATED RDW RBC AUTO: 38.3 FL (ref 37–54)
EGFRCR SERPLBLD CKD-EPI 2021: 54.7 ML/MIN/1.73
EOSINOPHIL # BLD AUTO: 0.25 10*3/MM3 (ref 0–0.4)
EOSINOPHIL NFR BLD AUTO: 2.8 % (ref 0.3–6.2)
ERYTHROCYTE [DISTWIDTH] IN BLOOD BY AUTOMATED COUNT: 12.8 % (ref 12.3–15.4)
FERRITIN SERPL-MCNC: 89.9 NG/ML (ref 13–150)
GLOBULIN UR ELPH-MCNC: 2.9 GM/DL
GLUCOSE SERPL-MCNC: 227 MG/DL (ref 65–99)
HBA1C MFR BLD: 8.3 % (ref 4.8–5.6)
HCT VFR BLD AUTO: 37.2 % (ref 34–46.6)
HGB BLD-MCNC: 12.4 G/DL (ref 12–15.9)
IMM GRANULOCYTES # BLD AUTO: 0.02 10*3/MM3 (ref 0–0.05)
IMM GRANULOCYTES NFR BLD AUTO: 0.2 % (ref 0–0.5)
IRON 24H UR-MRATE: 54 MCG/DL (ref 37–145)
IRON SATN MFR SERPL: 14 % (ref 20–50)
LYMPHOCYTES # BLD AUTO: 1.93 10*3/MM3 (ref 0.7–3.1)
LYMPHOCYTES NFR BLD AUTO: 22 % (ref 19.6–45.3)
MCH RBC QN AUTO: 27.9 PG (ref 26.6–33)
MCHC RBC AUTO-ENTMCNC: 33.3 G/DL (ref 31.5–35.7)
MCV RBC AUTO: 83.8 FL (ref 79–97)
MONOCYTES # BLD AUTO: 0.6 10*3/MM3 (ref 0.1–0.9)
MONOCYTES NFR BLD AUTO: 6.8 % (ref 5–12)
NEUTROPHILS NFR BLD AUTO: 5.94 10*3/MM3 (ref 1.7–7)
NEUTROPHILS NFR BLD AUTO: 67.6 % (ref 42.7–76)
NRBC BLD AUTO-RTO: 0 /100 WBC (ref 0–0.2)
PEAK FLOW: 250
PEAK FLOW: 250
PEAK FLOW: 280
PLATELET # BLD AUTO: 328 10*3/MM3 (ref 140–450)
PMV BLD AUTO: 10.3 FL (ref 6–12)
POTASSIUM SERPL-SCNC: 4.6 MMOL/L (ref 3.5–5.2)
PROT SERPL-MCNC: 7.2 G/DL (ref 6–8.5)
RBC # BLD AUTO: 4.44 10*6/MM3 (ref 3.77–5.28)
SODIUM SERPL-SCNC: 137 MMOL/L (ref 136–145)
T4 FREE SERPL-MCNC: 1.28 NG/DL (ref 0.92–1.68)
TIBC SERPL-MCNC: 378 MCG/DL (ref 298–536)
TRANSFERRIN SERPL-MCNC: 254 MG/DL (ref 200–360)
TSH SERPL DL<=0.05 MIU/L-ACNC: 1.21 UIU/ML (ref 0.27–4.2)
WBC NRBC COR # BLD AUTO: 8.79 10*3/MM3 (ref 3.4–10.8)

## 2025-07-29 PROCEDURE — 84439 ASSAY OF FREE THYROXINE: CPT | Performed by: FAMILY MEDICINE

## 2025-07-29 PROCEDURE — 82728 ASSAY OF FERRITIN: CPT | Performed by: FAMILY MEDICINE

## 2025-07-29 PROCEDURE — 84466 ASSAY OF TRANSFERRIN: CPT | Performed by: FAMILY MEDICINE

## 2025-07-29 PROCEDURE — 83036 HEMOGLOBIN GLYCOSYLATED A1C: CPT | Performed by: FAMILY MEDICINE

## 2025-07-29 PROCEDURE — 84443 ASSAY THYROID STIM HORMONE: CPT | Performed by: FAMILY MEDICINE

## 2025-07-29 PROCEDURE — 80053 COMPREHEN METABOLIC PANEL: CPT | Performed by: FAMILY MEDICINE

## 2025-07-29 PROCEDURE — 85025 COMPLETE CBC W/AUTO DIFF WBC: CPT | Performed by: FAMILY MEDICINE

## 2025-07-29 PROCEDURE — 83540 ASSAY OF IRON: CPT | Performed by: FAMILY MEDICINE

## 2025-07-29 RX ORDER — INSULIN GLARGINE 100 [IU]/ML
INJECTION, SOLUTION SUBCUTANEOUS
Qty: 36 ML | Refills: 1 | Status: SHIPPED | OUTPATIENT
Start: 2025-07-29

## 2025-07-29 RX ORDER — KRILL/OM-3/DHA/EPA/PHOSPHO/AST 500-110 MG
1 CAPSULE ORAL DAILY
COMMUNITY

## 2025-07-29 RX ORDER — ASCORBIC ACID 500 MG
500 TABLET ORAL DAILY
COMMUNITY

## 2025-07-30 ENCOUNTER — TELEPHONE (OUTPATIENT)
Dept: FAMILY MEDICINE CLINIC | Facility: CLINIC | Age: 70
End: 2025-07-30
Payer: MEDICARE

## 2025-07-31 ENCOUNTER — OFFICE VISIT (OUTPATIENT)
Dept: CARDIOLOGY | Facility: CLINIC | Age: 70
End: 2025-07-31
Payer: MEDICARE

## 2025-07-31 VITALS
HEART RATE: 80 BPM | DIASTOLIC BLOOD PRESSURE: 74 MMHG | OXYGEN SATURATION: 95 % | SYSTOLIC BLOOD PRESSURE: 137 MMHG | HEIGHT: 62 IN | BODY MASS INDEX: 27.71 KG/M2 | WEIGHT: 150.6 LBS

## 2025-07-31 DIAGNOSIS — G47.30 SLEEP APNEA, UNSPECIFIED TYPE: ICD-10-CM

## 2025-07-31 DIAGNOSIS — I25.118 CORONARY ARTERY DISEASE OF NATIVE ARTERY OF NATIVE HEART WITH STABLE ANGINA PECTORIS: Chronic | ICD-10-CM

## 2025-07-31 DIAGNOSIS — R06.09 DYSPNEA ON EXERTION: ICD-10-CM

## 2025-07-31 DIAGNOSIS — I10 ESSENTIAL HYPERTENSION: ICD-10-CM

## 2025-07-31 DIAGNOSIS — I25.10 ASCVD (ARTERIOSCLEROTIC CARDIOVASCULAR DISEASE): Primary | ICD-10-CM

## 2025-07-31 DIAGNOSIS — E78.5 DYSLIPIDEMIA: ICD-10-CM

## 2025-07-31 DIAGNOSIS — I50.22 CHRONIC HFREF (HEART FAILURE WITH REDUCED EJECTION FRACTION): Chronic | ICD-10-CM

## 2025-07-31 NOTE — PROGRESS NOTES
Myrna Ramos MD  Breanna Kaba  1955 07/31/2025    Patient Active Problem List   Diagnosis    Neuropathy    Gout    GERD (gastroesophageal reflux disease)    Diabetes mellitus    Chronic pain    Family history of GI malignancy    History of adenomatous polyp of colon    COVID-19    Chronic HFrEF (heart failure with preserved ejection fraction)    Benign essential hypertension    Mixed hyperlipidemia    Coronary artery disease of native artery of native heart with stable angina pectoris    Erosion of implanted urethral mesh to surrounding organ or tissue, initial encounter    Incontinence of feces    Midline cystocele    Proteinuria    Urge incontinence    Vaginal enterocele    Encounter for screening for malignant neoplasm of colon       Dear Myrna Ramos MD:    Subjective     History of Present Illness:    Chief Complaint   Patient presents with    Surgical Clearance     colonoscopy    Follow-up     routine       Breanna Kaba is a pleasant 70 y.o. female with a past medical history significant for coronary artery disease with history of stenting of the LAD on 3/31/2022 after she presented with an acute NSTEMI. She also had repeat LHC on 5/8/2024 for recurrent chest pain with subsequent PCI to the LAD x2 with other noted disease in the 2nd marginal. Ischemic cardiomyopathy with recovered LVEF now at 50 to 55% previously as low as 20 to 25%, diabetes mellitus, essential hypertension, and dyslipidemia.  No history of tobacco abuse.  She comes in today for cardiology follow-up.       History of Present Illness  The patient presents for a perioperative cardiac evaluation in preparation for a scheduled colonoscopy. The patient has a history of coronary artery disease (CAD) with previous percutaneous coronary intervention (PCI) performed in May 2024, involving two stents placed in the mid-left anterior descending (LAD) artery. The patient has advanced ischemic cardiomyopathy, with left ventricular  ejection fraction (LVEF) having improved from 25% to 61-65%. Comorbid conditions include dyslipidemia, hypertension, and diabetes mellitus.    The patient denies experiencing chest pain but reports dyspnea on exertion, which has been progressively worsening. A recent consultation with Dr. Ramos indicated normal cardiac function. The patient is a non-smoker and has not sought evaluation from a pulmonologist. During previous episodes of arterial blockages, the patient experienced intermittent arm pain and weakness.    The patient is seeking cardiac clearance for the upcoming colonoscopy. Current medications include low-dose aspirin (baby aspirin) and clopidogrel (Plavix).    The patient is also on Repatha (evolocumab) injections administered biweekly, which are well tolerated.    Additionally, the patient reports occasional sleep disturbances, characterized by episodes of insomnia.    SOCIAL HISTORY  Does not smoke.       Allergies   Allergen Reactions    Asa [Aspirin] GI Intolerance    Naproxen Nausea And Vomiting    Penicillins Hives and Rash   :      Current Outpatient Medications:     allopurinol (ZYLOPRIM) 300 MG tablet, Take 1 tablet by mouth Daily., Disp: 90 tablet, Rfl: 1    ascorbic acid (VITAMIN C) 500 MG tablet, Take 1 tablet by mouth Daily., Disp: , Rfl:     aspirin 81 MG EC tablet, Take 1 tablet by mouth Daily., Disp: 90 tablet, Rfl: 1    atorvastatin (LIPITOR) 80 MG tablet, Take 1 tablet by mouth Daily., Disp: 90 tablet, Rfl: 1    clopidogrel (PLAVIX) 75 MG tablet, Take one tablet by mouth daily, Disp: 90 tablet, Rfl: 3    Evolocumab (Repatha SureClick) solution auto-injector SureClick injection, Inject 1 mL under the skin into the appropriate area as directed Every 14 (Fourteen) Days., Disp: 6 mL, Rfl: 2    glucose blood test strip, For QID testing  E11.65, Disp: 400 each, Rfl: 1    glucose monitor monitoring kit, ForQID testing E11.65 (On sliding scale insulin), Disp: 1 each, Rfl: 0    insulin aspart  (novoLOG FLEXPEN) 100 UNIT/ML solution pen-injector sc pen, Inject 10-20 Units under the skin into the appropriate area as directed 3 (Three) Times a Day As Needed (blood glucose)., Disp: 60 mL, Rfl: 1    Insulin Glargine (BASAGLAR KWIKPEN) 100 UNIT/ML injection pen, Inject 24 units in the AM, 16 units at night, Disp: 36 mL, Rfl: 1    Insulin Pen Needle (Pen Needles) 32G X 4 MM misc, Use 1 syringe 4 (Four) Times a Day. E11.65, Disp: 400 each, Rfl: 1    Insulin Pen Needle 31G X 8 MM misc, Use 1 stick 4 (Four) Times a Day. USE THREE TIMES A DAY WITH NOVOLOG BEFORE MEALS, Disp: 400 each, Rfl: 1    Krill Oil (Omega-3) 500 MG capsule, Take 1 capsule by mouth Daily., Disp: , Rfl:     Lancets misc, For QID testing  E11.65, Disp: 400 each, Rfl: 1    losartan (Cozaar) 50 MG tablet, Take 1 tablet by mouth Daily., Disp: 90 tablet, Rfl: 1    metoprolol succinate XL (TOPROL-XL) 25 MG 24 hr tablet, Take 0.5 tablets by mouth Daily., Disp: 45 tablet, Rfl: 3    Misc Natural Products (BEET ROOT PO), Take 1,000 mg by mouth Daily., Disp: , Rfl:     nitroglycerin (NITROSTAT) 0.4 MG SL tablet, 1 under the tongue as needed for angina, may repeat q5mins for up three doses, Disp: 100 tablet, Rfl: 11    NON FORMULARY, Take 1 each by mouth Daily. GLUCOCIL, Disp: , Rfl:     polyethylene glycol (MiraLax) 17 GM/SCOOP powder, Take 17 g by mouth Daily. TAKE 15 CAPFULLS MIXED IN 32 OZ OF LIQUID AT 4PM AND 6PM, Disp: 510 g, Rfl: 0    spironolactone (ALDACTONE) 25 MG tablet, Take 1 tablet by mouth Daily., Disp: 90 tablet, Rfl: 1    trospium (SANCTURA) 20 MG tablet, Take 1 tablet by mouth Every 12 (Twelve) Hours., Disp: 180 tablet, Rfl: 1    The following portions of the patient's history were reviewed and updated as appropriate: allergies, current medications, past family history, past medical history, past social history, past surgical history and problem list.    Social History     Tobacco Use    Smoking status: Never     Passive exposure: Past     "Smokeless tobacco: Never   Vaping Use    Vaping status: Never Used   Substance Use Topics    Alcohol use: No    Drug use: No         Objective   Vitals:    07/31/25 0902   BP: 137/74   Pulse: 80   SpO2: 95%   Weight: 68.3 kg (150 lb 9.6 oz)   Height: 157.5 cm (62\")     Body mass index is 27.55 kg/m².    ROS    Constitutional:       General: Not in acute distress.     Appearance: Healthy appearance. Well-developed and not in distress. Not diaphoretic.   Eyes:      Conjunctiva/sclera: Conjunctivae normal.      Pupils: Pupils are equal, round, and reactive to light.   HENT:      Head: Normocephalic and atraumatic.   Neck:      Vascular: No carotid bruit or JVD.   Pulmonary:      Effort: Pulmonary effort is normal. No respiratory distress.      Breath sounds: Normal breath sounds.   Cardiovascular:      Normal rate. Regular rhythm.   Edema:     Peripheral edema absent.   Skin:     General: Skin is cool.   Neurological:      Mental Status: Alert, oriented to person, place, and time and oriented to person, place and time.         Lab Results   Component Value Date     07/29/2025    K 4.6 07/29/2025     07/29/2025    CO2 26.1 07/29/2025    BUN 25.0 (H) 07/29/2025    CREATININE 1.08 (H) 07/29/2025    GLUCOSE 227 (H) 07/29/2025    CALCIUM 9.6 07/29/2025    AST 19 07/29/2025    ALT 14 07/29/2025    ALKPHOS 139 (H) 07/29/2025     No results found for: \"CKTOTAL\"  Lab Results   Component Value Date    WBC 8.79 07/29/2025    HGB 12.4 07/29/2025    HCT 37.2 07/29/2025     07/29/2025     Lab Results   Component Value Date    INR 0.86 (L) 12/24/2023    INR 1.04 03/28/2022     Lab Results   Component Value Date    MG 1.6 04/02/2022     Lab Results   Component Value Date    TSH 1.210 07/29/2025    CHLPL 256 (H) 03/01/2017    TRIG 118 06/17/2025    HDL 51 06/17/2025    LDL 13 06/17/2025      No results found for: \"BNP\"    During this visit the following were done:  Labs Reviewed []    Labs Ordered []    Radiology " Reports Reviewed []    Radiology Ordered []    PCP Records Reviewed []    Referring Provider Records Reviewed []    ER Records Reviewed []    Hospital Records Reviewed []    History Obtained From Family []    Radiology Images Reviewed []    Other Reviewed []    Records Requested []       Procedures    Assessment & Plan    Diagnosis Plan   1. ASCVD (arteriosclerotic cardiovascular disease)  Adult Transthoracic Echo Complete W/ Cont if Necessary Per Protocol      2. Dyslipidemia        3. Essential hypertension        4. Sleep apnea, unspecified type  Home Sleep Study      5. Coronary artery disease of native artery of native heart with stable angina pectoris        6. Chronic HFrEF (heart failure with preserved ejection fraction)        7. Dyspnea on exertion  Complete PFT - Pre & Post Bronchodilator               Assessment & Plan  1. Cardiac evaluation for colonoscopy:  - Proceed with colonoscopy with moderate cardiovascular risk given history of CAD and ischemic cardiomyopathy with chronic HFrEF.  - Hold Plavix for 5 days.  - Continue baby aspirin if colonoscopy team agrees.    2. Dyspnea on exertion:  - May be from cardiomyopathy; COPD cannot be ruled out.  - Order home sleep study test.  - Order pulmonary function test.  - Order echocardiogram.    3. CAD:  - No anginal symptoms.  - Continue current medications: aspirin, Lipitor, Plavix, Repatha, losartan, metoprolol, spironolactone.    4. Chronic HFrEF:  - Appears euvolemic.  - Continue above regimen.    5. Dyslipidemia:  - Continue Repatha and Lipitor.      No follow-ups on file.    As always, I appreciate very much the opportunity to participate in the cardiovascular care of your patients.      With Best Regards,    Da Fox PA-C    Patient or patient representative verbalized consent for the use of Ambient Listening during the visit with  Da Fox PA-C for chart documentation. 7/31/2025  12:22 EDT

## 2025-08-01 DIAGNOSIS — Z79.4 TYPE 2 DIABETES MELLITUS WITH DIABETIC NEUROPATHY, WITH LONG-TERM CURRENT USE OF INSULIN: ICD-10-CM

## 2025-08-01 DIAGNOSIS — E11.40 TYPE 2 DIABETES MELLITUS WITH DIABETIC NEUROPATHY, WITH LONG-TERM CURRENT USE OF INSULIN: ICD-10-CM

## 2025-08-01 RX ORDER — NITROGLYCERIN 0.4 MG/1
0.4 TABLET SUBLINGUAL
Qty: 100 TABLET | Refills: 0 | Status: SHIPPED | OUTPATIENT
Start: 2025-08-01

## 2025-08-18 ENCOUNTER — ANESTHESIA EVENT (OUTPATIENT)
Dept: PERIOP | Facility: HOSPITAL | Age: 70
End: 2025-08-18
Payer: MEDICARE

## 2025-08-18 ENCOUNTER — ANESTHESIA (OUTPATIENT)
Dept: PERIOP | Facility: HOSPITAL | Age: 70
End: 2025-08-18
Payer: MEDICARE

## 2025-08-18 ENCOUNTER — TELEPHONE (OUTPATIENT)
Dept: FAMILY MEDICINE CLINIC | Facility: CLINIC | Age: 70
End: 2025-08-18
Payer: MEDICARE

## 2025-08-18 ENCOUNTER — HOSPITAL ENCOUNTER (OUTPATIENT)
Facility: HOSPITAL | Age: 70
Setting detail: HOSPITAL OUTPATIENT SURGERY
Discharge: HOME | End: 2025-08-18
Attending: INTERNAL MEDICINE | Admitting: INTERNAL MEDICINE
Payer: MEDICARE

## 2025-08-18 PROCEDURE — 25010000003 LABETALOL 5 MG/ML SOLUTION: Performed by: NURSE ANESTHETIST, CERTIFIED REGISTERED

## 2025-08-18 PROCEDURE — 25010000002 PROPOFOL 10 MG/ML EMULSION: Performed by: NURSE ANESTHETIST, CERTIFIED REGISTERED

## 2025-08-18 RX ORDER — PROPOFOL 10 MG/ML
VIAL (ML) INTRAVENOUS AS NEEDED
Status: DISCONTINUED | OUTPATIENT
Start: 2025-08-18 | End: 2025-08-18 | Stop reason: SURG

## 2025-08-18 RX ORDER — LABETALOL HYDROCHLORIDE 5 MG/ML
INJECTION, SOLUTION INTRAVENOUS AS NEEDED
Status: DISCONTINUED | OUTPATIENT
Start: 2025-08-18 | End: 2025-08-18 | Stop reason: SURG

## 2025-08-18 RX ADMIN — PROPOFOL 30 MG: 10 INJECTION, EMULSION INTRAVENOUS at 11:38

## 2025-08-18 RX ADMIN — PROPOFOL 30 MG: 10 INJECTION, EMULSION INTRAVENOUS at 11:32

## 2025-08-18 RX ADMIN — PROPOFOL 30 MG: 10 INJECTION, EMULSION INTRAVENOUS at 11:21

## 2025-08-18 RX ADMIN — PROPOFOL 50 MG: 10 INJECTION, EMULSION INTRAVENOUS at 11:08

## 2025-08-18 RX ADMIN — Medication 10 MG: at 11:17

## 2025-08-18 RX ADMIN — PROPOFOL 50 MG: 10 INJECTION, EMULSION INTRAVENOUS at 11:16

## 2025-08-18 RX ADMIN — PROPOFOL 50 MG: 10 INJECTION, EMULSION INTRAVENOUS at 11:27

## 2025-08-18 RX ADMIN — PROPOFOL 30 MG: 10 INJECTION, EMULSION INTRAVENOUS at 11:41

## 2025-08-23 ENCOUNTER — RESULTS FOLLOW-UP (OUTPATIENT)
Dept: PERIOP | Facility: HOSPITAL | Age: 70
End: 2025-08-23
Payer: MEDICARE

## 2025-08-25 DIAGNOSIS — I25.10 CORONARY ARTERY DISEASE DUE TO LIPID RICH PLAQUE: ICD-10-CM

## 2025-08-25 DIAGNOSIS — N32.81 OAB (OVERACTIVE BLADDER): ICD-10-CM

## 2025-08-25 DIAGNOSIS — I25.5 ISCHEMIC CARDIOMYOPATHY: ICD-10-CM

## 2025-08-25 DIAGNOSIS — I25.83 CORONARY ARTERY DISEASE DUE TO LIPID RICH PLAQUE: ICD-10-CM

## 2025-08-25 RX ORDER — SPIRONOLACTONE 25 MG/1
25 TABLET ORAL DAILY
Qty: 90 TABLET | Refills: 0 | Status: SHIPPED | OUTPATIENT
Start: 2025-08-25

## 2025-08-25 RX ORDER — TROSPIUM CHLORIDE 20 MG/1
20 TABLET, FILM COATED ORAL EVERY 12 HOURS SCHEDULED
Qty: 180 TABLET | Refills: 0 | Status: SHIPPED | OUTPATIENT
Start: 2025-08-25

## (undated) DEVICE — CATH F5 INF JL 4 100CM: Brand: INFINITI

## (undated) DEVICE — RUNWAY RADL W/TOP PAD

## (undated) DEVICE — CATH F5 INF PIG145 110CM 6SH: Brand: INFINITI

## (undated) DEVICE — NC TREK CORONARY DILATATION CATHETER 2.75 MM X 15 MM / RAPID-EXCHANGE: Brand: NC TREK

## (undated) DEVICE — PK CATH CARD 70

## (undated) DEVICE — HI-TORQUE WHISPER MS GUIDE WIRE .014 STRAIGHT TIP 3.0 CM X 190 CM: Brand: HI-TORQUE WHISPER

## (undated) DEVICE — DEV INFL MONARCH 25W

## (undated) DEVICE — GW J/TP MOVE/CORE 0.035 3MM/TP 145CM

## (undated) DEVICE — Device

## (undated) DEVICE — 6F .070 JL3.5 100CM: Brand: CORDIS

## (undated) DEVICE — CATH F5 INF 3DRC 100CM: Brand: INFINITI

## (undated) DEVICE — ANGIO-SEAL STS PLUS VASCULAR CLOSURE DEVICE: Brand: ANGIO-SEAL

## (undated) DEVICE — SHEATH INTRO SUPERSHEATH JWIRE .035 6F 11CM

## (undated) DEVICE — GLIDESHEATH SLENDER STAINLESS STEEL KIT: Brand: GLIDESHEATH SLENDER

## (undated) DEVICE — KT VLV HEMO MAP ACC PLS LG/BORE MTL/INTRO W/TORQ/DEV

## (undated) DEVICE — GOWN,REINF,POLY,ECL,PP SLV,XL: Brand: MEDLINE

## (undated) DEVICE — GUIDELINER CATHETERS ARE INTENDED TO BE USED IN CONJUNCTION WITH GUIDE CATHETERS TO ACCESS DISCRETE REGIONS OF THE CORONARY AND/OR PERIPHERAL VASCULATURE, AND TO FACILITATE PLACEMENT OF INTERVENTIONAL DEVICES.: Brand: GUIDELINER® V3 CATHETER

## (undated) DEVICE — ST EXT IV SMARTSITE 2VLV SP M LL 5ML IV1

## (undated) DEVICE — Device: Brand: OMNIWIRE PRESSURE GUIDE WIRE

## (undated) DEVICE — DRAPE, RADIAL, STERILE: Brand: MEDLINE

## (undated) DEVICE — BALN TREK MINI OTW 2X15MM

## (undated) DEVICE — TUBING, SUCTION, 1/4" X 20', STRAIGHT: Brand: MEDLINE INDUSTRIES, INC.

## (undated) DEVICE — 6F .070 XB LAD 3.5 100CM: Brand: VISTA BRITE TIP

## (undated) DEVICE — ST EXT IV SMRTSTE 2VLV FIX M LL 6ML 41

## (undated) DEVICE — MANIFLD NAMIC PRECEPTOR INTEGR/TRANSD RT/HND 1/PRT 500PSI

## (undated) DEVICE — Device: Brand: MEDEX

## (undated) DEVICE — TR BAND RADIAL ARTERY COMPRESSION DEVICE: Brand: TR BAND

## (undated) DEVICE — Device: Brand: DEFENDO AIR/WATER/SUCTION AND BIOPSY VALVE

## (undated) DEVICE — MTS LHK BAPTIST CORBIN: Brand: NAMIC

## (undated) DEVICE — LN FLTR ORL/NASL MICROSTREAM NONINTUB A/LNG

## (undated) DEVICE — ASMBL SPK CONTRST CONTRL

## (undated) DEVICE — LN INJ CONTRST FLXCIL HP F/M LL 1200PSI10

## (undated) DEVICE — PAD, DEFIB, ADULT, RADIOTRANS, ZOLL: Brand: MEDLINE

## (undated) DEVICE — DRSNG SURESITE WNDW 4X4.5

## (undated) DEVICE — ST INF PRI SMRTSTE 20DRP 2VLV 24ML 117

## (undated) DEVICE — CONN Y IRR DISP 1P/U

## (undated) DEVICE — SHEATH INTRO SUPERSHEATH SHRT .035 4F 11CM

## (undated) DEVICE — ENDOGATOR AUXILIARY WATER JET CONNECTOR: Brand: ENDOGATOR

## (undated) DEVICE — TREK CORONARY DILATATION CATHETER 2.25 MM X 8 MM / RAPID-EXCHANGE: Brand: TREK

## (undated) DEVICE — SUCTION CANISTER, 1500CC, RIGID: Brand: DEROYAL

## (undated) DEVICE — GW INQW FIX/CORE PTFE J/3MM .035 260CM

## (undated) DEVICE — RADIFOCUS OPTITORQUE ANGIOGRAPHIC CATHETER: Brand: OPTITORQUE

## (undated) DEVICE — ADULT DISPOSABLE SINGLE-PATIENT USE PULSE OXIMETER SENSOR: Brand: NONIN

## (undated) DEVICE — CATH F5 INF JL 3.5 100CM: Brand: INFINITI

## (undated) DEVICE — RADIFOCUS GLIDEWIRE: Brand: GLIDEWIRE

## (undated) DEVICE — SYR LUERLOK 30CC

## (undated) DEVICE — EP LEFT SUBCLAVIAN SHIELD-YELLOW: Brand: RADPAD